# Patient Record
Sex: MALE | Race: WHITE | NOT HISPANIC OR LATINO | Employment: OTHER | URBAN - METROPOLITAN AREA
[De-identification: names, ages, dates, MRNs, and addresses within clinical notes are randomized per-mention and may not be internally consistent; named-entity substitution may affect disease eponyms.]

---

## 2017-02-03 ENCOUNTER — ALLSCRIPTS OFFICE VISIT (OUTPATIENT)
Dept: OTHER | Facility: OTHER | Age: 57
End: 2017-02-03

## 2017-03-04 ENCOUNTER — TRANSCRIBE ORDERS (OUTPATIENT)
Dept: ADMINISTRATIVE | Facility: HOSPITAL | Age: 57
End: 2017-03-04

## 2017-03-04 ENCOUNTER — HOSPITAL ENCOUNTER (OUTPATIENT)
Dept: RADIOLOGY | Facility: MEDICAL CENTER | Age: 57
Discharge: HOME | End: 2017-03-04
Payer: COMMERCIAL

## 2017-03-04 DIAGNOSIS — N20.0 URIC ACID NEPHROLITHIASIS: Primary | ICD-10-CM

## 2017-03-04 DIAGNOSIS — N20.0 URIC ACID NEPHROLITHIASIS: ICD-10-CM

## 2017-03-04 PROCEDURE — 74000 HB X-RAY EXAM OF ABDOMEN (SINGLE ANTEROPOSTERIOR VIEW): CPT

## 2017-04-07 ENCOUNTER — GENERIC CONVERSION - ENCOUNTER (OUTPATIENT)
Dept: OTHER | Facility: OTHER | Age: 57
End: 2017-04-07

## 2017-04-10 ENCOUNTER — GENERIC CONVERSION - ENCOUNTER (OUTPATIENT)
Dept: OTHER | Facility: OTHER | Age: 57
End: 2017-04-10

## 2017-06-26 ENCOUNTER — ALLSCRIPTS OFFICE VISIT (OUTPATIENT)
Dept: OTHER | Facility: OTHER | Age: 57
End: 2017-06-26

## 2017-06-26 ENCOUNTER — TRANSCRIBE ORDERS (OUTPATIENT)
Dept: ADMINISTRATIVE | Facility: HOSPITAL | Age: 57
End: 2017-06-26

## 2017-06-26 DIAGNOSIS — K21.9 GASTRO-ESOPHAGEAL REFLUX DISEASE WITHOUT ESOPHAGITIS: ICD-10-CM

## 2017-06-26 DIAGNOSIS — M19.90 CHRONIC ARTHRITIS: ICD-10-CM

## 2017-06-26 DIAGNOSIS — M19.90 OSTEOARTHRITIS: ICD-10-CM

## 2017-06-26 DIAGNOSIS — Z00.00 ROUTINE GENERAL MEDICAL EXAMINATION AT A HEALTH CARE FACILITY: ICD-10-CM

## 2017-06-26 DIAGNOSIS — Z00.00 ENCOUNTER FOR GENERAL ADULT MEDICAL EXAMINATION WITHOUT ABNORMAL FINDINGS: ICD-10-CM

## 2017-06-26 DIAGNOSIS — R10.12 ABDOMINAL PAIN, LEFT UPPER QUADRANT: ICD-10-CM

## 2017-06-26 DIAGNOSIS — R05.9 COUGH: ICD-10-CM

## 2017-06-26 DIAGNOSIS — R10.12 LEFT UPPER QUADRANT PAIN: ICD-10-CM

## 2017-06-26 DIAGNOSIS — R10.32 LEFT LOWER QUADRANT PAIN: ICD-10-CM

## 2017-06-26 DIAGNOSIS — C62.90 MALIGNANT NEOPLASM OF TESTIS (HCC): ICD-10-CM

## 2017-06-26 DIAGNOSIS — Z13.89 SCREENING FOR GOUT: ICD-10-CM

## 2017-06-26 DIAGNOSIS — R10.32 ABDOMINAL PAIN, LEFT LOWER QUADRANT: ICD-10-CM

## 2017-06-26 DIAGNOSIS — Z13.89 ENCOUNTER FOR SCREENING FOR OTHER DISORDER: ICD-10-CM

## 2017-06-26 DIAGNOSIS — C62.90 MALIGNANT NEOPLASM OF TESTIS, UNSPECIFIED LATERALITY, UNSPECIFIED WHETHER DESCENDED OR UNDESCENDED: Primary | ICD-10-CM

## 2017-06-26 DIAGNOSIS — K21.9 GASTROESOPHAGEAL REFLUX DISEASE WITHOUT ESOPHAGITIS: ICD-10-CM

## 2017-06-27 ENCOUNTER — APPOINTMENT (OUTPATIENT)
Dept: LAB | Facility: MEDICAL CENTER | Age: 57
End: 2017-06-27
Payer: COMMERCIAL

## 2017-06-27 DIAGNOSIS — R10.12 LEFT UPPER QUADRANT PAIN: ICD-10-CM

## 2017-06-27 DIAGNOSIS — K21.9 GASTRO-ESOPHAGEAL REFLUX DISEASE WITHOUT ESOPHAGITIS: ICD-10-CM

## 2017-06-27 DIAGNOSIS — R10.32 LEFT LOWER QUADRANT PAIN: ICD-10-CM

## 2017-06-27 DIAGNOSIS — M19.90 OSTEOARTHRITIS: ICD-10-CM

## 2017-06-27 DIAGNOSIS — Z13.89 ENCOUNTER FOR SCREENING FOR OTHER DISORDER: ICD-10-CM

## 2017-06-27 DIAGNOSIS — C62.90 MALIGNANT NEOPLASM OF TESTIS (HCC): ICD-10-CM

## 2017-06-27 DIAGNOSIS — Z00.00 ENCOUNTER FOR GENERAL ADULT MEDICAL EXAMINATION WITHOUT ABNORMAL FINDINGS: ICD-10-CM

## 2017-06-27 DIAGNOSIS — R05.9 COUGH: ICD-10-CM

## 2017-06-27 LAB
ALBUMIN SERPL BCP-MCNC: 3.6 G/DL (ref 3.5–5)
ALP SERPL-CCNC: 72 U/L (ref 46–116)
ALT SERPL W P-5'-P-CCNC: 41 U/L (ref 12–78)
ANION GAP SERPL CALCULATED.3IONS-SCNC: 7 MMOL/L (ref 4–13)
AST SERPL W P-5'-P-CCNC: 24 U/L (ref 5–45)
BASOPHILS # BLD AUTO: 0.02 THOUSANDS/ΜL (ref 0–0.1)
BASOPHILS NFR BLD AUTO: 0 % (ref 0–1)
BILIRUB SERPL-MCNC: 0.83 MG/DL (ref 0.2–1)
BILIRUB UR QL STRIP: NEGATIVE
BUN SERPL-MCNC: 15 MG/DL (ref 5–25)
CALCIUM SERPL-MCNC: 9.3 MG/DL (ref 8.3–10.1)
CHLORIDE SERPL-SCNC: 102 MMOL/L (ref 100–108)
CLARITY UR: CLEAR
CO2 SERPL-SCNC: 29 MMOL/L (ref 21–32)
COLOR UR: YELLOW
CREAT SERPL-MCNC: 1.01 MG/DL (ref 0.6–1.3)
EOSINOPHIL # BLD AUTO: 0.17 THOUSAND/ΜL (ref 0–0.61)
EOSINOPHIL NFR BLD AUTO: 2 % (ref 0–6)
ERYTHROCYTE [DISTWIDTH] IN BLOOD BY AUTOMATED COUNT: 12.8 % (ref 11.6–15.1)
GFR SERPL CREATININE-BSD FRML MDRD: >60 ML/MIN/1.73SQ M
GLUCOSE P FAST SERPL-MCNC: 84 MG/DL (ref 65–99)
GLUCOSE UR STRIP-MCNC: NEGATIVE MG/DL
HCT VFR BLD AUTO: 46.6 % (ref 36.5–49.3)
HGB BLD-MCNC: 15.7 G/DL (ref 12–17)
HGB UR QL STRIP.AUTO: NEGATIVE
KETONES UR STRIP-MCNC: NEGATIVE MG/DL
LEUKOCYTE ESTERASE UR QL STRIP: NEGATIVE
LIPASE SERPL-CCNC: 127 U/L (ref 73–393)
LYMPHOCYTES # BLD AUTO: 2.13 THOUSANDS/ΜL (ref 0.6–4.47)
LYMPHOCYTES NFR BLD AUTO: 25 % (ref 14–44)
MCH RBC QN AUTO: 30.5 PG (ref 26.8–34.3)
MCHC RBC AUTO-ENTMCNC: 33.7 G/DL (ref 31.4–37.4)
MCV RBC AUTO: 91 FL (ref 82–98)
MONOCYTES # BLD AUTO: 0.9 THOUSAND/ΜL (ref 0.17–1.22)
MONOCYTES NFR BLD AUTO: 10 % (ref 4–12)
NEUTROPHILS # BLD AUTO: 5.42 THOUSANDS/ΜL (ref 1.85–7.62)
NEUTS SEG NFR BLD AUTO: 63 % (ref 43–75)
NITRITE UR QL STRIP: NEGATIVE
NRBC BLD AUTO-RTO: 0 /100 WBCS
PH UR STRIP.AUTO: 6 [PH] (ref 4.5–8)
PLATELET # BLD AUTO: 261 THOUSANDS/UL (ref 149–390)
PMV BLD AUTO: 12 FL (ref 8.9–12.7)
POTASSIUM SERPL-SCNC: 4.5 MMOL/L (ref 3.5–5.3)
PROT SERPL-MCNC: 7.8 G/DL (ref 6.4–8.2)
PROT UR STRIP-MCNC: NEGATIVE MG/DL
RBC # BLD AUTO: 5.15 MILLION/UL (ref 3.88–5.62)
SODIUM SERPL-SCNC: 138 MMOL/L (ref 136–145)
SP GR UR STRIP.AUTO: 1.02 (ref 1–1.03)
TSH SERPL DL<=0.05 MIU/L-ACNC: 3.32 UIU/ML (ref 0.36–3.74)
UROBILINOGEN UR QL STRIP.AUTO: 0.2 E.U./DL
VIT B12 SERPL-MCNC: 1034 PG/ML (ref 100–900)
WBC # BLD AUTO: 8.67 THOUSAND/UL (ref 4.31–10.16)

## 2017-06-27 PROCEDURE — 82607 VITAMIN B-12: CPT

## 2017-06-27 PROCEDURE — 36415 COLL VENOUS BLD VENIPUNCTURE: CPT

## 2017-06-27 PROCEDURE — 83690 ASSAY OF LIPASE: CPT

## 2017-06-27 PROCEDURE — 85025 COMPLETE CBC W/AUTO DIFF WBC: CPT

## 2017-06-27 PROCEDURE — 84443 ASSAY THYROID STIM HORMONE: CPT

## 2017-06-27 PROCEDURE — 87086 URINE CULTURE/COLONY COUNT: CPT

## 2017-06-27 PROCEDURE — 80053 COMPREHEN METABOLIC PANEL: CPT

## 2017-06-27 PROCEDURE — 81003 URINALYSIS AUTO W/O SCOPE: CPT

## 2017-06-28 LAB — BACTERIA UR CULT: NORMAL

## 2017-07-03 ENCOUNTER — HOSPITAL ENCOUNTER (OUTPATIENT)
Dept: CT IMAGING | Facility: HOSPITAL | Age: 57
Discharge: HOME | End: 2017-07-03
Payer: COMMERCIAL

## 2017-07-03 DIAGNOSIS — Z13.89 ENCOUNTER FOR SCREENING FOR OTHER DISORDER: ICD-10-CM

## 2017-07-03 DIAGNOSIS — C62.90 MALIGNANT NEOPLASM OF TESTIS (HCC): ICD-10-CM

## 2017-07-03 DIAGNOSIS — R10.12 LEFT UPPER QUADRANT PAIN: ICD-10-CM

## 2017-07-03 DIAGNOSIS — M19.90 OSTEOARTHRITIS: ICD-10-CM

## 2017-07-03 DIAGNOSIS — R05.9 COUGH: ICD-10-CM

## 2017-07-03 DIAGNOSIS — K21.9 GASTRO-ESOPHAGEAL REFLUX DISEASE WITHOUT ESOPHAGITIS: ICD-10-CM

## 2017-07-03 DIAGNOSIS — Z00.00 ENCOUNTER FOR GENERAL ADULT MEDICAL EXAMINATION WITHOUT ABNORMAL FINDINGS: ICD-10-CM

## 2017-07-03 DIAGNOSIS — R10.32 LEFT LOWER QUADRANT PAIN: ICD-10-CM

## 2017-07-03 PROCEDURE — 74177 CT ABD & PELVIS W/CONTRAST: CPT

## 2017-07-03 RX ADMIN — IOHEXOL 100 ML: 350 INJECTION, SOLUTION INTRAVENOUS at 08:12

## 2017-07-07 ENCOUNTER — GENERIC CONVERSION - ENCOUNTER (OUTPATIENT)
Dept: OTHER | Facility: OTHER | Age: 57
End: 2017-07-07

## 2017-07-07 ENCOUNTER — ALLSCRIPTS OFFICE VISIT (OUTPATIENT)
Dept: OTHER | Facility: OTHER | Age: 57
End: 2017-07-07

## 2018-01-12 VITALS
HEIGHT: 72 IN | DIASTOLIC BLOOD PRESSURE: 77 MMHG | WEIGHT: 235 LBS | SYSTOLIC BLOOD PRESSURE: 123 MMHG | BODY MASS INDEX: 31.83 KG/M2 | HEART RATE: 72 BPM

## 2018-01-13 VITALS
WEIGHT: 232.5 LBS | HEIGHT: 72 IN | DIASTOLIC BLOOD PRESSURE: 80 MMHG | TEMPERATURE: 97.8 F | BODY MASS INDEX: 31.49 KG/M2 | SYSTOLIC BLOOD PRESSURE: 116 MMHG

## 2018-01-13 NOTE — RESULT NOTES
Verified Results  * CT ABDOMEN PELVIS W CONTRAST [id number] 07:31AM Mika Santos Order Number: [id number]    - Patient Instructions: To schedule this appointment, please contact Central Scheduling at [phone]  Test Name Result Flag Reference   CT ABDOMEN PELVIS W CONTRAST (Report)     CT ABDOMEN AND PELVIS WITH IV CONTRAST     INDICATION: Left lower quadrant pain  History of testicular cancer     COMPARISON: 1/14/2014     TECHNIQUE: CT examination of the abdomen and pelvis was performed  Reformatted images were created in axial, sagittal, and coronal planes  Radiation dose length product (DLP) for this visit: 715 mGy-cm   This examination, like all CT scans performed in the [facility], was performed utilizing techniques to minimize radiation dose exposure, including the use of iterative    reconstruction and automated exposure control  IV Contrast: 100 mL of iohexol (OMNIPAQUE) 350   Enteric Contrast: Enteric contrast was not administered  FINDINGS:     ABDOMEN     LOWER CHEST: No significant abnormalities identified in the lower chest      LIVER/BILIARY TREE: Unremarkable  GALLBLADDER: No calcified gallstones  No pericholecystic inflammatory change  SPLEEN: Unremarkable  PANCREAS: Unremarkable  ADRENAL GLANDS: Unremarkable  KIDNEYS/URETERS: There is an approximately 4 mm calculus in the interpolar portion of the right kidney  On the left, there is focal lateral cortical scarring with adjacent calcification, as on the prior study  There is no hydronephrosis  No acute    renal abnormality is seen  STOMACH AND BOWEL: Scattered colonic diverticula are seen  There is mild inflammatory change involving the midportion of the descending colon  Induration of the adjacent adipose tissue is seen  There is no extraluminal abscess or free air  The wall    is mildly thickened  APPENDIX: No findings to suggest appendicitis       ABDOMINOPELVIC CAVITY: No ascites or free intraperitoneal air  No lymphadenopathy  VESSELS: Unremarkable for patient's age  PELVIS     REPRODUCTIVE ORGANS: Unremarkable for patient's age  URINARY BLADDER: Unremarkable  ABDOMINAL WALL/INGUINAL REGIONS: Unremarkable  OSSEOUS STRUCTURES: No acute fracture or destructive osseous lesion  IMPRESSION:       1  Mild inflammatory changes involving the midportion of the descending colon, most consistent with diverticulitis  No extraluminal abscess, free air, or ascites  2  Left renal cortical scarring with adjacent calcification and nonobstructing right renal calculus  No hydronephrosis     3  No intra-abdominal adenopathy or other finding that would suggest recurrent testicular carcinoma        ##imslh##imslh            Workstation performed: [id number]     Signed by:   Pito Portillo MD   7/6/17

## 2018-01-14 VITALS
BODY MASS INDEX: 31.42 KG/M2 | DIASTOLIC BLOOD PRESSURE: 72 MMHG | HEIGHT: 72 IN | WEIGHT: 232 LBS | SYSTOLIC BLOOD PRESSURE: 120 MMHG

## 2018-01-15 RX ORDER — DUTASTERIDE 0.5 MG/1
0.5 CAPSULE, LIQUID FILLED ORAL DAILY
Status: ON HOLD | COMMUNITY
Start: 2017-07-07 | End: 2018-01-18 | Stop reason: ALTCHOICE

## 2018-01-15 RX ORDER — CALCIUM CARBONATE 200(500)MG
1 TABLET,CHEWABLE ORAL AS NEEDED
COMMUNITY
End: 2019-11-22

## 2018-01-15 RX ORDER — ALFUZOSIN HYDROCHLORIDE 10 MG/1
10 TABLET, EXTENDED RELEASE ORAL EVERY MORNING
Status: ON HOLD | COMMUNITY
Start: 2017-06-26 | End: 2018-01-18 | Stop reason: ALTCHOICE

## 2018-01-15 RX ORDER — ACETAMINOPHEN 325 MG/1
650 TABLET ORAL EVERY 6 HOURS PRN
COMMUNITY
End: 2018-05-15

## 2018-01-15 NOTE — PRE-PROCEDURE INSTRUCTIONS
Pre-Surgery Instructions:   Medication Instructions    acetaminophen (TYLENOL) 325 mg tablet Instructed patient per Anesthesia Guidelines   alfuzosin (UROXATRAL) 10 mg 24 hr tablet Instructed patient per Anesthesia Guidelines   aspirin (ECOTRIN) 325 mg EC tablet Instructed patient per Anesthesia Guidelines   calcium carbonate (TUMS) 500 mg chewable tablet Instructed patient per Anesthesia Guidelines   dutasteride (AVODART) 0 5 mg capsule Instructed patient per Anesthesia Guidelines   NON FORMULARY Instructed patient per Anesthesia Guidelines   psyllium (METAMUCIL) 0 52 g capsule Instructed patient per Anesthesia Guidelines  Per anesthesia patient was told to stop NSAIDS, ASA, and supplements and no medications are to be taken on morning of surgery

## 2018-01-17 ENCOUNTER — ANESTHESIA EVENT (OUTPATIENT)
Dept: PERIOP | Facility: HOSPITAL | Age: 58
End: 2018-01-17
Payer: COMMERCIAL

## 2018-01-18 ENCOUNTER — HOSPITAL ENCOUNTER (OUTPATIENT)
Facility: HOSPITAL | Age: 58
Setting detail: OUTPATIENT SURGERY
Discharge: HOME | End: 2018-01-18
Attending: UROLOGY | Admitting: UROLOGY
Payer: COMMERCIAL

## 2018-01-18 ENCOUNTER — ANESTHESIA (OUTPATIENT)
Dept: PERIOP | Facility: HOSPITAL | Age: 58
End: 2018-01-18
Payer: COMMERCIAL

## 2018-01-18 VITALS
BODY MASS INDEX: 30.48 KG/M2 | OXYGEN SATURATION: 98 % | SYSTOLIC BLOOD PRESSURE: 150 MMHG | HEIGHT: 72 IN | RESPIRATION RATE: 16 BRPM | DIASTOLIC BLOOD PRESSURE: 88 MMHG | TEMPERATURE: 97.5 F | HEART RATE: 60 BPM | WEIGHT: 225 LBS

## 2018-01-18 PROCEDURE — L8699 PROSTHETIC IMPLANT NOS: HCPCS | Performed by: UROLOGY

## 2018-01-18 DEVICE — IMPLANT URO PROSTATE UROLIFT: Type: IMPLANTABLE DEVICE | Site: PROSTATE | Status: FUNCTIONAL

## 2018-01-18 RX ORDER — ASPIRIN 325 MG
325 TABLET, DELAYED RELEASE (ENTERIC COATED) ORAL EVERY 6 HOURS PRN
Status: DISCONTINUED | OUTPATIENT
Start: 2018-01-18 | End: 2018-01-18 | Stop reason: HOSPADM

## 2018-01-18 RX ORDER — CIPROFLOXACIN 2 MG/ML
400 INJECTION, SOLUTION INTRAVENOUS ONCE
Status: COMPLETED | OUTPATIENT
Start: 2018-01-18 | End: 2018-01-18

## 2018-01-18 RX ORDER — ONDANSETRON 2 MG/ML
4 INJECTION INTRAMUSCULAR; INTRAVENOUS EVERY 6 HOURS PRN
Status: DISCONTINUED | OUTPATIENT
Start: 2018-01-18 | End: 2018-01-18 | Stop reason: HOSPADM

## 2018-01-18 RX ORDER — CALCIUM CARBONATE 200(500)MG
500 TABLET,CHEWABLE ORAL AS NEEDED
Status: DISCONTINUED | OUTPATIENT
Start: 2018-01-18 | End: 2018-01-18 | Stop reason: HOSPADM

## 2018-01-18 RX ORDER — SODIUM CHLORIDE 9 MG/ML
125 INJECTION, SOLUTION INTRAVENOUS CONTINUOUS
Status: DISCONTINUED | OUTPATIENT
Start: 2018-01-18 | End: 2018-01-18 | Stop reason: HOSPADM

## 2018-01-18 RX ORDER — FENTANYL CITRATE 50 UG/ML
50 INJECTION, SOLUTION INTRAMUSCULAR; INTRAVENOUS
Status: DISCONTINUED | OUTPATIENT
Start: 2018-01-18 | End: 2018-01-18 | Stop reason: HOSPADM

## 2018-01-18 RX ORDER — CIPROFLOXACIN 500 MG/1
500 TABLET, FILM COATED ORAL EVERY 12 HOURS SCHEDULED
Status: DISCONTINUED | OUTPATIENT
Start: 2018-01-18 | End: 2018-01-18 | Stop reason: HOSPADM

## 2018-01-18 RX ORDER — DEXAMETHASONE SODIUM PHOSPHATE 4 MG/ML
INJECTION, SOLUTION INTRA-ARTICULAR; INTRALESIONAL; INTRAMUSCULAR; INTRAVENOUS; SOFT TISSUE AS NEEDED
Status: DISCONTINUED | OUTPATIENT
Start: 2018-01-18 | End: 2018-01-18 | Stop reason: SURG

## 2018-01-18 RX ORDER — ACETAMINOPHEN 325 MG/1
650 TABLET ORAL EVERY 6 HOURS PRN
Status: DISCONTINUED | OUTPATIENT
Start: 2018-01-18 | End: 2018-01-18 | Stop reason: HOSPADM

## 2018-01-18 RX ORDER — FENTANYL CITRATE 50 UG/ML
INJECTION, SOLUTION INTRAMUSCULAR; INTRAVENOUS AS NEEDED
Status: DISCONTINUED | OUTPATIENT
Start: 2018-01-18 | End: 2018-01-18 | Stop reason: SURG

## 2018-01-18 RX ORDER — PROPOFOL 10 MG/ML
INJECTION, EMULSION INTRAVENOUS AS NEEDED
Status: DISCONTINUED | OUTPATIENT
Start: 2018-01-18 | End: 2018-01-18 | Stop reason: SURG

## 2018-01-18 RX ORDER — ALFUZOSIN HYDROCHLORIDE 10 MG/1
10 TABLET, EXTENDED RELEASE ORAL DAILY
COMMUNITY
End: 2018-07-05

## 2018-01-18 RX ORDER — CIPROFLOXACIN 500 MG/1
500 TABLET, FILM COATED ORAL EVERY 12 HOURS SCHEDULED
Qty: 14 TABLET | Refills: 0 | Status: SHIPPED | OUTPATIENT
Start: 2018-01-18 | End: 2018-01-25

## 2018-01-18 RX ORDER — ALFUZOSIN HYDROCHLORIDE 10 MG/1
10 TABLET, EXTENDED RELEASE ORAL DAILY
Status: DISCONTINUED | OUTPATIENT
Start: 2018-01-18 | End: 2018-01-18 | Stop reason: HOSPADM

## 2018-01-18 RX ORDER — MIDAZOLAM HYDROCHLORIDE 1 MG/ML
INJECTION INTRAMUSCULAR; INTRAVENOUS AS NEEDED
Status: DISCONTINUED | OUTPATIENT
Start: 2018-01-18 | End: 2018-01-18 | Stop reason: SURG

## 2018-01-18 RX ADMIN — SODIUM CHLORIDE: 0.9 INJECTION, SOLUTION INTRAVENOUS at 11:30

## 2018-01-18 RX ADMIN — MIDAZOLAM HYDROCHLORIDE 2 MG: 1 INJECTION, SOLUTION INTRAMUSCULAR; INTRAVENOUS at 11:00

## 2018-01-18 RX ADMIN — FENTANYL CITRATE 25 MCG: 50 INJECTION INTRAMUSCULAR; INTRAVENOUS at 11:27

## 2018-01-18 RX ADMIN — PROPOFOL 300 MG: 10 INJECTION, EMULSION INTRAVENOUS at 11:08

## 2018-01-18 RX ADMIN — SODIUM CHLORIDE 125 ML/HR: 0.9 INJECTION, SOLUTION INTRAVENOUS at 09:13

## 2018-01-18 RX ADMIN — CIPROFLOXACIN: 2 INJECTION INTRAVENOUS at 10:59

## 2018-01-18 RX ADMIN — LIDOCAINE HYDROCHLORIDE 100 MG: 20 INJECTION, SOLUTION INTRAVENOUS at 11:08

## 2018-01-18 RX ADMIN — FENTANYL CITRATE 25 MCG: 50 INJECTION INTRAMUSCULAR; INTRAVENOUS at 11:13

## 2018-01-18 RX ADMIN — DEXAMETHASONE SODIUM PHOSPHATE 8 MG: 4 INJECTION, SOLUTION INTRAMUSCULAR; INTRAVENOUS at 11:26

## 2018-01-18 NOTE — DISCHARGE INSTRUCTIONS
Cystoscopy   WHAT YOU NEED TO KNOW:   A cystoscopy is a procedure to look inside of your urethra and bladder using a cystoscope  A cystoscope is a small tube with a light and magnifying camera on the end  The procedure is used to diagnose and treat conditions of the bladder, urethra, and prostate  The procedure is also done to remove stones or blood clots from the urethra or bladder  Your healthcare provider may do other tests, such as ureteroscopy, during a cystoscopy  DISCHARGE INSTRUCTIONS:   Call 911 if:   · You suddenly have chest pain or trouble breathing  Seek care immediately if:   · Your urine turns from pink to red, or you have clots in your urine  · You cannot urinate and your bladder feels full  · Your pain or burning becomes worse or lasts longer than 2 days  Contact your healthcare provider or urologist if:   · Your urine stays pink for longer than 3 days  · You urinate less than normal, or still feel like you have to urinate after you use the bathroom  · Your skin is itchy, swollen, or has a new rash  · You have a fever and chills  · You have questions or concerns about your condition or care  Medicines: You may  be given any of the following:  · Antibiotics  help treat or prevent a bacterial infection  · Acetaminophen  decreases pain and fever  It is available without a doctor's order  Ask how much to take and how often to take it  Follow directions  Read the labels of all other medicines you are using to see if they also contain acetaminophen, or ask your doctor or pharmacist  Acetaminophen can cause liver damage if not taken correctly  Do not use more than 4 grams (4,000 milligrams) total of acetaminophen in one day  · Take your medicine as directed  Contact your healthcare provider if you think your medicine is not helping or if you have side effects  Tell him or her if you are allergic to any medicine   Keep a list of the medicines, vitamins, and herbs you take  Include the amounts, and when and why you take them  Bring the list or the pill bottles to follow-up visits  Carry your medicine list with you in case of an emergency  Follow up with your healthcare provider as directed: You may need to have another cystoscopy  Write down your questions so you remember to ask them during your visits  Self-care:   · Drink at least 3 to 4 glasses of water daily for 2 days after your procedure  Do not drink acidic juices such as orange juice and lemonade  Drink water to help prevent blood clots from forming  It can also help decrease the amount of acid in your urine  Acid in your urine may increase the burning feeling when you urinate  · Sit in a warm tub of water  Warm water may relieve pain and bladder spasms  · Do not have sex  until your healthcare provider tells you it is okay  Sex may increase your risk for a urinary tract infection  © 2017 [address] [employer] Information is for End User's use only and may not be sold, redistributed or otherwise used for commercial purposes  All illustrations and images included in CareNotes® are the copyrighted property of A D A M [employer]  or [address]  The above information is an  only  It is not intended as medical advice for individual conditions or treatments  Talk to your doctor, nurse or pharmacist before following any medical regimen to see if it is safe and effective for you  Carreon Catheter Placement and Care   WHAT YOU NEED TO KNOW:   A Carreon catheter is a sterile tube that is inserted into your bladder to drain urine  It is also called an indwelling urinary catheter  The tip of the catheter has a small balloon filled with solution that holds the catheter inside your bladder  DISCHARGE INSTRUCTIONS:   Seek care immediately if:   · Your catheter comes out  · You suddenly have material that looks like sand in the tubing or drainage bag      · No urine is draining into the bag and you have checked the system  · You have pain in your hip, back, pelvis, or lower abdomen  · You are confused or cannot think clearly  Contact your healthcare provider if:   · You have a fever  · You have bladder spasms for more than 1 day after the catheter is placed  · You see blood in the tubing or drainage bag  · You have a rash or itching where the catheter tube is secured to your skin  · Urine leaks from or around the catheter, tubing, or drainage bag  · The closed drainage system has accidently come open or apart  · You see a layer of crystals inside the tubing  · You have questions or concerns about your condition or care  Care for your Carreon catheter:   · Clean your genital area 2 times every day  Clean your catheter and the area around where it was inserted  Use soap and water  Clean your anal opening and catheter area after every bowel movement  · Secure the catheter tube  so you do not pull or move the catheter  This helps prevent pain and bladder spasms  Healthcare providers will show you how to use medical tape or a strap to secure the catheter tube to your body  · Keep a closed drainage system  Your Carreon catheter should always be attached to the drainage bag to form a closed system  Do not disconnect any part of the closed system unless you need to change the bag  Care for your drainage bag:   · Ask if a leg bag is right for you  A leg bag can be worn under your clothes  Ask your healthcare provider for more information about a leg bag  · Keep the drainage bag below the level of your waist   This helps stop urine from moving back up the tubing and into your bladder  Do not loop or kink the tubing  This can cause urine to back up and collect in your bladder  Do not let the drainage bag touch or lie on the floor  · Empty the drainage bag when needed  The weight of a full drainage bag can be painful   Empty the drainage bag every 3 to 6 hours or when it is ? full  · Clean and change the drainage bag as directed  Ask your healthcare provider how often you should change the drainage bag and what cleaning solution to use  Wear disposable gloves when you change the bag  Do not allow the end of the catheter or tubing to touch anything  Clean the ends with an alcohol pad before you reconnect them  What to do if problems develop:   · No urine is draining into the bag:      ¨ Check for kinks in the tubing and straighten them out  ¨ Check the tape or strap used to secure the catheter tube to your skin  Make sure it is not blocking the tube  ¨ Make sure you are not sitting or lying on the tubing  ¨ Make sure the urine bag is hanging below the level of your waist     · Urine leaks from or around the catheter, tubing, or drainage bag:  Check if the closed drainage system has accidently come open or apart  Clean the catheter and tubing ends with a new alcohol pad and reconnect them  Prevent an infection:   · Wash your hands often  Wash before and after you touch your catheter, tubing, or drainage bag  Use soap and water  Wear clean disposable gloves when you care for your catheter or disconnect the drainage bag  Wash your hands before you prepare or eat food  · Drink liquids as directed  Ask your healthcare provider how much liquid to drink each day and which liquids are best for you  Liquids will help flush your kidneys and bladder to help prevent infection  Follow up with your healthcare provider as directed:  Write down your questions so you remember to ask them during your visits  © 2017 [address] Information is for End User's use only and may not be sold, redistributed or otherwise used for commercial purposes  All illustrations and images included in CareNotes® are the copyrighted property of [employer] [facility] , [employer]  or [employer]  The above information is an  only   It is not intended as medical advice for individual conditions or treatments  Talk to your doctor, nurse or pharmacist before following any medical regimen to see if it is safe and effective for you  Urinary Leg Bag   WHAT YOU NEED TO KNOW:   What is a urinary leg bag? A urinary leg bag holds urine that drains from your catheter  It fits under your clothes and allows you to do your normal daily activities  How do I use a urinary leg bag? · Wash your hands  before and after you touch your catheter, tubing, or drainage bag  Use soap and water  This reduces the risk of infection  · Strap your leg bag to your thigh or calf  Make sure the straps are comfortable  The straps can cause problems with blood flow in your leg if they are too tight  · Clean the tip of the drainage tube with alcohol  before attaching it to your catheter  This helps prevent bacteria from getting into your catheter  · The connecting tube should not pull on your catheter  Skin breakdown can occur if there is constant pulling on the catheter  · Check the tube often to make sure it is not kinked or twisted  Blockage in the tube can cause urine to back up into your bladder  Your urine must flow straight through the tube into your leg bag  · Always keep the leg bag below your bladder  This prevents urine from the bag going back into your bladder, which may cause an infection  · Empty your leg bag when it is ½ full, or every 3 hours  A full bag may break or disconnect from the catheter  · Change to your bedside bag before you go to bed  Your bedside bag can hold more urine  Do not use your leg bag at night because it could become too full or break  · Clean your leg bag after every use  Fill the bag with 2 parts vinegar and 3 parts water  Let it soak for 20 minutes, then rinse and let dry  Follow your healthcare provider's instruction on replacing your leg bag with a new one    CARE AGREEMENT:   You have the right to help plan your care  Learn about your health condition and how it may be treated  Discuss treatment options with your caregivers to decide what care you want to receive  You always have the right to refuse treatment  The above information is an  only  It is not intended as medical advice for individual conditions or treatments  Talk to your doctor, nurse or pharmacist before following any medical regimen to see if it is safe and effective for you  © 2017 [address]  Information is for End User's use only and may not be sold, redistributed or otherwise used for commercial purposes  All illustrations and images included in CareNotes® are the copyrighted property of [employer]  or Jude Duarte

## 2018-01-18 NOTE — ANESTHESIA PREPROCEDURE EVALUATION
Review of Systems/Medical History          Cardiovascular  Negative cardio ROS    Pulmonary  Negative pulmonary ROS        GI/Hepatic    GERD well controlled,        Kidney stones,        Endo/Other  Negative endo/other ROS      GYN  Negative gynecology ROS          Hematology  Negative hematology ROS      Musculoskeletal  Negative musculoskeletal ROS        Neurology  Negative neurology ROS      Psychology   Negative psychology ROS              Physical Exam    Airway    Mallampati score: II  TM Distance: >3 FB  Neck ROM: full     Dental   No notable dental hx     Cardiovascular  Comment: Negative ROS, Rhythm: regular, Rate: normal, Cardiovascular exam normal    Pulmonary  Breath sounds clear to auscultation,     Other Findings        Anesthesia Plan  ASA Score- 2     Anesthesia Type- general with ASA Monitors  Additional Monitors:   Airway Plan: LMA  Plan Factors-    Induction- intravenous  Postoperative Plan- Plan for postoperative opioid use  Informed Consent- Anesthetic plan and risks discussed with patient

## 2018-04-13 ENCOUNTER — TRANSCRIBE ORDERS (OUTPATIENT)
Dept: ADMINISTRATIVE | Facility: HOSPITAL | Age: 58
End: 2018-04-13

## 2018-04-13 ENCOUNTER — APPOINTMENT (OUTPATIENT)
Dept: LAB | Facility: MEDICAL CENTER | Age: 58
End: 2018-04-13
Payer: COMMERCIAL

## 2018-04-13 ENCOUNTER — APPOINTMENT (OUTPATIENT)
Dept: RADIOLOGY | Facility: MEDICAL CENTER | Age: 58
End: 2018-04-13
Payer: COMMERCIAL

## 2018-04-13 DIAGNOSIS — R40.1 SEMICOMA: ICD-10-CM

## 2018-04-13 DIAGNOSIS — R39.14 FEELING OF INCOMPLETE BLADDER EMPTYING: ICD-10-CM

## 2018-04-13 DIAGNOSIS — R31.9 HEMATURIA, UNSPECIFIED TYPE: ICD-10-CM

## 2018-04-13 DIAGNOSIS — R39.12 WEAK URINARY STREAM: ICD-10-CM

## 2018-04-13 DIAGNOSIS — R39.12 WEAK URINARY STREAM: Primary | ICD-10-CM

## 2018-04-13 LAB — PSA SERPL-MCNC: 0.2 NG/ML (ref 0–4)

## 2018-04-13 PROCEDURE — 74018 RADEX ABDOMEN 1 VIEW: CPT

## 2018-04-13 PROCEDURE — 36415 COLL VENOUS BLD VENIPUNCTURE: CPT

## 2018-04-13 PROCEDURE — G0103 PSA SCREENING: HCPCS

## 2018-05-09 ENCOUNTER — TRANSCRIBE ORDERS (OUTPATIENT)
Dept: ADMINISTRATIVE | Facility: HOSPITAL | Age: 58
End: 2018-05-09

## 2018-05-09 DIAGNOSIS — R39.14 FEELING OF INCOMPLETE BLADDER EMPTYING: Primary | ICD-10-CM

## 2018-05-11 ENCOUNTER — APPOINTMENT (OUTPATIENT)
Dept: LAB | Facility: MEDICAL CENTER | Age: 58
End: 2018-05-11
Payer: COMMERCIAL

## 2018-05-11 DIAGNOSIS — R39.14 FEELING OF INCOMPLETE BLADDER EMPTYING: ICD-10-CM

## 2018-05-11 DIAGNOSIS — R39.12 WEAK URINARY STREAM: ICD-10-CM

## 2018-05-11 DIAGNOSIS — R31.9 HEMATURIA, UNSPECIFIED TYPE: ICD-10-CM

## 2018-05-11 DIAGNOSIS — R40.1 SEMICOMA: ICD-10-CM

## 2018-05-11 LAB
ANION GAP SERPL CALCULATED.3IONS-SCNC: 7 MMOL/L (ref 4–13)
BUN SERPL-MCNC: 19 MG/DL (ref 5–25)
CALCIUM SERPL-MCNC: 9.1 MG/DL (ref 8.3–10.1)
CHLORIDE SERPL-SCNC: 104 MMOL/L (ref 100–108)
CO2 SERPL-SCNC: 27 MMOL/L (ref 21–32)
CREAT SERPL-MCNC: 1.1 MG/DL (ref 0.6–1.3)
ERYTHROCYTE [DISTWIDTH] IN BLOOD BY AUTOMATED COUNT: 12.9 % (ref 11.6–15.1)
GFR SERPL CREATININE-BSD FRML MDRD: 73 ML/MIN/1.73SQ M
GLUCOSE SERPL-MCNC: 100 MG/DL (ref 65–140)
HCT VFR BLD AUTO: 44.9 % (ref 36.5–49.3)
HGB BLD-MCNC: 15.4 G/DL (ref 12–17)
MCH RBC QN AUTO: 30.9 PG (ref 26.8–34.3)
MCHC RBC AUTO-ENTMCNC: 34.3 G/DL (ref 31.4–37.4)
MCV RBC AUTO: 90 FL (ref 82–98)
PLATELET # BLD AUTO: 250 THOUSANDS/UL (ref 149–390)
PMV BLD AUTO: 11.2 FL (ref 8.9–12.7)
POTASSIUM SERPL-SCNC: 4.5 MMOL/L (ref 3.5–5.3)
RBC # BLD AUTO: 4.98 MILLION/UL (ref 3.88–5.62)
SODIUM SERPL-SCNC: 138 MMOL/L (ref 136–145)
WBC # BLD AUTO: 7.54 THOUSAND/UL (ref 4.31–10.16)

## 2018-05-11 PROCEDURE — 85027 COMPLETE CBC AUTOMATED: CPT

## 2018-05-11 PROCEDURE — 80048 BASIC METABOLIC PNL TOTAL CA: CPT

## 2018-05-11 PROCEDURE — 36415 COLL VENOUS BLD VENIPUNCTURE: CPT

## 2018-05-14 ENCOUNTER — HOSPITAL ENCOUNTER (OUTPATIENT)
Dept: ULTRASOUND IMAGING | Facility: HOSPITAL | Age: 58
Discharge: HOME | End: 2018-05-14
Attending: UROLOGY
Payer: COMMERCIAL

## 2018-05-14 DIAGNOSIS — R39.14 FEELING OF INCOMPLETE BLADDER EMPTYING: ICD-10-CM

## 2018-05-14 PROCEDURE — 76857 US EXAM PELVIC LIMITED: CPT

## 2018-05-15 RX ORDER — AMOXICILLIN 125 MG/1
125 TABLET, CHEWABLE ORAL 2 TIMES DAILY
COMMUNITY
End: 2018-07-05

## 2018-05-15 NOTE — PRE-PROCEDURE INSTRUCTIONS
Pre-Surgery Instructions:   Medication Instructions    alfuzosin (UROXATRAL) 10 mg 24 hr tablet Instructed patient per Anesthesia Guidelines   amoxicillin (AMOXIL) 125 MG chewable tablet Instructed patient per Anesthesia Guidelines   calcium carbonate (TUMS) 500 mg chewable tablet Instructed patient per Anesthesia Guidelines   Misc Natural Products (PROSTATE HEALTH PO) Instructed patient per Anesthesia Guidelines   psyllium (METAMUCIL) 0 52 g capsule Instructed patient per Anesthesia Guidelines   Saw Palmetto, Serenoa repens, (SAW PALMETTO PO) Instructed patient per Anesthesia Guidelines  Spoke to patient via telephone  Medications reviewed and patient was instructed to take no medications am of surgery  Patient was instructed to avoid NSAID, Aspirin, Vitamins, and supplements 7 days prior to surgery  [facility] pre-op instructions reviewed  Pre-op bathing reviewed with patient

## 2018-05-16 ENCOUNTER — ANESTHESIA EVENT (OUTPATIENT)
Dept: PERIOP | Facility: HOSPITAL | Age: 58
End: 2018-05-16
Payer: COMMERCIAL

## 2018-05-17 ENCOUNTER — ANESTHESIA (OUTPATIENT)
Dept: PERIOP | Facility: HOSPITAL | Age: 58
End: 2018-05-17
Payer: COMMERCIAL

## 2018-05-17 ENCOUNTER — HOSPITAL ENCOUNTER (OUTPATIENT)
Facility: HOSPITAL | Age: 58
Setting detail: OUTPATIENT SURGERY
Discharge: HOME | End: 2018-05-17
Attending: UROLOGY | Admitting: UROLOGY
Payer: COMMERCIAL

## 2018-05-17 VITALS
HEIGHT: 72 IN | DIASTOLIC BLOOD PRESSURE: 80 MMHG | TEMPERATURE: 98.4 F | HEART RATE: 55 BPM | BODY MASS INDEX: 30.48 KG/M2 | OXYGEN SATURATION: 96 % | RESPIRATION RATE: 16 BRPM | WEIGHT: 225 LBS | SYSTOLIC BLOOD PRESSURE: 140 MMHG

## 2018-05-17 DIAGNOSIS — N40.1 ENLARGED PROSTATE WITH LOWER URINARY TRACT SYMPTOMS (LUTS): ICD-10-CM

## 2018-05-17 PROCEDURE — 87086 URINE CULTURE/COLONY COUNT: CPT | Performed by: UROLOGY

## 2018-05-17 PROCEDURE — L8699 PROSTHETIC IMPLANT NOS: HCPCS | Performed by: UROLOGY

## 2018-05-17 DEVICE — IMPLANT URO PROSTATE UROLIFT: Type: IMPLANTABLE DEVICE | Site: BLADDER | Status: FUNCTIONAL

## 2018-05-17 RX ORDER — ACETAMINOPHEN 325 MG/1
650 TABLET ORAL EVERY 6 HOURS PRN
Status: DISCONTINUED | OUTPATIENT
Start: 2018-05-17 | End: 2018-05-17 | Stop reason: HOSPADM

## 2018-05-17 RX ORDER — PROPOFOL 10 MG/ML
INJECTION, EMULSION INTRAVENOUS AS NEEDED
Status: DISCONTINUED | OUTPATIENT
Start: 2018-05-17 | End: 2018-05-17 | Stop reason: SURG

## 2018-05-17 RX ORDER — FENTANYL CITRATE 50 UG/ML
INJECTION, SOLUTION INTRAMUSCULAR; INTRAVENOUS AS NEEDED
Status: DISCONTINUED | OUTPATIENT
Start: 2018-05-17 | End: 2018-05-17 | Stop reason: SURG

## 2018-05-17 RX ORDER — LEVOFLOXACIN 500 MG/1
500 TABLET, FILM COATED ORAL ONCE
Status: COMPLETED | OUTPATIENT
Start: 2018-05-17 | End: 2018-05-17

## 2018-05-17 RX ORDER — MAGNESIUM HYDROXIDE 1200 MG/15ML
LIQUID ORAL AS NEEDED
Status: DISCONTINUED | OUTPATIENT
Start: 2018-05-17 | End: 2018-05-17 | Stop reason: HOSPADM

## 2018-05-17 RX ORDER — FENTANYL CITRATE/PF 50 MCG/ML
50 SYRINGE (ML) INJECTION
Status: DISCONTINUED | OUTPATIENT
Start: 2018-05-17 | End: 2018-05-17 | Stop reason: HOSPADM

## 2018-05-17 RX ORDER — ONDANSETRON 2 MG/ML
4 INJECTION INTRAMUSCULAR; INTRAVENOUS ONCE AS NEEDED
Status: DISCONTINUED | OUTPATIENT
Start: 2018-05-17 | End: 2018-05-17 | Stop reason: HOSPADM

## 2018-05-17 RX ORDER — PHENAZOPYRIDINE HYDROCHLORIDE 200 MG/1
200 TABLET, FILM COATED ORAL ONCE
Status: COMPLETED | OUTPATIENT
Start: 2018-05-17 | End: 2018-05-17

## 2018-05-17 RX ORDER — SODIUM CHLORIDE 9 MG/ML
125 INJECTION, SOLUTION INTRAVENOUS CONTINUOUS
Status: DISCONTINUED | OUTPATIENT
Start: 2018-05-17 | End: 2018-05-17 | Stop reason: HOSPADM

## 2018-05-17 RX ORDER — TAMSULOSIN HYDROCHLORIDE 0.4 MG/1
0.4 CAPSULE ORAL ONCE
Status: COMPLETED | OUTPATIENT
Start: 2018-05-17 | End: 2018-05-17

## 2018-05-17 RX ORDER — MIDAZOLAM HYDROCHLORIDE 1 MG/ML
INJECTION INTRAMUSCULAR; INTRAVENOUS AS NEEDED
Status: DISCONTINUED | OUTPATIENT
Start: 2018-05-17 | End: 2018-05-17 | Stop reason: SURG

## 2018-05-17 RX ORDER — LIDOCAINE HYDROCHLORIDE 10 MG/ML
INJECTION, SOLUTION INFILTRATION; PERINEURAL AS NEEDED
Status: DISCONTINUED | OUTPATIENT
Start: 2018-05-17 | End: 2018-05-17 | Stop reason: SURG

## 2018-05-17 RX ORDER — ONDANSETRON 2 MG/ML
INJECTION INTRAMUSCULAR; INTRAVENOUS AS NEEDED
Status: DISCONTINUED | OUTPATIENT
Start: 2018-05-17 | End: 2018-05-17 | Stop reason: SURG

## 2018-05-17 RX ORDER — VANCOMYCIN HYDROCHLORIDE 1 G/200ML
1000 INJECTION, SOLUTION INTRAVENOUS
Status: DISCONTINUED | OUTPATIENT
Start: 2018-05-17 | End: 2018-05-17 | Stop reason: HOSPADM

## 2018-05-17 RX ORDER — OXYCODONE HYDROCHLORIDE AND ACETAMINOPHEN 5; 325 MG/1; MG/1
1 TABLET ORAL EVERY 4 HOURS PRN
Status: DISCONTINUED | OUTPATIENT
Start: 2018-05-17 | End: 2018-05-17 | Stop reason: HOSPADM

## 2018-05-17 RX ADMIN — LEVOFLOXACIN 500 MG: 500 TABLET, FILM COATED ORAL at 12:38

## 2018-05-17 RX ADMIN — DEXAMETHASONE SODIUM PHOSPHATE 4 MG: 10 INJECTION INTRAMUSCULAR; INTRAVENOUS at 10:19

## 2018-05-17 RX ADMIN — VANCOMYCIN HYDROCHLORIDE 1000 MG: 1 INJECTION, SOLUTION INTRAVENOUS at 10:14

## 2018-05-17 RX ADMIN — MIDAZOLAM 2 MG: 1 INJECTION INTRAMUSCULAR; INTRAVENOUS at 10:06

## 2018-05-17 RX ADMIN — FENTANYL CITRATE 50 MCG: 50 INJECTION, SOLUTION INTRAMUSCULAR; INTRAVENOUS at 10:16

## 2018-05-17 RX ADMIN — PHENAZOPYRIDINE HYDROCHLORIDE 200 MG: 200 TABLET ORAL at 12:37

## 2018-05-17 RX ADMIN — FENTANYL CITRATE 50 MCG: 50 INJECTION, SOLUTION INTRAMUSCULAR; INTRAVENOUS at 10:44

## 2018-05-17 RX ADMIN — TAMSULOSIN HYDROCHLORIDE 0.4 MG: 0.4 CAPSULE ORAL at 12:37

## 2018-05-17 RX ADMIN — LIDOCAINE HYDROCHLORIDE 60 MG: 10 INJECTION, SOLUTION INFILTRATION; PERINEURAL at 10:11

## 2018-05-17 RX ADMIN — PROPOFOL 200 MG: 10 INJECTION, EMULSION INTRAVENOUS at 10:11

## 2018-05-17 RX ADMIN — SODIUM CHLORIDE 125 ML/HR: 0.9 INJECTION, SOLUTION INTRAVENOUS at 09:27

## 2018-05-17 RX ADMIN — ONDANSETRON HYDROCHLORIDE 4 MG: 2 INJECTION, SOLUTION INTRAVENOUS at 10:19

## 2018-05-17 NOTE — ANESTHESIA POSTPROCEDURE EVALUATION
Post-Op Assessment Note      CV Status:  Stable    Mental Status:  Alert and awake    Hydration Status:  Euvolemic    PONV Controlled:  Controlled    Airway Patency:  Patent    Post Op Vitals Reviewed:  Yes              BP      Temp      Pulse 58 (05/17/18 1057)   Resp (!) 11 (05/17/18 1057)    SpO2 99 % (05/17/18 1057)

## 2018-05-17 NOTE — OP NOTE
OPERATIVE REPORT    PATIENT NAME: Jhonatan Casillas    :  1960  MRN: [id number]  Pt Location: [facility] OR ROOM 03    SURGERY DATE:   2018  Surgeon(s) and Role:     Eliseo Covington DO - Primary    Preop Diagnosis:  Enlarged prostate with lower urinary tract symptoms (LUTS) [N40 1]  Post-Op Diagnosis Codes:   Enlarged prostate with lower urinary tract symptoms (LUTS) [N40 1]  Procedure(s):  CYSTOSCOPY WITH INSERTION UROLIFT    Specimen(s):  Urine was sent for culture     Estimated Blood Loss:   Minimal    Drains:  Urethral Catheter Latex; Double-lumen 18 Fr   (Active)   Number of days: 119       Anesthesia Type:   General    Operative Indications:  Persistent bladder outlet obstruction  Recurring urinary infections  Incomplete bladder emptying  Presumed overflow type incontinence    Operative Findings:    Obstructing right lateral lobe prostate tissue    Complications:   None    Procedure and Technique:  Patient was identified  General anesthesia was administered  Genitals were prepped and draped  Time-out was taken  Rigid cystoscopy was performed  Right-sided obstructing prostate tissue was noted  Obturator was removed  And replaced with an implant device  A single implant was placed in the right lateral lobe of the prostate proximal to the verumontanum  A 2nd implant was placed in the obstructing right lateral lobe of the prostate distal to the bladder  Cystoscopic examination was was then done and showed anatomic resolution of bladder outlet obstruction  Excellent operative result was accomplished  Eighteen [place] Carreon catheter was placed to gravity drainage and attached to a drainage bag  Patient went to recovery room in good postoperative condition having tolerated the procedure well  He is to follow up at the office in 24 hours for Carreon catheter removal  And subsequent voiding trial       I was present for the entire procedure    Patient Disposition:  PACU     SIGNATURE: Eliseo Covington DO  DATE: May 17, 2018  TIME: 11:04 AM

## 2018-05-17 NOTE — DISCHARGE INSTRUCTIONS
Urinary Leg Bag   WHAT YOU NEED TO KNOW:   What is a urinary leg bag? A urinary leg bag holds urine that drains from your catheter  It fits under your clothes and allows you to do your normal daily activities  How do I use a urinary leg bag? · Wash your hands  before and after you touch your catheter, tubing, or drainage bag  Use soap and water  This reduces the risk of infection  · Strap your leg bag to your thigh or calf  Make sure the straps are comfortable  The straps can cause problems with blood flow in your leg if they are too tight  · Clean the tip of the drainage tube with alcohol  before attaching it to your catheter  This helps prevent bacteria from getting into your catheter  · The connecting tube should not pull on your catheter  Skin breakdown can occur if there is constant pulling on the catheter  · Check the tube often to make sure it is not kinked or twisted  Blockage in the tube can cause urine to back up into your bladder  Your urine must flow straight through the tube into your leg bag  · Always keep the leg bag below your bladder  This prevents urine from the bag going back into your bladder, which may cause an infection  · Empty your leg bag when it is ½ full, or every 3 hours  A full bag may break or disconnect from the catheter  · Change to your bedside bag before you go to bed  Your bedside bag can hold more urine  Do not use your leg bag at night because it could become too full or break  · Clean your leg bag after every use  Fill the bag with 2 parts vinegar and 3 parts water  Let it soak for 20 minutes, then rinse and let dry  Follow your healthcare provider's instruction on replacing your leg bag with a new one  CARE AGREEMENT:   You have the right to help plan your care  Learn about your health condition and how it may be treated  Discuss treatment options with your caregivers to decide what care you want to receive   You always have the right to refuse treatment  The above information is an  only  It is not intended as medical advice for individual conditions or treatments  Talk to your doctor, nurse or pharmacist before following any medical regimen to see if it is safe and effective for you  © 2017 [address] [employer] Information is for End User's use only and may not be sold, redistributed or otherwise used for commercial purposes  All illustrations and images included in CareNotes® are the copyrighted property of [employer]  or Jude Duarte  Carreon Catheter Placement and Care   AMBULATORY CARE:   A Carreon catheter  is a sterile tube that is inserted into your bladder to drain urine  It is also called an indwelling urinary catheter  The tip of the catheter has a small balloon filled with solution that holds the catheter in your bladder  How a Carreon catheter is placed:   · Your healthcare provider will clean your genital area with a sterile solution  He or she will put lubricant jelly on the catheter to help it go in smoothly  The end of the catheter with the deflated balloon will be inserted into your urethra  The catheter will be moved slowly and gently into your bladder  You may be asked to take slow, deep breaths or to push as if you were trying to urinate as the catheter is inserted  · When your healthcare provider sees urine flowing from the catheter, he or she will fill the balloon at the end of the catheter  The balloon holds the catheter in place so it does not come out  Your healthcare provider will attach the open end of the catheter to a sterile drainage bag  Seek care immediately if:   · Your catheter comes out  · You suddenly have material that looks like sand in the tubing or drainage bag  · No urine is draining into the bag and you have checked the system  · You have pain in your hip, back, pelvis, or lower abdomen      · You are confused or cannot think clearly  Contact your healthcare provider if:   · You have a fever  · You have bladder spasms for more than 1 day after the catheter is placed  · You see blood in the tubing or drainage bag  · You have a rash or itching where the catheter tube is secured to your skin  · Urine leaks from or around the catheter, tubing, or drainage bag  · The closed drainage system has accidently come open or apart  · You see a layer of crystals inside the tubing  · You have questions or concerns about your condition or care  Care for your Carreon catheter:   · Clean your genital area 2 times every day  Clean your catheter and the area around where it was inserted  Use soap and water  Clean your anal opening and catheter area after every bowel movement  · Secure the catheter tube  so you do not pull or move the catheter  This helps prevent pain and bladder spasms  Healthcare providers will show you how to use medical tape or a strap to secure the catheter tube to your body  · Keep a closed drainage system  Your Carreon catheter should always be attached to the drainage bag to form a closed system  Do not disconnect any part of the closed system unless you need to change the bag  Care for your drainage bag:   · Ask if a leg bag is right for you  A leg bag can be worn under your clothes  Ask your healthcare provider for more information about a leg bag  · Keep the drainage bag below the level of your waist   This helps stop urine from moving back up the tubing and into your bladder  Do not loop or kink the tubing  This can cause urine to back up and collect in your bladder  Do not let the drainage bag touch or lie on the floor  · Empty the drainage bag when needed  The weight of a full drainage bag can be painful  Empty the drainage bag every 3 to 6 hours or when it is ? full  · Clean and change the drainage bag as directed    Ask your healthcare provider how often you should change the drainage bag and what cleaning solution to use  Wear disposable gloves when you change the bag  Do not allow the end of the catheter or tubing to touch anything  Clean the ends with an alcohol pad before you reconnect them  What to do if problems develop:   · No urine is draining into the bag:      ¨ Check for kinks in the tubing and straighten them out  ¨ Check the tape or strap used to secure the catheter tube to your skin  Make sure it is not blocking the tube  ¨ Make sure you are not sitting or lying on the tubing  ¨ Make sure the urine bag is hanging below the level of your waist     · Urine leaks from or around the catheter, tubing, or drainage bag:  Check if the closed drainage system has accidently come open or apart  Clean the catheter and tubing ends with a new alcohol pad and reconnect them  Prevent an infection:   · Wash your hands often  Wash before and after you touch your catheter, tubing, or drainage bag  Use soap and water  Wear clean disposable gloves when you care for your catheter or disconnect the drainage bag  Wash your hands before you prepare or eat food  · Drink liquids as directed  Ask your healthcare provider how much liquid to drink each day and which liquids are best for you  Liquids will help flush your kidneys and bladder to help prevent infection  Follow up with your healthcare provider as directed:  Write down your questions so you remember to ask them during your visits  © 2017 [employer] Information is for End User's use only and may not be sold, redistributed or otherwise used for commercial purposes  All illustrations and images included in CareNotes® are the copyrighted property of [employer]  or Jude Duarte  The above information is an  only  It is not intended as medical advice for individual conditions or treatments   Talk to your doctor, nurse or pharmacist before following any medical regimen to see if it is safe and effective for you  Benign Prostatic Hypertrophy   WHAT YOU NEED TO KNOW:   Benign prostatic hypertrophy (BPH) is a condition that causes your prostate gland to grow larger than normal  The prostate gland is the male sex gland that produces a fluid that is part of semen  It is about the size of a walnut and it is located under the bladder  As the prostate grows, it can squeeze the urethra  This can block urine flow and cause urinary problems  DISCHARGE INSTRUCTIONS:   Medicines:   · Alpha blockers: This medicine relaxes the muscles in your prostate and bladder  It may help you urinate more easily  · 5 alpha reductase inhibitors: These medicines block the production of a hormone that causes the prostate to get larger  It may help to slow the growth of the prostate or shrink the prostate  · Take your medicine as directed  Contact your healthcare provider if you think your medicine is not helping or if you have side effects  Tell him of her if you are allergic to any medicine  Keep a list of the medicines, vitamins, and herbs you take  Include the amounts, and when and why you take them  Bring the list or the pill bottles to follow-up visits  Carry your medicine list with you in case of an emergency  Follow up with your healthcare provider as directed:  Write down your questions so you remember to ask them during your visits  Manage BPH:   · Do not let your bladder get too full before you empty it  Urinate when you feel the urge  · Limit alcohol  Do not drink large amounts of any liquid at one time  · Decrease the amount of salt you eat  Examples of salty foods are chips, cured meats, and canned soups  Do not use table salt  · Healthcare providers may tell you not to eat spicy foods such as chilli peppers  This may help you find out if spicy food makes your BPH symptoms worse  · You may have sex if you feel well    Contact your healthcare provider if:   · There is a large amount of blood in your urine  · Your signs and symptoms get worse  · You have a fever  · You have questions or concerns about your condition or care  Return to the emergency department if:   · You are unable to urinate  · Your bladder feels very full and painful  © 2017 [address] Information is for End User's use only and may not be sold, redistributed or otherwise used for commercial purposes  All illustrations and images included in CareNotes® are the copyrighted property of [employer]  or Jude Duarte  The above information is an  only  It is not intended as medical advice for individual conditions or treatments  Talk to your doctor, nurse or pharmacist before following any medical regimen to see if it is safe and effective for you  Cystoscopy   WHAT YOU NEED TO KNOW:   A cystoscopy is a procedure to look inside of your urethra and bladder using a cystoscope  A cystoscope is a small tube with a light and magnifying camera on the end  The procedure is used to diagnose and treat conditions of the bladder, urethra, and prostate  The procedure is also done to remove stones or blood clots from the urethra or bladder  Your healthcare provider may do other tests, such as ureteroscopy, during a cystoscopy  DISCHARGE INSTRUCTIONS:   Call 911 if:   · You suddenly have chest pain or trouble breathing  Seek care immediately if:   · Your urine turns from pink to red, or you have clots in your urine  · You cannot urinate and your bladder feels full  · Your pain or burning becomes worse or lasts longer than 2 days  Contact your healthcare provider or urologist if:   · Your urine stays pink for longer than 3 days  · You urinate less than normal, or still feel like you have to urinate after you use the bathroom  · Your skin is itchy, swollen, or has a new rash  · You have a fever and chills      · You have questions or concerns about your condition or care  Medicines: You may  be given any of the following:  · Antibiotics  help treat or prevent a bacterial infection  · Acetaminophen  decreases pain and fever  It is available without a doctor's order  Ask how much to take and how often to take it  Follow directions  Read the labels of all other medicines you are using to see if they also contain acetaminophen, or ask your doctor or pharmacist  Acetaminophen can cause liver damage if not taken correctly  Do not use more than 4 grams (4,000 milligrams) total of acetaminophen in one day  · Take your medicine as directed  Contact your healthcare provider if you think your medicine is not helping or if you have side effects  Tell him or her if you are allergic to any medicine  Keep a list of the medicines, vitamins, and herbs you take  Include the amounts, and when and why you take them  Bring the list or the pill bottles to follow-up visits  Carry your medicine list with you in case of an emergency  Follow up with your healthcare provider as directed: You may need to have another cystoscopy  Write down your questions so you remember to ask them during your visits  Self-care:   · Drink at least 3 to 4 glasses of water daily for 2 days after your procedure  Do not drink acidic juices such as orange juice and lemonade  Drink water to help prevent blood clots from forming  It can also help decrease the amount of acid in your urine  Acid in your urine may increase the burning feeling when you urinate  · Sit in a warm tub of water  Warm water may relieve pain and bladder spasms  · Do not have sex  until your healthcare provider tells you it is okay  Sex may increase your risk for a urinary tract infection  © 2017 [address] [employer]  Information is for End User's use only and may not be sold, redistributed or otherwise used for commercial purposes   All illustrations and images included in CareNotes® are the copyrighted property of [employer]  or Jude Duarte  The above information is an  only  It is not intended as medical advice for individual conditions or treatments  Talk to your doctor, nurse or pharmacist before following any medical regimen to see if it is safe and effective for you

## 2018-05-19 LAB — BACTERIA UR CULT: NORMAL

## 2018-07-05 ENCOUNTER — OFFICE VISIT (OUTPATIENT)
Dept: FAMILY MEDICINE CLINIC | Facility: CLINIC | Age: 58
End: 2018-07-05
Payer: COMMERCIAL

## 2018-07-05 VITALS
WEIGHT: 231.6 LBS | BODY MASS INDEX: 31.37 KG/M2 | SYSTOLIC BLOOD PRESSURE: 110 MMHG | DIASTOLIC BLOOD PRESSURE: 66 MMHG | HEART RATE: 75 BPM | OXYGEN SATURATION: 94 % | HEIGHT: 72 IN

## 2018-07-05 DIAGNOSIS — R29.898 WEAKNESS OF BOTH UPPER EXTREMITIES: Primary | ICD-10-CM

## 2018-07-05 DIAGNOSIS — R60.0 LOCALIZED EDEMA: ICD-10-CM

## 2018-07-05 DIAGNOSIS — Z12.11 SCREENING FOR COLON CANCER: ICD-10-CM

## 2018-07-05 DIAGNOSIS — R42 DIZZINESS: ICD-10-CM

## 2018-07-05 PROCEDURE — 99214 OFFICE O/P EST MOD 30 MIN: CPT | Performed by: FAMILY MEDICINE

## 2018-07-05 RX ORDER — DUTASTERIDE 0.5 MG/1
0.5 CAPSULE, LIQUID FILLED ORAL DAILY
Refills: 11 | COMMUNITY
Start: 2018-06-19 | End: 2019-08-15

## 2018-07-05 NOTE — PROGRESS NOTES
Assessment/Plan:   patient go for MRI of the brain due to abnormal neurologic test in today along with symptoms progressively worsening over the past year  Patient will go for laboratory studies also  Patient will follow up with Urology regarding UTI  Patient will have further testing for lower extremity edema as well as use of Galo stockings  Patient will stay well hydrated  Patient will follow up after MRI in studies done  Diagnoses and all orders for this visit:    Weakness of both upper extremities  -     CBC and differential; Future  -     Comprehensive metabolic panel; Future  -     Lipid panel; Future  -     TSH, 3rd generation with Free T4 reflex; Future  -     Magnesium; Future  -     Lyme disease, western blot; Future  -     C-reactive protein; Future  -     Vitamin B12; Future  -     Vitamin D 25 hydroxy; Future  -     MRI brain wo contrast; Future    Screening for colon cancer  -     Ambulatory referral to Gastroenterology; Future    Dizziness  -     CBC and differential; Future  -     Comprehensive metabolic panel; Future  -     Lipid panel; Future  -     TSH, 3rd generation with Free T4 reflex; Future  -     Magnesium; Future  -     Lyme disease, western blot; Future  -     C-reactive protein; Future  -     Vitamin B12; Future  -     Vitamin D 25 hydroxy; Future  -     MRI brain wo contrast; Future    Localized edema  -     CBC and differential; Future  -     Comprehensive metabolic panel; Future  -     Lipid panel; Future  -     TSH, 3rd generation with Free T4 reflex; Future  -     Magnesium; Future  -     Lyme disease, western blot; Future  -     C-reactive protein; Future  -     Vitamin B12; Future  -     Vitamin D 25 hydroxy; Future  -     MRI brain wo contrast; Future  -     NT-BNP PRO; Future  -     TEDS Stockings    Other orders  -     Cancel: Ambulatory referral to Gastroenterology;  Future  -     Cancel: Occult Bloood,Fecal Immunochemical; Future  -     dutasteride (AVODART) 0 5 mg capsule; Take 0 5 mg by mouth daily          Subjective:      Patient ID: Pratibha Ralph is a 60 y o  male  Patient is here for multiple reasons  Patient with some dizziness when standing up from a bent over position or lying position  No vertigo or syncope noted  No chest pain or shortness of breath directly related to this  Patient has noticed some bilateral lower extremity edema also over the past 6 months or so  Patient does see Urology for for prostate related issues  Patient has had bladder infection recently  Patient is following up with Dr Haylie Mathis in 2 weeks  Patient also with some difficulty with his signature right hand along with some other and coordination and dropping items with the right hand  No significant headache or vision changes  Patient with some decreased coordination with legs also  Patient does use fiber supplements for constipation issues  The following portions of the patient's history were reviewed and updated as appropriate: allergies, current medications, past family history, past medical history, past social history, past surgical history and problem list     Review of Systems   HENT: Negative  Eyes: Negative  Respiratory: Negative  Cardiovascular: Negative  Gastrointestinal: Positive for constipation  Endocrine: Negative  Genitourinary: Positive for dysuria  Musculoskeletal: Negative  Skin: Negative  Allergic/Immunologic: Negative  Neurological: Positive for dizziness, weakness, light-headedness and numbness  Negative for headaches  Hematological: Negative  Psychiatric/Behavioral: Negative  Objective:      /66 (BP Location: Right arm, Patient Position: Sitting, Cuff Size: Adult)   Pulse 75   Ht 6' (1 829 m)   Wt 105 kg (231 lb 9 6 oz)   SpO2 94%   BMI 31 41 kg/m²          Physical Exam   Constitutional: He is oriented to person, place, and time  He appears well-developed and well-nourished  No distress     HENT: Head: Normocephalic  Right Ear: External ear normal    Left Ear: External ear normal    Mouth/Throat: Oropharynx is clear and moist  No oropharyngeal exudate  Eyes: EOM are normal  Pupils are equal, round, and reactive to light  Right eye exhibits no discharge  Left eye exhibits no discharge  No scleral icterus  Neck: Normal range of motion  Neck supple  No thyromegaly present  Cardiovascular: Normal rate, regular rhythm, normal heart sounds and intact distal pulses  Exam reveals no gallop and no friction rub  No murmur heard  Pulmonary/Chest: Effort normal and breath sounds normal  No respiratory distress  He has no wheezes  He has no rales  He exhibits no tenderness  Abdominal: Soft  Bowel sounds are normal  He exhibits no distension  There is no tenderness  There is no rebound and no guarding  Musculoskeletal: Normal range of motion  He exhibits no edema or tenderness  Lymphadenopathy:     He has no cervical adenopathy  Neurological: He is alert and oriented to person, place, and time  No cranial nerve deficit  Coordination abnormal    Poor memory negative Romberg  Patient with some difficulty with finger-to-nose testing bilaterally   Skin: Skin is warm and dry  No rash noted  He is not diaphoretic  No erythema  No pallor  Psychiatric: He has a normal mood and affect  His behavior is normal  Judgment and thought content normal    Nursing note and vitals reviewed

## 2018-07-06 ENCOUNTER — APPOINTMENT (OUTPATIENT)
Dept: LAB | Facility: MEDICAL CENTER | Age: 58
End: 2018-07-06
Payer: COMMERCIAL

## 2018-07-06 DIAGNOSIS — R60.0 LOCALIZED EDEMA: ICD-10-CM

## 2018-07-06 DIAGNOSIS — R29.898 WEAKNESS OF BOTH UPPER EXTREMITIES: ICD-10-CM

## 2018-07-06 DIAGNOSIS — R42 DIZZINESS: ICD-10-CM

## 2018-07-06 LAB
25(OH)D3 SERPL-MCNC: 25.1 NG/ML (ref 30–100)
ALBUMIN SERPL BCP-MCNC: 3.9 G/DL (ref 3.5–5)
ALP SERPL-CCNC: 51 U/L (ref 46–116)
ALT SERPL W P-5'-P-CCNC: 27 U/L (ref 12–78)
ANION GAP SERPL CALCULATED.3IONS-SCNC: 6 MMOL/L (ref 4–13)
AST SERPL W P-5'-P-CCNC: 16 U/L (ref 5–45)
BASOPHILS # BLD AUTO: 0.03 THOUSANDS/ΜL (ref 0–0.1)
BASOPHILS NFR BLD AUTO: 1 % (ref 0–1)
BILIRUB SERPL-MCNC: 2.03 MG/DL (ref 0.2–1)
BUN SERPL-MCNC: 24 MG/DL (ref 5–25)
CALCIUM SERPL-MCNC: 9 MG/DL (ref 8.3–10.1)
CHLORIDE SERPL-SCNC: 103 MMOL/L (ref 100–108)
CHOLEST SERPL-MCNC: 186 MG/DL (ref 50–200)
CO2 SERPL-SCNC: 29 MMOL/L (ref 21–32)
CREAT SERPL-MCNC: 1.17 MG/DL (ref 0.6–1.3)
CRP SERPL QL: <3 MG/L
EOSINOPHIL # BLD AUTO: 0.11 THOUSAND/ΜL (ref 0–0.61)
EOSINOPHIL NFR BLD AUTO: 2 % (ref 0–6)
ERYTHROCYTE [DISTWIDTH] IN BLOOD BY AUTOMATED COUNT: 13 % (ref 11.6–15.1)
GFR SERPL CREATININE-BSD FRML MDRD: 68 ML/MIN/1.73SQ M
GLUCOSE P FAST SERPL-MCNC: 117 MG/DL (ref 65–99)
HCT VFR BLD AUTO: 46.8 % (ref 36.5–49.3)
HDLC SERPL-MCNC: 49 MG/DL (ref 40–60)
HGB BLD-MCNC: 15.3 G/DL (ref 12–17)
IMM GRANULOCYTES # BLD AUTO: 0.02 THOUSAND/UL (ref 0–0.2)
IMM GRANULOCYTES NFR BLD AUTO: 0 % (ref 0–2)
LDLC SERPL CALC-MCNC: 120 MG/DL (ref 0–100)
LYMPHOCYTES # BLD AUTO: 1.67 THOUSANDS/ΜL (ref 0.6–4.47)
LYMPHOCYTES NFR BLD AUTO: 26 % (ref 14–44)
MAGNESIUM SERPL-MCNC: 2.6 MG/DL (ref 1.6–2.6)
MCH RBC QN AUTO: 30.2 PG (ref 26.8–34.3)
MCHC RBC AUTO-ENTMCNC: 32.7 G/DL (ref 31.4–37.4)
MCV RBC AUTO: 93 FL (ref 82–98)
MONOCYTES # BLD AUTO: 0.71 THOUSAND/ΜL (ref 0.17–1.22)
MONOCYTES NFR BLD AUTO: 11 % (ref 4–12)
NEUTROPHILS # BLD AUTO: 3.99 THOUSANDS/ΜL (ref 1.85–7.62)
NEUTS SEG NFR BLD AUTO: 60 % (ref 43–75)
NONHDLC SERPL-MCNC: 137 MG/DL
NRBC BLD AUTO-RTO: 0 /100 WBCS
NT-PROBNP SERPL-MCNC: 91 PG/ML
PLATELET # BLD AUTO: 223 THOUSANDS/UL (ref 149–390)
PMV BLD AUTO: 11.6 FL (ref 8.9–12.7)
POTASSIUM SERPL-SCNC: 4.9 MMOL/L (ref 3.5–5.3)
PROT SERPL-MCNC: 7.2 G/DL (ref 6.4–8.2)
RBC # BLD AUTO: 5.06 MILLION/UL (ref 3.88–5.62)
SODIUM SERPL-SCNC: 138 MMOL/L (ref 136–145)
TRIGL SERPL-MCNC: 83 MG/DL
TSH SERPL DL<=0.05 MIU/L-ACNC: 3.09 UIU/ML (ref 0.36–3.74)
VIT B12 SERPL-MCNC: 638 PG/ML (ref 100–900)
WBC # BLD AUTO: 6.53 THOUSAND/UL (ref 4.31–10.16)

## 2018-07-06 PROCEDURE — 36415 COLL VENOUS BLD VENIPUNCTURE: CPT

## 2018-07-06 PROCEDURE — 82306 VITAMIN D 25 HYDROXY: CPT

## 2018-07-06 PROCEDURE — 85025 COMPLETE CBC W/AUTO DIFF WBC: CPT

## 2018-07-06 PROCEDURE — 83735 ASSAY OF MAGNESIUM: CPT

## 2018-07-06 PROCEDURE — 80053 COMPREHEN METABOLIC PANEL: CPT

## 2018-07-06 PROCEDURE — 84443 ASSAY THYROID STIM HORMONE: CPT

## 2018-07-06 PROCEDURE — 82607 VITAMIN B-12: CPT

## 2018-07-06 PROCEDURE — 86617 LYME DISEASE ANTIBODY: CPT

## 2018-07-06 PROCEDURE — 83880 ASSAY OF NATRIURETIC PEPTIDE: CPT

## 2018-07-06 PROCEDURE — 80061 LIPID PANEL: CPT

## 2018-07-06 PROCEDURE — 86140 C-REACTIVE PROTEIN: CPT

## 2018-07-07 LAB
B BURGDOR IGG PATRN SER IB-IMP: NEGATIVE
B BURGDOR IGM PATRN SER IB-IMP: NEGATIVE
B BURGDOR18KD IGG SER QL IB: NORMAL
B BURGDOR23KD IGG SER QL IB: NORMAL
B BURGDOR23KD IGM SER QL IB: NORMAL
B BURGDOR28KD IGG SER QL IB: NORMAL
B BURGDOR30KD IGG SER QL IB: NORMAL
B BURGDOR39KD IGG SER QL IB: NORMAL
B BURGDOR39KD IGM SER QL IB: NORMAL
B BURGDOR41KD IGG SER QL IB: NORMAL
B BURGDOR41KD IGM SER QL IB: NORMAL
B BURGDOR45KD IGG SER QL IB: NORMAL
B BURGDOR58KD IGG SER QL IB: NORMAL
B BURGDOR66KD IGG SER QL IB: NORMAL
B BURGDOR93KD IGG SER QL IB: NORMAL

## 2018-07-09 ENCOUNTER — TELEPHONE (OUTPATIENT)
Dept: FAMILY MEDICINE CLINIC | Facility: CLINIC | Age: 58
End: 2018-07-09

## 2018-07-09 NOTE — TELEPHONE ENCOUNTER
----- Message from Esmer Pope DO sent at 7/9/2018  7:47 AM EDT -----  Call patient  Labs normal other than borderline elevated blood sugar and bilirubin level

## 2018-07-13 ENCOUNTER — HOSPITAL ENCOUNTER (OUTPATIENT)
Dept: MRI IMAGING | Facility: HOSPITAL | Age: 58
Discharge: HOME | End: 2018-07-13
Payer: COMMERCIAL

## 2018-07-13 DIAGNOSIS — R42 DIZZINESS: ICD-10-CM

## 2018-07-13 DIAGNOSIS — R29.898 WEAKNESS OF BOTH UPPER EXTREMITIES: ICD-10-CM

## 2018-07-13 DIAGNOSIS — R60.0 LOCALIZED EDEMA: ICD-10-CM

## 2018-07-13 PROCEDURE — 70551 MRI BRAIN STEM W/O DYE: CPT

## 2018-08-13 ENCOUNTER — OFFICE VISIT (OUTPATIENT)
Dept: FAMILY MEDICINE CLINIC | Facility: CLINIC | Age: 58
End: 2018-08-13
Payer: COMMERCIAL

## 2018-08-13 VITALS
DIASTOLIC BLOOD PRESSURE: 83 MMHG | SYSTOLIC BLOOD PRESSURE: 120 MMHG | BODY MASS INDEX: 29.16 KG/M2 | WEIGHT: 220 LBS | HEIGHT: 73 IN

## 2018-08-13 DIAGNOSIS — R29.898 WEAKNESS OF BOTH UPPER EXTREMITIES: Primary | ICD-10-CM

## 2018-08-13 DIAGNOSIS — R42 DIZZINESS: ICD-10-CM

## 2018-08-13 PROCEDURE — 99213 OFFICE O/P EST LOW 20 MIN: CPT | Performed by: FAMILY MEDICINE

## 2018-08-13 PROCEDURE — 3008F BODY MASS INDEX DOCD: CPT | Performed by: FAMILY MEDICINE

## 2018-08-13 RX ORDER — ALFUZOSIN HYDROCHLORIDE 10 MG/1
10 TABLET, EXTENDED RELEASE ORAL DAILY
COMMUNITY
End: 2018-10-18

## 2018-08-13 NOTE — PROGRESS NOTES
Assessment/Plan:    Patient MRI and labs discussed with him  Patient will be referred to Neurology  Patient will try using Uroxatral at night  May consider trial off medication  patient will follow up as needed     Diagnoses and all orders for this visit:    Weakness of both upper extremities    Dizziness    Other orders  -     alfuzosin (UROXATRAL) 10 mg 24 hr tablet; Take 10 mg by mouth daily          Subjective:      Patient ID: Ban Quintanilla is a 60 y o  male  Patient here to follow-up on MRI and laboratory studies  Patient still having some hand tremors and micrographia  Patient also with some difficulty swallowing intermittently  Patient also does get some ringing in his ears as well as dizziness and lightheadedness  Patient is trying to alter the way he does things IE gets up slowly and hold on the things in the shower  Patient will drug heel intermittently when walking  Patient also will stagger when walking  The following portions of the patient's history were reviewed and updated as appropriate: allergies, current medications, past family history, past medical history, past social history, past surgical history and problem list     Review of Systems   HENT: Negative  Eyes: Negative  Respiratory: Negative  Cardiovascular: Negative  Gastrointestinal: Positive for constipation  Endocrine: Negative  Genitourinary: Negative  Musculoskeletal: Negative  Skin: Negative  Allergic/Immunologic: Negative  Neurological: Positive for dizziness, tremors, weakness, light-headedness and numbness  Negative for syncope and headaches  Hematological: Negative  Psychiatric/Behavioral: Negative  Objective:      /83 (BP Location: Right arm, Patient Position: Sitting, Cuff Size: Standard)   Ht 6' 1" (1 854 m)   Wt 99 8 kg (220 lb)   BMI 29 03 kg/m²          Physical Exam   Constitutional: He is oriented to person, place, and time   He appears well-developed and well-nourished  No distress  HENT:   Head: Normocephalic  Right Ear: External ear normal    Left Ear: External ear normal    Mouth/Throat: Oropharynx is clear and moist  No oropharyngeal exudate  Eyes: EOM are normal  Pupils are equal, round, and reactive to light  Right eye exhibits no discharge  Left eye exhibits no discharge  No scleral icterus  Neck: Normal range of motion  Neck supple  No thyromegaly present  Cardiovascular: Normal rate, regular rhythm, normal heart sounds and intact distal pulses  Exam reveals no gallop and no friction rub  No murmur heard  Pulmonary/Chest: Effort normal and breath sounds normal  No respiratory distress  He has no wheezes  He has no rales  He exhibits no tenderness  Abdominal: Soft  Bowel sounds are normal  He exhibits no distension  There is no tenderness  There is no rebound and no guarding  Musculoskeletal: Normal range of motion  He exhibits no edema or tenderness  Lymphadenopathy:     He has no cervical adenopathy  Neurological: He is oriented to person, place, and time  Coordination abnormal    Skin: Skin is warm and dry  No rash noted  He is not diaphoretic  No erythema  No pallor  Psychiatric: He has a normal mood and affect  His behavior is normal  Judgment and thought content normal    Nursing note and vitals reviewed

## 2018-09-18 ENCOUNTER — TELEPHONE (OUTPATIENT)
Dept: FAMILY MEDICINE CLINIC | Facility: CLINIC | Age: 58
End: 2018-09-18

## 2018-09-18 DIAGNOSIS — R29.898 WEAKNESS OF BOTH UPPER EXTREMITIES: ICD-10-CM

## 2018-09-18 DIAGNOSIS — R42 DIZZINESS: Primary | ICD-10-CM

## 2018-10-09 ENCOUNTER — TELEPHONE (OUTPATIENT)
Dept: NEUROLOGY | Facility: CLINIC | Age: 58
End: 2018-10-09

## 2018-10-09 NOTE — PROGRESS NOTES
DEPARTMENT OF NEUROLOGICAL SCIENCES  [address] DISORDERS [facility]         NEW PATIENT EVALUATION NOTE    Patient: Kristofer Balderas  Medical Record Number: # [id number]  YOB: 1960  Date of visit: 10/10/2018    Referring provider: Barrett Read, DO     The patient was not accompanied today  History was obtained from patient    HISTORY OF PRESENT ILLNESS:  Mr Fredy Silvestre is a 60 y o  right handed male who has been referred to the [facility] for evaluation of possible Parkinson's disease  Main bothersome symptoms are:   1  Balance problem  He says he first noticed smaller handwriting while taking a class and taking notes around 2015  In 2016 he noticed he was "meandering walking left and right" which seemed to progress to the point where he would have difficulty keeping pace with someone walking more normally  Denies feeling hemibody slowness or stiffness  He felt he had to put forth more effort in walking fast  If he climbs more than a flight of stairs, his thighs become fatigued  +lightheadedness after ascending stairs or rising from a kneeling position (none while laying down)  If he closes his eyes in shower and looks up, he feels very unsteady  He denies ever falling except 1x while pulling up pants in 2017  He is aware of his posture being more slouched forward  Patient saw his PCP with complaint of lightheadedness while standing and dizziness, trouble with writing on right hand with dropping objects  MRI Brain on July 2018 was reported unremarkable  - Laboratory testing in July 2018 including normal CBC, CMP, Lyme ab testing and hypovitaminosis D noted  - No previous injuries or surgeries on head or neck  - His main concern today is if he has Parkinson's disease, as he read about his symptoms on WebMD      2  Tremor  Noticeable but not bothersome; he says he can see his R hand shaking when he writes or eats  Not apparent when his hand is resting  No other tremor in his body he says  Started after 2016 very mild progression if at all  Sleep:  He says he sleeps poorly with sleep habits (sleeping on recliner while watching TV, ~4-5 hrs a night  +difficulty falling back asleep  +snores  Hyposmia: denies  RBD (REM semi-purposeful body movements):  "occasionally" acting out dreams   Gait Disturbance:  Yes, as above   Dementia:  +feeling more difficulty with attention and focusing at work  Forgetting names and small tasks  Constipation: endorses  Hallucination: denies  Skin Cancer History: denies  Hypovitaminosis D: denies  Hypovitaminosis B12:  denies  Dysautonomia:  +urinary retention but has BPH  +orthostatism    Gaze Palsy: none  Exercise Therapy: INACTIVE    Family History: Number of First Degree Relatives With Parkinsonism: mother    Imaging: MRI brain in July 2018      REVIEW OF PAST MEDICAL, SOCIAL AND FAMILY HISTORY:  This is the list of problems as per our Medical Records:    Patient Active Problem List    Diagnosis Date Noted    Plantar fasciitis, bilateral 10/10/2018    Dizziness 07/05/2018    Weakness of both upper extremities 07/05/2018    Localized edema 07/05/2018    Arthritis 11/23/2016    [facility] testicular cancer ([facility] [address] ) 12/17/2014    Esophageal reflux 10/17/2012       Past Medical History:   Diagnosis Date    Back pain     Bladder infection     BPH (benign prostatic hyperplasia)     Diverticulosis     GERD (gastroesophageal reflux disease)     occassional    History of testicular cancer 1988    Surgery and radiation; left side    Kidney stones     Currently and in the past    Wears glasses         Past Surgical History:   Procedure Laterality Date    COLONOSCOPY     [facility] [facility] inguinal hernia repair    KIDNEY STONE SURGERY      LITHOTRIPSY      Renal; Resolved: 2/27/07    ORCHIECTOMY Left     [place] CYSTOURETHRO W/IMPLANT N/A 5/17/2018    Procedure: CYSTOSCOPY WITH INSERTION Fabrice Kaplan;  Surgeon: Shan Pérez Emili Michaud DO;  Location: [facility];  Service: Urology    PROSTATE SURGERY N/A 1/18/2018    Procedure: CYSTOSCOPY WITH INSERTION Ajith Erickson;  Surgeon: Ebbie Boxer, DO;  Location: [facility];  Service: Urology   [facility]    For cancer    TONSILLECTOMY      URETERONEOCYSTOSTOMY      w/ cystoscopy Ureteral Stent Placement; Resolved: 6/14/2007    WISDOM TOOTH EXTRACTION          No Known Allergies     Outpatient Encounter Prescriptions as of 10/10/2018   Medication Sig Dispense Refill    dutasteride (AVODART) 0 5 mg capsule Take 0 5 mg by mouth daily  11    multivitamin (THERAGRAN) TABS Take 1 tablet by mouth daily      alfuzosin (UROXATRAL) 10 mg 24 hr tablet Take 10 mg by mouth daily      calcium carbonate (TUMS) 500 mg chewable tablet Chew 1 tablet as needed for indigestion or heartburn      Misc Natural Products (PROSTATE HEALTH PO) Take 2 tablets by mouth daily        psyllium (METAMUCIL) 0 52 g capsule Take 0 52 g by mouth 2 (two) times a day      Saw Palmetto, Serenoa repens, (SAW PALMETTO PO) Take 1 tablet by mouth daily       No facility-administered encounter medications on file as of 10/10/2018  Social History   Substance Use Topics    Smoking status: Former Smoker     Types: Cigarettes     Quit date: 1978    Smokeless tobacco: Never Used    Alcohol use Yes      Comment: social   works as a  on service printing [employer]  Family History   Problem Relation Age of Onset    Colon cancer Father     Heart failure Father     Parkinsonism Mother     Cancer Paternal Grandmother         REVIEW OF SYSTEMS:  The patient has entered data on an intake form regarding present illness, past medical and surgical history, medications, allergies, family and social history, and a full review of 14 systems  I have reviewed this form with the patient, and all the relevant information has been included on this note   The full review of systems was negative except as stated in HPI and below  Constitutional: Positive for appetite change  Negative for fever  HENT: Positive for trouble swallowing  Negative for hearing loss, tinnitus and voice change  Eyes: Negative  Negative for photophobia and pain  Respiratory: Negative  Negative for shortness of breath  Cardiovascular: Negative  Negative for palpitations  Gastrointestinal: Negative  Negative for nausea  Bowel habit changes   Endocrine: Negative  Negative for cold intolerance and heat intolerance  Genitourinary: Negative  Negative for dysuria, frequency and urgency  Musculoskeletal: Positive for gait problem (difficulty walking, balance problems, clumsiness) and joint swelling (Elbow pain)  Negative for myalgias and neck pain  Skin: Negative  Allergic/Immunologic: Negative  Neurological: Positive for dizziness, tremors (Right hand) and light-headedness  Negative for seizures, syncope, facial asymmetry, speech difficulty, weakness and numbness  Memory problems     Hematological: Negative  Does not bruise/bleed easily  Psychiatric/Behavioral: Negative  Negative for confusion and hallucinations  PHYSICAL EXAMINATION:     Vital signs:  /82 (BP Location: Right arm, Patient Position: Sitting)   Pulse (!) 53   Ht 6' 2" (1 88 m)   Wt 101 kg (222 lb)   BMI 28 50 kg/m²     General:  Well-appearing, well nourished, pleasant patient in no acute distress  Mood and Fund of Knowledge are appropriate  Head:  Normocephalic, atraumatic  Oropharynx and conjunctiva are clear  Speech  No hypophonia or bradylalia  No scanning speech  Language: Comprehension intact  Neck:  Supple, strong 5/5 forward flexion and retroflexion  Extremities: Range of motion is normal       Cognitive and Mental Exam:  The patient is alert, oriented to self, location, date and situation   Memory is normal to provide accurate details of health history     Cranial Nerves:  Sense of smell intact / impaired  Funduscopic examination reveals no papilledema  Direct and consensual light reflexes were normal  No afferent pupillary defect  Visual fields are full to confrontation  Extraocular movements were full, with normal pursuit and saccades  Pupils were equal, reactive to light symmetrically  Facial sensation to light touch was intact  Face is symmetric with normal strength  Hearing was assessed using the Calibrated Finger Rub Auditory Screening Test (CALFRAST) and was not abnormal (Better than CALFRAST-Strong-70)  Palate is up going bilaterally and symmetrically  Neck muscles are strong  Tongue protrusion is at midline with normal movements  No dysarthria  Motor:    Dystonia: he has some overflow on his left hand when clenching his right hand or SORAYA (left hand clenches too)  No dystonia seen in leg or foot positioning  Dyskinesia: none  Myoclonus: none  Chorea: none  Tics: none  Handwriting:  Signature x3 shows no change, but writing a long sentence several times over shows decrease in size of characters and shorter length overall  No variation in size of letters within the same sentence sample  MDS-UPDRS III:   Speech: 1  Facial Expression: 1  Tremor (Head):  0  Rest Tremor Severity (RUE/LUE/RLE/LLE/Lip): 0  Action Tremor of Hands (R): 1   (very small amplitude fast action tremor in various positions     Action Tremor of Hands (L): 0  Finger tapping (R): 2  Finger tapping (L): 0  Hand clenching (R): 0  Hand clenching (L): 0  SORAYA Hand (R): 0  SORAYA Hand (L): 0  Toe Tapping (R):  0  Toe Tapping (L):  0  Leg Agility (R):  0  Leg Agility (L):  0  Rigidity (Neck): 0  Rigidity (RUE): 0  Rigidity (LUE): 0  Rigidity (RLE): 1  Rigidity (LLE): 1  Arising From Chair: 0  Posture: 1  Gait: 0  Freezing of Gait: 0  Postural Stability: 0 (2 steps backwards)  Body Bradykinesia: 1 (perhaps slightly less arm swing on R but not consistently)  -------------------------------------------------------------------------------------    Muscle Strength Right Left  Muscle Strength Right Left   Deltoid 5/5 5/5  Hip Adductors 5/5 5/5   Biceps 5/5 5/5  Hip Abductors 5/5 5/5   Triceps 5/5 5/5  Knee Extensors 5/5 5/5   Wrist Extensors 5/5 5/5  Knee Flexors 5/5 5/5   Wrist Flexors 5/5 5/5  Ankle Extensors 5/5 5/5    5/5 5/5  Ankle Flexors 5/5 5/5   Finger Abductors 5/5 5/5       Hip Flexors 5/5 5/5   Hip Extensors 5/5 5/5     Sensory  Reduced to temperature on both feet to midcalf, reduced vibratory sense  Intact proprioception  Coordination:  Finger-to-nose-finger: normal  Sequential Finger Movements: normal  Hand rhythm tapping: normal  Finger-following-finger: normal     Gait:  Normal comprehensive gait evaluation, has normal raising, stance, gait, turns, AS, tip-toes, heels and Pull test of 0  Base was normal without scissoring, foot drop or shuffling or festination  2 steps to turn  EXCEPT:  Tandem gait could accomplish 5 steps at most       Reflexes:    Right Left   Biceps 1/4 2/4   Brachioradialis 1/4 2/4   Triceps 2/4 2/4   Knee 2/4 2/4   Ankle 2/4 2/4      Plantar cutaneous reflex:  Right: flexor  Left: flexor      REVIEW OF ANCILLARY TESTS:     Results for orders placed or performed during the hospital encounter of 07/13/18   MRI brain wo contrast    Narrative    MRI BRAIN WITHOUT CONTRAST    INDICATION:  R42: Dizziness and giddiness  R29 898: Other symptoms and signs involving the musculoskeletal system  R60 0: Localized edema  poor right hand cordination and feeling light headed  COMPARISON:   None  TECHNIQUE:  Sagittal T1, axial T2, axial FLAIR, axial T1, axial Morven and axial diffusion imaging  IMAGE QUALITY:  Diagnostic  FINDINGS:    BRAIN PARENCHYMA:  There is no discrete mass, mass effect or midline shift  No abnormal white matter signal identified   Brainstem and cerebellum demonstrate normal signal  There is no intracranial hemorrhage  There is no evidence of acute infarction and   diffusion imaging is unremarkable  VENTRICLES:  The ventricles are normal in size and contour  SELLA AND PITUITARY GLAND:  Normal     ORBITS:  Normal     PARANASAL SINUSES:  Mild ethmoidal mucosal signal abnormality  VASCULATURE:  Evaluation of the major intracranial vasculature demonstrates appropriate flow voids  CALVARIUM AND SKULL BASE:  Normal     EXTRACRANIAL SOFT TISSUES:  Normal       Impression    No acute infarction, intracranial hemorrhage or mass effect    Workstation performed: [id number]         Diagnoses for this encounter:  1  Truncal ataxia  Ambulatory referral to Neurology    Comprehensive metabolic panel    Vitamin E    Vitamin B12    Vitamin D Panel    Ceruloplasmin    COPPER, URINE, 24 HOUR    Magnesium    TSH, 3rd generation with Free T4 reflex    Lyme Antibody Profile with reflex to WB    Antigliadin Abs, IgA    DELBERT Screen w/ Reflex to Titer/Pattern    Ambulatory referral to Physical Therapy    Vitamin B1, whole blood   2  Idiopathic peripheral neuropathy  HEMOGLOBIN A1C W/ EAG ESTIMATION    Protein electrophoresis, serum       ASSESSMENT     Impression of this gentleman with complaint of 3 year history of slowly progressive and constant gait imbalance and change in his handwriting size, action-based tremor - most consistent with a sensory ataxia  He feels balance is worse when he looks upward, but denies lightheadedness, suggesting loss of proprioceptive input is contributory  His MRI brain did not demonstrate any changes explaining his complaints and no focal deficits  Today his examination was only notable for impaired tandem gait, with normal muscle strength and coordination otherwise  He is not on medication known to cause ataxia or neuropathy  He denies drinking alcohol recently to any significant degree       I reassured him that at this time, aside from micrographia, there were no clear red flags for the bradykinesia, rest tremor or rigidity typically seen in parkinsonism  The diagnosis of Parkinson's disease is mainly a clinical one and it would require further observation over time to be more certain  Orthostatic Blood pressures taken were not positive, but he still complained of lightheadedness  Examined imaging and no radiographic correlates to a developing structural lesion such as a tumor, to Multiple System Atrophy (which could cause dysautonomia and ataxia) which were on my differential       PLAN     · Will check CMP, ceruloplasmin, TSH, Lyme ab, antigliadin ab, and vitamin panel for D, E, B1, B12 and b6, folate  · Checked orthostatic blood pressure today on him to check for dysautonomia and showed at most 14 pt systolic drop but without a corresponding increase in    · Referral to Physical Therapy for more detailed evaluation of gait and safety techniques  · Encouraged more physical activity, with the equipment at home, stretching exercises and light weight training with dumbbells and techniques imparted by Physical Therapy after seeing them  · Endorsed abstaining from alcoholic drinks as his tolerance has lowered and alcohol itself can impair the balance centers of the brain  · Thank you very much for sending me this interesting patient  · The patient has been instructed to call us about any new neurological problems or medication side effects  · Return to Clinic on 3-4 months    Olimpia Brady MD  Movement Disorders  Department of Neurological [facility]    A total of 60 minutes were spent face-to-face with this patient, of which at least 50% was spent on counseling and coordination of care  We discussed the natural history of the patient's condition, differential diagnosis, level of diagnostic certainty, treatment alternatives and their side effects and possible complications

## 2018-10-10 ENCOUNTER — OFFICE VISIT (OUTPATIENT)
Dept: NEUROLOGY | Facility: CLINIC | Age: 58
End: 2018-10-10
Payer: COMMERCIAL

## 2018-10-10 VITALS
HEART RATE: 62 BPM | BODY MASS INDEX: 28.49 KG/M2 | WEIGHT: 222 LBS | DIASTOLIC BLOOD PRESSURE: 82 MMHG | SYSTOLIC BLOOD PRESSURE: 110 MMHG | HEIGHT: 74 IN

## 2018-10-10 DIAGNOSIS — G60.9 IDIOPATHIC PERIPHERAL NEUROPATHY: ICD-10-CM

## 2018-10-10 DIAGNOSIS — R27.8 TRUNCAL ATAXIA: Primary | ICD-10-CM

## 2018-10-10 DIAGNOSIS — I95.1 ORTHOSTATIC HYPOTENSION: ICD-10-CM

## 2018-10-10 DIAGNOSIS — R25.1 TREMOR: ICD-10-CM

## 2018-10-10 DIAGNOSIS — R42 POSITIONAL LIGHTHEADEDNESS: ICD-10-CM

## 2018-10-10 PROBLEM — R60.0 LOCALIZED EDEMA: Status: RESOLVED | Noted: 2018-07-05 | Resolved: 2018-10-10

## 2018-10-10 PROBLEM — M72.2 PLANTAR FASCIITIS, BILATERAL: Status: ACTIVE | Noted: 2018-10-10

## 2018-10-10 PROBLEM — R26.9 UNSPECIFIED ABNORMALITIES OF GAIT AND MOBILITY: Status: ACTIVE | Noted: 2018-10-10

## 2018-10-10 PROCEDURE — 99245 OFF/OP CONSLTJ NEW/EST HI 55: CPT | Performed by: PSYCHIATRY & NEUROLOGY

## 2018-10-10 RX ORDER — DIPHENOXYLATE HYDROCHLORIDE AND ATROPINE SULFATE 2.5; .025 MG/1; MG/1
1 TABLET ORAL DAILY
COMMUNITY

## 2018-10-10 NOTE — PATIENT INSTRUCTIONS
· Will check CMP, ceruloplasmin, TSH, Lyme ab, antigliadin ab, and vitamin panel for D, E, B1, B12 and b6, folate  · Will check orthostatic blood pressure today on him to check for dysautonomia  · Referral to Physical Therapy for more detailed evaluation of gait and safety techniques  · Encouraged more physical activity, with the equipment at home, stretching exercises and light weight training with dumbbells and techniques imparted by Physical Therapy after seeing them  · Endorsed abstaining from alcoholic drinks as his tolerance has lowered and alcohol itself can impair the balance centers of the brain  · Thank you very much for sending me this interesting patient  · The patient has been instructed to call us about any new neurological problems or medication side effects    · Return to Clinic on 3-4 months

## 2018-10-10 NOTE — LETTER
October 10, 2018     Leopold Mcardle, [address] [address]    Patient: Kevin Holloway   YOB: 1960   Date of Visit: 10/10/2018       Dear Dr Lazarus Slim:    Thank you for referring Kevin Holloway to me for evaluation  Below are my notes for this consultation  If you have questions, please do not hesitate to call me  I look forward to following your patient along with you  Sincerely,        Jose Roberto Manzano MD        CC: No Recipients  Jose Roberto Manzano MD  10/10/2018 12:06 PM  Sign at close encounter  [address] [facility] PATIENT EVALUATION NOTE    Patient: Kevin Holloway  Medical Record Number: # [id number]  YOB: 1960  Date of visit: 10/10/2018    Referring provider: Paola Barrett,      The patient was not accompanied today  History was obtained from patient    HISTORY OF PRESENT ILLNESS:  Mr Yola Santos is a 60 y o  right handed male who has been referred to the [address] [place] [address] for evaluation of possible Parkinson's disease  Main bothersome symptoms are:   1  Balance problem  He says he first noticed smaller handwriting while taking a class and taking notes around 2015  In 2016 he noticed he was "meandering walking left and right" which seemed to progress to the point where he would have difficulty keeping pace with someone walking more normally  Denies feeling hemibody slowness or stiffness  He felt he had to put forth more effort in walking fast  If he climbs more than a flight of stairs, his thighs become fatigued  +lightheadedness after ascending stairs or rising from a kneeling position (none while laying down)  If he closes his eyes in shower and looks up, he feels very unsteady  He denies ever falling except 1x while pulling up pants in 2017  He is aware of his posture being more slouched forward   Patient saw his PCP with complaint of lightheadedness while standing and dizziness, trouble with writing on right hand with dropping objects  MRI Brain on July 2018 was reported unremarkable  - Laboratory testing in July 2018 including normal CBC, CMP, Lyme ab testing and hypovitaminosis D noted  - No previous injuries or surgeries on head or neck  - His main concern today is if he has Parkinson's disease, as he read about his symptoms on WebMD      2  Tremor  Noticeable but not bothersome; he says he can see his R hand shaking when he writes or eats  Not apparent when his hand is resting  No other tremor in his body he says  Started after 2016 very mild progression if at all  Sleep:  He says he sleeps poorly with sleep habits (sleeping on recliner while watching TV, ~4-5 hrs a night  +difficulty falling back asleep  +snores  Hyposmia: denies  RBD (REM semi-purposeful body movements):  "occasionally" acting out dreams   Gait Disturbance:  Yes, as above   Dementia:  +feeling more difficulty with attention and focusing at work  Forgetting names and small tasks  Constipation: endorses  Hallucination: denies  Skin Cancer History: denies  Hypovitaminosis D: denies  Hypovitaminosis B12:  denies  Dysautonomia:  +urinary retention but has BPH  +orthostatism    Gaze Palsy: none  Exercise Therapy: INACTIVE    Family History: Number of First Degree Relatives With Parkinsonism: mother    Imaging: MRI brain in July 2018      REVIEW OF PAST MEDICAL, SOCIAL AND FAMILY HISTORY:  This is the list of problems as per our Medical Records:    Patient Active Problem List    Diagnosis Date Noted    Plantar fasciitis, bilateral 10/10/2018    Dizziness 07/05/2018    Weakness of both upper extremities 07/05/2018    Localized edema 07/05/2018    Arthritis 11/23/2016    Primary testicular cancer ([facility] [address] ) 12/17/2014    Esophageal reflux 10/17/2012       Past Medical History:   Diagnosis Date    Back pain     Bladder infection     BPH (benign prostatic hyperplasia)     Diverticulosis  GERD (gastroesophageal reflux disease)     occassional    History of testicular cancer 1988    Surgery and radiation; left side    Kidney stones     Currently and in the past    Wears glasses         Past Surgical History:   Procedure Laterality Date    COLONOSCOPY     [address] 1986 inguinal hernia repair    KIDNEY STONE SURGERY      LITHOTRIPSY      Renal; Resolved: 2/27/07    ORCHIECTOMY Left     NE CYSTOURETHRO W/IMPLANT N/A 5/17/2018    Procedure: CYSTOSCOPY WITH INSERTION UROLIFT;  Surgeon: Jamison Griggs DO;  Location: [facility];  Service: Urology    PROSTATE SURGERY N/A 1/18/2018    Procedure: CYSTOSCOPY WITH INSERTION UROLIFT;  Surgeon: Jamison Griggs DO;  Location: [facility];  Service: Urology   [address]    For cancer    TONSILLECTOMY      URETERONEOCYSTOSTOMY      w/ cystoscopy Ureteral Stent Placement; Resolved: 6/14/2007    WISDOM TOOTH EXTRACTION          No Known Allergies     Outpatient Encounter Prescriptions as of 10/10/2018   Medication Sig Dispense Refill    dutasteride (AVODART) 0 5 mg capsule Take 0 5 mg by mouth daily  11    multivitamin (THERAGRAN) TABS Take 1 tablet by mouth daily      alfuzosin (UROXATRAL) 10 mg 24 hr tablet Take 10 mg by mouth daily      calcium carbonate (TUMS) 500 mg chewable tablet Chew 1 tablet as needed for indigestion or heartburn      Misc Natural Products (PROSTATE HEALTH PO) Take 2 tablets by mouth daily        psyllium (METAMUCIL) 0 52 g capsule Take 0 52 g by mouth 2 (two) times a day      Saw Palmetto, Serenoa repens, (SAW PALMETTO PO) Take 1 tablet by mouth daily       No facility-administered encounter medications on file as of 10/10/2018  Social History   Substance Use Topics    Smoking status: Former Smoker     Types: Cigarettes     Quit date: 1978    Smokeless tobacco: Never Used    Alcohol use Yes      Comment: social   works as a  on service printing [employer]  Family History   Problem Relation Age of Onset    Colon cancer Father     Heart failure Father     Parkinsonism Mother     Cancer Paternal Grandmother         REVIEW OF SYSTEMS:  The patient has entered data on an intake form regarding present illness, past medical and surgical history, medications, allergies, family and social history, and a full review of 14 systems  I have reviewed this form with the patient, and all the relevant information has been included on this note  The full review of systems was negative except as stated in HPI and below  Constitutional: Positive for appetite change  Negative for fever  HENT: Positive for trouble swallowing  Negative for hearing loss, tinnitus and voice change  Eyes: Negative  Negative for photophobia and pain  Respiratory: Negative  Negative for shortness of breath  Cardiovascular: Negative  Negative for palpitations  Gastrointestinal: Negative  Negative for nausea  Bowel habit changes   Endocrine: Negative  Negative for cold intolerance and heat intolerance  Genitourinary: Negative  Negative for dysuria, frequency and urgency  Musculoskeletal: Positive for gait problem (difficulty walking, balance problems, clumsiness) and joint swelling (Elbow pain)  Negative for myalgias and neck pain  Skin: Negative  Allergic/Immunologic: Negative  Neurological: Positive for dizziness, tremors (Right hand) and light-headedness  Negative for seizures, syncope, facial asymmetry, speech difficulty, weakness and numbness  Memory problems     Hematological: Negative  Does not bruise/bleed easily  Psychiatric/Behavioral: Negative  Negative for confusion and hallucinations       PHYSICAL EXAMINATION:     Vital signs:  /82 (BP Location: Right arm, Patient Position: Sitting)   Pulse (!) 53   Ht 6' 2" (1 88 m)   Wt 101 kg (222 lb)   BMI 28 50 kg/m²      General:  Well-appearing, well nourished, pleasant patient in no acute distress  Mood and Fund of Knowledge are appropriate  Head:  Normocephalic, atraumatic  Oropharynx and conjunctiva are clear  Speech  No hypophonia or bradylalia  No scanning speech  Language: Comprehension intact  Neck:  Supple, strong 5/5 forward flexion and retroflexion  Extremities: Range of motion is normal       Cognitive and Mental Exam:  The patient is alert, oriented to self, location, date and situation  Memory is normal to provide accurate details of health history     Cranial Nerves:  Sense of smell intact / impaired  Funduscopic examination reveals no papilledema  Direct and consensual light reflexes were normal  No afferent pupillary defect  Visual fields are full to confrontation  Extraocular movements were full, with normal pursuit and saccades  Pupils were equal, reactive to light symmetrically  Facial sensation to light touch was intact  Face is symmetric with normal strength  Hearing was assessed using the Calibrated Finger Rub Auditory Screening Test (CALFRAST) and was not abnormal (Better than CALFRAST-Strong-70)  Palate is up going bilaterally and symmetrically  Neck muscles are strong  Tongue protrusion is at midline with normal movements  No dysarthria  Motor:    Dystonia: he has some overflow on his left hand when clenching his right hand or SORAYA (left hand clenches too)  No dystonia seen in leg or foot positioning  Dyskinesia: none  Myoclonus: none  Chorea: none  Tics: none  Handwriting:  Signature x3 shows no change, but writing a long sentence several times over shows decrease in size of characters and shorter length overall  No variation in size of letters within the same sentence sample  MDS-UPDRS III:   Speech: 1  Facial Expression: 1  Tremor (Head):  0  Rest Tremor Severity (RUE/LUE/RLE/LLE/Lip): 0  Action Tremor of Hands (R): 1   (very small amplitude fast action tremor in various positions     Action Tremor of Hands (L): 0  Finger tapping (R): 2  Finger tapping (L): 0  Hand clenching (R): 0  Hand clenching (L): 0  SORAYA Hand (R): 0  SORAYA Hand (L): 0  Toe Tapping (R):   0  Toe Tapping (L):   0  Leg Agility (R):   0  Leg Agility (L):   0  Rigidity (Neck): 0  Rigidity (RUE): 0  Rigidity (LUE): 0  Rigidity (RLE): 1  Rigidity (LLE): 1  Arising From Chair: 0  Posture: 1  Gait: 0  Freezing of Gait: 0  Postural Stability: 0 (2 steps backwards)  Body Bradykinesia: 1 (perhaps slightly less arm swing on R but not consistently)  -------------------------------------------------------------------------------------    Muscle Strength Right Left  Muscle Strength Right Left   Deltoid 5/5 5/5  Hip Adductors 5/5 5/5   Biceps 5/5 5/5  Hip Abductors 5/5 5/5   Triceps 5/5 5/5  Knee Extensors 5/5 5/5   Wrist Extensors 5/5 5/5  Knee Flexors 5/5 5/5   Wrist Flexors 5/5 5/5  Ankle Extensors 5/5 5/5    5/5 5/5  Ankle Flexors 5/5 5/5   Finger Abductors 5/5 5/5       Hip Flexors 5/5 5/5   Hip Extensors 5/5 5/5     Sensory  Reduced to temperature on both feet to midcalf, reduced vibratory sense  Intact proprioception  Coordination:  Finger-to-nose-finger: normal  Sequential Finger Movements: normal  Hand rhythm tapping: normal  Finger-following-finger: normal     Gait:  Normal comprehensive gait evaluation, has normal raising, stance, gait, turns, AS, tip-toes, heels and Pull test of 0  Base was normal without scissoring, foot drop or shuffling or festination  2 steps to turn     EXCEPT:  Tandem gait could accomplish 5 steps at most       Reflexes:    Right Left   Biceps 1/4 2/4   Brachioradialis 1/4 2/4   Triceps 2/4 2/4   Knee 2/4 2/4   Ankle 2/4 2/4      Plantar cutaneous reflex:  Right: flexor  Left: flexor      REVIEW OF ANCILLARY TESTS:     Results for orders placed or performed during the hospital encounter of 07/13/18   MRI brain wo contrast    Narrative    MRI BRAIN WITHOUT CONTRAST    INDICATION:  R42: Dizziness and giddiness  R29 898: Other symptoms and signs involving the musculoskeletal system  R60 0: Localized edema  poor right hand cordination and feeling light headed  COMPARISON:   None  TECHNIQUE:  Sagittal T1, axial T2, axial FLAIR, axial T1, axial Jefferson and axial diffusion imaging  IMAGE QUALITY:  Diagnostic  FINDINGS:    BRAIN PARENCHYMA:  There is no discrete mass, mass effect or midline shift  No abnormal white matter signal identified  Brainstem and cerebellum demonstrate normal signal  There is no intracranial hemorrhage  There is no evidence of acute infarction and   diffusion imaging is unremarkable  VENTRICLES:  The ventricles are normal in size and contour  SELLA AND PITUITARY GLAND:  Normal     ORBITS:  Normal     PARANASAL SINUSES:  Mild ethmoidal mucosal signal abnormality  VASCULATURE:  Evaluation of the major intracranial vasculature demonstrates appropriate flow voids  CALVARIUM AND SKULL BASE:  Normal     EXTRACRANIAL SOFT TISSUES:  Normal       Impression    No acute infarction, intracranial hemorrhage or mass effect    Workstation performed: [id number]         Diagnoses for this encounter:  1  Truncal ataxia  Ambulatory referral to Neurology    Comprehensive metabolic panel    Vitamin E    Vitamin B12    Vitamin D Panel    Ceruloplasmin    COPPER, URINE, 24 HOUR    Magnesium    TSH, 3rd generation with Free T4 reflex    Lyme Antibody Profile with reflex to WB    Antigliadin Abs, IgA    DELBERT Screen w/ Reflex to Titer/Pattern    Ambulatory referral to Physical Therapy    Vitamin B1, whole blood   2  Idiopathic peripheral neuropathy  HEMOGLOBIN A1C W/ EAG ESTIMATION    Protein electrophoresis, serum       ASSESSMENT     Impression of this gentleman with complaint of 3 year history of slowly progressive and constant gait imbalance and change in his handwriting size, action-based tremor - most consistent with a sensory ataxia   He feels balance is worse when he looks upward, but denies lightheadedness, suggesting loss of proprioceptive input is contributory  His MRI brain did not demonstrate any changes explaining his complaints and no focal deficits  Today his examination was only notable for impaired tandem gait, with normal muscle strength and coordination otherwise  He is not on medication known to cause ataxia or neuropathy  He denies drinking alcohol recently to any significant degree  I reassured him that at this time, aside from micrographia, there were no clear red flags for the bradykinesia, rest tremor or rigidity typically seen in parkinsonism  The diagnosis of Parkinson's disease is mainly a clinical one and it would require further observation over time to be more certain  Orthostatic Blood pressures taken were not positive, but he still complained of lightheadedness  Examined imaging and no radiographic correlates to a developing structural lesion such as a tumor, to Multiple System Atrophy (which could cause dysautonomia and ataxia) which were on my differential       PLAN     · Will check CMP, ceruloplasmin, TSH, Lyme ab, antigliadin ab, and vitamin panel for D, E, B1, B12 and b6, folate  · Checked orthostatic blood pressure today on him to check for dysautonomia and showed at most 14 pt systolic drop but without a corresponding increase in    · Referral to Physical Therapy for more detailed evaluation of gait and safety techniques  · Encouraged more physical activity, with the equipment at home, stretching exercises and light weight training with dumbbells and techniques imparted by Physical Therapy after seeing them  · Endorsed abstaining from alcoholic drinks as his tolerance has lowered and alcohol itself can impair the balance centers of the brain  · Thank you very much for sending me this interesting patient  · The patient has been instructed to call us about any new neurological problems or medication side effects  · Return to Clinic on 3-4 months    Marissa Jolley MD  Movement Disorders  Department of Neurological [facility]    A total of 60 minutes were spent face-to-face with this patient, of which at least 50% was spent on counseling and coordination of care  We discussed the natural history of the patient's condition, differential diagnosis, level of diagnostic certainty, treatment alternatives and their side effects and possible complications

## 2018-10-10 NOTE — PROGRESS NOTES
Review of Systems   Constitutional: Positive for appetite change  Negative for fever  HENT: Positive for trouble swallowing  Negative for hearing loss, tinnitus and voice change  Eyes: Negative  Negative for photophobia and pain  Respiratory: Negative  Negative for shortness of breath  Cardiovascular: Negative  Negative for palpitations  Gastrointestinal: Negative  Negative for nausea  Bowel habit changes   Endocrine: Negative  Negative for cold intolerance and heat intolerance  Genitourinary: Negative  Negative for dysuria, frequency and urgency  Musculoskeletal: Positive for gait problem (difficulty walking, balance problems, clumsiness) and joint swelling (Elbow pain)  Negative for myalgias and neck pain  Skin: Negative  Allergic/Immunologic: Negative  Neurological: Positive for dizziness, tremors (Right hand) and light-headedness  Negative for seizures, syncope, facial asymmetry, speech difficulty, weakness and numbness  Memory problems     Hematological: Negative  Does not bruise/bleed easily  Psychiatric/Behavioral: Negative  Negative for confusion and hallucinations

## 2018-10-11 ENCOUNTER — APPOINTMENT (OUTPATIENT)
Dept: LAB | Facility: MEDICAL CENTER | Age: 58
End: 2018-10-11
Payer: COMMERCIAL

## 2018-10-11 DIAGNOSIS — G60.9 IDIOPATHIC PERIPHERAL NEUROPATHY: ICD-10-CM

## 2018-10-11 DIAGNOSIS — R27.8 TRUNCAL ATAXIA: ICD-10-CM

## 2018-10-11 LAB
ALBUMIN SERPL BCP-MCNC: 3.9 G/DL (ref 3.5–5)
ALP SERPL-CCNC: 57 U/L (ref 46–116)
ALT SERPL W P-5'-P-CCNC: 28 U/L (ref 12–78)
ANION GAP SERPL CALCULATED.3IONS-SCNC: 6 MMOL/L (ref 4–13)
AST SERPL W P-5'-P-CCNC: 15 U/L (ref 5–45)
BILIRUB SERPL-MCNC: 1.43 MG/DL (ref 0.2–1)
BUN SERPL-MCNC: 22 MG/DL (ref 5–25)
CALCIUM SERPL-MCNC: 8.8 MG/DL (ref 8.3–10.1)
CHLORIDE SERPL-SCNC: 103 MMOL/L (ref 100–108)
CO2 SERPL-SCNC: 26 MMOL/L (ref 21–32)
CREAT SERPL-MCNC: 1.07 MG/DL (ref 0.6–1.3)
EST. AVERAGE GLUCOSE BLD GHB EST-MCNC: 117 MG/DL
GFR SERPL CREATININE-BSD FRML MDRD: 76 ML/MIN/1.73SQ M
GLUCOSE P FAST SERPL-MCNC: 96 MG/DL (ref 65–99)
HBA1C MFR BLD: 5.7 % (ref 4.2–6.3)
MAGNESIUM SERPL-MCNC: 2.3 MG/DL (ref 1.6–2.6)
POTASSIUM SERPL-SCNC: 3.7 MMOL/L (ref 3.5–5.3)
PROT SERPL-MCNC: 7.1 G/DL (ref 6.4–8.2)
SODIUM SERPL-SCNC: 135 MMOL/L (ref 136–145)
TSH SERPL DL<=0.05 MIU/L-ACNC: 2.34 UIU/ML (ref 0.36–3.74)
VIT B12 SERPL-MCNC: 651 PG/ML (ref 100–900)

## 2018-10-11 PROCEDURE — 86038 ANTINUCLEAR ANTIBODIES: CPT

## 2018-10-11 PROCEDURE — 84425 ASSAY OF VITAMIN B-1: CPT

## 2018-10-11 PROCEDURE — 82607 VITAMIN B-12: CPT

## 2018-10-11 PROCEDURE — 84165 PROTEIN E-PHORESIS SERUM: CPT | Performed by: PATHOLOGY

## 2018-10-11 PROCEDURE — 83735 ASSAY OF MAGNESIUM: CPT

## 2018-10-11 PROCEDURE — 84165 PROTEIN E-PHORESIS SERUM: CPT

## 2018-10-11 PROCEDURE — 82784 ASSAY IGA/IGD/IGG/IGM EACH: CPT

## 2018-10-11 PROCEDURE — 36415 COLL VENOUS BLD VENIPUNCTURE: CPT

## 2018-10-11 PROCEDURE — 84443 ASSAY THYROID STIM HORMONE: CPT

## 2018-10-11 PROCEDURE — 86618 LYME DISEASE ANTIBODY: CPT

## 2018-10-11 PROCEDURE — 82306 VITAMIN D 25 HYDROXY: CPT

## 2018-10-11 PROCEDURE — 83516 IMMUNOASSAY NONANTIBODY: CPT

## 2018-10-11 PROCEDURE — 86255 FLUORESCENT ANTIBODY SCREEN: CPT

## 2018-10-11 PROCEDURE — 80053 COMPREHEN METABOLIC PANEL: CPT

## 2018-10-11 PROCEDURE — 83036 HEMOGLOBIN GLYCOSYLATED A1C: CPT

## 2018-10-11 PROCEDURE — 82390 ASSAY OF CERULOPLASMIN: CPT

## 2018-10-11 PROCEDURE — 84446 ASSAY OF VITAMIN E: CPT

## 2018-10-12 LAB
ALBUMIN SERPL ELPH-MCNC: 4.22 G/DL (ref 3.5–5)
ALBUMIN SERPL ELPH-MCNC: 61.2 % (ref 52–65)
ALPHA1 GLOB SERPL ELPH-MCNC: 0.24 G/DL (ref 0.1–0.4)
ALPHA1 GLOB SERPL ELPH-MCNC: 3.5 % (ref 2.5–5)
ALPHA2 GLOB SERPL ELPH-MCNC: 0.57 G/DL (ref 0.4–1.2)
ALPHA2 GLOB SERPL ELPH-MCNC: 8.3 % (ref 7–13)
B BURGDOR IGG SER IA-ACNC: 0.27
B BURGDOR IGM SER IA-ACNC: 0.4
BETA GLOB ABNORMAL SERPL ELPH-MCNC: 0.45 G/DL (ref 0.4–0.8)
BETA1 GLOB SERPL ELPH-MCNC: 6.5 % (ref 5–13)
BETA2 GLOB SERPL ELPH-MCNC: 6 % (ref 2–8)
BETA2+GAMMA GLOB SERPL ELPH-MCNC: 0.41 G/DL (ref 0.2–0.5)
CERULOPLASMIN SERPL-MCNC: 17.7 MG/DL (ref 16–31)
ENDOMYSIUM IGA SER QL: NEGATIVE
GAMMA GLOB ABNORMAL SERPL ELPH-MCNC: 1 G/DL (ref 0.5–1.6)
GAMMA GLOB SERPL ELPH-MCNC: 14.5 % (ref 12–22)
GLIADIN PEPTIDE IGA SER-ACNC: 4 UNITS (ref 0–19)
GLIADIN PEPTIDE IGG SER-ACNC: 2 UNITS (ref 0–19)
IGA SERPL-MCNC: 322 MG/DL (ref 90–386)
IGG/ALB SER: 1.58 {RATIO} (ref 1.1–1.8)
PROT PATTERN SERPL ELPH-IMP: NORMAL
PROT SERPL-MCNC: 6.9 G/DL (ref 6.4–8.2)
RYE IGE QN: NEGATIVE
TTG IGA SER-ACNC: <2 U/ML (ref 0–3)
TTG IGG SER-ACNC: <2 U/ML (ref 0–5)

## 2018-10-14 LAB
A-TOCOPHEROL VIT E SERPL-MCNC: 12.3 MG/L (ref 7–25.1)
GAMMA TOCOPHEROL SERPL-MCNC: 0.7 MG/L (ref 0.5–5.5)
VIT B1 BLD-SCNC: 119.7 NMOL/L (ref 66.5–200)

## 2018-10-15 ENCOUNTER — TELEPHONE (OUTPATIENT)
Dept: NEUROLOGY | Facility: CLINIC | Age: 58
End: 2018-10-15

## 2018-10-15 LAB
25(OH)D2 SERPL-MCNC: <1 NG/ML
25(OH)D3 SERPL-MCNC: 30 NG/ML
25(OH)D3+25(OH)D2 SERPL-MCNC: 31 NG/ML

## 2018-10-15 NOTE — TELEPHONE ENCOUNTER
----- Message from Ezra Estrada MD sent at 10/15/2018  5:56 AM EDT -----  Regarding: Laboratory Results  Hello, please call patient back that his laboratory tests came back as all normal and he should continue with the plan as previously discussed   Thanks     ----- Message -----  From: Lab, Background User  Sent: 10/11/2018   3:59 PM  To: Ezra Estrada MD

## 2018-10-16 ENCOUNTER — EVALUATION (OUTPATIENT)
Dept: PHYSICAL THERAPY | Facility: REHABILITATION | Age: 58
End: 2018-10-16
Payer: COMMERCIAL

## 2018-10-16 DIAGNOSIS — R27.8 TRUNCAL ATAXIA: Primary | ICD-10-CM

## 2018-10-16 PROCEDURE — G8979 MOBILITY GOAL STATUS: HCPCS

## 2018-10-16 PROCEDURE — 97163 PT EVAL HIGH COMPLEX 45 MIN: CPT

## 2018-10-16 PROCEDURE — G8978 MOBILITY CURRENT STATUS: HCPCS

## 2018-10-16 PROCEDURE — 97112 NEUROMUSCULAR REEDUCATION: CPT

## 2018-10-16 NOTE — PROGRESS NOTES
Physical Therapy Initial Evaluation    10/16/2018  Erica Lopez  : 1960  MRN: [id number]  \[facility] [place] [facility]\"":[phone] (home)   Mobile: [phone] (mobile)  Insurance Information: Payor: Mora Marsh / Plan: [facility] PLAN 361 / Product Type: Blue Fee for Service /   Referring Provider: Abelino Reyes MD    Subjective    HPI: Erica Lopez is a 60 y o  male referred to outpatient physical therapy for   1  Truncal ataxia       Per physician note from 10/10/2018: "Mr Mikki Levy is a 60 y o  right handed male who has been referred to the [facility] for evaluation of possible Parkinson's disease  He says he first noticed smaller handwriting while taking a class and taking notes around   In  he noticed he was "meandering walking left and right" which seemed to progress to the point where he would have difficulty keeping pace with someone walking more normally  Denies feeling hemibody slowness or stiffness  He felt he had to put forth more effort in walking fast  If he climbs more than a flight of stairs, his thighs become fatigued  +lightheadedness after ascending stairs or rising from a kneeling position (none while laying down)  If he closes his eyes in shower and looks up, he feels very unsteady  He denies ever falling except 1x while pulling up pants in 2017  He is aware of his posture being more slouched forward  Patient saw his PCP with complaint of lightheadedness while standing and dizziness, trouble with writing on right hand with dropping objects  MRI Brain on 2018 was reported unremarkable "     Patient reports reports that his most prevalent symptoms are a path deviation and tremor in R hand which is not constant  He reports that if he is tired or if he has had two drinks of alcohol, his path deviation is worse  He has noticed within the last 6 months he notices muscle fatigue when he reaches the top of the steps and catches his L toes at times   Noticing an increase in lightheadedness within the past 6 months  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  Patient reports that he also has difficulty swallowing at times, especially when tilting head back to drink soda  Falls Hx: Denies   Home Environment: 2 steps to enter home, no railing  1 flight upstairs, with B rails  1 flight to basement, unilateral rail  DME: None   Pain: R elbow pain, B thumb pain   Imaging: Brain MRI with no remarkable results  Patient Goal: Patient would like to improve his path deviation and improve strength     Objective     Reflexes:  B patellar: 3+  B calcaneal: 3+     (-) pronator drift    Coordination Left Right   Heel To Shin Normal Normal    Finger To Nose Normal Hitting slightly below nose    Rapid Alternating Movement Normal Normal      UE ROM: WNL throughout   LE ROM: WNL throughout     Manual Muscle Testing - Hip Left Right   Flexion 4 4   Abduction 5 5   Internal Rotation 5 4   External Rotation 4 5   Extension Clears SL bridge Clears SL bridge     Manual Muscle Testing - Knee Left Right   Flexion 5 5   Extension 5 5     Manual Muscle Testing - Ankle Left Right   Doriflexion 5 5   Plantarflexion 4 4     Transfers    Sit To Stand Independent    Ambulatory Transfers Independent   Sit To Supine Independent   Roll Independent     Gait Assessment: Patient ambulating with good gait speed  Path deviation typically to the R  Lateral stepping for balance at time B  Decreased arm swing on R  Good step length       Outcome Measures    6 Minute Walk Test (ft): 1,650 ft    10MWT Not assessed    5x Sit To Stand (s): 8 47 sec      Modified Clinical Test of Sensory Interaction on Balance (normed on ages 19-56, lower number is less sway or better static balance)   0 94 eyes open firm surface (norm 0 21-0 48)  1 67 eyes closed firm surface (norm 0 48-0 99)  1 61 eyes open foam surface (norm 0 38-0 71)  4 09 eyes closed foam surface (norm 0 70-2 22)    Functional Gait Assessment  3 Gait level surface  3 Change in gait speed  3 Gait with horizontal head turns  2 Gait with vertical head turns  3 Gait and pivot turn  3 Step over obstacle  3 Gait with narrow base of support  2 Gait with eyes closed  2 Ambulating backwards  2 Steps  26/30 Total score    HEP Provided:  Supine SLR: 10x2  SL SLR: 10x2  SL HR: 10x2 B    Assessment/Plan    Assessment:  Patient presents to outpatient physical therapy with mild decreases in LE strength, mild decreases in static/dynamic balance, mild decreases in endurance, and gait deviations including those listed in observation section  Overall the patient's functional mobility is good and his FGA does not place him at an increased risk for falls  However, patient has noticed declines in his mobility within the last 6 months  He currently does not have an exercise routine and is willing to begin a gym regimen when he completes his outpatient physical therapy  Patient will benefit from skilled outpatient physical therapy to address the impairments listed above as well as prepare the patient for a gym regimen to decrease the risk of further decline in his mobility  Referral Required: Discussed possibility of speech therapy with patient secondary to swallowing deficits Patient declines referral at this time       STG's:    - Patient performs 9 hole peg test next session for baseline of current functioning of B UE      - Patient improves EC on foam task of MCSTIB for improved static balance within 3 wk    - Patient is independent with initial home exercise program for improvements at home within 3 wk    - Patient ambulates for 10 consecutive min with appropriate vital sign response for improved endurance within 3 wk  LTG's:   - Patient improves distance ambulated on 6MWT by 10% within  for improved endurance within 6 wk   - Patient improves score on FGA by to 30/30  for improved dynamic balance within 6 wk   - Patient reports he has joined a local gym for continued exercise upon D/C     Plan:  Patient will benefit from skilled outpatient physical therapy 2x/wk for 5-6 wk  Interventions to include therapeutic exercises, therapeutic activities, neuromuscular re-education, gait training, formation of HEP, manual therapy, and modalities as seen appropriate   Planned exercises found in table below        Exercise Diary          Initial HEP Supine SLR, SL SLR, SL heel raises       Static Balance Ex: FTEC, FTHT, Semitandem, A/P shift with EO and EC        LE Strengthening Ex: standing hip abd with TB, standing hip ext with TB, standing hip flex with TB, SL heel raises, sit <> stands       Dynamic balance  NT: EC walking, backwards walking, tandem walking, walking with head turns, walking while bouncing ball       TM ambulation         [employer] activities

## 2018-10-18 ENCOUNTER — OFFICE VISIT (OUTPATIENT)
Dept: PHYSICAL THERAPY | Facility: REHABILITATION | Age: 58
End: 2018-10-18
Payer: COMMERCIAL

## 2018-10-18 ENCOUNTER — OFFICE VISIT (OUTPATIENT)
Dept: FAMILY MEDICINE CLINIC | Facility: CLINIC | Age: 58
End: 2018-10-18
Payer: COMMERCIAL

## 2018-10-18 VITALS
BODY MASS INDEX: 27.65 KG/M2 | HEIGHT: 75 IN | SYSTOLIC BLOOD PRESSURE: 110 MMHG | DIASTOLIC BLOOD PRESSURE: 60 MMHG | WEIGHT: 222.4 LBS

## 2018-10-18 DIAGNOSIS — M25.531 RIGHT WRIST PAIN: ICD-10-CM

## 2018-10-18 DIAGNOSIS — M79.645 PAIN OF LEFT THUMB: Primary | ICD-10-CM

## 2018-10-18 DIAGNOSIS — M25.521 RIGHT ELBOW PAIN: ICD-10-CM

## 2018-10-18 DIAGNOSIS — R27.8 TRUNCAL ATAXIA: Primary | ICD-10-CM

## 2018-10-18 DIAGNOSIS — M79.644 PAIN OF RIGHT THUMB: ICD-10-CM

## 2018-10-18 PROCEDURE — 97112 NEUROMUSCULAR REEDUCATION: CPT

## 2018-10-18 PROCEDURE — 99214 OFFICE O/P EST MOD 30 MIN: CPT | Performed by: FAMILY MEDICINE

## 2018-10-18 RX ORDER — MELOXICAM 15 MG/1
15 TABLET ORAL DAILY
Qty: 30 TABLET | Refills: 1 | Status: SHIPPED | OUTPATIENT
Start: 2018-10-18 | End: 2018-12-22 | Stop reason: SDUPTHER

## 2018-10-18 NOTE — PROGRESS NOTES
Daily Note     Today's date: 10/18/2018  Patient name: Kinsey Garner  :   MRN: [id number]  Referring provider: Edd Morris MD  Dx:   Encounter Diagnosis     ICD-10-CM    1  Truncal ataxia R27 8        Start Time: 945  Stop Time:   Total time in clinic (min): 40 minutes     Subjective: Pt reports 2/10 lightheaded at start and end of session  Objective: See treatment diary below    9 hole peg test  RUE:  Trial 1: 28 sec  Trial 2: 23 sec    LUE  Trial 1: 25 sec  Trial 2: 23 sec    Exercise Diary     10/18/18     Initial HEP Supine SLR, SL SLR, SL heel raises       Static Balance Ex: FTEC, FTHT, Semitandem, A/P shift with EO and EC        LE Strengthening Ex: standing hip abd with TB, standing hip ext with TB, standing hip flex with TB, SL heel raises, sit <> stands       Dynamic balance  NT: EC walking, backwards walking, tandem walking, walking with head turns, walking while bouncing ball Walking activities on firm  involving EO, EC, backward, tandem, diagonal HTs, sudden stops and turns, HT then body turns, 180* turns w/ fwd/backward walking, karaokes x25 min    STS w/ turn in place x5 each direction    Cone step overs w/ tapping, forward and lateral x5 min    Balance beam firm and foam:  Lateral stepping, tandem stepping x5 min       TM ambulation         Biodex activities                                  Assessment: Tolerated treatment well  Pt experiences increases in lightheadedness w/ HT activities and a sense of dizziness and imbalance w/ turns in place  Pt balance most challenged w/ sidestepping cone taps, requiring multiple instances of lateral stepping strategy  Pt also utilized stepping strategy and UE support w/ wall rail during tandem walking on balance beam  Patient exhibited good technique with therapeutic exercises and would benefit from continued PT      Plan: Continue per plan of care   Continue turning practice, tandem activities, and lateral stepping w/ extended time in SLS

## 2018-10-18 NOTE — PROGRESS NOTES
Assessment/Plan:   patient will stop meloxicam daily  Patient use ice as directed  Patient go for x-rays  Patient will use tennis elbow band  Patient will follow up in 6 weeks  Patient is presently going to physical therapy  Diagnoses and all orders for this visit:    Pain of left thumb    Pain of right thumb    Right elbow pain  -     Tennis elbow strap    Right wrist pain          Subjective:      Patient ID: Ben Mendez is a 60 y o  male  Patient is here with bilateral thumb pain as well as right elbow pain and right wrist pain  Patient has had some pain for roughly 6 months and elbow pain for roughly 3 months  No treatment  No direct trauma or history of fractures  No chest pain or shortness of breath  The following portions of the patient's history were reviewed and updated as appropriate: allergies, current medications, past family history, past medical history, past social history, past surgical history and problem list     Review of Systems   Constitutional: Negative  HENT: Negative  Eyes: Negative  Respiratory: Negative  Cardiovascular: Negative  Gastrointestinal: Negative  Endocrine: Negative  Genitourinary: Negative  Musculoskeletal: Positive for arthralgias  Skin: Negative  Allergic/Immunologic: Negative  Neurological: Negative  Hematological: Negative  Psychiatric/Behavioral: Negative  Objective:      /60 (BP Location: Left arm, Patient Position: Sitting, Cuff Size: Adult)   Ht 6' 2 5" (1 892 m)   Wt 101 kg (222 lb 6 4 oz)   BMI 28 17 kg/m²          Physical Exam   Constitutional: He is oriented to person, place, and time  He appears well-developed  Musculoskeletal: He exhibits tenderness  He exhibits no edema or deformity  Right elbow pain with flexion  Some pain medially and laterally  Patient with negative pain with testing for D core venous tendinitis  Patient does have pain in the [facility] and 1st MTP joints  Neurological: He is alert and oriented to person, place, and time  Skin: Skin is warm  Psychiatric: He has a normal mood and affect  Nursing note and vitals reviewed

## 2018-10-22 ENCOUNTER — APPOINTMENT (OUTPATIENT)
Dept: LAB | Facility: MEDICAL CENTER | Age: 58
End: 2018-10-22
Payer: COMMERCIAL

## 2018-10-22 DIAGNOSIS — R27.8 TRUNCAL ATAXIA: ICD-10-CM

## 2018-10-22 PROCEDURE — 82570 ASSAY OF URINE CREATININE: CPT

## 2018-10-22 PROCEDURE — 82525 ASSAY OF COPPER: CPT

## 2018-10-23 ENCOUNTER — OFFICE VISIT (OUTPATIENT)
Dept: PHYSICAL THERAPY | Facility: REHABILITATION | Age: 58
End: 2018-10-23
Payer: COMMERCIAL

## 2018-10-23 DIAGNOSIS — R27.8 TRUNCAL ATAXIA: Primary | ICD-10-CM

## 2018-10-23 PROCEDURE — 97112 NEUROMUSCULAR REEDUCATION: CPT

## 2018-10-23 NOTE — PROGRESS NOTES
Daily Note     Today's date: 10/23/2018  Patient name: Jenni Schmidt  :   MRN: [id number]  Referring provider: Yesenia An MD  Dx:   Encounter Diagnosis     ICD-10-CM    1  Truncal ataxia R27 8        Start Time: 214  Stop Time: 810  Total time in clinic (min): 39 minutes     Subjective: Pt reports a constant lightheadedness he describes as being in the background  Objective: See treatment diary below      Exercise Diary     10/18/18  10/23/18   Initial HEP Supine SLR, SL SLR, SL heel raises       Static Balance Ex: FTEC, FTHT, Semitandem, A/P shift with EO and EC     Foam: FTEC x4 min  Tandem EO x4 min  Ball toss over shoulder x2 min     LE Strengthening Ex: standing hip abd with TB, standing hip ext with TB, standing hip flex with TB, SL heel raises, sit <> stands       Dynamic balance  NT: EC walking, backwards walking, tandem walking, walking with head turns, walking while bouncing ball Walking activities on firm  involving EO, EC, backward, tandem, diagonal HTs, sudden stops and turns, HT then body turns, 180* turns w/ fwd/backward walking, karaokes x25 min    STS w/ turn in place x5 each direction    Cone step overs w/ tapping, forward and lateral x5 min    Balance beam firm and foam:  Lateral stepping, tandem stepping x5 min Walking w/ diagonal HTs x2 min (3-4/10 lightheaded)    Sit to stand, bend over, turn to sit x2 min    Bend over and reach to bean bag, toss over shoulder standing on gray disc x5 min    Tandem walking x2 min    Forward and lateral cone tap/stepovers x 4 min    Square stepping x2 min     TM ambulation         Biodex activities                                  Assessment: Tolerated treatment well  HTs increase lightheadedness, but 30 sec to 1 minute rest returns symptoms to baseline  Pt more unsteady on foam with rotation ball tosses, utilizing hip strategy to maintain balance  At this time pt unable to tolerate tandem EC on foam due to repeated LOB   Patient exhibited good technique with therapeutic exercises and would benefit from continued PT      Plan: Continue per plan of care   Continue balance activities involving side stepping, tandem on firm and foam, and FTEC on foam

## 2018-10-24 LAB
COPPER 24H UR-MRATE: 23 UG/24 HR (ref 3–35)
COPPER UR-MCNC: 15 UG/L
COPPER/CREAT UR: 9 UG/G CREAT (ref 0–49)
CREAT UR-MCNC: 1.62 G/L (ref 0.3–3)

## 2018-10-25 ENCOUNTER — OFFICE VISIT (OUTPATIENT)
Dept: PHYSICAL THERAPY | Facility: REHABILITATION | Age: 58
End: 2018-10-25
Payer: COMMERCIAL

## 2018-10-25 DIAGNOSIS — R27.8 TRUNCAL ATAXIA: Primary | ICD-10-CM

## 2018-10-25 PROCEDURE — 97112 NEUROMUSCULAR REEDUCATION: CPT

## 2018-10-25 NOTE — PROGRESS NOTES
Daily Note     Today's date: 10/25/2018  Patient name: Arbie Saint  :   MRN: [id number]  Referring provider: Bouchra Day MD  Dx:   Encounter Diagnosis     ICD-10-CM    1  Truncal ataxia R27 8        Start Time: 730  Stop Time: 08  Total time in clinic (min): 38 minutes     Subjective: Pt reports he is compliant w/ HEP  Objective: See treatment diary below      Exercise Diary     10/18/18  10/23/18 10/25/18   Initial HEP Supine SLR, SL SLR, SL heel raises        Static Balance Ex: FTEC, FTHT, Semitandem, A/P shift with EO and EC     Foam: FTEC x4 min  Tandem EO x4 min  Ball toss over shoulder x2 min   Firm: Tandem EC 30sec  Foam: FTEC 30 sec x3  Tandem EC 30 sec x2     LE Strengthening Ex: standing hip abd with TB, standing hip ext with TB, standing hip flex with TB, SL heel raises, sit <> stands        Dynamic balance  NT: EC walking, backwards walking, tandem walking, walking with head turns, walking while bouncing ball Walking activities on firm  involving EO, EC, backward, tandem, diagonal HTs, sudden stops and turns, HT then body turns, 180* turns w/ fwd/backward walking, karaokes x25 min    STS w/ turn in place x5 each direction    Cone step overs w/ tapping, forward and lateral x5 min    Balance beam firm and foam:  Lateral stepping, tandem stepping x5 min Walking w/ diagonal HTs x2 min (3-4/10 lightheaded)    Sit to stand, bend over, turn to sit x2 min    Bend over and reach to bean bag, toss over shoulder standing on gray disc x5 min    Tandem walking x2 min    Forward and lateral cone tap/stepovers x 4 min    Square stepping x2 min   Karaokes x120 ft    Agility ladder w/ light plyometric patterns x5 min    Balance beam tandem and sidestepping x4 min  Balance beam sidestepping over obstacles x2 min    Biodex  Fwd and lateral weight shifts x2 min  LOS x2 min   TM ambulation          Biodex activities                                     Assessment: Tolerated treatment well   Pt relies primarily on placing weight through heels to maintain balance both w/ static and dynamic balance exercises  No LOB w/ increased speed during lateral stepping, pt only demonstrates LOB with activities that require higher LE clearance and extended time in SLS  Add SLS on unsteady surface  Patient exhibited good technique with therapeutic exercises and would benefit from continued PT      Plan: Continue per plan of care  Monitor R hand during gait, place cuff weight on wrist as tactile reminder to complete arm swing w/ each step  Increase balance challenges to include single limb stance on unsteady surfaces w/ appropriate support/guarding

## 2018-10-30 ENCOUNTER — OFFICE VISIT (OUTPATIENT)
Dept: PHYSICAL THERAPY | Facility: REHABILITATION | Age: 58
End: 2018-10-30
Payer: COMMERCIAL

## 2018-10-30 DIAGNOSIS — R27.8 TRUNCAL ATAXIA: Primary | ICD-10-CM

## 2018-10-30 PROCEDURE — 97112 NEUROMUSCULAR REEDUCATION: CPT

## 2018-10-30 NOTE — PROGRESS NOTES
Daily Note     Today's date: 10/30/2018  Patient name: Lilliam Guerrero  :   MRN: [id number]  Referring provider: Hannah Walker MD  Dx:   Encounter Diagnosis     ICD-10-CM    1  Truncal ataxia R27 8        Start Time:   Stop Time: 814  Total time in clinic (min): 43 minutes     Subjective: Pt reports a 1/10 lightheadedness at start of session        Objective: See treatment diary below      Exercise Diary   10/18/18  10/23/18 10/25/18 10/30/18   Initial HEP         Static Balance    Foam: FTEC x4 min  Tandem EO x4 min  Ball toss over shoulder x2 min   Firm: Tandem EC 30sec  Foam: FTEC 30 sec x3  Tandem EC 30 sec x2   Foam: Tandem EO 30 sec x2  Tandem EC 30 sec x4  (3/10 lightheaded)  SLS EO 30 sec x4  ECHTs 30 secx2  A/P sway x30    Firm:  SLS: EC 30 secx4     LE Strengthening         Dynamic balance  Walking activities on firm  involving EO, EC, backward, tandem, diagonal HTs, sudden stops and turns, HT then body turns, 180* turns w/ fwd/backward walking, karaokes x25 min    STS w/ turn in place x5 each direction    Cone step overs w/ tapping, forward and lateral x5 min    Balance beam firm and foam:  Lateral stepping, tandem stepping x5 min Walking w/ diagonal HTs x2 min (3-4/10 lightheaded)    Sit to stand, bend over, turn to sit x2 min    Bend over and reach to bean bag, toss over shoulder standing on gray disc x5 min    Tandem walking x2 min    Forward and lateral cone tap/stepovers x 4 min    Square stepping x2 min   Karaokes x120 ft    Agility ladder w/ light plyometric patterns x5 min    Balance beam tandem and sidestepping x4 min  Balance beam sidestepping over obstacles x2 min    Biodex  Fwd and lateral weight shifts x2 min  LOS x2 min Lateral step overs 20ft x4  Lateral tap step overs 20ft x4   Walking w/ diagonal HTs 1 min x4 (4/10 lightheaded)   TM ambulation       Ambulation x2 min w/ VC for increased arm swing  Ambulation x2 min w/ 2# cuff weight on R writs  Ambulation x4 min w/ 2# pvc   Biodex activities                                  Assessment: Tolerated treatment well  Increased balance challenge w/ EC, SLS, and changing surfaces  With dynamic balance pt experiences greatest instances of LOB w/ lateral toe tapping  He reports highest symptoms of lightheadedness w/ tasks EC and diagonal HTs  No change in R arm swing noted w/ cuff weight, however pt increased swing of RUE when holding weighted pvc pipe  Patient exhibited good technique with therapeutic exercises and would benefit from continued PT      Plan: Continue per plan of care  Continue ambulation w/ focus on R arm swing and progress balance activities to pt tolerance  Linnea Snyder

## 2018-11-01 ENCOUNTER — OFFICE VISIT (OUTPATIENT)
Dept: PHYSICAL THERAPY | Facility: REHABILITATION | Age: 58
End: 2018-11-01
Payer: COMMERCIAL

## 2018-11-01 DIAGNOSIS — R27.8 TRUNCAL ATAXIA: Primary | ICD-10-CM

## 2018-11-01 PROCEDURE — 97110 THERAPEUTIC EXERCISES: CPT

## 2018-11-01 PROCEDURE — 97112 NEUROMUSCULAR REEDUCATION: CPT

## 2018-11-01 RX ORDER — FLUDROCORTISONE ACETATE 0.1 MG/1
0.1 TABLET ORAL DAILY
Qty: 30 TABLET | Refills: 3 | Status: SHIPPED | OUTPATIENT
Start: 2018-11-01 | End: 2019-02-28 | Stop reason: ALTCHOICE

## 2018-11-01 NOTE — PROGRESS NOTES
Daily Note     Today's date: 2018  Patient name: Monica Weir  : [id number]  MRN: [id number]  Referring provider: Todd Long MD  Dx:   Encounter Diagnosis     ICD-10-CM    1  Truncal ataxia R27 8        Start Time: 732  Stop Time: 816  Total time in clinic (min): 44 minutes     Subjective: Pt reports lightheadedness is currently a 2/10  He reports this is the worst he has felt since his lightheadedness       Objective: See treatment diary below  VBI Test: (-) B  Convergence testing: normal  Midline Testing: normal (performed by OT)  Cover/uncover: Left Esophoria    Orthostatic Hypotension testing:  Supine - BP: 137/75   HR: 72 bpm  Seated - BP: 129/79   HR: 77 bpm  Standing (immediate) - BP: 109/70  HR: 89 bpm   Standing (1 minute) - BP: 104/66  HR: 92 bpm     Exercise Diary  10/25/18 10/30/18 11/01/18   Initial HEP      Static Balance Firm: Tandem EC 30sec  Foam: FTEC 30 sec x3  Tandem EC 30 sec x2   Foam: Tandem EO 30 sec x2  Tandem EC 30 sec x4  (3/10 lightheaded)  SLS EO 30 sec x4  ECHTs 30 secx2  A/P sway x30    Firm:  SLS: EC 30 secx4   Foam: Tandem EO 30 sec x2  FTEO: 30 sec  FTEC: 30 sec x 2    Firm: SLS 30 sec B   LE Strengthening      Dynamic balance  Karaokes x120 ft    Agility ladder w/ light plyometric patterns x5 min    Balance beam tandem and sidestepping x4 min  Balance beam sidestepping over obstacles x2 min    Biodex  Fwd and lateral weight shifts x2 min  LOS x2 min Lateral step overs 20ft x4  Lateral tap step overs 20ft x4   Walking w/ diagonal HTs 1 min x4 (4/10 lightheaded)    TM ambulation   Ambulation x2 min w/ VC for increased arm swing  Ambulation x2 min w/ 2# cuff weight on R writs  Ambulation x4 min w/ 2# pvc Ambulation x8 min w/ 2# pvc   Biodex activities                         Assessment: Tolerated treatment fair  His lightheadedness has increased since last session  He tested postive for orthostatic hypotension, physician will be contacted with the test information  Patient exhibited good technique with therapeutic exercises and would benefit from continued PT      Plan: Continue per plan of care  Continue ambulation w/ focus on R arm swing and progress balance activities to pt tolerance

## 2018-11-02 ENCOUNTER — TELEPHONE (OUTPATIENT)
Dept: NEUROLOGY | Facility: CLINIC | Age: 58
End: 2018-11-02

## 2018-11-02 NOTE — TELEPHONE ENCOUNTER
----- Message from Brenda Betancourt MD sent at 11/1/2018  7:12 PM EDT -----  Regarding: FW: Orthostatic Hypotension  Contact: [phone]  Please let patient know that as he has positive orthostatics during this PT session and since it is interfering with his therapy, I will be starting him on Florinef 0 1mg daily, script sent to his pharmacy  Also encourage increase salt intake and adequate fluid intake of at least 4 large glasses of 16oz water a day  He should call us in 1-2 weeks to report if this is helping  He can call with questions, thanks    ----- Message -----  From: Rei Flores PT  Sent: 11/1/2018   8:41 AM  To: Michael Keller DO, Brenda Betancourt MD  Subject: Orthostatic Hypotension                          Good Morning,    I wanted to make you aware that I saw Mr Deann Seymour for an outpatient physical therapy visit today  He reported that his lightheadedness was the worst it has ever been this morning  Continues to have increases in lightheadedness with changes in position and head turns  I performed a VBI test today and it was (-) B as well as a test or orthostatic hypotension  Please see below for [facility] test results  Orthostatic Hypotension testing:  Supine - BP: 137/75   HR: 72 bpm  Seated - BP: 129/79   HR: 77 bpm  Standing (immediate) - BP: 109/70  HR: 89 bpm   Standing (1 minute) - BP: 104/66  HR: 92 bpm     BP did not improve after 1 minute of standing, it actual continued to decrease  This may be playing a part in his symptoms of lightheadedness       Thank you,  Rei Flores, PT, DPT

## 2018-11-02 NOTE — TELEPHONE ENCOUNTER
Patient made aware of Dr Renetta Daniel note  To increase salt intake and fluids  Also made aware rx for florinef sent to pharm  He will call 1 to 2 weeks to  update on how he's doing  Patient verbalized understanding

## 2018-11-04 ENCOUNTER — APPOINTMENT (OUTPATIENT)
Dept: RADIOLOGY | Facility: MEDICAL CENTER | Age: 58
End: 2018-11-04
Payer: COMMERCIAL

## 2018-11-04 DIAGNOSIS — M79.645 PAIN OF LEFT THUMB: ICD-10-CM

## 2018-11-04 DIAGNOSIS — M79.644 PAIN OF RIGHT THUMB: ICD-10-CM

## 2018-11-04 DIAGNOSIS — M25.521 RIGHT ELBOW PAIN: ICD-10-CM

## 2018-11-04 PROCEDURE — 73070 X-RAY EXAM OF ELBOW: CPT

## 2018-11-04 PROCEDURE — 73130 X-RAY EXAM OF HAND: CPT

## 2018-11-06 ENCOUNTER — OFFICE VISIT (OUTPATIENT)
Dept: PHYSICAL THERAPY | Facility: REHABILITATION | Age: 58
End: 2018-11-06
Payer: COMMERCIAL

## 2018-11-06 DIAGNOSIS — R27.8 TRUNCAL ATAXIA: Primary | ICD-10-CM

## 2018-11-06 PROCEDURE — 97110 THERAPEUTIC EXERCISES: CPT

## 2018-11-06 PROCEDURE — 97112 NEUROMUSCULAR REEDUCATION: CPT

## 2018-11-06 NOTE — PROGRESS NOTES
Daily Note     Today's date: 2018  Patient name: Inderjit Bey  : [id number]  MRN: [id number]  Referring provider: Sheryle Bihari, MD  Dx:   Encounter Diagnosis     ICD-10-CM    1  Truncal ataxia R27 8        Start Time: 730  Stop Time: 813  Total time in clinic (min): 43 minutes     Subjective: Pt reports he started his medication yesterday for his low BP, and he was instructed by his doctor to drink 4 bottles of water per day, which he has been doing  He reports 2/10 lightheadedness at start of session  End of session pt denies room spinning and ringing in ears  Objective: See treatment diary below      Exercise Diary  10/30/18 11/01/18 11/6   Initial HEP      Static Balance Foam: Tandem EO 30 sec x2  Tandem EC 30 sec x4  (3/10 lightheaded)  SLS EO 30 sec x4  ECHTs 30 secx2  A/P sway x30    Firm:  SLS: EC 30 secx4   Foam: Tandem EO 30 sec x2  FTEO: 30 sec  FTEC: 30 sec x 2    Firm: SLS 30 sec B Vor x1 30 sec x3 (H)  VORx2 30 sec x3 (H) (3/10 room spinning)   LE Strengthening      Dynamic balance  Lateral step overs 20ft x4  Lateral tap step overs 20ft x4   Walking w/ diagonal HTs 1 min x4 (4/10 lightheaded)  Walking w/ horizontal HTs x120ft  Walking, self ball toss x120 ft   Walking HT, identifying numbers on L and R side x120ft    Stand to squat to  object x120ft (fwd/backward) (/10 dizziness)     TM ambulation  Ambulation x2 min w/ VC for increased arm swing  Ambulation x2 min w/ 2# cuff weight on R writs  Ambulation x4 min w/ 2# pvc Ambulation x8 min w/ 2# pvc Ambulation w/ 2# pvc, w/ mental challenges x10 min   Biodex activities                         Assessment: Tolerated treatment fair  HT activities bring his lightheadedness to a 4/10 today  After the self toss he looked down at his phone and immediately felt the room spinning (/10) and ringing in his ears  He required repeated seated rests throughout session to reduce symptoms   Noted decreased arm swing when pt focuse don mental challenge  Patient exhibited good technique with therapeutic exercises and would benefit from continued PT      Plan: Continue per plan of care  Continue to monitor pt symptoms w/ position changes and HTs

## 2018-11-08 ENCOUNTER — OFFICE VISIT (OUTPATIENT)
Dept: PHYSICAL THERAPY | Facility: REHABILITATION | Age: 58
End: 2018-11-08
Payer: COMMERCIAL

## 2018-11-08 DIAGNOSIS — R27.8 TRUNCAL ATAXIA: Primary | ICD-10-CM

## 2018-11-08 PROCEDURE — 97110 THERAPEUTIC EXERCISES: CPT

## 2018-11-08 PROCEDURE — 97112 NEUROMUSCULAR REEDUCATION: CPT

## 2018-11-08 NOTE — PROGRESS NOTES
Daily Note     Today's date: 2018  Patient name: Robbie Krause  :   MRN: [id number]  Referring provider: Gabo Ye MD  Dx:   Encounter Diagnosis     ICD-10-CM    1  Truncal ataxia R27 8        Start Time:   Stop Time: 812  Total time in clinic (min): 41 minutes     Subjective: Pt reports since beginning his BP medication, his instances of lightheadedness have cut down by half  "0 5 lightheaded" at start of session  Objective: See treatment diary below    Reviewed HEP x 5 min    Exercise Diary  18   Initial HEP      Static Balance Foam: Tandem EO 30 sec x2  FTEO: 30 sec  FTEC: 30 sec x 2    Firm: SLS 30 sec B Vor x1 30 sec x3 (H)  VORx2 30 sec x3 (H) (3/10 room spinning) Vor x1 30 sec x3 (3/10 lightheaded, 3rd set room spinning 1/10)  VOR x2 30 sec x2   LE Strengthening      Dynamic balance   Walking w/ horizontal HTs x120ft  Walking, self ball toss x120 ft   Walking HT, identifying numbers on L and R side x120ft    Stand to squat to  object x120ft (fwd/backward) (5/10 dizziness)   Walking w/ diagonal HTs x120ft (room spinning 1/10)    Stand to squat to  object x120ft (fwd/backward) (5/10 lightheaded, 1/10 ringing in ears)   TM ambulation  Ambulation x8 min w/ 2# pvc Ambulation w/ 2# pvc, w/ mental challenges x10 min Ambulation w/ 2# pvc, w/ mental challenges x10 min   Biodex activities                         Assessment: Tolerated treatment well  Pt is improving in consistency w/ R arm swing, some limitations still noted during backswing of R arm, w/ some decrease in swing as pt is focused on mental task  1 minute rest provided between each oculomotor exercise secondary to reports of symptoms  Pt participation is most limited by his lightheadedness and sensation of room spinning   Additionally he reported his ears ringing again at end of session, will advise evaluating PT as this was not a complaint that was brought up at the initial eval  Patient exhibited good technique with therapeutic exercises and would benefit from continued PT      Plan: Continue per plan of care  Next session end session w/ VOR exercises to allow greater participation w/ other exercises  Add VOR and positional change exercises to HEP

## 2018-11-13 ENCOUNTER — EVALUATION (OUTPATIENT)
Dept: PHYSICAL THERAPY | Facility: REHABILITATION | Age: 58
End: 2018-11-13
Payer: COMMERCIAL

## 2018-11-13 DIAGNOSIS — R27.8 TRUNCAL ATAXIA: Primary | ICD-10-CM

## 2018-11-13 PROCEDURE — G8979 MOBILITY GOAL STATUS: HCPCS

## 2018-11-13 PROCEDURE — 97112 NEUROMUSCULAR REEDUCATION: CPT

## 2018-11-13 PROCEDURE — 97110 THERAPEUTIC EXERCISES: CPT

## 2018-11-13 PROCEDURE — G8978 MOBILITY CURRENT STATUS: HCPCS

## 2018-11-13 NOTE — PROGRESS NOTES
Physical Therapy Initial Evaluation    2018  Monica Weir  : 1960  MRN: [id number]  POGN:[phone] (home)   Mobile: [phone] (mobile)  Insurance Information: Payor: [employer] / Plan: [employer] PLAN 361 / Product Type: Blue Fee for Service /   Referring Provider: Todd Long MD     Subjective    HPI: Monica Weir is a 60 y o  male referred to outpatient physical therapy for   1  Truncal ataxia       Per physician note from 10/10/2018: "Mr Stella Mahan is a 60 y o  right handed male who has been referred to the [facility] for evaluation of possible Parkinson's disease  He says he first noticed smaller handwriting while taking a class and taking notes around   In  he noticed he was "meandering walking left and right" which seemed to progress to the point where he would have difficulty keeping pace with someone walking more normally  Denies feeling hemibody slowness or stiffness  He felt he had to put forth more effort in walking fast  If he climbs more than a flight of stairs, his thighs become fatigued  +lightheadedness after ascending stairs or rising from a kneeling position (none while laying down)  If he closes his eyes in shower and looks up, he feels very unsteady  He denies ever falling except 1x while pulling up pants in 2017  He is aware of his posture being more slouched forward  Patient saw his PCP with complaint of lightheadedness while standing and dizziness, trouble with writing on right hand with dropping objects  MRI Brain on 2018 was reported unremarkable "     Patient reports symptoms of lightheadedness are improving since being on his new medication  He reports that he continues to have a path deviation when ambulating and feels at though this has not improved much  He reports that he noticed a lot of difficulty with ambulating up an incline recently  He felt like he was leaning very far forward and couldn't control his lean   He wants to continue working on his endurance and mobility as he does believe he has parkinson's and wants to do his best to slow the progression  Falls Hx: Denies   Home Environment: 2 steps to enter home, no railing  1 flight upstairs, with B rails  1 flight to basement, unilateral rail  DME: None   Pain: R elbow pain, B thumb pain   Imaging: Brain MRI with no remarkable results    Objective     Coordination Left Right   Heel To Shin Normal Normal    Finger To Nose Normal Hitting slightly below nose    Rapid Alternating Movement Normal Normal      UE ROM: WNL throughout   LE ROM: WNL throughout       Transfers    Sit To Stand Independent    Ambulatory Transfers Independent   Sit To Supine Independent   Roll Independent     Gait Assessment: Patient ambulating with good gait speed  Path deviation typically to the R  Lateral stepping for balance at time B  Improved R arm swing, however, patient reports he has to think about moving his arm        Outcome Measures    6 Minute Walk Test (ft): 1,670 ft  Reports fatigue 3/10    10MWT Not assessed    5x Sit To Stand (s): 8 47 sec at IE     Modified Clinical Test of Sensory Interaction on Balance (normed on ages 19-56, lower number is less sway or better static balance)   0 96 eyes open firm surface (norm 0 21-0 48)  2 87 eyes closed firm surface (norm 0 48-0 99)  1 43 eyes open foam surface (norm 0 38-0 71)  4 27 eyes closed foam surface (norm 0 70-2 22)    Functional Gait Assessment  3 Gait level surface  3 Change in gait speed  3 Gait with horizontal head turns  3 Gait with vertical head turns  3 Gait and pivot turn  3 Step over obstacle  3 Gait with narrow base of support  2 Gait with eyes closed  3 Ambulating backwards  2 Steps  28/30 Total score    9 hole peg test  RUE:  Trial 1: 19 68 sec  Trial 2: 18 79 sec    LUE  Trial 1: 17 90 sec  Trial 2: 21 10 sec    Updated HEP:   FTEC  Tandem stance  SLS  FT horizontal HT  FT Vertical HT  Picking objects up from ground    Assessment/Plan    Assessment:  Patient presents to outpatient physical therapy with mild decreases in LE strength, mild decreases in static/dynamic balance, mild decreases in endurance, and gait deviations including those listed in observation section  He has shown improvements in his dynamic balance and mild improvements in he walking endurance  He also reports improvements in lightheadedness as he can now work without experiencing symptoms  He has the most difficulty maintaining his balance when his eyes are closed indicated by outcome measures above  Patient will benefit from skilled outpatient physical therapy to address ongoing impairments listed above as well as prepare the patient for a gym regimen to decrease the risk of further decline in his mobility  Referral Required: Discussed possibility of speech therapy with patient secondary to swallowing deficits Patient declines referral at this time  STG's:    - Patient performs 9 hole peg test next session for baseline of current functioning of B UE  - MET    - Patient improves EC on foam task of MCSTIB for improved static balance within 3 wk - Not MET    - Patient is independent with initial home exercise program for improvements at home within 3 wk - MET    - Patient ambulates for 10 consecutive min with appropriate vital sign response for improved endurance within 3 wk - Ongoing   LTG's:   - Patient improves distance ambulated on 6MWT by 10% within  for improved endurance within 6 wk - Progressing   - Patient improves score on FGA by to 30/30  for improved dynamic balance within 6 wk - Progressing   - Patient reports he has joined a local gym for continued exercise upon D/C - Not Met    Plan:  Patient will benefit from skilled outpatient physical therapy 2x/wk for 5-6 wk  Interventions to include therapeutic exercises, therapeutic activities, neuromuscular re-education, gait training, manual therapy, and modalities as seen appropriate  Being to focus mostly on static balance and endurance with TM training secondary to minimal improvements in these areas upon re-evaluations

## 2018-11-15 ENCOUNTER — OFFICE VISIT (OUTPATIENT)
Dept: PHYSICAL THERAPY | Facility: REHABILITATION | Age: 58
End: 2018-11-15
Payer: COMMERCIAL

## 2018-11-15 DIAGNOSIS — R27.8 TRUNCAL ATAXIA: Primary | ICD-10-CM

## 2018-11-15 PROCEDURE — 97112 NEUROMUSCULAR REEDUCATION: CPT

## 2018-11-15 PROCEDURE — 97110 THERAPEUTIC EXERCISES: CPT

## 2018-11-15 NOTE — PROGRESS NOTES
Daily Note     Today's date: 11/15/2018  Patient name: Rahel Mariscal  :   MRN: [id number]  Referring provider: Lucia Hennessy MD  Dx:   Encounter Diagnosis     ICD-10-CM    1  Truncal ataxia R27 8        Start Time: 730  Stop Time: 813  Total time in clinic (min): 43 minutes     Subjective: Pt reports that he has approx 1 5/10 lightheadedness this morning  Objective: See treatment diary below    Exercise Diary  11/6 11/8 11/15   Initial HEP      Static Balance Vor x1 30 sec x3 (H)  VORx2 30 sec x3 (H) (3/10 room spinning) Vor x1 30 sec x3 (3/10 lightheaded, 3rd set room spinning 1/10)  VOR x2 30 sec x2    LE Strengthening      Dynamic balance  Walking w/ horizontal HTs x120ft  Walking, self ball toss x120 ft   Walking HT, identifying numbers on L and R side x120ft    Stand to squat to  object x120ft (fwd/backward) (5/10 dizziness)   Walking w/ diagonal HTs x120ft (room spinning 1/10)    Stand to squat to  object x120ft (fwd/backward) (5/10 lightheaded, 1/10 ringing in ears)    TM ambulation  Ambulation w/ 2# pvc, w/ mental challenges x10 min Ambulation w/ 2# pvc, w/ mental challenges x10 min 14 min total:  4 min - 1 7 mph  7 min - 1 8 mph  1 min - 2 0 mph    Last 5 min at 1 0% incline   [employer] activities       Maximal Daily Exercises    Floor to ceiling on physioball   Side to side on physioball  Forward step and reach  Sideways step and reach   Forward rock and reach   Sideways rock and reach               Assessment: Tolerated treatment well  Patient became dizzy with Maximal daily exercises and required a standing rest break  The dizziness faded, however, we took the rest of the exercises a little slower to decrease risk of dizziness   Patient exhibited good technique with therapeutic exercises and would benefit from continued PT      Plan: Continue per plan of care  Patient provided with daily maximal exercise print out  Ask patient about how they are going upon next visit

## 2018-11-20 ENCOUNTER — APPOINTMENT (OUTPATIENT)
Dept: PHYSICAL THERAPY | Facility: REHABILITATION | Age: 58
End: 2018-11-20
Payer: COMMERCIAL

## 2018-11-23 ENCOUNTER — OFFICE VISIT (OUTPATIENT)
Dept: PHYSICAL THERAPY | Facility: REHABILITATION | Age: 58
End: 2018-11-23
Payer: COMMERCIAL

## 2018-11-23 DIAGNOSIS — R27.8 TRUNCAL ATAXIA: Primary | ICD-10-CM

## 2018-11-23 PROCEDURE — 97112 NEUROMUSCULAR REEDUCATION: CPT

## 2018-11-23 PROCEDURE — 97110 THERAPEUTIC EXERCISES: CPT

## 2018-11-23 NOTE — PROGRESS NOTES
Daily Note     Today's date: 2018  Patient name: Silver Ge  : [birth year]  MRN: [id number]  Referring provider: Alejandra Funes MD  Dx:   Encounter Diagnosis     ICD-10-CM    1  Truncal ataxia R27 8        Start Time: 730  Stop Time: 815  Total time in clinic (min): 45 minutes     Subjective: Pt reports has not had the opportunity to perform Home exercise program    Objective: See treatment diary below    Exercise Diary  11/6 11/8 11/15 11/23     Initial HEP       Static Balance Vor x1 30 sec x3 (H)  VORx2 30 sec x3 (H) (3/10 room spinning) Vor x1 30 sec x3 (3/10 lightheaded, 3rd set room spinning 1/10)  VOR x2 30 sec x2     LE Strengthening       Dynamic balance  Walking w/ horizontal HTs x120ft  Walking, self ball toss x120 ft   Walking HT, identifying numbers on L and R side x120ft    Stand to squat to  object x120ft (fwd/backward) (/10 dizziness)   Walking w/ diagonal HTs x120ft (room spinning 1/10)    Stand to squat to  object x120ft (fwd/backward) (/10 lightheaded, /10 ringing in ears)  Walking side step  [place]        Single leg stance :30 x 2 each leg   TM ambulation  Ambulation w/ 2# pvc, w/ mental challenges x10 min Ambulation w/ 2# pvc, w/ mental challenges x10 min 14 min total:  4 min - 1 7 mph  7 min - 1 8 mph  1 min - 2 0 mph    Last 5 min at 1 0% incline   4 min- 1 7  12 min 2 0  1 cool down    Borgs Exertion scale 11   Biodex activities        Maximal Daily Exercises     Floor to ceiling on physioball   Side to side on physioball  Forward step and reach  Sideways step and reach   Forward rock and reach   Sideways rock and reach  Floor to ceiling on physioball   Side to side on physioball  Forward step and reach 2 x 10 each  Sideways step and reach 2 x 10 each  Forward rock and reach 2x10 each  Sideways rock and reach 2 x 10 each               Assessment: Tolerated treatment well  Patient did not present with dizziness today    Increased end range movement on maximal daily exercises and increased time on the treadmill  Encouraged client to make HEP a part of his daily routine for improved outcomes  Patient exhibited good technique with therapeutic exercises and would benefit from continued PT      Plan: Continue per plan of care

## 2018-11-27 ENCOUNTER — OFFICE VISIT (OUTPATIENT)
Dept: PHYSICAL THERAPY | Facility: REHABILITATION | Age: 58
End: 2018-11-27
Payer: COMMERCIAL

## 2018-11-27 DIAGNOSIS — R27.8 TRUNCAL ATAXIA: Primary | ICD-10-CM

## 2018-11-27 PROCEDURE — 97110 THERAPEUTIC EXERCISES: CPT

## 2018-11-27 PROCEDURE — 97112 NEUROMUSCULAR REEDUCATION: CPT

## 2018-11-27 NOTE — PROGRESS NOTES
Daily Note     Today's date: 2018  Patient name: Sarah Rivas  : 2002  MRN: [id number]  Referring provider: Eren Oconnell MD  Dx:   Encounter Diagnosis     ICD-10-CM    1  Truncal ataxia R27 8        Start Time: 732  Stop Time: 810  Total time in clinic (min): 38 minutes     Subjective: Pt reports he had a really bad weekend with symptoms  He reports that he walked approximately 1 5 miles to get to a football game and he had to negotiate a lot of stairs and ramps  He reports that his quads became very fatigued and he became very dizzy  At one point he felt his vision going dark and was fearful of passing out  He reports he had to stop walking and lean against a wall until his vision returned  He reports his current level of lightheadedness is 2/10    Objective: See treatment diary below    Resting BP: 143/73  O2 Saturation: 99%    Exercise Diary  11/15 11/23   11/27/18   Static Balance   FT horizontal VOR: 30 sec x 3  FT vertical VOR: 30 sec x 3  Horizontal VOR x 2: 30 sec x2  Vertical VOR x 2: 30 sec x2   Horizontal HT EC: 30 sec x 2  AP shifts EO: 30 sec  AP shifts EC: 30 sec   LE Strengthening      Dynamic balance   Walking side step  [place]        Single leg stance :30 x 2 each leg    TM ambulation  14 min total:  4 min - 1 7 mph  7 min - 1 8 mph  1 min - 2 0 mph    Last 5 min at 1 0% incline   4 min- 1 7  12 min 2 0  1 cool down    Borgs Exertion scale 11 12 min at 1 8 mph, last 6 min on 1 0% incline     O2 saturation dropped to 92-96%  Patient with reports of increased dizziness    BP s/p TM: 135/73   Biodex activities       Maximal Daily Exercises   Floor to ceiling on physioball   Side to side on physioball  Forward step and reach  Sideways step and reach   Forward rock and reach   Sideways rock and reach  Floor to ceiling on physioball   Side to side on physioball  Forward step and reach 2 x 10 each  Sideways step and reach 2 x 10 each  Forward rock and reach 2x10 each  Sideways rock and reach 2 x 10 each               Assessment: Tolerated treatment fair  He was provided with rest breaks between static balance activities secondary to increases in dizziness  He showed a significant drop in O2 saturation with TM ambulation  It was suggested that the patient should consider getting a cardiologist secondary to the changes he has been experiencing with his BP and O2 saturation as this may be causing some of his symptoms  The patient as a follow-up with his PCP on Thursday and he will be discussing this plan  Patient exhibited good technique with therapeutic exercises and would benefit from continued PT      Plan: Continue per plan of care

## 2018-11-29 ENCOUNTER — APPOINTMENT (OUTPATIENT)
Dept: RADIOLOGY | Facility: MEDICAL CENTER | Age: 58
End: 2018-11-29
Payer: COMMERCIAL

## 2018-11-29 ENCOUNTER — OFFICE VISIT (OUTPATIENT)
Dept: FAMILY MEDICINE CLINIC | Facility: CLINIC | Age: 58
End: 2018-11-29
Payer: COMMERCIAL

## 2018-11-29 VITALS
BODY MASS INDEX: 30 KG/M2 | SYSTOLIC BLOOD PRESSURE: 112 MMHG | DIASTOLIC BLOOD PRESSURE: 62 MMHG | HEIGHT: 73 IN | WEIGHT: 226.4 LBS

## 2018-11-29 DIAGNOSIS — M25.531 RIGHT WRIST PAIN: ICD-10-CM

## 2018-11-29 DIAGNOSIS — G20 PARKINSON'S DISEASE (HCC): Primary | ICD-10-CM

## 2018-11-29 DIAGNOSIS — R06.02 SHORTNESS OF BREATH: ICD-10-CM

## 2018-11-29 DIAGNOSIS — M79.644 PAIN OF RIGHT THUMB: ICD-10-CM

## 2018-11-29 DIAGNOSIS — M25.521 RIGHT ELBOW PAIN: ICD-10-CM

## 2018-11-29 DIAGNOSIS — R42 DIZZINESS: Primary | ICD-10-CM

## 2018-11-29 DIAGNOSIS — M79.645 PAIN OF LEFT THUMB: ICD-10-CM

## 2018-11-29 PROCEDURE — 1036F TOBACCO NON-USER: CPT | Performed by: FAMILY MEDICINE

## 2018-11-29 PROCEDURE — 71046 X-RAY EXAM CHEST 2 VIEWS: CPT

## 2018-11-29 PROCEDURE — 99214 OFFICE O/P EST MOD 30 MIN: CPT | Performed by: FAMILY MEDICINE

## 2018-11-29 PROCEDURE — 3008F BODY MASS INDEX DOCD: CPT | Performed by: FAMILY MEDICINE

## 2018-11-29 RX ORDER — DOXAZOSIN MESYLATE 1 MG/1
1 TABLET ORAL
COMMUNITY
End: 2019-02-28 | Stop reason: ALTCHOICE

## 2018-11-29 NOTE — PROGRESS NOTES
Assessment/Plan:  Patient will try to stay well hydrated regarding dizziness  Patient will stop Cardura which may be the offending agent  Patient will stop meloxicam if dizziness persists  Patient go for chest x-ray due to shortness of breath  X-rays discussed regarding right thumb and left thumb pain as well as right elbow pain  Patient will continue with brace for right elbow  Patient will follow up with Neurology regarding workup for Parkinson's disease  For follow-up in 2-3 months if symptoms persist   Patient will see cardiologist     Diagnoses and all orders for this visit:    Dizziness    Pain of left thumb    Pain of right thumb    Right elbow pain    Right wrist pain    Shortness of breath  -     XR chest pa & lateral; Future    Other orders  -     doxazosin (CARDURA) 1 mg tablet; Take 2 mg by mouth daily with dinner          Subjective:      Patient ID: Anneliese Simmons is a 60 y o  male  Patient here to follow-up on thumb pain as well as right elbow pain  Patient has an worsening dizziness  No syncope  Patient has had some shortness of breath with exertion  No chest pain  No diaphoresis or vomiting associated with this  Patient has been trying to hydrate more and this is helping  Patient does get some orthostatic changes  Right elbow pain and thumb pain have improved overall with treatment plan  Dizziness         The following portions of the patient's history were reviewed and updated as appropriate: allergies, current medications, past family history, past medical history, past social history, past surgical history and problem list     Review of Systems   Constitutional: Negative  HENT: Negative  Eyes: Negative  Respiratory: Negative  Cardiovascular: Negative  Gastrointestinal: Negative  Endocrine: Negative  Genitourinary: Negative  Musculoskeletal: Negative  Skin: Negative  Allergic/Immunologic: Negative  Neurological: Positive for dizziness  Hematological: Negative  Psychiatric/Behavioral: Negative            Objective:      /62 (BP Location: Right arm, Patient Position: Sitting, Cuff Size: Adult)   Ht 6' 0 5" (1 842 m)   Wt 103 kg (226 lb 6 4 oz)   BMI 30 28 kg/m²          Physical Exam

## 2018-11-30 ENCOUNTER — APPOINTMENT (OUTPATIENT)
Dept: PHYSICAL THERAPY | Facility: REHABILITATION | Age: 58
End: 2018-11-30
Payer: COMMERCIAL

## 2018-11-30 NOTE — PROGRESS NOTES
Daily Note     Today's date: 2018  Patient name: Deann Salgado  :   MRN: [id number]  Referring provider: Sharee Melara MD  Dx:   No diagnosis found  Subjective: Pt reports which Dr Lesley Wheeler recommended that he stop taking taking off medication that may be increasing his dizziness  He was also referred to a cardiologist which he will be seeing on December  Objective: See treatment diary below    Resting BP: L = 173/97   R = 182/99  O2 Saturation: 99%    Resting HR: 69 bpm     Exercise Diary  18   Static Balance  FT horizontal VOR: 30 sec x 3  FT vertical VOR: 30 sec x 3  Horizontal VOR x 2: 30 sec x2  Vertical VOR x 2: 30 sec x2   Horizontal HT EC: 30 sec x 2  AP shifts EO: 30 sec  AP shifts EC: 30 sec    LE Strengthening      Dynamic balance  Walking side step  [place]        Single leg stance :30 x 2 each leg     TM ambulation    4 min- 1 7  12 min 2 0  1 cool down    Borgs Exertion scale 11 12 min at 1 8 mph, last 6 min on 1 0% incline  O2 saturation dropped to 92-96%  Patient with reports of increased dizziness    BP s/p TM: 135/73    Biodex activities       Maximal Daily Exercises  Floor to ceiling on physioball   Side to side on physioball  Forward step and reach 2 x 10 each  Sideways step and reach 2 x 10 each  Forward rock and reach 2x10 each  Sideways rock and reach 2 x 10 each                Assessment: Tolerated treatment fair  He was provided with rest breaks between static balance activities secondary to increases in dizziness  He showed a significant drop in O2 saturation with TM ambulation  It was suggested that the patient should consider getting a cardiologist secondary to the changes he has been experiencing with his BP and O2 saturation as this may be causing some of his symptoms  The patient as a follow-up with his PCP on Thursday and he will be discussing this plan   Patient exhibited good technique with therapeutic exercises and would benefit from continued PT      Plan: Continue per plan of care

## 2018-12-03 ENCOUNTER — OFFICE VISIT (OUTPATIENT)
Dept: PHYSICAL THERAPY | Facility: REHABILITATION | Age: 58
End: 2018-12-03
Payer: COMMERCIAL

## 2018-12-03 DIAGNOSIS — R27.8 TRUNCAL ATAXIA: Primary | ICD-10-CM

## 2018-12-03 PROCEDURE — 97110 THERAPEUTIC EXERCISES: CPT

## 2018-12-03 PROCEDURE — 97112 NEUROMUSCULAR REEDUCATION: CPT

## 2018-12-03 NOTE — PROGRESS NOTES
Daily Note     Today's date: 12/3/2018  Patient name: Kurtis Partida  :   MRN: [id number]  Referring provider: Lizett Solano MD  Dx:   Encounter Diagnosis     ICD-10-CM    1  Truncal ataxia R27 8        Start Time: 732  Stop Time:   Total time in clinic (min): 42 minutes     Subjective: Pt not seen last session secondary to BP being too high  It was 182/99  He reports he was taking his BP throughout the weekend and it was consistently high  He says that his diastolic pressure was 350 at one point in time  Objective: See treatment diary below    Resting BP: 145/78  O2 Saturation: 99%    Exercise Diary  18   Static Balance  FT horizontal VOR: 30 sec x 3  FT vertical VOR: 30 sec x 3  Horizontal VOR x 2: 30 sec x2  Vertical VOR x 2: 30 sec x2   Horizontal HT EC: 30 sec x 2  AP shifts EO: 30 sec  AP shifts EC: 30 sec FTEO: 1 min  FTEC:  Min  FTEO horizontal HT: 30sec x 3  Attempted EC, however, too symptomatic   FTEO vertical HT:  30sec x 3  Tandem stance: 1 min B  AP shifts: 1 min   AP shitfs EC: 30 sec      Dynamic balance  Walking side step  [place]        Single leg stance :30 x 2 each leg     TM ambulation    4 min- 1 7  12 min 2 0  1 cool down    Borgs Exertion scale 11 12 min at 1 8 mph, last 6 min on 1 0% incline  O2 saturation dropped to 92-96%  Patient with reports of increased dizziness  BP s/p TM: 135/73 1 7 mph warm-up 2 min  2 0 mph, 1% incline 8 min  1 7 mph cool-down 2 min    12 min total   BP drops to 117/68   Biodex activities       Maximal Daily Exercises  Floor to ceiling on physioba   Side to side on physioball  Forward step and reach 2 x 10 each  Sideways step and reach 2 x 10 each  Forward rock and reach 2x10 each  Sideways rock and reach 2 x 10 each                Assessment: Tolerated treatment fair  Patient became very symptomatic with EC tasks today and had a slower than normal recovery rate to bring his symptoms back down   Continues to show large fluctuations in BP  End of session today BP was 108/70  Patient exhibited good technique with therapeutic exercises and would benefit from continued PT      Plan: Continue per plan of care

## 2018-12-06 ENCOUNTER — OFFICE VISIT (OUTPATIENT)
Dept: PHYSICAL THERAPY | Facility: REHABILITATION | Age: 58
End: 2018-12-06
Payer: COMMERCIAL

## 2018-12-06 DIAGNOSIS — R27.8 TRUNCAL ATAXIA: Primary | ICD-10-CM

## 2018-12-06 PROCEDURE — 97112 NEUROMUSCULAR REEDUCATION: CPT

## 2018-12-06 PROCEDURE — 97110 THERAPEUTIC EXERCISES: CPT

## 2018-12-06 NOTE — PROGRESS NOTES
Daily Note     Today's date: 2018  Patient name: Kevin Curry  :   MRN: [id number]  Referring provider: Valentina Mansfield MD  Dx:   Encounter Diagnosis     ICD-10-CM    1  Truncal ataxia R27 8        Start Time:   Stop Time: 813  Total time in clinic (min): 42 minutes     Subjective: Pt reports 2/10 lightheadedness  Objective: See treatment diary below    Resting BP at start: 132/72, BPM 79  O2 Saturation: 95%    Exercise Diary  18   Static Balance FT horizontal VOR: 30 sec x 3  FT vertical VOR: 30 sec x 3  Horizontal VOR x 2: 30 sec x2  Vertical VOR x 2: 30 sec x2   Horizontal HT EC: 30 sec x 2  AP shifts EO: 30 sec  AP shifts EC: 30 sec FTEO: 1 min  FTEC:  Min  FTEO horizontal HT: 30sec x 3  Attempted EC, however, too symptomatic   FTEO vertical HT:  30sec x 3  Tandem stance: 1 min B  AP shifts: 1 min   AP shitfs EC: 30 sec    Sit to stand x10 EO (no symptoms)  Sit to stand 10x2 EC  3/10 lightheaded  Sit to stand on foam, EC x10  3/10 lightheaded, ringing in ears  Marches EC in //bars x5   Dynamic balance       TM ambulation  12 min at 1 8 mph, last 6 min on 1 0% incline  O2 saturation dropped to 92-96%  Patient with reports of increased dizziness  BP s/p TM: 135/73 1 7 mph warm-up 2 min  2 0 mph, 1% incline 8 min  1 7 mph cool-down 2 min    12 min total   BP drops to 117/68 1 7 mph warm-up 2 min  2 0 mph, 1% incline 8 min  1 7 mph cool-down 2 min (3/10 lightheaded)  At end:  5/10 HA, room spinning, lightheaded  O2:96%  BP: 110/65, BPM 90  12 min total    Biodex activities       Maximal Daily Exercises                  Assessment: Tolerated treatment fair  Patient continues to become symptomatic after TM activity and EC tasks  Symptoms monitored throughout session, frequent rest breaks provided to allow pt to return to baseline     Patient exhibited good technique with therapeutic exercises and would benefit from continued PT      Plan: Continue per plan of care  Progress time on TM to pt tolerance

## 2018-12-11 ENCOUNTER — OFFICE VISIT (OUTPATIENT)
Dept: PHYSICAL THERAPY | Facility: REHABILITATION | Age: 58
End: 2018-12-11
Payer: COMMERCIAL

## 2018-12-11 DIAGNOSIS — R27.8 TRUNCAL ATAXIA: Primary | ICD-10-CM

## 2018-12-11 PROCEDURE — 97110 THERAPEUTIC EXERCISES: CPT

## 2018-12-11 PROCEDURE — 97112 NEUROMUSCULAR REEDUCATION: CPT

## 2018-12-11 NOTE — PROGRESS NOTES
Daily Note     Today's date: 2018  Patient name: Mary Jones  :   MRN: [id number]  Referring provider: Fam Dewitt MD  Dx:   Encounter Diagnosis     ICD-10-CM    1  Truncal ataxia R27 8        Start Time:   Stop Time: 815  Total time in clinic (min): 40 minutes     Subjective: Pt reports 0 5/10 lightheadedness  He reports his BP still fluctuates frequently  He feels that when he walks he deviates frequently side to side, w/ significant increase in deviation noted when he is holding something  Objective: See treatment diary below    Resting BP at start: 158/85, BPM 74       At end : 118/64  BPM 88    Exercise Diary  18   Static Balance FTEO: 1 min  FTEC:  Min  FTEO horizontal HT: 30sec x 3  Attempted EC, however, too symptomatic   FTEO vertical HT:  30sec x 3  Tandem stance: 1 min B  AP shifts: 1 min   AP shitfs EC: 30 sec    Sit to stand x10 EO (no symptoms)  Sit to stand 10x2 EC  3/10 lightheaded  Sit to stand on foam, EC x10  3/10 lightheaded, ringing in ears  Marches EC in //bars x5 Sit to stand 10x2 EC (2/10 lightheaded; off balance 4/10 lightheaded)    Foam:  FTEO x1 min  A/P sway EO x1 min  A/P sway w/ ball toss x2 min  Single limb stance EO x1 min ea  FTEC x1 min  A/P sway EC x2 min    Marches EC in //bars x10 (5/10 lightheaded)   Dynamic balance       TM ambulation  1 7 mph warm-up 2 min  2 0 mph, 1% incline 8 min  1 7 mph cool-down 2 min    12 min total   BP drops to 117/68 1 7 mph warm-up 2 min  2 0 mph, 1% incline 8 min  1 7 mph cool-down 2 min (3/10 lightheaded)  At end:  5/10 HA, room spinning, lightheaded  O2:96%  BP: 110/65, BPM 90  12 min total  1 9 mph warm-up 2 min  2 2 mph, 1% incline 8 min  1 9 mph cool-down 2 mi ("I almost feel the room is spinning")   Biodex activities       Maximal Daily Exercises                  Assessment: Tolerated treatment fair   Continued to monitor vitals and symptoms throughout session as pt reports he still becomes symptomatic frequently at home and in community and that he regularly experiences change in BP  Pt symptoms significantly provoked w/ rapid changes from sit to stand w/ EC  Significant decrease in BP noted after TM, as noted above  Signigicant increase in symptoms noted w/ EC geni  Patient exhibited good technique with therapeutic exercises and would benefit from continued PT      Plan: Continue per plan of care  Continue static balance challenges on firm and foam, continue balance challenges involving posterior weight shifting

## 2018-12-13 ENCOUNTER — OFFICE VISIT (OUTPATIENT)
Dept: PHYSICAL THERAPY | Facility: REHABILITATION | Age: 58
End: 2018-12-13
Payer: COMMERCIAL

## 2018-12-13 DIAGNOSIS — R27.8 TRUNCAL ATAXIA: Primary | ICD-10-CM

## 2018-12-13 PROCEDURE — 97110 THERAPEUTIC EXERCISES: CPT

## 2018-12-13 PROCEDURE — 97112 NEUROMUSCULAR REEDUCATION: CPT

## 2018-12-13 NOTE — PROGRESS NOTES
Daily Note     Today's date: 2018  Patient name: Santos Chinchilla  :   MRN: [id number]  Referring provider: Fely Mendoza MD  Dx:   Encounter Diagnosis     ICD-10-CM    1  Truncal ataxia R27 8        Start Time: 730  Stop Time:   Total time in clinic (min): 44 minutes     Subjective: Pt reports 0 5/10 lightheadedness  He reports his BP still fluctuates frequently  He feels that when he walks he deviates frequently side to side, w/ significant increase in deviation noted when he is holding something  Objective: See treatment diary below    Resting BP at start: 153/86, BPM 76  O2 saturation 99%       At end : 133/77  BPM 87  O2 saturation: 94-95%      Exercise Diary     Static Balance Sit to stand x10 EO (no symptoms)  Sit to stand 10x2 EC  3/10 lightheaded  Sit to stand on foam, EC x10  3/10 lightheaded, ringing in ears      Marches EC in //bars x5 Sit to stand 10x2 EC (2/10 lightheaded; off balance 4/10 lightheaded)    Foam:  FTEO x1 min  A/P sway EO x1 min  A/P sway w/ ball toss x2 min  Single limb stance EO x1 min ea  FTEC x1 min  A/P sway EC x2 min    Marches EC in //bars x10 (5/10 lightheaded)    Dynamic balance    Lateral stepping to cones, SL stance to tap cones  Forwards weaving around cones  Backwards walking, weaving around cones; posterior LOB, self corrected   Walking forward/backward foam ramp  Tossing ball up while walking  Forward/backward walking tossing ball with therapist   TM ambulation  1 7 mph warm-up 2 min  2 0 mph, 1% incline 8 min  1 7 mph cool-down 2 min (3/10 lightheaded)  At end:  5/10 HA, room spinning, lightheaded  O2:96%  BP: 110/65, BPM 90  12 min total  1 9 mph warm-up 2 min  2 2 mph, 1% incline 8 min  1 9 mph cool-down 2 mi ("I almost feel the room is spinning") 1 2 mph 4 min  1 5 mph 4 min  1 6 mph 2 min    O2 Saturation dropped to 94-95%   [employer] activities       Maximal Daily Exercises                  Assessment: Tolerated treatment fair  Did well with very slow progression of speed on TM today  However with dynamic balance activities, Brady needed frequent rest breaks secondary to increase in dizziness  He continues to have a significant drop in O2 saturation during activities and typically does not have the expected change in BP with exercise as it typically drops  Patient exhibited good technique with therapeutic exercises and would benefit from continued PT      Plan: Continue per plan of care

## 2018-12-17 ENCOUNTER — OFFICE VISIT (OUTPATIENT)
Dept: PHYSICAL THERAPY | Facility: REHABILITATION | Age: 58
End: 2018-12-17
Payer: COMMERCIAL

## 2018-12-17 DIAGNOSIS — R27.8 TRUNCAL ATAXIA: Primary | ICD-10-CM

## 2018-12-17 PROCEDURE — 97110 THERAPEUTIC EXERCISES: CPT

## 2018-12-17 NOTE — PROGRESS NOTES
Daily Note     Today's date: 2018  Patient name: Peggy Vasquez  :   MRN: [id number]  Referring provider: Evelyn Dooley MD  Dx:   Encounter Diagnosis     ICD-10-CM    1  Truncal ataxia R27 8        Start Time: 732  Stop Time: 810  Total time in clinic (min): 38 minutes     Subjective: Pt 2/0 lightheaded  He reports this weekend he got out of breath after walking up an incline    Objective: See treatment diary below       At start: 154/88  BPM 73  O2 saturation: 96%      Exercise Diary     Static Balance Sit to stand 10x2 EC (2/10 lightheaded; off balance /10 lightheaded)    Foam:  FTEO x1 min  A/P sway EO x1 min  A/P sway w/ ball toss x2 min  Single limb stance EO x1 min ea  FTEC x1 min  A/P sway EC x2 min    Marches EC in //bars x10 (5/10 lightheaded)  Foam:  FTEC x1 min mod sway  TandemEO x1 min ea  SLSEO x1 min ea  Bosu step ups x10  (3/10 lightheaded)     Dynamic balance   Lateral stepping to cones, SL stance to tap cones  Forwards weaving around cones  Backwards walking, weaving around cones; posterior LOB, self corrected   Walking forward/backward foam ramp  Tossing ball up while walking  Forward/backward walking tossing ball with therapist Ambulation    TM ambulation  1 9 mph warm-up 2 min  2 2 mph, 1% incline 8 min  1 9 mph cool-down 2 mi ("I almost feel the room is spinning") 1 2 mph 4 min  1 5 mph 4 min  1 6 mph 2 min    O2 Saturation dropped to 94-95% 1 2 mph 2 min  1 4 mph 2 min  1 5 mph 6 min (reports fatigue and 3/10 lightheaded)  1 0 mph 1 min    141/68; 75  92% O2   [employer] activities       Maximal Daily Exercises                  Assessment: Tolerated treatment fair  Monitored vitals at start of session and at the end of each activity  Pt continues to require seated rests secondary to lightheadedness  Moderate sway noted today throughout static balance activities and repeated lateral deviation and LOB w/ dynamic balance activities    Patient exhibited good technique with therapeutic exercises and would benefit from continued PT       Plan: Continue per plan of care  Pt to be reevaled

## 2018-12-20 ENCOUNTER — EVALUATION (OUTPATIENT)
Dept: PHYSICAL THERAPY | Facility: REHABILITATION | Age: 58
End: 2018-12-20
Payer: COMMERCIAL

## 2018-12-20 DIAGNOSIS — R27.8 TRUNCAL ATAXIA: Primary | ICD-10-CM

## 2018-12-20 PROCEDURE — G8990 OTHER PT/OT CURRENT STATUS: HCPCS

## 2018-12-20 PROCEDURE — G8991 OTHER PT/OT GOAL STATUS: HCPCS

## 2018-12-20 PROCEDURE — 97112 NEUROMUSCULAR REEDUCATION: CPT

## 2018-12-20 PROCEDURE — 97110 THERAPEUTIC EXERCISES: CPT

## 2018-12-20 NOTE — PROGRESS NOTES
Physical Therapy Re-evaluation     2018  Nikos Severino  : 1960  MRN: [id number]  GONN:[phone] (home)   Mobile: [phone] (mobile)  Insurance Information: Payor: Frida Neumann / Plan: [employer]AN [id number] / Product Type: Blue Fee for Service /   Referring Provider: Carlyle Gaspar MD     Subjective    HPI: Nikos Severino is a 60 y o  male referred to outpatient physical therapy for   1  Truncal ataxia       Per physician note from 10/10/2018: "Mr Huong Daniel is a 60 y o  right handed male who has been referred to the [address] [facility] for evaluation of possible Parkinson's disease  He says he first noticed smaller handwriting while taking a class and taking notes around   In  he noticed he was "meandering walking left and right" which seemed to progress to the point where he would have difficulty keeping pace with someone walking more normally  Denies feeling hemibody slowness or stiffness  He felt he had to put forth more effort in walking fast  If he climbs more than a flight of stairs, his thighs become fatigued  +lightheadedness after ascending stairs or rising from a kneeling position (none while laying down)  If he closes his eyes in shower and looks up, he feels very unsteady  He denies ever falling except 1x while pulling up pants in 2017  He is aware of his posture being more slouched forward  Patient saw his PCP with complaint of lightheadedness while standing and dizziness, trouble with writing on right hand with dropping objects  MRI Brain on 2018 was reported unremarkable "     Patient reports he no longer is experiencing a reduction in his symptoms  He says he woke up the other morning very dizzy for no reason, and the dizziness did not fade until approx 11 am  He says that he was walking up a ramp and talking on the phone and he had a very difficult time talking and maintaining his breath while walking up the ramp     Falls Hx: Denies   Home Environment: 2 steps to enter home, no railing  1 flight upstairs, with B rails  1 flight to basement, unilateral rail  DME: None   Pain: R elbow pain, B thumb pain   Imaging: Brain MRI with no remarkable results  Current Dizziness: 2-2 5/10     Objective     BP: 142/82  HR: 80 bpm  O2 Saturation: 96%    Transfers    Sit To Stand Independent    Ambulatory Transfers Independent   Sit To Supine Independent   Roll Independent     Gait Assessment: Patient ambulating with good gait speed  Improved arm swing and pelvic rotation, however, patient reports he really has to think about this when walking  Outcome Measures    6 Minute Walk Test (ft): 1,590 ft   10MWT Not assessed    5x Sit To Stand (s): 8 47 sec at IE     Modified Clinical Test of Sensory Interaction on Balance (normed on ages 19-56, lower number is less sway or better static balance)  1 29 eyes open firm surface (norm 0 21-0 48)  1 87 eyes closed firm surface (norm 0 48-0 99)  1 72 eyes open foam surface (norm 0 38-0 71)  3 46 eyes closed foam surface (norm 0 70-2 22)    Functional Gait Assessment  3 Gait level surface  3 Change in gait speed  3 Gait with horizontal head turns  3 Gait with vertical head turns  3 Gait and pivot turn  3 Step over obstacle  3 Gait with narrow base of support  2 Gait with eyes closed  3 Ambulating backwards  2 Steps  28/30 Total score    9 hole peg test  RUE:  Trial 1: 17 97 sec  Trial 2: 14 16 sec    LUE  Trial 1: 15 97 sec  Trial 2: 19 56 sec    Neuromuscular re-education:  Side-stepping on balance beam with unilateral UE support  Stepping forward/lateral/backwards over hurdles, on and off foam    Assessment/Plan    Assessment:  Patient presents to outpatient physical therapy with mild decreases in LE strength, decreases in static/dynamic balance, decreases in endurance, and dizziness that decreases his QOL  Despite focusing on patient's endurance this past month, he had a decrease in the distance ambulated on the 6MWT   He continues to have abnormal changes in BP and O2 saturation with activity and at times his O2 saturation is low even when resting  It was suggested that the patient see a cardiologist approximately 3 weeks ago  He has an appointment with a cardiologist on 12/31/2018 and will be placed on a hold until then as she is not showing improvements with therapy at this time  This plan was discussed with the patient and he agrees  STG's:    - Patient performs 9 hole peg test next session for baseline of current functioning of B UE  - MET    - Patient improves EC on foam task of MCSTIB for improved static balance within 3 wk - Not MET    - Patient is independent with initial home exercise program for improvements at home within 3 wk - MET    - Patient ambulates for 10 consecutive min with appropriate vital sign response for improved endurance within 3 wk - ambulates 10 min, however, does not have an appropriate vital sign response     LTG's:   - Patient improves distance ambulated on 6MWT by 10% within  for improved endurance within 6 wk - Not Met   - Patient improves score on FGA by to 30/30  for improved dynamic balance within 6 wk -Progressed    - Patient reports he has joined a local gym for continuede exercise upon D/C - Not Met    Plan:  Patient is being placed on hold at this time until he has an appointment with his cardiologist

## 2018-12-22 DIAGNOSIS — M79.644 PAIN OF RIGHT THUMB: ICD-10-CM

## 2018-12-24 RX ORDER — MELOXICAM 15 MG/1
TABLET ORAL
Qty: 30 TABLET | Refills: 1 | Status: SHIPPED | OUTPATIENT
Start: 2018-12-24 | End: 2019-02-28 | Stop reason: ALTCHOICE

## 2018-12-25 NOTE — PROGRESS NOTES
DEPARTMENT OF NEUROLOGICAL SCIENCES  [address] DISORDERS CLINIC         RETURN PATIENT NOTE    Patient: Isaura Brambila Record Number: # [id number]  YOB: 1960  Date of visit: 12/27/2018    Referring provider: Diana Fonseca DO     The patient was not accompanied today  History was obtained from patient mainly with wife present  ASSESSMENT     1  Orthostatic lightheadedness  Ambulatory referral to Neurology   2  Idiopathic peripheral neuropathy     3  Nocturnal leg movements     4  Worsened handwriting  Ambulatory referral to Occupational Therapy     This gentleman returns now with mainly dizziness and orthostatic hypotension as confirmed from PT sessions  He also has reports of shortness of breath with exertion, which he will be seeing Cardiology for  Today I still did not appreciate bradykinesia, resting tremor or rigidity on examination to support parkinsonism  He has had no falls  He endorses some position-dependent movement of both his legs in a jerking fashion he demonstrates that resembles a mild myoclonus and has none of the classic sensation of resembling a restless leg syndrome  However this was unable to be replicated today  He does become transiently hypertensive with the prev dose of Florinef; recommended halving dose and seeing if this helps, continuing the salt and fluid intake as before  Strength testing still full  At this point, discussed with him his condition is most likely idiopathic autonomic neuropathy vs a developing atypical Parkinsonism (which he does not currently display) vs Multiple System Atrophy (which could cause dysautonomia and ataxia without parkinsonism)  His overall condition appears stable  PLAN     · Checking orthostatic blood pressure today  · Restart the fludrocortisone at half a tablet (0 05mg) a day and see if this does symptomatically help in the next 1-2 weeks   Call with any reports of side effects or questions  Considering Midodrine as next line of medication (2 5-10mg q8h)  · Continue the Physical Therapy as before  We will make a referral to Occupational Therapy for his concern over loss of fine motor coordination and micrographia  · Asked family to video record the leg movements at home, when reclining  Depending on symptomatology will then treat accordingly  · Encouraged more physical activity, with the equipment at home, stretching exercises and light weight training with dumbbells and techniques imparted by Physical Therapy  · Thank you very much for sending me this interesting patient  · The patient has been instructed to call us about any new neurological problems or medication side effects  · Return to Clinic on 3-4 months    A total of 45 minutes were spent face-to-face with this patient, of which at least 50% was spent on counseling and coordination of care  We discussed the natural history of the patient's condition, differential diagnosis, level of diagnostic certainty, treatment alternatives and their side effects and possible complications  HISTORY OF PRESENT ILLNESS:     Mr Margie Zamora is a 60 y o  right handed male who has been referred to the [facility] for evaluation of possible Parkinson's disease  Interim History  Labwork was unremarkable  During his PT session he had positive orthostatic hypotension and since it is interfering with his therapy he was started on Florinef 0 1mg daily, along with encouragement for increased salt intake and adequate fluid intake of at least 4 large glasses of 16oz water a day  He was only taking the fludrocortisone up to a week around 12/7/18 before stopping this due to hypertension noted at PT  He has been overhydrating and with the increased salt intake, this has helped "a bit"  He says he does measure his blood pressure with an electronic gauge at home  He has an appointment scheduled for the cardiologist for next week   He tells me he was on a long walk for 2 miles and had to slow down and stop because he was out of breath with lightheadedness, which was new  Main bothersome symptoms are:   1  Balance problem - from orthostatism  No new falls  2  Tremors - very intermittent  When sitting in a recliner he tends to have some involuntary movements of the legs  INITIAL HISTORY  He says he first noticed smaller handwriting while taking a class and taking notes around 2015  In 2016 he noticed he was "meandering walking left and right" which seemed to progress to the point where he would have difficulty keeping pace with someone walking more normally  Denies feeling hemibody slowness or stiffness  He felt he had to put forth more effort in walking fast  If he climbs more than a flight of stairs, his thighs become fatigued  +lightheadedness after ascending stairs or rising from a kneeling position (none while laying down)  If he closes his eyes in shower and looks up, he feels very unsteady  He denies ever falling except 1x while pulling up pants in 2017  He is aware of his posture being more slouched forward  Patient saw his PCP with complaint of lightheadedness while standing and dizziness, trouble with writing on right hand with dropping objects  MRI Brain on July 2018 was reported unremarkable  - Laboratory testing in July 2018 including normal CBC, CMP, Lyme ab testing and hypovitaminosis D noted  - No previous injuries or surgeries on head or neck  - His main concern today is if he has Parkinson's disease, as he read about his symptoms on WebMD      Tremor noticeable but not bothersome; he says he can see his R hand shaking when he writes or eats  Not apparent when his hand is resting  No other tremor in his body he says  Started after 2016 very mild progression if at all  Sleep:  He says he sleeps poorly with sleep habits (sleeping on recliner while watching TV, ~4-5 hrs a night  +difficulty falling back asleep  +snores  Hyposmia: denies  RBD (REM semi-purposeful body movements):  "occasionally" acting out dreams   Gait Disturbance:  Yes, as above   Dementia:  +feeling more difficulty with attention and focusing at work  Forgetting names and small tasks  Constipation: endorses  Hallucination: denies  Skin Cancer History: denies  Hypovitaminosis D: denies  Hypovitaminosis B12:  denies  Dysautonomia:  +urinary retention but has BPH  +orthostatism    Gaze Palsy: none  Exercise Therapy: INACTIVE    Family History: Number of First Degree Relatives With Parkinsonism: mother    Imaging: MRI brain in July 2018      REVIEW OF PAST MEDICAL, SOCIAL AND FAMILY HISTORY:  This is the list of problems as per our Medical Records:    Patient Active Problem List    Diagnosis Date Noted    Shortness of breath 11/29/2018    Pain of right thumb 10/18/2018    Pain of left thumb 10/18/2018    Right elbow pain 10/18/2018    Right wrist pain 10/18/2018    Plantar fasciitis, bilateral 10/10/2018    Unspecified abnormalities of gait and mobility 10/10/2018    Dizziness 07/05/2018    Primary testicular cancer ([facility] [address] ) 12/17/2014       Past Medical History:   Diagnosis Date    Back pain     Bladder infection     BPH (benign prostatic hyperplasia)     Diverticulosis     GERD (gastroesophageal reflux disease)     occassional    History of testicular cancer 1988    Surgery and radiation; left side    Kidney stones     Currently and in the past    Wears glasses         Past Surgical History:   Procedure Laterality Date    COLONOSCOPY     [facility] inguinal hernia repair    KIDNEY STONE SURGERY      LITHOTRIPSY      Renal; Resolved: 2/27/07    ORCHIECTOMY Left     NH CYSTOURETHRO W/IMPLANT N/A 5/17/2018    Procedure: CYSTOSCOPY WITH INSERTION Faye Canavan;  Surgeon: Bashir Rojas DO;  Location: [facility];  Service: Urology    PROSTATE SURGERY N/A 1/18/2018    Procedure: CYSTOSCOPY WITH INSERTION Faye Canavan;  Surgeon: Derrick Crawley DO;  Location: [facility];  Service: Urology   [facility]    For cancer    TONSILLECTOMY      URETERONEOCYSTOSTOMY      w/ cystoscopy Ureteral Stent Placement; Resolved: 6/14/2007    WISDOM TOOTH EXTRACTION          No Known Allergies     Outpatient Encounter Prescriptions as of 12/27/2018   Medication Sig Dispense Refill    calcium carbonate (TUMS) 500 mg chewable tablet Chew 1 tablet as needed for indigestion or heartburn      doxazosin (CARDURA) 1 mg tablet Take 2 mg by mouth daily with dinner      dutasteride (AVODART) 0 5 mg capsule Take 0 5 mg by mouth daily  11    meloxicam (MOBIC) 15 mg tablet TAKE 1 TABLET BY MOUTH EVERY DAY 30 tablet 1    multivitamin (THERAGRAN) TABS Take 1 tablet by mouth daily      psyllium (METAMUCIL) 0 52 g capsule Take 0 52 g by mouth 2 (two) times a day      fludrocortisone (FLORINEF) 0 1 mg tablet Take 1 tablet (0 1 mg total) by mouth daily (Patient not taking: Reported on 12/27/2018 ) 30 tablet 3     No facility-administered encounter medications on file as of 12/27/2018  Social History   Substance Use Topics    Smoking status: Former Smoker     Types: Cigarettes     Quit date: 1978    Smokeless tobacco: Never Used    Alcohol use Yes      Comment: social   works as a  on service [employer]  Family History   Problem Relation Age of Onset    Colon cancer Father     Heart failure Father     Parkinsonism Mother     Cancer Paternal Grandmother         REVIEW OF SYSTEMS:  The patient has entered data on an intake form regarding present illness, past medical and surgical history, medications, allergies, family and social history, and a full review of 14 systems  I have reviewed this form with the patient, and all the relevant information has been included on this note  The full review of systems was negative except as stated in HPI and below  Constitutional: Negative    Negative for appetite change and fever  HENT: Positive for tinnitus (occ) and trouble swallowing (occ)  Negative for hearing loss and voice change  Eyes: Negative  Negative for photophobia and pain  Respiratory: Positive for shortness of breath  Cardiovascular: Negative  Negative for palpitations  Gastrointestinal: Negative  Negative for nausea  Endocrine: Negative  Negative for cold intolerance and heat intolerance  Genitourinary: Negative  Negative for dysuria, frequency and urgency  Musculoskeletal: Positive for gait problem (balance problems)  Negative for myalgias and neck pain  Skin: Negative  Allergic/Immunologic: Negative  Neurological: Positive for dizziness and light-headedness  Negative for tremors, seizures, syncope, facial asymmetry, speech difficulty, weakness and numbness  Hematological: Negative  Does not bruise/bleed easily  Psychiatric/Behavioral: Negative  Negative for confusion and hallucinations  FOCUSED PHYSICAL EXAMINATION:     Vital signs:  /74 (BP Location: Left arm, Patient Position: Sitting, Cuff Size: Standard)   Pulse 78   Ht 6' (1 829 m)   Wt 103 kg (228 lb)   BMI 30 92 kg/m²     General:  Well-appearing, well nourished, pleasant patient in no acute distress  Mood and Fund of Knowledge are appropriate  Head:  Normocephalic, atraumatic  Oropharynx and conjunctiva are clear  Speech  No hypophonia or bradylalia  No scanning speech  Language: Comprehension intact  Neck:  Supple, strong 5/5 forward flexion and retroflexion  Extremities: Range of motion is normal       Cognitive and Mental Exam:  The patient is alert, oriented to self, location, date and situation  Memory is normal to provide accurate details of health history     Cranial Nerves:  Direct and consensual light reflexes were normal  No afferent pupillary defect  Visual fields are full to confrontation  Extraocular movements were full, with normal pursuit and saccades     Pupils were equal, reactive to light symmetrically  Facial sensation to light touch was intact  Face is symmetric with normal strength  Hearing was assessed using the Calibrated Finger Rub Auditory Screening Test (CALFRAST) and was not abnormal (Better than CALFRAST-Strong-70)  Palate is up going bilaterally and symmetrically  Neck muscles are strong  Tongue protrusion is at midline with normal movements  No dysarthria  Motor:    Dystonia: he has some overflow on his left hand when clenching his right hand or SORAYA (left hand clenches too)  No dystonia seen in leg or foot positioning  Dyskinesia: none  Myoclonus: none  Chorea: none  Tics: none  MDS-UPDRS III:   Speech: 1  Facial Expression: 1  Tremor (Head):  0  Rest Tremor Severity (RUE/LUE/RLE/LLE/Lip): 0/0/0/0/0  Action Tremor of Hands (R): 1   (very small amplitude fast action tremor in various positions     Action Tremor of Hands (L): 0  Finger tapping (R): 0  Finger tapping (L): 0  Hand clenching (R): 0  Hand clenching (L): 0  SORAYA Hand (R): 0  SORAYA Hand (L): 0  Toe Tapping (R):  0  Toe Tapping (L):  0  Leg Agility (R):  0  Leg Agility (L):  0  Rigidity (Neck): 0  Rigidity (RUE): 0  Rigidity (LUE): 0  Rigidity (RLE): 1  Rigidity (LLE): 1  Arising From Chair: 0  Posture: 1  Gait: 0  Freezing of Gait: 0  Postural Stability: -  Body Bradykinesia: 1 (perhaps slightly less arm swing on R but not consistently)  -------------------------------------------------------------------------------------    Muscle Strength Right Left  Muscle Strength Right Left   Deltoid 5/5 5/5  Hip Adductors 5/5 5/5   Biceps 5/5 5/5  Hip Abductors 5/5 5/5   Triceps 5/5 5/5  Knee Extensors 5/5 5/5   Wrist Extensors 5/5 5/5  Knee Flexors 5/5 5/5   Wrist Flexors 5/5 5/5  Ankle Extensors 5/5 5/5    5/5 5/5  Ankle Flexors 5/5 5/5   Finger Abductors 5/5 5/5       Hip Flexors 5/5 5/5   Hip Extensors 5/5 5/5     Coordination:  Finger-to-nose-finger: normal     Gait:  Normal rise, straight line walking and with slightly forward hunched position  Able to do tandem gait x 10 steps, heel and toe walking without issue  Base is narrow without scissoring, foot drop or shuffling or festination  No reduced arm swing  Reflexes:    Right Left   Biceps 2/4 2/4   Brachioradialis 2/4 2/4   Triceps 2/4 2/4   Knee 2/4 2/4   Ankle 2/4 2/4      REVIEW OF ANCILLARY TESTS:     Results for orders placed or performed during the hospital encounter of 07/13/18   MRI brain wo contrast    Narrative    MRI BRAIN WITHOUT CONTRAST    INDICATION:  R42: Dizziness and giddiness  R29 898: Other symptoms and signs involving the musculoskeletal system  R60 0: Localized edema  poor right hand cordination and feeling light headed  COMPARISON:   None  TECHNIQUE:  Sagittal T1, axial T2, axial FLAIR, axial T1, axial Kittredge and axial diffusion imaging  IMAGE QUALITY:  Diagnostic  FINDINGS:    BRAIN PARENCHYMA:  There is no discrete mass, mass effect or midline shift  No abnormal white matter signal identified  Brainstem and cerebellum demonstrate normal signal  There is no intracranial hemorrhage  There is no evidence of acute infarction and   diffusion imaging is unremarkable  VENTRICLES:  The ventricles are normal in size and contour  SELLA AND PITUITARY GLAND:  Normal     ORBITS:  Normal     PARANASAL SINUSES:  Mild ethmoidal mucosal signal abnormality  VASCULATURE:  Evaluation of the major intracranial vasculature demonstrates appropriate flow voids      CALVARIUM AND SKULL BASE:  Normal     EXTRACRANIAL SOFT TISSUES:  Normal       Impression    No acute infarction, intracranial hemorrhage or mass effect    Workstation performed: [id number]

## 2018-12-27 ENCOUNTER — OFFICE VISIT (OUTPATIENT)
Dept: NEUROLOGY | Facility: CLINIC | Age: 58
End: 2018-12-27
Payer: COMMERCIAL

## 2018-12-27 VITALS
DIASTOLIC BLOOD PRESSURE: 97 MMHG | SYSTOLIC BLOOD PRESSURE: 161 MMHG | HEART RATE: 68 BPM | HEIGHT: 72 IN | BODY MASS INDEX: 30.88 KG/M2 | WEIGHT: 228 LBS

## 2018-12-27 DIAGNOSIS — R42 ORTHOSTATIC LIGHTHEADEDNESS: Primary | ICD-10-CM

## 2018-12-27 DIAGNOSIS — R25.8 NOCTURNAL LEG MOVEMENTS: ICD-10-CM

## 2018-12-27 DIAGNOSIS — G60.9 IDIOPATHIC PERIPHERAL NEUROPATHY: ICD-10-CM

## 2018-12-27 DIAGNOSIS — R27.8 WORSENED HANDWRITING: ICD-10-CM

## 2018-12-27 PROCEDURE — 99215 OFFICE O/P EST HI 40 MIN: CPT | Performed by: PSYCHIATRY & NEUROLOGY

## 2018-12-27 NOTE — LETTER
December 27, 2018     Derrick Vogel, [address] [place] [address]    Patient: Melvin Martinez   YOB: 1960   Date of Visit: 12/27/2018       Dear Dr Omari Duke:    Thank you for referring Inderjit Bey to me for evaluation  Below are my notes for this consultation  If you have questions, please do not hesitate to call me  I look forward to following your patient along with you  Sincerely,        Davis Weiss MD        CC: No Recipients  Vergil Bottoms  12/27/2018 11:44 AM  Sign at close encounter  Review of Systems   Constitutional: Negative  Negative for appetite change and fever  HENT: Positive for tinnitus (occ) and trouble swallowing (occ)  Negative for hearing loss and voice change  Eyes: Negative  Negative for photophobia and pain  Respiratory: Positive for shortness of breath  Cardiovascular: Negative  Negative for palpitations  Gastrointestinal: Negative  Negative for nausea  Endocrine: Negative  Negative for cold intolerance and heat intolerance  Genitourinary: Negative  Negative for dysuria, frequency and urgency  Musculoskeletal: Positive for gait problem (balance problems)  Negative for myalgias and neck pain  Skin: Negative  Allergic/Immunologic: Negative  Neurological: Positive for dizziness and light-headedness  Negative for tremors, seizures, syncope, facial asymmetry, speech difficulty, weakness and numbness  Hematological: Negative  Does not bruise/bleed easily  Psychiatric/Behavioral: Negative  Negative for confusion and hallucinations         Davis Weiss MD  12/27/2018  7:52 PM  Sign at close encounter  [address] PATIENT NOTE    Patient: Michele Mcdaniel 49 Record Number: # [id number]  YOB: 1960  Date of visit: 12/27/2018    Referring provider: Ezekiel Mccullough,      The patient was not accompanied today  History was obtained from patient mainly with wife present  ASSESSMENT     1  Orthostatic lightheadedness  Ambulatory referral to Neurology   2  Idiopathic peripheral neuropathy     3  Nocturnal leg movements     4  Worsened handwriting  Ambulatory referral to Occupational Therapy     This gentleman returns now with mainly dizziness and orthostatic hypotension as confirmed from PT sessions  He also has reports of shortness of breath with exertion, which he will be seeing Cardiology for  Today I still did not appreciate bradykinesia, resting tremor or rigidity on examination to support parkinsonism  He has had no falls  He endorses some position-dependent movement of both his legs in a jerking fashion he demonstrates that resembles myoclonus and has none of the classic sensation of resembling a restless leg syndrome  However this was unable to be replicated today  He does become transiently hypertensive with the prev dose of Florinef; recommended halving dose and seeing if this helps, continuing the salt and fluid intake as before  Strength testing still full  At this point, discussed with him his condition is most likely idiopathic autonomic neuropathy vs a developing atypical Parkinsonism (which he does not currently display) vs Multiple System Atrophy (which could cause dysautonomia and ataxia without parkinsonism)  His overall condition appears stable  PLAN     · Checking orthostatic blood pressure today  · Restart the fludrocortisone at half a tablet (0 05mg) a day and see if this does symptomatically help in the next 1-2 weeks  Call with any reports of side effects or questions  Considering Midodrine as next line of medication (2 5-10mg q8h)  · Continue the Physical Therapy as before  We will make a referral to Occupational Therapy for his concern over loss of fine motor coordination and micrographia  · Asked family to video record the leg movements at home, when reclining   Depending on symptomatology will then treat accordingly  · Encouraged more physical activity, with the equipment at home, stretching exercises and light weight training with dumbbells and techniques imparted by Physical Therapy  · Thank you very much for sending me this interesting patient  · The patient has been instructed to call us about any new neurological problems or medication side effects  · Return to Clinic on 3-4 months    A total of 45 minutes were spent face-to-face with this patient, of which at least 50% was spent on counseling and coordination of care  We discussed the natural history of the patient's condition, differential diagnosis, level of diagnostic certainty, treatment alternatives and their side effects and possible complications  HISTORY OF PRESENT ILLNESS:     Mr Antonia Huber is a 60 y o  right handed male who has been referred to the [facility] for evaluation of possible Parkinson's disease  Interim History  Labwork was unremarkable  During his PT session he had positive orthostatic hypotension and since it is interfering with his therapy he was started on Florinef 0 1mg daily, along with encouragement for increased salt intake and adequate fluid intake of at least 4 large glasses of 16oz water a day  He was only taking the fludrocortisone up to a week around 12/7/18 before stopping this due to hypertension noted at PT  He has been overhydrating and with the increased salt intake, this has helped "a bit"  He says he does measure his blood pressure with an electronic gauge at home  He has an appointment scheduled for the cardiologist for next week  He tells me he was on a long walk for 2 miles and had to slow down and stop because he was out of breath with lightheadedness, which was new  Main bothersome symptoms are:   1  Balance problem - from orthostatism  No new falls  2  Tremors - very intermittent   When sitting in a recliner he tends to have some involuntary movements of the legs  INITIAL HISTORY  He says he first noticed smaller handwriting while taking a class and taking notes around 2015  In 2016 he noticed he was "meandering walking left and right" which seemed to progress to the point where he would have difficulty keeping pace with someone walking more normally  Denies feeling hemibody slowness or stiffness  He felt he had to put forth more effort in walking fast  If he climbs more than a flight of stairs, his thighs become fatigued  +lightheadedness after ascending stairs or rising from a kneeling position (none while laying down)  If he closes his eyes in shower and looks up, he feels very unsteady  He denies ever falling except 1x while pulling up pants in 2017  He is aware of his posture being more slouched forward  Patient saw his PCP with complaint of lightheadedness while standing and dizziness, trouble with writing on right hand with dropping objects  MRI Brain on July 2018 was reported unremarkable  - Laboratory testing in July 2018 including normal CBC, CMP, Lyme ab testing and hypovitaminosis D noted  - No previous injuries or surgeries on head or neck  - His main concern today is if he has Parkinson's disease, as he read about his symptoms on WebMD      Tremor noticeable but not bothersome; he says he can see his R hand shaking when he writes or eats  Not apparent when his hand is resting  No other tremor in his body he says  Started after 2016 very mild progression if at all  Sleep:  He says he sleeps poorly with sleep habits (sleeping on recliner while watching TV, ~4-5 hrs a night  +difficulty falling back asleep  +snores  Hyposmia: denies  RBD (REM semi-purposeful body movements):  "occasionally" acting out dreams   Gait Disturbance:  Yes, as above   Dementia:  +feeling more difficulty with attention and focusing at work  Forgetting names and small tasks     Constipation: endorses  Hallucination: denies  Skin Cancer History: denies  Hypovitaminosis D: denies  Hypovitaminosis B12:  denies  Dysautonomia:  +urinary retention but has BPH  +orthostatism  Gaze Palsy: none  Exercise Therapy: INACTIVE    Family History: Number of First Degree Relatives With Parkinsonism: mother    Imaging: MRI brain in July 2018      REVIEW OF PAST MEDICAL, SOCIAL AND FAMILY HISTORY:  This is the list of problems as per our Medical Records:    Patient Active Problem List    Diagnosis Date Noted    Shortness of breath 11/29/2018    Pain of right thumb 10/18/2018    Pain of left thumb 10/18/2018    Right elbow pain 10/18/2018    Right wrist pain 10/18/2018    Plantar fasciitis, bilateral 10/10/2018    Unspecified abnormalities of gait and mobility 10/10/2018    Dizziness 07/05/2018    Primary testicular cancer ([address] ) 12/17/2014       Past Medical History:   Diagnosis Date    Back pain     Bladder infection     BPH (benign prostatic hyperplasia)     Diverticulosis     GERD (gastroesophageal reflux disease)     occassional    History of testicular cancer 1988    Surgery and radiation; left side    Kidney stones     Currently and in the past    Wears glasses         Past Surgical History:   Procedure Laterality Date    COLONOSCOPY     [facility] inguinal hernia repair    KIDNEY STONE SURGERY      LITHOTRIPSY      Renal; Resolved: 2/27/07    ORCHIECTOMY Left     MN CYSTOURETHRO W/IMPLANT N/A 5/17/2018    Procedure: CYSTOSCOPY WITH INSERTION Deanne Daunt;  Surgeon: Malaika Macias DO;  Location: [facility];  Service: Urology    PROSTATE SURGERY N/A 1/18/2018    Procedure: CYSTOSCOPY WITH INSERTION UROLIFT;  Surgeon: Malaika Macias DO;  Location: [facility];  Service: Urology   [address]    For cancer    TONSILLECTOMY      URETERONEOCYSTOSTOMY      w/ cystoscopy Ureteral Stent Placement;  Resolved: 6/14/2007    WISDOM TOOTH EXTRACTION          No Known Allergies     Outpatient Encounter Prescriptions as of 12/27/2018   Medication Sig Dispense Refill    calcium carbonate (TUMS) 500 mg chewable tablet Chew 1 tablet as needed for indigestion or heartburn      doxazosin (CARDURA) 1 mg tablet Take 2 mg by mouth daily with dinner      dutasteride (AVODART) 0 5 mg capsule Take 0 5 mg by mouth daily  11    meloxicam (MOBIC) 15 mg tablet TAKE 1 TABLET BY MOUTH EVERY DAY 30 tablet 1    multivitamin (THERAGRAN) TABS Take 1 tablet by mouth daily      psyllium (METAMUCIL) 0 52 g capsule Take 0 52 g by mouth 2 (two) times a day      fludrocortisone (FLORINEF) 0 1 mg tablet Take 1 tablet (0 1 mg total) by mouth daily (Patient not taking: Reported on 12/27/2018 ) 30 tablet 3     No facility-administered encounter medications on file as of 12/27/2018  Social History   Substance Use Topics    Smoking status: Former Smoker     Types: Cigarettes     Quit date: 1978    Smokeless tobacco: Never Used    Alcohol use Yes      Comment: social   works as a  on service [employer]  Family History   Problem Relation Age of Onset    Colon cancer Father     Heart failure Father     Parkinsonism Mother     Cancer Paternal Grandmother         REVIEW OF SYSTEMS:  The patient has entered data on an intake form regarding present illness, past medical and surgical history, medications, allergies, family and social history, and a full review of 14 systems  I have reviewed this form with the patient, and all the relevant information has been included on this note  The full review of systems was negative except as stated in HPI and below  Constitutional: Negative  Negative for appetite change and fever  HENT: Positive for tinnitus (occ) and trouble swallowing (occ)  Negative for hearing loss and voice change  Eyes: Negative  Negative for photophobia and pain  Respiratory: Positive for shortness of breath  Cardiovascular: Negative  Negative for palpitations  Gastrointestinal: Negative  Negative for nausea  Endocrine: Negative  Negative for cold intolerance and heat intolerance  Genitourinary: Negative  Negative for dysuria, frequency and urgency  Musculoskeletal: Positive for gait problem (balance problems)  Negative for myalgias and neck pain  Skin: Negative  Allergic/Immunologic: Negative  Neurological: Positive for dizziness and light-headedness  Negative for tremors, seizures, syncope, facial asymmetry, speech difficulty, weakness and numbness  Hematological: Negative  Does not bruise/bleed easily  Psychiatric/Behavioral: Negative  Negative for confusion and hallucinations  FOCUSED PHYSICAL EXAMINATION:     Vital signs:  /74 (BP Location: Left arm, Patient Position: Sitting, Cuff Size: Standard)   Pulse 78   Ht 6' (1 829 m)   Wt 103 kg (228 lb)   BMI 30 92 kg/m²      General:  Well-appearing, well nourished, pleasant patient in no acute distress  Mood and Fund of Knowledge are appropriate  Head:  Normocephalic, atraumatic  Oropharynx and conjunctiva are clear  Speech  No hypophonia or bradylalia  No scanning speech  Language: Comprehension intact  Neck:  Supple, strong 5/5 forward flexion and retroflexion  Extremities: Range of motion is normal       Cognitive and Mental Exam:  The patient is alert, oriented to self, location, date and situation  Memory is normal to provide accurate details of health history     Cranial Nerves:  Direct and consensual light reflexes were normal  No afferent pupillary defect  Visual fields are full to confrontation  Extraocular movements were full, with normal pursuit and saccades  Pupils were equal, reactive to light symmetrically  Facial sensation to light touch was intact  Face is symmetric with normal strength  Hearing was assessed using the Calibrated Finger Rub Auditory Screening Test (CALFRAST) and was not abnormal (Better than CALFRAST-Strong-70)  Palate is up going bilaterally and symmetrically  Neck muscles are strong  Tongue protrusion is at midline with normal movements  No dysarthria  Motor:    Dystonia: he has some overflow on his left hand when clenching his right hand or SORAYA (left hand clenches too)  No dystonia seen in leg or foot positioning  Dyskinesia: none  Myoclonus: none  Chorea: none  Tics: none  MDS-UPDRS III:   Speech: 1  Facial Expression: 1  Tremor (Head):  0  Rest Tremor Severity (RUE/LUE/RLE/LLE/Lip): 0/0/0/0/0  Action Tremor of Hands (R): 1   (very small amplitude fast action tremor in various positions  Action Tremor of Hands (L): 0  Finger tapping (R): 0  Finger tapping (L): 0  Hand clenching (R): 0  Hand clenching (L): 0  SORAYA Hand (R): 0  SORAYA Hand (L): 0  Toe Tapping (R):  0  Toe Tapping (L):  0  Leg Agility (R):  0  Leg Agility (L):  0  Rigidity (Neck): 0  Rigidity (RUE): 0  Rigidity (LUE): 0  Rigidity (RLE): 1  Rigidity (LLE): 1  Arising From Chair: 0  Posture: 1  Gait: 0  Freezing of Gait: 0  Postural Stability: -  Body Bradykinesia: 1 (perhaps slightly less arm swing on R but not consistently)  -------------------------------------------------------------------------------------    Muscle Strength Right Left  Muscle Strength Right Left   Deltoid 5/5 5/5  Hip Adductors 5/5 5/5   Biceps 5/5 5/5  Hip Abductors 5/5 5/5   Triceps 5/5 5/5  Knee Extensors 5/5 5/5   Wrist Extensors 5/5 5/5  Knee Flexors 5/5 5/5   Wrist Flexors 5/5 5/5  Ankle Extensors 5/5 5/5    5/5 5/5  Ankle Flexors 5/5 5/5   Finger Abductors 5/5 5/5       Hip Flexors 5/5 5/5   Hip Extensors 5/5 5/5     Coordination:  Finger-to-nose-finger: normal     Gait:  Normal rise, straight line walking and with slightly forward hunched position  Able to do tandem gait x 10 steps, heel and toe walking without issue  Base is narrow without scissoring, foot drop or shuffling or festination  No reduced arm swing        Reflexes:    Right Left   Biceps 2/4 2/4   Brachioradialis 2/4 2/4   Triceps 2/4 2/4 Knee 2/4 2/4   Ankle 2/4 2/4      REVIEW OF ANCILLARY TESTS:     Results for orders placed or performed during the hospital encounter of 07/13/18   MRI brain wo contrast    Narrative    MRI BRAIN WITHOUT CONTRAST    INDICATION:  R42: Dizziness and giddiness  R29 898: Other symptoms and signs involving the musculoskeletal system  R60 0: Localized edema  poor right hand cordination and feeling light headed  COMPARISON:   None  TECHNIQUE:  Sagittal T1, axial T2, axial FLAIR, axial T1, axial [place] and axial diffusion imaging  IMAGE QUALITY:  Diagnostic  FINDINGS:    BRAIN PARENCHYMA:  There is no discrete mass, mass effect or midline shift  No abnormal white matter signal identified  Brainstem and cerebellum demonstrate normal signal  There is no intracranial hemorrhage  There is no evidence of acute infarction and   diffusion imaging is unremarkable  VENTRICLES:  The ventricles are normal in size and contour  SELLA AND PITUITARY GLAND:  Normal     ORBITS:  Normal     PARANASAL SINUSES:  Mild ethmoidal mucosal signal abnormality  VASCULATURE:  Evaluation of the major intracranial vasculature demonstrates appropriate flow voids      CALVARIUM AND SKULL BASE:  Normal     EXTRACRANIAL SOFT TISSUES:  Normal       Impression    No acute infarction, intracranial hemorrhage or mass effect    Workstation performed: [id number]

## 2018-12-27 NOTE — PROGRESS NOTES
Review of Systems   Constitutional: Negative  Negative for appetite change and fever  HENT: Positive for tinnitus (occ) and trouble swallowing (occ)  Negative for hearing loss and voice change  Eyes: Negative  Negative for photophobia and pain  Respiratory: Positive for shortness of breath  Cardiovascular: Negative  Negative for palpitations  Gastrointestinal: Negative  Negative for nausea  Endocrine: Negative  Negative for cold intolerance and heat intolerance  Genitourinary: Negative  Negative for dysuria, frequency and urgency  Musculoskeletal: Positive for gait problem (balance problems)  Negative for myalgias and neck pain  Skin: Negative  Allergic/Immunologic: Negative  Neurological: Positive for dizziness and light-headedness  Negative for tremors, seizures, syncope, facial asymmetry, speech difficulty, weakness and numbness  Hematological: Negative  Does not bruise/bleed easily  Psychiatric/Behavioral: Negative  Negative for confusion and hallucinations

## 2018-12-27 NOTE — PATIENT INSTRUCTIONS
· Checking orthostatic blood pressure today  · Restart the fludrocortisone at half a tablet (0 05mg) a day and see if this does symptomatically help in the next 1-2 weeks  Call with any reports of side effects or questions  Considering Midodrine as next line of medication  · Continue the Physical Therapy as before  We will make a referral to Occupational Therapy for his concern over loss of fine motor coordination and micrographia  · Asked family to video record the leg movements at home, when reclining  Depending on symptomatology will then treat accordingly  · Encouraged more physical activity, with the equipment at home, stretching exercises and light weight training with dumbbells and techniques imparted by Physical Therapy  · Thank you very much for sending me this interesting patient  · The patient has been instructed to call us about any new neurological problems or medication side effects    · Return to Clinic on 3 months

## 2018-12-31 ENCOUNTER — CONSULT (OUTPATIENT)
Dept: CARDIOLOGY CLINIC | Facility: CLINIC | Age: 58
End: 2018-12-31
Payer: COMMERCIAL

## 2018-12-31 VITALS
WEIGHT: 232.6 LBS | HEART RATE: 71 BPM | SYSTOLIC BLOOD PRESSURE: 150 MMHG | BODY MASS INDEX: 31.51 KG/M2 | HEIGHT: 72 IN | DIASTOLIC BLOOD PRESSURE: 82 MMHG

## 2018-12-31 DIAGNOSIS — R06.02 SHORTNESS OF BREATH: Primary | ICD-10-CM

## 2018-12-31 DIAGNOSIS — R42 DIZZINESS: ICD-10-CM

## 2018-12-31 PROCEDURE — 99214 OFFICE O/P EST MOD 30 MIN: CPT | Performed by: INTERNAL MEDICINE

## 2018-12-31 PROCEDURE — 93000 ELECTROCARDIOGRAM COMPLETE: CPT | Performed by: INTERNAL MEDICINE

## 2018-12-31 NOTE — LETTER
2018     Nidhi Marley, [address]    Patient: Kelrine Diamond   YOB: 1960   Date of Visit: 2018       Dear Dr Ayden Matthews:    Thank you for referring Monica Weir to me for evaluation  Below are my notes for this consultation  If you have questions, please do not hesitate to call me  I look forward to following your patient along with you  Sincerely,        Lawrence Gee MD        CC: No Recipients  Lawrence Gee MD  2018  3:24 PM  Sign at close encounter  [facility] [place]  [address], [address] 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, testicular cancer in the past, bilateral inguinal hernia repair, nephrolithiasis, BPH presents for new patient cardiology evaluation  He complains of shortness of breath with walking and exertion and also at times accompanied with dizziness  He also notes some dizziness when he 1st stands up from sitting or kneeling  He notes no palpitations and has not had abeba syncope  He is recently retired from working as a  for [employer] is  He is  with 2 children  He has no family history of cardiovascular disease      Patient Active Problem List   Diagnosis    Primary testicular cancer ([address] )    Dizziness    Plantar fasciitis, bilateral    Unspecified abnormalities of gait and mobility    Pain of right thumb    Pain of left thumb    Right elbow pain    Right wrist pain    Shortness of breath       Past Surgical History  Past Surgical History:   Procedure Laterality Date    COLONOSCOPY      HERNIA REPAIR      ,  inguinal hernia repair    KIDNEY STONE SURGERY      LITHOTRIPSY      Renal; Resolved: 2/27/07    ORCHIECTOMY Left     ME CYSTOURETHRO W/IMPLANT N/A 5/17/2018    Procedure: CYSTOSCOPY WITH INSERTION UROLIFT;  Surgeon: Merly Chaidez DO;  [facility];  Service: Urology    PROSTATE SURGERY N/A 1/18/2018    Procedure: CYSTOSCOPY WITH INSERTION UROLIFT;  Surgeon: Merly Chaidez DO;  [facility];  Service: Urology   [address]    For cancer    TONSILLECTOMY      URETERONEOCYSTOSTOMY      w/ cystoscopy Ureteral Stent Placement; Resolved: 6/14/2007    WISDOM TOOTH EXTRACTION         Social History   Social History     Social History    Marital status: /Civil Union     Spouse name: N/A    Number of children: N/A    Years of education: N/A     Occupational History    Not on file  Social History Main Topics    Smoking status: Former Smoker     Types: Cigarettes     Quit date: 1978    Smokeless tobacco: Never Used    Alcohol use Yes      Comment: social    Drug use: No    Sexual activity: No     Other Topics Concern    Not on file     Social History Narrative    No narrative on file        No Known Allergies    Family History   Family History   Problem Relation Age of Onset    Colon cancer Father     Heart failure Father     Parkinsonism Mother     Cancer Paternal Grandmother        Review of Systems:  Review of Systems   Constitutional: Negative for chills, fatigue and fever  HENT: Negative for hearing loss and trouble swallowing  Eyes: Negative for pain  Respiratory: Positive for shortness of breath  Negative for cough and chest tightness  Cardiovascular: Negative for chest pain, palpitations and leg swelling  Gastrointestinal: Negative for abdominal pain, blood in stool, nausea and vomiting  Endocrine: Negative for cold intolerance and heat intolerance  Genitourinary: Negative for difficulty urinating, frequency and hematuria     Musculoskeletal: Negative for arthralgias and neck pain    Skin: Negative for rash  Allergic/Immunologic: Negative for environmental allergies  Neurological: Positive for dizziness  Negative for weakness and headaches  Hematological: Does not bruise/bleed easily  Psychiatric/Behavioral: Negative for decreased concentration and sleep disturbance  The patient is not nervous/anxious  Current Outpatient Prescriptions:     calcium carbonate (TUMS) 500 mg chewable tablet, Chew 1 tablet as needed for indigestion or heartburn, Disp: , Rfl:     doxazosin (CARDURA) 1 mg tablet, Take 2 mg by mouth daily with dinner, Disp: , Rfl:     dutasteride (AVODART) 0 5 mg capsule, Take 0 5 mg by mouth daily, Disp: , Rfl: 11    fludrocortisone (FLORINEF) 0 1 mg tablet, Take 1 tablet (0 1 mg total) by mouth daily (Patient not taking: Reported on 12/27/2018 ), Disp: 30 tablet, Rfl: 3    meloxicam (MOBIC) 15 mg tablet, TAKE 1 TABLET BY MOUTH EVERY DAY, Disp: 30 tablet, Rfl: 1    multivitamin (THERAGRAN) TABS, Take 1 tablet by mouth daily, Disp: , Rfl:     psyllium (METAMUCIL) 0 52 g capsule, Take 0 52 g by mouth 2 (two) times a day, Disp: , Rfl:      Physical Exam:    Vitals:    12/31/18 1441   BP: 150/82   BP Location: Left arm   Patient Position: Sitting   Cuff Size: Adult   Weight: 106 kg (232 lb 9 6 oz)   Height: 6' (1 829 m)       Physical Exam   Constitutional: He is oriented to person, place, and time  He appears well-developed and well-nourished  HENT:   Head: Normocephalic  Right Ear: External ear normal    Left Ear: External ear normal    Mouth/Throat: Oropharynx is clear and moist    Eyes: Pupils are equal, round, and reactive to light  Neck: No JVD present  Carotid bruit is not present  Cardiovascular: Normal rate, regular rhythm and intact distal pulses  Exam reveals no gallop and no friction rub  No murmur heard  Pulmonary/Chest: Effort normal and breath sounds normal  No tachypnea  No respiratory distress  He has no wheezes   He has no rales  He exhibits no tenderness  Abdominal: Soft  He exhibits no distension  There is no tenderness  There is no rebound and no guarding  Musculoskeletal: He exhibits no edema  Neurological: He is alert and oriented to person, place, and time  Skin: Skin is warm and dry  Psychiatric: He has a normal mood and affect  His behavior is normal  Judgment and thought content normal    Nursing note and vitals reviewed  Labs:not applicable    Assessment/Plan:    1  Dyspnea on exertion and dizziness with exertion as well-I would like to get an echocardiogram as well as a stress echocardiogram   We can rule out any abnormal blood pressure response with exercise as well as any thickened walls of the left ventricle which could be consistent with amyloidosis  2   Orthostasis-his blood pressure dropped no more than 18 mm of mercury in the office today  He recently had his fludrocortisone decreased  We can follow up on his blood pressure and his orthostatics symptoms in the near future  I would like to see him back in about 6 weeks for follow-up  Thank you so much, please do not hesitate to contact me if any questions or concerns    Adelaide Epps MD  12/31/2018  2:48 PM

## 2018-12-31 NOTE — LETTER
2018     Lennox Heather, [address]    Patient: Temo Ordaz   YOB: 1960   Date of Visit: 2018       Dear Dr Sid Estrella:    Thank you for referring Erica Lopez to me for evaluation  Below are my notes for this consultation  If you have questions, please do not hesitate to call me  I look forward to following your patient along with you  Sincerely,        Dino Winston MD        CC: MD Dino Jennings MD  2018  3:24 PM  Sign at close encounter  [address] [facility]iology [facility] AT [facility], [address] [place] [phone]    Cardiology New Patient     Patient:  Temo Ordaz  :  [birth year]  MRN:  [id number]    History of Present Illness:     21-year-old man with her neurologic symptoms which is thought to be idiopathic on a number neuropathy versus developing 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, testicular cancer in the past, bilateral inguinal hernia repair, nephrolithiasis, BPH presents for new patient cardiology evaluation  He complains of shortness of breath with walking and exertion and also at times accompanied with dizziness  He also notes some dizziness when he 1st stands up from sitting or kneeling  He notes no palpitations and has not had abeba syncope  He is recently retired from working as a  for [employer] is  He is  with 2 children  He has no family history of cardiovascular disease      Patient Active Problem List   Diagnosis    Primary testicular cancer ([address] )    Dizziness    Plantar fasciitis, bilateral    Unspecified abnormalities of gait and mobility    Pain of right thumb    Pain of left thumb    Right elbow pain    Right wrist pain    Shortness of breath       Past Surgical History  Past Surgical History:   Procedure Laterality Date    COLONOSCOPY      HERNIA REPAIR      ,  inguinal hernia repair    KIDNEY STONE SURGERY      LITHOTRIPSY      Renal; Resolved: 2/27/07    ORCHIECTOMY Left     AK CYSTOURETHRO W/IMPLANT N/A 5/17/2018    Procedure: CYSTOSCOPY WITH INSERTION UROLIFT;  Surgeon: Tasha Zacarias DO;  [facility];  Service: Urology    PROSTATE SURGERY N/A 1/18/2018    Procedure: CYSTOSCOPY WITH INSERTION UROLIFT;  Surgeon: Tasha Zacarias DO;  [facility];  Service: Urology   [address]    For cancer    TONSILLECTOMY      URETERONEOCYSTOSTOMY      w/ cystoscopy Ureteral Stent Placement; Resolved: 6/14/2007    WISDOM TOOTH EXTRACTION         Social History   Social History     Social History    Marital status: /Civil Union     Spouse name: N/A    Number of children: N/A    Years of education: N/A     Occupational History    Not on file  Social History Main Topics    Smoking status: Former Smoker     Types: Cigarettes     Quit date: 1978    Smokeless tobacco: Never Used    Alcohol use Yes      Comment: social    Drug use: No    Sexual activity: No     Other Topics Concern    Not on file     Social History Narrative    No narrative on file        No Known Allergies    Family History   Family History   Problem Relation Age of Onset    Colon cancer Father     Heart failure Father     Parkinsonism Mother     Cancer Paternal Grandmother        Review of Systems:  Review of Systems   Constitutional: Negative for chills, fatigue and fever  HENT: Negative for hearing loss and trouble swallowing  Eyes: Negative for pain  Respiratory: Positive for shortness of breath  Negative for cough and chest tightness  Cardiovascular: Negative for chest pain, palpitations and leg swelling  Gastrointestinal: Negative for abdominal pain, blood in stool, nausea and vomiting  Endocrine: Negative for cold intolerance and heat intolerance  Genitourinary: Negative for difficulty urinating, frequency and hematuria     Musculoskeletal: Negative for arthralgias and neck pain  Skin: Negative for rash  Allergic/Immunologic: Negative for environmental allergies  Neurological: Positive for dizziness  Negative for weakness and headaches  Hematological: Does not bruise/bleed easily  Psychiatric/Behavioral: Negative for decreased concentration and sleep disturbance  The patient is not nervous/anxious  Current Outpatient Prescriptions:     calcium carbonate (TUMS) 500 mg chewable tablet, Chew 1 tablet as needed for indigestion or heartburn, Disp: , Rfl:     doxazosin (CARDURA) 1 mg tablet, Take 2 mg by mouth daily with dinner, Disp: , Rfl:     dutasteride (AVODART) 0 5 mg capsule, Take 0 5 mg by mouth daily, Disp: , Rfl: 11    fludrocortisone (FLORINEF) 0 1 mg tablet, Take 1 tablet (0 1 mg total) by mouth daily (Patient not taking: Reported on 12/27/2018 ), Disp: 30 tablet, Rfl: 3    meloxicam (MOBIC) 15 mg tablet, TAKE 1 TABLET BY MOUTH EVERY DAY, Disp: 30 tablet, Rfl: 1    multivitamin (THERAGRAN) TABS, Take 1 tablet by mouth daily, Disp: , Rfl:     psyllium (METAMUCIL) 0 52 g capsule, Take 0 52 g by mouth 2 (two) times a day, Disp: , Rfl:      Physical Exam:    Vitals:    12/31/18 1441   BP: 150/82   BP Location: Left arm   Patient Position: Sitting   Cuff Size: Adult   Weight: 106 kg (232 lb 9 6 oz)   Height: 6' (1 829 m)       Physical Exam   Constitutional: He is oriented to person, place, and time  He appears well-developed and well-nourished  HENT:   Head: Normocephalic  Right Ear: External ear normal    Left Ear: External ear normal    Mouth/Throat: Oropharynx is clear and moist    Eyes: Pupils are equal, round, and reactive to light  Neck: No JVD present  Carotid bruit is not present  Cardiovascular: Normal rate, regular rhythm and intact distal pulses  Exam reveals no gallop and no friction rub  No murmur heard  Pulmonary/Chest: Effort normal and breath sounds normal  No tachypnea  No respiratory distress  He has no wheezes   He has no rales  He exhibits no tenderness  Abdominal: Soft  He exhibits no distension  There is no tenderness  There is no rebound and no guarding  Musculoskeletal: He exhibits no edema  Neurological: He is alert and oriented to person, place, and time  Skin: Skin is warm and dry  Psychiatric: He has a normal mood and affect  His behavior is normal  Judgment and thought content normal    Nursing note and vitals reviewed  Labs:not applicable    Assessment/Plan:    1  Dyspnea on exertion and dizziness with exertion as well-I would like to get an echocardiogram as well as a stress echocardiogram   We can rule out any abnormal blood pressure response with exercise as well as any thickened walls of the left ventricle which could be consistent with amyloidosis  2   Orthostasis-his blood pressure dropped no more than 18 mm of mercury in the office today  He recently had his fludrocortisone decreased  We can follow up on his blood pressure and his orthostatics symptoms in the near future  I would like to see him back in about 6 weeks for follow-up  Thank you so much, please do not hesitate to contact me if any questions or concerns    George Rosenberg MD  12/31/2018  2:48 PM

## 2019-01-22 ENCOUNTER — HOSPITAL ENCOUNTER (OUTPATIENT)
Dept: NON INVASIVE DIAGNOSTICS | Facility: HOSPITAL | Age: 59
Discharge: HOME | End: 2019-01-22
Payer: COMMERCIAL

## 2019-01-22 VITALS — DIASTOLIC BLOOD PRESSURE: 100 MMHG | SYSTOLIC BLOOD PRESSURE: 176 MMHG

## 2019-01-22 DIAGNOSIS — R06.02 SHORTNESS OF BREATH: ICD-10-CM

## 2019-01-22 LAB
CHEST PAIN STATEMENT: NORMAL
MAX DIASTOLIC BP: 100 MMHG
MAX HEART RATE: 136 BPM
MAX PREDICTED HEART RATE: 161 BPM
MAX. SYSTOLIC BP: 176 MMHG
PROTOCOL NAME: NORMAL
REASON FOR TERMINATION: NORMAL
TARGET HR FORMULA: NORMAL
TEST INDICATION: NORMAL
TIME IN EXERCISE PHASE: NORMAL

## 2019-01-22 PROCEDURE — 93306 TTE W/DOPPLER COMPLETE: CPT | Performed by: INTERNAL MEDICINE

## 2019-01-22 PROCEDURE — 93306 TTE W/DOPPLER COMPLETE: CPT

## 2019-01-22 PROCEDURE — 93350 STRESS TTE ONLY: CPT

## 2019-01-22 PROCEDURE — 93351 STRESS TTE COMPLETE: CPT | Performed by: INTERNAL MEDICINE

## 2019-01-25 DIAGNOSIS — M25.472 SWELLING OF BOTH ANKLES: Primary | ICD-10-CM

## 2019-01-25 DIAGNOSIS — M25.471 SWELLING OF BOTH ANKLES: Primary | ICD-10-CM

## 2019-01-25 NOTE — PROGRESS NOTES
Patient called today and stated that his insurance requires for compression stocking (s0 has to be mm HG 20-30 or higher to be approved

## 2019-01-25 NOTE — Clinical Note
Patient called in today for high strength in compression stockings, due to insurance will no longer approver lower strengths

## 2019-01-28 ENCOUNTER — TELEPHONE (OUTPATIENT)
Dept: FAMILY MEDICINE CLINIC | Facility: CLINIC | Age: 59
End: 2019-01-28

## 2019-01-28 DIAGNOSIS — R26.9 GAIT ABNORMALITY: Primary | ICD-10-CM

## 2019-01-28 NOTE — TELEPHONE ENCOUNTER
----- Message from Marisol Solano DO sent at 1/28/2019  7:50 AM EST -----      ----- Message -----  From: Josias Florian  Sent: 1/25/2019   1:42 PM  To: Marisol Solano DO    Patient called in today for high strength in compression stockings, due to insurance will no longer approver lower strengths

## 2019-01-31 ENCOUNTER — TELEPHONE (OUTPATIENT)
Dept: NEUROLOGY | Facility: CLINIC | Age: 59
End: 2019-01-31

## 2019-01-31 ENCOUNTER — OFFICE VISIT (OUTPATIENT)
Dept: CARDIOLOGY CLINIC | Facility: CLINIC | Age: 59
End: 2019-01-31
Payer: COMMERCIAL

## 2019-01-31 VITALS
OXYGEN SATURATION: 97 % | DIASTOLIC BLOOD PRESSURE: 80 MMHG | HEIGHT: 72 IN | SYSTOLIC BLOOD PRESSURE: 140 MMHG | HEART RATE: 64 BPM | BODY MASS INDEX: 32.51 KG/M2 | WEIGHT: 240 LBS

## 2019-01-31 DIAGNOSIS — R06.02 SOB (SHORTNESS OF BREATH): Primary | ICD-10-CM

## 2019-01-31 PROCEDURE — 99214 OFFICE O/P EST MOD 30 MIN: CPT | Performed by: INTERNAL MEDICINE

## 2019-01-31 NOTE — LETTER
2019     James Campos, [address]    Patient: Mary Jones   YOB: 1960   Date of Visit: 2019       Dear Dr Tanya Christianson:    Thank you for referring Glenna Almendarez to me for evaluation  Below are my notes for this consultation  If you have questions, please do not hesitate to call me  I look forward to following your patient along with you  Sincerely,        Redgie Riedel, MD        CC: No Recipients  Redgie Riedel, MD  2019  8:41 AM  Sign at close encounter  [address] Cardiology [facility] AT [place] [address], 98 [facility]  [phone]    Cardiology Follow up    Patient:  Mary Jones  :  1960  MRN:  [id number]    History of Present Illness:     11-year-old man with her neurologic symptoms which is thought to be idiopathic on a number neuropathy versus developing 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, testicular cancer in the past, bilateral inguinal hernia repair, nephrolithiasis, BPH presents for new patient cardiology evaluation  He still has been getting shortness of breath and dizziness with exertion  He denies any chest pain, palpitations, or syncope          Patient Active Problem List   Diagnosis    Primary testicular cancer ([address] )    Dizziness    Plantar fasciitis, bilateral    Unspecified abnormalities of gait and mobility    Pain of right thumb    Pain of left thumb    Right elbow pain    Right wrist pain    Shortness of breath       Past Surgical History  Past Surgical History:   Procedure Laterality Date    COLONOSCOPY      HERNIA REPAIR      ,  inguinal hernia repair    KIDNEY STONE SURGERY      LITHOTRIPSY      Renal; Resolved: 07    ORCHIECTOMY Left     HI CYSTOURETHRO W/IMPLANT N/A 2018    Procedure: CYSTOSCOPY WITH INSERTION UROLIFT;  Surgeon: Sandra Perkins DO;  [facility];  Service: Urology    PROSTATE SURGERY N/A 2018 Procedure: CYSTOSCOPY WITH INSERTION UROLIFT;  Surgeon: Vinay Jolley DO;  [facility];  Service: Urology   [address]    For cancer    TONSILLECTOMY      URETERONEOCYSTOSTOMY      w/ cystoscopy Ureteral Stent Placement; Resolved: 6/14/2007    WISDOM TOOTH EXTRACTION         Social History   Social History     Social History    Marital status: /Civil Union     Spouse name: N/A    Number of children: N/A    Years of education: N/A     Occupational History    Not on file  Social History Main Topics    Smoking status: Former Smoker     Types: Cigarettes     Quit date: 1978    Smokeless tobacco: Never Used    Alcohol use Yes      Comment: social    Drug use: No    Sexual activity: No     Other Topics Concern    Not on file     Social History Narrative    No narrative on file        No Known Allergies    Family History   Family History   Problem Relation Age of Onset    Colon cancer Father     Heart failure Father     Parkinsonism Mother     Cancer Paternal Grandmother        Review of Systems:  Review of Systems   Constitutional: Negative for chills, fatigue and fever  HENT: Negative for hearing loss and trouble swallowing  Eyes: Negative for pain  Respiratory: Positive for shortness of breath  Negative for cough and chest tightness  Cardiovascular: Negative for chest pain, palpitations and leg swelling  Gastrointestinal: Negative for abdominal pain, blood in stool, nausea and vomiting  Endocrine: Negative for cold intolerance and heat intolerance  Genitourinary: Negative for difficulty urinating, frequency and hematuria  Musculoskeletal: Negative for arthralgias and neck pain  Skin: Negative for rash  Allergic/Immunologic: Negative for environmental allergies  Neurological: Positive for light-headedness  Negative for dizziness, weakness and headaches  Hematological: Does not bruise/bleed easily     Psychiatric/Behavioral: Negative for decreased concentration and sleep disturbance  The patient is not nervous/anxious  Current Outpatient Prescriptions:     calcium carbonate (TUMS) 500 mg chewable tablet, Chew 1 tablet as needed for indigestion or heartburn, Disp: , Rfl:     doxazosin (CARDURA) 1 mg tablet, Take 1 mg by mouth daily with dinner  , Disp: , Rfl:     dutasteride (AVODART) 0 5 mg capsule, Take 0 5 mg by mouth daily, Disp: , Rfl: 11    fludrocortisone (FLORINEF) 0 1 mg tablet, Take 1 tablet (0 1 mg total) by mouth daily (Patient taking differently: Take 0 05 mg by mouth daily  ), Disp: 30 tablet, Rfl: 3    meloxicam (MOBIC) 15 mg tablet, TAKE 1 TABLET BY MOUTH EVERY DAY (Patient taking differently: TAKE 1 TABLET BY MOUTH PRN), Disp: 30 tablet, Rfl: 1    multivitamin (THERAGRAN) TABS, Take 1 tablet by mouth daily, Disp: , Rfl:     psyllium (METAMUCIL) 0 52 g capsule, Take 0 52 g by mouth 2 (two) times a day, Disp: , Rfl:      Physical Exam:    There were no vitals filed for this visit  Physical Exam   Constitutional: He is oriented to person, place, and time  He appears well-developed and well-nourished  HENT:   Head: Normocephalic  Right Ear: External ear normal    Left Ear: External ear normal    Mouth/Throat: Oropharynx is clear and moist    Eyes: Pupils are equal, round, and reactive to light  Neck: No JVD present  Carotid bruit is not present  Cardiovascular: Normal rate, regular rhythm and intact distal pulses  Exam reveals no gallop and no friction rub  No murmur heard  Pulmonary/Chest: Effort normal and breath sounds normal  No tachypnea  No respiratory distress  He has no wheezes  He has no rales  He exhibits no tenderness  Abdominal: Soft  He exhibits no distension  There is no tenderness  There is no rebound and no guarding  Musculoskeletal: He exhibits no edema  Neurological: He is alert and oriented to person, place, and time  Skin: Skin is warm and dry     Psychiatric: He has a normal mood and affect  His behavior is normal  Judgment and thought content normal    Nursing note and vitals reviewed  Labs:not applicable    Assessment/Plan:    1  Orthostatic hypotension  I would like to discuss with Neurology to see if [address] there would be a possibility  2  Shortness of breath-this may be secondary to his autonomic process but I would like him to follow up with pulmonology  3  Prevention of cardiovascular disease-would consider a calcium score for him in the future  I would like to see him back in 3-4 months for follow-up  Thank you so much, please do not hesitate to contact me with any questions or concerns          Rebecca Sam MD  1/31/2019  8:19 AM

## 2019-01-31 NOTE — TELEPHONE ENCOUNTER
Called pt back - I have not explicitly discussed blood pressure medications with Dr Danay Mcguire, but I have discussed his case with Dr Danay Mcguire today  Pt is currently only on a low dose of doxazosin for BP control and would not alter this  Pt does not have CHF, known arrhythmias, or ischemic heart disease  He has tried fludrocortisone but has hypertension with exertion that may limit increases in dose and potential uptitration  He has tried regulating fluid intake and increasing dietary salt  We discussed about Northera, an option proposed by Dr Danay Mcguire, and its potential benefit for him  I mentioned that the advantage would be ability to uptitrate to a satisfactory dose benefit, while supine hypertension would be the main drawback as well as potential cost      We agreed to try on Northera 100mg TID for now and see what insurance has to say about it  I gave him the number to the [facility] patient assistance line to see if he qualifies  If goes well, plan to uptitrate to benefit, with lowered dose at the time of day he is exercising (to avoid triggering the hypertension)  He acknowledged and agreed  Will have patient fill out / sign the [facility] specialty form to start process

## 2019-02-01 NOTE — TELEPHONE ENCOUNTER
Dr Sharon Thomson gave me the forms to fill out the rest  I spoke to the patient to make him aware there will be one form that he needs to sign before I can fax over everything  Forms are emailed to [employer]  Patient will come to the office to sign the treatment forms for Northera either this afternoon or Monday  When signed please e-mail the form back to me

## 2019-02-06 ENCOUNTER — EVALUATION (OUTPATIENT)
Dept: OCCUPATIONAL THERAPY | Facility: REHABILITATION | Age: 59
End: 2019-02-06
Payer: COMMERCIAL

## 2019-02-06 DIAGNOSIS — R27.8 WORSENED HANDWRITING: Primary | ICD-10-CM

## 2019-02-06 PROCEDURE — 97165 OT EVAL LOW COMPLEX 30 MIN: CPT | Performed by: OCCUPATIONAL THERAPIST

## 2019-02-06 NOTE — PROGRESS NOTES
OCCUPATIONAL THERAPY INITIAL EVALUATION:    2019  Juan Ferreira  [birth year]  [id number]  Eren Oconnell MD  1  Worsened handwriting        Subjective Evaluation    Quality of life: good          "We don't know what is going on yet "    PATIENT GOAL: "Increase my control and dexterity, particularly of my R hand "    HISTORY OF PRESENT ILLNESS:     Pt is a 60 y o  male who was referred to Occupational Therapy s/p Worsened Handwriting  Pt reports initial symptoms of Micrographia beginning a few years ago and then noticed imbalance and R resting Hand tremor beginning 6-9 months ago  Pt underwent imaging and blood tests which resulted WNL  Pt with no diagnosis at this time with further evaluation being carried out  As per Pt, diagnosis of Parkinson's Disease is being further evaluated  Pt with self-report of micrographia, resting R hand tremors occurring occasionally, decreased FMC/prehension, difficulties manipulating mouse with dysmetria, and lightheadedness, imbalance, and difficulties with gait  Pt underwent PT services to address imbalance/lightheadedness and now performs HEP in home environment  Pt lives in a two story home with wife  Pt currently performing all ADLs/IADLs independently at this time  Pt recently retired from a  for [employer]-- Retired 2018  Pt continues the  role with no difficulties reported  PMH:   Past Medical History:   Diagnosis Date    Back pain     Bladder infection     BPH (benign prostatic hyperplasia)     Diverticulosis     GERD (gastroesophageal reflux disease)     occassional    History of testicular cancer 1988    Surgery and radiation; left side    Kidney stones     Currently and in the past    Wears glasses          Pain Levels:     Restin    With Activity:  0    Objective     Functional Assessment        Comments  See impairment section for further details  Impairment Observations:  1  Micrographia  2  Decreased FMC/prehension  3  Occasional R resting hand tremors  4  Difficulties manipulating mouse with dysmetria      Dynamometer Position #2:   R: 119  L: 116    R hand dominant    Action:  3 point pinch: R 16 5 and L 10  2 point pinch: R 8 and L 5  Lateral pinch: R 19 and L 19    9-hole Peg Test: RUE: 19 89 seconds, LUE: 21 19seconds    Myofilaments: B/L 2 83, Pt with G abilities distinguishing between hot and cold temperatures    B/L AROM:  Shoulder elevation:  B/L full  Shoulder FF: B/L full  Shoulder ABD: B/L full  ER/IR: B/L full  Elbow ext/flex: B/L full  Sup/Pron: B/L full  Wrist flex/ext: B/L full  Composite: B/L full  Hook: B/L full  Opposition: B/L intact   Finger to nose: B/L intact  Dysdiadochokinesia: B/L intact       Assessment  Assessment details: See skilled analysis for further details  Skilled Analysis:  Pt is a 60 y o  male referred to Occupational Therapy s/p Worsened Handwriting  Pt presents with micrographia, decreased FMC/prehension, occasional R resting hand tremor, and difficulties manipulating mouse with dysmetria reported  Pt will benefit from skilled Occupational Therapy services 2x/week for 4 weeks with focus on neuro re-ed, manual tx, and handwriting functional tasks with focus on improving legibility/fluidity of handwriting, improved R hand to target accuracy, and FMC/prehension for improved overall functional use of RUE       Short Term Goals:  - Pt will tolerate BIONESS for improved motor and sensory performance for overall improved hand to target with 80% accuracy (if cleared by neurologist) 4 weeks  - Pt will increase R prehension patterns for improved tripod with utensil management and fluidity/legibility of handwriting with <20% droppage 4 weeks  - Pt will increase proprioception of R UE hand to target for improved functional reach vision occluded with ADL tasks 4 weeks  - Pt will demo with G carryover of Home Exercise Program to improve functional progression towards goals in Plan of care and for improved functional use of RUE 4 weeks  - Pt will increase automaticity of RUE to 75% for improved grasp release of tabletop items for improved functional performance with salient tasks 4 weeks  - Pt will increase rate of manipulation for all FM tests for improved functional performance with salient tasks 4 weeks  - Pt will increase RUE to Mod I assist with <20% cuing for tabletop tasks for improved functional performance of life roles and salient tasks 4           Long Term Goals:  - Pt will increase automaticity of  R  UE to 95% for resumption of B integrative tasks  - Pt will increase rate of prehension for all R FM tasks for improved use of utensils, fluidity/legibility of handwriting, ADL fasteners  - Pt will resume  R  hand dominance to independent with all meaningful occupations and life roles        -    Pt will increase R prehension patterns for improved tripod with utensil management with <0% droppage         OTHER PLANNED THERAPY INTERVENTIONS:   BIONESS, if cleared by neurologist  Handwriting  Hand to target tasks        INTERVENTION COMMENTS:  Diagnosis: Worsened Handwriting  Precautions: BPH  FOTO: 74, with 0% limitation in UE function  Insurance: Payor: [employer]OTT / Plan: [employer] PLAN 361 / Product Type: Blue Fee for Service /   1 of ____ visits, PN due 03/06/2019

## 2019-02-07 ENCOUNTER — OFFICE VISIT (OUTPATIENT)
Dept: OCCUPATIONAL THERAPY | Facility: REHABILITATION | Age: 59
End: 2019-02-07
Payer: COMMERCIAL

## 2019-02-07 DIAGNOSIS — R27.8 WORSENED HANDWRITING: Primary | ICD-10-CM

## 2019-02-07 PROCEDURE — 97112 NEUROMUSCULAR REEDUCATION: CPT

## 2019-02-07 PROCEDURE — 97535 SELF CARE MNGMENT TRAINING: CPT

## 2019-02-07 NOTE — PROGRESS NOTES
Daily Note     Today's date: 2019  Patient name: Kalyani Juarez  :   MRN: [id number]  Referring provider: Faizan Lema MD  Dx:   Encounter Diagnosis   Name Primary?  Worsened handwriting Yes                  Subjective: "I find I drop things sometimes, sometimes I think my depth perception is off too"  Objective: See treatment diary below    Pt set up on BIONESS for facilitation of muscle strengthening  Hand strengthening utilizing rubber bands and rasta board  Line tangles utilizing med  for increased grasp/release facilitating VMI, legibility and fluidity of movement  Time trials focusing on pad pinch for coin retrieval, Q,D,N,P: 16 coins w/30sec time frame for speed and accuracy of task  Assessment: Tolerated treatment well  Time trial w/coin retrieval using pad pinch: 1)10/16, 2) , 3), 4), 5)15/16, 6)15/16 in 30 sec  Pt presented w/dysmetria when dropping coins into slotted container, frequently missing slot or knocking it over  Pt demo-fluidity of movement and sustained tripod grasp when stabilizing marker during task  Patient would benefit from continued OT      Plan: Continued skilled OT per POC focusing on functional coordination tasks and handwriting       INTERVENTION COMMENTS:  Diagnosis: Worsened Handwriting  Precautions: BPH  FOTO: 76, with 0% limitation in UE function  Insurance: Payor: MART CROSS / Plan: [employer] PLAN 361 / Product Type: Blue Fee for Service /   2 of ____ visits, PN due 2019

## 2019-02-13 ENCOUNTER — OFFICE VISIT (OUTPATIENT)
Dept: OCCUPATIONAL THERAPY | Facility: REHABILITATION | Age: 59
End: 2019-02-13
Payer: COMMERCIAL

## 2019-02-13 DIAGNOSIS — R27.8 WORSENED HANDWRITING: Primary | ICD-10-CM

## 2019-02-13 PROCEDURE — 97112 NEUROMUSCULAR REEDUCATION: CPT

## 2019-02-13 PROCEDURE — 97535 SELF CARE MNGMENT TRAINING: CPT

## 2019-02-13 NOTE — PROGRESS NOTES
Daily Note     Today's date: 2019  Patient name: Lovely Mtz  : [id number]  MRN: [id number]  Referring provider: Saranya Bhat MD  Dx:   Encounter Diagnosis   Name Primary?  Worsened handwriting Yes                  Subjective: "I have problems with my circles and loops when I'm writing"  Objective: See treatment diary below    Educated pt on WHEP focusing on flexibility and dexterity to improve handwriting skills  Hand to target tasks focusing on /VS utilizing LHR to apply blocks to cones stacking three high, no noted droppgae  Handwriting tasks tracking and freehand focusing on loops and curves to improve legibility and fluidity of movement  Assessment: Tolerated treatment well  Pt demo-G understanding of WHEP as evidenced by participation in [address]  Handout provided to pt for carryover at home  No noted droppage during hand to target task, dysmetria noted about 75% of time  Pt inconsistant with size of shapes during handwriting task when copying shapes freehand  Improvement noted with mass practice  Worksheets provided for carryover at home    Patient would benefit from continued OT      Plan: Continued skilled OT per POC     INTERVENTION COMMENTS:  Diagnosis: Worsened Handwriting  [address] [place] [address]  FOTO: 74, with 0% limitation in UE function  Insurance: Payor: Donta Valverde / Plan: [facility] PLAN 361 / Product Type: Blue Fee for Service /   3 of ____ visits, PN due 2019

## 2019-02-15 ENCOUNTER — OFFICE VISIT (OUTPATIENT)
Dept: OCCUPATIONAL THERAPY | Facility: REHABILITATION | Age: 59
End: 2019-02-15
Payer: COMMERCIAL

## 2019-02-15 DIAGNOSIS — R27.8 WORSENED HANDWRITING: Primary | ICD-10-CM

## 2019-02-15 DIAGNOSIS — R06.00 DYSPNEA, UNSPECIFIED TYPE: Primary | ICD-10-CM

## 2019-02-15 PROCEDURE — 97150 GROUP THERAPEUTIC PROCEDURES: CPT

## 2019-02-15 PROCEDURE — 97112 NEUROMUSCULAR REEDUCATION: CPT

## 2019-02-15 NOTE — PROGRESS NOTES
Daily Note     Today's date: 2/15/2019  Patient name: Chucho Stoner  :   MRN: [id number]  Referring provider: Ben Camacho MD  Dx:   Encounter Diagnosis   Name Primary?  Worsened handwriting Yes                  Subjective:"I can feel it when I do the exercises"  Objective: See treatment diary below    Bioness on neuro re-ed mode  Peg pattern follow and bead string focusing on mixed prehension patterns, hand strengthening and dynamic stabilization of RUE  Ball bounce focusing on eye/hand coordination  Assessment: Tolerated treatment well  No noted droppage during peg pattern follow, pt demo-ability to string bead using shoelace alternating digits during fm movements  Ball bounce demo- ability to catch/toss w/fluidity of movement unilaterally/bilaterally     Patient would benefit from continued OT      Plan: Continued skilled OT per POC       INTERVENTION COMMENTS:  Diagnosis: Worsened Handwriting  [address] [facility]  FOTO: 74, with 0% limitation in UE function  Insurance: Payor: Lois Lubin / Plan: [facility] PLAN 361 / Product Type: Blue Fee for Service /   4 of ____ visits, PN due 2019    2/15:  [id number]  [id number]  [id number]  [id number]

## 2019-02-16 ENCOUNTER — APPOINTMENT (OUTPATIENT)
Dept: RADIOLOGY | Facility: MEDICAL CENTER | Age: 59
End: 2019-02-16
Payer: COMMERCIAL

## 2019-02-16 DIAGNOSIS — R06.00 DYSPNEA, UNSPECIFIED TYPE: ICD-10-CM

## 2019-02-16 PROCEDURE — 71046 X-RAY EXAM CHEST 2 VIEWS: CPT

## 2019-02-19 ENCOUNTER — OFFICE VISIT (OUTPATIENT)
Dept: OCCUPATIONAL THERAPY | Facility: REHABILITATION | Age: 59
End: 2019-02-19
Payer: COMMERCIAL

## 2019-02-19 DIAGNOSIS — R27.8 WORSENED HANDWRITING: Primary | ICD-10-CM

## 2019-02-19 PROCEDURE — 97535 SELF CARE MNGMENT TRAINING: CPT

## 2019-02-19 PROCEDURE — 97112 NEUROMUSCULAR REEDUCATION: CPT

## 2019-02-19 NOTE — PROGRESS NOTES
Daily Note     Today's date: 2019  Patient name: Peggy Vasquez  :   MRN: [id number]  Referring provider: Evelyn Dooley MD  Dx:   Encounter Diagnosis   Name Primary?  Worsened handwriting Yes                  Subjective: "I don't notice stiffness until I start handwriting, then its in my forearm"  Objective: See treatment diary below  BIONESS to R-hand on neuro mod simultaneously engaging in therex focusing on BIG upper body movements, 3x10  B/L wrist and forearm therex using 3# DB with UE extended on wedge, wrist flex/ext, pronation/supination, radial/ulnar deviation, 3x10  Ulnar sweep focusing on D 4&5 w/marble transfer between 2 power webs during trailing task  Large peg retrieval utilizing tennis ball focusing on hand strength and in hand manipulation to control opening and closing of ball for retrieval/insertion  Assessment: Tolerated treatment well  G form and functional movement demo during therex, fatigue to left shoulder reported w/UB therex  Min difficulty w/marble retrieval using D5  Minimal droppage noted with peg retrieval utilizing ball during inhand manipulation  Educated pt on importance of proximal strengthening to sustain distal mobility during functional task  Pt stated he continues with difficulty making loops during handwriting  Patient would benefit from continued OT      Plan: Continued skilled OT per POC focusing on RUE endurance and BIG handwriting       INTERVENTION COMMENTS:  Diagnosis: Worsened Handwriting  [address] [place] [address]  FOTO: 74, with 0% limitation in UE function  Insurance: Payor: Wily Cat / Plan: [facility] PLAN 361 / Product Type: Blue Fee for Service /   5 of ____ visits, PN due 2019

## 2019-02-21 ENCOUNTER — OFFICE VISIT (OUTPATIENT)
Dept: OCCUPATIONAL THERAPY | Facility: REHABILITATION | Age: 59
End: 2019-02-21
Payer: COMMERCIAL

## 2019-02-21 DIAGNOSIS — R27.8 WORSENED HANDWRITING: Primary | ICD-10-CM

## 2019-02-21 PROCEDURE — 97112 NEUROMUSCULAR REEDUCATION: CPT

## 2019-02-21 PROCEDURE — 97110 THERAPEUTIC EXERCISES: CPT

## 2019-02-21 NOTE — PROGRESS NOTES
Daily Note     Today's date: 2019  Patient name: Nano Clark  : [id number]  MRN: [id number]  Referring provider: Noa Johnson MD  Dx:   Encounter Diagnosis   Name Primary?  Worsened handwriting Yes                  Subjective: "I just noticed I have trouble twirling the fork when I eat spaghetti "      Objective: See treatment diary below    Bioness set on neuro candy mode for 10 min simultaneously with MMP and BIG twist at waist for trunk rotation using large red pball, 2x15  Tracing large loops focusing on BIG recipricol movements, activity placed on wall to facilitate dynamic stabilization of RUE and wrist extension during task  Educated pt on [facility] for hand strengthening and dexterity  Assessment: Tolerated treatment well  Pt performed BIG reciprocal movements in stance when completing loops repetitively with G follow through of task,G fluidity of movement and legibility noted  Pt with G understanding of putty WHEP as evidenced by participation in exercises     Patient would benefit from continued OT      Plan: Continued skilled OT per POC     INTERVENTION COMMENTS:  Diagnosis: Worsened Handwriting  [address] [place] [address]  FOTO: 74, with 0% limitation in UE function  Insurance: Payor: [facility] Steffi  / Plan: [employer] PLAN 361 / Product Type: Blue Fee for Service /   6 of ____ visits, PN due 2019

## 2019-02-25 ENCOUNTER — OFFICE VISIT (OUTPATIENT)
Dept: OCCUPATIONAL THERAPY | Facility: REHABILITATION | Age: 59
End: 2019-02-25
Payer: COMMERCIAL

## 2019-02-25 DIAGNOSIS — R27.8 WORSENED HANDWRITING: Primary | ICD-10-CM

## 2019-02-25 PROCEDURE — 97112 NEUROMUSCULAR REEDUCATION: CPT

## 2019-02-25 PROCEDURE — 97535 SELF CARE MNGMENT TRAINING: CPT

## 2019-02-25 NOTE — PROGRESS NOTES
Daily Note     Today's date: 2019  Patient name: Damián Lewis  :   MRN: [id number]  Referring provider: Edd Morris MD  Dx:   Encounter Diagnosis   Name Primary?  Worsened handwriting Yes                  Subjective: "I start medication today, its called selegiline 1x a day  There are a lot of dietary restrictions with this"  Objective: See treatment diary below"    Bioness simultaneously while engaging in therex using red pball focusng on BIG movements to sustain ROM, MMP w/trunk flexion to low surface, scaption side to side with midline pause, 2x15  Matrix and small peg pattern copy completed in mirror focusing on B/L integration,  /VS automaticity of FM w/hand to target demands  Assessment: Tolerated treatment well  No noted dysmetria during matrix activity  Pt automatically engaged both hands retrieving shapes from appropriate sides w/no noted droppage during b/l integration task  Pt able to follow peg pattern tracking peg insertion in mirror w/minimal difficulty for accurate placement, mod difficulty noted with manipulation of tweezers for peg retrieval     Patient would benefit from continued OT      Plan: Continued skilled OT per POC with focus on fluidity and coordination of movement during functional tasks       INTERVENTION COMMENTS:  Diagnosis: Worsened Handwriting  [address] [facility]  FOTO: 74, with 0% limitation in UE function  Insurance: Payor: Donte Bhatt / Plan: [employer] PLAN 361 / Product Type: Blue Fee for Service /   7 of ____ visits, PN due 2019

## 2019-02-27 ENCOUNTER — APPOINTMENT (OUTPATIENT)
Dept: OCCUPATIONAL THERAPY | Facility: REHABILITATION | Age: 59
End: 2019-02-27
Payer: COMMERCIAL

## 2019-02-27 ENCOUNTER — TELEPHONE (OUTPATIENT)
Dept: NEUROLOGY | Facility: CLINIC | Age: 59
End: 2019-02-27

## 2019-02-27 DIAGNOSIS — G90.3 NEUROGENIC ORTHOSTATIC HYPOTENSION (HCC): Primary | ICD-10-CM

## 2019-02-27 RX ORDER — DROXIDOPA 100 MG/1
CAPSULE ORAL
Qty: 450 CAPSULE | Refills: 0 | Status: SHIPPED | OUTPATIENT
Start: 2019-02-27 | End: 2019-04-30 | Stop reason: ALTCHOICE

## 2019-02-27 NOTE — TELEPHONE ENCOUNTER
Received a call from Charlotte Glasgow with [facility] requesting rx for northera, they received the start form    Their number [phone]  Fax [phone]

## 2019-02-27 NOTE — TELEPHONE ENCOUNTER
The rx was part of the Northera forms I had provided to the patient himself I believe  I had confirmed he received the form before  If this is the specialty pharmacy then, I will place in the order for:    Northera (droxidopa) 100mg capsules,  Quantity 450, 0 refills  He should take them when he gets up in the morning, at midday, and in late afternoon (at least 3 hours before bed)  Would elevate head of bed to 30 degrees and avoid laying completely flat  He should take his blood pressure both lying down and standing up once a day while taking this to make sure it is not too high or too low respectively  At any point if symptoms are controlled then can hold that dose without going up further  If feels any side effects (dizziness) that are intolerable, then go back down to next lower dose and hold there, then call us  Day 1, 2:   Take 1 tab three times a day  Day 3, 4:   Take 2 tabs three times a day  Day 5, 6:   Take 3 tabs three times a day  Day 7, 8:   Take 4 tabs three times a day  Day 9, 10:  Take 5 tabs three times a day  Days 11-30: Take 6 tabs three times a day    Depending on final beneficial dose reached, we may then change capsule size

## 2019-03-01 ENCOUNTER — EVALUATION (OUTPATIENT)
Dept: OCCUPATIONAL THERAPY | Facility: REHABILITATION | Age: 59
End: 2019-03-01
Payer: COMMERCIAL

## 2019-03-01 DIAGNOSIS — R27.8 WORSENED HANDWRITING: Primary | ICD-10-CM

## 2019-03-01 PROCEDURE — 97535 SELF CARE MNGMENT TRAINING: CPT | Performed by: OCCUPATIONAL THERAPIST

## 2019-03-01 NOTE — PROGRESS NOTES
OCCUPATIONAL THERAPY RE- EVALUATION:    2019  Pako Aguilar    [phone]  Hannah Walker MD  No diagnosis found  Subjective Evaluation    Quality of life: good          "I visited a neurologist at [facility]  He is confident that I have Parkinson's Disease"  PATIENT GOAL: "Increase my control and dexterity, particularly of my R hand "    HISTORY OF PRESENT ILLNESS:     Pt is a 60 y o  male who was referred to Occupational Therapy s/p Worsened Handwriting  Pt reports initial symptoms of Micrographia beginning a few years ago and then noticed imbalance and R resting Hand tremor beginning 6-9 months ago  Pt underwent imaging and blood tests which resulted WNL  Pt with no diagnosis at this time with further evaluation being carried out  As per Pt, diagnosis of Parkinson's Disease is being further evaluated  Pt with self-report of micrographia, resting R hand tremors occurring occasionally, decreased FMC/prehension, difficulties manipulating mouse with dysmetria, and lightheadedness, imbalance, and difficulties with gait  Pt underwent PT services to address imbalance/lightheadedness and now performs HEP in home environment  Pt lives in a two story home with wife  Pt currently performing all ADLs/IADLs independently at this time  Pt recently retired from a  for [employer]-- Retired 2018  Pt continues the  role with no difficulties reported       PMH:   Past Medical History:   Diagnosis Date    Back pain     Bladder infection     BPH (benign prostatic hyperplasia)     Diverticulosis     GERD (gastroesophageal reflux disease)     occassional    History of testicular cancer 1988    Surgery and radiation; left side    Kidney stones     Currently and in the past    Wears glasses          Pain Levels:     Restin    With Activity:  0    Objective     Functional Assessment        Comments  See impairment section for further details  Impairment Observations:  1  Micrographia    Improving, Pt reports "good days" and "bad days"  2  Decreased FMC/prehension    improving  3  Occasional R resting hand tremors    remains  4  Difficulties manipulating mouse with dysmetria    Improving, "good days" and "bad days"  Dynamometer Position #2:   R: 119    120  L: 116    112    R hand dominant    Action:  3 point pinch: R 16 5 and L 10    R 20, L 14  2 point pinch: R 8 and L 5  R 11, L 8  Lateral pinch: R 19 and L 19    R 20, L 20    9-hole Peg Test: RUE: 19 89 seconds, LUE: 21 19seconds    R 18 70 seconds, L 20 20 seconds    Myofilaments: B/L 2 83, Pt with G abilities distinguishing between hot and cold temperatures    B/L AROM:  Shoulder elevation:  B/L full  Shoulder FF: B/L full  Shoulder ABD: B/L full  ER/IR: B/L full  Elbow ext/flex: B/L full  Sup/Pron: B/L full  Wrist flex/ext: B/L full  Composite: B/L full  Hook: B/L full  Opposition: B/L intact   Finger to nose: B/L intact  Dysdiadochokinesia: B/L intact     MMT: B/L 5/5      Assessment  Assessment details: See skilled analysis for further details  Skilled Analysis:  Pt is a 60 y o  male referred to Occupational Therapy s/p Worsened Handwriting  Pt presents with micrographia, decreased FMC/prehension, occasional R resting hand tremor, and difficulties manipulating mouse with dysmetria reported  Pt will benefit from skilled Occupational Therapy services 2x/week for 4 weeks with focus on neuro re-ed, manual tx, and handwriting functional tasks with focus on improving legibility/fluidity of handwriting, improved R hand to target accuracy, and FMC/prehension for improved overall functional use of RUE  Pt demo with excellent functional progression towards goals in POC  Pt demo with G understanding/carryover of HEP provided to Pt  OTR will be D/Cing Pt at this time 2* All goals met    OTR educated Pt on the importance of continuing HEP and notifying staff if regression is evident  Short Term Goals:  - Pt will tolerate BIONESS for improved motor and sensory performance for overall improved hand to target with 80% accuracy (if cleared by neurologist) 4 weeks-- MET  - Pt will increase R prehension patterns for improved tripod with utensil management and fluidity/legibility of handwriting with <20% droppage 4 weeks-- MET  - Pt will increase proprioception of R UE hand to target for improved functional reach vision occluded with ADL tasks 4 weeks-- MET  - Pt will demo with G carryover of Home Exercise Program to improve functional progression towards goals in Plan of care and for improved functional use of RUE 4 weeks-- MET  - Pt will increase automaticity of RUE to 75% for improved grasp release of tabletop items for improved functional performance with salient tasks 4 weeks-- MET  - Pt will increase rate of manipulation for all FM tests for improved functional performance with salient tasks 4 weeks-- MET  - Pt will increase RUE to Mod I assist with <20% cuing for tabletop tasks for improved functional performance of life roles and salient tasks 4-- MET           Long Term Goals:  - Pt will increase automaticity of  R  UE to 95% for resumption of B integrative tasks-- MET  - Pt will increase rate of prehension for all R FM tasks for improved use of utensils, fluidity/legibility of handwriting, ADL fasteners-- MET  - Pt will resume  R  hand dominance to independent with all meaningful occupations and life roles-- MET        -    Pt will increase R prehension patterns for improved tripod with utensil management with <0% droppage -- MET        INTERVENTION COMMENTS:  Diagnosis: Worsened Handwriting  Precautions: BPH  FOTO: 74, with 0% limitation in UE function    68, with 16% limitations in UE function  Insurance: Payor: Miriam Day / Plan: [employer] PLAN 361 / Product Type: Blue Fee for Service /   8 of ____ visits

## 2019-03-04 ENCOUNTER — OFFICE VISIT (OUTPATIENT)
Dept: FAMILY MEDICINE CLINIC | Facility: CLINIC | Age: 59
End: 2019-03-04
Payer: COMMERCIAL

## 2019-03-04 ENCOUNTER — OFFICE VISIT (OUTPATIENT)
Dept: PULMONOLOGY | Facility: CLINIC | Age: 59
End: 2019-03-04
Payer: COMMERCIAL

## 2019-03-04 ENCOUNTER — APPOINTMENT (OUTPATIENT)
Dept: OCCUPATIONAL THERAPY | Facility: REHABILITATION | Age: 59
End: 2019-03-04
Payer: COMMERCIAL

## 2019-03-04 VITALS
SYSTOLIC BLOOD PRESSURE: 110 MMHG | WEIGHT: 235 LBS | BODY MASS INDEX: 31.83 KG/M2 | TEMPERATURE: 98.5 F | HEIGHT: 72 IN | DIASTOLIC BLOOD PRESSURE: 70 MMHG

## 2019-03-04 VITALS
SYSTOLIC BLOOD PRESSURE: 70 MMHG | HEART RATE: 95 BPM | HEIGHT: 72 IN | RESPIRATION RATE: 18 BRPM | DIASTOLIC BLOOD PRESSURE: 50 MMHG | BODY MASS INDEX: 32.23 KG/M2 | OXYGEN SATURATION: 96 % | TEMPERATURE: 97.4 F | WEIGHT: 238 LBS

## 2019-03-04 DIAGNOSIS — G89.29 CHRONIC PAIN OF LEFT KNEE: Primary | ICD-10-CM

## 2019-03-04 DIAGNOSIS — R06.02 SOB (SHORTNESS OF BREATH): ICD-10-CM

## 2019-03-04 DIAGNOSIS — E01.0 THYROMEGALY: ICD-10-CM

## 2019-03-04 DIAGNOSIS — G20 PARKINSON'S DISEASE (HCC): ICD-10-CM

## 2019-03-04 DIAGNOSIS — G89.29 CHRONIC PAIN OF RIGHT ANKLE: ICD-10-CM

## 2019-03-04 DIAGNOSIS — M25.571 CHRONIC PAIN OF RIGHT ANKLE: ICD-10-CM

## 2019-03-04 DIAGNOSIS — M25.562 CHRONIC PAIN OF LEFT KNEE: Primary | ICD-10-CM

## 2019-03-04 PROBLEM — G20.A1 PARKINSON'S DISEASE: Status: ACTIVE | Noted: 2019-03-04

## 2019-03-04 PROCEDURE — 99214 OFFICE O/P EST MOD 30 MIN: CPT | Performed by: FAMILY MEDICINE

## 2019-03-04 PROCEDURE — 1036F TOBACCO NON-USER: CPT | Performed by: FAMILY MEDICINE

## 2019-03-04 PROCEDURE — 99244 OFF/OP CNSLTJ NEW/EST MOD 40: CPT | Performed by: INTERNAL MEDICINE

## 2019-03-04 PROCEDURE — 3008F BODY MASS INDEX DOCD: CPT | Performed by: FAMILY MEDICINE

## 2019-03-04 RX ORDER — SELEGILINE HYDROCHLORIDE 5 MG/1
5 CAPSULE ORAL 2 TIMES DAILY
Refills: 11 | COMMUNITY
Start: 2019-02-11 | End: 2020-03-11 | Stop reason: ALTCHOICE

## 2019-03-04 NOTE — PROGRESS NOTES
Pulmonary outpatient note   Kurtis Partida 60 y o  male MRN: [id number]  3/4/2019      Assessment and plan:  Kurtis Partida complains of shortness of Breath the last year  He was recently diagnosed with Parkinson disease  He does not have a smoking history that can explain his symptoms  I explained to him that Parkinson's usually does not affect the lung parenchyma but can cause chest wall syndromes such as respiratory muscle weakness or dyssynchrony  I referred him to pulmonary function tests and also a sleep study to screen for sleep apnea and nocturnal hypoxemia which might be secondary to respiratory muscle weakness  This patient is also have high frequency of restless leg syndrome with frequent awakenings that should be seen on the sleep study  Follow-up in 4-6 weeks with PFTs and sleep study results  Elie Casillas MD/PhD,  [facility]      Return in about 1 month (around 4/1/2019)  History of Present Illness  This is 60y o  year old male with previous medical history of recently diagnosed Parkinson's disease who complains of shortness of Breath in the last half year specially when bending down  The patient does not have history of lung disease and was 1st seen by cardiologist who sent him to an echo and stress test which were normal  He has a very mild smoking history  He does not complain of cough or phlegm  Social history:   Smoked half pack a day for 3 years many years ago  Does not drink alcohol  Occupational history:   Retired  Worked as a printing   Review of Systems   Constitutional: Negative  HENT: Negative  Eyes: Negative  Respiratory: Positive for shortness of breath  Cardiovascular: Negative  Gastrointestinal: Negative  Endocrine: Negative  Genitourinary: Negative  Musculoskeletal: Positive for gait problem  Skin: Negative  Allergic/Immunologic: Negative      Neurological: Positive for tremors and weakness  Hematological: Negative  Psychiatric/Behavioral: Negative  Historical Information   Past Medical History:   Diagnosis Date    Back pain     Bladder infection     BPH (benign prostatic hyperplasia)     Diverticulosis     GERD (gastroesophageal reflux disease)     occassional    History of testicular cancer 1988    Surgery and radiation; left side    Kidney stones     Currently and in the past    Wears glasses      Past Surgical History:   Procedure Laterality Date    COLONOSCOPY     [address] inguinal hernia repair    KIDNEY STONE SURGERY      LITHOTRIPSY      Renal; Resolved: 2/27/07    ORCHIECTOMY Left     DE CYSTOURETHRO W/IMPLANT N/A 5/17/2018    Procedure: CYSTOSCOPY WITH INSERTION Jung Blackbird;  Surgeon: Kristen Alvarado DO;  [facility];  Service: Urology    PROSTATE SURGERY N/A 1/18/2018    Procedure: CYSTOSCOPY WITH INSERTION UROLIFT;  Surgeon: Kristen Alvarado DO;  [facility];  Service: Urology   [address]    For cancer    TONSILLECTOMY      URETERONEOCYSTOSTOMY      w/ cystoscopy Ureteral Stent Placement; Resolved: 6/14/2007    WISDOM TOOTH EXTRACTION       Family History   Problem Relation Age of Onset    Colon cancer Father     Heart failure Father     Parkinsonism Mother     Cancer Paternal Grandmother        Meds/Allergies     Current Outpatient Medications:     calcium carbonate (TUMS) 500 mg chewable tablet, Chew 1 tablet as needed for indigestion or heartburn, Disp: , Rfl:     Droxidopa (NORTHERA) 100 MG CAPS, 48 hour titration schedule   When you get up in the morning, at midday, and in late afternoon (at least 3 hours before bed), Disp: 450 capsule, Rfl: 0    dutasteride (AVODART) 0 5 mg capsule, Take 0 5 mg by mouth daily, Disp: , Rfl: 11    multivitamin (THERAGRAN) TABS, Take 1 tablet by mouth daily, Disp: , Rfl:     selegiline (ELDEPRYL) 5 mg capsule, Take 5 mg by mouth 2 (two) times a day, Disp: , Rfl: 11    Wheat Dextrin (BENEFIBER DRINK MIX PO), , Disp: , Rfl:   No Known Allergies    Vitals: Blood pressure (!) 70/50, pulse 95, temperature (!) 97 4 °F (36 3 °C), resp  rate 18, height 6' (1 829 m), weight 108 kg (238 lb), SpO2 96 %  Body mass index is 32 28 kg/m²  Oxygen Therapy  SpO2: 96 %  Oxygen Therapy: None (Room air)    Physical Exam  Physical Exam   Constitutional: He is oriented to person, place, and time  He appears well-developed and well-nourished  No distress  HENT:   Head: Normocephalic and atraumatic  Eyes: Pupils are equal, round, and reactive to light  EOM are normal    Neck: Normal range of motion  Neck supple  No JVD present  Cardiovascular: Normal rate, regular rhythm and normal heart sounds  Exam reveals no friction rub  No murmur heard  Pulmonary/Chest: Effort normal and breath sounds normal  No stridor  No respiratory distress  He has no wheezes  He has no rales  Abdominal: Soft  He exhibits no distension  Musculoskeletal: Normal range of motion  He exhibits no edema or deformity  Neurological: He is alert and oriented to person, place, and time  Skin: Skin is warm  He is not diaphoretic  Psychiatric: He has a normal mood and affect  Labs: I have personally reviewed pertinent lab results  Lab Results   Component Value Date    WBC 6 53 07/06/2018    HGB 15 3 07/06/2018    HCT 46 8 07/06/2018    MCV 93 07/06/2018     07/06/2018     Lab Results   Component Value Date    CALCIUM 8 8 10/11/2018    K 3 7 10/11/2018    CO2 26 10/11/2018     10/11/2018    BUN 22 10/11/2018    CREATININE 1 07 10/11/2018     No results found for: IGE  Lab Results   Component Value Date    ALT 28 10/11/2018    AST 15 10/11/2018    ALKPHOS 57 10/11/2018       Imaging and other studies:   I have personally reviewed pertinent imaging studies in PACS  The patient had a normal chest x-ray on February 2019      Yassine Hatch MD/PhD,  [facility]

## 2019-03-04 NOTE — LETTER
March 4, 2019     Rosana Peacock, [address] [facility]  [address]    Patient: Vu Bravo   YOB: 1960   Date of Visit: 3/4/2019       Dear Dr Saint Jacobson:    Thank you for referring Gilbert Girard to me for evaluation  Below are my notes for this consultation  If you have questions, please do not hesitate to call me  I look forward to following your patient along with you  Sincerely,        Deidre Damon MD        CC: No Recipients  Deidre Damon MD  3/4/2019  4:14 PM  Sign at close encounter  Pulmonary outpatient note   Vu Bravo 60 y o  male MRN: [id number]  3/4/2019      Assessment and plan:  Vu Bravo complains of shortness of Breath the last year  He was recently diagnosed with Parkinson disease  He does not have a smoking history that can explain his symptoms  I explained to him that Parkinson's usually does not affect the lung parenchyma but can cause chest wall syndromes such as respiratory muscle weakness or dyssynchrony  I referred him to pulmonary function tests and also a sleep study to screen for sleep apnea and nocturnal hypoxemia which might be secondary to respiratory muscle weakness  This patient is also have high frequency of restless leg syndrome with frequent awakenings that should be seen on the sleep study  Follow-up in 4-6 weeks with PFTs and sleep study results  Deidre Damon MD/PhD,  [facility]      Return in about 1 month (around 4/1/2019)  History of Present Illness  This is 60y o  year old male with previous medical history of recently diagnosed Parkinson's disease who complains of shortness of Breath in the last half year specially when bending down  The patient does not have history of lung disease and was 1st seen by cardiologist who sent him to an echo and stress test which were normal  He has a very mild smoking history  He does not complain of cough or phlegm      Social history:   Smoked half pack a day for 3 years many years ago  Does not drink alcohol  Occupational history:   Retired  Worked as a printing   Review of Systems   Constitutional: Negative  HENT: Negative  Eyes: Negative  Respiratory: Positive for shortness of breath  Cardiovascular: Negative  Gastrointestinal: Negative  Endocrine: Negative  Genitourinary: Negative  Musculoskeletal: Positive for gait problem  Skin: Negative  Allergic/Immunologic: Negative  Neurological: Positive for tremors and weakness  Hematological: Negative  Psychiatric/Behavioral: Negative  Historical Information   Past Medical History:   Diagnosis Date    Back pain     Bladder infection     BPH (benign prostatic hyperplasia)     Diverticulosis     GERD (gastroesophageal reflux disease)     occassional    History of testicular cancer 1988    Surgery and radiation; left side    Kidney stones     Currently and in the past    Wears glasses      Past Surgical History:   Procedure Laterality Date    [address]       [address] [facility]    KIDNEY STONE SURGERY      LITHOTRIPSY      Renal; Resolved: 2/27/07    ORCHIECTOMY Left     MT CYSTOURETHRO W/IMPLANT N/A 5/17/2018    Procedure: CYSTOSCOPY WITH INSERTION Fabrice Rosaura;  Surgeon: Tasha Zacarias DO;  [facility];  Service: Urology    PROSTATE SURGERY N/A 1/18/2018    Procedure: CYSTOSCOPY WITH INSERTION UROLIFT;  Surgeon: Tasha Zacarias DO;  [facility];  Service: Urology   [address]    For cancer    TONSILLECTOMY      URETERONEOCYSTOSTOMY      w/ cystoscopy Ureteral Stent Placement;  Resolved: 6/14/2007    WISDOM TOOTH EXTRACTION       Family History   Problem Relation Age of Onset    Colon cancer Father     Heart failure Father     Parkinsonism Mother     Cancer Paternal Grandmother        Meds/Allergies     Current Outpatient Medications:    calcium carbonate (TUMS) 500 mg chewable tablet, Chew 1 tablet as needed for indigestion or heartburn, Disp: , Rfl:     Droxidopa (NORTHERA) 100 MG CAPS, 48 hour titration schedule  When you get up in the morning, at midday, and in late afternoon (at least 3 hours before bed), Disp: 450 capsule, Rfl: 0    dutasteride (AVODART) 0 5 mg capsule, Take 0 5 mg by mouth daily, Disp: , Rfl: 11    multivitamin (THERAGRAN) TABS, Take 1 tablet by mouth daily, Disp: , Rfl:     selegiline (ELDEPRYL) 5 mg capsule, Take 5 mg by mouth 2 (two) times a day, Disp: , Rfl: 11    Wheat Dextrin (BENEFIBER DRINK MIX PO), , Disp: , Rfl:   No Known Allergies    Vitals: Blood pressure (!) 70/50, pulse 95, temperature (!) 97 4 °F (36 3 °C), resp  rate 18, height 6' (1 829 m), weight 108 kg (238 lb), SpO2 96 %  Body mass index is 32 28 kg/m²  Oxygen Therapy  SpO2: 96 %  Oxygen Therapy: None (Room air)    Physical Exam  Physical Exam   Constitutional: He is oriented to person, place, and time  He appears well-developed and well-nourished  No distress  HENT:   Head: Normocephalic and atraumatic  Eyes: Pupils are equal, round, and reactive to light  EOM are normal    Neck: Normal range of motion  Neck supple  No JVD present  Cardiovascular: Normal rate, regular rhythm and normal heart sounds  Exam reveals no friction rub  No murmur heard  Pulmonary/Chest: Effort normal and breath sounds normal  No stridor  No respiratory distress  He has no wheezes  He has no rales  Abdominal: Soft  He exhibits no distension  Musculoskeletal: Normal range of motion  He exhibits no edema or deformity  Neurological: He is alert and oriented to person, place, and time  Skin: Skin is warm  He is not diaphoretic  Psychiatric: He has a normal mood and affect  Labs: I have personally reviewed pertinent lab results    Lab Results   Component Value Date    WBC 6 53 07/06/2018    HGB 15 3 07/06/2018    HCT 46 8 07/06/2018    MCV 93 07/06/2018  07/06/2018     Lab Results   Component Value Date    CALCIUM 8 8 10/11/2018    K 3 7 10/11/2018    CO2 26 10/11/2018     10/11/2018    BUN 22 10/11/2018    CREATININE 1 07 10/11/2018     No results found for: IGE  Lab Results   Component Value Date    ALT 28 10/11/2018    AST 15 10/11/2018    ALKPHOS 57 10/11/2018       Imaging and other studies:   I have personally reviewed pertinent imaging studies in PACS  The patient had a normal chest x-ray on February 2019      Braulio Rinaldi MD/PhD,  [facility]

## 2019-03-04 NOTE — PROGRESS NOTES
Assessment/Plan:  Patient use meloxicam daily for the next 2 weeks  Patient uses with food  Patient go for x-rays of the left knee as well as right ankle  Patient will do home exercise program   Patient may use ice  Patient will follow up in 6 weeks if not improving  Diagnoses and all orders for this visit:    Chronic pain of left knee  -     XR knee 4+ vw left injury; Future    Chronic pain of right ankle  -     XR ankle 3+ vw right; Future    Parkinson's disease ([address] )    Thyromegaly  -     US thyroid; Future          Subjective:      Patient ID: Willie Frazier is a 60 y o  male  Patient is here with left knee pain worse over the past 2 to 3 months  Patient notices swelling after using rowing machine and using stationary bike  Patient does have pain on lateral aspect as well as medially under the patella  Patient has pain with using the stairs  No trauma noted  Patient with history of Osgood-Schlatter's  No recent x-rays etc   The patient has some pain and right ankle also  Patient has some pain with dorsiflexion the and plantar the flexion  Patient has noticed some swelling on the medial aspect  No recent trauma to ankle either  No medications used yet  Patient also with swelling over right lower neck over the past 3 weeks  The following portions of the patient's history were reviewed and updated as appropriate: allergies, current medications, past family history, past medical history, past social history, past surgical history and problem list     Review of Systems   Constitutional: Negative  HENT:        Swelling right neck   Eyes: Negative  Respiratory: Negative  Cardiovascular: Negative  Gastrointestinal: Negative  Endocrine: Negative  Genitourinary: Negative  Musculoskeletal: Positive for arthralgias and gait problem  Skin: Negative  Allergic/Immunologic: Negative  Hematological: Negative  Psychiatric/Behavioral: Negative  Objective:      /70 (BP Location: Right arm, Patient Position: Sitting, Cuff Size: Large)   Temp 98 5 °F (36 9 °C) (Tympanic)   Ht 6' (1 829 m)   Wt 107 kg (235 lb)   BMI 31 87 kg/m²          Physical Exam   Constitutional: He is oriented to person, place, and time  He appears well-developed and well-nourished  No distress  HENT:   Head: Normocephalic  Right Ear: External ear normal    Left Ear: External ear normal    Mouth/Throat: Oropharynx is clear and moist  No oropharyngeal exudate  Eyes: Pupils are equal, round, and reactive to light  EOM are normal  Right eye exhibits no discharge  Left eye exhibits no discharge  No scleral icterus  Neck: Normal range of motion  Neck supple  Thyromegaly present  Cardiovascular: Normal rate, regular rhythm, normal heart sounds and intact distal pulses  Exam reveals no gallop and no friction rub  No murmur heard  Pulmonary/Chest: Effort normal and breath sounds normal  No respiratory distress  He has no wheezes  He has no rales  He exhibits no tenderness  Abdominal: Soft  Bowel sounds are normal  He exhibits no distension  There is no tenderness  There is no rebound and no guarding  Musculoskeletal: Normal range of motion  He exhibits tenderness  He exhibits no edema  The left knee with effusion  Patient has positive Apley test   Negative Lachman test   No pain with testing LCL or MCL  Patient does not have any discomfort with palpation over the patella  Right ankle patient has some pain with inversion  Patient also with some discomfort with dorsiflexion  No pain with palpation medially or over plantar fascia  Lymphadenopathy:     He has no cervical adenopathy  Neurological: He is oriented to person, place, and time  No cranial nerve deficit  He exhibits normal muscle tone  Coordination normal    Skin: Skin is warm and dry  No rash noted  He is not diaphoretic  No erythema  No pallor  Psychiatric: He has a normal mood and affect   His behavior is normal  Judgment and thought content normal    Nursing note and vitals reviewed

## 2019-03-05 ENCOUNTER — HOSPITAL ENCOUNTER (OUTPATIENT)
Dept: ULTRASOUND IMAGING | Facility: MEDICAL CENTER | Age: 59
Discharge: HOME | End: 2019-03-05
Payer: COMMERCIAL

## 2019-03-05 ENCOUNTER — APPOINTMENT (OUTPATIENT)
Dept: RADIOLOGY | Facility: MEDICAL CENTER | Age: 59
End: 2019-03-05
Payer: COMMERCIAL

## 2019-03-05 DIAGNOSIS — G89.29 CHRONIC PAIN OF LEFT KNEE: ICD-10-CM

## 2019-03-05 DIAGNOSIS — G89.29 CHRONIC PAIN OF RIGHT ANKLE: ICD-10-CM

## 2019-03-05 DIAGNOSIS — E01.0 THYROMEGALY: ICD-10-CM

## 2019-03-05 DIAGNOSIS — M25.571 CHRONIC PAIN OF RIGHT ANKLE: ICD-10-CM

## 2019-03-05 DIAGNOSIS — M25.562 CHRONIC PAIN OF LEFT KNEE: ICD-10-CM

## 2019-03-05 PROCEDURE — 73610 X-RAY EXAM OF ANKLE: CPT

## 2019-03-05 PROCEDURE — 73564 X-RAY EXAM KNEE 4 OR MORE: CPT

## 2019-03-05 PROCEDURE — 76536 US EXAM OF HEAD AND NECK: CPT

## 2019-03-06 ENCOUNTER — APPOINTMENT (OUTPATIENT)
Dept: OCCUPATIONAL THERAPY | Facility: REHABILITATION | Age: 59
End: 2019-03-06
Payer: COMMERCIAL

## 2019-03-07 NOTE — TELEPHONE ENCOUNTER
Call placed to [facility] and spoke with pharmacist   Provided verbal order as stated below for Northera  Call placed to patient on both home and mobile numbers listed requesting call back to review instructions below    Other number listed was busy signal

## 2019-03-07 NOTE — TELEPHONE ENCOUNTER
Pt called back and was made aware of instructions  He verbalizes clear understanding and wrote the instructions down  Pt also states that he does occasional takes he blood pressure and notices a decrease of about 30 points systolic when pt stands after lying down  I did remind pt to get up very slowly to avoid dizziness/falls  Patient states that he is having a procedure on his prostate in April where he will be under anaesthesia  He is questioning if he should still take the medication that day   Please advise

## 2019-03-07 NOTE — TELEPHONE ENCOUNTER
Would advise not taking Northera the day of the procedure given he will likely be lying flat   Thanks

## 2019-03-08 ENCOUNTER — TELEPHONE (OUTPATIENT)
Dept: SLEEP CENTER | Facility: CLINIC | Age: 59
End: 2019-03-08

## 2019-03-08 ENCOUNTER — APPOINTMENT (OUTPATIENT)
Dept: LAB | Facility: MEDICAL CENTER | Age: 59
End: 2019-03-08
Payer: COMMERCIAL

## 2019-03-08 ENCOUNTER — TELEPHONE (OUTPATIENT)
Dept: FAMILY MEDICINE CLINIC | Facility: CLINIC | Age: 59
End: 2019-03-08

## 2019-03-08 ENCOUNTER — TRANSCRIBE ORDERS (OUTPATIENT)
Dept: ADMINISTRATIVE | Facility: HOSPITAL | Age: 59
End: 2019-03-08

## 2019-03-08 DIAGNOSIS — N13.8 NODULAR PROSTATE WITH URINARY OBSTRUCTION: ICD-10-CM

## 2019-03-08 DIAGNOSIS — N13.8 ENLARGED PROSTATE WITH URINARY OBSTRUCTION: Primary | ICD-10-CM

## 2019-03-08 DIAGNOSIS — N40.1 ENLARGED PROSTATE WITH URINARY OBSTRUCTION: ICD-10-CM

## 2019-03-08 DIAGNOSIS — N40.3 NODULAR PROSTATE WITH URINARY OBSTRUCTION: ICD-10-CM

## 2019-03-08 DIAGNOSIS — N40.1 ENLARGED PROSTATE WITH URINARY OBSTRUCTION: Primary | ICD-10-CM

## 2019-03-08 DIAGNOSIS — N13.8 ENLARGED PROSTATE WITH URINARY OBSTRUCTION: ICD-10-CM

## 2019-03-08 LAB
BILIRUB UR QL STRIP: NEGATIVE
CLARITY UR: NORMAL
COLOR UR: YELLOW
GLUCOSE UR STRIP-MCNC: NEGATIVE MG/DL
HGB UR QL STRIP.AUTO: NEGATIVE
KETONES UR STRIP-MCNC: NEGATIVE MG/DL
LEUKOCYTE ESTERASE UR QL STRIP: NEGATIVE
NITRITE UR QL STRIP: NEGATIVE
PH UR STRIP.AUTO: 6.5 [PH]
PROT UR STRIP-MCNC: NEGATIVE MG/DL
SP GR UR STRIP.AUTO: 1.02 (ref 1–1.03)
UROBILINOGEN UR QL STRIP.AUTO: 0.2 E.U./DL

## 2019-03-08 PROCEDURE — 87147 CULTURE TYPE IMMUNOLOGIC: CPT

## 2019-03-08 PROCEDURE — 81003 URINALYSIS AUTO W/O SCOPE: CPT

## 2019-03-08 PROCEDURE — 87086 URINE CULTURE/COLONY COUNT: CPT

## 2019-03-08 PROCEDURE — 87077 CULTURE AEROBIC IDENTIFY: CPT

## 2019-03-08 NOTE — TELEPHONE ENCOUNTER
----- Message from Austin Moy DO sent at 3/5/2019  5:06 PM EST -----  Chart reviewed  Study approved   Schedule HST   ----- Message -----  From: Krys Benoit  Sent: 3/5/2019  10:45 AM  To: [facility] Provider    Study for approval

## 2019-03-08 NOTE — TELEPHONE ENCOUNTER
Call patient  Ultrasound shows nodule that is so small that it does not need to be biopsied    To consider rechecking ultrasound in 1-2 years

## 2019-03-08 NOTE — TELEPHONE ENCOUNTER
Patient made aware of below message from Dr Tono Guerrero and advised not to take the Northera the day of the procedure  Patient verbalized clear understanding

## 2019-03-08 NOTE — TELEPHONE ENCOUNTER
Called and spoke with patient about results per Dr Steve Blackwell  Patient would like clarification of what to expect (should he expect this nodule to grow, will he just have to deal with it?)  Please advise  Thank you       ----- Message from Charlotte Monique DO sent at 3/8/2019 11:36 AM EST -----  Call patient    Ultrasound of the thyroid shows small thyroid nodule on the right which does not warrant biopsy or further workup

## 2019-03-10 LAB
BACTERIA UR CULT: ABNORMAL
BACTERIA UR CULT: ABNORMAL

## 2019-03-11 ENCOUNTER — TELEPHONE (OUTPATIENT)
Dept: NEUROLOGY | Facility: CLINIC | Age: 59
End: 2019-03-11

## 2019-03-12 NOTE — TELEPHONE ENCOUNTER
Documenting return call to patient 9:30pm  He had taken one 100mg dose of northera on 3/10 in the morning and grew concerned over his subsequent sitting blood pressures along with the mild headache that developed  He did not take the second dose of the day for this reason, and subsequently stopped taking it today 3/11  His blood pressures do appear to be elevated for several hours before they fell  I offered two routes:   1  Continue Northera 100mg one capsule a day for a week, documenting blood pressures and see if body normalizes before any further uptitration  OR  2  Stop Marquez Merrill for now, and return to the fludrocortisone that he was using before  He would return to 0 1mg daily for a week, recording blood pressures every few hours  If BP was too high then he would decrease to 1/2 tablet a day  Mckinley Herbie, if low then increase to 1 5 tablet a day for a week, then up to 2 tablets (0 2mg) a day at maximum  Goal is to see which dose would reach most regular standing and sitting blood pressures (systolic between 780-107 ideally while being clinically tolerable  He elected to choose #2  Will stop Northera for the time being based on his preference  Will notify his PCP and cardiologist as well  Regarding: FW: Prescription Question  Contact: [phone]  Dr Cas Jolley,    Please review  ----- Message -----  From: Oliva Butler  Sent: 3/11/2019   8:56 AM  To: [facility] [place] [facility]  Subject: Prescription Question                            ----- Message from [address] [place] [address], Generic sent at 3/11/2019  8:56 AM EDT -----    Good morning Dr Cas Jolley,  As you directed, I began taking Northera 100mg for my issues with low blood pressure  I'm concerned with the effects after the first use Sunday afternoon  I felt warm in my face about an hour afterward and a few hours later I had a slight headache  The headache disappeared in less than an hour and the flushed feeling diminished soon afterward    I took only one dose of Northera because I felt my BP was too high  I avoided laying flat during this period, in fact I stood as much as I sat  Here are my BP levels, taken at home,  during the past few days: Mar 5 @ 0730: seated - 112/71, heart rate=72                                               laying flat - 143/87, heart rate=63      Mar 8 @ 11:15: seated - 119/68,  hr=74     standing - 101/65, hr=88     Mar 9 @ 23:00: seated - 110/67, hr=75      standing - 100/67, hr=87      laying flat - 138/92, hr=75     Mar 10 @12:35:                                                   standing - 70/49, hr=99         laying flat - 127/78, hr=61                        13:00 1st dose Northera                        16:30 seated - 178/108, hr=63    standing - 136/89, hr=75                        17:45 seated - 175/105, hr=67    standing - 144/91, hr=80                        18:45 seated - 163/89, hr=66       standing - 128/88, hr=82                        19:45 seated - 180/103, hr=66     standing - 146/100, hr=70                        21:00 seated - 106/64, hr=76       standing - 96/59, hr=91     Mar 11 @ 0645: seated - 133/89, hr=60      standing - 96/66, hr=84      lying flat - 145/96, hr=56  With my elevated BP, I'm not comfortable continuing with Northera  I still have a small supply of Fludrocortisone 0 1mg if you feel this is a better course of treatment at this point  Did I over-react and stop Northera prematurely? What are your thoughts/advice? Also, I'm scheduled to have prostate surgery (TURP procedure) Friday morning, which will put me under general anesthesia for about an hour      Thank you,  Gauri Sanchez  cell [phone]

## 2019-03-13 ENCOUNTER — DOCUMENTATION (OUTPATIENT)
Dept: NEUROLOGY | Facility: CLINIC | Age: 59
End: 2019-03-13

## 2019-04-02 ENCOUNTER — HOSPITAL ENCOUNTER (OUTPATIENT)
Dept: SLEEP CENTER | Facility: CLINIC | Age: 59
Discharge: HOME | End: 2019-04-02
Payer: COMMERCIAL

## 2019-04-02 DIAGNOSIS — R06.02 SOB (SHORTNESS OF BREATH): ICD-10-CM

## 2019-04-02 DIAGNOSIS — G47.33 OSA (OBSTRUCTIVE SLEEP APNEA): ICD-10-CM

## 2019-04-02 PROCEDURE — G0399 HOME SLEEP TEST/TYPE 3 PORTA: HCPCS

## 2019-04-02 PROCEDURE — G0399 HOME SLEEP TEST/TYPE 3 PORTA: HCPCS | Performed by: PSYCHIATRY & NEUROLOGY

## 2019-04-04 ENCOUNTER — OFFICE VISIT (OUTPATIENT)
Dept: NEUROLOGY | Facility: CLINIC | Age: 59
End: 2019-04-04
Payer: COMMERCIAL

## 2019-04-04 VITALS
SYSTOLIC BLOOD PRESSURE: 87 MMHG | HEART RATE: 101 BPM | WEIGHT: 241 LBS | BODY MASS INDEX: 32.64 KG/M2 | HEIGHT: 72 IN | DIASTOLIC BLOOD PRESSURE: 53 MMHG

## 2019-04-04 DIAGNOSIS — G47.33 OSA (OBSTRUCTIVE SLEEP APNEA): Primary | ICD-10-CM

## 2019-04-04 DIAGNOSIS — G20 PARKINSON'S DISEASE (HCC): Primary | ICD-10-CM

## 2019-04-04 DIAGNOSIS — G90.3 NEUROGENIC ORTHOSTATIC HYPOTENSION (HCC): ICD-10-CM

## 2019-04-04 PROCEDURE — 99215 OFFICE O/P EST HI 40 MIN: CPT | Performed by: PSYCHIATRY & NEUROLOGY

## 2019-04-04 RX ORDER — FLUDROCORTISONE ACETATE 0.1 MG/1
0.15 TABLET ORAL DAILY
COMMUNITY
Start: 2018-11-01 | End: 2019-05-08 | Stop reason: SDUPTHER

## 2019-04-05 ENCOUNTER — TELEPHONE (OUTPATIENT)
Dept: SLEEP CENTER | Facility: CLINIC | Age: 59
End: 2019-04-05

## 2019-04-17 ENCOUNTER — HOSPITAL ENCOUNTER (OUTPATIENT)
Dept: PULMONOLOGY | Facility: HOSPITAL | Age: 59
Discharge: HOME | End: 2019-04-17
Attending: INTERNAL MEDICINE
Payer: COMMERCIAL

## 2019-04-17 DIAGNOSIS — R06.02 SOB (SHORTNESS OF BREATH): ICD-10-CM

## 2019-04-17 PROCEDURE — 94726 PLETHYSMOGRAPHY LUNG VOLUMES: CPT | Performed by: INTERNAL MEDICINE

## 2019-04-17 PROCEDURE — 94010 BREATHING CAPACITY TEST: CPT

## 2019-04-17 PROCEDURE — 94726 PLETHYSMOGRAPHY LUNG VOLUMES: CPT

## 2019-04-17 PROCEDURE — 94729 DIFFUSING CAPACITY: CPT

## 2019-04-17 PROCEDURE — 94760 N-INVAS EAR/PLS OXIMETRY 1: CPT

## 2019-04-17 PROCEDURE — 94729 DIFFUSING CAPACITY: CPT | Performed by: INTERNAL MEDICINE

## 2019-04-17 PROCEDURE — 94060 EVALUATION OF WHEEZING: CPT | Performed by: INTERNAL MEDICINE

## 2019-04-17 RX ORDER — ALBUTEROL SULFATE 2.5 MG/3ML
2.5 SOLUTION RESPIRATORY (INHALATION) ONCE AS NEEDED
Status: DISCONTINUED | OUTPATIENT
Start: 2019-04-17 | End: 2019-04-17

## 2019-04-24 ENCOUNTER — HOSPITAL ENCOUNTER (OUTPATIENT)
Dept: SLEEP CENTER | Facility: CLINIC | Age: 59
Discharge: HOME | End: 2019-04-24
Payer: COMMERCIAL

## 2019-04-24 DIAGNOSIS — G47.33 OSA (OBSTRUCTIVE SLEEP APNEA): ICD-10-CM

## 2019-04-24 PROCEDURE — 95811 POLYSOM 6/>YRS CPAP 4/> PARM: CPT

## 2019-04-24 PROCEDURE — 95811 POLYSOM 6/>YRS CPAP 4/> PARM: CPT | Performed by: PSYCHIATRY & NEUROLOGY

## 2019-04-25 ENCOUNTER — OFFICE VISIT (OUTPATIENT)
Dept: PULMONOLOGY | Facility: CLINIC | Age: 59
End: 2019-04-25
Payer: COMMERCIAL

## 2019-04-25 VITALS
OXYGEN SATURATION: 91 % | BODY MASS INDEX: 32.91 KG/M2 | DIASTOLIC BLOOD PRESSURE: 84 MMHG | SYSTOLIC BLOOD PRESSURE: 136 MMHG | WEIGHT: 243 LBS | HEART RATE: 67 BPM | HEIGHT: 72 IN | TEMPERATURE: 97.4 F | RESPIRATION RATE: 16 BRPM

## 2019-04-25 DIAGNOSIS — R06.02 SOB (SHORTNESS OF BREATH): Primary | ICD-10-CM

## 2019-04-25 PROCEDURE — 99215 OFFICE O/P EST HI 40 MIN: CPT | Performed by: INTERNAL MEDICINE

## 2019-04-30 ENCOUNTER — OFFICE VISIT (OUTPATIENT)
Dept: CARDIOLOGY CLINIC | Facility: CLINIC | Age: 59
End: 2019-04-30
Payer: COMMERCIAL

## 2019-04-30 ENCOUNTER — TELEPHONE (OUTPATIENT)
Dept: SLEEP CENTER | Facility: CLINIC | Age: 59
End: 2019-04-30

## 2019-04-30 VITALS
HEIGHT: 72 IN | BODY MASS INDEX: 32.78 KG/M2 | DIASTOLIC BLOOD PRESSURE: 70 MMHG | SYSTOLIC BLOOD PRESSURE: 114 MMHG | WEIGHT: 242 LBS | HEART RATE: 68 BPM

## 2019-04-30 DIAGNOSIS — G90.3 NEUROGENIC ORTHOSTATIC HYPOTENSION (HCC): Primary | ICD-10-CM

## 2019-04-30 PROCEDURE — 99214 OFFICE O/P EST MOD 30 MIN: CPT | Performed by: INTERNAL MEDICINE

## 2019-04-30 RX ORDER — RIBOFLAVIN (VITAMIN B2) 100 MG
100 TABLET ORAL DAILY
COMMUNITY
End: 2020-02-12

## 2019-05-08 DIAGNOSIS — G90.3 NEUROGENIC ORTHOSTATIC HYPOTENSION (HCC): Primary | ICD-10-CM

## 2019-05-08 RX ORDER — FLUDROCORTISONE ACETATE 0.1 MG/1
0.15 TABLET ORAL DAILY
Qty: 45 TABLET | Refills: 4 | Status: SHIPPED | OUTPATIENT
Start: 2019-05-08 | End: 2019-10-04 | Stop reason: SDUPTHER

## 2019-05-10 ENCOUNTER — TELEPHONE (OUTPATIENT)
Dept: NEUROLOGY | Facility: CLINIC | Age: 59
End: 2019-05-10

## 2019-05-20 ENCOUNTER — HOSPITAL ENCOUNTER (OUTPATIENT)
Dept: PULMONOLOGY | Facility: HOSPITAL | Age: 59
Discharge: HOME | End: 2019-05-20
Attending: INTERNAL MEDICINE
Payer: COMMERCIAL

## 2019-05-20 ENCOUNTER — HOSPITAL ENCOUNTER (OUTPATIENT)
Dept: CT IMAGING | Facility: HOSPITAL | Age: 59
Discharge: HOME | End: 2019-05-20
Attending: INTERNAL MEDICINE
Payer: COMMERCIAL

## 2019-05-20 ENCOUNTER — HOSPITAL ENCOUNTER (OUTPATIENT)
Dept: RADIOLOGY | Facility: HOSPITAL | Age: 59
Discharge: HOME | End: 2019-05-20
Attending: INTERNAL MEDICINE
Payer: COMMERCIAL

## 2019-05-20 DIAGNOSIS — R06.02 SOB (SHORTNESS OF BREATH): ICD-10-CM

## 2019-05-20 LAB
BASE EXCESS BLDA CALC-SCNC: -0.8 MMOL/L
HCO3 BLDA-SCNC: 23.9 MMOL/L (ref 22–28)
O2 CT BLDA-SCNC: 20.1 ML/DL (ref 16–23)
OXYHGB MFR BLDA: 94.1 % (ref 94–97)
PCO2 BLDA: 40.1 MM HG (ref 36–44)
PH BLDA: 7.39 [PH] (ref 7.35–7.45)
PO2 BLDA: 71.9 MM HG (ref 75–129)

## 2019-05-20 PROCEDURE — 71250 CT THORAX DX C-: CPT

## 2019-05-20 PROCEDURE — 36600 WITHDRAWAL OF ARTERIAL BLOOD: CPT

## 2019-05-20 PROCEDURE — 82805 BLOOD GASES W/O2 SATURATION: CPT

## 2019-05-20 PROCEDURE — 76000 FLUOROSCOPY <1 HR PHYS/QHP: CPT

## 2019-05-21 ENCOUNTER — TELEPHONE (OUTPATIENT)
Dept: PULMONOLOGY | Facility: CLINIC | Age: 59
End: 2019-05-21

## 2019-05-24 ENCOUNTER — TELEPHONE (OUTPATIENT)
Dept: PULMONOLOGY | Facility: CLINIC | Age: 59
End: 2019-05-24

## 2019-05-30 ENCOUNTER — TELEPHONE (OUTPATIENT)
Dept: PULMONOLOGY | Facility: CLINIC | Age: 59
End: 2019-05-30

## 2019-05-30 DIAGNOSIS — G47.33 OSA (OBSTRUCTIVE SLEEP APNEA): Primary | ICD-10-CM

## 2019-05-31 ENCOUNTER — TELEPHONE (OUTPATIENT)
Dept: PULMONOLOGY | Facility: CLINIC | Age: 59
End: 2019-05-31

## 2019-05-31 NOTE — TELEPHONE ENCOUNTER
Sharifa Jacques from [facility] [address]  called stating physician signature missing on patient's Cpap script  Please refax to [phone]   Thank you

## 2019-06-03 ENCOUNTER — OFFICE VISIT (OUTPATIENT)
Dept: FAMILY MEDICINE CLINIC | Facility: CLINIC | Age: 59
End: 2019-06-03
Payer: COMMERCIAL

## 2019-06-03 VITALS
SYSTOLIC BLOOD PRESSURE: 144 MMHG | HEIGHT: 73 IN | TEMPERATURE: 97.5 F | BODY MASS INDEX: 31.68 KG/M2 | DIASTOLIC BLOOD PRESSURE: 82 MMHG | WEIGHT: 239 LBS

## 2019-06-03 DIAGNOSIS — K21.00 GASTROESOPHAGEAL REFLUX DISEASE WITH ESOPHAGITIS: ICD-10-CM

## 2019-06-03 DIAGNOSIS — R49.9 CHANGE IN VOICE: Primary | ICD-10-CM

## 2019-06-03 PROCEDURE — 3008F BODY MASS INDEX DOCD: CPT | Performed by: FAMILY MEDICINE

## 2019-06-03 PROCEDURE — 99213 OFFICE O/P EST LOW 20 MIN: CPT | Performed by: FAMILY MEDICINE

## 2019-06-03 RX ORDER — MELOXICAM 15 MG/1
15 TABLET ORAL DAILY
COMMUNITY
End: 2019-08-15

## 2019-06-03 RX ORDER — OMEPRAZOLE 20 MG/1
20 CAPSULE, DELAYED RELEASE ORAL
Qty: 90 CAPSULE | Refills: 1 | Status: SHIPPED | OUTPATIENT
Start: 2019-06-03 | End: 2019-12-09 | Stop reason: SDUPTHER

## 2019-06-05 ENCOUNTER — OFFICE VISIT (OUTPATIENT)
Dept: NEUROLOGY | Facility: CLINIC | Age: 59
End: 2019-06-05
Payer: COMMERCIAL

## 2019-06-05 VITALS
SYSTOLIC BLOOD PRESSURE: 124 MMHG | HEART RATE: 80 BPM | BODY MASS INDEX: 31.54 KG/M2 | WEIGHT: 238 LBS | DIASTOLIC BLOOD PRESSURE: 74 MMHG | HEIGHT: 73 IN

## 2019-06-05 DIAGNOSIS — R27.8 TRUNCAL ATAXIA: ICD-10-CM

## 2019-06-05 DIAGNOSIS — G90.3 NEUROGENIC ORTHOSTATIC HYPOTENSION (HCC): ICD-10-CM

## 2019-06-05 DIAGNOSIS — J98.6 DIAPHRAGM PARALYSIS: ICD-10-CM

## 2019-06-05 DIAGNOSIS — R13.13 PHARYNGEAL DYSPHAGIA: ICD-10-CM

## 2019-06-05 DIAGNOSIS — G20 PRIMARY PARKINSONISM (HCC): Primary | ICD-10-CM

## 2019-06-05 PROBLEM — G20.C PRIMARY PARKINSONISM: Status: ACTIVE | Noted: 2019-03-04

## 2019-06-05 PROCEDURE — 99215 OFFICE O/P EST HI 40 MIN: CPT | Performed by: PSYCHIATRY & NEUROLOGY

## 2019-06-17 ENCOUNTER — EVALUATION (OUTPATIENT)
Dept: SPEECH THERAPY | Facility: REHABILITATION | Age: 59
End: 2019-06-17
Payer: COMMERCIAL

## 2019-06-17 DIAGNOSIS — R13.13 PHARYNGEAL DYSPHAGIA: Primary | ICD-10-CM

## 2019-06-17 DIAGNOSIS — R13.10 DYSPHAGIA, UNSPECIFIED TYPE: Primary | ICD-10-CM

## 2019-06-17 DIAGNOSIS — G20 PRIMARY PARKINSONISM (HCC): ICD-10-CM

## 2019-06-17 DIAGNOSIS — G20 PARKINSON DISEASE (HCC): ICD-10-CM

## 2019-06-17 DIAGNOSIS — G20 PARKINSON'S DISEASE (HCC): ICD-10-CM

## 2019-06-17 PROCEDURE — 92610 EVALUATE SWALLOWING FUNCTION: CPT

## 2019-07-03 DIAGNOSIS — M79.644 PAIN OF RIGHT THUMB: ICD-10-CM

## 2019-07-03 RX ORDER — MELOXICAM 15 MG/1
TABLET ORAL
Qty: 30 TABLET | Refills: 1 | Status: SHIPPED | OUTPATIENT
Start: 2019-07-03 | End: 2019-08-15 | Stop reason: HOSPADM

## 2019-07-11 ENCOUNTER — HOSPITAL ENCOUNTER (OUTPATIENT)
Dept: RADIOLOGY | Facility: HOSPITAL | Age: 59
Discharge: HOME | End: 2019-07-11
Attending: PSYCHIATRY & NEUROLOGY
Payer: COMMERCIAL

## 2019-07-11 DIAGNOSIS — R13.10 DYSPHAGIA, UNSPECIFIED TYPE: ICD-10-CM

## 2019-07-11 DIAGNOSIS — G20 PARKINSON DISEASE (HCC): ICD-10-CM

## 2019-07-11 PROCEDURE — G8998 SWALLOW D/C STATUS: HCPCS

## 2019-07-11 PROCEDURE — G8997 SWALLOW GOAL STATUS: HCPCS

## 2019-07-11 PROCEDURE — G8996 SWALLOW CURRENT STATUS: HCPCS

## 2019-07-11 PROCEDURE — 74230 X-RAY XM SWLNG FUNCJ C+: CPT

## 2019-07-11 PROCEDURE — 92611 MOTION FLUOROSCOPY/SWALLOW: CPT

## 2019-07-11 NOTE — PROCEDURES
Video Swallow Study      Patient Name: Sher Saravia  OETMR'U Date: 7/11/2019        Past Medical History  Past Medical History:   Diagnosis Date    Back pain     Bladder infection     BPH (benign prostatic hyperplasia)     Diverticulosis     GERD (gastroesophageal reflux disease)     occassional    History of testicular cancer 1988    Surgery and radiation; left side    Kidney stones     Currently and in the past    Parkinson's disease ([address] )     Wears glasses         Past Surgical History  Past Surgical History:   Procedure Laterality Date    COLONOSCOPY     [address] 1986 inguinal hernia repair    KIDNEY STONE SURGERY      LITHOTRIPSY      Renal; Resolved: 2/27/07    ORCHIECTOMY Left     ID CYSTOURETHRO W/IMPLANT N/A 5/17/2018    Procedure: CYSTOSCOPY WITH INSERTION Murtis Frannie;  Surgeon: Lucero El DO;  [facility];  Service: Urology    PROSTATE SURGERY N/A 1/18/2018    Procedure: CYSTOSCOPY WITH INSERTION UROLIFT;  Surgeon: Lucero El DO;  [facility];  Service: Urology   [address]    For cancer    TONSILLECTOMY      URETERONEOCYSTOSTOMY      w/ cystoscopy Ureteral Stent Placement; Resolved: 6/14/2007    WISDOM TOOTH EXTRACTION       Obtained from notes from visit w/ OP SLP 6/17/19   Subjective Comments: Tyree Crandall, 61 yo male, presented today for a swallowing evaluation  He was referred by his neurologist, Dr Donnelly Essex  He was last seen by Dr Donnelly Essex on 6/5/2019  The following was obtained from neurology chart notes on that DOS, "Impression of 61 yo gentleman with significant postural orthostatism and very subtle signs of a R sided bradykinesia and reduced arm swing suggestive of Parkinson's disease    His condition has elements supportive of an idiopathic Parkinson's disease, the degree of orthostatism is atypical and thus atypical Parkinsonism (which he does not currently display) vs Multiple System Atrophy (which could cause dysautonomia and ataxia without parkinsonism) remains on the differential  His overall condition appears stable "   Pt reports he was diagnosed with Parkinson's less than one year ago  He reports an occasional hoarse vocal quality which started 2-3 months ago and "almost difficulty swallowing"  Pt states that he felt like his throat was constricted however his PCP put him on reflux medications and "the restricted feeling has resolved 80%"  He reports his voice has maybe improved since starting his reflux medication as well  He no longer lays flat to sleep  He has an appointment scheduled the first week in July with an ENT  In regards to his swallowing, he reported being able to feel medications go down more than before, food sticking in his throat clearing with multiple throat clears and gaggling on his saliva 2x in the last 4 months while sleeping  Pt denies any recent unintentional weight loss, PNA, upper respiratory infection, or fevers  He also denies coughing/choking with food or liquid  At this time, pt reports consuming regular diet without difficulty and denies avoidance of any food items  *Please refer to Dr Debbie Ling note for complete documentation on pt's history  Portion of SLP note from 6/17/19:  -Subjective report of swallowing difficulty: Poor secretion management when sleeping x2 in the last 4 months and Globus sensation    -Difficulty swallowing: Solids and Pills    Video Barium Swallow Study    Summary:  Pt presented w/ oral and pharyngeal stages that were WNL  Only trace residual  No penetration or aspiration observed  Per gross screening, the esophageal stage was WNL  Images are on PACS for review  Recommendations:  Diet:Regular  Paola Fredy  Meds:as tolerated/desired  Upright position  F/u ST tx: Not for swallowing at this time (pending no fluctuations in status)  Aspiration Precautions  Reflux Precautions: Sleep w/ the HOB elevated   Avoid eating 2 hours prior to bedtime  Limit caffeine, spicy foods  Results reviewed with: pt  If a dedicated assessment of the esophagus is desired, consider esophagram/barium swallow or EGD  Pt is a 59yom referred for VBS  He reports h/o gagging while laying flat at night  (? On velum/tongue vs saliva vs reflux)  He now has a CPAP machine for sleep apnea but is stating he can't sleep well while wearing it  He reports much relief since he started taking reflux meds  Previous VBS:  none  Current Diet:  regular  Dentition:  Natural  O2 requirement:  no  Oral mech:  Strength and ROM:  WNL  Vocal Quality/Speech:  WNL  Cognitive status:  A&O    Consistencies administered: Barium laden applesauce, hard cookie, nutrigrain bar, ham sandwich, thin liquids, 13mm barium pill  Liquids were administered by cup  Pt was seated laterally at 90 degrees  Oral stage:  WNL  Lip closure:+  Mastication: prolonged but wnl  Bolus formation:+  Bolus control:+  Transfer:+  Residue:-    Pharyngeal stage: WNL  Swallow promptness:+  Spill to valleculae:-  Spill to pyriforms:-  Epiglottic inversion:+  Laryngeal excursion: +  Pharyngeal constriction:+  Vallecular retention:-  Pyriform retention:-  PPW coating:-  CP prominence:-  Transient penetration:-  Epiglottic undercoat:-  Penetration:-  Aspiration:-    Screening of Esophageal stage:   WNL

## 2019-08-15 ENCOUNTER — OFFICE VISIT (OUTPATIENT)
Dept: PULMONOLOGY | Facility: CLINIC | Age: 59
End: 2019-08-15
Payer: COMMERCIAL

## 2019-08-15 ENCOUNTER — DOCUMENTATION (OUTPATIENT)
Dept: PULMONOLOGY | Facility: CLINIC | Age: 59
End: 2019-08-15

## 2019-08-15 VITALS
BODY MASS INDEX: 31.69 KG/M2 | OXYGEN SATURATION: 95 % | WEIGHT: 234 LBS | DIASTOLIC BLOOD PRESSURE: 78 MMHG | RESPIRATION RATE: 16 BRPM | SYSTOLIC BLOOD PRESSURE: 136 MMHG | HEIGHT: 72 IN | HEART RATE: 71 BPM | TEMPERATURE: 97.4 F

## 2019-08-15 DIAGNOSIS — G20 PARKINSON DISEASE (HCC): ICD-10-CM

## 2019-08-15 DIAGNOSIS — J98.6 DIAPHRAGM DYSFUNCTION: ICD-10-CM

## 2019-08-15 DIAGNOSIS — G47.33 OSA (OBSTRUCTIVE SLEEP APNEA): Primary | ICD-10-CM

## 2019-08-15 PROCEDURE — 99214 OFFICE O/P EST MOD 30 MIN: CPT | Performed by: INTERNAL MEDICINE

## 2019-08-15 NOTE — LETTER
August 15, 2019     Jacklyn Boyd, [address]    Patient: Swathi Norris   YOB: 1960   Date of Visit: 8/15/2019       Dear Dr Prashanth Fung:    Thank you for referring Cristin Sanchez to me for evaluation  Below are my notes for this consultation  If you have questions, please do not hesitate to call me  I look forward to following your patient along with you  Sincerely,        María Ward MD        CC: MD María Toro MD  8/15/2019  8:55 AM  Sign at close encounter  Pulmonary outpatient note   Swathi Norris 60 y o  male MRN: [id number]  8/15/2019      Assessment and plan:  Swathi Norris has the following medical problems:  1  Shortness of breath  This is most probably secondary to his Parkinson disease with diaphragm dysfunction  Diaphragm dysfunction has been shown on a sniff test   His pulmonary function tests and chest CT are normal   The patient does not have primary lung disease  He is being followed by Neurology and treated for this Parkinson's disease which is well controlled currently  2   Obstructive sleep apnea  The patient had a sleep study with AHI 15 7 and desaturation down to 83%  He is using CPAP every night for approximately 4-5 hours  He is complaining of difficulties with breathing out against positive pressure  Given his special unique situation with sleep apnea and Parkinson's disease with diaphragmatic dysfunction I recommend to use BiPAP instead of CPAP  I am referring the patient to sleep specialist to evaluate this and order BiPAP as needed  3   Parkinson  Seeing Dr Walters from Neurology  His disease is atypical Parkinson and differential include multisystem atrophy given atypical symptoms such as significant orthostatic some and diaphragm dysfunction  Patient is pretty stable from the breathing standpoint  Will follow him in 6 months    I am referring him to sleep specialist to evaluate need of BiPAP  Stephan Browning MD/PhD,  [facility]    No follow-ups on file  History of Present Illness  This is 60y o  year old male with previous medical history of recently diagnosed Parkinson's disease who complains of shortness of Breath in the last half year especially when bending down  The patient does not have history of lung disease and was 1st seen by cardiologist who sent him to an echo and stress test which were normal  He has a very mild smoking history  He does not complain of cough or phlegm  I referred the patient to pulmonary function tests which were completely normal and chest CT that was also normal   Sniff test showed diaphragm dysfunction  Also referred to sleep study that showed mild to moderate obstructive sleep apnea with AHI 15 7 and desaturations down to 83%  He is using CPAP every night for 4-5 hours        Social history:   Smoked half pack a day for 3 years many years ago  Does not drink alcohol      Occupational history:   Retired  Worked as a printing   Review of Systems   Constitutional: Negative for appetite change and fever  HENT: Negative for ear pain, postnasal drip, rhinorrhea, sneezing, sore throat and trouble swallowing  Eyes: Negative  Respiratory: Positive for shortness of breath (Improved)  Cardiovascular: Negative for chest pain  Gastrointestinal: Negative  Endocrine: Negative  Genitourinary: Negative  Musculoskeletal: Positive for gait problem  Negative for myalgias  Skin: Negative  Allergic/Immunologic: Negative  Neurological: Positive for tremors  Negative for headaches  Hematological: Negative  Psychiatric/Behavioral: Negative          Historical Information   Past Medical History:   Diagnosis Date    Back pain     Bladder infection     BPH (benign prostatic hyperplasia)     Diverticulosis     GERD (gastroesophageal reflux disease)     occassional    History of testicular cancer 1988    Surgery and radiation; left side    Kidney stones     Currently and in the past    Parkinson's disease ([address] )     Wears glasses      Past Surgical History:   Procedure Laterality Date    [address]ONOSCOPY     [address] inguinal hernia repair    KIDNEY STONE SURGERY      LITHOTRIPSY      Renal; Resolved: 2/27/07    ORCHIECTOMY Left     NE CYSTOURETHRO W/IMPLANT N/A 5/17/2018    Procedure: CYSTOSCOPY WITH INSERTION UROLIFT;  Surgeon: Rocio Alves DO;  [facility];  Service: Urology    PROSTATE SURGERY N/A 1/18/2018    Procedure: CYSTOSCOPY WITH INSERTION Willean Johansen;  Surgeon: Rocio Alves DO;  [facility];  Service: Urology   [address]    For cancer    TONSILLECTOMY      URETERONEOCYSTOSTOMY      w/ cystoscopy Ureteral Stent Placement;  Resolved: 6/14/2007    WISDOM TOOTH EXTRACTION       Family History   Problem Relation Age of Onset    Colon cancer Father     Heart failure Father     Parkinsonism Mother     Cancer Paternal Grandmother        Meds/Allergies     Current Outpatient Medications:     Ascorbic Acid (VITAMIN C) 100 MG tablet, Take 100 mg by mouth daily, Disp: , Rfl:     calcium carbonate (TUMS) 500 mg chewable tablet, Chew 1 tablet as needed for indigestion or heartburn, Disp: , Rfl:     dutasteride (AVODART) 0 5 mg capsule, Take 0 5 mg by mouth daily, Disp: , Rfl: 11    fludrocortisone (FLORINEF) 0 1 mg tablet, Take 1 5 tablets (0 15 mg total) by mouth daily, Disp: 45 tablet, Rfl: 4    meloxicam (MOBIC) 15 mg tablet, Take 15 mg by mouth daily, Disp: , Rfl:     meloxicam (MOBIC) 15 mg tablet, TAKE 1 TABLET BY MOUTH EVERY DAY (Patient not taking: Reported on 7/9/2019), Disp: 30 tablet, Rfl: 1    multivitamin (THERAGRAN) TABS, Take 1 tablet by mouth daily, Disp: , Rfl:     omeprazole (PriLOSEC) 20 mg delayed release capsule, Take 1 capsule (20 mg total) by mouth daily before breakfast, Disp: 90 capsule, Rfl: 1    selegiline (ELDEPRYL) 5 mg capsule, Take 5 mg by mouth 2 (two) times a day, Disp: , Rfl: 11    vitamin E 100 UNIT capsule, Take 100 Units by mouth daily, Disp: , Rfl:     Wheat Dextrin (BENEFIBER DRINK MIX PO), , Disp: , Rfl:   No Known Allergies    Vitals: There were no vitals taken for this visit  There is no height or weight on file to calculate BMI  Physical Exam  Physical Exam   Constitutional: He is oriented to person, place, and time  He appears well-developed and well-nourished  No distress  Mild tremor in the hands   HENT:   Head: Normocephalic and atraumatic  Eyes: Pupils are equal, round, and reactive to light  EOM are normal    Neck: Normal range of motion  Neck supple  No JVD present  Cardiovascular: Normal rate, regular rhythm and normal heart sounds  Exam reveals no friction rub  No murmur heard  Pulmonary/Chest: Effort normal and breath sounds normal  No stridor  No respiratory distress  He has no wheezes  He has no rales  Abdominal: Soft  He exhibits no distension  Musculoskeletal: Normal range of motion  He exhibits no edema or deformity  Neurological: He is alert and oriented to person, place, and time  Skin: Skin is warm  He is not diaphoretic  Psychiatric: He has a normal mood and affect  Labs: I have personally reviewed pertinent lab results  Lab Results   Component Value Date    WBC 6 53 07/06/2018    HGB 15 3 07/06/2018    HCT 46 8 07/06/2018    MCV 93 07/06/2018     07/06/2018     Lab Results   Component Value Date    CALCIUM 8 8 10/11/2018    K 3 7 10/11/2018    CO2 26 10/11/2018     10/11/2018    BUN 22 10/11/2018    CREATININE 1 07 10/11/2018     No results found for: IGE  Lab Results   Component Value Date    ALT 28 10/11/2018    AST 15 10/11/2018    ALKPHOS 57 10/11/2018       Imaging and other studies:   I have personally reviewed pertinent imaging studies in PACS  Chest CT May 2019  LUNGS:  Lungs are clear    There is no tracheal or endobronchial lesion  Sniff test May 2019  Absence of motion of the anterior portion of the right hemidiaphragm with diminished excursion anteriorly on the left  Normal excursion posteriorly with rapid deep inhalation and during quiet breathing      Pulmonary function testin/2019  Results:  FEV1/FVC Ratio: 80 %  Forced Vital Capacity: 4 80 L    93 % predicted  FEV1: 3 85 L     97 % predicted  Lung volumes by body plethysmography:   Total Lung Capacity 94 % predicted   Residual volume 88 % predicted  DLCO corrected for patients hemoglobin level: 79 %     Resting room air saturation: 93%  Randy scale of dyspnea at start of test: 0/10  Ambulation testing:  Lowest saturation 93%  No supplemental oxygen required  Randy scale of dyspnea at end of test: 3/10  Reason if test was stopped early: N/A   Total 6 minute walk distance: 1400 feet    Joni Hinojosa MD/PhD,  [facility]    Answers for HPI/ROS submitted by the patient on 2019   Primary symptoms  Chronicity: recurrent  When did you first notice your symptoms?: more than 1 month ago  How often do your symptoms occur?: daily  Since you first noticed this problem, how has it changed?: gradually worsening  Do you have shortness of breath that occurs with effort or exertion?: Yes  Do you have ear congestion?: No  Do you have heartburn?: No  Do you have fatigue?: No  Do you have nasal congestion?: No  Do you have shortness of breath when lying flat?: No  Do you have shortness of breath when you wake up?: No  Do you have sweats?: No  Have you experienced weight loss?: No  Which of the following makes your symptoms worse?: climbing stairs, exercise, strenuous activity  Which of the following makes your symptoms better?: change in position, rest    Answers for HPI/ROS submitted by the patient on 2019   Primary symptoms  Do you have a hoarse voice?: Yes  When did you first notice your symptoms?: more than 1 month ago  How often do your symptoms occur?: intermittently  Since you first noticed this problem, how has it changed?: waxing and waning  Do you have shortness of breath that occurs with effort or exertion?: Yes  Do you have ear congestion?: No  Do you have heartburn?: No  Do you have fatigue?: No  Do you have nasal congestion?: No  Do you have shortness of breath when lying flat?: No  Do you have shortness of breath when you wake up?: No  Do you have sweats?: No  Have you experienced weight loss?: No  Which of the following makes your symptoms worse?: climbing stairs, exercise, strenuous activity  Which of the following makes your symptoms better?: rest

## 2019-08-15 NOTE — PROGRESS NOTES
Pulmonary outpatient note   Danna Pina 60 y o  male MRN: [id number]  8/15/2019      Assessment and plan:  Danna Pina has the following medical problems:  1  Shortness of breath  This is most probably secondary to his Parkinson disease with diaphragm dysfunction  Diaphragm dysfunction has been shown on a sniff test   His pulmonary function tests and chest CT are normal   The patient does not have primary lung disease  He is being followed by Neurology and treated for this Parkinson's disease which is well controlled currently  2   Obstructive sleep apnea  The patient had a sleep study with AHI 15 7 and desaturation down to 83%  He is using CPAP every night for approximately 4-5 hours  He is complaining of difficulties with breathing out against positive pressure  Given his special unique situation with sleep apnea and Parkinson's disease with diaphragmatic dysfunction I recommend to use BiPAP instead of CPAP  I am referring the patient to sleep specialist to evaluate this and order BiPAP as needed  In the last month he used the CPAP for an average of 5 hours 87% of the nights with CPAP 11 and average AHI 13     3   Parkinson  Seeing Dr Walters from Neurology  His disease is atypical Parkinson and differential include multisystem atrophy given atypical symptoms such as significant orthostatic some and diaphragm dysfunction  Patient is pretty stable from the breathing standpoint  Will follow him in 6 months  I am referring him to sleep specialist to evaluate need of BiPAP  Ketan Kumar MD/PhD,  [facility]    No follow-ups on file  History of Present Illness  This is 60y o  year old male with previous medical history of recently diagnosed Parkinson's disease who complains of shortness of Breath in the last half year especially when bending down    The patient does not have history of lung disease and was 1st seen by cardiologist who sent him to an echo and stress test which were normal  He has a very mild smoking history  He does not complain of cough or phlegm  I referred the patient to pulmonary function tests which were completely normal and chest CT that was also normal   Sniff test showed diaphragm dysfunction  Also referred to sleep study that showed mild to moderate obstructive sleep apnea with AHI 15 7 and desaturations down to 83%  He is using CPAP every night for 4-5 hours        Social history:   Smoked half pack a day for 3 years many years ago  Does not drink alcohol      Occupational history:   Retired  Worked as a [employer]   Review of Systems   Constitutional: Negative for appetite change and fever  HENT: Negative for ear pain, postnasal drip, rhinorrhea, sneezing, sore throat and trouble swallowing  Eyes: Negative  Respiratory: Positive for shortness of breath (Improved)  Cardiovascular: Negative for chest pain  Gastrointestinal: Negative  Endocrine: Negative  Genitourinary: Negative  Musculoskeletal: Positive for gait problem  Negative for myalgias  Skin: Negative  Allergic/Immunologic: Negative  Neurological: Positive for tremors  Negative for headaches  Hematological: Negative  Psychiatric/Behavioral: Negative          Historical Information   Past Medical History:   Diagnosis Date    Back pain     Bladder infection     BPH (benign prostatic hyperplasia)     Diverticulosis     GERD (gastroesophageal reflux disease)     occassional    History of testicular cancer 1988    Surgery and radiation; left side    Kidney stones     Currently and in the past    Parkinson's disease ([facility] [address] )     Wears glasses      Past Surgical History:   Procedure Laterality Date    COLONOSCOPY       Zandra 57, 1986 inguinal hernia repair    KIDNEY STONE SURGERY      LITHOTRIPSY      Renal; Resolved: 2/27/07    ORCHIECTOMY Left     [place] CYSTOURETHRO W/IMPLANT N/A 5/17/2018 Procedure: CYSTOSCOPY WITH INSERTION UROLIFT;  Surgeon: Alisia Felix DO;  [facility];  Service: Urology    PROSTATE SURGERY N/A 1/18/2018    Procedure: CYSTOSCOPY WITH INSERTION Zac Charleson;  Surgeon: Alisia Felix DO;  [facility];  Service: Urology   [address]    For cancer    TONSILLECTOMY      URETERONEOCYSTOSTOMY      w/ cystoscopy Ureteral Stent Placement; Resolved: 6/14/2007    WISDOM TOOTH EXTRACTION       Family History   Problem Relation Age of Onset    Colon cancer Father     Heart failure Father     Parkinsonism Mother     Cancer Paternal Grandmother        Meds/Allergies     Current Outpatient Medications:     Ascorbic Acid (VITAMIN C) 100 MG tablet, Take 100 mg by mouth daily, Disp: , Rfl:     calcium carbonate (TUMS) 500 mg chewable tablet, Chew 1 tablet as needed for indigestion or heartburn, Disp: , Rfl:     dutasteride (AVODART) 0 5 mg capsule, Take 0 5 mg by mouth daily, Disp: , Rfl: 11    fludrocortisone (FLORINEF) 0 1 mg tablet, Take 1 5 tablets (0 15 mg total) by mouth daily, Disp: 45 tablet, Rfl: 4    meloxicam (MOBIC) 15 mg tablet, Take 15 mg by mouth daily, Disp: , Rfl:     meloxicam (MOBIC) 15 mg tablet, TAKE 1 TABLET BY MOUTH EVERY DAY (Patient not taking: Reported on 7/9/2019), Disp: 30 tablet, Rfl: 1    multivitamin (THERAGRAN) TABS, Take 1 tablet by mouth daily, Disp: , Rfl:     omeprazole (PriLOSEC) 20 mg delayed release capsule, Take 1 capsule (20 mg total) by mouth daily before breakfast, Disp: 90 capsule, Rfl: 1    selegiline (ELDEPRYL) 5 mg capsule, Take 5 mg by mouth 2 (two) times a day, Disp: , Rfl: 11    vitamin E 100 UNIT capsule, Take 100 Units by mouth daily, Disp: , Rfl:     Wheat Dextrin (BENEFIBER DRINK MIX PO), , Disp: , Rfl:   No Known Allergies    Vitals: There were no vitals taken for this visit  There is no height or weight on file to calculate BMI        Physical Exam  Physical Exam   Constitutional: He is oriented to person, place, and time  He appears well-developed and well-nourished  No distress  Mild tremor in the hands   HENT:   Head: Normocephalic and atraumatic  Eyes: Pupils are equal, round, and reactive to light  EOM are normal    Neck: Normal range of motion  Neck supple  No JVD present  Cardiovascular: Normal rate, regular rhythm and normal heart sounds  Exam reveals no friction rub  No murmur heard  Pulmonary/Chest: Effort normal and breath sounds normal  No stridor  No respiratory distress  He has no wheezes  He has no rales  Abdominal: Soft  He exhibits no distension  Musculoskeletal: Normal range of motion  He exhibits no edema or deformity  Neurological: He is alert and oriented to person, place, and time  Skin: Skin is warm  He is not diaphoretic  Psychiatric: He has a normal mood and affect  Labs: I have personally reviewed pertinent lab results  Lab Results   Component Value Date    WBC 6 53 2018    HGB 15 3 2018    HCT 46 8 2018    MCV 93 2018     2018     Lab Results   Component Value Date    CALCIUM 8 8 10/11/2018    K 3 7 10/11/2018    CO2 26 10/11/2018     10/11/2018    BUN 22 10/11/2018    CREATININE 1 07 10/11/2018     No results found for: IGE  Lab Results   Component Value Date    ALT 28 10/11/2018    AST 15 10/11/2018    ALKPHOS 57 10/11/2018       Imaging and other studies:   I have personally reviewed pertinent imaging studies in PACS  Chest CT May 2019  LUNGS:  Lungs are clear  There is no tracheal or endobronchial lesion  Sniff test May 2019  Absence of motion of the anterior portion of the right hemidiaphragm with diminished excursion anteriorly on the left  Normal excursion posteriorly with rapid deep inhalation and during quiet breathing      Pulmonary function testin/2019  Results:  FEV1/FVC Ratio: 80 %  Forced Vital Capacity: 4 80 L    93 % predicted  FEV1: 3 85 L     97 % predicted  Lung volumes by body plethysmography:   Total Lung Capacity 94 % predicted   Residual volume 88 % predicted  DLCO corrected for patients hemoglobin level: 79 %     Resting room air saturation: 93%  Randy scale of dyspnea at start of test: 0/10  Ambulation testing:  Lowest saturation 93%  No supplemental oxygen required  Randy scale of dyspnea at end of test: 3/10  Reason if test was stopped early: N/A   Total 6 minute walk distance: 1400 feet    Joceline Alcantara MD/PhD,  [facility]    Answers for HPI/ROS submitted by the patient on 4/22/2019   Primary symptoms  Chronicity: recurrent  When did you first notice your symptoms?: more than 1 month ago  How often do your symptoms occur?: daily  Since you first noticed this problem, how has it changed?: gradually worsening  Do you have shortness of breath that occurs with effort or exertion?: Yes  Do you have ear congestion?: No  Do you have heartburn?: No  Do you have fatigue?: No  Do you have nasal congestion?: No  Do you have shortness of breath when lying flat?: No  Do you have shortness of breath when you wake up?: No  Do you have sweats?: No  Have you experienced weight loss?: No  Which of the following makes your symptoms worse?: climbing stairs, exercise, strenuous activity  Which of the following makes your symptoms better?: change in position, rest    Answers for HPI/ROS submitted by the patient on 8/14/2019   Primary symptoms  Do you have a hoarse voice?: Yes  When did you first notice your symptoms?: more than 1 month ago  How often do your symptoms occur?: intermittently  Since you first noticed this problem, how has it changed?: waxing and waning  Do you have shortness of breath that occurs with effort or exertion?: Yes  Do you have ear congestion?: No  Do you have heartburn?: No  Do you have fatigue?: No  Do you have nasal congestion?: No  Do you have shortness of breath when lying flat?: No  Do you have shortness of breath when you wake up?: No  Do you have sweats?: No  Have you experienced weight loss?: No  Which of the following makes your symptoms worse?: climbing stairs, exercise, strenuous activity  Which of the following makes your symptoms better?: rest

## 2019-08-22 ENCOUNTER — OFFICE VISIT (OUTPATIENT)
Dept: PULMONOLOGY | Facility: HOSPITAL | Age: 59
End: 2019-08-22
Payer: COMMERCIAL

## 2019-08-22 ENCOUNTER — DOCUMENTATION (OUTPATIENT)
Dept: PULMONOLOGY | Facility: HOSPITAL | Age: 59
End: 2019-08-22

## 2019-08-22 VITALS
WEIGHT: 234 LBS | SYSTOLIC BLOOD PRESSURE: 150 MMHG | BODY MASS INDEX: 31.69 KG/M2 | TEMPERATURE: 97 F | DIASTOLIC BLOOD PRESSURE: 100 MMHG | HEIGHT: 72 IN | HEART RATE: 72 BPM | OXYGEN SATURATION: 94 %

## 2019-08-22 DIAGNOSIS — G47.33 OSA (OBSTRUCTIVE SLEEP APNEA): Primary | ICD-10-CM

## 2019-08-22 DIAGNOSIS — G20 PRIMARY PARKINSONISM (HCC): ICD-10-CM

## 2019-08-22 DIAGNOSIS — J98.6 DIAPHRAGM DYSFUNCTION: ICD-10-CM

## 2019-08-22 DIAGNOSIS — G47.61 PERIODIC LIMB MOVEMENT DISORDER (PLMD): ICD-10-CM

## 2019-08-22 DIAGNOSIS — G47.00 INSOMNIA, UNSPECIFIED TYPE: ICD-10-CM

## 2019-08-22 DIAGNOSIS — G47.52 RBD (REM BEHAVIORAL DISORDER): ICD-10-CM

## 2019-08-22 PROCEDURE — 99215 OFFICE O/P EST HI 40 MIN: CPT | Performed by: INTERNAL MEDICINE

## 2019-08-22 RX ORDER — MELOXICAM 15 MG/1
15 TABLET ORAL DAILY
COMMUNITY
End: 2019-09-12 | Stop reason: SDUPTHER

## 2019-08-22 NOTE — LETTER
August 22, 2019     [place], [address]    Patient: Danna Pina   YOB: 1960   Date of Visit: 8/22/2019       Dear Dr Yfn Gutierrez:    Thank you for referring Bryce Ceja to me for evaluation  Below are my notes for this consultation  If you have questions, please do not hesitate to call me  I look forward to following your patient along with you  Sincerely,        Bing Thomas DO        CC: MD Graciela Hernandez MD Carlette Mantel, DO  8/22/2019  4:18 PM  Sign at close encounter  Pulmonary and Sleep Follow UP   Danna Pina 60 y o  male MRN: [id number]      Assessment/Plan  60 y o  M with PMHx of Parkinson's disease with diaphragmatic weakness, chronic knee and ankle pain, urinary retention s/p TURP, Moderate KALLI on CPAP who comes in for difficulty tolerating CPAP  1   Moderate KALLI (AHI - 15 7) - on CPAP 11  Compliance seems adequate but AHI is still elevated at 11 and he is meeting difficulty with tolerance due to resistance on exhalation        -   He has been using his ramp button but finds it will often go up on pressure before he is able to fall asleep and as a results will be up 1 hr prior to falling asleep  -  Switch to a trial of 5-20 today  This will allow him to fall asleep and then titrate up as need to levels above 11 to eliminate apnea  Follow compliance data in 1- 2 months      -  Discussed in depth the results of the sleep study and treatment pitfalls prior to initiation  Answered all questions regarding treatment      -  I also discussed in depth the risk of leaving sleep apnea untreated including hypertension, heart failure, arrhythmia, MI and stroke  2    Possible RBD -  He has acted out dreams  This is commonly associated with parkinson's disease  I have recommended that he start melatonin as this likely will be beneficial to prevent RBD     Can also consider benzodiazepine in the future if needed but would like to avoid this  3   Periodic limb movement (PLM index - 109) -  This may be secondary to KALLI or Parkinsons  He denies symptoms of this  -  Treat KALLI as above       -  If daytime sleepiness persists after the treatment of KALLI, will consider treatment with Neurontin, Pregabalin, Requip or Mirapex  Given parkinson's disease perhaps trial of Requip or Mirapex may help as well  4   Bilateral Diaphragmatic weakenss - R > L  PFTs without restriction, ABG without hypercapnia  -  While based on the SNIF he may benefit from BiPAP he does not meet criteria via medicare for hypercapnic respiratory failure  However, if he continues to have difficulty with tolerating CPAP, we will convert to BIPAP and he will likely benefit from it on this aspect as well  History of Present Illness   HPI:  Hailey Real is a 60 y o  male with PMHx as below who comes in for help with management of KALLI  Mr Sandra Montalvo underwent a sleep study and was diagnosed with moderate KALLI  He was then treated with CPAP of 11 but has had a lot of difficulty tolerating the CPAP  He is frustrated with the sensation of resistance against exhalation  He has been fighting that for this situation  He has had a lot of difficulty tolerating it even with using the ramp button  He will stay up for prolonged periods of up to 1 hour to fall asleep  He is ready to give up on the machine and finds that it has not helped his sleepiness and maintaining sleep  He denies morning headaches but will admit to dry mouth in the morning  He denies restless leg symptoms but does admit that he will often have vivid dreams and acts them out in sleep  As far as breathing during the day he will get dyspnea on exertion  He does admit to when he talks for long periods he will note some shortness of breath    He has been evaluated by Dr Cecily Garza and underwent PFTs, 6 minute walk and ABG which were normal  He also underwent a SNIF test which demonstrated diaphragmatic weakness  As a result he was sent here to determine if BiPAP would be more appropriate for his sleep  ROS:   Review of Systems   Constitutional: Negative  HENT: Negative  Eyes: Negative  Respiratory: Negative  Cardiovascular: Negative  Gastrointestinal: Negative  Endocrine: Negative  Genitourinary: Negative  Musculoskeletal: Positive for gait problem  Skin: Negative  Neurological: Positive for speech difficulty  Negative for tremors, syncope, numbness and headaches  Hematological: Negative  Psychiatric/Behavioral: Positive for sleep disturbance  Negative for behavioral problems and decreased concentration  Historical Information   Past Medical History:   Diagnosis Date    Back pain     Bladder infection     BPH (benign prostatic hyperplasia)     Diverticulosis     GERD (gastroesophageal reflux disease)     occassional    History of testicular cancer 1988    Surgery and radiation; left side    Kidney stones     Currently and in the past    Parkinson's disease ([facility] [address] )     Wears glasses      Past Surgical History:   Procedure Laterality Date    COLONOSCOPY       Zandra 57, 1986 inguinal hernia repair    KIDNEY STONE SURGERY      LITHOTRIPSY      Renal; Resolved: 2/27/07    ORCHIECTOMY Left     [place] CYSTOURETHRO W/IMPLANT N/A 5/17/2018    Procedure: CYSTOSCOPY WITH INSERTION Ryann Deist;  Surgeon: Gigi Serna DO;  [facility];  Service: Urology    PROSTATE SURGERY N/A 1/18/2018    Procedure: CYSTOSCOPY WITH INSERTION UROLIFT;  Surgeon: Gigi Serna DO;  [facility];  Service: Urology   [address]    For cancer    TONSILLECTOMY      URETERONEOCYSTOSTOMY      w/ cystoscopy Ureteral Stent Placement;  Resolved: 6/14/2007    WISDOM TOOTH EXTRACTION       Family History   Problem Relation Age of Onset    Colon cancer Father     Heart failure Father     Parkinsonism Mother     Cancer Paternal Grandmother      Social History     Socioeconomic History    Marital status: /Civil Union     Spouse name: Not on file    Number of children: Not on file    Years of education: Not on file    Highest education level: Not on file   Occupational History    Not on file   Social Needs    Financial resource strain: Not on file    Food insecurity:     Worry: Not on file     Inability: Not on file    Transportation needs:     Medical: Not on file     Non-medical: Not on file   Tobacco Use    Smoking status: Former Smoker     Packs/day: 0 50     Years: 3 50     Pack years: 1 75     Types: Cigarettes     Last attempt to quit:      Years since quittin 6    Smokeless tobacco: Never Used   Substance and Sexual Activity    Alcohol use: Not Currently     Comment: social    Drug use: No    Sexual activity: Never   Lifestyle    Physical activity:     Days per week: Not on file     Minutes per session: Not on file    Stress: Not on file   Relationships    Social connections:     Talks on phone: Not on file     Gets together: Not on file     Attends Sabianism service: Not on file     Active member of club or organization: Not on file     Attends meetings of clubs or organizations: Not on file     Relationship status: Not on file    Intimate partner violence:     Fear of current or ex partner: Not on file     Emotionally abused: Not on file     Physically abused: Not on file     Forced sexual activity: Not on file   Other Topics Concern    Not on file   Social History Narrative    Consumes 5 glasses of tea per week         Meds/Allergies   No Known Allergies    Home medications:  Prior to Admission medications    Medication Sig Start Date End Date Taking?  Authorizing Provider   Ascorbic Acid (VITAMIN C) 100 MG tablet Take 100 mg by mouth daily   Yes Historical Provider, MD   calcium carbonate (TUMS) 500 mg chewable tablet Chew 1 tablet as needed for indigestion or heartburn   Yes Historical Provider, MD   fludrocortisone (FLORINEF) 0 1 mg tablet Take 1 5 tablets (0 15 mg total) by mouth daily 5/8/19  Yes Toni , MD   meloxicam (MOBIC) 15 mg tablet Take 15 mg by mouth daily   Yes Historical Provider, MD   multivitamin (THERAGRAN) TABS Take 1 tablet by mouth daily   Yes Historical Provider, MD   omeprazole (PriLOSEC) 20 mg delayed release capsule Take 1 capsule (20 mg total) by mouth daily before breakfast 6/3/19  Yes Lorguerline Moon,    selegiline (ELDEPRYL) 5 mg capsule Take 5 mg by mouth 2 (two) times a day 2/11/19  Yes Historical Provider, MD   vitamin E 100 UNIT capsule Take 100 Units by mouth daily   Yes Historical Provider, MD   Wheat Dextrin (BENEFIBER DRINK MIX PO)    Yes Historical Provider, MD       Vitals:   Blood pressure 150/100, pulse 72, temperature (!) 97 °F (36 1 °C), temperature source Tympanic, height 6' (1 829 m), weight 106 kg (234 lb), SpO2 94 % , RA, Body mass index is 31 74 kg/m²  Physical Exam  General: Pleasant, Awake alert and oriented x 3, conversant without conversational dyspnea, NAD, normal affect  HEENT:  PERRL, Sclera noninjected, nonicteric OU, Nares patent,  no craniofacial abnormalities, Mucous membranes, moist, no oral lesions, normal dentition, Mallampati class 4  NECK: Trachea midline, no accessory muscle use, no stridor, no cervical or supraclavicular adenopathy, JVP not elevated  CARDIAC: Reg, single s1/S2, no m/r/g  PULM: CTA bilaterally no wheezing, rhonchi or rales  ABD: Normoactive bowel sounds, soft nontender, nondistended, no rebound, no rigidity, no guarding  EXT: No cyanosis, no clubbing, no edema, normal capillary refill  NEURO: no focal neurologic deficits but with ataxic gait, rigidity and delayed speech    Labs: I have personally reviewed pertinent lab results    Lab Results   Component Value Date    WBC 6 53 07/06/2018    HGB 15 3 07/06/2018    HCT 46 8 07/06/2018    MCV 93 07/06/2018     07/06/2018 Lab Results   Component Value Date    CALCIUM 8 8 10/11/2018    K 3 7 10/11/2018    CO2 26 10/11/2018     10/11/2018    BUN 22 10/11/2018    CREATININE 1 07 10/11/2018       PFTs:  The most recent pulmonary function tests were reviewed  Interpretation:  · No obstructive airflow defect   · Normal Spirometry  · Normal Lung volumes  · Normal diffusion capacity     6 minute walk  Resting room air saturation: 93%  Randy scale of dyspnea at start of test: 0/10     Ambulation testing:  Lowest saturation 93%  No supplemental oxygen required     Randy scale of dyspnea at end of test: 3/10  Reason if test was stopped early: N/A   Total 6 minute walk distance: 1400 feet    ABG  pH, Arterial 7 350 - 7 450 7 394    pCO2, Arterial 36 0 - 44 0 mm Hg 40 1    pO2, Arterial 75 0 - 129 0 mm Hg 71 9Low     HCO3, Arterial 22 0 - 28 0 mmol/L 23 9    Base Excess, Arterial mmol/L -0 8    O2 Content, Arterial 16 0 - 23 0 mL/dL 20 1    O2 HGB,Arterial  94 0 - 97 0 % 94 1        Imaging  I personally reviewed the images on the [employer] system pertinent to today's visit  Fl sniff test - 5/20/19   IMPRESSION:  Absence of motion of the anterior portion of the right hemidiaphragm with diminished excursion anteriorly on the left  Normal excursion posteriorly with rapid deep inhalation and during quiet breathing  CT chest - 5/20/19  Within normal limits CT of the chest     Cardiac:  Echo -    IMPRESSIONS:  1  Normal left ventricular cavity size, mildly increased wall thickness, normal left ventricle systolic and diastolic function  EF approximately 60-65%  2  No significant chamber hypertrophy or enlargement  3  No aortic valve stenosis or regurgitation  4  Trace mitral and tricuspid valve regurgitation  5  No obvious pulmonary hypertension  6  No pericardial effusion  No previous echocardiogram is available for comparison    Sleep studies:  Home study:  A total of 19 apneas and 81 hypopneas were recorded over the course of the study  The AHI for the entire study is 15 7  During supine sleep this number increases to 23 9 events per hour  Lowest measured oxygen saturation was 83%  Approximately 24 % of the study was spent with an oxygen saturation less than 90%  These findings are consistent with a  diagnosis of obstructive sleep apnea  A repeat study for titration of nasal CPAP is recommended  CPAP  Sleep latency was 1 hour 4 minutes and 57 seconds  REM latency was 1 hour and 7 minutes  Sleep efficiency was  51 7%  Seven abnormal breathing events were identified  The AHI is 2 1 events per hour  Minimum oxygen  saturation was 88%  A total of 360 periodic limb movements were identified  The PLM I was 109 4 events per  hour  One hundred ninety-seven arousals were also seen  The arousal index is 59 8 arousals per hour  Sleep  architecture was highly fragmented throughout  This is a very long episode of wakefulness in the initial portion of  the tracing which accounted for the low sleep efficiency  Periodic limb movements were seen throughout but  appeared more prominently in the terminal portion of the study  Nasal CPAP was titrated from 5-11 cm of water for  control of snoring and abnormal breathing  Patient appeared to do best at 11 cm of CPAP delivered using a [address] full face interface  Continued use of this equipment at these settings is recommended      Compliance Data:  Type of CPAP:  11                                   Percent usage: 87%, 80% for 4 hrs                                   Average time used: 4 hrs 18 mins                                  Time in large leak: 48 seconds                                   Residual AHI: 11 2, (central 0 8, obstructive 5 8)                                       Barber Brothers DO  [facility] Sleep Phys[facility]

## 2019-08-22 NOTE — PROGRESS NOTES
Pulmonary and Sleep Follow UP   Anna Fay 60 y o  male MRN: [id number]      Assessment/Plan  60 y o  M with PMHx of Parkinson's disease with diaphragmatic weakness, chronic knee and ankle pain, urinary retention s/p TURP, Moderate KALLI on CPAP who comes in for difficulty tolerating CPAP  1   Moderate KALLI (AHI - 15 7) - on CPAP 11  Compliance seems adequate but AHI is still elevated at 11 and he is meeting difficulty with tolerance due to resistance on exhalation        -   He has been using his ramp button but finds it will often go up on pressure before he is able to fall asleep and as a results will be up 1 hr prior to falling asleep  -  Switch to a trial of 5-20 today  This will allow him to fall asleep and then titrate up as need to levels above 11 to eliminate apnea  Follow compliance data in 1- 2 months      -  Discussed in depth the results of the sleep study and treatment pitfalls prior to initiation  Answered all questions regarding treatment      -  I also discussed in depth the risk of leaving sleep apnea untreated including hypertension, heart failure, arrhythmia, MI and stroke  2    Possible RBD -  He has acted out dreams  This is commonly associated with parkinson's disease  I have recommended that he start melatonin as this likely will be beneficial to prevent RBD  Can also consider benzodiazepine in the future if needed but would like to avoid this  3   Periodic limb movement (PLM index - 109) -  This may be secondary to KALLI or Parkinsons  He denies symptoms of this  -  Treat KALLI as above       -  If daytime sleepiness persists after the treatment of KALLI, will consider treatment with Neurontin, Pregabalin, Requip or Mirapex  Given parkinson's disease perhaps trial of Requip or Mirapex may help as well  4   Bilateral Diaphragmatic weakenss - R > L  PFTs without restriction, ABG without hypercapnia           -  While based on the SNIF he may benefit from BiPAP he does not meet criteria via medicare for hypercapnic respiratory failure  However, if he continues to have difficulty with tolerating CPAP, we will convert to BIPAP and he will likely benefit from it on this aspect as well  History of Present Illness   HPI:  Rachel Perkins is a 60 y o  male with PMHx as below who comes in for help with management of KALLI  Mr Robinson Combs underwent a sleep study and was diagnosed with moderate KALLI  He was then treated with CPAP of 11 but has had a lot of difficulty tolerating the CPAP  He is frustrated with the sensation of resistance against exhalation  He has been fighting that for this situation  He has had a lot of difficulty tolerating it even with using the ramp button  He will stay up for prolonged periods of up to 1 hour to fall asleep  He is ready to give up on the machine and finds that it has not helped his sleepiness and maintaining sleep  He denies morning headaches but will admit to dry mouth in the morning  He denies restless leg symptoms but does admit that he will often have vivid dreams and acts them out in sleep  As far as breathing during the day he will get dyspnea on exertion  He does admit to when he talks for long periods he will note some shortness of breath  He has been evaluated by Dr Joshua Dangelo and underwent PFTs, 6 minute walk and ABG which were normal   He also underwent a SNIF test which demonstrated diaphragmatic weakness  As a result he was sent here to determine if BiPAP would be more appropriate for his sleep  ROS:   Review of Systems   Constitutional: Negative  HENT: Negative  Eyes: Negative  Respiratory: Negative  Cardiovascular: Negative  Gastrointestinal: Negative  Endocrine: Negative  Genitourinary: Negative  Musculoskeletal: Positive for gait problem  Skin: Negative  Neurological: Positive for speech difficulty  Negative for tremors, syncope, numbness and headaches  Hematological: Negative  Psychiatric/Behavioral: Positive for sleep disturbance  Negative for behavioral problems and decreased concentration  Historical Information   Past Medical History:   Diagnosis Date    Back pain     Bladder infection     BPH (benign prostatic hyperplasia)     Diverticulosis     GERD (gastroesophageal reflux disease)     occassional    History of testicular cancer 1988    Surgery and radiation; left side    Kidney stones     Currently and in the past    Parkinson's disease ([facility] [address] )     Wears glasses      Past Surgical History:   Procedure Laterality Date    [facility]    KIDNEY STONE SURGERY      LITHOTRIPSY      Renal; Resolved: 2/27/07    ORCHIECTOMY Left     IL CYSTOURETHRO W/IMPLANT N/A 5/17/2018    Procedure: CYSTOSCOPY WITH INSERTION Wilhelmena Lion;  Surgeon: Deep Rojas DO;  Location: [facility];  Service: Urology    PROSTATE SURGERY N/A 1/18/2018    Procedure: CYSTOSCOPY WITH INSERTION UROLIFT;  Surgeon: Deep Rojas DO;  Location: [facility];  Service: Urology   [address]    For cancer    TONSILLECTOMY      URETERONEOCYSTOSTOMY      w/ cystoscopy Ureteral Stent Placement;  Resolved: 6/14/2007    WISDOM TOOTH EXTRACTION       Family History   Problem Relation Age of Onset    Colon cancer Father     Heart failure Father     Parkinsonism Mother     Cancer Paternal Grandmother      Social History     Socioeconomic History    Marital status: /Civil Union     Spouse name: Not on file    Number of children: Not on file    Years of education: Not on file    Highest education level: Not on file   Occupational History    Not on file   Social Needs    Financial resource strain: Not on file    Food insecurity:     Worry: Not on file     Inability: Not on file    Transportation needs:     Medical: Not on file     Non-medical: Not on file   Tobacco Use    Smoking status: Former Smoker     Packs/day: 0 50     Years: 3 50     Pack years: 1 75     Types: Cigarettes     Last attempt to quit: 1980     Years since quittin 6    Smokeless tobacco: Never Used   Substance and Sexual Activity    Alcohol use: Not Currently     Comment: social    Drug use: No    Sexual activity: Never   Lifestyle    Physical activity:     Days per week: Not on file     Minutes per session: Not on file    Stress: Not on file   Relationships    Social connections:     Talks on phone: Not on file     Gets together: Not on file     Attends Holiness service: Not on file     Active member of club or organization: Not on file     Attends meetings of clubs or organizations: Not on file     Relationship status: Not on file    Intimate partner violence:     Fear of current or ex partner: Not on file     Emotionally abused: Not on file     Physically abused: Not on file     Forced sexual activity: Not on file   Other Topics Concern    Not on file   Social History Narrative    Consumes 5 glasses of tea per week         Meds/Allergies   No Known Allergies    Home medications:  Prior to Admission medications    Medication Sig Start Date End Date Taking?  Authorizing Provider   Ascorbic Acid (VITAMIN C) 100 MG tablet Take 100 mg by mouth daily   Yes Historical Provider, MD   calcium carbonate (TUMS) 500 mg chewable tablet Chew 1 tablet as needed for indigestion or heartburn   Yes Historical Provider, MD   fludrocortisone (FLORINEF) 0 1 mg tablet Take 1 5 tablets (0 15 mg total) by mouth daily 19  Yes Luz Contreras MD   meloxicam (MOBIC) 15 mg tablet Take 15 mg by mouth daily   Yes Historical Provider, MD   multivitamin (THERAGRAN) TABS Take 1 tablet by mouth daily   Yes Historical Provider, MD   omeprazole (PriLOSEC) 20 mg delayed release capsule Take 1 capsule (20 mg total) by mouth daily before breakfast 6/3/19  Yes Mouna Mari DO   selegiline (ELDEPRYL) 5 mg capsule Take 5 mg by mouth 2 (two) times a day 19 Yes Historical Provider, MD   vitamin E 100 UNIT capsule Take 100 Units by mouth daily   Yes Historical Provider, MD   Wheat Dextrin (BENEFIBER DRINK MIX PO)    Yes Historical Provider, MD       Vitals:   Blood pressure 150/100, pulse 72, temperature (!) 97 °F (36 1 °C), temperature source Tympanic, height 6' (1 829 m), weight 106 kg (234 lb), SpO2 94 % , RA, Body mass index is 31 74 kg/m²  Physical Exam  General: Pleasant, Awake alert and oriented x 3, conversant without conversational dyspnea, NAD, normal affect  HEENT:  PERRL, Sclera noninjected, nonicteric OU, Nares patent,  no craniofacial abnormalities, Mucous membranes, moist, no oral lesions, normal dentition, Mallampati class 4  NECK: Trachea midline, no accessory muscle use, no stridor, no cervical or supraclavicular adenopathy, JVP not elevated  CARDIAC: Reg, single s1/S2, no m/r/g  PULM: CTA bilaterally no wheezing, rhonchi or rales  ABD: Normoactive bowel sounds, soft nontender, nondistended, no rebound, no rigidity, no guarding  EXT: No cyanosis, no clubbing, no edema, normal capillary refill  NEURO: no focal neurologic deficits but with ataxic gait, rigidity and delayed speech    Labs: I have personally reviewed pertinent lab results  Lab Results   Component Value Date    WBC 6 53 07/06/2018    HGB 15 3 07/06/2018    HCT 46 8 07/06/2018    MCV 93 07/06/2018     07/06/2018      Lab Results   Component Value Date    CALCIUM 8 8 10/11/2018    K 3 7 10/11/2018    CO2 26 10/11/2018     10/11/2018    BUN 22 10/11/2018    CREATININE 1 07 10/11/2018       PFTs:  The most recent pulmonary function tests were reviewed    Interpretation:  · No obstructive airflow defect   · Normal Spirometry  · Normal Lung volumes  · Normal diffusion capacity     6 minute walk  Resting room air saturation: 93%  Randy scale of dyspnea at start of test: 0/10     Ambulation testing:  Lowest saturation 93%  No supplemental oxygen required     Randy scale of dyspnea at end of test: 3/10  Reason if test was stopped early: N/A   Total 6 minute walk distance: 1400 feet    ABG  pH, Arterial 7 350 - 7 450 7 394    pCO2, Arterial 36 0 - 44 0 mm Hg 40 1    pO2, Arterial 75 0 - 129 0 mm Hg 71 9Low     HCO3, Arterial 22 0 - 28 0 mmol/L 23 9    Base Excess, Arterial mmol/L -0 8    O2 Content, Arterial 16 0 - 23 0 mL/dL 20 1    O2 HGB,Arterial  94 0 - 97 0 % 94 1        Imaging  I personally reviewed the images on the [facility] system pertinent to today's visit  Fl sniff test - 5/20/19   IMPRESSION:  Absence of motion of the anterior portion of the right hemidiaphragm with diminished excursion anteriorly on the left  Normal excursion posteriorly with rapid deep inhalation and during quiet breathing  CT chest - 5/20/19  Within normal limits CT of the chest     Cardiac:  Echo -    IMPRESSIONS:  1  Normal left ventricular cavity size, mildly increased wall thickness, normal left ventricle systolic and diastolic function  EF approximately 60-65%  2  No significant chamber hypertrophy or enlargement  3  No aortic valve stenosis or regurgitation  4  Trace mitral and tricuspid valve regurgitation  5  No obvious pulmonary hypertension  6  No pericardial effusion  No previous echocardiogram is available for comparison    Sleep studies:  Home study:  A total of 19 apneas and 81 hypopneas were recorded over the course of the study  The AHI for the entire study is 15 7  During supine sleep this number increases to 23 9 events per hour  Lowest measured oxygen saturation was 83%  Approximately 24 % of the study was spent with an oxygen saturation less than 90%  These findings are consistent with a  diagnosis of obstructive sleep apnea  A repeat study for titration of nasal CPAP is recommended  CPAP  Sleep latency was 1 hour 4 minutes and 57 seconds  REM latency was 1 hour and 7 minutes  Sleep efficiency was  51 7%  Seven abnormal breathing events were identified   The AHI is 2 1 events per hour  Minimum oxygen  saturation was 88%  A total of 360 periodic limb movements were identified  The PLM I was 109 4 events per  hour  One hundred ninety-seven arousals were also seen  The arousal index is 59 8 arousals per hour  Sleep  architecture was highly fragmented throughout  This is a very long episode of wakefulness in the initial portion of  the tracing which accounted for the low sleep efficiency  Periodic limb movements were seen throughout but  appeared more prominently in the terminal portion of the study  Nasal CPAP was titrated from 5-11 cm of water for  control of snoring and abnormal breathing  Patient appeared to do best at 11 cm of CPAP delivered using a [address] full face interface  Continued use of this equipment at these settings is recommended      Compliance Data:  Type of CPAP:  11                                   Percent usage: 87%, 80% for 4 hrs                                   Average time used: 4 hrs 18 mins                                  Time in large leak: 48 seconds                                   Residual AHI: 11 2, (central 0 8, obstructive 5 8)                                       Nica Elise DO  [facility]'s Sleep Physician

## 2019-08-27 ENCOUNTER — OFFICE VISIT (OUTPATIENT)
Dept: FAMILY MEDICINE CLINIC | Facility: CLINIC | Age: 59
End: 2019-08-27
Payer: COMMERCIAL

## 2019-08-27 VITALS
BODY MASS INDEX: 31.83 KG/M2 | DIASTOLIC BLOOD PRESSURE: 82 MMHG | SYSTOLIC BLOOD PRESSURE: 136 MMHG | TEMPERATURE: 98.2 F | WEIGHT: 235 LBS | HEIGHT: 72 IN

## 2019-08-27 DIAGNOSIS — B35.4 TINEA CORPORIS: Primary | ICD-10-CM

## 2019-08-27 DIAGNOSIS — G20 PRIMARY PARKINSONISM (HCC): ICD-10-CM

## 2019-08-27 PROCEDURE — 99213 OFFICE O/P EST LOW 20 MIN: CPT | Performed by: FAMILY MEDICINE

## 2019-08-27 PROCEDURE — 1036F TOBACCO NON-USER: CPT | Performed by: FAMILY MEDICINE

## 2019-08-27 PROCEDURE — 3008F BODY MASS INDEX DOCD: CPT | Performed by: FAMILY MEDICINE

## 2019-08-27 NOTE — PROGRESS NOTES
Assessment/Plan:       Diagnoses and all orders for this visit:    Tinea corporis  -     econazole nitrate 1 % cream; Apply topically daily    Primary Parkinsonism ([address] )  -     Ambulatory referral to Physical Therapy; Future  -     Ambulatory referral to Occupational Therapy; Future    Other orders  -     Psyllium 400 MG CAPS; Take 0 52 g by mouth            Subjective:        Patient ID: Andrea Shelton is a 60 y o  male  Patient is here with rash on anterior chest wall for roughly 1 week  Mild itching  No new soaps shampoos detergents etc   No new foods  No treatment use  The following portions of the patient's history were reviewed and updated as appropriate: allergies, current medications, past family history, past medical history, past social history, past surgical history and problem list       Review of Systems   Constitutional: Negative  HENT: Negative  Eyes: Negative  Respiratory: Negative  Cardiovascular: Negative  Gastrointestinal: Negative  Endocrine: Negative  Genitourinary: Negative  Skin: Positive for rash  Allergic/Immunologic: Negative  Neurological: Negative  Hematological: Negative  Psychiatric/Behavioral: Negative  Objective:      BMI Counseling: Body mass index is 31 87 kg/m²  Discussed the patient's BMI with him  The BMI is above average  BMI counseling and education was provided to the patient  Nutrition recommendations include reducing portion sizes  Depression Screening Follow-up Plan: Patient's depression screening was positive with a PHQ-2 score of   Their PHQ-9 score was   Patient assessed for underlying major depression  They have no active suicidal ideations  Brief counseling provided and recommend additional follow-up/re-evaluation next office visit        /82 (BP Location: Right arm, Patient Position: Sitting, Cuff Size: Adult)   Temp 98 2 °F (36 8 °C) (Tympanic)   Ht 6' (1 829 m)   Wt 107 kg (235 lb)   BMI 31 87 kg/m²          Physical Exam   Constitutional: He appears well-developed and well-nourished  Neurological: He is alert  Skin: Skin is warm and dry  Rash noted  Circular rash with central clearing with raised border which is erythematous on anterior chest wall just right of midline  Nursing note and vitals reviewed

## 2019-08-30 ENCOUNTER — TELEPHONE (OUTPATIENT)
Dept: PULMONOLOGY | Facility: CLINIC | Age: 59
End: 2019-08-30

## 2019-08-30 NOTE — TELEPHONE ENCOUNTER
Patient calling saying Dr Haris Stephen just recently changed the pressure on his cpap  He is noticing that when the pressure goes up, it is too much for him  He mentioned when it starts out, he is fine but when the pressure goes up, he wakes up and feels like "an inflated balloon"  Please advise

## 2019-08-31 DIAGNOSIS — M79.644 PAIN OF RIGHT THUMB: ICD-10-CM

## 2019-09-03 DIAGNOSIS — G47.33 OSA (OBSTRUCTIVE SLEEP APNEA): Primary | ICD-10-CM

## 2019-09-03 RX ORDER — MELOXICAM 15 MG/1
TABLET ORAL
Qty: 30 TABLET | Refills: 1 | Status: SHIPPED | OUTPATIENT
Start: 2019-09-03 | End: 2019-10-30 | Stop reason: SDUPTHER

## 2019-09-03 NOTE — TELEPHONE ENCOUNTER
Order has been faxed to [address]  at [phone] on 9/3/19 at 11:34am  Pt has been called and notified  He is scheduled for CPAP clinic on 9/24/19 with Dr Vidhi Srinivasan

## 2019-09-03 NOTE — TELEPHONE ENCOUNTER
I narrowed the range  Please fax to maría and let him know 8-15  Perhaps will avoid high end    Also have him come in to PAP clinic or if there is an opening anywhere

## 2019-09-12 ENCOUNTER — TELEPHONE (OUTPATIENT)
Dept: NEUROLOGY | Facility: CLINIC | Age: 59
End: 2019-09-12

## 2019-09-12 ENCOUNTER — CLINICAL SUPPORT (OUTPATIENT)
Dept: PULMONOLOGY | Age: 59
End: 2019-09-12
Payer: COMMERCIAL

## 2019-09-12 ENCOUNTER — OFFICE VISIT (OUTPATIENT)
Dept: NEUROLOGY | Facility: CLINIC | Age: 59
End: 2019-09-12
Payer: COMMERCIAL

## 2019-09-12 VITALS — HEIGHT: 72 IN | BODY MASS INDEX: 31.69 KG/M2 | WEIGHT: 234 LBS

## 2019-09-12 VITALS
HEIGHT: 72 IN | HEART RATE: 81 BPM | SYSTOLIC BLOOD PRESSURE: 135 MMHG | DIASTOLIC BLOOD PRESSURE: 79 MMHG | BODY MASS INDEX: 32.05 KG/M2 | WEIGHT: 236.6 LBS

## 2019-09-12 DIAGNOSIS — G20 PARKINSON DISEASE (HCC): ICD-10-CM

## 2019-09-12 DIAGNOSIS — J98.6 DIAPHRAGM DYSFUNCTION: ICD-10-CM

## 2019-09-12 DIAGNOSIS — G90.3 NEUROGENIC ORTHOSTATIC HYPOTENSION (HCC): ICD-10-CM

## 2019-09-12 DIAGNOSIS — G20 PARKINSON'S DISEASE (HCC): Primary | ICD-10-CM

## 2019-09-12 PROCEDURE — 99215 OFFICE O/P EST HI 40 MIN: CPT | Performed by: PSYCHIATRY & NEUROLOGY

## 2019-09-12 NOTE — PROGRESS NOTES
CARDIAC/PULMONARY REHAB ASSESSMENT    Today's date: 2019   Patient name: Radha Briseno      :        MRN: [id number]  PCP: Trudy Motley DO  Pulmonologist: Raudel Braden  Dx:   Encounter Diagnoses   Name Primary?     Parkinson disease ([facility] [address] )     Diaphragm dysfunction      Date of onset: 8/15/19  Cultural needs: none    Height: Height: 6' (182 9 cm)   Weight:  Weight - Scale: 106 kg (234 lb)   Medical History:   Past Medical History:   Diagnosis Date    Back pain     Bladder infection     BPH (benign prostatic hyperplasia)     Diverticulosis     GERD (gastroesophageal reflux disease)     occassional    History of testicular cancer 1988    Surgery and radiation; left side    Kidney stones     Currently and in the past    Parkinson's disease ([address] )     Wears glasses        Physical Limitations: 10 lb weight limit at this time    Risk Factors   Smoking: no  HTN: no  DM: no  Obesity: no   Inactivity: yes  Family History:   Family History   Problem Relation Age of Onset    Colon cancer Father     Heart failure Father     Parkinsonism Mother     Cancer Paternal Grandmother      Allergies: No Known Allergies  Other: none    Current Medications:   Current Outpatient Medications   Medication Sig Dispense Refill    Ascorbic Acid (VITAMIN C) 100 MG tablet Take 100 mg by mouth daily      calcium carbonate (TUMS) 500 mg chewable tablet Chew 1 tablet as needed for indigestion or heartburn      econazole nitrate 1 % cream Apply topically daily 30 g 1    fludrocortisone (FLORINEF) 0 1 mg tablet Take 1 5 tablets (0 15 mg total) by mouth daily 45 tablet 4    meloxicam (MOBIC) 15 mg tablet Take 15 mg by mouth daily      meloxicam (MOBIC) 15 mg tablet TAKE 1 TABLET BY MOUTH EVERY DAY 30 tablet 1    multivitamin (THERAGRAN) TABS Take 1 tablet by mouth daily      omeprazole (PriLOSEC) 20 mg delayed release capsule Take 1 capsule (20 mg total) by mouth daily before breakfast 90 capsule 1    Psyllium 400 MG CAPS Take 0 52 g by mouth      selegiline (ELDEPRYL) 5 mg capsule Take 5 mg by mouth 2 (two) times a day  11    vitamin E 100 UNIT capsule Take 100 Units by mouth daily      Wheat Dextrin (BENEFIBER DRINK MIX PO)        No current facility-administered medications for this visit  Functional Status Prior to Diagnosis for Treatment   Occupation: retired  Recreation: little  ADLs: no problems  [place]: yes  Exercise: very little  Other: none    Short Term Program Goals: Decrease shortness of breath, improve stamina, increase strength   Long Term Goals: Be able to participate in favorite activity, sport, hobby (golf, hunt, sew, play games, cPatient was evaluated for Pulm Rehab as per order by Dr Morales  He has Parkinson's and diaphragm paralysis  He feels SOB when climbing stairs and walking uphill  He had 2 surgeries this past summer on his prostate and is currently limited to 10 lb weights  He is looking forward to gaining strength and stamina  He is retired and will begin exercising on 9/17/19    ook) and enjoy family, friends without breathing difficulti  Comments:    Ability to reach goals/rehabilitation potential: medium    Projected return to function: Full    Emotional/Social    Marital status:     Life Stressors: Shortness of breath walking stairs and uphill    Goals: reduce shortness of breath

## 2019-09-12 NOTE — PROGRESS NOTES
DEPARTMENT OF NEUROLOGICAL SCIENCES  [address] DISORDERS CLINIC         RETURN PATIENT NOTE    Patient: Reynaldo Ellis  Medical Record Number: # [id number]  YOB: 1960  Date of visit: 9/12/2019     Referring provider: Franci Bahena, DO     The patient was not accompanied today  History was obtained from patient     ASSESSMENT     1  Parkinson's disease [facility])  Ambulatory referral to Physical Therapy    Ambulatory referral to Occupational Therapy   2  Neurogenic orthostatic hypotension (HCC)       Impression of this 61 yo gentleman following up for Parkinsonism treated on selegiline BID, likely idiopathic Parkinson's dz given very slight R bradykinesia, but has more prominent signs of postural orthostatism requiring fludrocortisone, reports of urinary retention and interruption in flow - as well as poorly moving diaphragm and respiratory issues  MSA not ruled out yet  But patient has not developed any further red flags, since as disorganized breaths or changes in breathing rate or strength  He has been overall slightly worse in walking, but attributed to physical deconditioning due to limitations on activity following a recent TURP procedure  We answered all of his questions today and he is interested in PD boxing  We consider adding dopamine agonist if the PT does not generate improvement  PLAN     · Will continue the selegiline as before  He will try the BiPAP longer and also eventually return to Physical Therapy  He has been evaluated at [facility], but will send referrals to [facility] for his interest in [employer]  He can return to physical activity once his weight bearing restriction post-operation is lifted  · If his walking worsens despite PT, consider then dopamine agonist    · He continues the fludrocortisone as before  He is doing well without major subjective blood pressure changes  · We answered many of his questions today   He can call us for clarification on anything else  · We made suggestion of ClinicalTrials  gov for ongoing recruiting research projects in the state he may be interested in  · We provided sheet of upcoming PD STEPS talk  · Return to Clinic on 3 months     A total of 45 minutes were spent face-to-face with this patient, of which 30% was spent on counseling and coordination of care  We discussed the natural history of the patient's condition, differential diagnosis, level of diagnostic certainty, treatment alternatives and their side effects and possible complications  HISTORY OF PRESENT ILLNESS:     Mr Anastacia Sanchez is a 60 y o  right handed male who returns to the [facility] for evaluation of Parkinson's disease and severe orthostatism  Last visit 6/5/19  Interim History  No interim calls to our office  He has followed up with Speech Therapy and Pulmonary for moderate KALLI with difficulty on his CPAP  He is on BiPAP now and working better with the adjustable pressures, but still not entirely comfortable  He feels that his walking has worsened slightly  He had another prostate surgery in mid July 2019  He could not exercise as much since then due to the weight bearing restrictions  He is interested in [employer] and wants to know how to join this  He feels his breathing otherwise has been good; only more troublesome when trying to tie his shoes or bending over at the waist      INITIAL HISTORY  He says he first noticed smaller handwriting while taking a class and taking notes around 2015  In 2016 he noticed he was "meandering walking left and right" which seemed to progress to the point where he would have difficulty keeping pace with someone walking more normally  Denies feeling hemibody slowness or stiffness  He felt he had to put forth more effort in walking fast  If he climbs more than a flight of stairs, his thighs become fatigued   +lightheadedness after ascending stairs or rising from a kneeling position (none while laying down)  If he closes his eyes in shower and looks up, he feels very unsteady  He denies ever falling except 1x while pulling up pants in 2017  He is aware of his posture being more slouched forward  Patient saw his PCP with complaint of lightheadedness while standing and dizziness, trouble with writing on right hand with dropping objects  MRI Brain on July 2018 was reported unremarkable  - Laboratory testing in July 2018 including normal CBC, CMP, Lyme ab testing and hypovitaminosis D noted  - No previous injuries or surgeries on head or neck  - His main concern today is if he has Parkinson's disease, as he read about his symptoms on WebMD      Tremor noticeable but not bothersome; he says he can see his R hand shaking when he writes or eats  Not apparent when his hand is resting  No other tremor in his body he says  Started after 2016 very mild progression if at all  Sleep:  He says he sleeps poorly with sleep habits (sleeping on recliner while watching TV, ~4-5 hrs a night  +difficulty falling back asleep  +snores  Hyposmia: denies  RBD (REM semi-purposeful body movements):  "occasionally" acting out dreams   Gait Disturbance:  Yes, as above   Dementia:  +feeling more difficulty with attention and focusing at work  Forgetting names and small tasks  Constipation: endorses  Hallucination: denies  Skin Cancer History: denies  Hypovitaminosis D: denies  Hypovitaminosis B12:  denies  Dysautonomia:  +urinary retention but has BPH  +orthostatism    Gaze Palsy: none  Exercise Therapy: INACTIVE    Family History: Number of First Degree Relatives With Parkinsonism: mother    Imaging: MRI brain in July 2018      REVIEW OF PAST MEDICAL, SOCIAL AND FAMILY HISTORY:  This is the list of problems as per our Medical Records:    Patient Active Problem List    Diagnosis Date Noted    Tinea corporis 08/27/2019    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    Periodic limb movement disorder (PLMD) 08/22/2019    Change in voice 06/03/2019    Gastroesophageal reflux disease with esophagitis 06/03/2019    KALLI (obstructive sleep apnea)     Chronic pain of right ankle 03/04/2019    Chronic pain of left knee 03/04/2019    [facility] 03/04/2019    Thyromegaly 03/04/2019    Neurogenic orthostatic hypotension (HCC) 02/27/2019    Shortness of breath 11/29/2018    Pain of right thumb 10/18/2018    Pain of left thumb 10/18/2018    Right elbow pain 10/18/2018    Right wrist pain 10/18/2018    Plantar fasciitis, bilateral 10/10/2018    Unspecified abnormalities of gait and mobility 10/10/2018    Dizziness 07/05/2018    Primary testicular cancer ([facility] ) 12/17/2014     No Known Allergies     Outpatient Encounter Medications as of 9/12/2019   Medication Sig Dispense Refill    Ascorbic Acid (VITAMIN C) 100 MG tablet Take 100 mg by mouth daily      calcium carbonate (TUMS) 500 mg chewable tablet Chew 1 tablet as needed for indigestion or heartburn      econazole nitrate 1 % cream Apply topically daily 30 g 1    fludrocortisone (FLORINEF) 0 1 mg tablet Take 1 5 tablets (0 15 mg total) by mouth daily 45 tablet 4    meloxicam (MOBIC) 15 mg tablet TAKE 1 TABLET BY MOUTH EVERY DAY 30 tablet 1    multivitamin (THERAGRAN) TABS Take 1 tablet by mouth daily      omeprazole (PriLOSEC) 20 mg delayed release capsule Take 1 capsule (20 mg total) by mouth daily before breakfast 90 capsule 1    selegiline (ELDEPRYL) 5 mg capsule Take 5 mg by mouth 2 (two) times a day  11    vitamin E 100 UNIT capsule Take 100 Units by mouth daily      Wheat Dextrin (BENEFIBER DRINK MIX PO)       Psyllium 400 MG CAPS Take 0 52 g by mouth      [DISCONTINUED] meloxicam (MOBIC) 15 mg tablet Take 15 mg by mouth daily       No facility-administered encounter medications on file as of 9/12/2019        REVIEW OF SYSTEMS:  The patient has entered data on an intake form regarding present illness, past medical and surgical history, medications, allergies, family and social history, and a full review of 14 systems  I have reviewed this form with the patient, and all the relevant information has been included on this note  The full review of systems was negative except as stated in HPI and below  Constitutional: Negative  Negative for appetite change and fever  HENT: Negative  Negative for hearing loss, tinnitus, trouble swallowing and voice change  Eyes: Negative  Negative for photophobia and pain  Respiratory: Positive for shortness of breath (ON EXCERTION)  Cardiovascular: Negative  Negative for palpitations  Gastrointestinal: Negative  Negative for nausea and vomiting  Endocrine: Negative  Negative for cold intolerance and heat intolerance  Genitourinary: Negative  Negative for dysuria, frequency and urgency  Musculoskeletal: Negative  Negative for myalgias and neck pain  Skin: Positive for rash  Neurological: Positive for tremors (RIGHT HAND)  Negative for dizziness, seizures, syncope, facial asymmetry, speech difficulty, weakness, light-headedness, numbness and headaches  BALANCE PROBLEMS AND DIFFICULTY WALKING GETTING SLIGHTLY WORSE   Hematological: Negative  Does not bruise/bleed easily  Psychiatric/Behavioral: Negative  Negative for confusion, hallucinations and sleep disturbance  FOCUSED PHYSICAL EXAMINATION:     Vital signs:  /79 (BP Location: Left arm, Patient Position: Sitting, Cuff Size: Standard)   Pulse 81   Ht 6' (1 829 m)   Wt 107 kg (236 lb 9 6 oz)   BMI 32 09 kg/m²     General:  Well-appearing, well nourished, pleasant patient in no acute distress  Mood and Fund of Knowledge are appropriate  Head:  Normocephalic, atraumatic  Oropharynx and conjunctiva are clear  Speech  No hypophonia or bradylalia  No scanning speech  Language: Comprehension intact  Neck:  Supple, strong 5/5 forward flexion and retroflexion     Extremities: Range of motion is normal       Cognitive and Mental Exam:  The patient is alert, oriented to self, location, date and situation  Memory is normal to provide accurate details of health history     Cranial Nerves:  Direct and consensual light reflexes were normal  No afferent pupillary defect  Visual fields are full to confrontation  Extraocular movements were full, with normal pursuit and saccades  Pupils were equal, reactive to light symmetrically  Facial sensation to light touch was intact  Face is symmetric with normal strength  Hearing was assessed using the finger rubs  And was normal bilaterally  Palate is up going bilaterally and symmetrically  Neck muscles are strong  Tongue protrusion is at midline with normal movements  No dysarthria  Motor:    Dystonia: none  Dyskinesia: none  Myoclonus: none  Chorea: none  Tics: none        UPDRS                Time since last dose:   4/4/19 6/5/19 on selegiline  9/12/19   Speech   1  1 1   Facial Expression   0  1 0   Rigidity - Neck   0  0 0   Rigidity - Upper Extremity (Right)   1  0 0   Rigidity - Upper Extremity (Left)    1  0 0   Rigidity - Lower Extremity (Right)   1  0 0   Rigidity - Lower Extremity (Left)    1  0 0   Finger Taps (Right)    1  0 2   Finger Taps (Left)    0  0 0   Hand Movement (Right)   0  0 0   Hand Movement (Left)    0  0 0   Pronation/Supination (Right)   0  0 0   Pronation/Supination (Left)    0  0 0   Toe Tapping (Right)  0  0 0   Toe Tapping (Left)  0  0 0   Leg Agility (Right)   0  0 0   Leg Agility (Left)    0  0 0   Arising from Chair    0  0 0   Gait    0  0 0   Freezing of Gait  0  0 0   Postural Stability    -  - -   Posture  1  0 0   Global spontaneity of movement  2  0 1 slightly less on R   Postural Tremor (Right)  0  0 0   Postural Tremor (Left)  0  0 0   Kinetic Tremor (Right)   0  0 0   Kinetic Tremor (Left)   0  0 0   Rest tremor amplitude RUE  0  0 0   Rest tremor amplitude LUE  0  0 0   Rest tremor amplitude RLE  0  0 0   Reset tremor amplitude LLE  0  0 0   Lip/Jaw Tremor   0  0 0   Consistency of tremor  0  0 0     -------------------------------------------------------------------------------------    Muscle Strength Right Left  Muscle Strength Right Left   Deltoid 5/5 5/5  Hip Adductors 5/5 5/5   Biceps 5/5 5/5  Hip Abductors 5/5 5/5   Triceps 5/5 5/5  Knee Extensors 5/5 5/5   Wrist Extensors 5/5 5/5  Knee Flexors 5/5 5/5   Wrist Flexors 5/5 5/5  Ankle Extensors 5/5 5/5    5/5 5/5  Ankle Flexors 5/5 5/5   Finger Abductors 5/5 5/5       Hip Flexors 5/5 5/5   Hip Extensors 5/5 5/5     Sensory:  Reduced to both pinprick and temperature and also vibration sense in the lower extremities  Gait:  Narrow based gait, upright walking without shift or sway, no freezing of gait and normal stride lengths  No re-emergent rest tremor  Tandem gait possible 5 steps        Reflexes:    Right Left   Biceps 2/4 2/4   Brachioradialis 2/4 2/4   Triceps 2/4 2/4   Knee 2/4 2/4   Ankle 2/4 2/4

## 2019-09-12 NOTE — PATIENT INSTRUCTIONS
· Will continue the selegiline as before  He will try the BiPAP longer and also eventually return to Physical Therapy  He has been evaluated at [facility], but will send referrals to [facility] for his interest in [employer]  He can return to physical activity once his weight bearing restriction post-operation is lifted  · If his walking worsens despite PT, consider then dopamine agonist    · He continues the fludrocortisone as before  He is doing well without major subjective blood pressure changes  · We answered many of his questions today  He can call us for clarification on anything else  · We made suggestion of ClinicalTri[employer]  gov for ongoing recruiting research projects in the state he may be interested in  · We provided sheet of upcoming PD STEPS talk     · Return to Clinic on 3 months

## 2019-09-12 NOTE — PROGRESS NOTES
Review of Systems   Constitutional: Negative  Negative for appetite change and fever  HENT: Negative  Negative for hearing loss, tinnitus, trouble swallowing and voice change  Eyes: Negative  Negative for photophobia and pain  Respiratory: Positive for shortness of breath (ON EXCERTION)  Cardiovascular: Negative  Negative for palpitations  Gastrointestinal: Negative  Negative for nausea and vomiting  Endocrine: Negative  Negative for cold intolerance and heat intolerance  Genitourinary: Negative  Negative for dysuria, frequency and urgency  Musculoskeletal: Negative  Negative for myalgias and neck pain  Skin: Positive for rash  Neurological: Positive for tremors (RIGHT HAND)  Negative for dizziness, seizures, syncope, facial asymmetry, speech difficulty, weakness, light-headedness, numbness and headaches  BALANCE PROBLEMS AND DIFFICULTY WALKING GETTING SLIGHTLY WORSE   Hematological: Negative  Does not bruise/bleed easily  Psychiatric/Behavioral: Negative  Negative for confusion, hallucinations and sleep disturbance

## 2019-09-12 NOTE — PROGRESS NOTES
Pulmonary Rehabilitation Plan of Care   Care Plan           Today's date: 2019   Total visits to date: first visit for eval  Patient name: Starr Haley      : 1960  Age: 60 y o  MRN: [id number]  Referring Physician: Ritika Fairbanks MD  Provider: [facility]  Clinician: Swapnil Sauceda    Dx:   Encounter Diagnoses   Name Primary?  Parkinson disease ([address] )     Diaphragm dysfunction      Date of onset: 8/15/19    COMMENTS:  Patient was evaluated for Pulm Rehab as per order by Dr Morales  He has Parkinson's and diaphragm paralysis  He feels SOB when climbing stairs and walking uphill  He had 2 surgeries this past summer on his prostate and is currently limited to 10 lb weights  He is looking forward to gaining strength and stamina  He is retired and will begin exercising on 19      Medication compliance: Yes   Comments: none  Fall Risk: Low   Comments: none    EXERCISE/ACTIVITY    Cardiopulmonary Goals:   Min: 30-50   HR:    RPE: 5-7 moderate to somewhat hard exercise   O2 sat: >90%    Modalities: Treadmill, UBE, Lifecycle, NuStep and Recumbent bike  Strength trainin-3 days / week, 12-15 repitations  and 1-2 sets per modality    Modalities: Leg press, Chest press, Lateral raise, Arm curl and Seated Row    Exercise Progression: first visit today for evaluation    RPE N/A  Home activity: little  Goals: 10% improvement in functional capacity, home exercise days opposite GA and >150 mins of exercise/wk  Education: Benefit of exercise, home exercise, pursed lip breathing, RPE scale and O2 saturation monitoring   Plan:home exercise target 30 mins, 2 days opposite GA and exercise education video  Readiness to change: Preparation    NUTRITION    Weight control:    Starting weight: 234 lb     Diabetes: N/A  Goals:Improved Rate Your Plate score  Education:More frequent meals, smaller portions  hydration  weight loss  healthy choices while dining out  portion control  Plan: Education Video- Diet and Nutrition  Readiness to change: Preparation    PSYCHOSOCIAL    Emotional: benefits of positive support system  Social support: good  Goals: Physical Fitness in [facility] Score < 3, Daily Activity in [facility] Score < 3, Pain in [facility] Score < 3 and Overall Health in [facility] Score < 3  Education: Mental Health Education  Plan: Anxiety and Lung disease  Readiness to change: Preparation    OTHER CORE COMPONENTS     Tobacco:   Social History     Tobacco Use   Smoking Status Former Smoker    Packs/day: 0 50    Years: 3 50    Pack years: 1 75    Types: Cigarettes    Last attempt to quit: Shanae Bartholomew Years since quittin 7   Smokeless Tobacco Never Used     Oxygen: room air  Blood pressure:    Resting:     Exercise:    Goals: consistent exercise >150 mins/wk  Education: Pulmonary Disease, physiology, Exercise, strength, flexibility, Conservation of energy, oxygen, breathing techniques, Mental Health, Diet,nutrition, Medication, Avoiding Infections and Caregivers  Plan: Exercise benefits  Readiness to change: Preparation

## 2019-09-12 NOTE — LETTER
September 12, 2019     Salo Junito, [address] [address]    Patient: Korina Bhatt   YOB: 1960   Date of Visit: 9/12/2019       Dear Dr Jimenez Daughters:    Earlier today I saw our mutual patient Mr Harmony Alves for follow-up Parkinson's disease  Below are my notes for this visit for your records and to keep you updated on his health status  If you have questions, please do not hesitate to call me  I look forward to following your patient along with you  Sincerely,        Juliette Moreau MD        CC: No Recipients  Juliette Moreau MD  9/12/2019  5:19 PM  Incomplete  [address] [place] [address] CLINIC         RETURN PATIENT NOTE    Patient: Korina Bhatt  Medical Record Number: # [id number]  YOB: 1960  Date of visit: 9/12/2019     Referring provider: Myron Dey,      The patient was not accompanied today  History was obtained from patient     ASSESSMENT     1  Parkinson's disease [facility])  Ambulatory referral to Physical Therapy    Ambulatory referral to Occupational Therapy   2  Neurogenic orthostatic hypotension (HCC)       Impression of this 61 yo gentleman following up for Parkinsonism treated on selegiline BID, likely idiopathic Parkinson's dz given very slight R bradykinesia, but has more prominent signs of postural orthostatism requiring fludrocortisone, reports of urinary retention and interruption in flow - as well as poorly moving diaphragm and respiratory issues  MSA not ruled out yet  But patient has not developed any further red flags, since as disorganized breaths or changes in breathing rate or strength  He has been overall slightly worse in walking, but attributed to physical deconditioning due to limitations on activity following a recent TURP procedure  We answered all of his questions today and he is interested in PD boxing   We consider adding dopamine agonist if the PT does not generate improvement  PLAN     · Will continue the selegiline as before  He will try the BiPAP longer and also eventually return to Physical Therapy  He has been evaluated at [facility], but will send referrals to [facility] for his interest in [employer]  He can return to physical activity once his weight bearing restriction post-operation is lifted  · If his walking worsens despite PT, consider then dopamine agonist    · He continues the fludrocortisone as before  He is doing well without major subjective blood pressure changes  · We answered many of his questions today  He can call us for clarification on anything else  · We made suggestion of [employer]Tri[employer]  gov for ongoing recruiting research projects in the state he may be interested in  · We provided sheet of upcoming PD STEPS talk  · Return to Clinic on 3 months     A total of 45 minutes were spent face-to-face with this patient, of which 30% was spent on counseling and coordination of care  We discussed the natural history of the patient's condition, differential diagnosis, level of diagnostic certainty, treatment alternatives and their side effects and possible complications  HISTORY OF PRESENT ILLNESS:     Mr Thomas Ward is a 60 y o  right handed male who returns to the [facility] for evaluation of Parkinson's disease and severe orthostatism  Last visit 6/5/19  Interim History  No interim calls to our office  He has followed up with Speech Therapy and Pulmonary for moderate KALLI with difficulty on his CPAP  He is on BiPAP now and working better with the adjustable pressures, but still not entirely comfortable  He feels that his walking has worsened slightly  He had another prostate surgery in mid July 2019  He could not exercise as much since then due to the weight bearing restrictions  He is interested in [employer] and wants to know how to join this   He feels his breathing otherwise has been good; only more troublesome when trying to tie his shoes or bending over at the waist      INITIAL HISTORY  He says he first noticed smaller handwriting while taking a class and taking notes around 2015  In 2016 he noticed he was "meandering walking left and right" which seemed to progress to the point where he would have difficulty keeping pace with someone walking more normally  Denies feeling hemibody slowness or stiffness  He felt he had to put forth more effort in walking fast  If he climbs more than a flight of stairs, his thighs become fatigued  +lightheadedness after ascending stairs or rising from a kneeling position (none while laying down)  If he closes his eyes in shower and looks up, he feels very unsteady  He denies ever falling except 1x while pulling up pants in 2017  He is aware of his posture being more slouched forward  Patient saw his PCP with complaint of lightheadedness while standing and dizziness, trouble with writing on right hand with dropping objects  MRI Brain on July 2018 was reported unremarkable  - Laboratory testing in July 2018 including normal CBC, CMP, Lyme ab testing and hypovitaminosis D noted  - No previous injuries or surgeries on head or neck  - His main concern today is if he has Parkinson's disease, as he read about his symptoms on WebMD      Tremor noticeable but not bothersome; he says he can see his R hand shaking when he writes or eats  Not apparent when his hand is resting  No other tremor in his body he says  Started after 2016 very mild progression if at all  Sleep:  He says he sleeps poorly with sleep habits (sleeping on recliner while watching TV, ~4-5 hrs a night  +difficulty falling back asleep  +snores  Hyposmia: denies  RBD (REM semi-purposeful body movements):  "occasionally" acting out dreams   Gait Disturbance:  Yes, as above   Dementia:  +feeling more difficulty with attention and focusing at work  Forgetting names and small tasks     Constipation: endorses  Hallucination: denies  Skin Cancer History: denies  Hypovitaminosis D: denies  Hypovitaminosis B12:  denies  Dysautonomia:  +urinary retention but has BPH  +orthostatism    Gaze Palsy: none  Exercise Therapy: INACTIVE    Family History: Number of First Degree Relatives With Parkinsonism: mother    Imaging: MRI brain in July 2018      REVIEW OF PAST MEDICAL, SOCIAL AND FAMILY HISTORY:  This is the list of problems as per our Medical Records:    Patient Active Problem List    Diagnosis Date Noted    Tinea corporis 08/27/2019    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    Periodic limb movement disorder (PLMD) 08/22/2019    Change in voice 06/03/2019    Gastroesophageal reflux disease with esophagitis 06/03/2019    KALLI (obstructive sleep apnea)     Chronic pain of right ankle 03/04/2019    Chronic pain of left knee 03/04/2019    [facility]'s [facility] [address] ) 03/04/2019    Thyromegaly 03/04/2019    Neurogenic orthostatic hypotension (HCC) 02/27/2019    Shortness of breath 11/29/2018    Pain of right thumb 10/18/2018    Pain of left thumb 10/18/2018    Right elbow pain 10/18/2018    Right wrist pain 10/18/2018    Plantar fasciitis, bilateral 10/10/2018    Unspecified abnormalities of gait and mobility 10/10/2018    Dizziness 07/05/2018    [facility] 12/17/2014     No Known Allergies     Outpatient Encounter Medications as of 9/12/2019   Medication Sig Dispense Refill    Ascorbic Acid (VITAMIN C) 100 MG tablet Take 100 mg by mouth daily      calcium carbonate (TUMS) 500 mg chewable tablet Chew 1 tablet as needed for indigestion or heartburn      econazole nitrate 1 % cream Apply topically daily 30 g 1    fludrocortisone (FLORINEF) 0 1 mg tablet Take 1 5 tablets (0 15 mg total) by mouth daily 45 tablet 4    meloxicam (MOBIC) 15 mg tablet TAKE 1 TABLET BY MOUTH EVERY DAY 30 tablet 1    multivitamin (THERAGRAN) TABS Take 1 tablet by mouth daily      omeprazole (PriLOSEC) 20 mg delayed release capsule Take 1 capsule (20 mg total) by mouth daily before breakfast 90 capsule 1    selegiline (ELDEPRYL) 5 mg capsule Take 5 mg by mouth 2 (two) times a day  11    vitamin E 100 UNIT capsule Take 100 Units by mouth daily      Wheat Dextrin (BENEFIBER DRINK MIX PO)       Psyllium 400 MG CAPS Take 0 52 g by mouth      [DISCONTINUED] meloxicam (MOBIC) 15 mg tablet Take 15 mg by mouth daily       No facility-administered encounter medications on file as of 9/12/2019  REVIEW OF SYSTEMS:  The patient has entered data on an intake form regarding present illness, past medical and surgical history, medications, allergies, family and social history, and a full review of 14 systems  I have reviewed this form with the patient, and all the relevant information has been included on this note  The full review of systems was negative except as stated in HPI and below  Constitutional: Negative  Negative for appetite change and fever  HENT: Negative  Negative for hearing loss, tinnitus, trouble swallowing and voice change  Eyes: Negative  Negative for photophobia and pain  Respiratory: Positive for shortness of breath (ON EXCERTION)  Cardiovascular: Negative  Negative for palpitations  Gastrointestinal: Negative  Negative for nausea and vomiting  Endocrine: Negative  Negative for cold intolerance and heat intolerance  Genitourinary: Negative  Negative for dysuria, frequency and urgency  Musculoskeletal: Negative  Negative for myalgias and neck pain  Skin: Positive for rash  Neurological: Positive for tremors (RIGHT HAND)  Negative for dizziness, seizures, syncope, facial asymmetry, speech difficulty, weakness, light-headedness, numbness and headaches  BALANCE PROBLEMS AND DIFFICULTY WALKING GETTING SLIGHTLY WORSE   Hematological: Negative  Does not bruise/bleed easily  Psychiatric/Behavioral: Negative  Negative for confusion, hallucinations and sleep disturbance        FOCUSED PHYSICAL EXAMINATION:     Vital signs:  /79 (BP Location: Left arm, Patient Position: Sitting, Cuff Size: Standard)   Pulse 81   Ht 6' (1 829 m)   Wt 107 kg (236 lb 9 6 oz)   BMI 32 09 kg/m²      General:  Well-appearing, well nourished, pleasant patient in no acute distress  Mood and Fund of Knowledge are appropriate  Head:  Normocephalic, atraumatic  Oropharynx and conjunctiva are clear  Speech  No hypophonia or bradylalia  No scanning speech  Language: Comprehension intact  Neck:  Supple, strong 5/5 forward flexion and retroflexion  Extremities: Range of motion is normal       Cognitive and Mental Exam:  The patient is alert, oriented to self, location, date and situation  Memory is normal to provide accurate details of health history     Cranial Nerves:  Direct and consensual light reflexes were normal  No afferent pupillary defect  Visual fields are full to confrontation  Extraocular movements were full, with normal pursuit and saccades  Pupils were equal, reactive to light symmetrically  Facial sensation to light touch was intact  Face is symmetric with normal strength  Hearing was assessed using the finger rubs  And was normal bilaterally  Palate is up going bilaterally and symmetrically  Neck muscles are strong  Tongue protrusion is at midline with normal movements  No dysarthria  Motor:    Dystonia: none  Dyskinesia: none  Myoclonus: none  Chorea: none  Tics: none        UPDRS                Time since last dose:   4/4/19 6/5/19 on selegiline  9/12/19   Speech   1  1 1   Facial Expression   0  1 0   Rigidity - Neck   0  0 0   Rigidity - Upper Extremity (Right)   1  0 0   Rigidity - Upper Extremity (Left)    1  0 0   Rigidity - Lower Extremity (Right)   1  0 0   Rigidity - Lower Extremity (Left)    1  0 0   Finger Taps (Right)    1  0 2   Finger Taps (Left)    0  0 0   Hand Movement (Right)   0  0 0   Hand Movement (Left)    0  0 0   Pronation/Supination (Right)  0  0 0   Pronation/Supination (Left)    0  0 0   Toe Tapping (Right)  0  0 0   Toe Tapping (Left)  0  0 0   Leg Agility (Right)   0  0 0   Leg Agility (Left)    0  0 0   Arising from Chair    0  0 0   Gait    0  0 0   Freezing of Gait  0  0 0   Postural Stability    -  - -   Posture  1  0 0   Global spontaneity of movement  2  0 1 slightly less on R   Postural Tremor (Right)  0  0 0   Postural Tremor (Left)  0  0 0   Kinetic Tremor (Right)   0  0 0   Kinetic Tremor (Left)   0  0 0   Rest tremor amplitude RUE  0  0 0   Rest tremor amplitude LUE  0  0 0   Rest tremor amplitude RLE  0  0 0   Reset tremor amplitude LLE  0  0 0   Lip/Jaw Tremor   0  0 0   Consistency of tremor  0  0 0     -------------------------------------------------------------------------------------    Muscle Strength Right Left  Muscle Strength Right Left   Deltoid 5/5 5/5  Hip Adductors 5/5 5/5   Biceps 5/5 5/5  Hip Abductors 5/5 5/5   Triceps 5/5 5/5  Knee Extensors 5/5 5/5   Wrist Extensors 5/5 5/5  Knee Flexors 5/5 5/5   Wrist Flexors 5/5 5/5  Ankle Extensors 5/5 5/5    5/5 5/5  Ankle Flexors 5/5 5/5   Finger Abductors 5/5 5/5       Hip Flexors 5/5 5/5   Hip Extensors 5/5 5/5     Sensory:  Reduced to both pinprick and temperature and also vibration sense in the lower extremities  Gait:  Narrow based gait, upright walking without shift or sway, no freezing of gait and normal stride lengths  No re-emergent rest tremor  Tandem gait possible 5 steps        Reflexes:    Right Left   Biceps 2/4 2/4   Brachioradialis 2/4 2/4   Triceps 2/4 2/4   Knee 2/4 2/4   Ankle 2/4 2/4        Sindi Black MD  9/12/2019  5:19 PM  Sign at close encounter  [address] PATIENT NOTE    Patient: Edu Ye  Medical Record Number: # [id number]  YOB: 1960  Date of visit: 9/12/2019     Referring provider: Vidhya Weaver, DO     The patient was not accompanied today  History was obtained from patient     ASSESSMENT     1  Parkinson's disease [facility])  Ambulatory referral to Physical Therapy    Ambulatory referral to Occupational Therapy   2  Neurogenic orthostatic hypotension (HCC)       Impression of this 61 yo gentleman following up for Parkinsonism treated on selegiline BID, likely idiopathic Parkinson's dz given very slight R bradykinesia, but has more prominent signs of postural orthostatism requiring fludrocortisone, reports of urinary retention and interruption in flow - as well as poorly moving diaphragm and respiratory issues  MSA not ruled out yet  But patient has not developed any further red flags, since as disorganized breaths or changes in breathing rate or strength  He has been overall slightly worse in walking, but attributed to physical deconditioning due to limitations on activity following a recent TURP procedure  We answered all of his questions today and he is interested in PD boxing  We consider adding dopamine agonist if the PT does not generate improvement  PLAN     · Will continue the selegiline as before  He will try the BiPAP longer and also eventually return to Physical Therapy  He has been evaluated at [facility], but will send referrals to [facility] for his interest in [employer]  He can return to physical activity once his weight bearing restriction post-operation is lifted  · If his walking worsens despite PT, consider then dopamine agonist    · He continues the fludrocortisone as before  He is doing well without major subjective blood pressure changes  · We answered many of his questions today  He can call us for clarification on anything else  · We made suggestion of [employer]  gov for ongoing recruiting research projects in the state he may be interested in  · We provided sheet of upcoming PD STEPS talk     · Return to Clinic on 3 months     A total of 45 minutes were spent face-to-face with this patient, of which 30% was spent on counseling and coordination of care  We discussed the natural history of the patient's condition, differential diagnosis, level of diagnostic certainty, treatment alternatives and their side effects and possible complications  HISTORY OF PRESENT ILLNESS:     Mr Reeda Apgar is a 60 y o  right handed male who returns to the [address] [facility] for evaluation of Parkinson's disease and severe orthostatism  Last visit 19  Interim History  No interim calls to our office  He has followed up with Speech Therapy and Pulmonary for moderate KALLI with difficulty on his CPAP  He is on BiPAP now and working better with the adjustable pressures, but still not entirely comfortable  He feels that his walking has worsened slightly  He had another prostate surgery in mid 2019  He could not exercise as much since then due to the weight bearing restrictions  He is interested in [employer] and wants to know how to join this  He feels his breathing otherwise has been good; only more troublesome when trying to tie his shoes or bending over at the waist      INITIAL HISTORY  He says he first noticed smaller handwriting while taking a class and taking notes around   In  he noticed he was "meandering walking left and right" which seemed to progress to the point where he would have difficulty keeping pace with someone walking more normally  Denies feeling hemibody slowness or stiffness  He felt he had to put forth more effort in walking fast  If he climbs more than a flight of stairs, his thighs become fatigued  +lightheadedness after ascending stairs or rising from a kneeling position (none while laying down)  If he closes his eyes in shower and looks up, he feels very unsteady  He denies ever falling except 1x while pulling up pants in 2017  He is aware of his posture being more slouched forward   Patient saw his PCP with complaint of lightheadedness while standing and dizziness, trouble with writing on right hand with dropping objects  MRI Brain on July 2018 was reported unremarkable  - Laboratory testing in July 2018 including normal CBC, CMP, Lyme ab testing and hypovitaminosis D noted  - No previous injuries or surgeries on head or neck  - His main concern today is if he has Parkinson's disease, as he read about his symptoms on WebMD      Tremor noticeable but not bothersome; he says he can see his R hand shaking when he writes or eats  Not apparent when his hand is resting  No other tremor in his body he says  Started after 2016 very mild progression if at all  Sleep:  He says he sleeps poorly with sleep habits (sleeping on recliner while watching TV, ~4-5 hrs a night  +difficulty falling back asleep  +snores  Hyposmia: denies  RBD (REM semi-purposeful body movements):  "occasionally" acting out dreams   Gait Disturbance:  Yes, as above   Dementia:  +feeling more difficulty with attention and focusing at work  Forgetting names and small tasks  Constipation: endorses  Hallucination: denies  Skin Cancer History: denies  Hypovitaminosis D: denies  Hypovitaminosis B12:  denies  Dysautonomia:  +urinary retention but has BPH  +orthostatism    Gaze Palsy: none  Exercise Therapy: INACTIVE    Family History: Number of First Degree Relatives With Parkinsonism: mother    Imaging: MRI brain in July 2018      REVIEW OF PAST MEDICAL, SOCIAL AND FAMILY HISTORY:  This is the list of problems as per our Medical Records:    Patient Active Problem List    Diagnosis Date Noted    Tinea corporis 08/27/2019    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    Periodic limb movement disorder (PLMD) 08/22/2019    Change in voice 06/03/2019    Gastroesophageal reflux disease with esophagitis 06/03/2019    KALLI (obstructive sleep apnea)     Chronic pain of right ankle 03/04/2019    Chronic pain of left knee 03/04/2019    [facility]'s [facility] ([facility] [address] ) 03/04/2019    Thyromegaly 03/04/2019    Neurogenic orthostatic hypotension (HCC) 02/27/2019    Shortness of breath 11/29/2018    Pain of right thumb 10/18/2018    Pain of left thumb 10/18/2018    Right elbow pain 10/18/2018    Right wrist pain 10/18/2018    Plantar fasciitis, bilateral 10/10/2018    Unspecified abnormalities of gait and mobility 10/10/2018    Dizziness 07/05/2018    Primary testicular cancer ([facility] [address] ) 12/17/2014     No Known Allergies     Outpatient Encounter Medications as of 9/12/2019   Medication Sig Dispense Refill    Ascorbic Acid (VITAMIN C) 100 MG tablet Take 100 mg by mouth daily      calcium carbonate (TUMS) 500 mg chewable tablet Chew 1 tablet as needed for indigestion or heartburn      econazole nitrate 1 % cream Apply topically daily 30 g 1    fludrocortisone (FLORINEF) 0 1 mg tablet Take 1 5 tablets (0 15 mg total) by mouth daily 45 tablet 4    meloxicam (MOBIC) 15 mg tablet TAKE 1 TABLET BY MOUTH EVERY DAY 30 tablet 1    multivitamin (THERAGRAN) TABS Take 1 tablet by mouth daily      omeprazole (PriLOSEC) 20 mg delayed release capsule Take 1 capsule (20 mg total) by mouth daily before breakfast 90 capsule 1    selegiline (ELDEPRYL) 5 mg capsule Take 5 mg by mouth 2 (two) times a day  11    vitamin E 100 UNIT capsule Take 100 Units by mouth daily      Wheat Dextrin (BENEFIBER DRINK MIX PO)       Psyllium 400 MG CAPS Take 0 52 g by mouth      [DISCONTINUED] meloxicam (MOBIC) 15 mg tablet Take 15 mg by mouth daily       No facility-administered encounter medications on file as of 9/12/2019  REVIEW OF SYSTEMS:  The patient has entered data on an intake form regarding present illness, past medical and surgical history, medications, allergies, family and social history, and a full review of 14 systems  I have reviewed this form with the patient, and all the relevant information has been included on this note  The full review of systems was negative except as stated in HPI and below        Constitutional: Negative  Negative for appetite change and fever  HENT: Negative  Negative for hearing loss, tinnitus, trouble swallowing and voice change  Eyes: Negative  Negative for photophobia and pain  Respiratory: Positive for shortness of breath (ON EXCERTION)  Cardiovascular: Negative  Negative for palpitations  Gastrointestinal: Negative  Negative for nausea and vomiting  Endocrine: Negative  Negative for cold intolerance and heat intolerance  Genitourinary: Negative  Negative for dysuria, frequency and urgency  Musculoskeletal: Negative  Negative for myalgias and neck pain  Skin: Positive for rash  Neurological: Positive for tremors (RIGHT HAND)  Negative for dizziness, seizures, syncope, facial asymmetry, speech difficulty, weakness, light-headedness, numbness and headaches  BALANCE PROBLEMS AND DIFFICULTY WALKING GETTING SLIGHTLY WORSE   Hematological: Negative  Does not bruise/bleed easily  Psychiatric/Behavioral: Negative  Negative for confusion, hallucinations and sleep disturbance  FOCUSED PHYSICAL EXAMINATION:     Vital signs:  /79 (BP Location: Left arm, Patient Position: Sitting, Cuff Size: Standard)   Pulse 81   Ht 6' (1 829 m)   Wt 107 kg (236 lb 9 6 oz)   BMI 32 09 kg/m²      General:  Well-appearing, well nourished, pleasant patient in no acute distress  Mood and Fund of Knowledge are appropriate  Head:  Normocephalic, atraumatic  Oropharynx and conjunctiva are clear  Speech  No hypophonia or bradylalia  No scanning speech  Language: Comprehension intact  Neck:  Supple, strong 5/5 forward flexion and retroflexion  Extremities: Range of motion is normal       Cognitive and Mental Exam:  The patient is alert, oriented to self, location, date and situation  Memory is normal to provide accurate details of health history     Cranial Nerves:  Direct and consensual light reflexes were normal  No afferent pupillary defect  Visual fields are full to confrontation  Extraocular movements were full, with normal pursuit and saccades  Pupils were equal, reactive to light symmetrically  Facial sensation to light touch was intact  Face is symmetric with normal strength  Hearing was assessed using the finger rubs  And was normal bilaterally  Palate is up going bilaterally and symmetrically  Neck muscles are strong  Tongue protrusion is at midline with normal movements  No dysarthria  Motor:    Dystonia: none  Dyskinesia: none  Myoclonus: none  Chorea: none  Tics: none        UPDRS                Time since last dose:   4/4/19 6/5/19 on selegiline  9/12/19   Speech   1  1 1   Facial Expression   0  1 0   Rigidity - Neck   0  0 0   Rigidity - Upper Extremity (Right)   1  0 0   Rigidity - Upper Extremity (Left)    1  0 0   Rigidity - Lower Extremity (Right)   1  0 0   Rigidity - Lower Extremity (Left)    1  0 0   Finger Taps (Right)    1  0 2   Finger Taps (Left)    0  0 0   Hand Movement (Right)   0  0 0   Hand Movement (Left)    0  0 0   Pronation/Supination (Right)   0  0 0   Pronation/Supination (Left)    0  0 0   Toe Tapping (Right)  0  0 0   Toe Tapping (Left)  0  0 0   Leg Agility (Right)   0  0 0   Leg Agility (Left)    0  0 0   Arising from Chair    0  0 0   Gait    0  0 0   Freezing of Gait  0  0 0   Postural Stability    -  - -   Posture  1  0 0   Global spontaneity of movement  2  0 1 slightly less on R   Postural Tremor (Right)  0  0 0   Postural Tremor (Left)  0  0 0   Kinetic Tremor (Right)   0  0 0   Kinetic Tremor (Left)   0  0 0   Rest tremor amplitude RUE  0  0 0   Rest tremor amplitude LUE  0  0 0   Rest tremor amplitude RLE  0  0 0   Reset tremor amplitude LLE  0  0 0   Lip/Jaw Tremor   0  0 0   Consistency of tremor  0  0 0     -------------------------------------------------------------------------------------    Muscle Strength Right Left  Muscle Strength Right Left   Deltoid 5/5 5/5  Hip Adductors 5/5 5/5   Biceps 5/5 5/5  Hip Abductors 5/5 5/5   Triceps 5/5 5/5  Knee Extensors 5/5 5/5   Wrist Extensors 5/5 5/5  Knee Flexors 5/5 5/5   Wrist Flexors 5/5 5/5  Ankle Extensors 5/5 5/5    5/5 5/5  Ankle Flexors 5/5 5/5   Finger Abductors 5/5 5/5       Hip Flexors 5/5 5/5   Hip Extensors 5/5 5/5     Sensory:  Reduced to both pinprick and temperature and also vibration sense in the lower extremities  Gait:  Narrow based gait, upright walking without shift or sway, no freezing of gait and normal stride lengths  No re-emergent rest tremor  Tandem gait possible 5 steps        Reflexes:    Right Left   Biceps 2/4 2/4   Brachioradialis 2/4 2/4   Triceps 2/4 2/4   Knee 2/4 2/4   Ankle 2/4 2/4

## 2019-09-17 ENCOUNTER — CLINICAL SUPPORT (OUTPATIENT)
Dept: PULMONOLOGY | Age: 59
End: 2019-09-17
Payer: COMMERCIAL

## 2019-09-17 DIAGNOSIS — J98.6 DISORDERS OF DIAPHRAGM: ICD-10-CM

## 2019-09-17 PROCEDURE — G0239 OTH RESP PROC, GROUP: HCPCS

## 2019-09-19 ENCOUNTER — CLINICAL SUPPORT (OUTPATIENT)
Dept: PULMONOLOGY | Age: 59
End: 2019-09-19
Payer: COMMERCIAL

## 2019-09-19 DIAGNOSIS — J98.6 DIAPHRAGM DYSFUNCTION: ICD-10-CM

## 2019-09-19 PROCEDURE — G0239 OTH RESP PROC, GROUP: HCPCS

## 2019-09-24 ENCOUNTER — OFFICE VISIT (OUTPATIENT)
Dept: PULMONOLOGY | Facility: CLINIC | Age: 59
End: 2019-09-24
Payer: COMMERCIAL

## 2019-09-24 ENCOUNTER — CLINICAL SUPPORT (OUTPATIENT)
Dept: PULMONOLOGY | Age: 59
End: 2019-09-24
Payer: COMMERCIAL

## 2019-09-24 VITALS
TEMPERATURE: 98.6 F | OXYGEN SATURATION: 93 % | HEART RATE: 82 BPM | SYSTOLIC BLOOD PRESSURE: 140 MMHG | RESPIRATION RATE: 14 BRPM | DIASTOLIC BLOOD PRESSURE: 80 MMHG | WEIGHT: 230 LBS | BODY MASS INDEX: 31.15 KG/M2 | HEIGHT: 72 IN

## 2019-09-24 DIAGNOSIS — G47.33 OSA (OBSTRUCTIVE SLEEP APNEA): ICD-10-CM

## 2019-09-24 DIAGNOSIS — B35.4 TINEA CORPORIS: ICD-10-CM

## 2019-09-24 DIAGNOSIS — G47.61 PERIODIC LIMB MOVEMENT DISORDER (PLMD): Primary | ICD-10-CM

## 2019-09-24 DIAGNOSIS — G47.50 PARASOMNIA, UNSPECIFIED TYPE: ICD-10-CM

## 2019-09-24 DIAGNOSIS — J98.6 DIAPHRAGM DYSFUNCTION: ICD-10-CM

## 2019-09-24 PROCEDURE — G0239 OTH RESP PROC, GROUP: HCPCS

## 2019-09-24 PROCEDURE — 99215 OFFICE O/P EST HI 40 MIN: CPT | Performed by: INTERNAL MEDICINE

## 2019-09-24 RX ORDER — ROPINIROLE 0.5 MG/1
0.5 TABLET, FILM COATED ORAL
Qty: 30 TABLET | Refills: 1 | Status: SHIPPED | OUTPATIENT
Start: 2019-09-24 | End: 2020-03-11 | Stop reason: ALTCHOICE

## 2019-09-24 NOTE — LETTER
September 24, 2019     Mouna Mari, [address]    Patient: Flakita Keane   YOB: 1960   Date of Visit: 9/24/2019       Dear Dr Curt Mcguire:    Thank you for referring Shen Julio to me for evaluation  Below are my notes for this consultation  If you have questions, please do not hesitate to call me  I look forward to following your patient along with you  Sincerely,        Bull Winston DO        CC: No Recipients  Bull Winston DO  9/24/2019  5:18 PM  Sign at close encounter  Progress note - Pulmonary Medicine   Flakita Keane 60 y o  male MRN: [id number]       Impression & Plan:   60 y o  M with PMHx of Parkinson's disease with diaphragmatic weakness, chronic knee and ankle pain, urinary retention s/p TURP, Moderate KALLI on CPAP who comes in for follow up with CPAP  1   Moderate KALLI (AHI - 15 7) - on  autoPAP 8-15  He has noted some improvement with the change to autoPAP 8-15  However he is still having obstructive events and the machine is increase the pressure up to 15  However, he still has difficulty tolerating CPAP due to aerophagia and drooling          -  Switch to a trial of 11-15 today  Follow compliance data in 2 months  If this is not successful, we will need to switch to BiPAP instead      -    He is aware of the risk of leaving sleep apnea untreated including hypertension, heart failure, arrhythmia, MI and stroke      2     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 -  He  is still acting out his dreams         -   I have recommended that he start melatonin as this likely will be beneficial to prevent RBD but he has not tried it yet  Can also consider benzodiazepine in the future if needed but would like to avoid this        3   Periodic limb movement (PLM index - 109) -  This may be secondary to KALLI or Parkinsons  He denies symptoms of this  -    Optimize KALLI as documented above         -  We will trial Requip 0 5 qHS to see I this is helpful for PLMs as well as parkinson's  I recommended he discuss this with Dr Bahman Pavon (neurology)  4   Bilateral Diaphragmatic weakenss - R > L  PFTs without restriction, ABG without hypercapnia  -  While based on the SNIF he may benefit from BiPAP he does not meet criteria via medicare for hypercapnic respiratory failure  Will switch to BiPAP if this does not improve        ______________________________________________________________________    HPI:    Reynaldo Ellis presents today for follow-up for sleep troubles  He states that he has noted some improvement with switch of CPAP to lower autoCPAP pressures  He still notes some aerophagia but it has improved overall  He also has noted drooling which pools in his mask as well which has been problematic for him  He is still tired overall and has not had a significant improvement with the machine  He still is acting abnormally and swinging his arms in sleep  He is kicking a lot according to his wife and is even doing it when sleeping in the recliner  He does note some jerking movements in his legs and restless symptoms at bedtime  He does admit that this will occasionally wake him up  Review of Systems:  Review of Systems   Constitutional: Negative for appetite change and fever  HENT: Negative for ear pain, postnasal drip, rhinorrhea, sneezing, sore throat and trouble swallowing  Respiratory: Positive for shortness of breath  Negative for chest tightness  Cardiovascular: Negative for chest pain  Endocrine: Negative  Genitourinary: Negative  Musculoskeletal: Positive for gait problem  Negative for myalgias  Allergic/Immunologic: Negative  Neurological: Positive for weakness  Negative for dizziness, tremors, speech difficulty and headaches  Hematological: Negative  Psychiatric/Behavioral: Positive for sleep disturbance           Social history updates:  Social History     Tobacco Use Smoking Status Former Smoker    Packs/day: 0 50    Years: 3 50    Pack years: 1 75    Types: Cigarettes    Last attempt to quit:     Years since quittin 7   Smokeless Tobacco Never Used     Social History     Socioeconomic History    Marital status: /Civil Union     Spouse name: Not on file    Number of children: Not on file    Years of education: Not on file    Highest education level: Not on file   Occupational History    Not on file   Social Needs    Financial resource strain: Not on file    Food insecurity:     Worry: Not on file     Inability: Not on file    Transportation needs:     Medical: Not on file     Non-medical: Not on file   Tobacco Use    Smoking status: Former Smoker     Packs/day: 0 50     Years: 3 50     Pack years: 1 75     Types: Cigarettes     Last attempt to quit:      Years since quittin 7    Smokeless tobacco: Never Used   Substance and Sexual Activity    Alcohol use: Not Currently     Comment: social    Drug use: No    Sexual activity: Never   Lifestyle    Physical activity:     Days per week: Not on file     Minutes per session: Not on file    Stress: Not on file   Relationships    Social connections:     Talks on phone: Not on file     Gets together: Not on file     Attends Yazidi service: Not on file     Active member of club or organization: Not on file     Attends meetings of clubs or organizations: Not on file     Relationship status: Not on file    Intimate partner violence:     Fear of current or ex partner: Not on file     Emotionally abused: Not on file     Physically abused: Not on file     Forced sexual activity: Not on file   Other Topics Concern    Not on file   Social History Narrative    Consumes 5 glasses of tea per week       PhysicalExamination:  Vitals:   /80   Pulse 82   Temp 98 6 °F (37 °C)   Resp 14   Ht 6' (1 829 m)   Wt 104 kg (230 lb)   SpO2 93%   BMI 31 19 kg/m²    General: Pleasant, Awake alert and oriented x 3, conversant without conversational dyspnea, NAD, normal affect  HEENT:  PERRL, Sclera noninjected, nonicteric OU, Nares patent,  no craniofacial abnormalities, Mucous membranes, moist, no oral lesions, normal dentition  NECK: Trachea midline, no accessory muscle use, no stridor, no cervical or supraclavicular adenopathy, JVP not elevated  CARDIAC: Reg, single s1/S2, no m/r/g  PULM: CTA bilaterally no wheezing, rhonchi or rales  ABD: Normoactive bowel sounds, soft nontender, nondistended, no rebound, no rigidity, no guarding  EXT: No cyanosis, no clubbing, no edema, normal capillary refill  NEURO: + monotone delayed slow speech, mild ataxia AAOx3, moving all extremities appropriately      Diagnostic Data:  Labs: I personally reviewed the most recent laboratory data pertinent to today's visit  I have personally reviewed pertinent lab results  Lab Results   Component Value Date    WBC 6 53 2018    HGB 15 3 2018    HCT 46 8 2018    MCV 93 2018     2018     Lab Results   Component Value Date    CALCIUM 8 8 10/11/2018    K 3 7 10/11/2018    CO2 26 10/11/2018     10/11/2018    BUN 22 10/11/2018    CREATININE 1 07 10/11/2018     No results found for: IGE  Lab Results   Component Value Date    ALT 28 10/11/2018    AST 15 10/11/2018    ALKPHOS 57 10/11/2018       PFT results: The most recent pulmonary function tests were reviewed    3/4/19  Results:  FEV1/FVC Ratio: 80 %  Forced Vital Capacity: 4 80 L    93 % predicted  FEV1: 3 85 L     97 % predicted     Lung volumes by body plethysmography:   Total Lung Capacity 94 % predicted   Residual volume 88 % predicted     DLCO corrected for patients hemoglobin level: 79 %     Interpretation:     · No obstructive airflow defect      · Normal Spirometry     · Normal Lung volumes     · Normal diffusion capacity    6 minute walk  Date of testin2019     Resting room air saturation: 93%  Randy scale of dyspnea at start of test: 0/10     Ambulation testing:  Lowest saturation 93%  No supplemental oxygen required     Randy scale of dyspnea at end of test: 3/10  Reason if test was stopped early: N/A      Total 6 minute walk distance: 1400 feet    Imaging:  I personally reviewed the images on the [facility] system pertinent to today's visit  CT chest - 5/20/19  IMPRESSION:  Within normal limits CT of the chest     Other studies:  HST - moderate (15 7), Supine AHI - 23, hypoxia   CPAP titration - Nasal CPAP was titrated from 5-11 cm of water for  control of snoring and abnormal breathing  Patient appeared to do best at 11 cm of CPAP delivered using a [address] full face interface  Continued use of this equipment at these settings is recommended      Compliance Data:  8/25/19-9/23/19                                   Type of CPAP:  autoPAP 8-15, Mean - 11 9, Peak - 15 6                                   Percent usage: 63%                                   Average time used: 2hrs 52 mins - 4 hrs 32 mins                                  Time in large leak: 4 mins 44 secs                                   Residual AHI: 13 6  (CA - 0 9)                                         Lisa Strange DO

## 2019-09-24 NOTE — PROGRESS NOTES
Progress note - Pulmonary Medicine   Roxane Weaver 60 y o  male MRN: [id number]       Impression & Plan:   60 y o  M with PMHx of Parkinson's disease with diaphragmatic weakness, chronic knee and ankle pain, urinary retention s/p TURP, Moderate KALLI on CPAP who comes in for follow up with CPAP  1   Moderate KALLI (AHI - 15 7) - on autoPAP 8-15  He has noted some improvement with the change to autoPAP 8-15  However he is still having obstructive events and the machine is increase the pressure up to 15  However, he still has difficulty tolerating CPAP due to aerophagia and drooling          -  Switch to a trial of 11-15 today  Follow compliance data in 2 months  If this is not successful, we will need to switch to BiPAP instead      -  He is aware of the risk of leaving sleep apnea untreated including hypertension, heart failure, arrhythmia, MI and stroke      2    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 -  He is still acting out his dreams         -  I have recommended that he start melatonin as this likely will be beneficial to prevent RBD but he has not tried it yet  Can also consider benzodiazepine in the future if needed but would like to avoid this        3   Periodic limb movement (PLM index - 109) -  This may be secondary to KALLI or Parkinsons  He denies symptoms of this  -  Optimize KALLI as documented above  -  We will trial Requip 0 5 qHS to see I this is helpful for PLMs as well as parkinson's  I recommended he discuss this with Dr Yasmin Bennett (neurology)  4   Bilateral Diaphragmatic weakenss - R > L  PFTs without restriction, ABG without hypercapnia  -  While based on the SNIF he may benefit from BiPAP he does not meet criteria via medicare for hypercapnic respiratory failure  Will switch to BiPAP if this does not improve        ______________________________________________________________________    HPI:    Roxane Weaver presents today for follow-up for sleep troubles    He states that he has noted some improvement with switch of CPAP to lower autoCPAP pressures  He still notes some aerophagia but it has improved overall  He also has noted drooling which pools in his mask as well which has been problematic for him  He is still tired overall and has not had a significant improvement with the machine  He still is acting abnormally and swinging his arms in sleep  He is kicking a lot according to his wife and is even doing it when sleeping in the recliner  He does note some jerking movements in his legs and restless symptoms at bedtime  He does admit that this will occasionally wake him up  Review of Systems:  Review of Systems   Constitutional: Negative for appetite change and fever  HENT: Negative for ear pain, postnasal drip, rhinorrhea, sneezing, sore throat and trouble swallowing  Respiratory: Positive for shortness of breath  Negative for chest tightness  Cardiovascular: Negative for chest pain  Endocrine: Negative  Genitourinary: Negative  Musculoskeletal: Positive for gait problem  Negative for myalgias  Allergic/Immunologic: Negative  Neurological: Positive for weakness  Negative for dizziness, tremors, speech difficulty and headaches  Hematological: Negative  Psychiatric/Behavioral: Positive for sleep disturbance           Social history updates:  Social History     Tobacco Use   Smoking Status Former Smoker    Packs/day: 0 50    Years: 3 50    Pack years: 1 75    Types: Cigarettes    Last attempt to quit:     Years since quittin 7   Smokeless Tobacco Never Used     Social History     Socioeconomic History    Marital status: /Civil Union     Spouse name: Not on file    Number of children: Not on file    Years of education: Not on file    Highest education level: Not on file   Occupational History    Not on file   Social Needs    Financial resource strain: Not on file    Food insecurity:     Worry: Not on file     Inability: Not on file    Transportation needs:     Medical: Not on file     Non-medical: Not on file   Tobacco Use    Smoking status: Former Smoker     Packs/day: 0 50     Years: 3 50     Pack years: 1 75     Types: Cigarettes     Last attempt to quit: 1980     Years since quittin 7    Smokeless tobacco: Never Used   Substance and Sexual Activity    Alcohol use: Not Currently     Comment: social    Drug use: No    Sexual activity: Never   Lifestyle    Physical activity:     Days per week: Not on file     Minutes per session: Not on file    Stress: Not on file   Relationships    Social connections:     Talks on phone: Not on file     Gets together: Not on file     Attends Congregation service: Not on file     Active member of club or organization: Not on file     Attends meetings of clubs or organizations: Not on file     Relationship status: Not on file    Intimate partner violence:     Fear of current or ex partner: Not on file     Emotionally abused: Not on file     Physically abused: Not on file     Forced sexual activity: Not on file   Other Topics Concern    Not on file   Social History Narrative    Consumes 5 glasses of tea per week       PhysicalExamination:  Vitals:   /80   Pulse 82   Temp 98 6 °F (37 °C)   Resp 14   Ht 6' (1 829 m)   Wt 104 kg (230 lb)   SpO2 93%   BMI 31 19 kg/m²   General: Pleasant, Awake alert and oriented x 3, conversant without conversational dyspnea, NAD, normal affect  HEENT:  PERRL, Sclera noninjected, nonicteric OU, Nares patent,  no craniofacial abnormalities, Mucous membranes, moist, no oral lesions, normal dentition  NECK: Trachea midline, no accessory muscle use, no stridor, no cervical or supraclavicular adenopathy, JVP not elevated  CARDIAC: Reg, single s1/S2, no m/r/g  PULM: CTA bilaterally no wheezing, rhonchi or rales  ABD: Normoactive bowel sounds, soft nontender, nondistended, no rebound, no rigidity, no guarding  EXT: No cyanosis, no clubbing, no edema, normal capillary refill  NEURO: + monotone delayed slow speech, mild ataxia AAOx3, moving all extremities appropriately      Diagnostic Data:  Labs: I personally reviewed the most recent laboratory data pertinent to today's visit  I have personally reviewed pertinent lab results  Lab Results   Component Value Date    WBC 6 53 2018    HGB 15 3 2018    HCT 46 8 2018    MCV 93 2018     2018     Lab Results   Component Value Date    CALCIUM 8 8 10/11/2018    K 3 7 10/11/2018    CO2 26 10/11/2018     10/11/2018    BUN 22 10/11/2018    CREATININE 1 07 10/11/2018     No results found for: IGE  Lab Results   Component Value Date    ALT 28 10/11/2018    AST 15 10/11/2018    ALKPHOS 57 10/11/2018       PFT results: The most recent pulmonary function tests were reviewed  3/4/19  Results:  FEV1/FVC Ratio: 80 %  Forced Vital Capacity: 4 80 L    93 % predicted  FEV1: 3 85 L     97 % predicted     Lung volumes by body plethysmography:   Total Lung Capacity 94 % predicted   Residual volume 88 % predicted     DLCO corrected for patients hemoglobin level: 79 %     Interpretation:     · No obstructive airflow defect      · Normal Spirometry     · Normal Lung volumes     · Normal diffusion capacity    6 minute walk  Date of testin2019     Resting room air saturation: 93%  Randy scale of dyspnea at start of test: 0/10     Ambulation testing:  Lowest saturation 93%  No supplemental oxygen required     Randy scale of dyspnea at end of test: 3/10  Reason if test was stopped early: N/A      Total 6 minute walk distance: 1400 feet    Imaging:  I personally reviewed the images on the [facility] system pertinent to today's visit  CT chest - 19  IMPRESSION:  Within normal limits CT of the chest     Other studies:  HST - moderate (15 7), Supine AHI - 23, hypoxia   CPAP titration - Nasal CPAP was titrated from 5-11 cm of water for  control of snoring and abnormal breathing  Patient appeared to do best at 11 cm of CPAP delivered using a [address] full face interface  Continued use of this equipment at these settings is recommended      Compliance Data:  8/25/19-9/23/19                                   Type of CPAP:  autoPAP 8-15, Mean - 11 9, Peak - 15 6                                   Percent usage: 63%                                   Average time used: 2hrs 52 mins - 4 hrs 32 mins                                  Time in large leak: 4 mins 44 secs                                   Residual AHI: 13 6  (CA - 0 9)                                         Edu Dunn DO

## 2019-09-25 ENCOUNTER — PATIENT MESSAGE (OUTPATIENT)
Dept: NEUROLOGY | Facility: CLINIC | Age: 59
End: 2019-09-25

## 2019-09-25 NOTE — TELEPHONE ENCOUNTER
From: Candace Sepulveda  To: Baljit Danielson MD  Sent: 9/25/2019 1:12 PM EDT  Subject: Shaji Gandhi I visited my Pulmonologist, Dr [place] and he recommended I begin taking Requip at a dosage  of 0 5mg/day to reduce my restless leg symptoms during sleep  He mentioned this may also help some   Parkinson's symptoms, albeit only slightly  What is your opinion on this drug? Do you have any concerns or objections to me taking it?     Thank you,  Robert Henry

## 2019-09-26 ENCOUNTER — CLINICAL SUPPORT (OUTPATIENT)
Dept: PULMONOLOGY | Age: 59
End: 2019-09-26
Payer: COMMERCIAL

## 2019-09-26 DIAGNOSIS — J98.6 DIAPHRAGM DYSFUNCTION: ICD-10-CM

## 2019-09-26 PROCEDURE — G0239 OTH RESP PROC, GROUP: HCPCS

## 2019-10-01 ENCOUNTER — CLINICAL SUPPORT (OUTPATIENT)
Dept: PULMONOLOGY | Age: 59
End: 2019-10-01
Payer: COMMERCIAL

## 2019-10-01 DIAGNOSIS — J98.6 DIAPHRAGM DYSFUNCTION: ICD-10-CM

## 2019-10-01 PROCEDURE — G0239 OTH RESP PROC, GROUP: HCPCS

## 2019-10-03 ENCOUNTER — CLINICAL SUPPORT (OUTPATIENT)
Dept: PULMONOLOGY | Age: 59
End: 2019-10-03
Payer: COMMERCIAL

## 2019-10-03 DIAGNOSIS — J98.6 DIAPHRAGM DYSFUNCTION: ICD-10-CM

## 2019-10-03 PROCEDURE — G0239 OTH RESP PROC, GROUP: HCPCS

## 2019-10-04 DIAGNOSIS — G90.3 NEUROGENIC ORTHOSTATIC HYPOTENSION (HCC): ICD-10-CM

## 2019-10-04 RX ORDER — FLUDROCORTISONE ACETATE 0.1 MG/1
0.15 TABLET ORAL DAILY
Qty: 45 TABLET | Refills: 4 | Status: SHIPPED | OUTPATIENT
Start: 2019-10-04 | End: 2019-12-06

## 2019-10-04 NOTE — TELEPHONE ENCOUNTER
Pt called Rx line for med refill for Fludrocortisone 0 1mg tablet 30 day supply to be sent to [facility] pharmacy   Pt last ov 9/12/19, next f/u 12/10/19

## 2019-10-08 ENCOUNTER — CLINICAL SUPPORT (OUTPATIENT)
Dept: PULMONOLOGY | Age: 59
End: 2019-10-08
Payer: COMMERCIAL

## 2019-10-08 DIAGNOSIS — J98.6 DIAPHRAGM DYSFUNCTION: ICD-10-CM

## 2019-10-08 PROCEDURE — G0239 OTH RESP PROC, GROUP: HCPCS

## 2019-10-09 ENCOUNTER — TRANSCRIBE ORDERS (OUTPATIENT)
Dept: ADMINISTRATIVE | Facility: HOSPITAL | Age: 59
End: 2019-10-09

## 2019-10-09 ENCOUNTER — TELEPHONE (OUTPATIENT)
Dept: OTHER | Facility: HOSPITAL | Age: 59
End: 2019-10-09

## 2019-10-09 ENCOUNTER — APPOINTMENT (OUTPATIENT)
Dept: LAB | Facility: MEDICAL CENTER | Age: 59
End: 2019-10-09
Payer: COMMERCIAL

## 2019-10-09 DIAGNOSIS — N39.0 URINARY TRACT INFECTION WITHOUT HEMATURIA, SITE UNSPECIFIED: ICD-10-CM

## 2019-10-09 DIAGNOSIS — N40.1 ENLARGED PROSTATE WITH URINARY RETENTION: ICD-10-CM

## 2019-10-09 DIAGNOSIS — N39.0 URINARY TRACT INFECTION WITHOUT HEMATURIA, SITE UNSPECIFIED: Primary | ICD-10-CM

## 2019-10-09 DIAGNOSIS — R33.8 ENLARGED PROSTATE WITH URINARY RETENTION: ICD-10-CM

## 2019-10-09 LAB — PSA SERPL-MCNC: 0.2 NG/ML (ref 0–4)

## 2019-10-09 PROCEDURE — G0103 PSA SCREENING: HCPCS

## 2019-10-09 PROCEDURE — 36415 COLL VENOUS BLD VENIPUNCTURE: CPT

## 2019-10-10 ENCOUNTER — CLINICAL SUPPORT (OUTPATIENT)
Dept: PULMONOLOGY | Age: 59
End: 2019-10-10
Payer: COMMERCIAL

## 2019-10-10 ENCOUNTER — APPOINTMENT (OUTPATIENT)
Dept: LAB | Facility: MEDICAL CENTER | Age: 59
End: 2019-10-10
Payer: COMMERCIAL

## 2019-10-10 DIAGNOSIS — J98.6 DIAPHRAGM DYSFUNCTION: ICD-10-CM

## 2019-10-10 LAB
BACTERIA UR QL AUTO: ABNORMAL /HPF
BILIRUB UR QL STRIP: NEGATIVE
CLARITY UR: CLEAR
COLOR UR: YELLOW
GLUCOSE UR STRIP-MCNC: NEGATIVE MG/DL
HGB UR QL STRIP.AUTO: NEGATIVE
HYALINE CASTS #/AREA URNS LPF: ABNORMAL /LPF
KETONES UR STRIP-MCNC: NEGATIVE MG/DL
LEUKOCYTE ESTERASE UR QL STRIP: ABNORMAL
NITRITE UR QL STRIP: NEGATIVE
NON-SQ EPI CELLS URNS QL MICRO: ABNORMAL /HPF
PH UR STRIP.AUTO: 7 [PH]
PROT UR STRIP-MCNC: NEGATIVE MG/DL
RBC #/AREA URNS AUTO: ABNORMAL /HPF
SP GR UR STRIP.AUTO: 1.02 (ref 1–1.03)
UROBILINOGEN UR QL STRIP.AUTO: 1 E.U./DL
WBC #/AREA URNS AUTO: ABNORMAL /HPF

## 2019-10-10 PROCEDURE — 87086 URINE CULTURE/COLONY COUNT: CPT

## 2019-10-10 PROCEDURE — G0239 OTH RESP PROC, GROUP: HCPCS

## 2019-10-10 PROCEDURE — 81001 URINALYSIS AUTO W/SCOPE: CPT | Performed by: UROLOGY

## 2019-10-11 ENCOUNTER — TELEPHONE (OUTPATIENT)
Dept: NEUROLOGY | Facility: CLINIC | Age: 59
End: 2019-10-11

## 2019-10-11 LAB — BACTERIA UR CULT: NORMAL

## 2019-10-11 NOTE — TELEPHONE ENCOUNTER
Pt called and states that dr Sherren Curls ordered PT at [facility] and they can not find referral   He is requesting that referral be faxed to [phone]  attn:alphonso tabares  Referral faxed

## 2019-10-15 ENCOUNTER — CLINICAL SUPPORT (OUTPATIENT)
Dept: PULMONOLOGY | Age: 59
End: 2019-10-15
Payer: COMMERCIAL

## 2019-10-15 ENCOUNTER — TELEPHONE (OUTPATIENT)
Dept: NEUROLOGY | Facility: CLINIC | Age: 59
End: 2019-10-15

## 2019-10-15 DIAGNOSIS — R13.13 PHARYNGEAL DYSPHAGIA: ICD-10-CM

## 2019-10-15 DIAGNOSIS — G20 PARKINSON'S DISEASE (HCC): Primary | ICD-10-CM

## 2019-10-15 DIAGNOSIS — J98.6 DIAPHRAGM DYSFUNCTION: ICD-10-CM

## 2019-10-15 PROCEDURE — G0239 OTH RESP PROC, GROUP: HCPCS

## 2019-10-15 NOTE — PROGRESS NOTES
Pulmonary Rehabilitation Plan of Care   30 day Plan of Care          Today's date: 10/15/2019   Total visits to date: 9  Patient name: Rao Briceño      : 1960  Age: 60 y o  MRN: [id number]  Referring Physician: Yfn Roberts MD  Provider: [address]  Clinician: Fiorella Tran    Dx:   No diagnosis found  Date of onset: 8/15/19    COMMENTS:  [email or web address] order by Dr Morales  He has Parkinson's and diaphragm paralysis  He feels SOB when climbing stairs and walking uphill  He has gained strength and endurance since beginning, now walking on the treadmill at 2 mph with an incline of 1 5; life cycle at level 6 for 10 min; arm ergometer at level 5 for 10 min, Nustep at level 6 for 10 min and Cybex equipment for strength      Medication compliance: Yes   Comments: none  Fall Risk: Low   Comments: none    EXERCISE/ACTIVITY    Cardiopulmonary Goals:   Min: 30-50   HR:    RPE: 5-7 moderate to somewhat hard exercise   O2 sat: >90%    Modalities: Treadmill, UBE, Lifecycle, NuStep and Recumbent bike  Strength trainin-3 days / week, 12-15 repitations  and 1-2 sets per modality    Modalities: Leg press, Chest press, Lateral raise, Arm curl and Seated Row    Exercise Progression: see above    RPE 5  Home activity: little  Goals: 10% improvement in functional capacity, home exercise days opposite MN and >150 mins of exercise/wk  Education: Benefit of exercise, home exercise, pursed lip breathing, RPE scale and O2 saturation monitoring   Plan:home exercise target 30 mins, 2 days opposite MN and exercise education video  Readiness to change: Preparation    NUTRITION    Weight control:    Starting weight: 234 lb     Diabetes: N/A  Goals:Improved Rate Your Plate score  Education:More frequent meals, smaller portions  hydration  weight loss  healthy choices while dining out  portion control  Plan: Education Video- Diet and Nutrition  Readiness to change: Action    PSYCHOSOCIAL    Emotional: benefits of positive support system  Social support: good  Goals: Physical Fitness in [facility] Score < 3, Daily Activity in [facility] Score < 3, Pain in [place] Score < 3 and Overall Health in [facility] Score < 3  Education: Mental Health Education  Plan: Anxiety and Lung disease  Readiness to change: Action    OTHER CORE COMPONENTS     Tobacco:   Social History     Tobacco Use   Smoking Status Former Smoker    Packs/day: 0 50    Years: 3 50    Pack years: 1 75    Types: Cigarettes    Last attempt to quit: Michael Hemphill 39 Years since quittin 8   Smokeless Tobacco Never Used     Oxygen: room air  Blood pressure:    Resting:     Exercise:    Goals: consistent exercise >150 mins/wk  Education: Pulmonary Disease, physiology, Exercise, strength, flexibility, Conservation of energy, oxygen, breathing techniques, Mental Health, Diet,nutrition, Medication, Avoiding Infections and Caregivers  Plan: Exercise benefits  Readiness to change: Action

## 2019-10-15 NOTE — TELEPHONE ENCOUNTER
[facility] from [facility] called requesting updated script for speech therapy   Requesting script be faxed to [phone]

## 2019-10-16 ENCOUNTER — PATIENT MESSAGE (OUTPATIENT)
Dept: NEUROLOGY | Facility: CLINIC | Age: 59
End: 2019-10-16

## 2019-10-16 DIAGNOSIS — G20 PARKINSON'S DISEASE (HCC): Primary | ICD-10-CM

## 2019-10-16 DIAGNOSIS — G25.81 RESTLESS LEG SYNDROME: ICD-10-CM

## 2019-10-17 ENCOUNTER — PATIENT MESSAGE (OUTPATIENT)
Dept: NEUROLOGY | Facility: CLINIC | Age: 59
End: 2019-10-17

## 2019-10-17 ENCOUNTER — OFFICE VISIT (OUTPATIENT)
Dept: CARDIOLOGY CLINIC | Facility: CLINIC | Age: 59
End: 2019-10-17
Payer: COMMERCIAL

## 2019-10-17 ENCOUNTER — CLINICAL SUPPORT (OUTPATIENT)
Dept: PULMONOLOGY | Age: 59
End: 2019-10-17
Payer: COMMERCIAL

## 2019-10-17 ENCOUNTER — DOCUMENTATION (OUTPATIENT)
Dept: NEUROLOGY | Facility: CLINIC | Age: 59
End: 2019-10-17

## 2019-10-17 VITALS
HEART RATE: 80 BPM | DIASTOLIC BLOOD PRESSURE: 72 MMHG | BODY MASS INDEX: 31.69 KG/M2 | HEIGHT: 72 IN | WEIGHT: 234 LBS | OXYGEN SATURATION: 98 % | SYSTOLIC BLOOD PRESSURE: 120 MMHG

## 2019-10-17 DIAGNOSIS — J98.6 DIAPHRAGM DYSFUNCTION: ICD-10-CM

## 2019-10-17 DIAGNOSIS — G90.3 NEUROGENIC ORTHOSTATIC HYPOTENSION (HCC): Primary | ICD-10-CM

## 2019-10-17 PROCEDURE — G0239 OTH RESP PROC, GROUP: HCPCS

## 2019-10-17 PROCEDURE — 99214 OFFICE O/P EST MOD 30 MIN: CPT | Performed by: INTERNAL MEDICINE

## 2019-10-17 RX ORDER — GABAPENTIN 100 MG/1
CAPSULE ORAL
Qty: 90 CAPSULE | Refills: 3 | Status: SHIPPED | OUTPATIENT
Start: 2019-10-17 | End: 2019-11-22

## 2019-10-17 NOTE — PROGRESS NOTES
Today I faxed the order for Physical Therapy to ATTN: [facility]; FAX# [phone] as per requested by Dr Briana Quispe and the patient

## 2019-10-17 NOTE — PROGRESS NOTES
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, obstructive sleep apnea on CPAP, on testicular cancer in the past, bilateral inguinal hernia repair, nephrolithiasis, BPH presents for cardiology follow-up  He denies any chest pain, shortness of breath, palpitations, or syncope  He does get some lightheadedness at times that is not a big problem for him  He also gets the dizziness that is separate feeling when he moves his head quickly  He is in pulmonary rehab because he was found to have dysfunction of his diaphragm      Patient Active Problem List   Diagnosis    Primary testicular cancer ([facility] )    Dizziness    Plantar fasciitis, bilateral    Unspecified abnormalities of gait and mobility    Pain of right thumb    Pain of left thumb    Right elbow pain    Right wrist pain    Shortness of breath    Neurogenic orthostatic hypotension (HCC)    Chronic pain of right ankle    Chronic pain of left knee    [facility]'s disease ([facility] )    Thyromegaly    KALLI (obstructive sleep apnea)    Change in voice    Gastroesophageal reflux disease with esophagitis    Diaphragm dysfunction    Periodic limb movement disorder (PLMD)    Tinea corporis       Past Surgical History  Past Surgical History:   Procedure Laterality Date    COLONOSCOPY      HERNIA REPAIR      ,  inguinal hernia repair    KIDNEY STONE SURGERY      LITHOTRIPSY      Renal; Resolved: 07    ORCHIECTOMY Left     WA CYSTOURETHRO W/IMPLANT N/A 2018    Procedure: CYSTOSCOPY WITH INSERTION UROLIFT;  Surgeon: Saleem Mcclain DO;  Location: [facility];  Service: Urology    PROSTATE SURGERY N/A 2018    Procedure: CYSTOSCOPY WITH INSERTION Lorraine Oconnell;  Surgeon: Saleem Mcclain DO;  Location[facility];  Service: Urology    [address]    For cancer    TONSILLECTOMY      URETERONEOCYSTOSTOMY      w/ cystoscopy Ureteral Stent Placement;  Resolved: 2007    WISDOM TOOTH EXTRACTION         Social History   Social History     Socioeconomic History    Marital status: /Civil Union     Spouse name: Not on file    Number of children: Not on file    Years of education: Not on file    Highest education level: Not on file   Occupational History    Not on file   Social Needs    Financial resource strain: Not on file    Food insecurity:     Worry: Not on file     Inability: Not on file    Transportation needs:     Medical: Not on file     Non-medical: Not on file   Tobacco Use    Smoking status: Former Smoker     Packs/day: 0 50     Years: 3 50     Pack years: 1 75     Types: Cigarettes     Last attempt to quit:      Years since quittin 8    Smokeless tobacco: Never Used   Substance and Sexual Activity    Alcohol use: Not Currently     Comment: social    Drug use: No    Sexual activity: Never   Lifestyle    Physical activity:     Days per week: Not on file     Minutes per session: Not on file    Stress: Not on file   Relationships    Social connections:     Talks on phone: Not on file     Gets together: Not on file     Attends Restorationist service: Not on file     Active member of club or organization: Not on file     Attends meetings of clubs or organizations: Not on file     Relationship status: Not on file    Intimate partner violence:     Fear of current or ex partner: Not on file     Emotionally abused: Not on file     Physically abused: Not on file     Forced sexual activity: Not on file   Other Topics Concern    Not on file   Social History Narrative    Consumes 5 glasses of tea per week        No Known Allergies    Family History   Family History   Problem Relation Age of Onset    Colon cancer Father    [address] [facility] Heart failure Father     Parkinsonism Mother     Cancer Paternal Grandmother        Review of Systems:  Review of Systems   Constitutional: Negative for chills, fatigue and fever  HENT: Negative for hearing loss and trouble swallowing  Eyes: Negative for pain  Respiratory: Negative for cough, chest tightness and shortness of breath  Cardiovascular: Negative for chest pain, palpitations and leg swelling  Gastrointestinal: Negative for abdominal pain, blood in stool, nausea and vomiting  Endocrine: Negative for cold intolerance and heat intolerance  Genitourinary: Negative for difficulty urinating, frequency and hematuria  Musculoskeletal: Negative for arthralgias and neck pain  Skin: Negative for rash  Allergic/Immunologic: Negative for environmental allergies  Neurological: Positive for dizziness and numbness  Negative for weakness and headaches  Hematological: Does not bruise/bleed easily  Psychiatric/Behavioral: Negative for decreased concentration and sleep disturbance  The patient is not nervous/anxious  Current Outpatient Medications:     Ascorbic Acid (VITAMIN C) 100 MG tablet, Take 100 mg by mouth daily, Disp: , Rfl:     calcium carbonate (TUMS) 500 mg chewable tablet, Chew 1 tablet as needed for indigestion or heartburn, Disp: , Rfl:     econazole nitrate 1 % cream, Apply topically daily, Disp: 30 g, Rfl: 1    fludrocortisone (FLORINEF) 0 1 mg tablet, Take 1 5 tablets (0 15 mg total) by mouth daily, Disp: 45 tablet, Rfl: 4    gabapentin (NEURONTIN) 100 mg capsule, Follow clinic instructions for titration up to 3 tabs at bedtime  , Disp: 90 capsule, Rfl: 3    meloxicam (MOBIC) 15 mg tablet, TAKE 1 TABLET BY MOUTH EVERY DAY, Disp: 30 tablet, Rfl: 1    multivitamin (THERAGRAN) TABS, Take 1 tablet by mouth daily, Disp: , Rfl:     omeprazole (PriLOSEC) 20 mg delayed release capsule, Take 1 capsule (20 mg total) by mouth daily before breakfast, Disp: 90 capsule, Rfl: 1    Psyllium 400 MG CAPS, Take 0 52 g by mouth, Disp: , Rfl:     selegiline (ELDEPRYL) 5 mg capsule, Take 5 mg by mouth 2 (two) times a day, Disp: , Rfl: 11    vitamin E 100 UNIT capsule, Take 100 Units by mouth daily, Disp: , Rfl:     Wheat Dextrin (BENEFIBER DRINK MIX PO), , Disp: , Rfl:     rOPINIRole (REQUIP) 0 5 mg tablet, Take 1 tablet (0 5 mg total) by mouth daily at bedtime (Patient not taking: Reported on 10/17/2019), Disp: 30 tablet, Rfl: 1     Physical Exam:    Vitals:    10/17/19 0830   BP: 120/72   BP Location: Left arm   Patient Position: Sitting   Cuff Size: Adult   Pulse: 80   SpO2: 98%   Weight: 106 kg (234 lb)   Height: 6' (1 829 m)       Physical Exam   Constitutional: He is oriented to person, place, and time  He appears well-developed and well-nourished  HENT:   Head: Normocephalic  Right Ear: External ear normal    Left Ear: External ear normal    Mouth/Throat: Oropharynx is clear and moist    Eyes: Pupils are equal, round, and reactive to light  Neck: No JVD present  Carotid bruit is not present  Cardiovascular: Normal rate, regular rhythm and intact distal pulses  Exam reveals no gallop and no friction rub  No murmur heard  Pulmonary/Chest: Effort normal and breath sounds normal  No tachypnea  No respiratory distress  He has no wheezes  He has no rales  He exhibits no tenderness  Abdominal: Soft  He exhibits no distension  There is no tenderness  There is no rebound and no guarding  Musculoskeletal: He exhibits no edema  Neurological: He is alert and oriented to person, place, and time  Skin: Skin is warm and dry  Psychiatric: He has a normal mood and affect  His behavior is normal  Judgment and thought content normal    Nursing note and vitals reviewed  Labs:not applicable    Assessment/Plan:    1  Orthostatic hypotension-this is stable on the Florinef      2  Prevention of cardiovascular disease-we discussed the possibility of calcium score-his 10 year risk is less than 10%  Given the complex relationship between statins and parkinsonism we will hold off for now but will consider this in the future  We will see him back in 1 year for follow-up  Thank you so much, please do not hesitate to contact me with any questions or concerns         May Newman MD  10/17/2019  8:43 AM

## 2019-10-17 NOTE — TELEPHONE ENCOUNTER
From: Issac Hoyt  To: Justin Michael MD  Sent: 10/16/2019 11:57 PM EDT  Subject: Referral Mahamed Moran,  I recently completed PT, OT and ST evaluations at [facility]  I've been approved to participate in their [facility] [employer]   Before I can start, they need one more document  Can you please send a prescription  for "Treatment for Parkinson's Disease including Parkinson's Disease fitness programs"  to [facility] ? Please fax to: [facility] [phone] attn: Jose Carrasco   Thank you  Another request a few weeks ago, I mentioned that Dr Maddie Watson prescribed Requip  to improve my involuntary leg movements  Dr Shay Bay at [facility] tells me this med  would disqualify me from their upcoming [facility] trial, and suggested using Gabapentin instead  What are your thoughts on this?     Thank you,  Marylou Seip

## 2019-10-18 NOTE — TELEPHONE ENCOUNTER
From: Issac Hoty  To: Justin Michael MD  Sent: 10/17/2019 11:35 PM EDT  Subject: Referral Request    Hi Dr Garima Moran,  Thanks for faxing the script to [facility]  Regarding the [facility] trial: I'm quite interested in the trial, but I will give it up if there's a better option  I have not yet started taking Requip  Do I sense from your note that Requip would be a better choice than Gabapentin? If you feel it is, I will start using it  I feel that would better suit me than holding out for a chance to participate in the [facility] trial     Thank you for your attention,  Marylou Seip  ----- Message -----  From: Justin Michael MD  Sent: 10/17/2019 8:15 AM EDT  To: Issac Hoyt  Subject: RE: Referral Request  Good morning Mr Franki Marks,     Certainly, and I have placed in the order  We are faxing it over now  Regarding your restless leg syndrome, how interested are you in the [facility] trial? And how well is the Requip helping? Since Requip is supposed to help your parkinsons, its possible you may feel a bit worse PD-wise if we make the switch  If you would like to keep your options open, we can switch you to a gabapentin trial (you'd stop the Requip first though)  I've sent a scrip to your pharmacy  It would be GABAPENTIN 100mg tablets to be taken according to the following: At any point if symptoms are controlled then can hold that dose without going up further  If feels any side effects (sedation) that are intolerable, then go back down to next lower dose and hold there, then call us  Alternative is topiramate, but has side effect of increasing paresthesia  will be sending one script for gabapentin for neuropathic pain, starting cautiously on GABAPENTIN 100mg tablets as such:   Week 1: take 100mg at bedtime   Week 2: take 200mg at bedtime   Week 3: take 300mg at bedtime       Thank you  Suresh Woodard      ----- Message -----   From: Issac Hoyt   Sent: 10/16/2019 11:57 PM EDT   To: Justin Michael MD  Subject: Referral Request    Rachelle Wing,  I recently completed PT, OT and ST evaluations at [place] [address]  I've been [facility] [employer]   Before I can start, they need one more document  Can you please send a prescription  for "Treatment for Parkinson's Disease including Parkinson's Disease fitness programs"  to [facility] ? Please fax to: [place] [address] [phone] attn: Rochelle Jaime   Thank you  Another request a few weeks ago, I mentioned that Dr Moira Adamson prescribed Requip  to improve my involuntary leg movements  Dr Libby Dominguez at [facility] tells me this med  would disqualify me from their upcoming [facility] trial, and suggested using Gabapentin instead  What are your thoughts on this?     Thank you,  Luz Reyes

## 2019-10-22 ENCOUNTER — CLINICAL SUPPORT (OUTPATIENT)
Dept: PULMONOLOGY | Age: 59
End: 2019-10-22
Payer: COMMERCIAL

## 2019-10-22 DIAGNOSIS — J98.6 DIAPHRAGM DYSFUNCTION: ICD-10-CM

## 2019-10-22 PROCEDURE — G0239 OTH RESP PROC, GROUP: HCPCS

## 2019-10-24 ENCOUNTER — CLINICAL SUPPORT (OUTPATIENT)
Dept: PULMONOLOGY | Age: 59
End: 2019-10-24
Payer: COMMERCIAL

## 2019-10-24 DIAGNOSIS — J98.6 DIAPHRAGM DYSFUNCTION: ICD-10-CM

## 2019-10-24 PROCEDURE — G0239 OTH RESP PROC, GROUP: HCPCS

## 2019-10-29 ENCOUNTER — CLINICAL SUPPORT (OUTPATIENT)
Dept: PULMONOLOGY | Age: 59
End: 2019-10-29
Payer: COMMERCIAL

## 2019-10-29 DIAGNOSIS — J98.6 DIAPHRAGM DYSFUNCTION: ICD-10-CM

## 2019-10-29 PROCEDURE — G0239 OTH RESP PROC, GROUP: HCPCS

## 2019-10-30 DIAGNOSIS — M79.644 PAIN OF RIGHT THUMB: ICD-10-CM

## 2019-10-30 RX ORDER — MELOXICAM 15 MG/1
TABLET ORAL
Qty: 30 TABLET | Refills: 1 | Status: SHIPPED | OUTPATIENT
Start: 2019-10-30 | End: 2019-12-19 | Stop reason: SDUPTHER

## 2019-10-31 ENCOUNTER — CLINICAL SUPPORT (OUTPATIENT)
Dept: PULMONOLOGY | Age: 59
End: 2019-10-31
Payer: COMMERCIAL

## 2019-10-31 DIAGNOSIS — J98.6 DIAPHRAGM DYSFUNCTION: ICD-10-CM

## 2019-10-31 PROCEDURE — G0239 OTH RESP PROC, GROUP: HCPCS

## 2019-11-01 ENCOUNTER — TELEPHONE (OUTPATIENT)
Dept: PULMONOLOGY | Facility: HOSPITAL | Age: 59
End: 2019-11-01

## 2019-11-01 NOTE — TELEPHONE ENCOUNTER
Patient needs a 6m f/u//prior f/u w dr watson and pulm rehab [place]  Reminder card sent

## 2019-11-04 ENCOUNTER — OFFICE VISIT (OUTPATIENT)
Dept: PULMONOLOGY | Facility: CLINIC | Age: 59
End: 2019-11-04

## 2019-11-04 DIAGNOSIS — J98.6 DIAPHRAGM DYSFUNCTION: Primary | ICD-10-CM

## 2019-11-04 PROCEDURE — RECHECK: Performed by: INTERNAL MEDICINE

## 2019-11-04 NOTE — PROGRESS NOTES
Medical Director 30 day Pulmonary Rehabilitation Review    I certify that I have met with Jordyn macias to provide a 30 day progress review of his/her pulmonary rehabilitation program at [address] [place] [address]  I have reviewed the most recent individualized treatment plan (ITP), outcomes assessment, and provided opportunity for discussion with the patient    Comments: feeling improvement and desires to continue   He has not started home exercise but this was encouraged today    Continue with current treatment plan yes    Please provide the following modifications to the current treatment plan: N/A      Meredith Cao DO

## 2019-11-05 ENCOUNTER — CLINICAL SUPPORT (OUTPATIENT)
Dept: PULMONOLOGY | Age: 59
End: 2019-11-05
Payer: COMMERCIAL

## 2019-11-05 DIAGNOSIS — J98.6 DIAPHRAGM DYSFUNCTION: ICD-10-CM

## 2019-11-05 PROCEDURE — G0239 OTH RESP PROC, GROUP: HCPCS

## 2019-11-05 NOTE — PROGRESS NOTES
Dr Yin Mcclure,    Just an [id number] regarding your patient Maranda Rondon: He comes into pulmonary rehab with a BP ranging from 800-631 RCQLAEAM/33-27 diastolic and feeling good  Once he starts exercise, he begins to feel lightheaded and dizziness  Severe lightheadedness and dizziness occurs while he is on the Lifecycle bike and then continues throughout exercise session  He hydrates during and takes breaks  His recovery BP drops down to  systolic/ 22-28 diastolic  He rests, drinks water, and sometimes eats a snack  He is being treated for hypotension but I just wanted you to be aware of this  Thank you      Jami Layton, MS  Cardiopulmonary Rehab

## 2019-11-07 ENCOUNTER — APPOINTMENT (OUTPATIENT)
Dept: PULMONOLOGY | Age: 59
End: 2019-11-07
Payer: COMMERCIAL

## 2019-11-12 ENCOUNTER — APPOINTMENT (OUTPATIENT)
Dept: PULMONOLOGY | Age: 59
End: 2019-11-12
Payer: COMMERCIAL

## 2019-11-12 NOTE — PROGRESS NOTES
Pulmonary Rehabilitation Plan of Care   60 day Plan of Care          Today's date: 2019   Total visits to date: 13  Patient name: Kaela Tam      : 1960  Age: 60 y o  MRN: [id number]  Referring Physician: Karrie Hodge MD  Provider: Bernice Gore  Clinician: Nikki Tejada MS    Dx:   Encounter Diagnosis   Name Primary?  Diaphragm dysfunction      Date of onset: 8/15/19    COMMENTS:  [facility]  as per order by Dr Morales  He has Parkinson's and diaphragm paralysis  He has been progressing very well as tolerated  His BP when at rest before exercise ranges from 150-632 diastolic and  diastolic  After completing a few pieces of equipment, he has been feeling lightheaded and dizzy  He rests and hydrates but symptoms seem to stay  His recovery BP ranges from  systolic and 76-47 diastolic  After his last visit on 19, Dr Rose Echeverria was contacted to be aware of this  Patient has called and will not be returning to rehab until   Reason was not stated  He has an appointment with his pulmonary doc on   Medication compliance: Yes   Comments: none  Fall Risk: Low   Comments: none    EXERCISE/ACTIVITY    Cardiopulmonary Goals:   Min: 30-50   HR:    RPE: 5-7 moderate to somewhat hard exercise   O2 sat: >90%    Modalities: Treadmill, UBE, Lifecycle, NuStep and Recumbent bike  Strength trainin-3 days / week, 12-15 repitations  and 1-2 sets per modality    Modalities: Leg press, Chest press, Lateral raise, Arm curl and Seated Row    Exercise Progression: Treadmill for 15 minutes at 2  1mph and 1 5% grade  Lifecycle bike for 10 minutes at level 7, UBE for 10 minutes at level 5, weight machines for strength training       RPE 5  Home activity: little  Goals: 10% improvement in functional capacity, home exercise days opposite FL and >150 mins of exercise/wk  Education: Benefit of exercise, home exercise, pursed lip breathing, RPE scale and O2 saturation monitoring   Plan:home exercise target 30 mins, 2 days opposite SC and exercise education video  Readiness to change: Action    NUTRITION    Weight control:    Starting weight: 234 lb     Diabetes: N/A  Goals:Improved Rate Your Plate score  Education:More frequent meals, smaller portions  hydration  weight loss  healthy choices while dining out  portion control  Plan: Education Video- Diet and Nutrition  Readiness to change: Action    PSYCHOSOCIAL    Emotional: benefits of positive support system  Social support: good  Goals: Physical Fitness in [facility] Score < 3, Daily Activity in [facility] Score < 3, Pain in [place] Score < 3 and Overall Health in [facility] Score < 3  Education: Mental Health Education  Plan: Anxiety and Lung disease  Readiness to change: Action    OTHER CORE COMPONENTS     Tobacco:   Social History     Tobacco Use   Smoking Status Former Smoker    Packs/day: 0 50    Years: 3 50    Pack years: 1 75    Types: Cigarettes    Last attempt to quit: Dayo Mcmanus Years since quittin 8   Smokeless Tobacco Never Used     Oxygen: room air  Blood pressure:    Restin/97   Recovery: 82/48  Goals: consistent exercise >150 mins/wk  Education: Pulmonary Disease, physiology, Exercise, strength, flexibility, Conservation of energy, oxygen, breathing techniques, Mental Health, Diet,nutrition, Medication, Avoiding Infections and Caregivers  Plan: Exercise benefits  Readiness to change: Action

## 2019-11-14 ENCOUNTER — APPOINTMENT (OUTPATIENT)
Dept: PULMONOLOGY | Age: 59
End: 2019-11-14
Payer: COMMERCIAL

## 2019-11-19 ENCOUNTER — PATIENT MESSAGE (OUTPATIENT)
Dept: NEUROLOGY | Facility: CLINIC | Age: 59
End: 2019-11-19

## 2019-11-19 ENCOUNTER — APPOINTMENT (OUTPATIENT)
Dept: PULMONOLOGY | Age: 59
End: 2019-11-19
Payer: COMMERCIAL

## 2019-11-20 ENCOUNTER — PATIENT MESSAGE (OUTPATIENT)
Dept: NEUROLOGY | Facility: CLINIC | Age: 59
End: 2019-11-20

## 2019-11-21 ENCOUNTER — APPOINTMENT (OUTPATIENT)
Dept: PULMONOLOGY | Age: 59
End: 2019-11-21
Payer: COMMERCIAL

## 2019-11-22 ENCOUNTER — OFFICE VISIT (OUTPATIENT)
Dept: FAMILY MEDICINE CLINIC | Facility: CLINIC | Age: 59
End: 2019-11-22
Payer: COMMERCIAL

## 2019-11-22 VITALS
SYSTOLIC BLOOD PRESSURE: 166 MMHG | DIASTOLIC BLOOD PRESSURE: 98 MMHG | TEMPERATURE: 98.3 F | WEIGHT: 239 LBS | BODY MASS INDEX: 32.37 KG/M2 | HEIGHT: 72 IN

## 2019-11-22 DIAGNOSIS — R03.0 ELEVATED BLOOD PRESSURE READING: Primary | ICD-10-CM

## 2019-11-22 PROCEDURE — 99213 OFFICE O/P EST LOW 20 MIN: CPT | Performed by: FAMILY MEDICINE

## 2019-11-22 RX ORDER — ACETAMINOPHEN AND CODEINE PHOSPHATE 300; 30 MG/1; MG/1
1 TABLET ORAL EVERY 6 HOURS PRN
COMMUNITY
Start: 2019-11-07 | End: 2019-11-22

## 2019-11-22 RX ORDER — CHOLECALCIFEROL (VITAMIN D3) 125 MCG
10 CAPSULE ORAL
COMMUNITY
End: 2020-12-21

## 2019-11-22 NOTE — PROGRESS NOTES
Assessment/Plan:  Labs and ultrasound thyroid reviewed  Patient will see Nephrology  Patient go for laboratory studies  Patient of low-salt diet  Guidance given regarding using Florinef  Patient will follow with Nephrology as soon as possible  Diagnoses and all orders for this visit:    Elevated blood pressure reading  -     Ambulatory referral to Nephrology; Future  -     Cortisol Level, AM Specimen; Future  -     Metanephrine, Fractionated Plasma Free; Future  -     Comprehensive metabolic panel; Future  -     TSH, 3rd generation with Free T4 reflex; Future  -     Aldosterone/Renin Ratio; Future            Subjective:        Patient ID: Melissa Russo is a 60 y o  male  Patient is here with elevated blood pressure  Blood pressure was elevated last night  Patient has been off Florinef  Last night patient blood pressures were elevated  Blood pressure was 179/108  This morning at roughly 10 o'clock it was 70/46  The patient did take a half tablet of Florinef this morning at roughly 930  Patient with some tinnitus over the past 24 hours or so  Patient does feel slightly flushed  No chest pain palpitations shortness of breath  Patient status post bladder neck surgery  The following portions of the patient's history were reviewed and updated as appropriate: allergies, current medications, past family history, past medical history, past social history, past surgical history and problem list       Review of Systems   Constitutional: Negative  Flushing   HENT: Positive for tinnitus  Eyes: Negative  Respiratory: Negative  Cardiovascular: Negative  Gastrointestinal: Negative  Endocrine: Negative  Genitourinary: Negative  Musculoskeletal: Negative  Skin: Negative  Allergic/Immunologic: Negative  Neurological: Negative  Hematological: Negative  Psychiatric/Behavioral: Negative              Objective:               /98 (BP Location: Left arm, Patient Position: Sitting, Cuff Size: Adult)   Temp 98 3 °F (36 8 °C) (Tympanic)   Ht 6' (1 829 m)   Wt 108 kg (239 lb)   BMI 32 41 kg/m²          Physical Exam   Constitutional: He is oriented to person, place, and time  He appears well-developed and well-nourished  No distress  HENT:   Head: Normocephalic  Right Ear: External ear normal    Left Ear: External ear normal    Mouth/Throat: Oropharynx is clear and moist  No oropharyngeal exudate  Eyes: Pupils are equal, round, and reactive to light  EOM are normal  Right eye exhibits no discharge  Left eye exhibits no discharge  No scleral icterus  Neck: Normal range of motion  Neck supple  Thyromegaly present  Cardiovascular: Normal rate, regular rhythm, normal heart sounds and intact distal pulses  Exam reveals no gallop and no friction rub  No murmur heard  Pulmonary/Chest: Effort normal and breath sounds normal  No respiratory distress  He has no wheezes  He has no rales  He exhibits no tenderness  Musculoskeletal: Normal range of motion  He exhibits no edema or tenderness  Lymphadenopathy:     He has no cervical adenopathy  Neurological: He is oriented to person, place, and time  No cranial nerve deficit  He exhibits normal muscle tone  Coordination normal    Skin: Skin is warm and dry  No rash noted  He is not diaphoretic  No erythema  No pallor  Psychiatric: He has a normal mood and affect  His behavior is normal  Judgment and thought content normal    Nursing note and vitals reviewed

## 2019-11-23 ENCOUNTER — APPOINTMENT (OUTPATIENT)
Dept: LAB | Facility: MEDICAL CENTER | Age: 59
End: 2019-11-23
Payer: COMMERCIAL

## 2019-11-23 DIAGNOSIS — R03.0 ELEVATED BLOOD PRESSURE READING: ICD-10-CM

## 2019-11-23 LAB
ALBUMIN SERPL BCP-MCNC: 3.9 G/DL (ref 3.5–5)
ALP SERPL-CCNC: 70 U/L (ref 46–116)
ALT SERPL W P-5'-P-CCNC: 26 U/L (ref 12–78)
ANION GAP SERPL CALCULATED.3IONS-SCNC: 6 MMOL/L (ref 4–13)
AST SERPL W P-5'-P-CCNC: 15 U/L (ref 5–45)
BILIRUB SERPL-MCNC: 1.45 MG/DL (ref 0.2–1)
BUN SERPL-MCNC: 18 MG/DL (ref 5–25)
CALCIUM SERPL-MCNC: 9.2 MG/DL (ref 8.3–10.1)
CHLORIDE SERPL-SCNC: 105 MMOL/L (ref 100–108)
CO2 SERPL-SCNC: 29 MMOL/L (ref 21–32)
CORTIS AM PEAK SERPL-MCNC: 16.6 UG/DL (ref 4.2–22.4)
CREAT SERPL-MCNC: 1.22 MG/DL (ref 0.6–1.3)
GFR SERPL CREATININE-BSD FRML MDRD: 64 ML/MIN/1.73SQ M
GLUCOSE P FAST SERPL-MCNC: 105 MG/DL (ref 65–99)
POTASSIUM SERPL-SCNC: 4.4 MMOL/L (ref 3.5–5.3)
PROT SERPL-MCNC: 7.5 G/DL (ref 6.4–8.2)
SODIUM SERPL-SCNC: 140 MMOL/L (ref 136–145)
TSH SERPL DL<=0.05 MIU/L-ACNC: 3.52 UIU/ML (ref 0.36–3.74)

## 2019-11-23 PROCEDURE — 84244 ASSAY OF RENIN: CPT

## 2019-11-23 PROCEDURE — 82088 ASSAY OF ALDOSTERONE: CPT

## 2019-11-23 PROCEDURE — 84443 ASSAY THYROID STIM HORMONE: CPT

## 2019-11-23 PROCEDURE — 82533 TOTAL CORTISOL: CPT

## 2019-11-23 PROCEDURE — 36415 COLL VENOUS BLD VENIPUNCTURE: CPT

## 2019-11-23 PROCEDURE — 80053 COMPREHEN METABOLIC PANEL: CPT

## 2019-11-23 PROCEDURE — 83835 ASSAY OF METANEPHRINES: CPT

## 2019-11-25 ENCOUNTER — OFFICE VISIT (OUTPATIENT)
Dept: PULMONOLOGY | Facility: CLINIC | Age: 59
End: 2019-11-25
Payer: COMMERCIAL

## 2019-11-25 VITALS
SYSTOLIC BLOOD PRESSURE: 138 MMHG | RESPIRATION RATE: 14 BRPM | OXYGEN SATURATION: 97 % | HEIGHT: 72 IN | TEMPERATURE: 97.9 F | WEIGHT: 232 LBS | HEART RATE: 77 BPM | BODY MASS INDEX: 31.42 KG/M2 | DIASTOLIC BLOOD PRESSURE: 100 MMHG

## 2019-11-25 DIAGNOSIS — J98.6 DIAPHRAGM DYSFUNCTION: Primary | ICD-10-CM

## 2019-11-25 DIAGNOSIS — G47.33 OSA (OBSTRUCTIVE SLEEP APNEA): ICD-10-CM

## 2019-11-25 DIAGNOSIS — G47.61 PERIODIC LIMB MOVEMENT DISORDER (PLMD): ICD-10-CM

## 2019-11-25 PROCEDURE — 99213 OFFICE O/P EST LOW 20 MIN: CPT | Performed by: INTERNAL MEDICINE

## 2019-11-25 NOTE — LETTER
November 26, 2019     [place], [address]    Patient: Bruce Delarosa   YOB: 1960   Date of Visit: 11/25/2019       Dear Dr Fidel Kumar:    Thank you for referring Melida Hill to me for evaluation  Below are my notes for this consultation  If you have questions, please do not hesitate to call me  I look forward to following your patient along with you  Sincerely,        Elisabet Massey DO        CC: No Recipients  Elisabet Massey DO  11/26/2019  9:46 PM  Sign at close encounter  Progress Note - Sleep Medicine  Bruce Delarosa 60 y o  male MRN: [id number]       Impression & Plan:   60 y o  M with PMHx of Parkinson's disease with diaphragmatic weakness, chronic knee and ankle pain, urinary retention s/p TURP, Moderate KALLI on CPAP who comes in for follow up with CPAP  1   Moderate KALLI (AHI - 15 7) - on autoPAP  11-15  He is unable to tolerate the pressure change  He feels like he is fighting against the machine at this pressure  However,  still notes some aerophagia and drooling          - Due to intolerance of CPAP we will move toward BiPAP to see if there is any benefit with this  It is likely he will also note benefit to diaphragmatic issues      -  He is aware of the risk of leaving sleep apnea untreated including hypertension, heart failure, arrhythmia, MI and stroke      2    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 -  He is still acting out his dreams          -  He has tried Melatonin with little effect  Will need to consider increase melatonin  I do not feel like he would do well with benzodiazepines        3   Periodic limb movement (PLM index - 109) -  This may be secondary to KALLI or Parkinsons   He denies symptoms of this          -  Optimize KALLI as documented above  -    He has improved with Requip but does note some sleepiness through the day    We will reduce Requip to 0 25mg qHS instead        4   Bilateral Diaphragmatic weakenss - R > L    PFTs without restriction, ABG without hypercapnia          -  He has failed CPAP and will move to BiPAP  This will likely benefit diaphragmatic weakness    ______________________________________________________________________    HPI:    Aury Dailey presents today for follow-up for his KALLI, parasomnia and PLMs  He states that he has been having a lot of trouble with tolerating his CPAP  He feels that the pressure makes it uncomfortable  He is unable to tolerate it well at the current pressure  As far as restless legs, he has noted a significant improvement with Requip 0 5  However, he does admit to feeling drowsy in the day  He still has periods of acting out dreams as well  Review of Systems:  Review of Systems   Constitutional: Negative  HENT: Negative  Eyes: Negative  Respiratory: Negative  Cardiovascular: Negative  Gastrointestinal: Negative  Endocrine: Negative  Genitourinary: Negative  Musculoskeletal: Negative            Social history updates:  Social History     Tobacco Use   Smoking Status Former Smoker    Packs/day: 0 50    Years: 3 50    Pack years: 1 75    Types: Cigarettes    Last attempt to quit: Tomasz Vicky Years since quittin 9   Smokeless Tobacco Never Used     Social History     Socioeconomic History    Marital status: /Civil Union     Spouse name: Not on file    Number of children: Not on file    Years of education: Not on file    Highest education level: Not on file   Occupational History    Not on file   Social Needs    Financial resource strain: Not on file    Food insecurity:     Worry: Not on file     Inability: Not on file    Transportation needs:     Medical: Not on file     Non-medical: Not on file   Tobacco Use    Smoking status: Former Smoker     Packs/day: 0 50     Years: 3 50     Pack years: 1 75     Types: Cigarettes     Last attempt to quit:      Years since quittin 9    Smokeless tobacco: Never Used   Substance and Sexual Activity    Alcohol use: Not Currently     Comment: social    Drug use: No    Sexual activity: Never   Lifestyle    Physical activity:     Days per week: Not on file     Minutes per session: Not on file    Stress: Not on file   Relationships    Social connections:     Talks on phone: Not on file     Gets together: Not on file     Attends Mandaen service: Not on file     Active member of club or organization: Not on file     Attends meetings of clubs or organizations: Not on file     Relationship status: Not on file    Intimate partner violence:     Fear of current or ex partner: Not on file     Emotionally abused: Not on file     Physically abused: Not on file     Forced sexual activity: Not on file   Other Topics Concern    Not on file   Social History Narrative    Consumes 5 glasses of tea per week       PhysicalExamination:  Vitals:   /100   Pulse 77   Temp 97 9 °F (36 6 °C)   Resp 14   Ht 6' (1 829 m)   Wt 105 kg (232 lb)   SpO2 97%   BMI 31 46 kg/m²    General: Pleasant, Awake alert and oriented x 3, conversant without conversational dyspnea, NAD, normal affect  HEENT:  PERRL, Sclera noninjected, nonicteric OU, Nares patent,  no craniofacial abnormalities, Mucous membranes, moist, no oral lesions, normal dentition, Mallampati class 4  NECK: Trachea midline, no accessory muscle use, no stridor, no cervical or supraclavicular adenopathy, JVP not elevated  CARDIAC: Reg, single s1/S2, no m/r/g  PULM: CTA bilaterally no wheezing, rhonchi or rales  ABD: Normoactive bowel sounds, soft nontender, nondistended, no rebound, no rigidity, no guarding  EXT: No cyanosis, no clubbing, no edema, normal capillary refill  NEURO: no focal neurologic deficits, AAOx3, moving all extremities appropriately      Diagnostic Data:  Labs:   I personally reviewed the most recent laboratory data pertinent to today's visit  I have personally reviewed pertinent lab results  Lab Results   Component Value Date    WBC 6 53 2018    HGB 15 3 2018    HCT 46 8 2018    MCV 93 2018     2018     Lab Results   Component Value Date    CALCIUM 9 2 2019    K 4 4 2019    CO2 29 2019     2019    BUN 18 2019    CREATININE 1 22 2019     No results found for: IGE  Lab Results   Component Value Date    ALT 26 2019    AST 15 2019    ALKPHOS 70 2019     No results found for: IRON, TIBC, FERRITIN  Lab Results   Component Value Date    [id number] 539 10/11/2018     PFT results: The most recent pulmonary function tests were reviewed  3/4/19  Results:  FEV1/FVC Ratio: 80 %  Forced Vital Capacity: 4 80 L    93 % predicted  FEV1: 3 85 L     97 % predicted     Lung volumes by body plethysmography:   Total Lung Capacity 94 % predicted   Residual volume 88 % predicted     DLCO corrected for patients hemoglobin level: 79 %     Interpretation:  · No obstructive airflow defect   · Normal Spirometry  · Normal Lung volumes  · Normal diffusion capacity     6 minute walk  Date of testin2019     Resting room air saturation: 93%  Randy scale of dyspnea at start of test: 0/10     Ambulation testing:  Lowest saturation 93%  No supplemental oxygen required     Randy scale of dyspnea at end of test: 3/10  Reason if test was stopped early: N/A      Total 6 minute walk distance: 1400 feet     Imaging:  I personally reviewed the images on the [facility] system pertinent to today's visit  CT chest - 19  IMPRESSION:  Within normal limits CT of the chest      Other studies:  HST - moderate (15 7), Supine AHI - 23, hypoxia   CPAP titration - Nasal CPAP was titrated from 5-11 cm of water for  control of snoring and abnormal breathing  Patient appeared to do best at 11 cm of CPAP delivered using a [address] full face interface   Continued use of this equipment at these settings is recommended      Compliance Data: 10/23/19-11/21/19                                   Type of CPAP:  autoPAP  11-15, Mean - 13 2, Peak - 15 0                                   Percent usage: 73%, 67%> 4hrs                                   Average time used: 3hrs 38 mins - 8 hrs 37 mins                                  Time in large leak: 9 mins 46 secs                                   Residual AHI: 11 0  (CA - 0 9)    Compliance Data:  8/25/19-9/23/19                                   Type of CPAP:  autoPAP 8-15, Mean - 11 9, Peak - 15 6                                   Percent usage: 63%                                   Average time used: 2hrs 52 mins - 4 hrs 32 mins                                  Time in large leak: 4 mins 44 secs                                   Residual AHI: 13 6  (CA - 0 9)                                            Vera Junior DO

## 2019-11-26 ENCOUNTER — TELEPHONE (OUTPATIENT)
Dept: FAMILY MEDICINE CLINIC | Facility: CLINIC | Age: 59
End: 2019-11-26

## 2019-11-26 ENCOUNTER — CLINICAL SUPPORT (OUTPATIENT)
Dept: PULMONOLOGY | Age: 59
End: 2019-11-26
Payer: COMMERCIAL

## 2019-11-26 ENCOUNTER — DOCUMENTATION (OUTPATIENT)
Dept: PULMONOLOGY | Facility: CLINIC | Age: 59
End: 2019-11-26

## 2019-11-26 DIAGNOSIS — J98.6 DIAPHRAGM DYSFUNCTION: ICD-10-CM

## 2019-11-26 DIAGNOSIS — B35.4 TINEA CORPORIS: Primary | ICD-10-CM

## 2019-11-26 PROCEDURE — G0239 OTH RESP PROC, GROUP: HCPCS

## 2019-11-26 NOTE — TELEPHONE ENCOUNTER
Pt called stating he was seen 11/22 in which a cream was to be prescribed for him, he never got this cream    OV note doesn't indicate anything about needing a cream     Do you recall what is he talking about?     (I LMOM asking for call back to clarify which cream he is referring to as well)     [facility] [address] [facility]

## 2019-11-26 NOTE — TELEPHONE ENCOUNTER
Patient called back, patient states that he was using econazole cream and stated since that was not working that Dr Kike Brown was going to prescribe something else

## 2019-11-27 LAB
METANEPH FREE SERPL-MCNC: 47 PG/ML (ref 0–62)
NORMETANEPHRINE SERPL-MCNC: 154 PG/ML (ref 0–145)

## 2019-11-27 NOTE — PROGRESS NOTES
Progress Note - Sleep Medicine  Franko Kimble 60 y o  male MRN: [id number]       Impression & Plan:   60 y o  M with PMHx of Parkinson's disease with diaphragmatic weakness, chronic knee and ankle pain, urinary retention s/p TURP, Moderate KALLI on CPAP who comes in for follow up with CPAP  1   Moderate KALLI (AHI - 15 7) - on autoPAP 11-15  He is unable to tolerate the pressure change  He feels like he is fighting against the machine at this pressure  However, still notes some aerophagia and drooling          - Due to intolerance of CPAP we will move toward BiPAP to see if there is any benefit with this  It is likely he will also note benefit to diaphragmatic issues      -  He is aware of the risk of leaving sleep apnea untreated including hypertension, heart failure, arrhythmia, MI and stroke      2    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 -  He is still acting out his dreams          -  He has tried Melatonin with little effect  Will need to consider increase melatonin  I do not feel like he would do well with benzodiazepines        3   Periodic limb movement (PLM index - 109) -  This may be secondary to KALLI or Parkinsons   He denies symptoms of this          -  Optimize KALLI as documented above  -  He has improved with Requip but does note some sleepiness through the day  We will reduce Requip to 0 25mg qHS instead        4   Bilateral Diaphragmatic weakenss - R > L    PFTs without restriction, ABG without hypercapnia          -  He has failed CPAP and will move to BiPAP  This will likely benefit diaphragmatic weakness    ______________________________________________________________________    HPI:    Franko Kimble presents today for follow-up for his KALLI, parasomnia and PLMs  He states that he has been having a lot of trouble with tolerating his CPAP  He feels that the pressure makes it uncomfortable  He is unable to tolerate it well at the current pressure     As far as restless legs, he has noted a significant improvement with Requip 0 5  However, he does admit to feeling drowsy in the day  He still has periods of acting out dreams as well  Review of Systems:  Review of Systems   Constitutional: Negative  HENT: Negative  Eyes: Negative  Respiratory: Negative  Cardiovascular: Negative  Gastrointestinal: Negative  Endocrine: Negative  Genitourinary: Negative  Musculoskeletal: Negative            Social history updates:  Social History     Tobacco Use   Smoking Status Former Smoker    Packs/day: 0 50    Years: 3 50    Pack years: 1 75    Types: Cigarettes    Last attempt to quit:     Years since quittin 9   Smokeless Tobacco Never Used     Social History     Socioeconomic History    Marital status: /Civil Union     Spouse name: Not on file    Number of children: Not on file    Years of education: Not on file    Highest education level: Not on file   Occupational History    Not on file   Social Needs    Financial resource strain: Not on file    Food insecurity:     Worry: Not on file     Inability: Not on file    Transportation needs:     Medical: Not on file     Non-medical: Not on file   Tobacco Use    Smoking status: Former Smoker     Packs/day: 0 50     Years: 3 50     Pack years: 1 75     Types: Cigarettes     Last attempt to quit:      Years since quittin 9    Smokeless tobacco: Never Used   Substance and Sexual Activity    Alcohol use: Not Currently     Comment: social    Drug use: No    Sexual activity: Never   Lifestyle    Physical activity:     Days per week: Not on file     Minutes per session: Not on file    Stress: Not on file   Relationships    Social connections:     Talks on phone: Not on file     Gets together: Not on file     Attends Tenriism service: Not on file     Active member of club or organization: Not on file     Attends meetings of clubs or organizations: Not on file     Relationship status: Not on file    Intimate partner violence:     Fear of current or ex partner: Not on file     Emotionally abused: Not on file     Physically abused: Not on file     Forced sexual activity: Not on file   Other Topics Concern    Not on file   Social History Narrative    Consumes 5 glasses of tea per week       PhysicalExamination:  Vitals:   /100   Pulse 77   Temp 97 9 °F (36 6 °C)   Resp 14   Ht 6' (1 829 m)   Wt 105 kg (232 lb)   SpO2 97%   BMI 31 46 kg/m²   General: Pleasant, Awake alert and oriented x 3, conversant without conversational dyspnea, NAD, normal affect  HEENT:  PERRL, Sclera noninjected, nonicteric OU, Nares patent,  no craniofacial abnormalities, Mucous membranes, moist, no oral lesions, normal dentition, Mallampati class 4  NECK: Trachea midline, no accessory muscle use, no stridor, no cervical or supraclavicular adenopathy, JVP not elevated  CARDIAC: Reg, single s1/S2, no m/r/g  PULM: CTA bilaterally no wheezing, rhonchi or rales  ABD: Normoactive bowel sounds, soft nontender, nondistended, no rebound, no rigidity, no guarding  EXT: No cyanosis, no clubbing, no edema, normal capillary refill  NEURO: no focal neurologic deficits, AAOx3, moving all extremities appropriately      Diagnostic Data:  Labs: I personally reviewed the most recent laboratory data pertinent to today's visit  I have personally reviewed pertinent lab results    Lab Results   Component Value Date    WBC 6 53 07/06/2018    HGB 15 3 07/06/2018    HCT 46 8 07/06/2018    MCV 93 07/06/2018     07/06/2018     Lab Results   Component Value Date    CALCIUM 9 2 11/23/2019    K 4 4 11/23/2019    CO2 29 11/23/2019     11/23/2019    BUN 18 11/23/2019    CREATININE 1 22 11/23/2019     No results found for: IGE  Lab Results   Component Value Date    ALT 26 11/23/2019    AST 15 11/23/2019    ALKPHOS 70 11/23/2019     No results found for: IRON, TIBC, FERRITIN  Lab Results   Component Value Date    [id number] 651 10/11/2018 PFT results: The most recent pulmonary function tests were reviewed  3/4/19  Results:  FEV1/FVC Ratio: 80 %  Forced Vital Capacity: 4 80 L    93 % predicted  FEV1: 3 85 L     97 % predicted     Lung volumes by body plethysmography:   Total Lung Capacity 94 % predicted   Residual volume 88 % predicted     DLCO corrected for patients hemoglobin level: 79 %     Interpretation:  · No obstructive airflow defect   · Normal Spirometry  · Normal Lung volumes  · Normal diffusion capacity     6 minute walk  Date of testin2019     Resting room air saturation: 93%  Randy scale of dyspnea at start of test: 0/10     Ambulation testing:  Lowest saturation 93%  No supplemental oxygen required     Randy scale of dyspnea at end of test: 3/10  Reason if test was stopped early: N/A      Total 6 minute walk distance: 1400 feet     Imaging:  I personally reviewed the images on the [facility] system pertinent to today's visit  CT chest - 19  IMPRESSION:  Within normal limits CT of the chest      Other studies:  HST - moderate (15 7), Supine AHI - 23, hypoxia   CPAP titration - Nasal CPAP was titrated from 5-11 cm of water for  control of snoring and abnormal breathing  Patient appeared to do best at 11 cm of CPAP delivered using a [address] full face interface   Continued use of this equipment at these settings is recommended      Compliance Data:  10/23/19-19                                   Type of CPAP:  autoPAP 11-15, Mean - 13 2, Peak - 15 0                                   Percent usage: 73%, 67%> 4hrs                                   Average time used: 3hrs 38 mins - 8 hrs 37 mins                                  Time in large leak: 9 mins 46 secs                                   Residual AHI: 11 0  (CA - 0 9)    Compliance Data:  19-19                                   Type of CPAP:  autoPAP 8-15, Mean - 11 9, Peak - 15 6                                   Percent usage: 63% Average time used: 2hrs 52 mins - 4 hrs 32 mins                                  Time in large leak: 4 mins 44 secs                                   Residual AHI: 13 6  (CA - 0 9)                                            Chris Nur DO

## 2019-12-03 ENCOUNTER — CLINICAL SUPPORT (OUTPATIENT)
Dept: PULMONOLOGY | Age: 59
End: 2019-12-03
Payer: COMMERCIAL

## 2019-12-03 DIAGNOSIS — J98.6 DIAPHRAGM DYSFUNCTION: ICD-10-CM

## 2019-12-03 PROCEDURE — G0239 OTH RESP PROC, GROUP: HCPCS

## 2019-12-04 LAB — MISCELLANEOUS LAB TEST RESULT: NORMAL

## 2019-12-05 ENCOUNTER — CLINICAL SUPPORT (OUTPATIENT)
Dept: PULMONOLOGY | Age: 59
End: 2019-12-05
Payer: COMMERCIAL

## 2019-12-05 DIAGNOSIS — J98.6 DIAPHRAGM DYSFUNCTION: ICD-10-CM

## 2019-12-05 PROCEDURE — G0239 OTH RESP PROC, GROUP: HCPCS

## 2019-12-06 ENCOUNTER — CONSULT (OUTPATIENT)
Dept: NEPHROLOGY | Facility: CLINIC | Age: 59
End: 2019-12-06
Payer: COMMERCIAL

## 2019-12-06 VITALS
HEART RATE: 78 BPM | BODY MASS INDEX: 33.05 KG/M2 | HEIGHT: 72 IN | DIASTOLIC BLOOD PRESSURE: 82 MMHG | SYSTOLIC BLOOD PRESSURE: 118 MMHG | WEIGHT: 244 LBS

## 2019-12-06 DIAGNOSIS — N20.0 NEPHROLITHIASIS: ICD-10-CM

## 2019-12-06 DIAGNOSIS — R33.9 URINARY RETENTION: ICD-10-CM

## 2019-12-06 DIAGNOSIS — G90.3 NEUROGENIC ORTHOSTATIC HYPOTENSION (HCC): ICD-10-CM

## 2019-12-06 DIAGNOSIS — R03.0 ELEVATED BLOOD PRESSURE READING: Primary | ICD-10-CM

## 2019-12-06 DIAGNOSIS — N18.2 CKD (CHRONIC KIDNEY DISEASE) STAGE 2, GFR 60-89 ML/MIN: ICD-10-CM

## 2019-12-06 DIAGNOSIS — R80.9 PROTEINURIA, UNSPECIFIED TYPE: ICD-10-CM

## 2019-12-06 LAB
SL AMB  POCT GLUCOSE, UA: NEGATIVE
SL AMB LEUKOCYTE ESTERASE,UA: NEGATIVE
SL AMB POCT BILIRUBIN,UA: NEGATIVE
SL AMB POCT BLOOD,UA: 3
SL AMB POCT CLARITY,UA: CLEAR
SL AMB POCT COLOR,UA: YELLOW
SL AMB POCT KETONES,UA: NEGATIVE
SL AMB POCT NITRITE,UA: NEGATIVE
SL AMB POCT PH,UA: 5
SL AMB POCT SPECIFIC GRAVITY,UA: 1.03
SL AMB POCT URINE PROTEIN: 2
SL AMB POCT UROBILINOGEN: 0.2

## 2019-12-06 PROCEDURE — 81002 URINALYSIS NONAUTO W/O SCOPE: CPT | Performed by: INTERNAL MEDICINE

## 2019-12-06 PROCEDURE — 99244 OFF/OP CNSLTJ NEW/EST MOD 40: CPT | Performed by: INTERNAL MEDICINE

## 2019-12-06 RX ORDER — CIPROFLOXACIN 500 MG/1
500 TABLET, FILM COATED ORAL 2 TIMES DAILY
COMMUNITY
Start: 2019-12-02 | End: 2019-12-09

## 2019-12-06 RX ORDER — FLUDROCORTISONE ACETATE 0.1 MG/1
0.05 TABLET ORAL DAILY
Qty: 45 TABLET | Refills: 4
Start: 2019-12-06 | End: 2020-03-11 | Stop reason: ALTCHOICE

## 2019-12-06 NOTE — PROGRESS NOTES
NEPHROLOGY OUTPATIENT CONSULTATION   Aury Dailey 60 y o  male MRN: [id number]  Date: 12/6/2019  Reason for consultation:   Chief Complaint   Patient presents with    Consult       Patient instructions:  Patient Instructions   1  Elevated BP readings with low readings at times as well - BP seems very well controlled in office   -Parkinson's disease can lead to autonomic dysfunction as well as labile BP readings    -I note the patient is on florinef  Would wonder if midodrine may be more effective in light of LE edema  Will discuss with the patient's neurologist   -am cortisol ok  -I do note mildly elevated free normetanephrine  Would expect 2-3x normal range to suspect pheochromocytoma  -craig 1 3, renin 0 208  Ratio is normal as of 11/23/19  -TSH ok  -will repeat plasma metanephrines  -continue to wear compression stockings  Take your time getting up   -Do not exceed half tablet of florinef   -limit sodium intake in diet as this can raise BP  -call office if BP readings persistently > 180/90      2  Elevated serum creatinine ? D/t chronic kidney disease stage 2(mild) - b/l sCr 1-1 2 since 2016, last sCr 1 22 which correlates with eGFR 88 ml/min per CKD EPI equation  ? If this is due to lability in BP readings vs some other cause  -in office urinalysis shows +blood and protein  Did have recent cystoscopy   -will repeat UA with microscopy once hematuria clears up  Prescriptions for UA and microalb:Cr and urine protein:Cr provided  3  Proteinuria - noted on UA in office as well as outpatient this month  -will check UpCr and microalb:Cr once hematuria resolves as gross hematuria can cause false proteinuria to be present  -of note, not anemic  Will defer myeloma workup    4  Nephrolithiasis - 4mm calculus noted in right kidney  Left renal cortical scarring with nonobstructing right renal calculus also noted on CT abdomen performed 7/3/17  -would follow low sodium diet (<2000mg/day)   Please read packages to ensure sodium content limited  -drink enough fluids to urinate 2-2 5L/day to prevent stone formation    5  History of urinary retention - s/p urolift x 2, s/p TURP x 2, bladder neck constricture repair  Follows with urology  For cystoscopy again soon   -will obtain renal ultrasound with PVR in light of urinary retention and kidney stones    RTC in 3 months  Armond Fountain was seen today for consult and hypertension  Diagnoses and all orders for this visit:    Elevated blood pressure reading  -     Ambulatory referral to Nephrology  -     POCT urine dip  -     Metanephrine, Fractionated Plasma Free; Future  -     US kidney and bladder; Future  -     Comprehensive metabolic panel; Future    Proteinuria, unspecified type  -     POCT urine dip  -     Urinalysis with microscopic; Future  -     Protein / creatinine ratio, urine; Future  -     Microalbumin / creatinine urine ratio; Future    Neurogenic orthostatic hypotension (HCC)  -     fludrocortisone (FLORINEF) 0 1 mg tablet; Take 0 5 tablets (0 05 mg total) by mouth daily    Urinary retention    Nephrolithiasis    CKD (chronic kidney disease) stage 2, GFR 60-89 ml/min        ASSESSMENT and PLAN:  1  Elevated BP readings with low readings at times as well - BP seems very well controlled in office   -Parkinson's disease can lead to autonomic dysfunction as well as labile BP readings    -I note the patient is on florinef  Would wonder if midodrine may be more effective in light of LE edema  Will discuss with the patient's neurologist   -am cortisol ok  -I do note mildly elevated free normetanephrine  Would expect 2-3x normal range to suspect pheochromocytoma  -craig 1 3, renin 0 208  Ratio is normal as of 11/23/19  -TSH ok  -will repeat plasma metanephrines  -continue to wear compression stockings   Take your time getting up   -Do not exceed half tablet of florinef   -limit sodium intake in diet as this can raise BP  -call office if BP readings persistently > 180/60      2  Elevated serum creatinine ? D/t chronic kidney disease stage 2(mild) - b/l sCr 1-1 2 since 2016, last sCr 1 22 which correlates with eGFR 88 ml/min per CKD EPI equation  ? If this is due to lability in BP readings vs some other cause  -in office urinalysis shows +blood and protein  Did have recent cystoscopy   -will repeat UA with microscopy once hematuria clears up  Prescriptions for UA and microalb:Cr and urine protein:Cr provided  3  Proteinuria - noted on UA in office as well as outpatient this month  -will check UpCr and microalb:Cr once hematuria resolves as gross hematuria can cause false proteinuria to be present  -of note, not anemic  Will defer myeloma workup    4  Nephrolithiasis - 4mm calculus noted in right kidney  Left renal cortical scarring with nonobstructing right renal calculus also noted on CT abdomen performed 7/3/17  -would follow low sodium diet (<2000mg/day)  Please read packages to ensure sodium content limited  -drink enough fluids to urinate 2-2 5L/day to prevent stone formation    5  History of urinary retention - s/p urolift x 2, s/p TURP x 2, bladder neck constricture repair  Follows with urology  For cystoscopy again soon   -will obtain renal ultrasound with PVR in light of urinary retention and kidney stones    HISTORY OF PRESENT ILLNESS:  Requesting Physician: DO Jann Jackson Avril is a 60 y o  male with history of GERD, BPH, testicular cancer, kidney stones, Parkinson's who presents in consultation for elevated BP  Denies history of use of herbal/OTC medications  Denies history of prior known kidney disease  Has had UTIs and has UTI now, currently on ciprofloxacin 7 day regimen total      Does take meloxicam 15mg daily at least for the past 5 months  He uses it for left knee pain  Testicular cancer history from 1988 s/p left orchiectomy  Does have history of kidney stones  He recently passed kidney stone recently   Has had lithotripsy and manual removal      January and May 2018 he had urolift x 2, but symptoms of urinary retention returned  Then he had TURP March and July 6215 complicated by bladder neck constricture which was repaired about a month ago  He has some difficulty starting and maintaining good urine flow  Has had gross hematuria since procedure  To have another cysto next week  Parkinson's has caused low BP readings so he takes florinef  He now takes it only as needed as BP has been high on it  He only takes 0 05mg florinef  His BP varies at home  He does get symptoms from low BP like tinnitus and lightheadedness  He has been checking BP 4-5x/day  Higher BP present for the past 6-7 weeks  He had been on selegiline but stopped taking this 11/28/19  Was taking it since February 2019  He is getting PT for strengthening intercostal muscles in light of unilateral diaphragmatic paralysis  He was on selegiline and took chocolate and wondered if that caused his BP to rise  He feels flushed in the face when his BP is high  He also will have headache when his BP is high  He was  on [address] [place] [address]  He is retired now  He cooks at home  Does not limit salt intake  Does not add salt to anything       PAST MEDICAL HISTORY:  Past Medical History:   Diagnosis Date    Back pain     Bladder infection     BPH (benign prostatic hyperplasia)     Diverticulosis     GERD (gastroesophageal reflux disease)     occassional    History of testicular cancer 1988    Surgery and radiation; left side    Kidney stones     Currently and in the past    Parkinson's disease ([facility] [address] )     Wears glasses        PAST SURGICAL HISTORY:  Past Surgical History:   Procedure Laterality Date    COLONOSCOPY       Zandra 57, 1986 inguinal hernia repair    KIDNEY STONE SURGERY      LITHOTRIPSY      Renal; Resolved: 2/27/07    ORCHIECTOMY Left     VA CYSTOURETHRO W/IMPLANT N/A 5/17/2018    Procedure: CYSTOSCOPY WITH INSERTION Camelia Dean;  Surgeon: Antonio Morales DO;  Location: [facility];  Service: Urology    PROSTATE SURGERY N/A 2018    Procedure: CYSTOSCOPY WITH INSERTION Camelia Dean;  Surgeon: Antonio Morales DO;  Location: [facility];  Service: Urology    [address]    For cancer    TONSILLECTOMY      URETERONEOCYSTOSTOMY      w/ cystoscopy Ureteral Stent Placement;  Resolved: 2007    WISDOM TOOTH EXTRACTION         ALLERGIES:  No Known Allergies    SOCIAL HISTORY:  Social History     Substance and Sexual Activity   Alcohol Use Not Currently    Comment: social     Social History     Substance and Sexual Activity   Drug Use No     Social History     Tobacco Use   Smoking Status Former Smoker    Packs/day: 0 50    Years: 3 50    Pack years: 1 75    Types: Cigarettes    Last attempt to quit: Katina Yarbrough Years since quittin 9   Smokeless Tobacco Never Used       FAMILY HISTORY:  Family History   Problem Relation Age of Onset    Colon cancer Father     Heart failure Father     Parkinsonism Mother     Cancer Paternal Grandmother        MEDICATIONS:    Current Outpatient Medications:     Ascorbic Acid (VITAMIN C) 100 MG tablet, Take 100 mg by mouth daily, Disp: , Rfl:     ciprofloxacin (CIPRO) 500 mg tablet, Take 500 mg by mouth 2 (two) times a day, Disp: , Rfl:     fludrocortisone (FLORINEF) 0 1 mg tablet, Take 1 5 tablets (0 15 mg total) by mouth daily (Patient taking differently: Take 0 15 mg by mouth daily as needed ), Disp: 45 tablet, Rfl: 4    Melatonin 5 MG TABS, Take 5 mg by mouth, Disp: , Rfl:     meloxicam (MOBIC) 15 mg tablet, TAKE 1 TABLET BY MOUTH EVERY DAY, Disp: 30 tablet, Rfl: 1    multivitamin (THERAGRAN) TABS, Take 1 tablet by mouth daily, Disp: , Rfl:     nystatin-triamcinolone (MYCOLOG-II) cream, Apply topically 2 (two) times a day, Disp: 30 g, Rfl: 1    omeprazole (PriLOSEC) 20 mg delayed release capsule, Take 1 capsule (20 mg total) by mouth daily before breakfast, Disp: 90 capsule, Rfl: 1    vitamin E 100 UNIT capsule, Take 100 Units by mouth daily, Disp: , Rfl:     Wheat Dextrin (BENEFIBER DRINK MIX PO), , Disp: , Rfl:     rOPINIRole (REQUIP) 0 5 mg tablet, Take 1 tablet (0 5 mg total) by mouth daily at bedtime (Patient not taking: Reported on 12/6/2019), Disp: 30 tablet, Rfl: 1    selegiline (ELDEPRYL) 5 mg capsule, Take 5 mg by mouth 2 (two) times a day, Disp: , Rfl: 11    REVIEW OF SYSTEMS:  Review of Systems   Constitutional: Negative for chills and fever  HENT: Negative for sore throat  Eyes: Negative for visual disturbance  Respiratory: Positive for shortness of breath  Negative for cough  Cardiovascular: Positive for leg swelling (more noticable lately)  Negative for chest pain  Gastrointestinal: Positive for constipation  Negative for abdominal pain, diarrhea, nausea and vomiting  Endocrine: Negative for polyuria  Genitourinary: Positive for difficulty urinating (as per HPI) and hematuria  Negative for decreased urine volume and dysuria  Musculoskeletal: Negative for back pain and myalgias  +knee pain as per HPI   Skin: Negative for rash  Neurological: Positive for dizziness (occurs at times with standing)  Negative for light-headedness and numbness  Hematological: Negative for adenopathy  Does not bruise/bleed easily  Psychiatric/Behavioral: Negative for confusion  PHYSICAL EXAM:   Vitals:    12/06/19 0732   BP: 118/82   BP Location: Right arm   Patient Position: Sitting   Cuff Size: Standard   Pulse: 78   Weight: 111 kg (244 lb)   Height: 6' (1 829 m)     Body mass index is 33 09 kg/m²  Physical Exam   Constitutional: He appears well-developed and well-nourished  No distress  HENT:   Head: Normocephalic and atraumatic  Mouth/Throat: No oropharyngeal exudate  Eyes: Right eye exhibits no discharge  Left eye exhibits no discharge  No scleral icterus  Neck: Normal range of motion  Neck supple  No thyromegaly present  Cardiovascular: Normal rate, regular rhythm and normal heart sounds  Pulmonary/Chest: Effort normal and breath sounds normal  He has no wheezes  He has no rales  Abdominal: Soft  Bowel sounds are normal  He exhibits no distension  There is no tenderness  Musculoskeletal: Normal range of motion  He exhibits no edema  Neurological: He is alert  awake   Skin: Skin is warm and dry  No rash noted  He is not diaphoretic  Psychiatric: He has a normal mood and affect  His behavior is normal    Vitals reviewed  Laboratory results:   Lab Results   Component Value Date    SODIUM 140 11/23/2019    K 4 4 11/23/2019     11/23/2019    CO2 29 11/23/2019    BUN 18 11/23/2019    CREATININE 1 22 11/23/2019    GLUC 100 05/11/2018    CALCIUM 9 2 11/23/2019        Imaging:  CT abdomen performed 7/3/17:     Portions of the record may have been created with voice recognition software   Occasional wrong word or "sound a like" substitutions may have occurred due to the inherent limitations of voice recognition software   Read the chart carefully and recognize, using context, where substitutions have occurred

## 2019-12-06 NOTE — LETTER
December 6, 2019     Alesia Johanna, [address]    Patient: Jose E Castro   YOB: 1960   Date of Visit: 12/6/2019       Dear Dr Mckay Farm:    Thank you for referring Chris Ibanez to me for evaluation  Below are my notes for this consultation  The patient exhibits orthostatic hypotension, worse in the morning  I have less concern for pheochromocytoma or other secondary cause of HTN as patient's BP is labile in setting of Parkinson's disease  His normetanephrine level was mildly elevated  I will repeat this but typically, a level 2-3x upper limit of normal range is seen with pheochromocytoma  I wonder if midodrine would be benefical rather than florinef in the setting of some leg swelling per patient  I also notice the patient has CKD stage 2  I wonder if meloxicam could contribute to this as well as resting higher BP readings at times  I would recommend replacing meloxicam if possible  If you have questions, please do not hesitate to call me  I look forward to following your patient along with you  Sincerely,        Ronnie Zamora DO        CC: Valentino Bible, MD Almetta Lightning, DO  12/6/2019  8:44 AM  Sign at close encounter  Sanya Becker [age] y o  male MRN: [id number]  Date: 12/6/2019  Reason for consultation:   Chief Complaint   Patient presents with    Consult       Patient instructions:  Patient Instructions   1  Elevated BP readings with low readings at times as well - BP seems very well controlled in office   -Parkinson's disease can lead to autonomic dysfunction as well as labile BP readings    -I note the patient is on florinef  Would wonder if midodrine may be more effective in light of LE edema  Will discuss with the patient's neurologist   -am cortisol ok  -I do note mildly elevated free normetanephrine  Would expect 2-3x normal range to suspect pheochromocytoma  -craig 1 3, renin 0 208   Ratio is normal as of 11/23/19  -TSH ok  -will repeat plasma metanephrines  -continue to wear compression stockings  Take your time getting up   -Do not exceed half tablet of florinef   -limit sodium intake in diet as this can raise BP  -call office if BP readings persistently > 180/90      2  Elevated serum creatinine ? D/t chronic kidney disease stage 2(mild) - b/l sCr 1-1 2 since 2016, last sCr 1 22 which correlates with eGFR 88 ml/min per CKD EPI equation  ? If this is due to lability in BP readings vs some other cause  -in office urinalysis shows +blood and protein  Did have recent cystoscopy   -will repeat UA with microscopy once hematuria clears up  Prescriptions for UA and microalb:Cr and urine protein:Cr provided  3  Proteinuria - noted on UA in office as well as outpatient this month  -will check UpCr and microalb:Cr once hematuria resolves as gross hematuria can cause false proteinuria to be present  -of note, not anemic  Will defer myeloma workup    4  Nephrolithiasis - 4mm calculus noted in right kidney  Left renal cortical scarring with nonobstructing right renal calculus also noted on CT abdomen performed 7/3/17  -would follow low sodium diet (<2000mg/day)  Please read packages to ensure sodium content limited  -drink enough fluids to urinate 2-2 5L/day to prevent stone formation    5  History of urinary retention - s/p urolift x 2, s/p TURP x 2, bladder neck constricture repair  Follows with urology  For cystoscopy again soon   -will obtain renal ultrasound with PVR in light of urinary retention and kidney stones    RTC in 3 months  Zaida Escobar was seen today for consult and hypertension  Diagnoses and all orders for this visit:    Elevated blood pressure reading  -     Ambulatory referral to Nephrology  -     POCT urine dip  -     Metanephrine, Fractionated Plasma Free; Future  -     US kidney and bladder; Future  -     Comprehensive metabolic panel;  Future    Proteinuria, unspecified type  -     POCT urine dip  -     Urinalysis with microscopic; Future  -     Protein / creatinine ratio, urine; Future  -     Microalbumin / creatinine urine ratio; Future    Neurogenic orthostatic hypotension (HCC)  -     fludrocortisone (FLORINEF) 0 1 mg tablet; Take 0 5 tablets (0 05 mg total) by mouth daily    Urinary retention    Nephrolithiasis    CKD (chronic kidney disease) stage 2, GFR 60-89 ml/min        ASSESSMENT and PLAN:  1  Elevated BP readings with low readings at times as well - BP seems very well controlled in office   -Parkinson's disease can lead to autonomic dysfunction as well as labile BP readings    -I note the patient is on florinef  Would wonder if midodrine may be more effective in light of LE edema  Will discuss with the patient's neurologist   -am cortisol ok  -I do note mildly elevated free normetanephrine  Would expect 2-3x normal range to suspect pheochromocytoma  -craig 1 3, renin 0 208  Ratio is normal as of 11/23/19  -TSH ok  -will repeat plasma metanephrines  -continue to wear compression stockings  Take your time getting up   -Do not exceed half tablet of florinef   -limit sodium intake in diet as this can raise BP  -call office if BP readings persistently > 180/60      2  Elevated serum creatinine ? D/t chronic kidney disease stage 2(mild) - b/l sCr 1-1 2 since 2016, last sCr 1 22 which correlates with eGFR 88 ml/min per CKD EPI equation  ? If this is due to lability in BP readings vs some other cause  -in office urinalysis shows +blood and protein  Did have recent cystoscopy   -will repeat UA with microscopy once hematuria clears up  Prescriptions for UA and microalb:Cr and urine protein:Cr provided  3  Proteinuria - noted on UA in office as well as outpatient this month  -will check UpCr and microalb:Cr once hematuria resolves as gross hematuria can cause false proteinuria to be present  -of note, not anemic  Will defer myeloma workup    4   Nephrolithiasis - 4mm calculus noted in right kidney  Left renal cortical scarring with nonobstructing right renal calculus also noted on CT abdomen performed 7/3/17  -would follow low sodium diet (<2000mg/day)  Please read packages to ensure sodium content limited  -drink enough fluids to urinate 2-2 5L/day to prevent stone formation    5  History of urinary retention - s/p urolift x 2, s/p TURP x 2, bladder neck constricture repair  Follows with urology  For cystoscopy again soon   -will obtain renal ultrasound with PVR in light of urinary retention and kidney stones    HISTORY OF PRESENT ILLNESS:  Requesting Physician: DO Jazmin Chesterchris Bruno is a 60 y o  male with history of GERD, BPH, testicular cancer, kidney stones, Parkinson's who presents in consultation for elevated BP  Denies history of use of herbal/OTC medications  Denies history of prior known kidney disease  Has had UTIs and has UTI now, currently on ciprofloxacin 7 day regimen total      Does take meloxicam 15mg daily at least for the past 5 months  He uses it for left knee pain  Testicular cancer history from 1988 s/p left orchiectomy  Does have history of kidney stones  He recently passed kidney stone recently  Has had lithotripsy and manual removal      January and May 2018 he had urolift x 2, but symptoms of urinary retention returned  Then he had TURP March and July 9277 complicated by bladder neck constricture which was repaired about a month ago  He has some difficulty starting and maintaining good urine flow  Has had gross hematuria since procedure  To have another cysto next week  Parkinson's has caused low BP readings so he takes florinef  He now takes it only as needed as BP has been high on it  He only takes 0 05mg florinef  His BP varies at home  He does get symptoms from low BP like tinnitus and lightheadedness  He has been checking BP 4-5x/day  Higher BP present for the past 6-7 weeks  He had been on selegiline but stopped taking this 11/28/19   Was taking it since February 2019  He is getting PT for strengthening intercostal muscles in light of unilateral diaphragmatic paralysis  He was on selegiline and took chocolate and wondered if that caused his BP to rise  He feels flushed in the face when his BP is high  He also will have headache when his BP is high  He was  on [address] [place] [address]  He is retired now  He cooks at home  Does not limit salt intake  Does not add salt to anything  PAST MEDICAL HISTORY:  Past Medical History:   Diagnosis Date    Back pain     Bladder infection     BPH (benign prostatic hyperplasia)     Diverticulosis     GERD (gastroesophageal reflux disease)     occassional    History of testicular cancer 1988    Surgery and radiation; left side    Kidney stones     Currently and in the past    Parkinson's disease ([facility] [address] )     Wears glasses        PAST SURGICAL HISTORY:  Past Surgical History:   Procedure Laterality Date    COLONOSCOPY       [address], 1986 inguinal hernia repair    KIDNEY STONE SURGERY      LITHOTRIPSY      Renal; Resolved: 2/27/07    ORCHIECTOMY Left     HI CYSTOURETHRO W/IMPLANT N/A 5/17/2018    Procedure: CYSTOSCOPY WITH INSERTION Kurtis Pinzon;  Surgeon: Christian Madera DO;  Location: [facility];  Service: Urology    PROSTATE SURGERY N/A 1/18/2018    Procedure: CYSTOSCOPY WITH INSERTION UROLIFT;  Surgeon: Christian Madera DO;  Location: [facility];  Service: Urology   [address] [facility]    For cancer    TONSILLECTOMY      URETERONEOCYSTOSTOMY      w/ cystoscopy Ureteral Stent Placement;  Resolved: 6/14/2007    WISDOM TOOTH EXTRACTION         ALLERGIES:  No Known Allergies    SOCIAL HISTORY:  Social History     Substance and Sexual Activity   Alcohol Use Not Currently    Comment: social     Social History     Substance and Sexual Activity   Drug Use No     Social History     Tobacco Use   Smoking Status Former Smoker    Packs/day: 0 50    Years: 3 50    Pack years: 1 75    Types: Cigarettes    Last attempt to quit: Janet Rosales Years since quittin 9   Smokeless Tobacco Never Used       FAMILY HISTORY:  Family History   Problem Relation Age of Onset    Colon cancer Father     Heart failure Father     Parkinsonism Mother     Cancer Paternal Grandmother        MEDICATIONS:    Current Outpatient Medications:     Ascorbic Acid (VITAMIN C) 100 MG tablet, Take 100 mg by mouth daily, Disp: , Rfl:     ciprofloxacin (CIPRO) 500 mg tablet, Take 500 mg by mouth 2 (two) times a day, Disp: , Rfl:     fludrocortisone (FLORINEF) 0 1 mg tablet, Take 1 5 tablets (0 15 mg total) by mouth daily (Patient taking differently: Take 0 15 mg by mouth daily as needed ), Disp: 45 tablet, Rfl: 4    Melatonin 5 MG TABS, Take 5 mg by mouth, Disp: , Rfl:     meloxicam (MOBIC) 15 mg tablet, TAKE 1 TABLET BY MOUTH EVERY DAY, Disp: 30 tablet, Rfl: 1    multivitamin (THERAGRAN) TABS, Take 1 tablet by mouth daily, Disp: , Rfl:     nystatin-triamcinolone (MYCOLOG-II) cream, Apply topically 2 (two) times a day, Disp: 30 g, Rfl: 1    omeprazole (PriLOSEC) 20 mg delayed release capsule, Take 1 capsule (20 mg total) by mouth daily before breakfast, Disp: 90 capsule, Rfl: 1    vitamin E 100 UNIT capsule, Take 100 Units by mouth daily, Disp: , Rfl:     Wheat Dextrin (BENEFIBER DRINK MIX PO), , Disp: , Rfl:     rOPINIRole (REQUIP) 0 5 mg tablet, Take 1 tablet (0 5 mg total) by mouth daily at bedtime (Patient not taking: Reported on 2019), Disp: 30 tablet, Rfl: 1    selegiline (ELDEPRYL) 5 mg capsule, Take 5 mg by mouth 2 (two) times a day, Disp: , Rfl: 11    REVIEW OF SYSTEMS:  Review of Systems   Constitutional: Negative for chills and fever  HENT: Negative for sore throat  Eyes: Negative for visual disturbance  Respiratory: Positive for shortness of breath  Negative for cough  Cardiovascular: Positive for leg swelling (more noticable lately)  Negative for chest pain  Gastrointestinal: Positive for constipation  Negative for abdominal pain, diarrhea, nausea and vomiting  Endocrine: Negative for polyuria  Genitourinary: Positive for difficulty urinating (as per HPI) and hematuria  Negative for decreased urine volume and dysuria  Musculoskeletal: Negative for back pain and myalgias  +knee pain as per HPI   Skin: Negative for rash  Neurological: Positive for dizziness (occurs at times with standing)  Negative for light-headedness and numbness  Hematological: Negative for adenopathy  Does not bruise/bleed easily  Psychiatric/Behavioral: Negative for confusion  PHYSICAL EXAM:   Vitals:    12/06/19 0732   BP: 118/82   BP Location: Right arm   Patient Position: Sitting   Cuff Size: Standard   Pulse: 78   Weight: 111 kg (244 lb)   Height: 6' (1 829 m)     Body mass index is 33 09 kg/m²  Physical Exam   Constitutional: He appears well-developed and well-nourished  No distress  HENT:   Head: Normocephalic and atraumatic  Mouth/Throat: No oropharyngeal exudate  Eyes: Right eye exhibits no discharge  Left eye exhibits no discharge  No scleral icterus  Neck: Normal range of motion  Neck supple  No thyromegaly present  Cardiovascular: Normal rate, regular rhythm and normal heart sounds  Pulmonary/Chest: Effort normal and breath sounds normal  He has no wheezes  He has no rales  Abdominal: Soft  Bowel sounds are normal  He exhibits no distension  There is no tenderness  Musculoskeletal: Normal range of motion  He exhibits no edema  Neurological: He is alert  awake   Skin: Skin is warm and dry  No rash noted  He is not diaphoretic  Psychiatric: He has a normal mood and affect  His behavior is normal    Vitals reviewed          Laboratory results:   Lab Results   Component Value Date    SODIUM 140 11/23/2019    K 4 4 11/23/2019     11/23/2019    CO2 29 11/23/2019    BUN 18 11/23/2019    CREATININE 1 22 11/23/2019    GLUC 100 05/11/2018 CALCIUM 9 2 11/23/2019        Imaging:  CT abdomen performed 7/3/17:     Portions of the record may have been created with voice recognition software   Occasional wrong word or "sound a like" substitutions may have occurred due to the inherent limitations of voice recognition software   Read the chart carefully and recognize, using context, where substitutions have occurred

## 2019-12-06 NOTE — PATIENT INSTRUCTIONS
1  Elevated BP readings with low readings at times as well - BP seems very well controlled in office   -Parkinson's disease can lead to autonomic dysfunction as well as labile BP readings    -I note the patient is on florinef  Would wonder if midodrine may be more effective in light of LE edema  Will discuss with the patient's neurologist   -am cortisol ok  -I do note mildly elevated free normetanephrine  Would expect 2-3x normal range to suspect pheochromocytoma  -craig 1 3, renin 0 208  Ratio is normal as of 11/23/19  -TSH ok  -will repeat plasma metanephrines  -continue to wear compression stockings  Take your time getting up   -Do not exceed half tablet of florinef   -limit sodium intake in diet as this can raise BP  -call office if BP readings persistently > 180/90      2  Elevated serum creatinine ? D/t chronic kidney disease stage 2(mild) - b/l sCr 1-1 2 since 2016, last sCr 1 22 which correlates with eGFR 88 ml/min per CKD EPI equation  ? If this is due to lability in BP readings vs some other cause  -in office urinalysis shows +blood and protein  Did have recent cystoscopy   -will repeat UA with microscopy once hematuria clears up  Prescriptions for UA and microalb:Cr and urine protein:Cr provided  3  Proteinuria - noted on UA in office as well as outpatient this month  -will check UpCr and microalb:Cr once hematuria resolves as gross hematuria can cause false proteinuria to be present  -of note, not anemic  Will defer myeloma workup    4  Nephrolithiasis - 4mm calculus noted in right kidney  Left renal cortical scarring with nonobstructing right renal calculus also noted on CT abdomen performed 7/3/17  -would follow low sodium diet (<2000mg/day)  Please read packages to ensure sodium content limited  -drink enough fluids to urinate 2-2 5L/day to prevent stone formation    5  History of urinary retention - s/p urolift x 2, s/p TURP x 2, bladder neck constricture repair  Follows with urology   For cystoscopy again soon   -will obtain renal ultrasound with PVR in light of urinary retention and kidney stones    RTC in 3 months

## 2019-12-09 DIAGNOSIS — K21.00 GASTROESOPHAGEAL REFLUX DISEASE WITH ESOPHAGITIS: ICD-10-CM

## 2019-12-09 DIAGNOSIS — R49.9 CHANGE IN VOICE: ICD-10-CM

## 2019-12-09 RX ORDER — OMEPRAZOLE 20 MG/1
20 CAPSULE, DELAYED RELEASE ORAL
Qty: 90 CAPSULE | Refills: 1 | Status: SHIPPED | OUTPATIENT
Start: 2019-12-09 | End: 2020-06-09

## 2019-12-09 NOTE — PROGRESS NOTES
[address] DISORDERS CLINIC         RETURN PATIENT NOTE    Patient: Diana Briones  Medical Record Number: # [id number]  YOB: 1960  Date of visit: 12/10/2019     Referring provider: Beckie Carbajal MD     The patient was not accompanied today  History was obtained from patient     ASSESSMENT     1  Neurogenic orthostatic hypotension (HCC)     2  Parkinson's disease ([address] )       Impression of this 59 yo gentleman following up for parkinsonism with significant orthostatic hypotension, having intermittent hypertension instead on low dose of fludrocortisone and worsened by physical activity  In fact he has been limited in the scope of his physical therapy due to the hypertension and sometimes feels dizzy from this  Despite this, he still sometimes have blood pressure in the systolic of 20X  However he never appears to be symptomatic from when it gets low - only when high  He has stopped both the selegiline and the Requip now with some mild worsening of his motor symptoms but manageable  He feels with his his average blood pressure has not spiked further  He continues to see pulmonology for his poorly moving diaphragm and respiratory issues  We have no ruled out [address] at this time, but his lack of substantial rapid progression is making this less likely  We advised lowering the fludrocortisone even further, perhaps replacing with midodrine given his sensitivity  We would like him to remain function enough to participate in [employer] as he wants  PLAN     · For now he will take the fludrocortisone 1/2 tablet once a day as needed for a low blood pressure reading  Discussed about potential switch to midodrine if this is unsuccessful  · We discussed about maintaining a blood pressure vital diary based on his use of the daily fludrocortisone over several weeks; he can show us in a month     · At this time his parkinsonism symptoms are manageable per him and will hold off adding any new dopamine agonist unless these become too bothersome  If that happens, considering starting pramipexole  · With pramipexole, if his average blood pressure drops too low then he may need an accompanying dose of Florinef or even midodrine  · He is using the physical therapy techniques at home, started [employer]  · We answered many of his questions today  He can call us for clarification on anything else  · Return to Clinic on 3 months     A total of 45 minutes were spent face-to-face with this patient, of which >50% was spent on counseling and coordination of care  HISTORY OF PRESENT ILLNESS:     Mr Nori Arevalo is a 60 y o  right handed male who [facility] for evaluation of Parkinson's disease and severe orthostatism  Last visit 9/12/19  Interim History  Pt had messaged office several times (see Sentri messages), touching upon questions regarding Requip and gabapentin titration (10/16/19), questions about fludrocortisone (11/20/19)  He wished to take the fludrocortisone in 1/2 tablet increments dependent upon morning BP value and prn adding another 1/2 tablet if low  As of 12/7/19 he messaged back to report periods of "very high BP" that alarmed him enough to stop selegiline and ropinirole  He felt slightly worse on his parkinsonism symptoms but not terribly so  Today he says since stopping the selegiline and Requip his self-measured blood pressure has not spiked as high  His parkinson's symptoms are worse than before but still manageable  His prn fludrocortisone regimen has been working for him  INITIAL HISTORY  He says he first noticed smaller handwriting while taking a class and taking notes around 2015  In 2016 he noticed he was "meandering walking left and right" which seemed to progress to the point where he would have difficulty keeping pace with someone walking more normally   Denies feeling hemibody slowness or stiffness  He felt he had to put forth more effort in walking fast  If he climbs more than a flight of stairs, his thighs become fatigued  +lightheadedness after ascending stairs or rising from a kneeling position (none while laying down)  If he closes his eyes in shower and looks up, he feels very unsteady  He denies ever falling except 1x while pulling up pants in 2017  He is aware of his posture being more slouched forward  Patient saw his PCP with complaint of lightheadedness while standing and dizziness, trouble with writing on right hand with dropping objects  MRI Brain on July 2018 was reported unremarkable  - Laboratory testing in July 2018 including normal CBC, CMP, Lyme ab testing and hypovitaminosis D noted  - No previous injuries or surgeries on head or neck  - His main concern today is if he has Parkinson's disease, as he read about his symptoms on WebMD      Tremor noticeable but not bothersome; he says he can see his R hand shaking when he writes or eats  Not apparent when his hand is resting  No other tremor in his body he says  Started after 2016 very mild progression if at all  Sleep:  He says he sleeps poorly with sleep habits (sleeping on recliner while watching TV, ~4-5 hrs a night  +difficulty falling back asleep  +snores  Hyposmia: denies  RBD (REM semi-purposeful body movements):  "occasionally" acting out dreams   Gait Disturbance:  Yes, as above   Dementia:  +feeling more difficulty with attention and focusing at work  Forgetting names and small tasks  Constipation: endorses  Hallucination: denies  Skin Cancer History: denies  Hypovitaminosis D: denies  Hypovitaminosis B12:  denies  Dysautonomia:  +urinary retention but has BPH  +orthostatism    Gaze Palsy: none  Exercise Therapy: INACTIVE    Family History: Number of First Degree Relatives With Parkinsonism: mother    Imaging: MRI brain in July 2018      REVIEW OF PAST MEDICAL, SOCIAL AND FAMILY HISTORY:  This is the list of problems as per our Medical Records:    Patient Active Problem List    Diagnosis Date Noted    Proteinuria 12/06/2019    Urinary retention 12/06/2019    Nephrolithiasis 12/06/2019    CKD (chronic kidney disease) stage 2, GFR 60-89 ml/min 12/06/2019    Elevated blood pressure reading 11/22/2019    Tinea corporis 08/27/2019    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    Periodic limb movement disorder (PLMD) 08/22/2019    Change in voice 06/03/2019    Gastroesophageal reflux disease with esophagitis 06/03/2019    KALLI (obstructive sleep apnea)     Chronic pain of right ankle 03/04/2019    Chronic pain of left knee 03/04/2019    Parkinson's disease ([facility] ) 03/04/2019    Thyromegaly 03/04/2019    Neurogenic orthostatic hypotension (HCC) 02/27/2019    Shortness of breath 11/29/2018    Pain of right thumb 10/18/2018    Pain of left thumb 10/18/2018    Right elbow pain 10/18/2018    Right wrist pain 10/18/2018    Plantar fasciitis, bilateral 10/10/2018    Unspecified abnormalities of gait and mobility 10/10/2018    Dizziness 07/05/2018    [facility] cancer ([facility] ) 12/17/2014     No Known Allergies     Outpatient Encounter Medications as of 12/10/2019   Medication Sig Dispense Refill    Ascorbic Acid (VITAMIN C) 100 MG tablet Take 100 mg by mouth daily      fludrocortisone (FLORINEF) 0 1 mg tablet Take 0 5 tablets (0 05 mg total) by mouth daily 45 tablet 4    Melatonin 5 MG TABS Take 5 mg by mouth      meloxicam (MOBIC) 15 mg tablet TAKE 1 TABLET BY MOUTH EVERY DAY 30 tablet 1    multivitamin (THERAGRAN) TABS Take 1 tablet by mouth daily      nystatin-triamcinolone (MYCOLOG-II) cream Apply topically 2 (two) times a day 30 g 1    omeprazole (PriLOSEC) 20 mg delayed release capsule Take 1 capsule (20 mg total) by mouth daily before breakfast 90 capsule 1    vitamin E 100 UNIT capsule Take 100 Units by mouth daily      Wheat Dextrin (BENEFIBER DRINK MIX PO)       [] ciprofloxacin (CIPRO) 500 mg tablet Take 500 mg by mouth 2 (two) times a day      rOPINIRole (REQUIP) 0 5 mg tablet Take 1 tablet (0 5 mg total) by mouth daily at bedtime (Patient not taking: Reported on 2019) 30 tablet 1    selegiline (ELDEPRYL) 5 mg capsule Take 5 mg by mouth 2 (two) times a day  11    [DISCONTINUED] omeprazole (PriLOSEC) 20 mg delayed release capsule Take 1 capsule (20 mg total) by mouth daily before breakfast 90 capsule 1     No facility-administered encounter medications on file as of 12/10/2019  REVIEW OF SYSTEMS:  The patient has entered data on an intake form regarding present illness, past medical and surgical history, medications, allergies, family and social history, and a full review of 14 systems  I have reviewed this form with the patient, and all the relevant information has been included on this note  The full review of systems was negative except as stated in HPI and below  Review of Systems   Constitutional: Negative  Negative for appetite change and fever  HENT: Positive for tinnitus  Negative for hearing loss, trouble swallowing and voice change  Eyes: Negative  Negative for photophobia and pain  Respiratory: Negative  Negative for shortness of breath  Cardiovascular: Negative  Negative for palpitations  Gastrointestinal: Negative  Negative for nausea and vomiting  Endocrine: Negative  Negative for cold intolerance and heat intolerance  Genitourinary: Negative  Negative for dysuria, frequency and urgency  Musculoskeletal: Positive for gait problem (balance)  Negative for myalgias and neck pain  Skin: Negative  Negative for rash  Neurological: Positive for dizziness, tremors (resting tremor in right hand, worse since stopping Selegiline and Requip) and light-headedness  Negative for seizures, syncope, facial asymmetry, speech difficulty, weakness, numbness and headaches  Hematological: Negative    Does not bruise/bleed easily  Psychiatric/Behavioral: Negative  Negative for confusion, hallucinations and sleep disturbance  FOCUSED PHYSICAL EXAMINATION:     Vital signs:  /71 (BP Location: Right arm, Patient Position: Standing, Cuff Size: Large) Comment: orthostatics  Pulse 84   Ht 6' (1 829 m)   Wt 109 kg (241 lb 1 6 oz)   BMI 32 70 kg/m²     General:  Well-appearing, well nourished, pleasant patient in no acute distress  Mood are appropriate  Head:  Normocephalic, atraumatic  Oropharynx and conjunctiva are clear  Speech  No hypophonia or bradylalia  No scanning speech  Language: Comprehension intact  Neck:  Supple, strong 5/5 forward flexion and retroflexion  Extremities: Range of motion is normal       Cognitive and Mental Exam:  The patient is alert, oriented to self, location, date and situation  Memory is normal to provide accurate details of health history     Cranial Nerves:  Direct and consensual light reflexes were normal  No afferent pupillary defect  Visual fields are full to confrontation  Extraocular movements were full, with normal pursuit and saccades  Pupils were equal, reactive to light symmetrically  Facial sensation to light touch was intact  Face is symmetric with normal strength  Hearing was assessed using the finger rubs  And was normal bilaterally  Palate is up going bilaterally and symmetrically  Neck muscles are strong  Tongue protrusion is at midline with normal movements  No dysarthria  Motor:    Dystonia: none  Dyskinesia: none  Myoclonus: none  Chorea: none  Tics: none        UPDRS                Time since last dose:   4/4/19 6/5/19 on selegiline  9/12/19  12/10/19   Speech   1  1 1 1   Facial Expression   0  1 0 0   Rigidity - Neck   0  0 0 0   Rigidity - Upper Extremity (Right)   1  0 0 0   Rigidity - Upper Extremity (Left)    1  0 0 0   Rigidity - Lower Extremity (Right)   1  0 0 0   Rigidity - Lower Extremity (Left)    1  0 0 0   Finger Taps (Right)    1  0 2 1   Finger Taps (Left)    0  0 0 0   Hand Movement (Right)   0  0 0 0   Hand Movement (Left)    0  0 0 0   Pronation/Supination (Right)   0  0 0 0   Pronation/Supination (Left)    0  0 0 0   Toe Tapping (Right)  0  0 0 0   Toe Tapping (Left)  0  0 0 0   Leg Agility (Right)   0  0 0 0   Leg Agility (Left)    0  0 0 0   Arising from Chair    0  0 0 0   Gait    0  0 0 0   Freezing of Gait  0  0 0 0   Postural Stability    -  - - -   Posture  1  0 0 0   Global spontaneity of movement  2  0 1 slightly less on R 0   Postural Tremor (Right)  0  0 0 0   Postural Tremor (Left)  0  0 0 0   Kinetic Tremor (Right)   0  0 0 0   Kinetic Tremor (Left)   0  0 0 0   Rest tremor amplitude RUE  0  0 0 0   Rest tremor amplitude LUE  0  0 0 0   Rest tremor amplitude RLE  0  0 0 0   Reset tremor amplitude LLE  0  0 0 0   Lip/Jaw Tremor   0  0 0 0   Consistency of tremor  0  0 0 0     -------------------------------------------------------------------------------------    Muscle Strength Right Left  Muscle Strength Right Left   Deltoid 5/5 5/5  Hip Adductors 5/5 5/5   Biceps 5/5 5/5  Hip Abductors 5/5 5/5   Triceps 5/5 5/5  Knee Extensors 5/5 5/5   Wrist Extensors 5/5 5/5  Knee Flexors 5/5 5/5   Wrist Flexors 5/5 5/5  Ankle Extensors 5/5 5/5    5/5 5/5  Ankle Flexors 5/5 5/5   Finger Abductors 5/5 5/5       Hip Flexors 5/5 5/5   Hip Extensors 5/5 5/5     Sensory:  Reduced to both pinprick and temperature and also vibration sense in the lower extremities  Gait:  Narrow based gait, upright walking without shift or sway, no freezing of gait and normal stride lengths  No re-emergent rest tremor       Reflexes:    Right Left   Biceps 2/4 2/4   Brachioradialis 2/4 2/4   Triceps 2/4 2/4   Knee 2/4 2/4   Ankle 2/4 2/4

## 2019-12-10 ENCOUNTER — TELEPHONE (OUTPATIENT)
Dept: NEUROLOGY | Facility: CLINIC | Age: 59
End: 2019-12-10

## 2019-12-10 ENCOUNTER — OFFICE VISIT (OUTPATIENT)
Dept: NEUROLOGY | Facility: CLINIC | Age: 59
End: 2019-12-10
Payer: COMMERCIAL

## 2019-12-10 ENCOUNTER — CLINICAL SUPPORT (OUTPATIENT)
Dept: PULMONOLOGY | Age: 59
End: 2019-12-10
Payer: COMMERCIAL

## 2019-12-10 VITALS
BODY MASS INDEX: 32.66 KG/M2 | WEIGHT: 241.1 LBS | HEIGHT: 72 IN | SYSTOLIC BLOOD PRESSURE: 122 MMHG | HEART RATE: 84 BPM | DIASTOLIC BLOOD PRESSURE: 71 MMHG

## 2019-12-10 DIAGNOSIS — G90.3 NEUROGENIC ORTHOSTATIC HYPOTENSION (HCC): Primary | ICD-10-CM

## 2019-12-10 DIAGNOSIS — J98.6 DIAPHRAGM DYSFUNCTION: ICD-10-CM

## 2019-12-10 DIAGNOSIS — G20 PARKINSON'S DISEASE (HCC): ICD-10-CM

## 2019-12-10 PROCEDURE — 99215 OFFICE O/P EST HI 40 MIN: CPT | Performed by: PSYCHIATRY & NEUROLOGY

## 2019-12-10 PROCEDURE — G0239 OTH RESP PROC, GROUP: HCPCS

## 2019-12-10 NOTE — LETTER
December 10, 2019     Dajuan Steven, [address] [address]    Patient: Rao Briceño   YOB: 1960   Date of Visit: 12/10/2019       Dear Dr Bonnie Edward:    Earlier today I saw Mr Ever Castellanos for follow-up of his orthostatism and Parkinson's disease  Below are my notes for this visit for your records and to keep you updated on his health status  If you have questions, please do not hesitate to call me  I look forward to following your patient along with you  Sincerely,        Fritz Carreon MD        CC: No Recipients  Fritz Carreon MD  12/10/2019  8:57 PM  Sign at close encounter  [address] PATIENT NOTE    Patient: Rao Briceño  Medical Record Number: # [id number]  YOB: 1960  Date of visit: 12/10/2019     Referring provider: Raudel Roberts MD     The patient was not accompanied today  History was obtained from patient     ASSESSMENT     1  Neurogenic orthostatic hypotension (HCC)     2  Parkinson's disease ([address] )       Impression of this 61 yo gentleman following up for parkinsonism with significant orthostatic hypotension, having intermittent hypertension instead on low dose of fludrocortisone and worsened by physical activity  In fact he has been limited in the scope of his physical therapy due to the hypertension and sometimes feels dizzy from this  Despite this, he still sometimes have blood pressure in the systolic of 50O  However he never appears to be symptomatic from when it gets low - only when high  He has stopped both the selegiline and the Requip now with some mild worsening of his motor symptoms but manageable  He feels with his his average blood pressure has not spiked further  He continues to see pulmonology for his poorly moving diaphragm and respiratory issues   We have no ruled out MSA at this time, but his lack of substantial rapid progression is making this less likely  We advised lowering the fludrocortisone even further, perhaps replacing with midodrine given his sensitivity  We would like him to remain function enough to participate in [employer] as he wants  PLAN     · For now he will take the fludrocortisone 1/2 tablet once a day as needed for a low blood pressure reading  Discussed about potential switch to midodrine if this is unsuccessful  · We discussed about maintaining a blood pressure vital diary based on his use of the daily fludrocortisone over several weeks; he can show us in a month  · At this time his parkinsonism symptoms are manageable per him and will hold off adding any new dopamine agonist unless these become too bothersome  If that happens, considering starting pramipexole  · With pramipexole, if his average blood pressure drops too low then he may need an accompanying dose of Florinef or even midodrine  · He is using the physical therapy techniques at home, started [employer]  · We answered many of his questions today  He can call us for clarification on anything else  · Return to Clinic on 3 months     A total of 45 minutes were spent face-to-face with this patient, of which >50% was spent on counseling and coordination of care  HISTORY OF PRESENT ILLNESS:     Mr Thien Sierra is a 60 y o  right handed male who returns to the [facility] for evaluation of Parkinson's disease and severe orthostatism  Last visit 9/12/19  Interim History  Pt had messaged office several times (see PerfectPostt messages), touching upon questions regarding Requip and gabapentin titration (10/16/19), questions about fludrocortisone (11/20/19)  He wished to take the fludrocortisone in 1/2 tablet increments dependent upon morning BP value and prn adding another 1/2 tablet if low   As of 12/7/19 he messaged back to report periods of "very high BP" that alarmed him enough to stop selegiline and ropinirole  He felt slightly worse on his parkinsonism symptoms but not terribly so  Today he says since stopping the selegiline and Requip his self-measured blood pressure has not spiked as high  His parkinson's symptoms are worse than before but still manageable  His prn fludrocortisone regimen has been working for him  INITIAL HISTORY  He says he first noticed smaller handwriting while taking a class and taking notes around 2015  In 2016 he noticed he was "meandering walking left and right" which seemed to progress to the point where he would have difficulty keeping pace with someone walking more normally  Denies feeling hemibody slowness or stiffness  He felt he had to put forth more effort in walking fast  If he climbs more than a flight of stairs, his thighs become fatigued  +lightheadedness after ascending stairs or rising from a kneeling position (none while laying down)  If he closes his eyes in shower and looks up, he feels very unsteady  He denies ever falling except 1x while pulling up pants in 2017  He is aware of his posture being more slouched forward  Patient saw his PCP with complaint of lightheadedness while standing and dizziness, trouble with writing on right hand with dropping objects  MRI Brain on July 2018 was reported unremarkable  - Laboratory testing in July 2018 including normal CBC, CMP, Lyme ab testing and hypovitaminosis D noted  - No previous injuries or surgeries on head or neck  - His main concern today is if he has Parkinson's disease, as he read about his symptoms on WebMD      Tremor noticeable but not bothersome; he says he can see his R hand shaking when he writes or eats  Not apparent when his hand is resting  No other tremor in his body he says  Started after 2016 very mild progression if at all  Sleep:  He says he sleeps poorly with sleep habits (sleeping on recliner while watching TV, ~4-5 hrs a night  +difficulty falling back asleep  +snores     Hyposmia: denies  RBD (REM semi-purposeful body movements):  "occasionally" acting out dreams   Gait Disturbance:  Yes, as above   Dementia:  +feeling more difficulty with attention and focusing at work  Forgetting names and small tasks  Constipation: endorses  Hallucination: denies  Skin Cancer History: denies  Hypovitaminosis D: denies  Hypovitaminosis B12:  denies  Dysautonomia:  +urinary retention but has BPH  +orthostatism    Gaze Palsy: none  Exercise Therapy: INACTIVE    Family History: Number of First Degree Relatives With Parkinsonism: mother    Imaging: MRI brain in July 2018      REVIEW OF PAST MEDICAL, SOCIAL AND FAMILY HISTORY:  This is the list of problems as per our Medical Records:    Patient Active Problem List    Diagnosis Date Noted    Proteinuria 12/06/2019    Urinary retention 12/06/2019    Nephrolithiasis 12/06/2019    CKD (chronic kidney disease) stage 2, GFR 60-89 ml/min 12/06/2019    Elevated blood pressure reading 11/22/2019    Tinea corporis 08/27/2019    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    Periodic limb movement disorder (PLMD) 08/22/2019    Change in voice 06/03/2019    Gastroesophageal reflux disease with esophagitis 06/03/2019    KALLI (obstructive sleep apnea)     Chronic pain of right ankle 03/04/2019    Chronic pain of left knee 03/04/2019    Parkinson's disease ([facility] ) 03/04/2019    Thyromegaly 03/04/2019    Neurogenic orthostatic hypotension (HCC) 02/27/2019    Shortness of breath 11/29/2018    Pain of right thumb 10/18/2018    Pain of left thumb 10/18/2018    Right elbow pain 10/18/2018    Right wrist pain 10/18/2018    Plantar fasciitis, bilateral 10/10/2018    Unspecified abnormalities of gait and mobility 10/10/2018    Dizziness 07/05/2018    Primary testicular cancer ([facility] ) 12/17/2014     No Known Allergies     Outpatient Encounter Medications as of 12/10/2019   Medication Sig Dispense Refill    Ascorbic Acid (VITAMIN C) 100 MG tablet Take 100 mg by mouth daily  fludrocortisone (FLORINEF) 0 1 mg tablet Take 0 5 tablets (0 05 mg total) by mouth daily 45 tablet 4    Melatonin 5 MG TABS Take 5 mg by mouth      meloxicam (MOBIC) 15 mg tablet TAKE 1 TABLET BY MOUTH EVERY DAY 30 tablet 1    multivitamin (THERAGRAN) TABS Take 1 tablet by mouth daily      nystatin-triamcinolone (MYCOLOG-II) cream Apply topically 2 (two) times a day 30 g 1    omeprazole (PriLOSEC) 20 mg delayed release capsule Take 1 capsule (20 mg total) by mouth daily before breakfast 90 capsule 1    vitamin E 100 UNIT capsule Take 100 Units by mouth daily      Wheat Dextrin (BENEFIBER DRINK MIX PO)       [] ciprofloxacin (CIPRO) 500 mg tablet Take 500 mg by mouth 2 (two) times a day      rOPINIRole (REQUIP) 0 5 mg tablet Take 1 tablet (0 5 mg total) by mouth daily at bedtime (Patient not taking: Reported on 2019) 30 tablet 1    selegiline (ELDEPRYL) 5 mg capsule Take 5 mg by mouth 2 (two) times a day  11    [DISCONTINUED] omeprazole (PriLOSEC) 20 mg delayed release capsule Take 1 capsule (20 mg total) by mouth daily before breakfast 90 capsule 1     No facility-administered encounter medications on file as of 12/10/2019  REVIEW OF SYSTEMS:  The patient has entered data on an intake form regarding present illness, past medical and surgical history, medications, allergies, family and social history, and a full review of 14 systems  I have reviewed this form with the patient, and all the relevant information has been included on this note  The full review of systems was negative except as stated in HPI and below  Review of Systems   Constitutional: Negative  Negative for appetite change and fever  HENT: Positive for tinnitus  Negative for hearing loss, trouble swallowing and voice change  Eyes: Negative  Negative for photophobia and pain  Respiratory: Negative  Negative for shortness of breath  Cardiovascular: Negative  Negative for palpitations     Gastrointestinal: Negative  Negative for nausea and vomiting  Endocrine: Negative  Negative for cold intolerance and heat intolerance  Genitourinary: Negative  Negative for dysuria, frequency and urgency  Musculoskeletal: Positive for gait problem (balance)  Negative for myalgias and neck pain  Skin: Negative  Negative for rash  Neurological: Positive for dizziness, tremors (resting tremor in right hand, worse since stopping Selegiline and Requip) and light-headedness  Negative for seizures, syncope, facial asymmetry, speech difficulty, weakness, numbness and headaches  Hematological: Negative  Does not bruise/bleed easily  Psychiatric/Behavioral: Negative  Negative for confusion, hallucinations and sleep disturbance  FOCUSED PHYSICAL EXAMINATION:     Vital signs:  /71 (BP Location: Right arm, Patient Position: Standing, Cuff Size: Large) Comment: orthostatics  Pulse 84   Ht 6' (1 829 m)   Wt 109 kg (241 lb 1 6 oz)   BMI 32 70 kg/m²      General:  Well-appearing, well nourished, pleasant patient in no acute distress  Mood are appropriate  Head:  Normocephalic, atraumatic  Oropharynx and conjunctiva are clear  Speech  No hypophonia or bradylalia  No scanning speech  Language: Comprehension intact  Neck:  Supple, strong 5/5 forward flexion and retroflexion  Extremities: Range of motion is normal       Cognitive and Mental Exam:  The patient is alert, oriented to self, location, date and situation  Memory is normal to provide accurate details of health history     Cranial Nerves:  Direct and consensual light reflexes were normal  No afferent pupillary defect  Visual fields are full to confrontation  Extraocular movements were full, with normal pursuit and saccades  Pupils were equal, reactive to light symmetrically  Facial sensation to light touch was intact  Face is symmetric with normal strength  Hearing was assessed using the finger rubs  And was normal bilaterally       Palate is up going bilaterally and symmetrically  Neck muscles are strong  Tongue protrusion is at midline with normal movements  No dysarthria  Motor:    Dystonia: none  Dyskinesia: none  Myoclonus: none  Chorea: none  Tics: none        UPDRS                Time since last dose:   4/4/19 6/5/19 on selegiline  9/12/19  12/10/19   Speech   1  1 1 1   Facial Expression   0  1 0 0   Rigidity - Neck   0  0 0 0   Rigidity - Upper Extremity (Right)   1  0 0 0   Rigidity - Upper Extremity (Left)    1  0 0 0   Rigidity - Lower Extremity (Right)   1  0 0 0   Rigidity - Lower Extremity (Left)    1  0 0 0   Finger Taps (Right)    1  0 2 1   Finger Taps (Left)    0  0 0 0   Hand Movement (Right)   0  0 0 0   Hand Movement (Left)    0  0 0 0   Pronation/Supination (Right)   0  0 0 0   Pronation/Supination (Left)    0  0 0 0   Toe Tapping (Right)  0  0 0 0   Toe Tapping (Left)  0  0 0 0   Leg Agility (Right)   0  0 0 0   Leg Agility (Left)    0  0 0 0   Arising from Chair    0  0 0 0   Gait    0  0 0 0   Freezing of Gait  0  0 0 0   Postural Stability    -  - - -   Posture  1  0 0 0   Global spontaneity of movement  2  0 1 slightly less on R 0   Postural Tremor (Right)  0  0 0 0   Postural Tremor (Left)  0  0 0 0   Kinetic Tremor (Right)   0  0 0 0   Kinetic Tremor (Left)   0  0 0 0   Rest tremor amplitude RUE  0  0 0 0   Rest tremor amplitude LUE  0  0 0 0   Rest tremor amplitude RLE  0  0 0 0   Reset tremor amplitude LLE  0  0 0 0   Lip/Jaw Tremor   0  0 0 0   Consistency of tremor  0  0 0 0     -------------------------------------------------------------------------------------    Muscle Strength Right Left  Muscle Strength Right Left   Deltoid 5/5 5/5  Hip Adductors 5/5 5/5   Biceps 5/5 5/5  Hip Abductors 5/5 5/5   Triceps 5/5 5/5  Knee Extensors 5/5 5/5   Wrist Extensors 5/5 5/5  Knee Flexors 5/5 5/5   Wrist Flexors 5/5 5/5  Ankle Extensors 5/5 5/5    5/5 5/5  Ankle Flexors 5/5 5/5   Finger Abductors 5/5 5/5       Hip Flexors 5/5 5/5   Hip Extensors 5/5 5/5     Sensory:  Reduced to both pinprick and temperature and also vibration sense in the lower extremities  Gait:  Narrow based gait, upright walking without shift or sway, no freezing of gait and normal stride lengths  No re-emergent rest tremor       Reflexes:    Right Left   Biceps 2/4 2/4   Brachioradialis 2/4 2/4   Triceps 2/4 2/4   Knee 2/4 2/4   Ankle 2/4 2/4

## 2019-12-10 NOTE — PATIENT INSTRUCTIONS
· For now he will take the fludrocortisone 1/2 tablet once a day as needed for a low blood pressure reading  Discussed about potential switch to midodrine if this is unsuccessful  · We discussed about maintaining a blood pressure vital diary based on his use of the daily fludrocortisone over several weeks; he can show us in a month  · At this time his parkinsonism symptoms are manageable per him and will hold off adding any new dopamine agonist unless these become too bothersome  If that happens, considering starting pramipexole  · With pramipexole, if his average blood pressure drops too low then he may need an accompanying dose of Florinef or even midodrine  · He is using the physical therapy techniques at home, started [employer]  · We answered many of his questions today  He can call us for clarification on anything else     · Return to Clinic on 3 months

## 2019-12-10 NOTE — TELEPHONE ENCOUNTER
Perhaps he can avoid taking his fludrocortisone on days when he's expected to exercise more  If that takes care of it, then he'll take it only as needed on non-PT days  If even this does not help him then he will probably need less aggressive exercises  He is taking the medication in case his blood pressure runs low and we cannot add on any antihypertensives to handle when he's high lest the hypotension becomes dangerous   Thanks

## 2019-12-10 NOTE — TELEPHONE ENCOUNTER
Zaida Lombardi from [employer]  Calling regarding patient BP and dizziness  Patient coming in extremely hypertensive  Patient lightheaded when trying to do equipment at rehab  Patient very orthostatic throughout therapy  Taking the fludrocortisone has not been helpful in their opinion  Patient is also having moments of "dazing out" during therapy sessions  Therapist is not comfortable with him doing therapy during these sessions and is further uncomfortable that he is driving home after the sessions  They wanted you to be aware    Dr Everett Harris    Please advise plan of action      Zaida Lombardi callback #[phone]

## 2019-12-10 NOTE — PROGRESS NOTES
Pulmonary Rehabilitation Plan of Care   90 day Plan of Care          Today's date: 12/10/2019   Total visits to date: 23  Patient name: Cynthia Cottrell      : 1960  Age: 60 y o  MRN: [id number]  Referring Physician: Rama Snellen, MD  Provider: Ana Sesay  Clinician: Jaison Orona MS    Dx:   Encounter Diagnosis   Name Primary?  Diaphragm dysfunction      Date of onset: 8/15/19    COMMENTS:  [facility]  [facility] per order by Dr Morales  He has Parkinson's and diaphragm paralysis  He has been progressing very well as tolerated  His BP when at rest before exercise ranges from 918-807 diastolic and  diastolic  After completing a few pieces of equipment, he still continues to feel severe lightheadedness and dizziness  He rests and hydrates but symptoms seem to stay  His recovery BP ranges from  systolic and 75-01 diastolic  Fludrocortisone was prescribed by his neurologist and he was instructed to take this when he feels symptoms  He sees Dr Cindy Wu on  and will further discuss with him  His SOB during exercise remains minimal to moderate and his O2 sats remain well above 90%  Medication compliance: Yes   Comments: none  Fall Risk: Low   Comments: none    EXERCISE/ACTIVITY    Cardiopulmonary Goals:   Min: 30-50   HR:    RPE: 5-7 moderate to somewhat hard exercise   O2 sat: >90%    Modalities: Treadmill, UBE, Lifecycle, NuStep and Recumbent bike  Strength trainin-3 days / week, 12-15 repitations  and 1-2 sets per modality    Modalities: Leg press, Chest press, Lateral raise, Arm curl and Seated Row    Exercise Progression: Treadmill for 15 minutes at 2  3mph and 2 5% grade  Lifecycle bike for 10 minutes at level 7 with hills, UBE for 10 minutes at level 5, weight machines for strength training       RPE 5  Home activity: little  Goals: 10% improvement in functional capacity, home exercise days opposite [place] and >150 mins of exercise/wk  Education: Benefit of exercise, home exercise, pursed lip breathing, RPE scale and O2 saturation monitoring   Plan:home exercise target 30 mins, 2 days opposite ID and exercise education video  Readiness to change: Action    NUTRITION    Weight control:    Starting weight: 234 lb     Diabetes: N/A  Goals:Improved Rate Your Plate score  Education:More frequent meals, smaller portions  hydration  weight loss  healthy choices while dining out  portion control  Plan: Education Video- Diet and Nutrition  Readiness to change: Action    PSYCHOSOCIAL    Emotional: benefits of positive support system  Social support: good  Goals: Physical Fitness in [place] Score < 3, Daily Activity in [place] Score < 3, Pain in [place] Score < 3 and [facility] < 3  Education: Mental Health Education  Plan: Anxiety and Lung disease  Readiness to change: Action    OTHER CORE COMPONENTS     Tobacco:   Social History     Tobacco Use   Smoking Status Former Smoker    Packs/day: 0 50    Years: 3 50    Pack years: 1 75    Types: Cigarettes    Last attempt to quit: [employer] Years since quittin 9   Smokeless Tobacco Never Used     Oxygen: room air  Blood pressure:    Restin/74   Recovery: 90/58  Goals: consistent exercise >150 mins/wk  Education: Pulmonary Disease, physiology, Exercise, strength, flexibility, Conservation of energy, oxygen, breathing techniques, Mental Health, Diet,nutrition, Medication, Avoiding Infections and Caregivers  Plan: Exercise benefits  Readiness to change: Action

## 2019-12-10 NOTE — PROGRESS NOTES
Review of Systems   Constitutional: Negative  Negative for appetite change and fever  HENT: Positive for tinnitus  Negative for hearing loss, trouble swallowing and voice change  Eyes: Negative  Negative for photophobia and pain  Respiratory: Negative  Negative for shortness of breath  Cardiovascular: Negative  Negative for palpitations  Gastrointestinal: Negative  Negative for nausea and vomiting  Endocrine: Negative  Negative for cold intolerance and heat intolerance  Genitourinary: Negative  Negative for dysuria, frequency and urgency  Musculoskeletal: Positive for gait problem (balance)  Negative for myalgias and neck pain  Skin: Negative  Negative for rash  Neurological: Positive for dizziness, tremors (resting tremor in right hand, worse since stopping Selegiline and Requip) and light-headedness  Negative for seizures, syncope, facial asymmetry, speech difficulty, weakness, numbness and headaches  Hematological: Negative  Does not bruise/bleed easily  Psychiatric/Behavioral: Negative  Negative for confusion, hallucinations and sleep disturbance

## 2019-12-11 ENCOUNTER — APPOINTMENT (OUTPATIENT)
Dept: LAB | Facility: MEDICAL CENTER | Age: 59
End: 2019-12-11
Payer: COMMERCIAL

## 2019-12-11 DIAGNOSIS — R80.9 PROTEINURIA, UNSPECIFIED TYPE: ICD-10-CM

## 2019-12-11 DIAGNOSIS — R03.0 ELEVATED BLOOD PRESSURE READING: ICD-10-CM

## 2019-12-11 LAB
ALBUMIN SERPL BCP-MCNC: 3.9 G/DL (ref 3.5–5)
ALP SERPL-CCNC: 67 U/L (ref 46–116)
ALT SERPL W P-5'-P-CCNC: 29 U/L (ref 12–78)
ANION GAP SERPL CALCULATED.3IONS-SCNC: 6 MMOL/L (ref 4–13)
AST SERPL W P-5'-P-CCNC: 15 U/L (ref 5–45)
BACTERIA UR QL AUTO: ABNORMAL /HPF
BILIRUB SERPL-MCNC: 1.56 MG/DL (ref 0.2–1)
BILIRUB UR QL STRIP: NEGATIVE
BUN SERPL-MCNC: 22 MG/DL (ref 5–25)
CALCIUM SERPL-MCNC: 9.5 MG/DL (ref 8.3–10.1)
CHLORIDE SERPL-SCNC: 105 MMOL/L (ref 100–108)
CLARITY UR: ABNORMAL
CO2 SERPL-SCNC: 29 MMOL/L (ref 21–32)
COLOR UR: YELLOW
CREAT SERPL-MCNC: 1.17 MG/DL (ref 0.6–1.3)
CREAT UR-MCNC: 115 MG/DL
CREAT UR-MCNC: 115 MG/DL
GFR SERPL CREATININE-BSD FRML MDRD: 67 ML/MIN/1.73SQ M
GLUCOSE P FAST SERPL-MCNC: 109 MG/DL (ref 65–99)
GLUCOSE UR STRIP-MCNC: NEGATIVE MG/DL
HGB UR QL STRIP.AUTO: ABNORMAL
HYALINE CASTS #/AREA URNS LPF: ABNORMAL /LPF
KETONES UR STRIP-MCNC: NEGATIVE MG/DL
LEUKOCYTE ESTERASE UR QL STRIP: ABNORMAL
MICROALBUMIN UR-MCNC: 80.5 MG/L (ref 0–20)
MICROALBUMIN/CREAT 24H UR: 70 MG/G CREATININE (ref 0–30)
NITRITE UR QL STRIP: NEGATIVE
NON-SQ EPI CELLS URNS QL MICRO: ABNORMAL /HPF
PH UR STRIP.AUTO: 6 [PH]
POTASSIUM SERPL-SCNC: 4.6 MMOL/L (ref 3.5–5.3)
PROT SERPL-MCNC: 7.1 G/DL (ref 6.4–8.2)
PROT UR STRIP-MCNC: ABNORMAL MG/DL
PROT UR-MCNC: 16 MG/DL
PROT/CREAT UR: 0.14 MG/G{CREAT} (ref 0–0.1)
RBC #/AREA URNS AUTO: ABNORMAL /HPF
SODIUM SERPL-SCNC: 140 MMOL/L (ref 136–145)
SP GR UR STRIP.AUTO: 1.02 (ref 1–1.03)
UROBILINOGEN UR QL STRIP.AUTO: 0.2 E.U./DL
WBC #/AREA URNS AUTO: ABNORMAL /HPF

## 2019-12-11 PROCEDURE — 82570 ASSAY OF URINE CREATININE: CPT

## 2019-12-11 PROCEDURE — 36415 COLL VENOUS BLD VENIPUNCTURE: CPT

## 2019-12-11 PROCEDURE — 81001 URINALYSIS AUTO W/SCOPE: CPT

## 2019-12-11 PROCEDURE — 83835 ASSAY OF METANEPHRINES: CPT

## 2019-12-11 PROCEDURE — 84156 ASSAY OF PROTEIN URINE: CPT

## 2019-12-11 PROCEDURE — 80053 COMPREHEN METABOLIC PANEL: CPT

## 2019-12-11 PROCEDURE — 82043 UR ALBUMIN QUANTITATIVE: CPT

## 2019-12-11 NOTE — TELEPHONE ENCOUNTER
Called and left message for Valeria Smalls to call office  Called Brady and discussed Dr Alberto Jones recommendations  Edith Brooks was able to confirm how he is feeling during his workout  Determined that he is feeling most of his symptoms during the bike workout  Confirmed that This is when his BP is dropping during the workout  Patient is taking his medication at this time  Patient advised to decrease the intensity of his workout during this time if he continues to have the symptoms  Patient is in agreement  Patient states that maintaining this is like "trying to hit a moving target"  Patient will call if he has any other questions/concerns

## 2019-12-12 ENCOUNTER — TELEPHONE (OUTPATIENT)
Dept: PULMONOLOGY | Facility: CLINIC | Age: 59
End: 2019-12-12

## 2019-12-12 ENCOUNTER — CLINICAL SUPPORT (OUTPATIENT)
Dept: PULMONOLOGY | Age: 59
End: 2019-12-12
Payer: COMMERCIAL

## 2019-12-12 ENCOUNTER — HOSPITAL ENCOUNTER (OUTPATIENT)
Dept: ULTRASOUND IMAGING | Facility: MEDICAL CENTER | Age: 59
Discharge: HOME | End: 2019-12-12
Payer: COMMERCIAL

## 2019-12-12 DIAGNOSIS — J98.6 DIAPHRAGM DYSFUNCTION: ICD-10-CM

## 2019-12-12 DIAGNOSIS — R03.0 ELEVATED BLOOD PRESSURE READING: ICD-10-CM

## 2019-12-12 PROCEDURE — 51798 US URINE CAPACITY MEASURE: CPT

## 2019-12-12 PROCEDURE — G0239 OTH RESP PROC, GROUP: HCPCS

## 2019-12-12 NOTE — TELEPHONE ENCOUNTER
Dr Romeo Mauro Carisa returned call  Just wanted to let you know patient was in for rehab today and everything went much better with patient  Will continue with your recommendations

## 2019-12-12 NOTE — TELEPHONE ENCOUNTER
I called Krishna to find out what was going on with pt's BiPAP that was faxed over on 11/26/19  Trung Sunnett from [facility] said that the patient's account was put on hold because there was not sufficient information as to why pt now needs BiPAP when he has been using CPAP for awhile (despite office notes from the doctor stating pt's intolerance to CPAP)  I asked what else was needed and Trung Adames said she didn't know and that she was going to send an e-mail to the CPAP department to find out what is going on  She is then supposed to call me back  I have also e-mailed Cum[place]ton Paola from [facility] to get this sorted out  I have called patient to update him  Rubia Hoffman from [facility] called back and said that they never received the office notes from his latest visit that talk about intolerance to the CPAP  Rubia Hoffman then went on to say that they have office notes from 9/24/19 that state patient has trouble tolerating CPAP and will eventually need to switch to BiPAP, and that they can use that office note as proof of the need for BiPAP use  I asked why it took 3 weeks, and for me to call to get this situation worked out, and Rubia Hoffman stated maybe the worker who was working on the original order didn't look into the office notes but he wasn't sure  Rubia Hoffman said he will make sure that the order is worked on today  A copy of the most recent office notes have been faxed to [facility] at [phone] with Attn to Rubia Hoffman  I called patient back and explained the situation and told pt if he still does not hear from [facility] by next week to call me back

## 2019-12-13 LAB
METANEPH FREE SERPL-MCNC: 37 PG/ML (ref 0–62)
NORMETANEPHRINE SERPL-MCNC: 118 PG/ML (ref 0–145)

## 2019-12-17 ENCOUNTER — CLINICAL SUPPORT (OUTPATIENT)
Dept: PULMONOLOGY | Age: 59
End: 2019-12-17
Payer: COMMERCIAL

## 2019-12-17 DIAGNOSIS — J98.6 DIAPHRAGM DYSFUNCTION: ICD-10-CM

## 2019-12-17 PROCEDURE — G0239 OTH RESP PROC, GROUP: HCPCS

## 2019-12-19 ENCOUNTER — TELEPHONE (OUTPATIENT)
Dept: FAMILY MEDICINE CLINIC | Facility: CLINIC | Age: 59
End: 2019-12-19

## 2019-12-19 ENCOUNTER — CLINICAL SUPPORT (OUTPATIENT)
Dept: PULMONOLOGY | Age: 59
End: 2019-12-19
Payer: COMMERCIAL

## 2019-12-19 ENCOUNTER — TELEPHONE (OUTPATIENT)
Dept: PULMONOLOGY | Age: 59
End: 2019-12-19

## 2019-12-19 DIAGNOSIS — J98.6 DIAPHRAGM DYSFUNCTION: ICD-10-CM

## 2019-12-19 DIAGNOSIS — G90.3 NEUROGENIC ORTHOSTATIC HYPOTENSION (HCC): ICD-10-CM

## 2019-12-19 DIAGNOSIS — M79.644 PAIN OF RIGHT THUMB: ICD-10-CM

## 2019-12-19 PROCEDURE — G0239 OTH RESP PROC, GROUP: HCPCS

## 2019-12-19 RX ORDER — MIDODRINE HYDROCHLORIDE 10 MG/1
TABLET ORAL
Qty: 90 TABLET | Refills: 2 | Status: SHIPPED | OUTPATIENT
Start: 2019-12-19 | End: 2020-03-19

## 2019-12-19 RX ORDER — MELOXICAM 15 MG/1
TABLET ORAL
Qty: 30 TABLET | Refills: 1 | Status: SHIPPED | OUTPATIENT
Start: 2019-12-19 | End: 2020-02-21

## 2019-12-19 NOTE — PROGRESS NOTES
Pulmonary Rehabilitation Plan of Care   Discharge          Today's date: 2019   Total visits to date: 25  Patient name: Jose E Castro      : 1960  Age: 60 y o  MRN: [id number]  Referring Physician: Clau Miller MD  Provider: Jordy Cope  Clinician: Jazmin Polanco MS    Dx:   Encounter Diagnosis   Name Primary?  Diaphragm dysfunction      Date of onset: 8/15/19    COMMENTS:  [facility]  as per order by Dr Morales  He has Parkinson's and diaphragm paralysis  He has been progressing very well as tolerated  His BP when at rest before exercise ranges from 519-525 diastolic and  diastolic  His recovery BP ranges from  systolic and 35-63 diastolic  After completing a few pieces of equipment, he still continues to feel severe lightheadedness and dizziness  Bouts of confusion were also experienced  Patients neurologist Dr Yang Chandra was contacted  Patient was advised to refrain from taking his Fludrocortisone  Patients symptoms and drastic BP drop did not improve so Dr Yang Chandra was contacted again  I was uncomfortable having the patient exercise under my care  His medications will be switched and he will skip his last 2 exercise sessions of rehab for now until this is under control  He completed his final 6MWT completing 1 380 total feet  His max SOB was 6 and he had a 3/10 dizziness  His beginning BP was 142/82 and when he left it was 90/52  I spoke with the patient today regarding being discharged from rehab and he was in agreement  He hopes to get this figured out so he can come back to rehab for another round of sessions       Medication compliance: Yes   Comments: none  Fall Risk: Low   Comments: none    EXERCISE/ACTIVITY    Cardiopulmonary Goals:   Min: 30-50   HR:    RPE: 5-7 moderate to somewhat hard exercise   O2 sat: >90%    Modalities: Treadmill, UBE, Lifecycle, NuStep and Recumbent bike  Strength trainin-3 days / week, 12-15 repitations and 1-2 sets per modality    Modalities: Leg press, Chest press, Lateral raise, Arm curl and Seated Row    Exercise Progression: Treadmill for 10 minutes at 2mph and 1% grade  Recumbant bike for 10 minutes at level 5, light weight machines for strength training       RPE 5  Home activity: little  Goals: >150 mins of exercise/wk  Education: Benefit of exercise, home exercise, pursed lip breathing, RPE scale and O2 saturation monitoring   Plan:begin light exercise at home and monitor his severe symptoms  Readiness to change: Action    NUTRITION    Weight control:    Starting weight: 234 lb     Diabetes: N/A  Goals:Improved Rate Your Plate score  Education:More frequent meals, smaller portions  hydration  weight loss  healthy choices while dining out  portion control  Plan: be aware of healthy eating habits  Readiness to change: Action    PSYCHOSOCIAL    Emotional: signs/sxs of depression, benefits of positive support system and depression and chronic disease  Social support: good  Goals: Physical Fitness in [facility] Score < 3 and Overall Health in [facility] Score < 3  Education: Mental Health Education  Plan: Relaxation  Readiness to change: Action    OTHER CORE COMPONENTS     Tobacco:   Social History     Tobacco Use   Smoking Status Former Smoker    Packs/day: 0 50    Years: 3 50    Pack years: 1 75    Types: Cigarettes    Last attempt to quit: Penny Lara Years since quittin 9   Smokeless Tobacco Never Used     Oxygen: room air  Blood pressure:    Restin/82   Recovery: 90/52  Goals: consistent BP < 130/80 and consistent exercise >150 mins/wk  Education: Pulmonary Disease, physiology, Exercise, strength, flexibility, Conservation of energy, oxygen, breathing techniques, Mental Health, Diet,nutrition, Medication, Avoiding Infections and Caregivers  Plan: Exercise benefits and Proper nutrition  Readiness to change: Action

## 2019-12-19 NOTE — TELEPHONE ENCOUNTER
Patient called to inform us that the compression stockings that Dr Nahun Wing ordered need to be supplied through [facility], can't be ordered through [address] told the patient they will fax us an order for the stockings, should receive it by tomorrow  I offered to send the rx that we had but he stated [facility] advised to do it this way instead  [address] fax

## 2019-12-19 NOTE — TELEPHONE ENCOUNTER
Vonnie from [email or web address], [facility]  called back again  She stated that Selena Mari had therapy today and was not doing any better today, symptoms were the same as before if not worse from cutting back medication  Vonnie also mentioned that his BP dropped so low that she is unable to let him leave the office at the moment

## 2019-12-19 NOTE — TELEPHONE ENCOUNTER
You're right  Script sent  Now, he cannot take the selegiline at the same time as the midodrine I'm afraid  Since we are not certain as to how much selegiline is helping his parkinsonism now, we can have him taper down selegiline (5mg daily for the first week, then off) and monitoring for level of worsening prior to starting midodrine  He can do this while the Florinef wean is being done  He can hold off filling the midodrine until after its confirmed he does ok off the selegiline  If he prefers to keep the selegiline, then nevermind starting midodrine  We'll rely on the abdominal binder and stockings instead  Thank you

## 2019-12-19 NOTE — TELEPHONE ENCOUNTER
I see  This wild fluctuation is very difficult to manage, if its either too high with the medication or too low without it  To date we've tried Northera, fludrocortisone  My plan was to switch entirely to midodrine if unsuccessful  Let us discontinue the Florinef entirely then now  Start on midodrine 10mg tablet as below: At any point if symptoms are controlled then can hold that dose without going up further  If feels any side effects (sedation) that are intolerable, then go back down to next lower dose and hold there, then call us  · Weeks 1-2: 0 5 tab BID  (doing exercises cautiously while on this dose, if hypertensive then take only 0 5 tab on the day of therapy  · Week 3-4: 0 5 tab TID  · Week 5-6: 1 tab / 0 5 tab / 0 5 tab   · Week 7-8: 1 tab / 1 tab / 0 5 tab  · Week 9-10: 1 tab TID      I know we have not tried abdominal binder - this device might help improve orthostatism modestly but not sure if this would restrict him too much to do exercises  We will have to recommend thigh high compression stocking as well  I've placed in scripts for these

## 2019-12-19 NOTE — TELEPHONE ENCOUNTER
Spoke with patient regarding his recent BP issues during rehab and severe lightheadedness/dizziness  With only 2 more sessions of IL left, patient/ Dr Gilbert Fabry and I have agreed to stop rehab for now until his BP and symptoms are controlled  Patient has been in contact with Dr Gilbert Fabry for his medication changes  Patient was then discharged today from Pulmonary Rehab

## 2019-12-19 NOTE — PROGRESS NOTES
IMPORTANT:    Dr Chaitanya Hutchins,    I saw your note regarding Arash Estes  He states that his severe symtpoms occur when he exercises here  When he is at home, he experiences lightheadedness and dizziness when bending over or doing strenuous things  He only has two more sessions left at pulmonary rehab but at this point he isn't getting anything out of the program because we had to cut back on a lot of intensities and durations of exercises and he still has such severe symptoms  His discharge paperwork and final 6 minute walk test has been completed so he is ready for discharge  Just need further instruction on whether to just have him stop rehab at this point  His BP dropped today to 92/50 after exercise  (BP when starting was 142/90)       Ad Zeng MS  Exercise Physiologist  Cardiopulmonary Rehab

## 2019-12-20 NOTE — TELEPHONE ENCOUNTER
Call patient  Patient use Tylenol exercising 3 times daily as needed    Patient will stop meloxicam

## 2019-12-24 ENCOUNTER — APPOINTMENT (OUTPATIENT)
Dept: PULMONOLOGY | Age: 59
End: 2019-12-24
Payer: COMMERCIAL

## 2019-12-26 ENCOUNTER — TELEPHONE (OUTPATIENT)
Dept: NEPHROLOGY | Facility: CLINIC | Age: 59
End: 2019-12-26

## 2019-12-26 ENCOUNTER — APPOINTMENT (OUTPATIENT)
Dept: PULMONOLOGY | Age: 59
End: 2019-12-26
Payer: COMMERCIAL

## 2019-12-26 NOTE — TELEPHONE ENCOUNTER
Reviewed the patient's ultrasound results  10mm stone in left kidney stable  No stones noted in right kidney  He will take half a tablet of midodrine if BP not high  He will be sending me his BP readings shortly

## 2019-12-31 NOTE — TELEPHONE ENCOUNTER
Dr Walters - please resend script for elastic bandages and support to rajeev  Script printed in office and was not sent

## 2020-02-04 ENCOUNTER — PATIENT MESSAGE (OUTPATIENT)
Dept: NEUROLOGY | Facility: CLINIC | Age: 60
End: 2020-02-04

## 2020-02-05 NOTE — TELEPHONE ENCOUNTER
From: Luis Clay  To: Lucille Argueta MD  Sent: 2/4/2020 10:02 PM EST  Subject: Non-Urgent [place] Dr Joy Nugent attached a record of my BP readings since stopping Selegine and Requip and Meloxicam in late Nov / early Dec  You may remember that I had high and low BP almost regularly  Since I stopped taking those meds HBP has been rare  Now I'm managing low BP with Midodrine - 1/2 tablet per dose  This is working fairly well, but my Parkinson's symptoms are becoming bothersome  Particularly low BP, light-headedness/dizzyness, balance issues, tremors and shortness of breath - now after only light exercise  I'd like to try another PD med to reduce my symptoms  What are your thoughts? Here is my BP record  I listed only the high and low reading per day  Jan 06: 0930-93/63; 7689-478/90 Jan 07: 1030-128/82; 1630-109/66 Jan 08: 1115-89/56 [½ Mido]; 6287-461/24  Jan 09: 1130-135/88; 2230- 86/57 [½ Mido]  Andres 10: 1700-120/75; 0615-660 /79  Jan 11: 8247-78/44 [½ Mido]; 1500-103/67 Jan 12: 1245-95/63; 2100-144/87 Jan 13: 9174-379/61; 1700-115/67 Jan 14: 1000-103/69; 8152-821/39  Jan 15: 5551-665/26; 1500-104/59  Jan 16: 1879-75/22 [½ Mido]; 8724-865/22 Jan 17: 0830-76/52 [½ Mido]; 1452-12/63; 8266-855/20 Jan 18: 0815-101/63; 1930-80/51 Jan 19: 0745-103/63; 2315-119/66 Jan 20: 1100-115/75; 2400-125/86  Jan 21: 104-94/63 [½ Mido]; ?? 120/76  Jan 22: 8755-63/82; 1482-29/87 [½ Mido]; 1630-146/90  Jan 23: 6320-56/29 [½ Mido]; 1950-134/86 Jan 24: 1130-104/62  Jan 25: 0630-73/49 [½ Mido]; 1830-102/68 Jan 26: 1300-115/74; 5369-tscpu-anksbc, [½ Mido]; 2015-115/69 Jan 27: 1015-101/62; 1130-90/53 [½ Mido]  Jan 28: 1100-81/49 [½ Mido]; 3348-122/96  Jan 29: 0915-104/72 [½ Mido]; 2130-124/81 Jan 30: 1630-160/101; 4653-225/90 Jan 31: 1600-106/63 [½ Mido]; 4721-647/24  Feb 01: 1000: 122/76; 2230-127/77  Feb 02: 0800-115/64; 8445-706/64    Feb 03: 1530-92/57 [½ Mido]; 2215-140/86 Feb 04: 1145 (light-head+dizzy) 93/54; 1930-142/85      Thank you for your attention  Tahmina Campos

## 2020-02-06 ENCOUNTER — PATIENT MESSAGE (OUTPATIENT)
Dept: NEUROLOGY | Facility: CLINIC | Age: 60
End: 2020-02-06

## 2020-02-06 NOTE — TELEPHONE ENCOUNTER
From: Indira Beauchamp  To: Samir Gonzalez MD  Sent: 2/6/2020 4:23 PM EST  Subject: Non-Urgent Medical Question    Good afternoon Dr Balta Dale,  Regarding Pulmonary therapy, I believe I can follow that regimen on my own at local [facility] - albeit without supervision  As for starting amantadine, I'm willing to try this but I'm concerned with the possible side effects  Particularly shortness of breath, dizziness, light headedness, trouble urinating, orthostatic hypotension, etc  from which I'm already suffering  Is there any definitive information quantifying the side effects? I'll defer to you recommendation, but I'd feel better if I knew more  Is there another choice with less side effects? I'll visit Dr Harley Stiles next Thursday - I'll ask about restarting PT  Thank you,  Amalia Martines  ----- Message -----  From: Samir Gonzalez MD  Sent: 2/5/2020 9:12 PM EST  To: Indira Beauchamp  Subject: RE: Non-Urgent Medical Question  Good evening sir,     Thank you for the update  I'm glad that the blood pressure is working out that way and more stable, unfortunately at the cost potentially having made your other symptoms worse  We have a few options going forward  The first one would be resuming your Pulmonary Rehab sessions from before (back when you were too hypertensive to continue) and seeing if that helps the parkinson's  Second, to help boost you enough to participate longer, would be to add on amantadine, in increments of 100mg - first one capsule once a day, then one capsule twice a day  Both can be done at the same time  If you agree, then I can send the script to your pharmacy, and please ask your Pulmonologist to place in another order for Pulm Rehab (prefer that Dr Angela Bustos does this because insurance won't question the need)  Please let me know what you think       Thanks,  Yaritza Bain      ----- Message -----   From: Indira Beauchamp   Sent: 2/4/2020 10:02 PM EST   To: Samir Gonzalez MD  Subject: Non-Urgent Juan Diego Lainez attached a record of my BP readings since stopping Selegine and Requip and Meloxicam in late Nov / early Dec  You may remember that I had high and low BP almost regularly  Since I stopped taking those meds HBP has been rare  Now I'm managing low BP with Midodrine - 1/2 tablet per dose  This is working fairly well, but my Parkinson's symptoms are becoming bothersome  Particularly low BP, light-headedness/dizzyness, balance issues, tremors and shortness of breath - now after only light exercise  I'd like to try another PD med to reduce my symptoms  What are your thoughts? Here is my BP record  I listed only the high and low reading per day  Jan 06: 0930-93/63; 7480-223/96  Jan 07: 1030-128/82; 1630-109/66 Jan 08: 1115-89/56 [½ Mido]; 1286-776/73  Jan 09: 1130-135/88; 2230- 86/57 [½ Mido]  Andres 10: 1700-120/75; 4633-417 /79  Jan 11: 7105-37/71 [½ Mido]; 1500-103/67 Jan 12: 1245-95/63; 2100-144/87 Jan 13: 7898-973/08; 1700-115/67 Jan 14: 1000-103/69; 0915-674/67 Jan 15: 5288-508/09; 1500-104/59  Jan 16: 2718-77/25 [½ Mido]; 2372-562/25 Jan 17: 0830-76/52 [½ Mido]; 2989-54/50; 7503-672/93 Jan 18: 0815-101/63; 1930-80/51  Jan 19: 0745-103/63; 2315-119/66 Jan 20: 1100-115/75; 2400-125/86  Jan 21: 104-94/63 [½ Mido]; ?? 120/76  Jan 22: 5462-51/34; 8008-75/40 [½ Mido]; 1630-146/90  Jan 23: 9506-92/93 [½ Mido]; 1950-134/86 Jan 24: 1130-104/62  Jan 25: 0630-73/49 [½ Mido]; 1830-102/68 Jan 26: 1300-115/74; 3893-qylcv-xqjcdn, [½ Mido]; 2015-115/69 Jan 27: 1015-101/62; 1130-90/53 [½ Mido]  Jan 28: 1100-81/49 [½ Mido]; 8719-963/92 Jan 29: 0915-104/72 [½ Mido]; 2130-124/81 Jan 30: 1630-160/101; 6947-197/33 Jan 31: 1600-106/63 [½ Mido]; 0614-584/95  Feb 01: 1000: 122/76; 2230-127/77  Feb 02: 0800-115/64; 5969-291/52  Feb 03: 1530-92/57 [½ Mido]; 2215-140/86  Feb 04: 1145 (light-head+dizzy) 93/54; 1930-142/85      Thank you for your binu Rojo

## 2020-02-09 ENCOUNTER — PATIENT MESSAGE (OUTPATIENT)
Dept: NEUROLOGY | Facility: CLINIC | Age: 60
End: 2020-02-09

## 2020-02-09 DIAGNOSIS — G20 PARKINSON'S DISEASE (HCC): Primary | ICD-10-CM

## 2020-02-10 RX ORDER — AMANTADINE HYDROCHLORIDE 100 MG/1
100 CAPSULE, GELATIN COATED ORAL 2 TIMES DAILY
Qty: 60 CAPSULE | Refills: 3 | Status: SHIPPED | OUTPATIENT
Start: 2020-02-10 | End: 2020-03-25

## 2020-02-10 NOTE — TELEPHONE ENCOUNTER
From: An Salguero  To: Babita Pickard MD  Sent: 2/9/2020 4:50 PM EST  Subject: Non-Urgent [place] Dr Nakul Rudolph,  I'll try amantadine  Thank you,  Radha Andres  ----- Message -----  From: Babita Pickard MD  Sent: 2/6/2020 6:42 PM EST  To: An Salguero  Subject: RE: Non-Urgent Medical Question  Good evening sir,   Understood and that's fine for the therapy regimen  Regarding amantadine, I'm afraid there is no study that directly compares amantadine's side effect profile to others  I'm afraid each one has their own host of potential problems - even if you chose a drug that does not have the side effect you listed, they have other major side effects and risks  However, if we seek to avoid a medication of the same class as the ones you have just stopped (selegiline is an MAO inhibitor and Requip is a dopamine agonist), then amantadine is a natural choice, save for just levodopa  The side effects you're concerned about are mainly potential ones - and just like any of of the parkinson's med there is no guarantee that you'll have some or any of them  The treatment option with the least side effects would be physical exercise  This page from the may be helpful to you:   Rehana goldstein    Thank you,  Marquita Granda MD        ----- Message -----   From: An Salguero   Sent: 2/6/2020 4:23 PM EST   To: Babita Pickard MD  Subject: Non-Urgent Medical Question    Good afternoon Dr Nakul Rudolph,  Regarding Pulmonary therapy, I believe I can follow that regimen on my own at local [facility] center - albeit without supervision  As for starting amantadine, I'm willing to try this but I'm concerned with the possible side effects  Particularly shortness of breath, dizziness, light headedness, trouble urinating, orthostatic hypotension, etc  from which I'm already suffering  Is there any definitive information quantifying the side effects?    I'll defer to you recommendation, but I'd feel better if I knew more  Is there another choice with less side effects? I'll visit Dr Antoinette Jones next Thursday - I'll ask about restarting PT  Thank you,  Diana Clements  ----- Message -----  From: Lidia Smiley MD  Sent: 2/5/2020 9:12 PM EST  To: Maximino Edwards  Subject: RE: Non-Urgent Medical Question  Good evening sir,     Thank you for the update  I'm glad that the blood pressure is working out that way and more stable, unfortunately at the cost potentially having made your other symptoms worse  We have a few options going forward  The first one would be resuming your Pulmonary Rehab sessions from before (back when you were too hypertensive to continue) and seeing if that helps the parkinson's  Second, to help boost you enough to participate longer, would be to add on amantadine, in increments of 100mg - first one capsule once a day, then one capsule twice a day  Both can be done at the same time  If you agree, then I can send the script to your pharmacy, and please ask your Pulmonologist to place in another order for Pulm Rehab (prefer that Dr Vincenzo Lee does this because insurance won't question the need)  Please let me know what you think  Thanks,  Smita Claudio      ----- Message -----   From: Maximino Edwards   Sent: 2/4/2020 10:02 PM EST   To: Lidia Smiley MD  Subject: Non-Urgent [facility] Shiver Dr Kj Dawson attached a record of my BP readings since stopping Selegine and Requip and Meloxicam in late Nov / early Dec  You may remember that I had high and low BP almost regularly  Since I stopped taking those meds HBP has been rare  Now I'm managing low BP with Midodrine - 1/2 tablet per dose  This is working fairly well, but my Parkinson's symptoms are becoming bothersome  Particularly low BP, light-headedness/dizzyness, balance issues, tremors and shortness of breath - now after only light exercise  I'd like to try another PD med to reduce my symptoms  What are your thoughts?     Here is my BP record  I listed only the high and low reading per day  Jan 06: 0930-93/63; 7986-397/74  Jan 07: 1030-128/82; 1630-109/66  Jan 08: 1115-89/56 [½ Mido]; 7029-479/26  Jan 09: 1130-135/88; 2230- 86/57 [½ Mido]  Andres 10: 1700-120/75; 2965-607 /79  Jan 11: 6267-86/73 [½ Mido]; 1500-103/67 Jan 12: 1245-95/63; 2100-144/87  Jan 13: 6980-308/30; 1700-115/67 Jan 14: 1000-103/69; 1574-882/90  Andres 15: 5638-193/04; 1500-104/59  Jan 16: 8192-31/15 [½ Mido]; 4801-341/42  Jan 17: 0830-76/52 [½ Mido]; 7401-22/47; 6483-865/86  Jan 18: 0815-101/63; 1930-80/51  Jan 19: 0745-103/63; 2315-119/66 Jan 20: 1100-115/75; 2400-125/86 Jan 21: 104-94/63 [½ Mido]; ?? 120/76  Jan 22: 5170-73/36; 3557-06/68 [½ Mido]; 1630-146/90  Jan 23: 4611-50/11 [½ Mido]; 1950-134/86 Jan 24: 1130-104/62  Jan 25: 0630-73/49 [½ Mido]; 1830-102/68 Jan 26: 1300-115/74; 7772-nefvh-nisnfs, [½ Mido]; 2015-115/69 Jan 27: 1015-101/62; 1130-90/53 [½ Mido]  Jan 28: 1100-81/49 [½ Mido]; 0357-998/89 Jan 29: 0915-104/72 [½ Mido]; 2130-124/81 Jan 30: 1630-160/101; 4766-986/95 Jan 31: 1600-106/63 [½ Mido]; 6584-655/27 Feb 01: 1000: 122/76; 2230-127/77  Feb 02: 0800-115/64; 6405-583/92 Feb 03: 1530-92/57 [½ Mido]; 2215-140/86 Feb 04: 1145 (light-head+dizzy) 93/54; 1930-142/85      Thank you for your attention  Brianne Moreno

## 2020-02-13 ENCOUNTER — OFFICE VISIT (OUTPATIENT)
Dept: PULMONOLOGY | Facility: CLINIC | Age: 60
End: 2020-02-13
Payer: COMMERCIAL

## 2020-02-13 VITALS
TEMPERATURE: 97.8 F | HEIGHT: 72 IN | DIASTOLIC BLOOD PRESSURE: 80 MMHG | SYSTOLIC BLOOD PRESSURE: 130 MMHG | BODY MASS INDEX: 31.97 KG/M2 | OXYGEN SATURATION: 96 % | WEIGHT: 236 LBS | HEART RATE: 72 BPM | RESPIRATION RATE: 14 BRPM

## 2020-02-13 DIAGNOSIS — G47.33 OSA (OBSTRUCTIVE SLEEP APNEA): ICD-10-CM

## 2020-02-13 DIAGNOSIS — G47.61 PERIODIC LIMB MOVEMENT DISORDER (PLMD): Primary | ICD-10-CM

## 2020-02-13 DIAGNOSIS — J98.6 DIAPHRAGM DYSFUNCTION: ICD-10-CM

## 2020-02-13 PROCEDURE — 3008F BODY MASS INDEX DOCD: CPT | Performed by: INTERNAL MEDICINE

## 2020-02-13 PROCEDURE — 1036F TOBACCO NON-USER: CPT | Performed by: INTERNAL MEDICINE

## 2020-02-13 PROCEDURE — 99214 OFFICE O/P EST MOD 30 MIN: CPT | Performed by: INTERNAL MEDICINE

## 2020-02-13 PROCEDURE — 3079F DIAST BP 80-89 MM HG: CPT | Performed by: INTERNAL MEDICINE

## 2020-02-13 PROCEDURE — 3075F SYST BP GE 130 - 139MM HG: CPT | Performed by: INTERNAL MEDICINE

## 2020-02-13 NOTE — LETTER
February 13, 2020     Jameson Barnes, [address]    Patient: Rao Briceño   YOB: 1960   Date of Visit: 2/13/2020       Dear Dr Bonnie Edward:    Thank you for referring Ever Castellanos to me for evaluation  Below are my notes for this consultation  If you have questions, please do not hesitate to call me  I look forward to following your patient along with you  Sincerely,        Liseth Campbell DO        CC: MD Liseth Michael DO  2/13/2020  3:27 PM  Sign at close encounter  Progress Note - Sleep Medicine  Rao Briceño 60 y o  male MRN: [id number]       Impression & Plan:   60 y o  M with PMHx of Parkinson's disease with diaphragmatic weakness, chronic knee and ankle pain, urinary retention s/p TURP, Moderate KALLI on CPAP who comes in for follow up  1   Moderate KALLI (AHI - 15 7) - on auto BiPAP  He  has better tolerance overall  Residual AHI has come down to 6 6 from 11  However there seems to be more central events  - For now continue current pressures  However, if at next visit central events are still present will lower min EPAP  -  He is finding that he is breathing better on BiPAP      -  He is aware of the risk of leaving sleep apnea untreated including hypertension, heart failure, arrhythmia, MI and stroke      2   Bulbar symptoms with Bilateral Diaphragmatic weakenss - R > L    PFTs without restriction, ABG without hypercapnia           He describes the upper airway closing and hoarseness  BiPAP will be helpful but I am concerned that there may be some underlying progressive neurodegenerative disease        -  Will discuss additional testing with his neurologist          -  Continue BiPAP qHS     3  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 -  He is still acting out his dreams but this has decrease in frequency         -  He will increase melatonin to 10mg qHS instead to see if that helps        4   Periodic limb movement (PLM index - 109) -  This may be secondary to KALLI or Parkinsons   He denies symptoms of this          -  Continue Requip at current dose          ______________________________________________________________________    HPI:    Franko Kimble presents today for follow-up for his KALLI  He has been using his BIPAP and finds that there has been an significant clinical improvement with the switch to BIPAP instead of CPAP  He feels more refreshed when he wakes up  He denies daytime sleepiness, mask intolerance or pressure intolerance  However, he did mention that he is having some trouble with breathing through the day  He finds that when he bends to touch tie his shoes he will note some shortness of breath  He denies coughing, wheezing or chest tightness  Review of Systems:  Review of Systems   Constitutional: Negative for appetite change and fever  HENT: Negative for ear pain, postnasal drip, rhinorrhea, sneezing, sore throat and trouble swallowing  Eyes: Negative  Respiratory: Positive for chest tightness, shortness of breath and wheezing  Cardiovascular: Negative for chest pain  Gastrointestinal: Negative for abdominal distention, blood in stool, nausea and vomiting  Endocrine: Negative  Genitourinary: Negative  Musculoskeletal: Negative for myalgias  Allergic/Immunologic: Negative  Neurological: Negative for headaches  Hematological: Negative  Psychiatric/Behavioral: Negative            Social history updates:  Social History     Tobacco Use   Smoking Status Former Smoker    Packs/day: 0 50    Years: 3 50    Pack years: 1 75    Types: Cigarettes    Last attempt to quit: 1980    Years since quittin 1   Smokeless Tobacco Never Used     Social History     Socioeconomic History    Marital status: /Civil Union     Spouse name: Not on file    Number of children: Not on file    Years of education: Not on file    Highest education level: Not on file   Occupational History    Not on file   Social Needs    Financial resource strain: Not on file    Food insecurity:     Worry: Not on file     Inability: Not on file    Transportation needs:     Medical: Not on file     Non-medical: Not on file   Tobacco Use    Smoking status: Former Smoker     Packs/day: 0 50     Years: 3 50     Pack years: 1 75     Types: Cigarettes     Last attempt to quit: 1980     Years since quittin 1    Smokeless tobacco: Never Used   Substance and Sexual Activity    Alcohol use: Not Currently     Comment: social    Drug use: No    Sexual activity: Never   Lifestyle    Physical activity:     Days per week: Not on file     Minutes per session: Not on file    Stress: Not on file   Relationships    Social connections:     Talks on phone: Not on file     Gets together: Not on file     Attends Muslim service: Not on file     Active member of club or organization: Not on file     Attends meetings of clubs or organizations: Not on file     Relationship status: Not on file    Intimate partner violence:     Fear of current or ex partner: Not on file     Emotionally abused: Not on file     Physically abused: Not on file     Forced sexual activity: Not on file   Other Topics Concern    Not on file   Social History Narrative    Consumes 5 glasses of tea per week       PhysicalExamination:  Vitals:   /80   Pulse 72   Temp 97 8 °F (36 6 °C)   Resp 14   Ht 6' (1 829 m)   Wt 107 kg (236 lb)   SpO2 96%   BMI 32 01 kg/m²    General: Pleasant, Awake alert and oriented x 3, conversant without conversational dyspnea, NAD, normal affect  HEENT:  PERRL, Sclera noninjected, nonicteric OU, Nares patent,  no craniofacial abnormalities, Mucous membranes, moist, no oral lesions, normal dentition, Mallampati class 3  NECK: Trachea midline, no accessory muscle use, no stridor, no cervical or supraclavicular adenopathy, JVP not elevated  CARDIAC: Reg, single s1/S2, no m/r/g  PULM: CTA bilaterally no wheezing, rhonchi or rales  ABD: Normoactive bowel sounds, soft nontender, nondistended, no rebound, no rigidity, no guarding  EXT: No cyanosis, no clubbing, no edema, normal capillary refill  NEURO: no focal neurologic deficits, AAOx3, moving all extremities appropriately      Diagnostic Data:  Labs: I personally reviewed the most recent laboratory data pertinent to today's visit  I have personally reviewed pertinent lab results  Lab Results   Component Value Date    WBC 6 53 2018    HGB 15 3 2018    HCT 46 8 2018    MCV 93 2018     2018     Lab Results   Component Value Date    CALCIUM 9 5 2019    K 4 6 2019    CO2 29 2019     2019    BUN 22 2019    CREATININE 1 17 2019     No results found for: IGE  Lab Results   Component Value Date    ALT 29 2019    AST 15 2019    ALKPHOS 67 2019     No results found for: IRON, TIBC, FERRITIN  Lab Results   Component Value Date    [id number] 297 10/11/2018     PFT results: The most recent pulmonary function tests were reviewed    3/4/19  Results:  FEV1/FVC Ratio: 80 %  Forced Vital Capacity: 4 80 L    93 % predicted  FEV1: 3 85 L     97 % predicted  Lung volumes by body plethysmography:   Total Lung Capacity 94 % predicted   Residual volume 88 % predicted  DLCO corrected for patients hemoglobin level: 79 %  Interpretation:  · No obstructive airflow defect   · Normal Spirometry  · Normal Lung volumes  · Normal diffusion capacity     6 minute walk  Date of testin2019     Resting room air saturation: 93%  Randy scale of dyspnea at start of test: 0/10     Ambulation testing:  Lowest saturation 93%  No supplemental oxygen required     Randy scale of dyspnea at end of test: 3/10  Reason if test was stopped early: N/A      Total 6 minute walk distance: 1400 feet     Imaging:  I personally reviewed the images on the [facility] system pertinent to today's visit  CT chest - 5/20/19  IMPRESSION:  Within normal limits CT of the chest      Other studies:  HST - moderate (15 7), Supine AHI - 23, hypoxia   CPAP titration - Nasal CPAP was titrated from 5-11 cm of water for  control of snoring and abnormal breathing  Patient appeared to do best at 11 cm of CPAP delivered using a [address] full face interface   Continued use of this equipment at these settings is recommended      New Compliance Data:  1/5/20-2/5/20                                   Type of CPAP:  auto BiPAP  min EPAP 11, PSV 4, Max IPAP 25,                                    Mean EPAP recorded - 12 1, Peak - 14 9, Min IPAP 16, Max IPAP 19                                   Percent usage: 7 2%, 63%> 4hrs                                   Average time used: 3hrs  51 mins - 8 hrs  26 mins                                  Time in large leak: 50 secs                                   Residual AHI: 6 6  (CA - 3 9)    Compliance Data:  10/23/19-11/21/19                                   Type of CPAP:  autoPAP 11-15, Mean - 13 2, Peak - 15 0                                   Percent usage: 73%, 67%> 4hrs                                   Average time used: 3hrs 38 mins - 8 hrs 37 mins                                  Time in large leak: 9 mins 46 secs                                   Residual AHI: 11 0  (CA - 0 9)     Compliance Data:  8/25/19-9/23/19                                   Type of CPAP:  autoPAP 8-15, Mean - 11 9, Peak - 15 6                                   Percent usage: 63%                                   Average time used: 2hrs 52 mins - 4 hrs 32 mins                                  Time in large leak: 4 mins 44 secs                                   Residual AHI: 13 6  (CA - 0 9)            Adrian Ye,

## 2020-02-13 NOTE — PROGRESS NOTES
Progress Note - Sleep Medicine  Lucho Cardoso 60 y o  male MRN: [id number]       Impression & Plan:   60 y o  M with PMHx of Parkinson's disease with diaphragmatic weakness, chronic knee and ankle pain, urinary retention s/p TURP, Moderate KALLI on CPAP who comes in for follow up  1   Moderate KALLI (AHI - 15 7) - on auto BiPAP  He has better tolerance overall  Residual AHI has come down to 6 6 from 11  However there seems to be more central events  - For now continue current pressures  However, if at next visit central events are still present will lower min EPAP  -  He is finding that he is breathing better on BiPAP      -  He is aware of the risk of leaving sleep apnea untreated including hypertension, heart failure, arrhythmia, MI and stroke      2   Bulbar symptoms with Bilateral Diaphragmatic weakenss - R > L    PFTs without restriction, ABG without hypercapnia           He describes the upper airway closing and hoarseness  BiPAP will be helpful but I am concerned that there may be some underlying progressive neurodegenerative disease        -  Will discuss additional testing with his neurologist          -  Continue BiPAP qHS     3  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 -  He is still acting out his dreams but this has decrease in frequency  -  He will increase melatonin to 10mg qHS instead to see if that helps        4   Periodic limb movement (PLM index - 109) -  This may be secondary to KALLI or Parkinsons   He denies symptoms of this          -  Continue Requip at current dose          ______________________________________________________________________    HPI:    Lucho Cardoso presents today for follow-up for his KALLI  He has been using his BIPAP and finds that there has been an significant clinical improvement with the switch to BIPAP instead of CPAP  He feels more refreshed when he wakes up  He denies daytime sleepiness, mask intolerance or pressure intolerance         However, he did mention that he is having some trouble with breathing through the day  He finds that when he bends to touch tie his shoes he will note some shortness of breath  He denies coughing, wheezing or chest tightness  Review of Systems:  Review of Systems   Constitutional: Negative for appetite change and fever  HENT: Negative for ear pain, postnasal drip, rhinorrhea, sneezing, sore throat and trouble swallowing  Eyes: Negative  Respiratory: Positive for chest tightness, shortness of breath and wheezing  Cardiovascular: Negative for chest pain  Gastrointestinal: Negative for abdominal distention, blood in stool, nausea and vomiting  Endocrine: Negative  Genitourinary: Negative  Musculoskeletal: Negative for myalgias  Allergic/Immunologic: Negative  Neurological: Negative for headaches  Hematological: Negative  Psychiatric/Behavioral: Negative            Social history updates:  Social History     Tobacco Use   Smoking Status Former Smoker    Packs/day: 0 50    Years: 3 50    Pack years: 1 75    Types: Cigarettes    Last attempt to quit:     Years since quittin 1   Smokeless Tobacco Never Used     Social History     Socioeconomic History    Marital status: /Civil Union     Spouse name: Not on file    Number of children: Not on file    Years of education: Not on file    Highest education level: Not on file   Occupational History    Not on file   Social Needs    Financial resource strain: Not on file    Food insecurity:     Worry: Not on file     Inability: Not on file    Transportation needs:     Medical: Not on file     Non-medical: Not on file   Tobacco Use    Smoking status: Former Smoker     Packs/day: 0 50     Years: 3 50     Pack years: 1 75     Types: Cigarettes     Last attempt to quit:      Years since quittin 1    Smokeless tobacco: Never Used   Substance and Sexual Activity    Alcohol use: Not Currently     Comment: social [employer]Rx Herrera Drug use: No    Sexual activity: Never   Lifestyle    Physical activity:     Days per week: Not on file     Minutes per session: Not on file    Stress: Not on file   Relationships    Social connections:     Talks on phone: Not on file     Gets together: Not on file     Attends Religion service: Not on file     Active member of club or organization: Not on file     Attends meetings of clubs or organizations: Not on file     Relationship status: Not on file    Intimate partner violence:     Fear of current or ex partner: Not on file     Emotionally abused: Not on file     Physically abused: Not on file     Forced sexual activity: Not on file   Other Topics Concern    Not on file   Social History Narrative    Consumes 5 glasses of tea per week       PhysicalExamination:  Vitals:   /80   Pulse 72   Temp 97 8 °F (36 6 °C)   Resp 14   Ht 6' (1 829 m)   Wt 107 kg (236 lb)   SpO2 96%   BMI 32 01 kg/m²   General: Pleasant, Awake alert and oriented x 3, conversant without conversational dyspnea, NAD, normal affect  HEENT:  PERRL, Sclera noninjected, nonicteric OU, Nares patent,  no craniofacial abnormalities, Mucous membranes, moist, no oral lesions, normal dentition, Mallampati class 3  NECK: Trachea midline, no accessory muscle use, no stridor, no cervical or supraclavicular adenopathy, JVP not elevated  CARDIAC: Reg, single s1/S2, no m/r/g  PULM: CTA bilaterally no wheezing, rhonchi or rales  ABD: Normoactive bowel sounds, soft nontender, nondistended, no rebound, no rigidity, no guarding  EXT: No cyanosis, no clubbing, no edema, normal capillary refill  NEURO: no focal neurologic deficits, AAOx3, moving all extremities appropriately      Diagnostic Data:  Labs: I personally reviewed the most recent laboratory data pertinent to today's visit  I have personally reviewed pertinent lab results    Lab Results   Component Value Date    WBC 6 53 07/06/2018    HGB 15 3 07/06/2018    HCT 46 8 07/06/2018    MCV 93 2018     2018     Lab Results   Component Value Date    CALCIUM 9 5 2019    K 4 6 2019    CO2 29 2019     2019    BUN 22 2019    CREATININE 1 17 2019     No results found for: IGE  Lab Results   Component Value Date    ALT 29 2019    AST 15 2019    ALKPHOS 67 2019     No results found for: IRON, TIBC, FERRITIN  Lab Results   Component Value Date    [id number] 576 10/11/2018     PFT results: The most recent pulmonary function tests were reviewed  3/4/19  Results:  FEV1/FVC Ratio: 80 %  Forced Vital Capacity: 4 80 L    93 % predicted  FEV1: 3 85 L     97 % predicted  Lung volumes by body plethysmography:   Total Lung Capacity 94 % predicted   Residual volume 88 % predicted  DLCO corrected for patients hemoglobin level: 79 %  Interpretation:  · No obstructive airflow defect   · Normal Spirometry  · Normal Lung volumes  · Normal diffusion capacity     6 minute walk  Date of testin2019     Resting room air saturation: 93%  Randy scale of dyspnea at start of test: 0/10     Ambulation testing:  Lowest saturation 93%  No supplemental oxygen required     Randy scale of dyspnea at end of test: 3/10  Reason if test was stopped early: N/A      Total 6 minute walk distance: 1400 feet     Imaging:  I personally reviewed the images on the [employer] system pertinent to today's visit  CT chest - 19  IMPRESSION:  Within normal limits CT of the chest      Other studies:  HST - moderate (15 7), Supine AHI - 23, hypoxia   CPAP titration - Nasal CPAP was titrated from 5-11 cm of water for  control of snoring and abnormal breathing  Patient appeared to do best at 11 cm of CPAP delivered using a [address] full face interface   Continued use of this equipment at these settings is recommended      New Compliance Data:  20-20                                   Type of CPAP:  auto BiPAP min EPAP 11, PSV 4, Max IPAP 25, Mean EPAP recorded - 12 1, Peak - 14 9, Min IPAP 16, Max IPAP 19                                   Percent usage: 72%, 63%> 4hrs                                   Average time used: 3hrs 51 mins - 8 hrs 26 mins                                  Time in large leak: 50 secs                                   Residual AHI: 6 6  (CA - 3 9)    Compliance Data:  10/23/19-11/21/19                                   Type of CPAP:  autoPAP 11-15, Mean - 13 2, Peak - 15 0                                   Percent usage: 73%, 67%> 4hrs                                   Average time used: 3hrs 38 mins - 8 hrs 37 mins                                  Time in large leak: 9 mins 46 secs                                   Residual AHI: 11 0  (CA - 0 9)     Compliance Data:  8/25/19-9/23/19                                   Type of CPAP:  autoPAP 8-15, Mean - 11 9, Peak - 15 6                                   Percent usage: 63%                                   Average time used: 2hrs 52 mins - 4 hrs 32 mins                                  Time in large leak: 4 mins 44 secs                                   Residual AHI: 13 6  (CA - 0 9)            Eliud Mckinley DO

## 2020-02-17 ENCOUNTER — TELEPHONE (OUTPATIENT)
Dept: NEUROLOGY | Facility: CLINIC | Age: 60
End: 2020-02-17

## 2020-02-17 DIAGNOSIS — G70.00 BULBAR MYASTHENIA GRAVIS (HCC): ICD-10-CM

## 2020-02-17 DIAGNOSIS — J98.6 DIAPHRAGM DYSFUNCTION: Primary | ICD-10-CM

## 2020-02-17 DIAGNOSIS — G70.00 MYASTHENIA GRAVIS (HCC): ICD-10-CM

## 2020-02-17 NOTE — PROGRESS NOTES
I just earlier discussing with the patient's pulmonologist Dr Shawn Mari about the concern about his decline regarding his diaphragmatic function and his concern for a neuromuscular process, including his fatiguing  Though I have lower suspicion for a pure neuromuscular problem, we have not ruled that out as a confounding factor to his Parkinsonism  In order to better investigate this avenue, long thought to be just dysautonomia related to the Parkinsonism, we would like to order the following:     - EMG of the diaphragm if any of our providers does this (ultrasound guided?) given his diaphragmatic weakness    - AChR antibodies and DELBERT, CK, sed rate and CRP    Please convey this to the patient if he is willing, and to help him schedule the EMG thank you  Addendum 2/19/20:  Discussed briefly with Dr Ed Cooney, with helpful recommendations of addition of MUSK antibody to round out the myasthenia gravis question, and also to modify order for EMG as 1 upper, 1 lower limb plus bilateral phrenic nerve investigation instead of diaphragm alone

## 2020-02-17 NOTE — TELEPHONE ENCOUNTER
Per Dr Jess Tomlin can do an EMG of the diaphragm, but it is not ultrasound guided   Do you want us to schedule pt for this?    (have not yet called pt)

## 2020-02-17 NOTE — Clinical Note
Rachelle, I spoke to Dr Rama Snellen and modified the orders according to our conversation and updated recommendations  We are just adding on one more antibody test for patient, so please let patient know  The rest is changing the type of EMG he gets, for Dr Cami Burnham please  Thank you

## 2020-02-17 NOTE — TELEPHONE ENCOUNTER
Doesn't have to be ultrasound guided  Many practitioners prefer to have ultrasound guidance for reassurance of hitting the target given the surrounding structures   Dr Abdifatah Bach is totally fine

## 2020-02-19 ENCOUNTER — LAB (OUTPATIENT)
Dept: LAB | Facility: MEDICAL CENTER | Age: 60
End: 2020-02-19
Payer: COMMERCIAL

## 2020-02-19 DIAGNOSIS — G70.00 MYASTHENIA GRAVIS (HCC): ICD-10-CM

## 2020-02-19 DIAGNOSIS — J98.6 DIAPHRAGM DYSFUNCTION: ICD-10-CM

## 2020-02-19 LAB
CK SERPL-CCNC: 128 U/L (ref 39–308)
CRP SERPL QL: <3 MG/L
ERYTHROCYTE [SEDIMENTATION RATE] IN BLOOD: 7 MM/HOUR (ref 0–10)

## 2020-02-19 PROCEDURE — 86038 ANTINUCLEAR ANTIBODIES: CPT

## 2020-02-19 PROCEDURE — 84238 ASSAY NONENDOCRINE RECEPTOR: CPT

## 2020-02-19 PROCEDURE — 86140 C-REACTIVE PROTEIN: CPT

## 2020-02-19 PROCEDURE — 82550 ASSAY OF CK (CPK): CPT

## 2020-02-19 PROCEDURE — 83519 RIA NONANTIBODY: CPT

## 2020-02-19 PROCEDURE — 36415 COLL VENOUS BLD VENIPUNCTURE: CPT

## 2020-02-19 PROCEDURE — 85652 RBC SED RATE AUTOMATED: CPT

## 2020-02-19 NOTE — TELEPHONE ENCOUNTER
I called pt and made him aware of Dr Best Lundberg below message  He was agreeable to labs, he will have them done within network  Pt agreeable to the EMG as well, I called over to central scheduling and we spoke with Rick Martinez, he plans to contact the [facility] to access their schedule  He will call pt and our office back with the date  Will await return call         "Just earlier discussing with the patient's pulmonologist Dr Peng Hilliard about the concern about his decline regarding his diaphragmatic function and his concern for a neuromuscular process, including his fatiguing  Though I have lower suspicion for a pure neuromuscular problem, we have not ruled that out as a confounding factor to his Parkinsonism   In order to better investigate this avenue, long thought to be just dysautonomia related to the Parkinsonism, we would like to order the following:      - EMG of the diaphragm if any of our providers does this (ultrasound guided?) given his diaphragmatic weakness    - AChR antibodies and DELBERT, CK, sed rate and CRP     Please convey this to the patient if he is willing, and to help him schedule the EMG thank you       Addendum 2/19/20:  Discussed briefly with Dr Magalis Hodge, with helpful recommendations of addition of MUSK antibody to round out the myasthenia gravis question, and also to modify order for EMG as 1 upper, 1 lower limb plus bilateral phrenic nerve investigation instead of diaphragm alone"

## 2020-02-19 NOTE — TELEPHONE ENCOUNTER
Nemesio calling back, he report the patient is scheduled with Dr Christianne Yun on 3/5 at 8:45am  Patient accepted this appt and was given all needed instructions by Mike Brown

## 2020-02-21 ENCOUNTER — TELEPHONE (OUTPATIENT)
Dept: RADIOLOGY | Facility: HOSPITAL | Age: 60
End: 2020-02-21

## 2020-02-21 ENCOUNTER — OFFICE VISIT (OUTPATIENT)
Dept: FAMILY MEDICINE CLINIC | Facility: CLINIC | Age: 60
End: 2020-02-21
Payer: COMMERCIAL

## 2020-02-21 ENCOUNTER — TELEPHONE (OUTPATIENT)
Dept: SURGERY | Facility: HOSPITAL | Age: 60
End: 2020-02-21

## 2020-02-21 VITALS
TEMPERATURE: 97.6 F | WEIGHT: 240 LBS | HEIGHT: 72 IN | DIASTOLIC BLOOD PRESSURE: 70 MMHG | SYSTOLIC BLOOD PRESSURE: 106 MMHG | BODY MASS INDEX: 32.51 KG/M2

## 2020-02-21 DIAGNOSIS — R33.9 URINARY RETENTION: ICD-10-CM

## 2020-02-21 DIAGNOSIS — G20 PARKINSON'S DISEASE (HCC): ICD-10-CM

## 2020-02-21 DIAGNOSIS — M25.562 CHRONIC PAIN OF LEFT KNEE: ICD-10-CM

## 2020-02-21 DIAGNOSIS — R06.02 SHORTNESS OF BREATH: Primary | ICD-10-CM

## 2020-02-21 DIAGNOSIS — M25.511 ACUTE PAIN OF RIGHT SHOULDER: ICD-10-CM

## 2020-02-21 DIAGNOSIS — G89.29 CHRONIC PAIN OF LEFT KNEE: ICD-10-CM

## 2020-02-21 LAB — RYE IGE QN: NEGATIVE

## 2020-02-21 PROCEDURE — 3078F DIAST BP <80 MM HG: CPT | Performed by: FAMILY MEDICINE

## 2020-02-21 PROCEDURE — 1036F TOBACCO NON-USER: CPT | Performed by: FAMILY MEDICINE

## 2020-02-21 PROCEDURE — 3008F BODY MASS INDEX DOCD: CPT | Performed by: FAMILY MEDICINE

## 2020-02-21 PROCEDURE — 99214 OFFICE O/P EST MOD 30 MIN: CPT | Performed by: FAMILY MEDICINE

## 2020-02-21 PROCEDURE — 3074F SYST BP LT 130 MM HG: CPT | Performed by: FAMILY MEDICINE

## 2020-02-21 RX ORDER — SODIUM CHLORIDE 9 MG/ML
75 INJECTION, SOLUTION INTRAVENOUS CONTINUOUS
Status: CANCELLED | OUTPATIENT
Start: 2020-02-21

## 2020-02-21 RX ORDER — PREDNISONE 10 MG/1
TABLET ORAL
Qty: 30 TABLET | Refills: 0 | Status: SHIPPED | OUTPATIENT
Start: 2020-02-21 | End: 2020-03-11 | Stop reason: ALTCHOICE

## 2020-02-21 RX ORDER — NITROFURANTOIN 25; 75 MG/1; MG/1
100 CAPSULE ORAL 2 TIMES DAILY
COMMUNITY
End: 2020-03-11 | Stop reason: ALTCHOICE

## 2020-02-21 NOTE — TELEPHONE ENCOUNTER
Called pt and reviewed pre procedure instructions with pt  No hold on medication  Pt is aware of where to arrive  Explained process to pt and answered all his questions  Pt has more questions for MD when he arrives  Pt will have a ride home   Orders in

## 2020-02-21 NOTE — H&P (VIEW-ONLY)
Assessment/Plan:  Patient will stop doing exercises above his head  Patient will see urologist   Patient go for echocardiogram   Patient will see Cardiology and pulmonology the  Patient will be placed on prednisone taper for left knee pain and right shoulder pain  Patient go for x-ray of the right shoulder  Follow-up in 3 months per routine       Diagnoses and all orders for this visit:    Shortness of breath  -     Ambulatory referral to Pulmonology; Future  -     Ambulatory referral to Cardiology; Future  -     Echo complete with contrast if indicated; Future    Urinary retention    Chronic pain of left knee  -     predniSONE 10 mg tablet; 5 pills daily for 2 days, 4 pills for 2 days, 3 for 2 days, 2 for 2 days, 1 for 2 days    Acute pain of right shoulder  -     XR shoulder 2+ vw right; Future  -     predniSONE 10 mg tablet; 5 pills daily for 2 days, 4 pills for 2 days, 3 for 2 days, 2 for 2 days, 1 for 2 days    Parkinson's disease ([facility] [address] )    Other orders  -     nitrofurantoin (MACROBID) 100 mg capsule; Take 100 mg by mouth 2 (two) times a day            Subjective:        Patient ID: Bruce Delarosa is a 60 y o  male  Patient is here to follow-up on urinary retention  Patient was seen at [facility] yesterday  The patient has scarring year bladder neck  Patient will need suprapubic catheter in the near future  Patient is be going for scan in the near future  Patient did reach out to Dr Rayray Marin urologist in this area  Patient awaiting phone call back  The patient is here also with knee pain  Patient have x-ray roughly 6 months ago  Patient was on his knees more frequently for some length furniture  Patient has difficulty going up and downstairs  Patient notices pulling sensation  The no instability  Patient's pain is 7 at 10 when descending some stairs  Patient has use Tylenol with some relief  Patient also with some right shoulder pain    Patient status post treatment with Cipro for UTI  Patient does notice pain with abduction as well as adduction  Patient also having some shortness of breath which is echocardiogram done  The following portions of the patient's history were reviewed and updated as appropriate: allergies, current medications, past family history, past medical history, past social history, past surgical history and problem list       Review of Systems   Constitutional: Negative  HENT: Negative  Eyes: Negative  Respiratory: Positive for shortness of breath  Cardiovascular: Negative  Gastrointestinal: Negative  Endocrine: Negative  Genitourinary: Positive for difficulty urinating  Musculoskeletal: Positive for arthralgias  Skin: Negative  Allergic/Immunologic: Negative  Neurological: Negative  Hematological: Negative  Psychiatric/Behavioral: Negative  Objective:      BMI Counseling: Body mass index is 32 55 kg/m²  The BMI is above normal  Nutrition recommendations include decreasing portion sizes  Exercise recommendations include moderate physical activity 150 minutes/week  /70   Temp 97 6 °F (36 4 °C) (Tympanic)   Ht 6' (1 829 m)   Wt 109 kg (240 lb)   BMI 32 55 kg/m²          Physical Exam   Constitutional: He appears well-developed and well-nourished  No distress  HENT:   Head: Normocephalic  Right Ear: External ear normal    Left Ear: External ear normal    Mouth/Throat: Oropharynx is clear and moist  No oropharyngeal exudate  Eyes: Pupils are equal, round, and reactive to light  EOM are normal  Right eye exhibits no discharge  Left eye exhibits no discharge  No scleral icterus  Neck: Normal range of motion  Neck supple  No thyromegaly present  Cardiovascular: Normal rate, regular rhythm, normal heart sounds and intact distal pulses  Exam reveals no gallop and no friction rub  No murmur heard  Pulmonary/Chest: Effort normal and breath sounds normal  No respiratory distress   He has no wheezes  He has no rales  He exhibits no tenderness  Abdominal: Soft  Bowel sounds are normal  He exhibits no distension  There is no tenderness  There is no rebound and no guarding  Musculoskeletal: He exhibits tenderness  He exhibits no edema  Right shoulder positive impingement sign  Patient has pain with abduction  Patient with no pain with palpation  Left knee no pain with palpation over the medial or lateral joint line  Negative Lachman test   Negative Apley test   No pain testing MCL or LCL  Effusion noted  Lymphadenopathy:     He has no cervical adenopathy  Neurological: He is alert  No cranial nerve deficit  He exhibits normal muscle tone  Coordination normal    Skin: Skin is warm and dry  No rash noted  He is not diaphoretic  No erythema  No pallor  Psychiatric: He has a normal mood and affect  His behavior is normal  Judgment and thought content normal    Nursing note and vitals reviewed

## 2020-02-21 NOTE — PROGRESS NOTES
Assessment/Plan:  Patient will stop doing exercises above his head  Patient will see urologist   Patient go for echocardiogram   Patient will see Cardiology and pulmonology the  Patient will be placed on prednisone taper for left knee pain and right shoulder pain  Patient go for x-ray of the right shoulder  Follow-up in 3 months per routine       Diagnoses and all orders for this visit:    Shortness of breath  -     Ambulatory referral to Pulmonology; Future  -     Ambulatory referral to Cardiology; Future  -     Echo complete with contrast if indicated; Future    Urinary retention    Chronic pain of left knee  -     predniSONE 10 mg tablet; 5 pills daily for 2 days, 4 pills for 2 days, 3 for 2 days, 2 for 2 days, 1 for 2 days    Acute pain of right shoulder  -     XR shoulder 2+ vw right; Future  -     predniSONE 10 mg tablet; 5 pills daily for 2 days, 4 pills for 2 days, 3 for 2 days, 2 for 2 days, 1 for 2 days    Parkinson's disease ([facility] [address] )    Other orders  -     nitrofurantoin (MACROBID) 100 mg capsule; Take 100 mg by mouth 2 (two) times a day            Subjective:        Patient ID: Vaibhav Lance is a 60 y o  male  Patient is here to follow-up on urinary retention  Patient was seen at [facility] yesterday  The patient has scarring year bladder neck  Patient will need suprapubic catheter in the near future  Patient is be going for scan in the near future  Patient did reach out to Dr Maycol Ferro urologist in this area  Patient awaiting phone call back  The patient is here also with knee pain  Patient have x-ray roughly 6 months ago  Patient was on his knees more frequently for some length furniture  Patient has difficulty going up and downstairs  Patient notices pulling sensation  The no instability  Patient's pain is 7 at 10 when descending some stairs  Patient has use Tylenol with some relief  Patient also with some right shoulder pain    Patient status post treatment with Cipro for UTI  Patient does notice pain with abduction as well as adduction  Patient also having some shortness of breath which is echocardiogram done  The following portions of the patient's history were reviewed and updated as appropriate: allergies, current medications, past family history, past medical history, past social history, past surgical history and problem list       Review of Systems   Constitutional: Negative  HENT: Negative  Eyes: Negative  Respiratory: Positive for shortness of breath  Cardiovascular: Negative  Gastrointestinal: Negative  Endocrine: Negative  Genitourinary: Positive for difficulty urinating  Musculoskeletal: Positive for arthralgias  Skin: Negative  Allergic/Immunologic: Negative  Neurological: Negative  Hematological: Negative  Psychiatric/Behavioral: Negative  Objective:      BMI Counseling: Body mass index is 32 55 kg/m²  The BMI is above normal  Nutrition recommendations include decreasing portion sizes  Exercise recommendations include moderate physical activity 150 minutes/week  /70   Temp 97 6 °F (36 4 °C) (Tympanic)   Ht 6' (1 829 m)   Wt 109 kg (240 lb)   BMI 32 55 kg/m²          Physical Exam   Constitutional: He appears well-developed and well-nourished  No distress  HENT:   Head: Normocephalic  Right Ear: External ear normal    Left Ear: External ear normal    Mouth/Throat: Oropharynx is clear and moist  No oropharyngeal exudate  Eyes: Pupils are equal, round, and reactive to light  EOM are normal  Right eye exhibits no discharge  Left eye exhibits no discharge  No scleral icterus  Neck: Normal range of motion  Neck supple  No thyromegaly present  Cardiovascular: Normal rate, regular rhythm, normal heart sounds and intact distal pulses  Exam reveals no gallop and no friction rub  No murmur heard  Pulmonary/Chest: Effort normal and breath sounds normal  No respiratory distress   He has no wheezes  He has no rales  He exhibits no tenderness  Abdominal: Soft  Bowel sounds are normal  He exhibits no distension  There is no tenderness  There is no rebound and no guarding  Musculoskeletal: He exhibits tenderness  He exhibits no edema  Right shoulder positive impingement sign  Patient has pain with abduction  Patient with no pain with palpation  Left knee no pain with palpation over the medial or lateral joint line  Negative Lachman test   Negative Apley test   No pain testing MCL or LCL  Effusion noted  Lymphadenopathy:     He has no cervical adenopathy  Neurological: He is alert  No cranial nerve deficit  He exhibits normal muscle tone  Coordination normal    Skin: Skin is warm and dry  No rash noted  He is not diaphoretic  No erythema  No pallor  Psychiatric: He has a normal mood and affect  His behavior is normal  Judgment and thought content normal    Nursing note and vitals reviewed

## 2020-02-22 ENCOUNTER — APPOINTMENT (OUTPATIENT)
Dept: RADIOLOGY | Facility: MEDICAL CENTER | Age: 60
End: 2020-02-22
Payer: COMMERCIAL

## 2020-02-22 DIAGNOSIS — M25.511 ACUTE PAIN OF RIGHT SHOULDER: ICD-10-CM

## 2020-02-22 PROCEDURE — 73030 X-RAY EXAM OF SHOULDER: CPT

## 2020-02-24 ENCOUNTER — ANESTHESIA EVENT (OUTPATIENT)
Dept: RADIOLOGY | Facility: HOSPITAL | Age: 60
End: 2020-02-24

## 2020-02-24 ENCOUNTER — HOSPITAL ENCOUNTER (OUTPATIENT)
Dept: RADIOLOGY | Facility: HOSPITAL | Age: 60
Discharge: HOME | End: 2020-02-24
Attending: UROLOGY
Payer: COMMERCIAL

## 2020-02-24 ENCOUNTER — ANESTHESIA (OUTPATIENT)
Dept: RADIOLOGY | Facility: HOSPITAL | Age: 60
End: 2020-02-24

## 2020-02-24 VITALS
WEIGHT: 235 LBS | OXYGEN SATURATION: 98 % | HEIGHT: 72 IN | SYSTOLIC BLOOD PRESSURE: 156 MMHG | HEART RATE: 67 BPM | DIASTOLIC BLOOD PRESSURE: 95 MMHG | BODY MASS INDEX: 31.83 KG/M2 | RESPIRATION RATE: 18 BRPM | TEMPERATURE: 97.9 F

## 2020-02-24 DIAGNOSIS — N31.9 NEUROGENIC BLADDER: ICD-10-CM

## 2020-02-24 LAB
ACHR BIND AB SER-SCNC: <0.03 NMOL/L (ref 0–0.24)
ERYTHROCYTE [DISTWIDTH] IN BLOOD BY AUTOMATED COUNT: 12.5 % (ref 11.6–15.1)
HCT VFR BLD AUTO: 44.3 % (ref 36.5–49.3)
HGB BLD-MCNC: 14.4 G/DL (ref 12–17)
INR PPP: 1.05 (ref 0.84–1.19)
MCH RBC QN AUTO: 30.4 PG (ref 26.8–34.3)
MCHC RBC AUTO-ENTMCNC: 32.5 G/DL (ref 31.4–37.4)
MCV RBC AUTO: 94 FL (ref 82–98)
PLATELET # BLD AUTO: 221 THOUSANDS/UL (ref 149–390)
PMV BLD AUTO: 10.8 FL (ref 8.9–12.7)
PROTHROMBIN TIME: 13.8 SECONDS (ref 11.6–14.5)
RBC # BLD AUTO: 4.74 MILLION/UL (ref 3.88–5.62)
WBC # BLD AUTO: 6.9 THOUSAND/UL (ref 4.31–10.16)

## 2020-02-24 PROCEDURE — 51102 DRAIN BL W/CATH INSERTION: CPT

## 2020-02-24 PROCEDURE — C1769 GUIDE WIRE: HCPCS

## 2020-02-24 PROCEDURE — 76942 ECHO GUIDE FOR BIOPSY: CPT

## 2020-02-24 PROCEDURE — 51102 DRAIN BL W/CATH INSERTION: CPT | Performed by: RADIOLOGY

## 2020-02-24 PROCEDURE — C1729 CATH, DRAINAGE: HCPCS

## 2020-02-24 PROCEDURE — 85610 PROTHROMBIN TIME: CPT | Performed by: RADIOLOGY

## 2020-02-24 PROCEDURE — 85027 COMPLETE CBC AUTOMATED: CPT | Performed by: RADIOLOGY

## 2020-02-24 PROCEDURE — 76942 ECHO GUIDE FOR BIOPSY: CPT | Performed by: RADIOLOGY

## 2020-02-24 RX ORDER — FENTANYL CITRATE/PF 50 MCG/ML
25 SYRINGE (ML) INJECTION
Status: DISCONTINUED | OUTPATIENT
Start: 2020-02-24 | End: 2020-02-24 | Stop reason: HOSPADM

## 2020-02-24 RX ORDER — SODIUM CHLORIDE 9 MG/ML
75 INJECTION, SOLUTION INTRAVENOUS CONTINUOUS
Status: DISCONTINUED | OUTPATIENT
Start: 2020-02-24 | End: 2020-02-25 | Stop reason: HOSPADM

## 2020-02-24 RX ORDER — LIDOCAINE WITH 8.4% SOD BICARB 0.9%(10ML)
SYRINGE (ML) INJECTION CODE/TRAUMA/SEDATION MEDICATION
Status: COMPLETED | OUTPATIENT
Start: 2020-02-24 | End: 2020-02-24

## 2020-02-24 RX ORDER — PROPOFOL 10 MG/ML
INJECTION, EMULSION INTRAVENOUS AS NEEDED
Status: DISCONTINUED | OUTPATIENT
Start: 2020-02-24 | End: 2020-02-24 | Stop reason: SURG

## 2020-02-24 RX ORDER — PROPOFOL 10 MG/ML
INJECTION, EMULSION INTRAVENOUS CONTINUOUS PRN
Status: DISCONTINUED | OUTPATIENT
Start: 2020-02-24 | End: 2020-02-24 | Stop reason: SURG

## 2020-02-24 RX ORDER — MELATONIN
2000 DAILY
COMMUNITY

## 2020-02-24 RX ADMIN — PROPOFOL 100 MCG/KG/MIN: 10 INJECTION, EMULSION INTRAVENOUS at 09:24

## 2020-02-24 RX ADMIN — CEFAZOLIN SODIUM 2000 MG: 10 INJECTION, POWDER, FOR SOLUTION INTRAVENOUS at 08:49

## 2020-02-24 RX ADMIN — SODIUM CHLORIDE 75 ML/HR: 0.9 INJECTION, SOLUTION INTRAVENOUS at 08:46

## 2020-02-24 RX ADMIN — IOHEXOL 15 ML: 350 INJECTION, SOLUTION INTRAVENOUS at 10:05

## 2020-02-24 RX ADMIN — Medication 10 ML: at 09:45

## 2020-02-24 RX ADMIN — PROPOFOL 100 MG: 10 INJECTION, EMULSION INTRAVENOUS at 09:24

## 2020-02-24 NOTE — PROGRESS NOTES
Patient and wife reviewed discharge instructions  No questions  Additional urinary drainage supplies received from IR prior to discharge

## 2020-02-24 NOTE — INTERVAL H&P NOTE
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, receiving care at multiple institutions  He recently underwent cystoscopy at [facility] which found a severe stricture, a wire or catheter could not be passed  He voids intermittently when his bladder is full  While there are plans for future surgical reconstruction, suprapubic placement is indicated  He currently does not have a Carreon catheter    He has multiple respiratory issues and anesthesia will support, I discussed that he is at high risk  He completed yesterday course of antibiotics for urinary tract infection, we will give Ancef today, unfortunately I do not have culture data for his recent organism    I discussed that if his bladder is not well distended it is probably more reasonable to place a small caliber tube and upsized later, after tract maturation  This is particularly the case if I will be unable to distend his bladder from below with a Carreon catheter    I discussed risks of bleeding, organ damage, sepsis, he wishes to proceed    Patient re-evaluated   Accept as history and physical     Rafa Matthews MD/February 24, 2020/9:34 AM

## 2020-02-24 NOTE — ANESTHESIA PREPROCEDURE EVALUATION
Review of Systems/Medical History  Patient summary reviewed  Chart reviewed  No history of anesthetic complications     Cardiovascular  Exercise tolerance (METS): good,     Pulmonary  Shortness of breath, Sleep apnea (questionable) ,        GI/Hepatic    GERD well controlled,        Kidney stones,   Comment: incontinence     Endo/Other  History of thyroid disease ,      GYN       Hematology  Negative hematology ROS      Musculoskeletal  Negative musculoskeletal ROS        Neurology      Comment: parkinsonism Psychology   Negative psychology ROS              Physical Exam    Airway    Mallampati score: I  TM Distance: >3 FB  Neck ROM: full     Dental   No notable dental hx     Cardiovascular  Rhythm: regular, Rate: normal,     Pulmonary  Breath sounds clear to auscultation,     Other Findings        Anesthesia Plan  ASA Score- 3     Anesthesia Type- general with ASA Monitors  Additional Monitors:   Airway Plan: LMA  Comment: 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

## 2020-02-24 NOTE — BRIEF OP NOTE (RAD/CATH)
IR SUPRAPUBIC TUBE Procedure Note    PATIENT NAME: Indira Beauchamp  : 1960  MRN: [id number]    Pre-op Diagnosis:   1  Neurogenic bladder      Post-op Diagnosis:   1   Neurogenic bladder        Surgeon:   Eduardo Cranker, MD  Assistants:     No qualified resident was available, Resident is only observing    Estimated Blood Loss: minimal  Findings: partially distensible bladder    12 Dutch APD suprapubic placed    Specimens: none    Complications:  None immediate    Anesthesia: MAC     Antibiotiocs Ancef    Plan:   Return for upsize in 4-6 weeks    Eduardo Cranker, MD     Date: 2020  Time: 10:01 AM

## 2020-02-26 LAB — ACHR BLOCK AB/ACHR TOTAL SFR SER: 10 % (ref 0–25)

## 2020-03-01 ENCOUNTER — TELEPHONE (OUTPATIENT)
Dept: OTHER | Facility: OTHER | Age: 60
End: 2020-03-01

## 2020-03-01 LAB — ACHR MOD AB/ACHR TOTAL SFR SER: <12 % (ref 0–20)

## 2020-03-01 NOTE — TELEPHONE ENCOUNTER
MSG: PT HAD SUPRAPUBIC TUBE INSTALLATION SURGERY ON 2/24- PT NOW HAS BLOOD IN URINE- WOULD LIKE TO CONSULT W/ DR Sameer Fraser TXT PT INFO TO   @ [phone]

## 2020-03-03 LAB — MUSK AB SER IA-ACNC: <1 U/ML

## 2020-03-04 NOTE — RESULT ENCOUNTER NOTE
Documentation of discussion of normal myasthenia and neuromuscular labs via [employer]  Message to patient as such:   "  Good evening Mr Cici Camacho,  I am glad to report that the blood testing we had you do turned out normal for a neuromuscular problem that Dr Hannah Kim and I wanted to rule out  The last of the results just came back now  I understand we have the EMG scheduled in a couple of days  We will be reporting back the findings when available  Please message us back here if you have any further questions    "

## 2020-03-05 ENCOUNTER — HOSPITAL ENCOUNTER (OUTPATIENT)
Dept: NEUROLOGY | Facility: CLINIC | Age: 60
Discharge: HOME | End: 2020-03-05
Payer: COMMERCIAL

## 2020-03-05 DIAGNOSIS — J98.6 DIAPHRAGM DYSFUNCTION: ICD-10-CM

## 2020-03-05 DIAGNOSIS — G70.00 MYASTHENIA GRAVIS (HCC): ICD-10-CM

## 2020-03-05 PROCEDURE — 95887 MUSC TST DONE W/N TST NONEXT: CPT | Performed by: PHYSICAL MEDICINE & REHABILITATION

## 2020-03-05 PROCEDURE — 95886 MUSC TEST DONE W/N TEST COMP: CPT | Performed by: PHYSICAL MEDICINE & REHABILITATION

## 2020-03-05 PROCEDURE — 95912 NRV CNDJ TEST 11-12 STUDIES: CPT | Performed by: PHYSICAL MEDICINE & REHABILITATION

## 2020-03-05 NOTE — RESULT ENCOUNTER NOTE
Documentation of discussion of unremarkable EMG results via [employer]  Message to patient as follows:     "  Good morning sir,   Your EMG results came back - they were normal and did help rule out a neuromuscular component to your diaphragmatic weakness that Dr Ellie Mcallister was concerned about  Combined with the lab results we have back, this is even more reassuring from that standpoint  However, it does bring us back to what we were thinking previously - that your breathing issues stems from higher up, from the brain and from the parkinsonism   Please message us back here with any questions   "

## 2020-03-09 NOTE — PROGRESS NOTES
DEPARTMENT OF NEUROLOGICAL SCIENCES  [address] DISORDERS CLINIC         RETURN PATIENT NOTE    Patient: Dhiraj Brambila Record Number: # [id number]  YOB: 1960  Date of visit: 3/11/2020     Referring provider: Myranda Prakash DO     The patient was not accompanied today  History was obtained from patient     ASSESSMENT     1  Parkinson's disease ([address] )  carbidopa-levodopa (SINEMET)  mg per tablet   2  Neurogenic orthostatic hypotension (HCC)     3  Diaphragm dysfunction       Impression of this 59 yo gentleman returning for parkinsonism in setting of persistent, significant orthostatic hypotension, overall with better control of blood pressure since discontinuing selegiline and ropinirole  He remains on amantadine but denies significant benefit  He does feel worse on his voice volume, L hand tremor and stiffness  He continues on midodrine prn for blood pressure reading below a certain point  The lightheadedness is constant and bothersome, but he is more concerned by the development of his L sided symptoms  We will first taper off amantadine as it is not helping him, followed by either making midodrine a daily scheduled dose or starting on Sinemet trial for the first time  He continues to see pulmonology for his poorly moving diaphragm and respiratory issues  We have not conclusively ruled out Multiple System Atrophy at this time, but his lack of substantial rapid progression and now asymmetric findings is making this less likely  PLAN     · We discussed about first tapering down and off the amantadine given limited or not benefit seen thus far:  First take 1 capsule 100mg daily in AM for four days before discontinuing  · After the above, then will start at Sinemet 25/100 tablets: At any point if symptoms are controlled then can hold that dose without going up further   If feels any side effects (sedation, lightheadedness, nausea) that are intolerable, then go back down to next lower dose and hold there, then call us  Nausea is common and if she has that she can take it after food  Week 1:  Take 0 5 tab BID in morning and afternoon  Week 2:  Take 0 5 tab three times a day before meals  Week 3:  Take 1 tab / 0 5 tab / 0 5 tab  Week 4:  1 tab / 1 tab / 0 5 tab  Week 5:  1 tab three times a day before meals   · He will f/u with [facility] per his preference for their opinion before proceeding with the above  · After the Sinemet dose is finalized, then we will adjust the midodrine and make the dose scheduled 0 5 tablet daily  · He is using the physical therapy techniques at home, started [employer]  · Return to Clinic on 3 months     A total of 45 minutes were spent face-to-face with this patient, of which >50% was spent on counseling and coordination of care  HISTORY OF PRESENT ILLNESS:     Mr Althea Wood is a 60 y o  right handed male who returns to the [facility] for evaluation of Parkinson's disease and severe orthostatism  Last visit 9/12/19  Interim History  While performing PT exercises he had recurrent fatigue and was not safe to perform  Team suggested stopping Fludrocortisone and this yielded variable results  Florinef replaced with midodrine  He started amantadine 100mg/day on 2/14/20, increased to 200mg/day on 3/8/20  He said starting before the dose increase, he experienced near-constant tinnitis and dizziness  He would like to know if that was a side effect  Despite stopping amantadine for a single day, symptoms did not improve  He has started to take midodrine faster but no benefit yet seen  After selegiline stopped, he feels his PD symptoms had all worsened  As discussed with his Sleep Medicine / Pulmonologist, workup for possible myasthenia gravis was ultimately negative without serum antibody markers and normal EMG with NCS of phrenic nerve for his diaphragmatic paralysis       He tells me today that his dizziness and balance issues remain his top concerns  He denies any falls, but the lightheadedness is "constant" most days  He has noticed more labored walking and tremor on the L hand as well as hoarseness and smaller handwriting  He is taking midodrine 5mg as needed, once a day when lightheadedness is especially low  INITIAL HISTORY  He says he first noticed smaller handwriting while taking a class and taking notes around 2015  In 2016 he noticed he was "meandering walking left and right" which seemed to progress to the point where he would have difficulty keeping pace with someone walking more normally  Denies feeling hemibody slowness or stiffness  He felt he had to put forth more effort in walking fast  If he climbs more than a flight of stairs, his thighs become fatigued  +lightheadedness after ascending stairs or rising from a kneeling position (none while laying down)  If he closes his eyes in shower and looks up, he feels very unsteady  He denies ever falling except 1x while pulling up pants in 2017  He is aware of his posture being more slouched forward  Patient saw his PCP with complaint of lightheadedness while standing and dizziness, trouble with writing on right hand with dropping objects  MRI Brain on July 2018 was reported unremarkable  - Laboratory testing in July 2018 including normal CBC, CMP, Lyme ab testing and hypovitaminosis D noted  - No previous injuries or surgeries on head or neck  - His main concern today is if he has Parkinson's disease, as he read about his symptoms on WebMD      Tremor noticeable but not bothersome; he says he can see his R hand shaking when he writes or eats  Not apparent when his hand is resting  No other tremor in his body he says  Started after 2016 very mild progression if at all  Sleep:  He says he sleeps poorly with sleep habits (sleeping on recliner while watching TV, ~4-5 hrs a night  +difficulty falling back asleep  +snores  Hyposmia: denies  RBD (REM semi-purposeful body movements):  "occasionally" acting out dreams   Gait Disturbance:  Yes, as above   Dementia:  +feeling more difficulty with attention and focusing at work  Forgetting names and small tasks  Constipation: endorses  Hallucination: denies  Skin Cancer History: denies  Hypovitaminosis D: denies  Hypovitaminosis B12:  denies  Dysautonomia:  +urinary retention but has BPH  +orthostatism    Gaze Palsy: none  Exercise Therapy: INACTIVE    Family History: Number of First Degree Relatives With Parkinsonism: mother    Imaging: MRI brain in July 2018      REVIEW OF PAST MEDICAL, SOCIAL AND FAMILY HISTORY:  This is the list of problems as per our Medical Records:    Patient Active Problem List    Diagnosis Date Noted    Myasthenia gravis ([facility] [address] )     Acute pain of right shoulder 02/21/2020    Proteinuria 12/06/2019    Urinary retention 12/06/2019    Nephrolithiasis 12/06/2019    CKD (chronic kidney disease) stage 2, GFR 60-89 ml/min 12/06/2019    Elevated blood pressure reading 11/22/2019    Tinea corporis 08/27/2019    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    Periodic limb movement disorder (PLMD) 08/22/2019    Change in voice 06/03/2019    Gastroesophageal reflux disease with esophagitis 06/03/2019    KALLI (obstructive sleep apnea)     Chronic pain of right ankle 03/04/2019    Chronic pain of left knee 03/04/2019    Parkinson's disease ([address] ) 03/04/2019    Thyromegaly 03/04/2019    Neurogenic orthostatic hypotension (HCC) 02/27/2019    Shortness of breath 11/29/2018    Pain of right thumb 10/18/2018    Pain of left thumb 10/18/2018    Right elbow pain 10/18/2018    Right wrist pain 10/18/2018    Plantar fasciitis, bilateral 10/10/2018    Unspecified abnormalities of gait and mobility 10/10/2018    Dizziness 07/05/2018    Primary testicular cancer ([facility] [address] ) 12/17/2014     No Known Allergies     Outpatient Encounter Medications as of 3/11/2020   Medication Sig Dispense Refill    amantadine (SYMMETREL) 100 mg capsule Take 1 capsule (100 mg total) by mouth 2 (two) times a day taken as 1 cap in the AM for a week and then if tolerated, 1 cap twice a day (Patient taking differently: Take 100 mg by mouth daily taken as 1 cap in the AM for a week and then if tolerated, 1 cap twice a day) 60 capsule 3    cephalexin (KEFLEX) 500 mg capsule       cholecalciferol (VITAMIN D3) 1,000 units tablet Take 2,000 Units by mouth daily      Elastic Bandages & Supports (MEDICAL COMPRESSION THIGH HIGH) MISC One pair thigh high compression stockings for orthostatic hypotension 2 each 0    Melatonin 5 MG TABS Take 10 mg by mouth       midodrine (PROAMATINE) 10 MG tablet Follow clinic instructions on titration dose to max 1 tab TID, as tolerated and to benefit  (Patient taking differently: 5 mg Follow clinic instructions on titration dose to max 1 tab TID, as tolerated and to benefit ) 90 tablet 2    multivitamin (THERAGRAN) TABS Take 1 tablet by mouth daily      omeprazole (PriLOSEC) 20 mg delayed release capsule Take 1 capsule (20 mg total) by mouth daily before breakfast 90 capsule 1    vitamin E 100 UNIT capsule Take 100 Units by mouth daily      Wheat Dextrin (BENEFIBER DRINK MIX PO)       carbidopa-levodopa (SINEMET)  mg per tablet Follow clinic instruction on titrating this up to 1 tablet three times a day, as tolerated and to level of benefit   Call office with questions, or after reaching Week 5  90 tablet 3    [DISCONTINUED] fludrocortisone (FLORINEF) 0 1 mg tablet Take 0 5 tablets (0 05 mg total) by mouth daily (Patient not taking: Reported on 2/13/2020) 45 tablet 4    [DISCONTINUED] nitrofurantoin (MACROBID) 100 mg capsule Take 100 mg by mouth 2 (two) times a day      [DISCONTINUED] nystatin-triamcinolone (MYCOLOG-II) cream Apply topically 2 (two) times a day 30 g 1    [DISCONTINUED] predniSONE 10 mg tablet 5 pills daily for 2 days, 4 pills for 2 days, 3 for 2 days, 2 for 2 days, 1 for 2 days 30 tablet 0    [DISCONTINUED] rOPINIRole (REQUIP) 0 5 mg tablet Take 1 tablet (0 5 mg total) by mouth daily at bedtime (Patient not taking: Reported on 2/13/2020) 30 tablet 1    [DISCONTINUED] selegiline (ELDEPRYL) 5 mg capsule Take 5 mg by mouth 2 (two) times a day  11     No facility-administered encounter medications on file as of 3/11/2020  REVIEW OF SYSTEMS:  The patient has entered data on an intake form regarding present illness, past medical and surgical history, medications, allergies, family and social history, and a full review of 14 systems  I have reviewed this form with the patient, and all the relevant information has been included on this note  The full review of systems was negative except as stated in HPI and below  Review of Systems   Constitutional: Negative  Negative for appetite change and fever  HENT: Positive for tinnitus  Negative for hearing loss, trouble swallowing and voice change  Eyes: Negative  Negative for photophobia and pain  Respiratory: Negative  Negative for shortness of breath  Cardiovascular: Negative  Negative for palpitations  Gastrointestinal: Negative  Negative for nausea and vomiting  Endocrine: Negative  Negative for cold intolerance and heat intolerance  Genitourinary: Negative  Negative for dysuria, frequency and urgency  Musculoskeletal: Positive for gait problem (balance)  Negative for myalgias and neck pain  Skin: Negative  Negative for rash  Neurological: Positive for dizziness, tremors (resting tremor in right hand, worse since stopping Selegiline and Requip) and light-headedness  Negative for seizures, syncope, facial asymmetry, speech difficulty, weakness, numbness and headaches  Hematological: Negative  Does not bruise/bleed easily  Psychiatric/Behavioral: Negative  Negative for confusion, hallucinations and sleep disturbance       FOCUSED PHYSICAL EXAMINATION:     Vital signs:  /66 (BP Location: Left arm, Patient Position: Standing, Cuff Size: Standard)   Pulse 81   Ht 6' (1 829 m)   Wt 108 kg (237 lb 9 6 oz)   BMI 32 22 kg/m²     General:  Well-appearing, well nourished, pleasant patient in no acute distress  Mood appropriate  Head:  Normocephalic, atraumatic  Oropharynx and conjunctiva are clear  Speech  +hypophonia  No bradylalia  No scanning speech  Language: Comprehension intact  Neck:  Supple, strong 5/5 forward flexion and retroflexion  Extremities: Range of motion is normal       Cognitive and Mental Exam:  The patient is alert, oriented to self, location, date and situation  Cranial Nerves:  Direct and consensual light reflexes were normal  No afferent pupillary defect  Visual fields are full to confrontation  Extraocular movements were full, with normal pursuit and saccades  Pupils were equal, reactive to light symmetrically  Facial sensation to light touch was intact  Face is symmetric with normal strength  Hearing was assessed using the finger rubs  And was normal bilaterally  Palate is up going bilaterally and symmetrically  Neck muscles are strong  Tongue protrusion is at midline with normal movements  No dysarthria  Motor:    Dystonia: none  Dyskinesia: none  Myoclonus: none  Chorea: none  Tics: none      UPDRS                Time since last dose:   4/4/19 6/5/19 on selegiline  9/12/19  12/10/19   3/11/20   Speech   1  1 1 1  2   Facial Expression   0  1 0 0  2   Rigidity - Neck   0  0 0 0  0   Rigidity - Upper Extremity (Right)   1  0 0 0 0   Rigidity - Upper Extremity (Left)    1  0 0 0 0   Rigidity - Lower Extremity (Right)   1  0 0 0 0   Rigidity - Lower Extremity (Left)    1  0 0 0 0   Finger Taps (Right)    1  0 2 1 2   Finger Taps (Left)    0  0 0 0 0   Hand Movement (Right)   0  0 0 0 0   Hand Movement (Left)    0  0 0 0 0   Pronation/Supination (Right)   0  0 0 0 0   Pronation/Supination (Left)    0  0 0 0 0   Toe Tapping (Right)  0  0 0 0 0   Toe Tapping (Left)  0  0 0 0 0   Leg Agility (Right)   0  0 0 0 0   Leg Agility (Left)    0  0 0 0 0   Arising from Chair    0  0 0 0 0   Gait    0  0 0 0 2 lagging R foot   Freezing of Gait  0  0 0 0 0   Postural Stability    -  - - - 3 retropulsion   Posture  1  0 0 0 1   Global spontaneity of movement  2  0 1 slightly less on R 0 2 decr  R arm swing   Postural Tremor (Right)  0  0 0 0 1   Postural Tremor (Left)  0  0 0 0 1   Kinetic Tremor (Right)   0  0 0 0 0   Kinetic Tremor (Left)   0  0 0 0 0   Rest tremor amplitude RUE  0  0 0 0 0   Rest tremor amplitude LUE  0  0 0 0 0   Rest tremor amplitude RLE  0  0 0 0 0   Reset tremor amplitude LLE  0  0 0 0 0   Lip/Jaw Tremor   0  0 0 0 0   Consistency of tremor  0  0 0 0 0     -------------------------------------------------------------------------------------    Muscle Strength Right Left  Muscle Strength Right Left   Deltoid 5/5 5/5  Hip Adductors 5/5 5/5   Biceps 5/5 5/5  Hip Abductors 5/5 5/5   Triceps 5/5 5/5  Knee Extensors 5/5 5/5   Wrist Extensors 5/5 5/5  Knee Flexors 5/5 5/5   Wrist Flexors 5/5 5/5  Ankle Extensors 5/5 5/5    5/5 5/5  Ankle Flexors 5/5 5/5   Finger Abductors 5/5 5/5       Hip Flexors 5/5 5/5   Hip Extensors 5/5 5/5      Gait:  Narrow based gait, upright walking without shift or sway, no freezing of gait and normal stride lengths  No re-emergent rest tremor       Reflexes:    Right Left   Biceps 2/4 2/4   Brachioradialis 2/4 2/4   Triceps 2/4 2/4   Knee 2/4 2/4   Ankle 2/4 2/4

## 2020-03-11 ENCOUNTER — OFFICE VISIT (OUTPATIENT)
Dept: NEUROLOGY | Facility: CLINIC | Age: 60
End: 2020-03-11
Payer: COMMERCIAL

## 2020-03-11 VITALS
HEART RATE: 81 BPM | BODY MASS INDEX: 32.18 KG/M2 | WEIGHT: 237.6 LBS | DIASTOLIC BLOOD PRESSURE: 66 MMHG | HEIGHT: 72 IN | SYSTOLIC BLOOD PRESSURE: 112 MMHG

## 2020-03-11 DIAGNOSIS — J98.6 DIAPHRAGM DYSFUNCTION: ICD-10-CM

## 2020-03-11 DIAGNOSIS — G90.3 NEUROGENIC ORTHOSTATIC HYPOTENSION (HCC): ICD-10-CM

## 2020-03-11 DIAGNOSIS — G20 PARKINSON'S DISEASE (HCC): Primary | ICD-10-CM

## 2020-03-11 PROCEDURE — 99215 OFFICE O/P EST HI 40 MIN: CPT | Performed by: PSYCHIATRY & NEUROLOGY

## 2020-03-11 PROCEDURE — 1036F TOBACCO NON-USER: CPT | Performed by: PSYCHIATRY & NEUROLOGY

## 2020-03-11 PROCEDURE — 3078F DIAST BP <80 MM HG: CPT | Performed by: PSYCHIATRY & NEUROLOGY

## 2020-03-11 PROCEDURE — 3074F SYST BP LT 130 MM HG: CPT | Performed by: PSYCHIATRY & NEUROLOGY

## 2020-03-11 PROCEDURE — 3008F BODY MASS INDEX DOCD: CPT | Performed by: PSYCHIATRY & NEUROLOGY

## 2020-03-11 RX ORDER — CEPHALEXIN 500 MG/1
CAPSULE ORAL
COMMUNITY
Start: 2020-03-11 | End: 2020-03-25

## 2020-03-11 NOTE — PATIENT INSTRUCTIONS
· We discussed about first tapering down and off the amantadine given limited or not benefit seen thus far:  First take 1 capsule 100mg daily in AM for four days before discontinuing  · After the above, then will start at Sinemet 25/100 tablets: At any point if symptoms are controlled then can hold that dose without going up further  If feels any side effects (sedation, lightheadedness, nausea) that are intolerable, then go back down to next lower dose and hold there, then call us  Nausea is common and if she has that she can take it after food  Week 1:  Take 0 5 tab BID in morning and afternoon  Week 2:  Take 0 5 tab three times a day before meals  Week 3:  Take 1 tab / 0 5 tab / 0 5 tab  Week 4:  1 tab / 1 tab / 0 5 tab  Week 5:  1 tab three times a day before meals   · He will f/u with [facility] per his preference for their opinion before proceeding with the above  · After the Sinemet dose is finalized, then we will adjust the midodrine and make the dose scheduled 0 5 tablet daily  · He is using the physical therapy techniques at home, started [employer]      · Return to Clinic on 3 months

## 2020-03-11 NOTE — LETTER
March 11, 2020     Nicole Ness, [address] [address]    Patient: Kya Gee   YOB: 1960   Date of Visit: 3/11/2020       Dear Dr Monserrat Shah:    Earlier today I saw Mr  Kaleigh Orellana for follow-up of his probable Parkinson's disease  Below are my notes for this visit for your records and to keep you updated on his health status  If you have questions, please do not hesitate to call me  I look forward to following your patient along with you  Sincerely,        Liset Araujo MD        CC: No Recipients  Liset Araujo MD  3/11/2020  6:12 PM  Sign at close encounter  [address] PATIENT NOTE    Patient: Kya Gee  Medical Record Number: # [id number]  YOB: 1960  Date of visit: 3/11/2020     Referring provider: Milli Alanis, DO     The patient was not accompanied today  History was obtained from patient     ASSESSMENT     1  Parkinson's disease ([address] )  carbidopa-levodopa (SINEMET)  mg per tablet   2  Neurogenic orthostatic hypotension (HCC)     3  Diaphragm dysfunction       Impression of this 61 yo gentleman returning for parkinsonism in setting of persistent, significant orthostatic hypotension, overall with better control of blood pressure since discontinuing selegiline and ropinirole  He remains on amantadine but denies significant benefit  He does feel worse on his voice volume, L hand tremor and stiffness  He continues on midodrine prn for blood pressure reading below a certain point  The lightheadedness is constant and bothersome, but he is more concerned by the development of his L sided symptoms  We will first taper off amantadine as it is not helping him, followed by either making midodrine a daily scheduled dose or starting on Sinemet trial for the first time   He continues to see pulmonology for his poorly moving diaphragm and respiratory issues  We have not conclusively ruled out Multiple System Atrophy at this time, but his lack of substantial rapid progression and now asymmetric findings is making this less likely  PLAN     · We discussed about first tapering down and off the amantadine given limited or not benefit seen thus far:  First take 1 capsule 100mg daily in AM for four days before discontinuing  · After the above, then will start at Sinemet 25/100 tablets: At any point if symptoms are controlled then can hold that dose without going up further  If feels any side effects (sedation, lightheadedness, nausea) that are intolerable, then go back down to next lower dose and hold there, then call us  Nausea is common and if she has that she can take it after food  Week 1:  Take 0 5 tab BID in morning and afternoon  Week 2:  Take 0 5 tab three times a day before meals  Week 3:  Take 1 tab / 0 5 tab / 0 5 tab  Week 4:  1 tab / 1 tab / 0 5 tab  Week 5:  1 tab three times a day before meals   · He will f/u with [facility] per his preference for their opinion before proceeding with the above  · After the Sinemet dose is finalized, then we will adjust the midodrine and make the dose scheduled 0 5 tablet daily  · He is using the physical therapy techniques at home, started [employer]  · Return to Clinic on 3 months     A total of 45 minutes were spent face-to-face with this patient, of which >50% was spent on counseling and coordination of care  HISTORY OF PRESENT ILLNESS:     Mr Oscar Carlin is a 60 y o  right handed male who returns to the [address] [place] [address] for evaluation of Parkinson's disease and severe orthostatism  Last visit 9/12/19  Interim History  While performing PT exercises he had recurrent fatigue and was not safe to perform  Team suggested stopping Fludrocortisone and this yielded variable results  Florinef replaced with midodrine   He started amantadine 100mg/day on 2/14/20, increased to 200mg/day on 3/8/20  He said starting before the dose increase, he experienced near-constant tinnitis and dizziness  He would like to know if that was a side effect  Despite stopping amantadine for a single day, symptoms did not improve  He has started to take midodrine faster but no benefit yet seen  After selegiline stopped, he feels his PD symptoms had all worsened  As discussed with his Sleep Medicine / Pulmonologist, workup for possible myasthenia gravis was ultimately negative without serum antibody markers and normal EMG with NCS of phrenic nerve for his diaphragmatic paralysis  He tells me today that his dizziness and balance issues remain his top concerns  He denies any falls, but the lightheadedness is "constant" most days  He has noticed more labored walking and tremor on the L hand as well as hoarseness and smaller handwriting  He is taking midodrine 5mg as needed, once a day when lightheadedness is especially low  INITIAL HISTORY  He says he first noticed smaller handwriting while taking a class and taking notes around 2015  In 2016 he noticed he was "meandering walking left and right" which seemed to progress to the point where he would have difficulty keeping pace with someone walking more normally  Denies feeling hemibody slowness or stiffness  He felt he had to put forth more effort in walking fast  If he climbs more than a flight of stairs, his thighs become fatigued  +lightheadedness after ascending stairs or rising from a kneeling position (none while laying down)  If he closes his eyes in shower and looks up, he feels very unsteady  He denies ever falling except 1x while pulling up pants in 2017  He is aware of his posture being more slouched forward  Patient saw his PCP with complaint of lightheadedness while standing and dizziness, trouble with writing on right hand with dropping objects  MRI Brain on July 2018 was reported unremarkable     - Laboratory testing in July 2018 including normal CBC, CMP, Lyme ab testing and hypovitaminosis D noted  - No previous injuries or surgeries on head or neck  - His main concern today is if he has Parkinson's disease, as he read about his symptoms on WebMD      Tremor noticeable but not bothersome; he says he can see his R hand shaking when he writes or eats  Not apparent when his hand is resting  No other tremor in his body he says  Started after 2016 very mild progression if at all  Sleep:  He says he sleeps poorly with sleep habits (sleeping on recliner while watching TV, ~4-5 hrs a night  +difficulty falling back asleep  +snores  Hyposmia: denies  RBD (REM semi-purposeful body movements):  "occasionally" acting out dreams   Gait Disturbance:  Yes, as above   Dementia:  +feeling more difficulty with attention and focusing at work  Forgetting names and small tasks  Constipation: endorses  Hallucination: denies  Skin Cancer History: denies  Hypovitaminosis D: denies  Hypovitaminosis B12:  denies  Dysautonomia:  +urinary retention but has BPH  +orthostatism    Gaze Palsy: none  Exercise Therapy: INACTIVE    Family History: Number of First Degree Relatives With Parkinsonism: mother    Imaging: MRI brain in July 2018      REVIEW OF PAST MEDICAL, SOCIAL AND FAMILY HISTORY:  This is the list of problems as per our Medical Records:    Patient Active Problem List    Diagnosis Date Noted    Myasthenia gravis ([address] )     Acute pain of right shoulder 02/21/2020    Proteinuria 12/06/2019    Urinary retention 12/06/2019    Nephrolithiasis 12/06/2019    CKD (chronic kidney disease) stage 2, GFR 60-89 ml/min 12/06/2019    Elevated blood pressure reading 11/22/2019    Tinea corporis 08/27/2019    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    Periodic limb movement disorder (PLMD) 08/22/2019    Change in voice 06/03/2019    Gastroesophageal reflux disease with esophagitis 06/03/2019    KALLI (obstructive sleep apnea)     Chronic pain of right ankle 03/04/2019    Chronic pain of left knee 03/04/2019    [facility]'s [facility] ([facility] ) 03/04/2019    Thyromegaly 03/04/2019    Neurogenic orthostatic hypotension (HCC) 02/27/2019    Shortness of breath 11/29/2018    Pain of right thumb 10/18/2018    Pain of left thumb 10/18/2018    Right elbow pain 10/18/2018    Right wrist pain 10/18/2018    Plantar fasciitis, bilateral 10/10/2018    Unspecified abnormalities of gait and mobility 10/10/2018    Dizziness 07/05/2018    Primary testicular cancer ([facility] ) 12/17/2014     No Known Allergies     Outpatient Encounter Medications as of 3/11/2020   Medication Sig Dispense Refill    amantadine (SYMMETREL) 100 mg capsule Take 1 capsule (100 mg total) by mouth 2 (two) times a day taken as 1 cap in the AM for a week and then if tolerated, 1 cap twice a day (Patient taking differently: Take 100 mg by mouth daily taken as 1 cap in the AM for a week and then if tolerated, 1 cap twice a day) 60 capsule 3    cephalexin (KEFLEX) 500 mg capsule       cholecalciferol (VITAMIN D3) 1,000 units tablet Take 2,000 Units by mouth daily      Elastic Bandages & Supports (MEDICAL COMPRESSION THIGH HIGH) MISC One pair thigh high compression stockings for orthostatic hypotension 2 each 0    Melatonin 5 MG TABS Take 10 mg by mouth       midodrine (PROAMATINE) 10 MG tablet Follow clinic instructions on titration dose to max 1 tab TID, as tolerated and to benefit   (Patient taking differently: 5 mg Follow clinic instructions on titration dose to max 1 tab TID, as tolerated and to benefit ) 90 tablet 2    multivitamin (THERAGRAN) TABS Take 1 tablet by mouth daily      omeprazole (PriLOSEC) 20 mg delayed release capsule Take 1 capsule (20 mg total) by mouth daily before breakfast 90 capsule 1    vitamin E 100 UNIT capsule Take 100 Units by mouth daily      Wheat Dextrin (BENEFIBER DRINK MIX PO)       carbidopa-levodopa (SINEMET)  mg per tablet Follow clinic instruction on titrating this up to 1 tablet three times a day, as tolerated and to level of benefit  Call office with questions, or after reaching Week 5  90 tablet 3    [DISCONTINUED] fludrocortisone (FLORINEF) 0 1 mg tablet Take 0 5 tablets (0 05 mg total) by mouth daily (Patient not taking: Reported on 2/13/2020) 45 tablet 4    [DISCONTINUED] nitrofurantoin (MACROBID) 100 mg capsule Take 100 mg by mouth 2 (two) times a day      [DISCONTINUED] nystatin-triamcinolone (MYCOLOG-II) cream Apply topically 2 (two) times a day 30 g 1    [DISCONTINUED] predniSONE 10 mg tablet 5 pills daily for 2 days, 4 pills for 2 days, 3 for 2 days, 2 for 2 days, 1 for 2 days 30 tablet 0    [DISCONTINUED] rOPINIRole (REQUIP) 0 5 mg tablet Take 1 tablet (0 5 mg total) by mouth daily at bedtime (Patient not taking: Reported on 2/13/2020) 30 tablet 1    [DISCONTINUED] selegiline (ELDEPRYL) 5 mg capsule Take 5 mg by mouth 2 (two) times a day  11     No facility-administered encounter medications on file as of 3/11/2020  REVIEW OF SYSTEMS:  The patient has entered data on an intake form regarding present illness, past medical and surgical history, medications, allergies, family and social history, and a full review of 14 systems  I have reviewed this form with the patient, and all the relevant information has been included on this note  The full review of systems was negative except as stated in HPI and below  Review of Systems   Constitutional: Negative  Negative for appetite change and fever  HENT: Positive for tinnitus  Negative for hearing loss, trouble swallowing and voice change  Eyes: Negative  Negative for photophobia and pain  Respiratory: Negative  Negative for shortness of breath  Cardiovascular: Negative  Negative for palpitations  Gastrointestinal: Negative  Negative for nausea and vomiting  Endocrine: Negative  Negative for cold intolerance and heat intolerance  Genitourinary: Negative    Negative for dysuria, frequency and urgency  Musculoskeletal: Positive for gait problem (balance)  Negative for myalgias and neck pain  Skin: Negative  Negative for rash  Neurological: Positive for dizziness, tremors (resting tremor in right hand, worse since stopping Selegiline and Requip) and light-headedness  Negative for seizures, syncope, facial asymmetry, speech difficulty, weakness, numbness and headaches  Hematological: Negative  Does not bruise/bleed easily  Psychiatric/Behavioral: Negative  Negative for confusion, hallucinations and sleep disturbance  FOCUSED PHYSICAL EXAMINATION:     Vital signs:  /66 (BP Location: Left arm, Patient Position: Standing, Cuff Size: Standard)   Pulse 81   Ht 6' (1 829 m)   Wt 108 kg (237 lb 9 6 oz)   BMI 32 22 kg/m²      General:  Well-appearing, well nourished, pleasant patient in no acute distress  Mood appropriate  Head:  Normocephalic, atraumatic  Oropharynx and conjunctiva are clear  Speech  +hypophonia  No bradylalia  No scanning speech  Language: Comprehension intact  Neck:  Supple, strong 5/5 forward flexion and retroflexion  Extremities: Range of motion is normal       Cognitive and Mental Exam:  The patient is alert, oriented to self, location, date and situation  Cranial Nerves:  Direct and consensual light reflexes were normal  No afferent pupillary defect  Visual fields are full to confrontation  Extraocular movements were full, with normal pursuit and saccades  Pupils were equal, reactive to light symmetrically  Facial sensation to light touch was intact  Face is symmetric with normal strength  Hearing was assessed using the finger rubs  And was normal bilaterally  Palate is up going bilaterally and symmetrically  Neck muscles are strong  Tongue protrusion is at midline with normal movements  No dysarthria  Motor:    Dystonia: none  Dyskinesia: none  Myoclonus: none  Chorea: none    Tics: none     UPDRS                Time since last dose:   4/4/19 6/5/19 on selegiline  9/12/19  12/10/19   3/11/20   Speech   1  1 1 1  2   Facial Expression   0  1 0 0  2   Rigidity - Neck   0  0 0 0  0   Rigidity - Upper Extremity (Right)   1  0 0 0 0   Rigidity - Upper Extremity (Left)    1  0 0 0 0   Rigidity - Lower Extremity (Right)   1  0 0 0 0   Rigidity - Lower Extremity (Left)    1  0 0 0 0   Finger Taps (Right)    1  0 2 1 2   Finger Taps (Left)    0  0 0 0 0   Hand Movement (Right)   0  0 0 0 0   Hand Movement (Left)    0  0 0 0 0   Pronation/Supination (Right)   0  0 0 0 0   Pronation/Supination (Left)    0  0 0 0 0   Toe Tapping (Right)  0  0 0 0 0   Toe Tapping (Left)  0  0 0 0 0   Leg Agility (Right)   0  0 0 0 0   Leg Agility (Left)    0  0 0 0 0   Arising from Chair    0  0 0 0 0   Gait    0  0 0 0 2 lagging R foot   Freezing of Gait  0  0 0 0 0   Postural Stability    -  - - - 3 retropulsion   Posture  1  0 0 0 1   Global spontaneity of movement  2  0 1 slightly less on R 0 2 decr   R arm swing   Postural Tremor (Right)  0  0 0 0 1   Postural Tremor (Left)  0  0 0 0 1   Kinetic Tremor (Right)   0  0 0 0 0   Kinetic Tremor (Left)   0  0 0 0 0   Rest tremor amplitude RUE  0  0 0 0 0   Rest tremor amplitude LUE  0  0 0 0 0   Rest tremor amplitude RLE  0  0 0 0 0   Reset tremor amplitude LLE  0  0 0 0 0   Lip/Jaw Tremor   0  0 0 0 0   Consistency of tremor  0  0 0 0 0     -------------------------------------------------------------------------------------    Muscle Strength Right Left  Muscle Strength Right Left   Deltoid 5/5 5/5  Hip Adductors 5/5 5/5   Biceps 5/5 5/5  Hip Abductors 5/5 5/5   Triceps 5/5 5/5  Knee Extensors 5/5 5/5   Wrist Extensors 5/5 5/5  Knee Flexors 5/5 5/5   Wrist Flexors 5/5 5/5  Ankle Extensors 5/5 5/5    5/5 5/5  Ankle Flexors 5/5 5/5   Finger Abductors 5/5 5/5       Hip Flexors 5/5 5/5   Hip Extensors 5/5 5/5      Gait:  Narrow based gait, upright walking without shift or sway, no freezing of gait and normal stride lengths  No re-emergent rest tremor       Reflexes:    Right Left   Biceps 2/4 2/4   Brachioradialis 2/4 2/4   Triceps 2/4 2/4   Knee 2/4 2/4   Ankle 2/4 2/4

## 2020-03-11 NOTE — PROGRESS NOTES
Review of Systems   Constitutional: Negative  Negative for appetite change and fever  HENT: Positive for tinnitus  Negative for hearing loss, trouble swallowing and voice change  Eyes: Negative  Negative for photophobia and pain  Respiratory: Positive for shortness of breath  Cardiovascular: Negative  Negative for palpitations  Gastrointestinal: Negative  Negative for nausea and vomiting  Endocrine: Negative  Negative for cold intolerance and heat intolerance  Genitourinary: Negative  Negative for dysuria, frequency and urgency  Musculoskeletal: Positive for myalgias (knee)  Negative for neck pain  Skin: Negative  Negative for rash  Neurological: Positive for dizziness, tremors (both hands, more on right ) and light-headedness  Negative for seizures, syncope, facial asymmetry, speech difficulty, weakness, numbness and headaches  Balance problems, difficulty walking   Hematological: Negative  Does not bruise/bleed easily  Psychiatric/Behavioral: Negative  Negative for confusion, hallucinations and sleep disturbance

## 2020-03-16 ENCOUNTER — OFFICE VISIT (OUTPATIENT)
Dept: NEPHROLOGY | Facility: CLINIC | Age: 60
End: 2020-03-16
Payer: COMMERCIAL

## 2020-03-16 VITALS
SYSTOLIC BLOOD PRESSURE: 114 MMHG | BODY MASS INDEX: 32.29 KG/M2 | RESPIRATION RATE: 16 BRPM | WEIGHT: 238.38 LBS | HEIGHT: 72 IN | DIASTOLIC BLOOD PRESSURE: 78 MMHG | HEART RATE: 66 BPM

## 2020-03-16 DIAGNOSIS — R33.9 URINARY RETENTION: ICD-10-CM

## 2020-03-16 DIAGNOSIS — R80.9 PROTEINURIA, UNSPECIFIED TYPE: ICD-10-CM

## 2020-03-16 DIAGNOSIS — N18.2 CKD (CHRONIC KIDNEY DISEASE) STAGE 2, GFR 60-89 ML/MIN: Primary | ICD-10-CM

## 2020-03-16 DIAGNOSIS — N20.0 NEPHROLITHIASIS: ICD-10-CM

## 2020-03-16 DIAGNOSIS — R03.0 ELEVATED BLOOD PRESSURE READING: ICD-10-CM

## 2020-03-16 PROCEDURE — 3078F DIAST BP <80 MM HG: CPT | Performed by: INTERNAL MEDICINE

## 2020-03-16 PROCEDURE — 3074F SYST BP LT 130 MM HG: CPT | Performed by: INTERNAL MEDICINE

## 2020-03-16 PROCEDURE — 3008F BODY MASS INDEX DOCD: CPT | Performed by: INTERNAL MEDICINE

## 2020-03-16 PROCEDURE — 1036F TOBACCO NON-USER: CPT | Performed by: INTERNAL MEDICINE

## 2020-03-16 PROCEDURE — 99213 OFFICE O/P EST LOW 20 MIN: CPT | Performed by: INTERNAL MEDICINE

## 2020-03-16 NOTE — PROGRESS NOTES
NEPHROLOGY OUTPATIENT PROGRESS NOTE   Erwin Fairchild 60 y o  male MRN: [id number]  DATE: 3/16/2020  Reason for visit:   Chief Complaint   Patient presents with    Chronic Kidney Disease    Follow-up        Patient Instructions   1  Previously elevated BP readings with low readings at times as well - BP seems very well controlled in office   -Parkinson's disease can lead to autonomic dysfunction as well as labile BP readings    -no longer on florinef  -am cortisol ok  -I do note mildly elevated free normetanephrine  Would expect 2-3x normal range to suspect pheochromocytoma  -craig 1 3, renin 0 208  Ratio is normal as of 11/23/19  -TSH ok  -repeat plasma metanephrines normal  -continue to wear compression stockings  Take your time getting up   -May take 10mg tablet midodrine up to three times daily  Take midodrine if BP < 105/65  -limit sodium intake in diet as this can raise BP     2  Elevated serum creatinine ? D/t chronic kidney disease stage 2(mild) - b/l sCr 1-1 2 since 2016, last sCr 1 17 which correlates with eGFR 88 ml/min per CKD EPI equation  ? If this is due to lability in BP readings vs some other cause  -recent UA with microscopy shows UpCr 0 14g with small blood, 4-10 RBCs and innumerable WBCS and trace protein     3  Proteinuria - noted on UA in office as well as outpatient this month  -UpCr 0 14g  -of note, not anemic  Will defer myeloma workup  -repeat UpCr     4  Nephrolithiasis - 10mm stone in left kidney  -would follow low sodium diet (<2000mg/day)  Please read packages to ensure sodium content limited  -drink enough fluids to urinate 2-2 5L/day to prevent stone formation     5  History of urinary retention s/p suprapubic catheter - s/p urolift x 2, s/p TURP x 2, bladder neck constricture repair  Follows with urology  Suprapubic catheter placed 2/24/20     RTC in 3 months  Repeat BMP and Urine protein:Cr a week after tube exchange            José Colon was seen today for chronic kidney disease and follow-up  Diagnoses and all orders for this visit:    CKD (chronic kidney disease) stage 2, GFR 60-89 ml/min  -     Protein / creatinine ratio, urine; Future  -     Basic metabolic panel; Future    Nephrolithiasis    Urinary retention    Proteinuria, unspecified type  -     Protein / creatinine ratio, urine; Future    Elevated blood pressure reading        Assessment/Plan:  1  Previously elevated BP readings with low readings at times as well - BP seems very well controlled in office   -Parkinson's disease can lead to autonomic dysfunction as well as labile BP readings    -no longer on florinef  -am cortisol ok  -I do note mildly elevated free normetanephrine  Would expect 2-3x normal range to suspect pheochromocytoma  -craig 1 3, renin 0 208  Ratio is normal as of 11/23/19  -TSH ok  -repeat plasma metanephrines normal  -continue to wear compression stockings  Take your time getting up   -May take 10mg tablet midodrine up to three times daily  Take midodrine if BP < 105/65  -limit sodium intake in diet as this can raise BP     2  Elevated serum creatinine ? D/t chronic kidney disease stage 2(mild) - b/l sCr 1-1 2 since 2016, last sCr 1 17 which correlates with eGFR 88 ml/min per CKD EPI equation  ? If this is due to lability in BP readings vs some other cause  -recent UA with microscopy shows UpCr 0 14g with small blood, 4-10 RBCs and innumerable WBCS and trace protein     3  Proteinuria - noted on UA in office as well as outpatient this month  -UpCr 0 14g  -of note, not anemic  Will defer myeloma workup  -repeat UpCr     4  Nephrolithiasis - 10mm stone in left kidney  -would follow low sodium diet (<2000mg/day)  Please read packages to ensure sodium content limited  -drink enough fluids to urinate 2-2 5L/day to prevent stone formation     5  History of urinary retention s/p suprapubic catheter - s/p urolift x 2, s/p TURP x 2, bladder neck constricture repair  Follows with urology   Suprapubic catheter placed 2/24/20     RTC in 3 months  Repeat BMP and Urine protein:Cr a week after tube exchange  SUBJECTIVE / INTERVAL HISTORY:  60 y o  male presents in follow up of CKD/HTN  Tyler Adair had SPT placed on 2/24/20  He is having bladder neck repaired in early April at [facility]  Still having low BP readings  He has been taking half a tablet of midodrine  Only once he felt he would pass out  Denies NSAID use  Uses tylenol as needed for pain  Avoiding salty food  Stay well hydrated  Review of Systems   Constitutional: Negative for chills and fever  HENT: Negative for sore throat  Eyes: Negative for visual disturbance  Respiratory: Positive for shortness of breath (for the last year, worsening  feels it after going up the stairs  talking and walking also brings it on)  Negative for cough  Cardiovascular: Negative for chest pain and leg swelling  Gastrointestinal: Positive for constipation (likely from Parkinson's)  Negative for abdominal pain, diarrhea, nausea and vomiting  Endocrine: Negative for polyuria  Genitourinary: Positive for hematuria (with excess movement or exercise)  Negative for decreased urine volume, difficulty urinating and dysuria  Musculoskeletal: Negative for back pain and myalgias  Skin: Negative for rash  Neurological: Positive for dizziness (as per HPI) and numbness (tingling in right foot)  Negative for light-headedness  Psychiatric/Behavioral: Negative for confusion  OBJECTIVE:  /78 (BP Location: Left arm, Patient Position: Sitting, Cuff Size: Standard)   Pulse 66   Resp 16   Ht 6' (1 829 m)   Wt 108 kg (238 lb 6 oz)   BMI 32 33 kg/m²  Body mass index is 32 33 kg/m²  Physical exam:  Physical Exam   Constitutional: He appears well-developed and well-nourished  No distress  HENT:   Head: Normocephalic and atraumatic  Mouth/Throat: No oropharyngeal exudate  Eyes: Right eye exhibits no discharge   Left eye exhibits no discharge  No scleral icterus  Neck: Neck supple  No thyromegaly present  Cardiovascular: Normal rate, regular rhythm and normal heart sounds  Pulmonary/Chest: Effort normal and breath sounds normal  He has no wheezes  He has no rales  Abdominal: Soft  Bowel sounds are normal  He exhibits no distension  There is no tenderness  Musculoskeletal: Normal range of motion  He exhibits no edema  Lymphadenopathy:     He has no cervical adenopathy  Neurological: He is alert  awake   Skin: Skin is warm and dry  No rash noted  He is not diaphoretic  Psychiatric: He has a normal mood and affect  His behavior is normal    Vitals reviewed  Medications:    Current Outpatient Medications:     carbidopa-levodopa (SINEMET)  mg per tablet, Follow clinic instruction on titrating this up to 1 tablet three times a day, as tolerated and to level of benefit  Call office with questions, or after reaching Week 5 , Disp: 90 tablet, Rfl: 3    cholecalciferol (VITAMIN D3) 1,000 units tablet, Take 2,000 Units by mouth daily, Disp: , Rfl:     Elastic Bandages & Supports (MEDICAL COMPRESSION THIGH HIGH) MISC, One pair thigh high compression stockings for orthostatic hypotension, Disp: 2 each, Rfl: 0    Melatonin 5 MG TABS, Take 10 mg by mouth , Disp: , Rfl:     midodrine (PROAMATINE) 10 MG tablet, Follow clinic instructions on titration dose to max 1 tab TID, as tolerated and to benefit   (Patient taking differently: 5 mg Follow clinic instructions on titration dose to max 1 tab TID, as tolerated and to benefit ), Disp: 90 tablet, Rfl: 2    multivitamin (THERAGRAN) TABS, Take 1 tablet by mouth daily, Disp: , Rfl:     omeprazole (PriLOSEC) 20 mg delayed release capsule, Take 1 capsule (20 mg total) by mouth daily before breakfast, Disp: 90 capsule, Rfl: 1    vitamin E 100 UNIT capsule, Take 100 Units by mouth daily, Disp: , Rfl:     Wheat Dextrin (BENEFIBER DRINK MIX PO), , Disp: , Rfl:     amantadine (SYMMETREL) 100 mg capsule, Take 1 capsule (100 mg total) by mouth 2 (two) times a day taken as 1 cap in the AM for a week and then if tolerated, 1 cap twice a day (Patient not taking: Reported on 3/16/2020), Disp: 60 capsule, Rfl: 3    cephalexin (KEFLEX) 500 mg capsule, , Disp: , Rfl:     Allergies: Allergies as of 03/16/2020    (No Known Allergies)       The following portions of the patient's history were reviewed and updated as appropriate: past family history, past surgical history and problem list     Laboratory Results:  Lab Results   Component Value Date    SODIUM 140 12/11/2019    K 4 6 12/11/2019     12/11/2019    CO2 29 12/11/2019    BUN 22 12/11/2019    CREATININE 1 17 12/11/2019    GLUC 100 05/11/2018    CALCIUM 9 5 12/11/2019        Lab Results   Component Value Date    CALCIUM 9 5 12/11/2019       Portions of the record may have been created with voice recognition software   Occasional wrong word or "sound a like" substitutions may have occurred due to the inherent limitations of voice recognition software   Read the chart carefully and recognize, using context, where substitutions have occurred

## 2020-03-16 NOTE — PATIENT INSTRUCTIONS
1  Previously elevated BP readings with low readings at times as well - BP seems very well controlled in office   -Parkinson's disease can lead to autonomic dysfunction as well as labile BP readings    -no longer on florinef  -am cortisol ok  -I do note mildly elevated free normetanephrine  Would expect 2-3x normal range to suspect pheochromocytoma  -craig 1 3, renin 0 208  Ratio is normal as of 11/23/19  -TSH ok  -repeat plasma metanephrines normal  -continue to wear compression stockings  Take your time getting up   -May take 10mg tablet midodrine up to three times daily  Take midodrine if BP < 105/65  -limit sodium intake in diet as this can raise BP     2  Elevated serum creatinine ? D/t chronic kidney disease stage 2(mild) - b/l sCr 1-1 2 since 2016, last sCr 1 17 which correlates with eGFR 88 ml/min per CKD EPI equation  ? If this is due to lability in BP readings vs some other cause  -recent UA with microscopy shows UpCr 0 14g with small blood, 4-10 RBCs and innumerable WBCS and trace protein     3  Proteinuria - noted on UA in office as well as outpatient this month  -UpCr 0 14g  -of note, not anemic  Will defer myeloma workup  -repeat UpCr     4  Nephrolithiasis - 10mm stone in left kidney  -would follow low sodium diet (<2000mg/day)  Please read packages to ensure sodium content limited  -drink enough fluids to urinate 2-2 5L/day to prevent stone formation     5  History of urinary retention s/p suprapubic catheter - s/p urolift x 2, s/p TURP x 2, bladder neck constricture repair  Follows with urology  Suprapubic catheter placed 2/24/20     RTC in 3 months  Repeat BMP and Urine protein:Cr a week after tube exchange

## 2020-03-18 ENCOUNTER — TELEPHONE (OUTPATIENT)
Dept: RADIOLOGY | Facility: HOSPITAL | Age: 60
End: 2020-03-18

## 2020-03-18 RX ORDER — SODIUM CHLORIDE 9 MG/ML
75 INJECTION, SOLUTION INTRAVENOUS CONTINUOUS
Status: CANCELLED | OUTPATIENT
Start: 2020-03-18

## 2020-03-19 DIAGNOSIS — G90.3 NEUROGENIC ORTHOSTATIC HYPOTENSION (HCC): ICD-10-CM

## 2020-03-19 RX ORDER — MIDODRINE HYDROCHLORIDE 5 MG/1
5 TABLET ORAL DAILY
Qty: 30 TABLET | Refills: 3 | Status: SHIPPED | OUTPATIENT
Start: 2020-03-19 | End: 2021-04-14 | Stop reason: SDUPTHER

## 2020-03-20 ENCOUNTER — TELEPHONE (OUTPATIENT)
Dept: SURGERY | Facility: HOSPITAL | Age: 60
End: 2020-03-20

## 2020-03-23 ENCOUNTER — ANESTHESIA EVENT (OUTPATIENT)
Dept: RADIOLOGY | Facility: HOSPITAL | Age: 60
End: 2020-03-23

## 2020-03-23 ENCOUNTER — ANESTHESIA (OUTPATIENT)
Dept: RADIOLOGY | Facility: HOSPITAL | Age: 60
End: 2020-03-23

## 2020-03-23 ENCOUNTER — HOSPITAL ENCOUNTER (OUTPATIENT)
Dept: RADIOLOGY | Facility: HOSPITAL | Age: 60
Discharge: HOME | End: 2020-03-23
Admitting: RADIOLOGY
Payer: COMMERCIAL

## 2020-03-23 VITALS
DIASTOLIC BLOOD PRESSURE: 104 MMHG | HEIGHT: 72 IN | WEIGHT: 238 LBS | TEMPERATURE: 97.8 F | RESPIRATION RATE: 18 BRPM | BODY MASS INDEX: 32.23 KG/M2 | HEART RATE: 70 BPM | OXYGEN SATURATION: 96 % | SYSTOLIC BLOOD PRESSURE: 163 MMHG

## 2020-03-23 DIAGNOSIS — N31.9 NEUROGENIC BLADDER: ICD-10-CM

## 2020-03-23 PROCEDURE — 51705 CHANGE OF BLADDER TUBE: CPT

## 2020-03-23 PROCEDURE — C1769 GUIDE WIRE: HCPCS

## 2020-03-23 PROCEDURE — 51705 CHANGE OF BLADDER TUBE: CPT | Performed by: RADIOLOGY

## 2020-03-23 PROCEDURE — C1725 CATH, TRANSLUMIN NON-LASER: HCPCS

## 2020-03-23 PROCEDURE — 75984 XRAY CONTROL CATHETER CHANGE: CPT | Performed by: RADIOLOGY

## 2020-03-23 RX ORDER — CEFAZOLIN SODIUM 2 G/50ML
SOLUTION INTRAVENOUS AS NEEDED
Status: DISCONTINUED | OUTPATIENT
Start: 2020-03-23 | End: 2020-03-23 | Stop reason: SURG

## 2020-03-23 RX ORDER — FENTANYL CITRATE/PF 50 MCG/ML
25 SYRINGE (ML) INJECTION
Status: DISCONTINUED | OUTPATIENT
Start: 2020-03-23 | End: 2020-03-23 | Stop reason: HOSPADM

## 2020-03-23 RX ORDER — PROPOFOL 10 MG/ML
INJECTION, EMULSION INTRAVENOUS AS NEEDED
Status: DISCONTINUED | OUTPATIENT
Start: 2020-03-23 | End: 2020-03-23 | Stop reason: SURG

## 2020-03-23 RX ORDER — PROPOFOL 10 MG/ML
INJECTION, EMULSION INTRAVENOUS CONTINUOUS PRN
Status: DISCONTINUED | OUTPATIENT
Start: 2020-03-23 | End: 2020-03-23 | Stop reason: SURG

## 2020-03-23 RX ORDER — LIDOCAINE WITH 8.4% SOD BICARB 0.9%(10ML)
SYRINGE (ML) INJECTION CODE/TRAUMA/SEDATION MEDICATION
Status: COMPLETED | OUTPATIENT
Start: 2020-03-23 | End: 2020-03-23

## 2020-03-23 RX ORDER — ONDANSETRON 2 MG/ML
4 INJECTION INTRAMUSCULAR; INTRAVENOUS ONCE AS NEEDED
Status: DISCONTINUED | OUTPATIENT
Start: 2020-03-23 | End: 2020-03-23 | Stop reason: HOSPADM

## 2020-03-23 RX ORDER — SODIUM CHLORIDE 9 MG/ML
75 INJECTION, SOLUTION INTRAVENOUS CONTINUOUS
Status: DISCONTINUED | OUTPATIENT
Start: 2020-03-23 | End: 2020-03-24 | Stop reason: HOSPADM

## 2020-03-23 RX ADMIN — PROPOFOL 50 MG: 10 INJECTION, EMULSION INTRAVENOUS at 09:47

## 2020-03-23 RX ADMIN — SODIUM CHLORIDE 75 ML/HR: 0.9 INJECTION, SOLUTION INTRAVENOUS at 09:00

## 2020-03-23 RX ADMIN — Medication 10 ML: at 10:12

## 2020-03-23 RX ADMIN — CEFAZOLIN SODIUM 2000 MG: 2 SOLUTION INTRAVENOUS at 10:08

## 2020-03-23 RX ADMIN — PROPOFOL 100 MCG/KG/MIN: 10 INJECTION, EMULSION INTRAVENOUS at 09:47

## 2020-03-23 RX ADMIN — IOHEXOL 10 ML: 350 INJECTION, SOLUTION INTRAVENOUS at 11:07

## 2020-03-23 NOTE — PROGRESS NOTES
H and P reviewed  Has increased numbness in his right leg today  He has ongoing neurologic issues  I instructed him to call his neurologist for further instructions  He had an episode of bleeding from his suprapubic tube several days ago since resolved after about a coughing  Prior imaging was reviewed  Plan up size his current all purpose drain to [place] [address] [facility] if possible  He has future plans for bladder neck reconstruction  Procedure discussed and questions answered  Informed written consent was obtained      Anish Sanchez MD  03/23/20  9:51 AM

## 2020-03-23 NOTE — ANESTHESIA PREPROCEDURE EVALUATION
Review of Systems/Medical History  Patient summary reviewed  Chart reviewed  No history of anesthetic complications     Cardiovascular  Exercise tolerance (METS): good,     Pulmonary  Shortness of breath, Sleep apnea (questionable) ,        GI/Hepatic    GERD well controlled,        Kidney stones,   Comment: incontinence     Endo/Other  History of thyroid disease ,   Comment: Enlarged thyroid    GYN       Hematology  Negative hematology ROS      Musculoskeletal  Negative musculoskeletal ROS        Neurology    Neuromuscular disease ,   Comment: parkinsonism Psychology   Negative psychology ROS              Physical Exam    Airway    Mallampati score: I  TM Distance: >3 FB  Neck ROM: full     Dental   No notable dental hx     Cardiovascular  Rhythm: regular, Rate: normal,     Pulmonary  Breath sounds clear to auscultation,     Other Findings        Anesthesia Plan  ASA Score- 3     Anesthesia Type- general with ASA Monitors  Additional Monitors:   Airway Plan: LMA  Comment: 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

## 2020-03-23 NOTE — DISCHARGE INSTRUCTIONS
Suprapubic Cystostomy     WHAT YOU NEED TO KNOW:   Suprapubic cystostomy is surgery to create a stoma (opening) through your abdomen into your bladder  This opening is where a catheter is inserted to drain urine  You may need a cystostomy if your urine flow is blocked  DISCHARGE INSTRUCTIONS:   Resume your normal diet  Small sips of flat soda will help with mild nausea  Follow up with your healthcare provider as directed: You may need to return to have your suprapubic catheter changed or removed  Write down your questions so you remember to ask them during your visits  Empty your urine drainage bag: Empty your urine drainage bag when it is ½ to ? full, or every 8 hours  If you have a smaller leg bag, empty it every 3 to 4 hours  Do the following when you empty your urine drainage bag:  · Hold the urine bag over a toilet or large container  · Remove the drain spout from its sleeve at the bottom of the urine bag  Do not touch the tip of the drain spout  Open the slide valve on the spout  · Let the urine flow out of the urine bag into the toilet or container  Do not let the drainage tube touch anything  · Clean the end of the drain spout with alcohol when the bag is empty  Ask which cleaning solution is best to use  · Close the slide valve and put the drain spout into its sleeve at the bottom of the urine bag  Write down how much urine was in your bag if you were asked to keep a record  Prevent an infection:   · Clean the stoma and skin around it daily  Wash your hands before and after cystostomy care  Put on a new pair of clean medical gloves  · Change your urine bag or clean reusable bags  Ask how often you need to change or clean your urine drainage bag  You may need to change your reusable bag at least once a week  · Keep the bag below your waist  This will prevent urine from flowing back into your bladder and causing an infection or other problems   Also, keep the tube free of kinks so the urine will drain properly  Do not pull on the catheter  This can cause pain and bleeding and may cause the catheter to come out  Contact Interventional Radiology at [phone] Devyn PATIENTS: Contact Interventional Radiology at [phone]) Jerzy Calloway PATIENTS: Contact Interventional Radiology at [phone]) if any of the following occur:  · You have a fever or chills  · Persistent nausea or vomiting  · You have severe pain  · You have a burning pain in your stoma  · Your stoma is red, swollen, or draining pus  · You have blood in your urine  · You have questions or concerns about your condition or care    Seek care immediately or call 911 if:   · No urine is draining into the urine bag

## 2020-03-23 NOTE — BRIEF OP NOTE (RAD/CATH)
IR TUBE UPSIZE Procedure Note    PATIENT NAME: Tyler Adair  : 1960  MRN: [id number]    Pre-op Diagnosis:   1  Neurogenic bladder      Post-op Diagnosis:   1   Neurogenic bladder        Surgeon:   Astrid Cruz MD    Estimated Blood Loss:  Minimal  Findings:  Upsized suprapubic tube to [address] [place] [address]    Specimens:  None    Complications:  None    Anesthesia: Dulce Hernandez MD     Date: 3/23/2020  Time: 10:37 AM

## 2020-03-24 ENCOUNTER — TELEPHONE (OUTPATIENT)
Dept: CARDIOLOGY CLINIC | Facility: CLINIC | Age: 60
End: 2020-03-24

## 2020-03-24 NOTE — TELEPHONE ENCOUNTER
Due to the current pandemic of COVID-19, patient was given the option to par take in a video/telephone call, which was accepted by the patient       Patient was informed via telephone,the following:     There is a possibility of a $27 00 fee to participate in this Virtual Visit  This is depending on your insurance company if they will cover that cost       Patients preferred phone number to contact is [phone]     Video option- Patient preferred e-mail: none

## 2020-03-25 ENCOUNTER — TELEMEDICINE (OUTPATIENT)
Dept: CARDIOLOGY CLINIC | Facility: CLINIC | Age: 60
End: 2020-03-25
Payer: COMMERCIAL

## 2020-03-25 VITALS
HEART RATE: 91 BPM | WEIGHT: 235 LBS | DIASTOLIC BLOOD PRESSURE: 61 MMHG | BODY MASS INDEX: 31.83 KG/M2 | HEIGHT: 72 IN | SYSTOLIC BLOOD PRESSURE: 101 MMHG

## 2020-03-25 DIAGNOSIS — J98.6 DIAPHRAGM DYSFUNCTION: ICD-10-CM

## 2020-03-25 DIAGNOSIS — K21.00 GASTROESOPHAGEAL REFLUX DISEASE WITH ESOPHAGITIS: ICD-10-CM

## 2020-03-25 DIAGNOSIS — N18.2 CKD (CHRONIC KIDNEY DISEASE) STAGE 2, GFR 60-89 ML/MIN: ICD-10-CM

## 2020-03-25 DIAGNOSIS — G20 PARKINSON'S DISEASE (HCC): ICD-10-CM

## 2020-03-25 DIAGNOSIS — R06.02 SHORTNESS OF BREATH: Primary | ICD-10-CM

## 2020-03-25 DIAGNOSIS — G90.3 NEUROGENIC ORTHOSTATIC HYPOTENSION (HCC): ICD-10-CM

## 2020-03-25 DIAGNOSIS — G47.33 OSA (OBSTRUCTIVE SLEEP APNEA): ICD-10-CM

## 2020-03-25 PROCEDURE — 99214 OFFICE O/P EST MOD 30 MIN: CPT

## 2020-03-25 NOTE — PROGRESS NOTES
Virtual Regular Visit    Problem List Items Addressed This Visit        Digestive    Gastroesophageal reflux disease with esophagitis       Respiratory    KALLI (obstructive sleep apnea)    Diaphragm dysfunction       Cardiovascular and Mediastinum    Neurogenic orthostatic hypotension (HCC)       Nervous and Auditory    Parkinson's disease (HCC)       Genitourinary    CKD (chronic kidney disease) stage 2, GFR 60-89 ml/min       Other    Shortness of breath - Primary            assessment  1  Dyspnea  2  Orthostatic hypotension  3  Diaphragm dysfunction  4  Parkinson's disease  5  Chronic kidney disease  6  Obstructive sleep apnea  7  Gastroesophageal reflux disease    Plan  At This time he complains of increasing shortness of breath  He does have Parkinson's disease and has been noted to have diaphragm dysfunction as well  I suspect this is more likely due to this however a echocardiogram has already been ordered  I Think if his repeat echocardiogram shows no significant change from last year, which I suspect it will not, I would not do any further cardiac workup for his shortness of breath  I think his symptoms are more likely neurologic in nature and secondary to his diaphragm dysfunction and/or underlying neurologic issues  It has been noted that multiple system atrophy is not been ruled out yet  The symptoms could also certainly be secondary to his known orthostatic hypotension      After he gets the echocardiogram performed I will follow-up with him with the results and make further recommendations at that time    ------------------------------------------------------------    Reason for visit is shortness of breath    Encounter provider Katelyn Dunham DO    Provider located at [address]  [address] [place] [address]      Recent Visits  Date Type Provider Dept   03/24/20 Telephone [phone] [facility] recent visits within past 7 days and meeting all other requirements     Future Appointments  No visits were found meeting these conditions  Showing future appointments within next 150 days and meeting all other requirements        After connecting through [employer], the patient was identified by name and date of birth  Momo Jesus was informed that this is a telemedicine visit and that the visit is being conducted through telephone which may not be secure and therefore, might not be HIPAA-compliant  My office door was closed  No one else was in the room  He acknowledged consent and understanding of privacy and security of the video platform  The patient has agreed to participate and understands they can discontinue the visit at any time  Subjective  Momo Jesus is a 60 y o  male with idiopathic parkinsonism, multi-system atrophy is not quite been ruled out yet, obstructive sleep apnea, testicular cancer in the past, bilateral inguinal hernia repair, nephrolithiasis, BPH, neurogenic bladder, orthostatic hypotension chronic kidney disease  I spoke to him today via telephone for a telephone visit in the middle of the corona virus outbreak  He states he has been feeling more short of breath recently  He saw his primary care doctor who would order repeat echocardiogram   Last year his echocardiogram was essentially normal, and he had a normal stress test, with no ischemia  His EKG, I reviewed today, is normal as well  He denies any symptoms of chest pain syncope diaphoresis or paroxysmal nocturnal dyspnea  He has no known history of coronary artery disease  He can check his blood pressure at home his blood pressure has been stable, although he is on Florinef to keep his blood pressure adequate        Past Medical History:   Diagnosis Date    Back pain     Bladder infection     BPH (benign prostatic hyperplasia)     Cancer [facility])     testicular 1988    Chronic kidney disease     Diverticulosis     GERD (gastroesophageal reflux disease)     occassional    History of testicular cancer 1988    Surgery and radiation; left side    Kidney stones     Currently and in the past    Orthostatic hypotension     Orthostatic hypotension due to Parkinson's disease ([address]     Parkinson's disease ([facility] [address] )     Wears glasses        Past Surgical History:   Procedure Laterality Date    COLONOSCOPY  2017   [address] inguinal hernia repair    IR SUPRAPUBIC TUBE  2/24/2020    IR TUBE UPSIZE  3/23/2020    KIDNEY STONE SURGERY      LITHOTRIPSY      Renal; Resolved: 2/27/07    ORCHIECTOMY Left     [place] CYSTOURETHRO W/IMPLANT N/A 5/17/2018    Procedure: CYSTOSCOPY WITH INSERTION UROLIFT;  Surgeon: Catrina Soto DO;  Location: [facility];  Service: Urology    PROSTATE SURGERY N/A 1/18/2018    Procedure: CYSTOSCOPY WITH INSERTION UROLIFT;  Surgeon: Catrina Soto DO;  Location: [facility];  Service: Urology   [address]    For cancer    TONSILLECTOMY      URETERONEOCYSTOSTOMY      w/ cystoscopy Ureteral Stent Placement; Resolved: 6/14/2007    WISDOM TOOTH EXTRACTION         Current Outpatient Medications   Medication Sig Dispense Refill    carbidopa-levodopa (SINEMET)  mg per tablet Follow clinic instruction on titrating this up to 1 tablet three times a day, as tolerated and to level of benefit  Call office with questions, or after reaching Week 5  90 tablet 3    cholecalciferol (VITAMIN D3) 1,000 units tablet Take 2,000 Units by mouth daily      Melatonin 5 MG TABS Take 10 mg by mouth       midodrine (PROAMATINE) 5 mg tablet Take 1 tablet (5 mg total) by mouth daily Follow clinic instructions on titration dose to max 1 tab TID, as tolerated and to benefit   30 tablet 3    multivitamin (THERAGRAN) TABS Take 1 tablet by mouth daily      omeprazole (PriLOSEC) 20 mg delayed release capsule Take 1 capsule (20 mg total) by mouth daily before breakfast 90 capsule 1    vitamin E 100 UNIT capsule Take 100 Units by mouth daily      Wheat Dextrin (BENEFIBER DRINK MIX PO)        No current facility-administered medications for this visit  No Known Allergies    Review of Systems   Constitutional: Negative for chills and fever  HENT: Negative for facial swelling and sore throat  Eyes: Negative for visual disturbance  Respiratory: Positive for shortness of breath  Negative for cough, chest tightness and wheezing  Cardiovascular: Negative for chest pain, palpitations and leg swelling  Gastrointestinal: Negative for abdominal pain, blood in stool, constipation, diarrhea, nausea and vomiting  Endocrine: Negative for cold intolerance and heat intolerance  Genitourinary: Negative for decreased urine volume, difficulty urinating, dysuria and hematuria  Musculoskeletal: Negative for arthralgias, back pain and myalgias  Skin: Negative for rash  Neurological: Negative for dizziness, syncope, weakness and numbness  Psychiatric/Behavioral: Negative for agitation, behavioral problems and confusion  The patient is not nervous/anxious  I spent 19 minutes with the patient during this visit

## 2020-03-31 ENCOUNTER — APPOINTMENT (OUTPATIENT)
Dept: LAB | Facility: MEDICAL CENTER | Age: 60
End: 2020-03-31
Payer: COMMERCIAL

## 2020-03-31 DIAGNOSIS — N18.2 CKD (CHRONIC KIDNEY DISEASE) STAGE 2, GFR 60-89 ML/MIN: ICD-10-CM

## 2020-03-31 DIAGNOSIS — R80.9 PROTEINURIA, UNSPECIFIED TYPE: ICD-10-CM

## 2020-03-31 LAB
ANION GAP SERPL CALCULATED.3IONS-SCNC: 3 MMOL/L (ref 4–13)
BUN SERPL-MCNC: 22 MG/DL (ref 5–25)
CALCIUM SERPL-MCNC: 9.5 MG/DL (ref 8.3–10.1)
CHLORIDE SERPL-SCNC: 105 MMOL/L (ref 100–108)
CO2 SERPL-SCNC: 28 MMOL/L (ref 21–32)
CREAT SERPL-MCNC: 1.25 MG/DL (ref 0.6–1.3)
CREAT UR-MCNC: 214 MG/DL
GFR SERPL CREATININE-BSD FRML MDRD: 62 ML/MIN/1.73SQ M
GLUCOSE P FAST SERPL-MCNC: 104 MG/DL (ref 65–99)
POTASSIUM SERPL-SCNC: 4.5 MMOL/L (ref 3.5–5.3)
PROT UR-MCNC: 41 MG/DL
PROT/CREAT UR: 0.19 MG/G{CREAT} (ref 0–0.1)
SODIUM SERPL-SCNC: 136 MMOL/L (ref 136–145)

## 2020-03-31 PROCEDURE — 82570 ASSAY OF URINE CREATININE: CPT

## 2020-03-31 PROCEDURE — 36415 COLL VENOUS BLD VENIPUNCTURE: CPT

## 2020-03-31 PROCEDURE — 84156 ASSAY OF PROTEIN URINE: CPT

## 2020-03-31 PROCEDURE — 80048 BASIC METABOLIC PNL TOTAL CA: CPT

## 2020-04-07 ENCOUNTER — HOSPITAL ENCOUNTER (OUTPATIENT)
Dept: NON INVASIVE DIAGNOSTICS | Facility: CLINIC | Age: 60
Discharge: HOME | End: 2020-04-07
Payer: COMMERCIAL

## 2020-04-07 DIAGNOSIS — R06.02 SHORTNESS OF BREATH: ICD-10-CM

## 2020-04-07 PROCEDURE — 93306 TTE W/DOPPLER COMPLETE: CPT

## 2020-04-07 PROCEDURE — 93306 TTE W/DOPPLER COMPLETE: CPT | Performed by: INTERNAL MEDICINE

## 2020-04-24 ENCOUNTER — OFFICE VISIT (OUTPATIENT)
Dept: FAMILY MEDICINE CLINIC | Facility: CLINIC | Age: 60
End: 2020-04-24
Payer: COMMERCIAL

## 2020-04-24 VITALS
HEIGHT: 72 IN | WEIGHT: 235 LBS | SYSTOLIC BLOOD PRESSURE: 144 MMHG | TEMPERATURE: 97.3 F | BODY MASS INDEX: 31.83 KG/M2 | DIASTOLIC BLOOD PRESSURE: 88 MMHG

## 2020-04-24 DIAGNOSIS — M75.41 CORACOID IMPINGEMENT OF RIGHT SHOULDER: Primary | ICD-10-CM

## 2020-04-24 DIAGNOSIS — R20.0 NUMBNESS AND TINGLING: ICD-10-CM

## 2020-04-24 DIAGNOSIS — R20.2 NUMBNESS AND TINGLING: ICD-10-CM

## 2020-04-24 DIAGNOSIS — M23.52: ICD-10-CM

## 2020-04-24 PROCEDURE — 99214 OFFICE O/P EST MOD 30 MIN: CPT | Performed by: FAMILY MEDICINE

## 2020-04-24 PROCEDURE — 3079F DIAST BP 80-89 MM HG: CPT | Performed by: FAMILY MEDICINE

## 2020-04-24 PROCEDURE — 3008F BODY MASS INDEX DOCD: CPT | Performed by: FAMILY MEDICINE

## 2020-04-24 PROCEDURE — 3077F SYST BP >= 140 MM HG: CPT | Performed by: FAMILY MEDICINE

## 2020-04-24 PROCEDURE — 1036F TOBACCO NON-USER: CPT | Performed by: FAMILY MEDICINE

## 2020-04-24 RX ORDER — DICLOFENAC SODIUM 75 MG/1
75 TABLET, DELAYED RELEASE ORAL 2 TIMES DAILY
Qty: 60 TABLET | Refills: 2 | Status: SHIPPED | OUTPATIENT
Start: 2020-04-24 | End: 2020-06-23 | Stop reason: HOSPADM

## 2020-04-30 ENCOUNTER — EVALUATION (OUTPATIENT)
Dept: PHYSICAL THERAPY | Facility: CLINIC | Age: 60
End: 2020-04-30
Payer: COMMERCIAL

## 2020-04-30 DIAGNOSIS — M75.41 CORACOID IMPINGEMENT OF RIGHT SHOULDER: ICD-10-CM

## 2020-04-30 DIAGNOSIS — M23.52: ICD-10-CM

## 2020-04-30 PROCEDURE — 97110 THERAPEUTIC EXERCISES: CPT | Performed by: PHYSICAL THERAPIST

## 2020-04-30 PROCEDURE — 97112 NEUROMUSCULAR REEDUCATION: CPT | Performed by: PHYSICAL THERAPIST

## 2020-04-30 PROCEDURE — 97163 PT EVAL HIGH COMPLEX 45 MIN: CPT | Performed by: PHYSICAL THERAPIST

## 2020-05-05 ENCOUNTER — OFFICE VISIT (OUTPATIENT)
Dept: PHYSICAL THERAPY | Facility: CLINIC | Age: 60
End: 2020-05-05
Payer: COMMERCIAL

## 2020-05-05 DIAGNOSIS — M75.41 CORACOID IMPINGEMENT OF RIGHT SHOULDER: Primary | ICD-10-CM

## 2020-05-05 DIAGNOSIS — M23.52: ICD-10-CM

## 2020-05-05 PROCEDURE — 97112 NEUROMUSCULAR REEDUCATION: CPT | Performed by: PHYSICAL THERAPIST

## 2020-05-05 PROCEDURE — 97140 MANUAL THERAPY 1/> REGIONS: CPT | Performed by: PHYSICAL THERAPIST

## 2020-05-05 PROCEDURE — 97110 THERAPEUTIC EXERCISES: CPT | Performed by: PHYSICAL THERAPIST

## 2020-05-07 ENCOUNTER — TELEPHONE (OUTPATIENT)
Dept: NEUROLOGY | Facility: CLINIC | Age: 60
End: 2020-05-07

## 2020-05-07 ENCOUNTER — APPOINTMENT (OUTPATIENT)
Dept: PHYSICAL THERAPY | Facility: CLINIC | Age: 60
End: 2020-05-07
Payer: COMMERCIAL

## 2020-05-12 ENCOUNTER — OFFICE VISIT (OUTPATIENT)
Dept: PHYSICAL THERAPY | Facility: CLINIC | Age: 60
End: 2020-05-12
Payer: COMMERCIAL

## 2020-05-12 DIAGNOSIS — M23.52: ICD-10-CM

## 2020-05-12 DIAGNOSIS — M75.41 CORACOID IMPINGEMENT OF RIGHT SHOULDER: Primary | ICD-10-CM

## 2020-05-12 PROCEDURE — 97112 NEUROMUSCULAR REEDUCATION: CPT | Performed by: PHYSICAL THERAPIST

## 2020-05-12 PROCEDURE — 97140 MANUAL THERAPY 1/> REGIONS: CPT | Performed by: PHYSICAL THERAPIST

## 2020-05-12 PROCEDURE — 97110 THERAPEUTIC EXERCISES: CPT | Performed by: PHYSICAL THERAPIST

## 2020-05-13 RX ORDER — CIPROFLOXACIN 500 MG/1
500 TABLET, FILM COATED ORAL 2 TIMES DAILY
COMMUNITY
Start: 2020-04-29 | End: 2020-05-15 | Stop reason: ALTCHOICE

## 2020-05-14 ENCOUNTER — OFFICE VISIT (OUTPATIENT)
Dept: PHYSICAL THERAPY | Facility: CLINIC | Age: 60
End: 2020-05-14
Payer: COMMERCIAL

## 2020-05-14 DIAGNOSIS — M75.41 CORACOID IMPINGEMENT OF RIGHT SHOULDER: Primary | ICD-10-CM

## 2020-05-14 DIAGNOSIS — M23.52: ICD-10-CM

## 2020-05-14 PROCEDURE — 97112 NEUROMUSCULAR REEDUCATION: CPT | Performed by: PHYSICAL THERAPIST

## 2020-05-14 PROCEDURE — 97140 MANUAL THERAPY 1/> REGIONS: CPT | Performed by: PHYSICAL THERAPIST

## 2020-05-14 PROCEDURE — 97110 THERAPEUTIC EXERCISES: CPT | Performed by: PHYSICAL THERAPIST

## 2020-05-15 ENCOUNTER — TELEMEDICINE (OUTPATIENT)
Dept: PULMONOLOGY | Facility: CLINIC | Age: 60
End: 2020-05-15
Payer: COMMERCIAL

## 2020-05-15 VITALS
TEMPERATURE: 96.5 F | HEIGHT: 72 IN | WEIGHT: 230 LBS | BODY MASS INDEX: 31.15 KG/M2 | OXYGEN SATURATION: 94 % | HEART RATE: 85 BPM | DIASTOLIC BLOOD PRESSURE: 55 MMHG | SYSTOLIC BLOOD PRESSURE: 82 MMHG

## 2020-05-15 DIAGNOSIS — G47.33 OSA (OBSTRUCTIVE SLEEP APNEA): Primary | ICD-10-CM

## 2020-05-15 DIAGNOSIS — R06.02 SHORTNESS OF BREATH: ICD-10-CM

## 2020-05-15 PROCEDURE — 99214 OFFICE O/P EST MOD 30 MIN: CPT | Performed by: PHYSICIAN ASSISTANT

## 2020-05-19 ENCOUNTER — OFFICE VISIT (OUTPATIENT)
Dept: PHYSICAL THERAPY | Facility: CLINIC | Age: 60
End: 2020-05-19
Payer: COMMERCIAL

## 2020-05-19 ENCOUNTER — APPOINTMENT (OUTPATIENT)
Dept: PHYSICAL THERAPY | Facility: CLINIC | Age: 60
End: 2020-05-19
Payer: COMMERCIAL

## 2020-05-19 DIAGNOSIS — M23.52: ICD-10-CM

## 2020-05-19 DIAGNOSIS — M75.41 CORACOID IMPINGEMENT OF RIGHT SHOULDER: Primary | ICD-10-CM

## 2020-05-19 PROCEDURE — 97110 THERAPEUTIC EXERCISES: CPT | Performed by: PHYSICAL THERAPIST

## 2020-05-19 PROCEDURE — 97112 NEUROMUSCULAR REEDUCATION: CPT | Performed by: PHYSICAL THERAPIST

## 2020-05-19 PROCEDURE — 97140 MANUAL THERAPY 1/> REGIONS: CPT | Performed by: PHYSICAL THERAPIST

## 2020-05-20 ENCOUNTER — PATIENT MESSAGE (OUTPATIENT)
Dept: NEUROLOGY | Facility: CLINIC | Age: 60
End: 2020-05-20

## 2020-05-21 ENCOUNTER — TELEPHONE (OUTPATIENT)
Dept: NEUROLOGY | Facility: CLINIC | Age: 60
End: 2020-05-21

## 2020-05-21 ENCOUNTER — APPOINTMENT (OUTPATIENT)
Dept: PHYSICAL THERAPY | Facility: CLINIC | Age: 60
End: 2020-05-21
Payer: COMMERCIAL

## 2020-05-21 ENCOUNTER — OFFICE VISIT (OUTPATIENT)
Dept: PHYSICAL THERAPY | Facility: CLINIC | Age: 60
End: 2020-05-21
Payer: COMMERCIAL

## 2020-05-21 DIAGNOSIS — M23.52: ICD-10-CM

## 2020-05-21 DIAGNOSIS — M75.41 CORACOID IMPINGEMENT OF RIGHT SHOULDER: Primary | ICD-10-CM

## 2020-05-21 PROCEDURE — 97110 THERAPEUTIC EXERCISES: CPT | Performed by: PHYSICAL THERAPIST

## 2020-05-21 PROCEDURE — 97112 NEUROMUSCULAR REEDUCATION: CPT | Performed by: PHYSICAL THERAPIST

## 2020-05-21 PROCEDURE — 97140 MANUAL THERAPY 1/> REGIONS: CPT | Performed by: PHYSICAL THERAPIST

## 2020-05-25 ENCOUNTER — PATIENT MESSAGE (OUTPATIENT)
Dept: NEUROLOGY | Facility: CLINIC | Age: 60
End: 2020-05-25

## 2020-05-25 DIAGNOSIS — G90.3 NEUROGENIC ORTHOSTATIC HYPOTENSION (HCC): Primary | ICD-10-CM

## 2020-05-25 DIAGNOSIS — G20 PARKINSON'S DISEASE (HCC): ICD-10-CM

## 2020-05-26 ENCOUNTER — OFFICE VISIT (OUTPATIENT)
Dept: PHYSICAL THERAPY | Facility: CLINIC | Age: 60
End: 2020-05-26
Payer: COMMERCIAL

## 2020-05-26 ENCOUNTER — HOSPITAL ENCOUNTER (OUTPATIENT)
Dept: MRI IMAGING | Facility: HOSPITAL | Age: 60
Discharge: HOME | End: 2020-05-26
Payer: COMMERCIAL

## 2020-05-26 DIAGNOSIS — M23.52: ICD-10-CM

## 2020-05-26 DIAGNOSIS — M75.41 CORACOID IMPINGEMENT OF RIGHT SHOULDER: Primary | ICD-10-CM

## 2020-05-26 PROCEDURE — 73721 MRI JNT OF LWR EXTRE W/O DYE: CPT

## 2020-05-26 PROCEDURE — 97140 MANUAL THERAPY 1/> REGIONS: CPT | Performed by: PHYSICAL THERAPIST

## 2020-05-26 PROCEDURE — 97110 THERAPEUTIC EXERCISES: CPT | Performed by: PHYSICAL THERAPIST

## 2020-05-26 PROCEDURE — 97112 NEUROMUSCULAR REEDUCATION: CPT | Performed by: PHYSICAL THERAPIST

## 2020-05-26 RX ORDER — PRAMIPEXOLE DIHYDROCHLORIDE 0.12 MG/1
TABLET ORAL
Qty: 360 TABLET | Refills: 1 | Status: SHIPPED | OUTPATIENT
Start: 2020-05-26 | End: 2020-07-21

## 2020-05-28 ENCOUNTER — OFFICE VISIT (OUTPATIENT)
Dept: PHYSICAL THERAPY | Facility: CLINIC | Age: 60
End: 2020-05-28
Payer: COMMERCIAL

## 2020-05-28 DIAGNOSIS — M75.41 CORACOID IMPINGEMENT OF RIGHT SHOULDER: Primary | ICD-10-CM

## 2020-05-28 DIAGNOSIS — M23.52: ICD-10-CM

## 2020-05-28 PROCEDURE — 97140 MANUAL THERAPY 1/> REGIONS: CPT | Performed by: PHYSICAL THERAPIST

## 2020-05-28 PROCEDURE — 97110 THERAPEUTIC EXERCISES: CPT | Performed by: PHYSICAL THERAPIST

## 2020-05-28 PROCEDURE — 97112 NEUROMUSCULAR REEDUCATION: CPT | Performed by: PHYSICAL THERAPIST

## 2020-06-02 ENCOUNTER — TELEPHONE (OUTPATIENT)
Dept: NEPHROLOGY | Facility: CLINIC | Age: 60
End: 2020-06-02

## 2020-06-02 ENCOUNTER — OFFICE VISIT (OUTPATIENT)
Dept: PHYSICAL THERAPY | Facility: CLINIC | Age: 60
End: 2020-06-02
Payer: COMMERCIAL

## 2020-06-02 DIAGNOSIS — M75.41 CORACOID IMPINGEMENT OF RIGHT SHOULDER: Primary | ICD-10-CM

## 2020-06-02 DIAGNOSIS — M23.52: ICD-10-CM

## 2020-06-02 PROCEDURE — 97140 MANUAL THERAPY 1/> REGIONS: CPT | Performed by: PHYSICAL THERAPIST

## 2020-06-02 PROCEDURE — 97110 THERAPEUTIC EXERCISES: CPT | Performed by: PHYSICAL THERAPIST

## 2020-06-02 PROCEDURE — 97112 NEUROMUSCULAR REEDUCATION: CPT | Performed by: PHYSICAL THERAPIST

## 2020-06-04 ENCOUNTER — OFFICE VISIT (OUTPATIENT)
Dept: PHYSICAL THERAPY | Facility: CLINIC | Age: 60
End: 2020-06-04
Payer: COMMERCIAL

## 2020-06-04 DIAGNOSIS — M75.41 CORACOID IMPINGEMENT OF RIGHT SHOULDER: Primary | ICD-10-CM

## 2020-06-04 DIAGNOSIS — M23.52: ICD-10-CM

## 2020-06-04 PROCEDURE — 97140 MANUAL THERAPY 1/> REGIONS: CPT | Performed by: PHYSICAL THERAPIST

## 2020-06-04 PROCEDURE — 97110 THERAPEUTIC EXERCISES: CPT | Performed by: PHYSICAL THERAPIST

## 2020-06-04 PROCEDURE — 97112 NEUROMUSCULAR REEDUCATION: CPT | Performed by: PHYSICAL THERAPIST

## 2020-06-05 ENCOUNTER — OFFICE VISIT (OUTPATIENT)
Dept: OBGYN CLINIC | Facility: MEDICAL CENTER | Age: 60
End: 2020-06-05
Payer: COMMERCIAL

## 2020-06-05 VITALS
DIASTOLIC BLOOD PRESSURE: 78 MMHG | RESPIRATION RATE: 18 BRPM | WEIGHT: 226 LBS | TEMPERATURE: 98.7 F | BODY MASS INDEX: 30.61 KG/M2 | HEIGHT: 72 IN | HEART RATE: 68 BPM | SYSTOLIC BLOOD PRESSURE: 135 MMHG

## 2020-06-05 DIAGNOSIS — M17.12 PATELLOFEMORAL ARTHRITIS OF LEFT KNEE: Primary | ICD-10-CM

## 2020-06-05 PROCEDURE — 99243 OFF/OP CNSLTJ NEW/EST LOW 30: CPT | Performed by: ORTHOPAEDIC SURGERY

## 2020-06-05 PROCEDURE — 1036F TOBACCO NON-USER: CPT | Performed by: ORTHOPAEDIC SURGERY

## 2020-06-05 PROCEDURE — 3078F DIAST BP <80 MM HG: CPT | Performed by: ORTHOPAEDIC SURGERY

## 2020-06-05 PROCEDURE — 3075F SYST BP GE 130 - 139MM HG: CPT | Performed by: ORTHOPAEDIC SURGERY

## 2020-06-05 PROCEDURE — 3008F BODY MASS INDEX DOCD: CPT | Performed by: ORTHOPAEDIC SURGERY

## 2020-06-09 ENCOUNTER — TELEMEDICINE (OUTPATIENT)
Dept: NEPHROLOGY | Facility: CLINIC | Age: 60
End: 2020-06-09
Payer: COMMERCIAL

## 2020-06-09 ENCOUNTER — OFFICE VISIT (OUTPATIENT)
Dept: PHYSICAL THERAPY | Facility: CLINIC | Age: 60
End: 2020-06-09
Payer: COMMERCIAL

## 2020-06-09 VITALS
HEIGHT: 72 IN | DIASTOLIC BLOOD PRESSURE: 74 MMHG | SYSTOLIC BLOOD PRESSURE: 118 MMHG | WEIGHT: 228 LBS | HEART RATE: 68 BPM | TEMPERATURE: 96 F | BODY MASS INDEX: 30.88 KG/M2 | RESPIRATION RATE: 12 BRPM

## 2020-06-09 DIAGNOSIS — G90.3 NEUROGENIC ORTHOSTATIC HYPOTENSION (HCC): ICD-10-CM

## 2020-06-09 DIAGNOSIS — R80.9 PROTEINURIA, UNSPECIFIED TYPE: ICD-10-CM

## 2020-06-09 DIAGNOSIS — N18.2 CKD (CHRONIC KIDNEY DISEASE) STAGE 2, GFR 60-89 ML/MIN: Primary | ICD-10-CM

## 2020-06-09 DIAGNOSIS — K21.00 GASTROESOPHAGEAL REFLUX DISEASE WITH ESOPHAGITIS: ICD-10-CM

## 2020-06-09 DIAGNOSIS — N20.0 NEPHROLITHIASIS: ICD-10-CM

## 2020-06-09 DIAGNOSIS — M75.41 CORACOID IMPINGEMENT OF RIGHT SHOULDER: Primary | ICD-10-CM

## 2020-06-09 DIAGNOSIS — R33.9 URINARY RETENTION: ICD-10-CM

## 2020-06-09 DIAGNOSIS — R49.9 CHANGE IN VOICE: ICD-10-CM

## 2020-06-09 DIAGNOSIS — M23.52: ICD-10-CM

## 2020-06-09 PROCEDURE — 97112 NEUROMUSCULAR REEDUCATION: CPT | Performed by: PHYSICAL THERAPIST

## 2020-06-09 PROCEDURE — 99214 OFFICE O/P EST MOD 30 MIN: CPT | Performed by: INTERNAL MEDICINE

## 2020-06-09 PROCEDURE — 97110 THERAPEUTIC EXERCISES: CPT | Performed by: PHYSICAL THERAPIST

## 2020-06-09 PROCEDURE — 97140 MANUAL THERAPY 1/> REGIONS: CPT | Performed by: PHYSICAL THERAPIST

## 2020-06-09 RX ORDER — OMEPRAZOLE 20 MG/1
CAPSULE, DELAYED RELEASE ORAL
Qty: 30 CAPSULE | Refills: 5 | Status: SHIPPED | OUTPATIENT
Start: 2020-06-09 | End: 2020-06-24 | Stop reason: SDUPTHER

## 2020-06-11 ENCOUNTER — OFFICE VISIT (OUTPATIENT)
Dept: PHYSICAL THERAPY | Facility: CLINIC | Age: 60
End: 2020-06-11
Payer: COMMERCIAL

## 2020-06-11 ENCOUNTER — APPOINTMENT (OUTPATIENT)
Dept: LAB | Facility: MEDICAL CENTER | Age: 60
End: 2020-06-11
Payer: COMMERCIAL

## 2020-06-11 DIAGNOSIS — R80.9 PROTEINURIA, UNSPECIFIED TYPE: ICD-10-CM

## 2020-06-11 DIAGNOSIS — N18.2 CKD (CHRONIC KIDNEY DISEASE) STAGE 2, GFR 60-89 ML/MIN: ICD-10-CM

## 2020-06-11 DIAGNOSIS — M23.52: ICD-10-CM

## 2020-06-11 DIAGNOSIS — M75.41 CORACOID IMPINGEMENT OF RIGHT SHOULDER: Primary | ICD-10-CM

## 2020-06-11 LAB
ANION GAP SERPL CALCULATED.3IONS-SCNC: 7 MMOL/L (ref 4–13)
BACTERIA UR QL AUTO: ABNORMAL /HPF
BILIRUB UR QL STRIP: NEGATIVE
BUN SERPL-MCNC: 15 MG/DL (ref 5–25)
CALCIUM SERPL-MCNC: 9.4 MG/DL (ref 8.3–10.1)
CHLORIDE SERPL-SCNC: 106 MMOL/L (ref 100–108)
CLARITY UR: ABNORMAL
CO2 SERPL-SCNC: 26 MMOL/L (ref 21–32)
COLOR UR: YELLOW
CREAT SERPL-MCNC: 1.05 MG/DL (ref 0.6–1.3)
CREAT UR-MCNC: 55.8 MG/DL
GFR SERPL CREATININE-BSD FRML MDRD: 76 ML/MIN/1.73SQ M
GLUCOSE P FAST SERPL-MCNC: 97 MG/DL (ref 65–99)
GLUCOSE UR STRIP-MCNC: NEGATIVE MG/DL
HGB UR QL STRIP.AUTO: ABNORMAL
KETONES UR STRIP-MCNC: NEGATIVE MG/DL
LEUKOCYTE ESTERASE UR QL STRIP: ABNORMAL
NITRITE UR QL STRIP: POSITIVE
NON-SQ EPI CELLS URNS QL MICRO: ABNORMAL /HPF
PH UR STRIP.AUTO: 6.5 [PH]
POTASSIUM SERPL-SCNC: 4.3 MMOL/L (ref 3.5–5.3)
PROT UR STRIP-MCNC: NEGATIVE MG/DL
PROT UR-MCNC: 9 MG/DL
PROT/CREAT UR: 0.16 MG/G{CREAT} (ref 0–0.1)
RBC #/AREA URNS AUTO: ABNORMAL /HPF
SODIUM SERPL-SCNC: 139 MMOL/L (ref 136–145)
SP GR UR STRIP.AUTO: 1.01 (ref 1–1.03)
UROBILINOGEN UR QL STRIP.AUTO: 0.2 E.U./DL
WBC #/AREA URNS AUTO: ABNORMAL /HPF

## 2020-06-11 PROCEDURE — 36415 COLL VENOUS BLD VENIPUNCTURE: CPT

## 2020-06-11 PROCEDURE — 97112 NEUROMUSCULAR REEDUCATION: CPT | Performed by: PHYSICAL THERAPIST

## 2020-06-11 PROCEDURE — 97140 MANUAL THERAPY 1/> REGIONS: CPT | Performed by: PHYSICAL THERAPIST

## 2020-06-11 PROCEDURE — 81001 URINALYSIS AUTO W/SCOPE: CPT

## 2020-06-11 PROCEDURE — 97110 THERAPEUTIC EXERCISES: CPT | Performed by: PHYSICAL THERAPIST

## 2020-06-11 PROCEDURE — 82570 ASSAY OF URINE CREATININE: CPT

## 2020-06-11 PROCEDURE — 80048 BASIC METABOLIC PNL TOTAL CA: CPT

## 2020-06-11 PROCEDURE — 84156 ASSAY OF PROTEIN URINE: CPT

## 2020-06-12 ENCOUNTER — OFFICE VISIT (OUTPATIENT)
Dept: OBGYN CLINIC | Facility: MEDICAL CENTER | Age: 60
End: 2020-06-12
Payer: COMMERCIAL

## 2020-06-12 VITALS
TEMPERATURE: 99.2 F | HEART RATE: 65 BPM | WEIGHT: 228 LBS | HEIGHT: 72 IN | BODY MASS INDEX: 30.88 KG/M2 | SYSTOLIC BLOOD PRESSURE: 137 MMHG | DIASTOLIC BLOOD PRESSURE: 83 MMHG

## 2020-06-12 DIAGNOSIS — M24.811 INTERNAL DERANGEMENT OF RIGHT SHOULDER: ICD-10-CM

## 2020-06-12 DIAGNOSIS — M25.511 CHRONIC RIGHT SHOULDER PAIN: Primary | ICD-10-CM

## 2020-06-12 DIAGNOSIS — M75.81 TENDINITIS OF RIGHT ROTATOR CUFF: ICD-10-CM

## 2020-06-12 DIAGNOSIS — G89.29 CHRONIC RIGHT SHOULDER PAIN: Primary | ICD-10-CM

## 2020-06-12 PROCEDURE — 3079F DIAST BP 80-89 MM HG: CPT | Performed by: ORTHOPAEDIC SURGERY

## 2020-06-12 PROCEDURE — 3075F SYST BP GE 130 - 139MM HG: CPT | Performed by: ORTHOPAEDIC SURGERY

## 2020-06-12 PROCEDURE — 99213 OFFICE O/P EST LOW 20 MIN: CPT | Performed by: ORTHOPAEDIC SURGERY

## 2020-06-12 PROCEDURE — 1036F TOBACCO NON-USER: CPT | Performed by: ORTHOPAEDIC SURGERY

## 2020-06-12 PROCEDURE — 3008F BODY MASS INDEX DOCD: CPT | Performed by: ORTHOPAEDIC SURGERY

## 2020-06-18 ENCOUNTER — HOSPITAL ENCOUNTER (OUTPATIENT)
Dept: MRI IMAGING | Facility: HOSPITAL | Age: 60
Discharge: HOME | End: 2020-06-18
Attending: ORTHOPAEDIC SURGERY
Payer: COMMERCIAL

## 2020-06-18 DIAGNOSIS — G89.29 CHRONIC RIGHT SHOULDER PAIN: ICD-10-CM

## 2020-06-18 DIAGNOSIS — M25.511 CHRONIC RIGHT SHOULDER PAIN: ICD-10-CM

## 2020-06-18 DIAGNOSIS — M24.811 INTERNAL DERANGEMENT OF RIGHT SHOULDER: ICD-10-CM

## 2020-06-18 DIAGNOSIS — M75.81 TENDINITIS OF RIGHT ROTATOR CUFF: ICD-10-CM

## 2020-06-18 PROCEDURE — 73221 MRI JOINT UPR EXTREM W/O DYE: CPT

## 2020-06-23 ENCOUNTER — OFFICE VISIT (OUTPATIENT)
Dept: OBGYN CLINIC | Facility: MEDICAL CENTER | Age: 60
End: 2020-06-23
Payer: COMMERCIAL

## 2020-06-23 VITALS — HEIGHT: 72 IN | TEMPERATURE: 97.7 F | WEIGHT: 228 LBS | BODY MASS INDEX: 30.88 KG/M2

## 2020-06-23 DIAGNOSIS — M17.12 PATELLOFEMORAL ARTHRITIS OF LEFT KNEE: Primary | ICD-10-CM

## 2020-06-23 PROCEDURE — 3079F DIAST BP 80-89 MM HG: CPT | Performed by: ORTHOPAEDIC SURGERY

## 2020-06-23 PROCEDURE — 99214 OFFICE O/P EST MOD 30 MIN: CPT | Performed by: ORTHOPAEDIC SURGERY

## 2020-06-23 PROCEDURE — 3075F SYST BP GE 130 - 139MM HG: CPT | Performed by: ORTHOPAEDIC SURGERY

## 2020-06-23 PROCEDURE — 3008F BODY MASS INDEX DOCD: CPT | Performed by: ORTHOPAEDIC SURGERY

## 2020-06-23 PROCEDURE — 1036F TOBACCO NON-USER: CPT | Performed by: ORTHOPAEDIC SURGERY

## 2020-06-24 ENCOUNTER — OFFICE VISIT (OUTPATIENT)
Dept: FAMILY MEDICINE CLINIC | Facility: CLINIC | Age: 60
End: 2020-06-24
Payer: COMMERCIAL

## 2020-06-24 ENCOUNTER — OFFICE VISIT (OUTPATIENT)
Dept: NEUROLOGY | Facility: CLINIC | Age: 60
End: 2020-06-24
Payer: COMMERCIAL

## 2020-06-24 VITALS
WEIGHT: 229 LBS | HEIGHT: 72 IN | SYSTOLIC BLOOD PRESSURE: 120 MMHG | BODY MASS INDEX: 31.02 KG/M2 | DIASTOLIC BLOOD PRESSURE: 62 MMHG | HEART RATE: 73 BPM | TEMPERATURE: 98 F | OXYGEN SATURATION: 97 %

## 2020-06-24 VITALS
DIASTOLIC BLOOD PRESSURE: 52 MMHG | BODY MASS INDEX: 30.61 KG/M2 | SYSTOLIC BLOOD PRESSURE: 89 MMHG | HEART RATE: 83 BPM | HEIGHT: 72 IN | WEIGHT: 226 LBS

## 2020-06-24 DIAGNOSIS — R49.9 CHANGE IN VOICE: ICD-10-CM

## 2020-06-24 DIAGNOSIS — L30.9 DERMATITIS: Primary | ICD-10-CM

## 2020-06-24 DIAGNOSIS — G20 PARKINSON'S DISEASE (HCC): Primary | ICD-10-CM

## 2020-06-24 DIAGNOSIS — J98.6 DIAPHRAGM PARALYSIS: ICD-10-CM

## 2020-06-24 DIAGNOSIS — K21.00 GASTROESOPHAGEAL REFLUX DISEASE WITH ESOPHAGITIS: ICD-10-CM

## 2020-06-24 DIAGNOSIS — G90.3 NEUROGENIC ORTHOSTATIC HYPOTENSION (HCC): ICD-10-CM

## 2020-06-24 PROCEDURE — 3078F DIAST BP <80 MM HG: CPT | Performed by: FAMILY MEDICINE

## 2020-06-24 PROCEDURE — 1036F TOBACCO NON-USER: CPT | Performed by: FAMILY MEDICINE

## 2020-06-24 PROCEDURE — 1036F TOBACCO NON-USER: CPT | Performed by: PSYCHIATRY & NEUROLOGY

## 2020-06-24 PROCEDURE — 3074F SYST BP LT 130 MM HG: CPT | Performed by: FAMILY MEDICINE

## 2020-06-24 PROCEDURE — 3074F SYST BP LT 130 MM HG: CPT | Performed by: PSYCHIATRY & NEUROLOGY

## 2020-06-24 PROCEDURE — 3008F BODY MASS INDEX DOCD: CPT | Performed by: PSYCHIATRY & NEUROLOGY

## 2020-06-24 PROCEDURE — 3078F DIAST BP <80 MM HG: CPT | Performed by: PSYCHIATRY & NEUROLOGY

## 2020-06-24 PROCEDURE — 99214 OFFICE O/P EST MOD 30 MIN: CPT | Performed by: FAMILY MEDICINE

## 2020-06-24 PROCEDURE — 99214 OFFICE O/P EST MOD 30 MIN: CPT | Performed by: PSYCHIATRY & NEUROLOGY

## 2020-06-24 PROCEDURE — 3008F BODY MASS INDEX DOCD: CPT | Performed by: FAMILY MEDICINE

## 2020-06-24 RX ORDER — OMEPRAZOLE 20 MG/1
20 CAPSULE, DELAYED RELEASE ORAL 2 TIMES DAILY
Qty: 60 CAPSULE | Refills: 5 | Status: SHIPPED | OUTPATIENT
Start: 2020-06-24 | End: 2020-08-20 | Stop reason: ALTCHOICE

## 2020-07-01 ENCOUNTER — TELEPHONE (OUTPATIENT)
Dept: NEUROLOGY | Facility: CLINIC | Age: 60
End: 2020-07-01

## 2020-07-01 NOTE — TELEPHONE ENCOUNTER
Pt reports that he started the pramipexole on 6/4, states that he has always had dizziness even prior to pramipexole, but as of last night this worsened  States that he was very lightheaded and dizzy and his BP was low at 90/58  States that he fees the same way this morning  Today he was to increase to 4 tablets TID, and he did do this  Please advise  Denies starting any other new medications  Pt #[phone] Ok to leave a detailed message

## 2020-07-01 NOTE — TELEPHONE ENCOUNTER
I see  Then return to his original pramipexole dose then  No further changes at this time  It appears everything tried at this time can trigger dizziness  The alternative would be trying a long-acting dose that would hopefully smooth out the peaks and valleys of the drug effect  He mentioned potentially returning to using selegiline before - it may be considered later if there is no good alternative

## 2020-07-01 NOTE — TELEPHONE ENCOUNTER
Ok  If he had baseline tolerable level of dizziness with 3 tab TID, then suggest 3 5 tab TID (splitting pills) to see if balance can be achieved and see what his limit is

## 2020-07-01 NOTE — TELEPHONE ENCOUNTER
To clarify, last night he was on 3 TID, and he experienced the worsening dizziness last night, then today he increased to 4 TID  So pt just started top experience the worsening last night after being on 3 TID

## 2020-07-21 ENCOUNTER — TELEPHONE (OUTPATIENT)
Dept: FAMILY MEDICINE CLINIC | Facility: CLINIC | Age: 60
End: 2020-07-21

## 2020-07-21 ENCOUNTER — OFFICE VISIT (OUTPATIENT)
Dept: FAMILY MEDICINE CLINIC | Facility: CLINIC | Age: 60
End: 2020-07-21
Payer: COMMERCIAL

## 2020-07-21 VITALS
SYSTOLIC BLOOD PRESSURE: 134 MMHG | TEMPERATURE: 98.1 F | HEART RATE: 76 BPM | DIASTOLIC BLOOD PRESSURE: 78 MMHG | WEIGHT: 229.2 LBS | HEIGHT: 72 IN | OXYGEN SATURATION: 98 % | BODY MASS INDEX: 31.04 KG/M2

## 2020-07-21 DIAGNOSIS — C62.90 PRIMARY MALIGNANT NEOPLASM OF TESTIS, UNSPECIFIED LATERALITY (HCC): ICD-10-CM

## 2020-07-21 DIAGNOSIS — Z01.818 PREOPERATIVE CLEARANCE: Primary | ICD-10-CM

## 2020-07-21 DIAGNOSIS — G20 PARKINSON'S DISEASE (HCC): ICD-10-CM

## 2020-07-21 DIAGNOSIS — N06.0 ISOLATED PROTEINURIA WITH MINOR GLOMERULAR ABNORMALITY: ICD-10-CM

## 2020-07-21 DIAGNOSIS — R33.9 URINARY RETENTION: ICD-10-CM

## 2020-07-21 DIAGNOSIS — G90.3 NEUROGENIC ORTHOSTATIC HYPOTENSION (HCC): ICD-10-CM

## 2020-07-21 DIAGNOSIS — N18.2 CKD (CHRONIC KIDNEY DISEASE) STAGE 2, GFR 60-89 ML/MIN: ICD-10-CM

## 2020-07-21 PROCEDURE — 1036F TOBACCO NON-USER: CPT | Performed by: FAMILY MEDICINE

## 2020-07-21 PROCEDURE — 99214 OFFICE O/P EST MOD 30 MIN: CPT | Performed by: FAMILY MEDICINE

## 2020-07-21 PROCEDURE — 3075F SYST BP GE 130 - 139MM HG: CPT | Performed by: FAMILY MEDICINE

## 2020-07-21 PROCEDURE — 3078F DIAST BP <80 MM HG: CPT | Performed by: FAMILY MEDICINE

## 2020-07-21 PROCEDURE — 93000 ELECTROCARDIOGRAM COMPLETE: CPT | Performed by: FAMILY MEDICINE

## 2020-07-21 PROCEDURE — 3008F BODY MASS INDEX DOCD: CPT | Performed by: FAMILY MEDICINE

## 2020-07-21 RX ORDER — PRAMIPEXOLE DIHYDROCHLORIDE 0.12 MG/1
TABLET ORAL
Qty: 180 TABLET | Refills: 3 | Status: SHIPPED | OUTPATIENT
Start: 2020-07-21 | End: 2020-09-03

## 2020-07-21 NOTE — PROGRESS NOTES
Assessment/Plan:  Patient at low risk for cardiopulmonary event  Okay to proceed if laboratory studies normal   Patient will be going for urine culture also  EKG done at this time which shows normal sinus rhythm with PAC  Diagnoses and all orders for this visit:    Preoperative clearance  -     POCT ECG  -     CBC and differential; Future  -     Comprehensive metabolic panel; Future  -     Urine culture; Future    Urinary retention    Primary malignant neoplasm of testis, unspecified laterality (HCC)    CKD (chronic kidney disease) stage 2, GFR 60-89 ml/min    Isolated proteinuria with minor glomerular abnormality    Parkinson's disease ([address] )    Neurogenic orthostatic hypotension (HCC)            Subjective:        Patient ID: Eduar Roche is a 63 y o  male  Patient is here for preop clearance for robotic bladder neck reconstruction to be done on July 27th at [facility]  This route is requested by Dr Juan Dennison  Patient will need laboratory studies well as EKG at this time  Patient with some shortness of breath only with exertion  This is been worked up with Cardiology  No new chest pain  No syncope  No fever  No bleeding issues at the present time  No abdominal pain  No diarrhea  No cough or fevers or chills  No edema  The following portions of the patient's history were reviewed and updated as appropriate: allergies, current medications, past family history, past medical history, past social history, past surgical history and problem list       Review of Systems   Constitutional: Negative  HENT: Negative  Eyes: Negative  Respiratory: Negative  Cardiovascular: Negative  Gastrointestinal: Negative  Endocrine: Negative  Genitourinary: Positive for difficulty urinating  Musculoskeletal: Negative  Skin: Negative  Allergic/Immunologic: Negative  Neurological: Negative  Hematological: Negative  Psychiatric/Behavioral: Negative  Objective:      BMI Counseling: Body mass index is 31 09 kg/m²  The BMI is above normal  Nutrition recommendations include decreasing portion sizes  Exercise recommendations include moderate physical activity 150 minutes/week  /78 (BP Location: Right arm, Patient Position: Sitting, Cuff Size: Standard)   Pulse 76   Temp 98 1 °F (36 7 °C) (Tympanic)   Ht 6' (1 829 m)   Wt 104 kg (229 lb 3 2 oz)   SpO2 98%   BMI 31 09 kg/m²          Physical Exam   Constitutional: He appears well-developed and well-nourished  No distress  HENT:   Head: Normocephalic  Right Ear: External ear normal    Left Ear: External ear normal    Mouth/Throat: Oropharynx is clear and moist  No oropharyngeal exudate  Eyes: Pupils are equal, round, and reactive to light  EOM are normal  Right eye exhibits no discharge  Left eye exhibits no discharge  No scleral icterus  Neck: Normal range of motion  Neck supple  No thyromegaly present  Cardiovascular: Normal rate, regular rhythm, normal heart sounds and intact distal pulses  Exam reveals no gallop and no friction rub  No murmur heard  Pulmonary/Chest: Effort normal and breath sounds normal  No respiratory distress  He has no wheezes  He has no rales  He exhibits no tenderness  Abdominal: Soft  Bowel sounds are normal  He exhibits no distension  There is no tenderness  There is no rebound and no guarding  Musculoskeletal: Normal range of motion  He exhibits edema  He exhibits no tenderness or deformity  Trace lower extremity edema bilaterally   Lymphadenopathy:     He has no cervical adenopathy  Neurological: He is alert  No cranial nerve deficit  He exhibits normal muscle tone  Coordination normal    Skin: Skin is warm and dry  No rash noted  He is not diaphoretic  No erythema  No pallor  Psychiatric: He has a normal mood and affect  His behavior is normal  Judgment and thought content normal    Nursing note and vitals reviewed

## 2020-07-22 ENCOUNTER — APPOINTMENT (OUTPATIENT)
Dept: LAB | Facility: MEDICAL CENTER | Age: 60
End: 2020-07-22
Payer: COMMERCIAL

## 2020-07-22 DIAGNOSIS — Z01.818 PREOPERATIVE CLEARANCE: ICD-10-CM

## 2020-07-22 LAB
ALBUMIN SERPL BCP-MCNC: 3.9 G/DL (ref 3.5–5)
ALP SERPL-CCNC: 62 U/L (ref 46–116)
ALT SERPL W P-5'-P-CCNC: 22 U/L (ref 12–78)
ANION GAP SERPL CALCULATED.3IONS-SCNC: 6 MMOL/L (ref 4–13)
AST SERPL W P-5'-P-CCNC: 14 U/L (ref 5–45)
BASOPHILS # BLD AUTO: 0.04 THOUSANDS/ΜL (ref 0–0.1)
BASOPHILS NFR BLD AUTO: 1 % (ref 0–1)
BILIRUB SERPL-MCNC: 2.19 MG/DL (ref 0.2–1)
BUN SERPL-MCNC: 15 MG/DL (ref 5–25)
CALCIUM SERPL-MCNC: 9.9 MG/DL (ref 8.3–10.1)
CHLORIDE SERPL-SCNC: 106 MMOL/L (ref 100–108)
CO2 SERPL-SCNC: 27 MMOL/L (ref 21–32)
CREAT SERPL-MCNC: 1.1 MG/DL (ref 0.6–1.3)
EOSINOPHIL # BLD AUTO: 0.12 THOUSAND/ΜL (ref 0–0.61)
EOSINOPHIL NFR BLD AUTO: 2 % (ref 0–6)
ERYTHROCYTE [DISTWIDTH] IN BLOOD BY AUTOMATED COUNT: 13.1 % (ref 11.6–15.1)
GFR SERPL CREATININE-BSD FRML MDRD: 72 ML/MIN/1.73SQ M
GLUCOSE P FAST SERPL-MCNC: 98 MG/DL (ref 65–99)
HCT VFR BLD AUTO: 44.5 % (ref 36.5–49.3)
HGB BLD-MCNC: 14.4 G/DL (ref 12–17)
IMM GRANULOCYTES # BLD AUTO: 0.02 THOUSAND/UL (ref 0–0.2)
IMM GRANULOCYTES NFR BLD AUTO: 0 % (ref 0–2)
LYMPHOCYTES # BLD AUTO: 1.85 THOUSANDS/ΜL (ref 0.6–4.47)
LYMPHOCYTES NFR BLD AUTO: 27 % (ref 14–44)
MCH RBC QN AUTO: 29.8 PG (ref 26.8–34.3)
MCHC RBC AUTO-ENTMCNC: 32.4 G/DL (ref 31.4–37.4)
MCV RBC AUTO: 92 FL (ref 82–98)
MONOCYTES # BLD AUTO: 0.75 THOUSAND/ΜL (ref 0.17–1.22)
MONOCYTES NFR BLD AUTO: 11 % (ref 4–12)
NEUTROPHILS # BLD AUTO: 3.96 THOUSANDS/ΜL (ref 1.85–7.62)
NEUTS SEG NFR BLD AUTO: 59 % (ref 43–75)
NRBC BLD AUTO-RTO: 0 /100 WBCS
PLATELET # BLD AUTO: 230 THOUSANDS/UL (ref 149–390)
PMV BLD AUTO: 11.9 FL (ref 8.9–12.7)
POTASSIUM SERPL-SCNC: 4.3 MMOL/L (ref 3.5–5.3)
PROT SERPL-MCNC: 7.3 G/DL (ref 6.4–8.2)
RBC # BLD AUTO: 4.83 MILLION/UL (ref 3.88–5.62)
SODIUM SERPL-SCNC: 139 MMOL/L (ref 136–145)
WBC # BLD AUTO: 6.74 THOUSAND/UL (ref 4.31–10.16)

## 2020-07-22 PROCEDURE — 85025 COMPLETE CBC W/AUTO DIFF WBC: CPT

## 2020-07-22 PROCEDURE — 80053 COMPREHEN METABOLIC PANEL: CPT

## 2020-07-22 PROCEDURE — 87077 CULTURE AEROBIC IDENTIFY: CPT

## 2020-07-22 PROCEDURE — 87086 URINE CULTURE/COLONY COUNT: CPT

## 2020-07-22 PROCEDURE — 87186 SC STD MICRODIL/AGAR DIL: CPT

## 2020-07-22 PROCEDURE — 87147 CULTURE TYPE IMMUNOLOGIC: CPT

## 2020-07-22 PROCEDURE — 36415 COLL VENOUS BLD VENIPUNCTURE: CPT

## 2020-07-23 DIAGNOSIS — R17 ELEVATED BILIRUBIN: Primary | ICD-10-CM

## 2020-07-23 DIAGNOSIS — N18.2 CKD (CHRONIC KIDNEY DISEASE) STAGE 2, GFR 60-89 ML/MIN: ICD-10-CM

## 2020-07-24 ENCOUNTER — TELEPHONE (OUTPATIENT)
Dept: FAMILY MEDICINE CLINIC | Facility: CLINIC | Age: 60
End: 2020-07-24

## 2020-07-24 DIAGNOSIS — N39.0 URINARY TRACT INFECTION WITHOUT HEMATURIA, SITE UNSPECIFIED: Primary | ICD-10-CM

## 2020-07-24 LAB
BACTERIA UR CULT: ABNORMAL
BACTERIA UR CULT: ABNORMAL

## 2020-07-27 RX ORDER — NITROFURANTOIN 25; 75 MG/1; MG/1
100 CAPSULE ORAL 2 TIMES DAILY
Qty: 20 CAPSULE | Refills: 0 | Status: SHIPPED | OUTPATIENT
Start: 2020-07-27 | End: 2020-08-06

## 2020-08-02 ENCOUNTER — PATIENT MESSAGE (OUTPATIENT)
Dept: NEUROLOGY | Facility: CLINIC | Age: 60
End: 2020-08-02

## 2020-08-03 ENCOUNTER — OFFICE VISIT (OUTPATIENT)
Dept: FAMILY MEDICINE CLINIC | Facility: CLINIC | Age: 60
End: 2020-08-03
Payer: COMMERCIAL

## 2020-08-03 VITALS
SYSTOLIC BLOOD PRESSURE: 138 MMHG | DIASTOLIC BLOOD PRESSURE: 86 MMHG | BODY MASS INDEX: 30.07 KG/M2 | WEIGHT: 222 LBS | HEIGHT: 72 IN | TEMPERATURE: 97.8 F

## 2020-08-03 DIAGNOSIS — R73.9 HYPERGLYCEMIA: ICD-10-CM

## 2020-08-03 DIAGNOSIS — G90.3 NEUROGENIC ORTHOSTATIC HYPOTENSION (HCC): Primary | ICD-10-CM

## 2020-08-03 PROCEDURE — 1036F TOBACCO NON-USER: CPT | Performed by: FAMILY MEDICINE

## 2020-08-03 PROCEDURE — 99213 OFFICE O/P EST LOW 20 MIN: CPT | Performed by: FAMILY MEDICINE

## 2020-08-03 PROCEDURE — 3079F DIAST BP 80-89 MM HG: CPT | Performed by: FAMILY MEDICINE

## 2020-08-03 PROCEDURE — 3075F SYST BP GE 130 - 139MM HG: CPT | Performed by: FAMILY MEDICINE

## 2020-08-03 PROCEDURE — 3008F BODY MASS INDEX DOCD: CPT | Performed by: FAMILY MEDICINE

## 2020-08-03 NOTE — PROGRESS NOTES
Assessment/Plan:  Patient will go for laboratory studies fasting for elevated blood sugar  Patient use midodrine as needed for hypotension patient go for laboratory studies  Patient will follow with Nephrology  Patient will stay well hydrated use salt as directed       Diagnoses and all orders for this visit:    Neurogenic orthostatic hypotension (HCC)  -     CBC and differential; Future  -     Lipid panel; Future  -     TSH, 3rd generation with Free T4 reflex; Future  -     Hemoglobin A1C; Future    Hyperglycemia  -     CBC and differential; Future  -     Lipid panel; Future  -     TSH, 3rd generation with Free T4 reflex; Future  -     Hemoglobin A1C; Future            Subjective:        Patient ID: Doris Hawk is a 63 y o  male  Patient is here with hypotension  Patient status post surgery at [facility]  Glucose was elevated at 136  Patient does see Nephrology  The following portions of the patient's history were reviewed and updated as appropriate: allergies, current medications, past family history, past medical history, past social history, past surgical history and problem list       Review of Systems   Constitutional: Negative  HENT: Negative  Eyes: Negative  Respiratory: Negative  Cardiovascular: Negative  Gastrointestinal: Negative  Endocrine: Negative  Genitourinary: Negative  Musculoskeletal: Negative  Skin: Negative  Allergic/Immunologic: Negative  Neurological: Positive for dizziness  Hematological: Negative  Psychiatric/Behavioral: Negative  Objective:               /86 (BP Location: Right arm, Patient Position: Sitting, Cuff Size: Adult)   Temp 97 8 °F (36 6 °C) (Tympanic)   Ht 6'   Wt 101 kg (222 lb)   BMI 30 11 kg/m²          Physical Exam   Constitutional: He is oriented to person, place, and time  He appears well-developed  No distress  HENT:   Head: Normocephalic     Right Ear: External ear normal    Left Ear: External ear normal    Mouth/Throat: No oropharyngeal exudate  Eyes: Pupils are equal, round, and reactive to light  Right eye exhibits no discharge  Left eye exhibits no discharge  No scleral icterus  Neck: Normal range of motion  Neck supple  No thyromegaly present  Cardiovascular: Normal rate, regular rhythm and normal heart sounds  Exam reveals no gallop and no friction rub  No murmur heard  Pulmonary/Chest: Effort normal and breath sounds normal  No respiratory distress  He has no wheezes  He has no rales  He exhibits no tenderness  Musculoskeletal: Normal range of motion  General: No tenderness  Lymphadenopathy:     He has no cervical adenopathy  Neurological: He is oriented to person, place, and time  No cranial nerve deficit  He exhibits normal muscle tone  Coordination normal    Skin: Skin is warm and dry  No rash noted  He is not diaphoretic  No erythema  No pallor  Psychiatric: His behavior is normal  Judgment and thought content normal    Nursing note and vitals reviewed

## 2020-08-04 ENCOUNTER — PATIENT MESSAGE (OUTPATIENT)
Dept: NEUROLOGY | Facility: CLINIC | Age: 60
End: 2020-08-04

## 2020-08-04 NOTE — TELEPHONE ENCOUNTER
From: Peggy Vasquez  To: Jose Roberto Manznao MD  Sent: 8/2/2020 6:24 PM EDT  Subject: Non-Urgent Paco Flores Smoke had difficulty lately with dizziness/low blood pressure  I almost constantly feel a bit light-headed often with some tinnitus  Last Monday I had surgery on my bladder neck  Before that I was walking half an hour every day and feeling slightly dizzy afterwards  Now I'll walk for 10-15 minutes and feel slightly dizzy  If I don't sit after walking the dizziness might progress to the point where I feel I may faint soon - at which point I immediately sit and/or recline  this situation only occurred once or twice  I'll take 5mg Midodrine as needed and it usually raises my BP enough but the last couple days I've had to take 5mg again a couple hours later  I read that Pramipexole may cause LBP so I stopped taking it for a 2 days - this didn't seem to affect my LBP or my PD symptoms - so I resumed taking it  BP would get as low as 80/50  Occasionally I will have High BP but not nearly as often as LBP  Any thoughts or advise?     Thank you,  Kevin Holloway

## 2020-08-04 NOTE — TELEPHONE ENCOUNTER
From: Johnathon Sprague  To: Дмитрий Viramontes MD  Sent: 8/4/2020 8:52 AM EDT  Subject: Non-Urgent Lazaro Adamaanderson Baptiste,   Thank you for responding  I will continue using Midodrine to correct my LBP  I read on the [facility] website that Pramipexole lowers BP  Do you agree with this? Should my meds be adjusted? I'm currently taking three 0 125mg tablets three times a day      Thank you,  Rahel Mariscal
No

## 2020-08-12 ENCOUNTER — PATIENT MESSAGE (OUTPATIENT)
Dept: NEUROLOGY | Facility: CLINIC | Age: 60
End: 2020-08-12

## 2020-08-12 NOTE — TELEPHONE ENCOUNTER
From: Nano Clark  To: Radha Jaramillo MD  Sent: 8/12/2020 2:15 PM EDT  Subject: Non-Urgent Medical Question    Good afternoon Dr Sydney Braga recall that I was to stop taking Premipexole for a week and see what effect this had on my low blood pressure  It's been a week and overall, my frequent low blood pressure may have gotten a bit worse  My PD symptoms are only slightly worse except for the return of leg twitches - which were rare while taking Pramipexole  I had a Video Visit this morning with Dr Jes Collazo at [facility]  I told him of this experiment and mentioned that while taking Selegeline in the past my BP tended to be high  And that you and I spoke about trying Selegeline again  He feels this is worth trying and sent in a prescription for it  He suggested I use it for two weeks  I will visit you on Aug 27 when we can discuss results  What are your thoughts on this matter?      Thank you,  Kamilah Grullon

## 2020-08-18 ENCOUNTER — APPOINTMENT (OUTPATIENT)
Dept: LAB | Facility: MEDICAL CENTER | Age: 60
End: 2020-08-18
Payer: COMMERCIAL

## 2020-08-18 DIAGNOSIS — R17 ELEVATED BILIRUBIN: ICD-10-CM

## 2020-08-18 DIAGNOSIS — N18.2 CKD (CHRONIC KIDNEY DISEASE) STAGE 2, GFR 60-89 ML/MIN: ICD-10-CM

## 2020-08-18 DIAGNOSIS — G90.3 NEUROGENIC ORTHOSTATIC HYPOTENSION (HCC): ICD-10-CM

## 2020-08-18 DIAGNOSIS — R73.9 HYPERGLYCEMIA: ICD-10-CM

## 2020-08-18 LAB
ALBUMIN SERPL BCP-MCNC: 4 G/DL (ref 3.5–5)
ALP SERPL-CCNC: 60 U/L (ref 46–116)
ALT SERPL W P-5'-P-CCNC: 20 U/L (ref 12–78)
ANION GAP SERPL CALCULATED.3IONS-SCNC: 5 MMOL/L (ref 4–13)
AST SERPL W P-5'-P-CCNC: 12 U/L (ref 5–45)
BASOPHILS # BLD AUTO: 0.03 THOUSANDS/ΜL (ref 0–0.1)
BASOPHILS NFR BLD AUTO: 0 % (ref 0–1)
BILIRUB SERPL-MCNC: 1.92 MG/DL (ref 0.2–1)
BUN SERPL-MCNC: 14 MG/DL (ref 5–25)
CALCIUM SERPL-MCNC: 9.2 MG/DL (ref 8.3–10.1)
CHLORIDE SERPL-SCNC: 104 MMOL/L (ref 100–108)
CHOLEST SERPL-MCNC: 182 MG/DL (ref 50–200)
CO2 SERPL-SCNC: 30 MMOL/L (ref 21–32)
CREAT SERPL-MCNC: 1.02 MG/DL (ref 0.6–1.3)
EOSINOPHIL # BLD AUTO: 0.06 THOUSAND/ΜL (ref 0–0.61)
EOSINOPHIL NFR BLD AUTO: 1 % (ref 0–6)
ERYTHROCYTE [DISTWIDTH] IN BLOOD BY AUTOMATED COUNT: 12.8 % (ref 11.6–15.1)
EST. AVERAGE GLUCOSE BLD GHB EST-MCNC: 123 MG/DL
GFR SERPL CREATININE-BSD FRML MDRD: 79 ML/MIN/1.73SQ M
GLUCOSE P FAST SERPL-MCNC: 115 MG/DL (ref 65–99)
HBA1C MFR BLD: 5.9 %
HCT VFR BLD AUTO: 45.2 % (ref 36.5–49.3)
HDLC SERPL-MCNC: 56 MG/DL
HGB BLD-MCNC: 14.4 G/DL (ref 12–17)
IMM GRANULOCYTES # BLD AUTO: 0.03 THOUSAND/UL (ref 0–0.2)
IMM GRANULOCYTES NFR BLD AUTO: 0 % (ref 0–2)
LDLC SERPL CALC-MCNC: 107 MG/DL (ref 0–100)
LYMPHOCYTES # BLD AUTO: 1.66 THOUSANDS/ΜL (ref 0.6–4.47)
LYMPHOCYTES NFR BLD AUTO: 19 % (ref 14–44)
MCH RBC QN AUTO: 29.4 PG (ref 26.8–34.3)
MCHC RBC AUTO-ENTMCNC: 31.9 G/DL (ref 31.4–37.4)
MCV RBC AUTO: 92 FL (ref 82–98)
MONOCYTES # BLD AUTO: 0.76 THOUSAND/ΜL (ref 0.17–1.22)
MONOCYTES NFR BLD AUTO: 9 % (ref 4–12)
NEUTROPHILS # BLD AUTO: 6.21 THOUSANDS/ΜL (ref 1.85–7.62)
NEUTS SEG NFR BLD AUTO: 71 % (ref 43–75)
NONHDLC SERPL-MCNC: 126 MG/DL
NRBC BLD AUTO-RTO: 0 /100 WBCS
PLATELET # BLD AUTO: 248 THOUSANDS/UL (ref 149–390)
PMV BLD AUTO: 11.6 FL (ref 8.9–12.7)
POTASSIUM SERPL-SCNC: 4.6 MMOL/L (ref 3.5–5.3)
PROT SERPL-MCNC: 7.2 G/DL (ref 6.4–8.2)
RBC # BLD AUTO: 4.89 MILLION/UL (ref 3.88–5.62)
SODIUM SERPL-SCNC: 139 MMOL/L (ref 136–145)
TRIGL SERPL-MCNC: 96 MG/DL
TSH SERPL DL<=0.05 MIU/L-ACNC: 1.91 UIU/ML (ref 0.36–3.74)
WBC # BLD AUTO: 8.75 THOUSAND/UL (ref 4.31–10.16)

## 2020-08-18 PROCEDURE — 85025 COMPLETE CBC W/AUTO DIFF WBC: CPT

## 2020-08-18 PROCEDURE — 84443 ASSAY THYROID STIM HORMONE: CPT

## 2020-08-18 PROCEDURE — 80053 COMPREHEN METABOLIC PANEL: CPT

## 2020-08-18 PROCEDURE — 83036 HEMOGLOBIN GLYCOSYLATED A1C: CPT

## 2020-08-18 PROCEDURE — 80061 LIPID PANEL: CPT

## 2020-08-18 PROCEDURE — 36415 COLL VENOUS BLD VENIPUNCTURE: CPT

## 2020-08-20 ENCOUNTER — TELEPHONE (OUTPATIENT)
Dept: FAMILY MEDICINE CLINIC | Facility: CLINIC | Age: 60
End: 2020-08-20

## 2020-08-20 DIAGNOSIS — K21.00 GASTROESOPHAGEAL REFLUX DISEASE WITH ESOPHAGITIS: Primary | ICD-10-CM

## 2020-08-20 RX ORDER — PANTOPRAZOLE SODIUM 40 MG/1
40 TABLET, DELAYED RELEASE ORAL DAILY
Qty: 30 TABLET | Refills: 2 | Status: CANCELLED | OUTPATIENT
Start: 2020-08-20

## 2020-08-20 RX ORDER — PANTOPRAZOLE SODIUM 40 MG/1
40 TABLET, DELAYED RELEASE ORAL DAILY
Qty: 30 TABLET | Refills: 2 | Status: SHIPPED | OUTPATIENT
Start: 2020-08-20 | End: 2020-10-09 | Stop reason: SDUPTHER

## 2020-08-20 NOTE — TELEPHONE ENCOUNTER
Spoke with patient about change in medication and referral to GI  He understands and would like to pursue referral  Have placed orders

## 2020-08-20 NOTE — ADDENDUM NOTE
Addended by: Cadence Nassar on: 8/20/2020 12:53 PM     Modules accepted: Orders
Addended by: Miguel Enciso on: 8/20/2020 05:26 PM     Modules accepted: Orders
no

## 2020-08-20 NOTE — TELEPHONE ENCOUNTER
Pt called stating the tight feeing in his throat is not getting better with increasing his omeprazole to 40  Pt is asking next steps

## 2020-08-24 ENCOUNTER — TELEPHONE (OUTPATIENT)
Dept: NEPHROLOGY | Facility: CLINIC | Age: 60
End: 2020-08-24

## 2020-08-25 NOTE — PROGRESS NOTES
[address] [facility] DISORDERS CLINIC         RETURN PATIENT NOTE    Patient: Belen Horner  Medical Record Number: # [id number]  YOB: 1960  Date of visit: 8/27/2020     Referring provider: No ref  provider found     The patient was accompanied today  History was obtained from patient and wife    ASSESSMENT     1  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[address] )     2  Neurogenic orthostatic hypotension (HCC)     3  Diaphragm paralysis     4  Truncal ataxia        Impression of this 62 yo gentleman returning for atypical Parkinsonism (idiopathic PD given asymmetry, vs Multiple System Atrophy with significant dysautonomia, orthostatism), currently having discontinued all dopamine agonists and levodopa due to restlessness and leg edema  After discontinuing pramipexole, he is only taking selegiline and has subjectively worsened in the interim in nearly all of his symptoms  He has not experienced the hypertension from before after taking selegiline - it may be that he had inadvertently consumed tyramine-containing compounds earlier on  He has not been able to return to his [employer] and other activities have been at a standstill  Orthostatic blood pressure measurements today were positive, with the systolic pressure falling nearly 50 points between lying supine and standing  To date: he has tried and stopped Sinemet (leg jumpy, not working well), selegiline, ropinirole, amantadine and now pramipexole  His involuntary inspiratory sighs support MSA over PD  We discussed more seriously about Multiple System Atrophy today, but will continue to treat as best as possible  PLAN     Regarding orthostatism, he understands to stand up slowly, and will be using his compression stockings more  Advised salty snacks at night as he wakes up feeling better  He can take midodrine increased to 10mg up to three times a day  Consider pyridostigmine     He will be returning to a more normalized exercise regimen with [employer] next month  Monitor for how much functioning he is able to gain back with this alone before any re-addition of levodopa or dopamine agonists  · Continue with the selegiline for now  We discussed about considering to restart levodopa after seeing how the return to an exercise regimen helps him  · He will f/u with [facility] Neurology Dr Mine Muñoz per his decision for 2nd opinion  · He will follow up with Pulmonary (Dr Lexi Kumar) regarding the possible repeat sniff test and whether diaphragmatic stimulation for the diaphragmatic paralysis may help should respiratory state decline  · Return to Clinic as scheduled with Dr Keiko Larose in Oct 2020    A total of 45 minutes were spent face-to-face with this patient, of which >50% was spent on counseling and coordination of care  HISTORY OF PRESENT ILLNESS:     Mr Jatinder Perez is a 63 y o  right handed male who returns for Parkinson's disease and severe orthostatism  Last visit 6/24/2020  Interim History  He called 7/1/20 to report worsened dizziness and hypotension on pramipexole 3 tab TID including to 4 tab TID  Team returned him to original pramipexole dose  Despite him stopping pramipexole for two days experimentally, his blood pressure continued to fluctuate as low as 80/50  Team advised increasing midodrine to 10mg TID prn, and to stop the pramipexole  On 8/12/20 he called feeling both his hypotension and his Parkinson's symptoms were both slightly worse  He did start selegiline from [facility] after discussing with us  He is wearing the full-length compression stockings and these seem to have helped with stabilizing blood pressure  He does not feeling the selegiline has substantially helped him  He notices he is leaning forward more and after his bladder neck surgery he felt his breathing became "short and shallow"   He notes increased trouble swallowing and a feeling of constriction in his throat  He feels his voice is quieter, tremors are worse in both hands  He has trouble controlling his smallest toes  INITIAL HISTORY  He says he first noticed smaller handwriting while taking a class and taking notes around 2015  In 2016 he noticed he was "meandering walking left and right" which seemed to progress to the point where he would have difficulty keeping pace with someone walking more normally  Denies feeling hemibody slowness or stiffness  He felt he had to put forth more effort in walking fast  If he climbs more than a flight of stairs, his thighs become fatigued  +lightheadedness after ascending stairs or rising from a kneeling position (none while laying down)  If he closes his eyes in shower and looks up, he feels very unsteady  He denies ever falling except 1x while pulling up pants in 2017  He is aware of his posture being more slouched forward  Patient saw his PCP with complaint of lightheadedness while standing and dizziness, trouble with writing on right hand with dropping objects  MRI Brain on July 2018 was reported unremarkable  - Laboratory testing in July 2018 including normal CBC, CMP, Lyme ab testing and hypovitaminosis D noted  - No previous injuries or surgeries on head or neck  - His main concern today is if he has Parkinson's disease, as he read about his symptoms on WebMD      Tremor noticeable but not bothersome; he says he can see his R hand shaking when he writes or eats  Not apparent when his hand is resting  No other tremor in his body he says  Started after 2016 very mild progression if at all  Sleep:  He says he sleeps poorly with sleep habits (sleeping on recliner while watching TV, ~4-5 hrs a night  +difficulty falling back asleep  +snores     Hyposmia: denies  RBD (REM semi-purposeful body movements):  "occasionally" acting out dreams   Gait Disturbance:  Yes, as above   Dementia:  +feeling more difficulty with attention and focusing at work  Forgetting names and small tasks  Constipation: endorses  Hallucination: denies  Skin Cancer History: denies  Hypovitaminosis D: denies  Hypovitaminosis B12:  denies  Dysautonomia:  +urinary retention but has BPH  +orthostatism    Gaze Palsy: none  Exercise Therapy: INACTIVE    Family History: Number of First Degree Relatives With Parkinsonism: mother  Imaging: MRI brain in July 2018    REVIEW OF PAST MEDICAL, SOCIAL AND FAMILY HISTORY:  This is the list of problems as per our Medical Records:    Patient Active Problem List    Diagnosis Date Noted    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[facility] [address] ) 08/27/2020    Truncal ataxia 08/27/2020    Recurrent left knee instability 04/24/2020    Coracoid impingement of right shoulder 04/24/2020    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 04/24/2020    Numbness and tingling 04/24/2020    Acute pain of right shoulder 02/21/2020    Proteinuria 12/06/2019    Urinary retention 12/06/2019    Nephrolithiasis 12/06/2019    CKD (chronic kidney disease) stage 2, GFR 60-89 ml/min 12/06/2019    Elevated blood pressure reading 11/22/2019    Tinea corporis 08/27/2019    Diaphragm paralysis 08/22/2019    Periodic limb movement disorder (PLMD) 08/22/2019    Change in voice 06/03/2019    Gastroesophageal reflux disease with esophagitis 06/03/2019    KALLI (obstructive sleep apnea)     Chronic pain of right ankle 03/04/2019    Chronic pain of left knee 03/04/2019    Parkinson's disease ([address] ) 03/04/2019    Thyromegaly 03/04/2019    Neurogenic orthostatic hypotension (HCC) 02/27/2019    Shortness of breath 11/29/2018    Pain of right thumb 10/18/2018    Pain of left thumb 10/18/2018    Right elbow pain 10/18/2018    Right wrist pain 10/18/2018    Plantar fasciitis, bilateral 10/10/2018    Unspecified abnormalities of gait and mobility 10/10/2018    Dizziness 07/05/2018    Primary testicular cancer ([facility] [address] [address] ) 12/17/2014     No Known Allergies     Outpatient Encounter Medications as of 8/27/2020   Medication Sig Dispense Refill    cholecalciferol (VITAMIN D3) 1,000 units tablet Take 2,000 Units by mouth daily      diclofenac sodium (VOLTAREN) 1 % Apply 2 g topically 4 (four) times a day 2 Tube 1    Elastic Bandages & Supports (MEDICAL COMPRESSION STOCKINGS) MISC Sig: One pair thigh high compression stockings 30mmHg for orthostatic hypotension  Cannot wear waist high due to urine drainage bag  1 each 0    FIBER PO Take by mouth daily      Melatonin 5 MG TABS Take 10 mg by mouth       midodrine (PROAMATINE) 5 mg tablet Take 1 tablet (5 mg total) by mouth daily Follow clinic instructions on titration dose to max 1 tab TID, as tolerated and to benefit  (Patient taking differently: Take 5 mg by mouth 3 (three) times a day as needed Follow clinic instructions on titration dose to max 1 tab TID, as tolerated and to benefit ) 30 tablet 3    multivitamin (THERAGRAN) TABS Take 1 tablet by mouth daily      nystatin-triamcinolone (MYCOLOG-II) cream Apply topically 2 (two) times a day 60 g 1    pantoprazole (PROTONIX) 40 mg tablet Take 1 tablet (40 mg total) by mouth daily 30 tablet 2    polyethylene glycol (GLYCOLAX) 17 GM/SCOOP powder Take 17 g by mouth as needed      selegiline (ELDEPRYL) 5 mg capsule Take 5 mg by mouth 2 (two) times a day before meals      vitamin E 100 UNIT capsule Take 100 Units by mouth daily      Wheat Dextrin (BENEFIBER DRINK MIX PO)       pramipexole (MIRAPEX) 0 125 mg tablet 2 tablets three times a day (Patient not taking: Reported on 8/27/2020) 180 tablet 3     No facility-administered encounter medications on file as of 8/27/2020  REVIEW OF SYSTEMS:  The patient has entered data on an intake form regarding present illness, past medical and surgical history, medications, allergies, family and social history, and a full review of 14 systems   I have reviewed this form with the patient, and all the relevant information has been included on this note  The full review of systems was negative except as stated in HPI and below  Constitutional: Negative  Negative for appetite change and fever  HENT: Positive for trouble swallowing (occassional and increased saliva , no drooling ) and voice change (more hoarse and lower in volume)  Negative for hearing loss and tinnitus  Eyes: Negative  Negative for photophobia and pain  Respiratory: Negative  Negative for shortness of breath  Breathing has become shallow and fast   Cardiovascular: Negative  Negative for palpitations  Gastrointestinal: Negative  Negative for nausea and vomiting  Endocrine: Negative  Negative for cold intolerance  Genitourinary: Negative  Negative for dysuria, frequency and urgency  Musculoskeletal: Negative  Negative for myalgias and neck pain  Skin: Negative  Negative for rash  Neurological: Positive for dizziness (MORE NOW ), tremors (right hand has gotten worse, has difficulty brusing teeth, shaving, typing in keyboard, and writing has gotten worse), light-headedness (MORE NOW) and numbness (unable to move small toes in both feet)  Negative for seizures, syncope, facial asymmetry, speech difficulty, weakness and headaches  BP Has been very low, balance is worse, leaning forward more, no falls, loses balance constantly, does catch himself, leg twitches have gotten worse , stopped the pramipexole  Hematological: Negative  Does not bruise/bleed easily  Psychiatric/Behavioral: Negative  Negative for confusion, hallucinations and sleep disturbance  FOCUSED PHYSICAL EXAMINATION:     Vital signs:  BP 96/54 (BP Location: Right arm, Patient Position: Sitting, Cuff Size: Standard)   Pulse 87   Temp 98 2 °F (36 8 °C)   Ht 6' (1 829 m)   Wt 99 6 kg (219 lb 9 6 oz)   BMI 29 78 kg/m²     General:  Well-appearing, well nourished, pleasant patient in no acute distress  Mood appropriate  Head:  Normocephalic, atraumatic  Oropharynx and conjunctiva are clear  Speech  +hypophonia  No bradylalia  No scanning speech  Language: Comprehension intact  Neck:  Supple, strong 5/5 forward flexion and retroflexion  Extremities: Range of motion is normal        Cognitive and Mental Exam:  The patient is alert, oriented to self, location, date and situation  Cranial Nerves:  Direct and consensual light reflexes were normal  No afferent pupillary defect  Visual fields are full to confrontation  Extraocular movements were full, with normal pursuit and saccades  Pupils were equal, reactive to light symmetrically  Facial sensation to light touch was intact  Face is symmetric with normal strength  Hearing was assessed using the finger rubs  And was normal bilaterally  Palate is up going bilaterally and symmetrically  Neck muscles are strong  Tongue protrusion is at midline with normal movements  No dysarthria  Motor:    Dystonia: none  Dyskinesia: none  Myoclonus: none  Chorea: none  Tics: none      UPDRS                Time since last dose:   4/4/19 6/5/19 on selegiline  9/12/19  12/10/19   3/11/20  6/24/20  8/27/20 on selegiline alone   Speech   1  1 1 1  2 1 1   Facial Expression   0  1 0 0  2 2 2   Rigidity - Neck   0  0 0 0  0 1 -   Rigidity - Upper Extremity (Right)   1  0 0 0 0 1 -   Rigidity - Upper Extremity (Left)    1  0 0 0 0 1 -   Rigidity - Lower Extremity (Right)   1  0 0 0 0 2 -   Rigidity - Lower Extremity (Left)    1  0 0 0 0 2 -   Finger Taps (Right)    1  0 2 1 2 0 2   Finger Taps (Left)    0  0 0 0 0 0 1   Hand Movement (Right)   0  0 0 0 0 0 0   Hand Movement (Left)    0  0 0 0 0 0 1   Pronation/Supination (Right)   0  0 0 0 0 1 1   Pronation/Supination (Left)    0  0 0 0 0 0 0   Toe Tapping (Right)  0  0 0 0 0 2 -   Toe Tapping (Left)  0  0 0 0 0 0 -   Leg Agility (Right)   0  0 0 0 0 0 -   Leg Agility (Left)    0  0 0 0 0 0 -   Arising from Chair    0  0 0 0 0 0 0   Gait    0  0 0 0 2 lagging R foot 1 1   Freezing of Gait  0  0 0 0 0 0 0   Postural Stability    -  - - - 3 retropulsion - -   Posture  1  0 0 0 1 1 2   Global spontaneity of movement  2  0 1 slightly less on R 0 2 decr  R arm swing 1 2   Postural Tremor (Right)  0  0 0 0 1 1 0   Postural Tremor (Left)  0  0 0 0 1 0 0   Kinetic Tremor (Right)   0  0 0 0 0 0 0   Kinetic Tremor (Left)   0  0 0 0 0 0 0   Rest tremor amplitude RUE  0  0 0 0 0 0 0   Rest tremor amplitude LUE  0  0 0 0 0 0 0   Rest tremor amplitude RLE  0  0 0 0 0 0 0   Reset tremor amplitude LLE  0  0 0 0 0 0 0   Lip/Jaw Tremor   0  0 0 0 0 0 0   Consistency of tremor  0  0 0 0 0 0 0     -------------------------------------------------------------------------------------   Gait: Narrow based gait, slight forward hunch, no freezing of gait and normal stride lengths  No rest tremor

## 2020-08-27 ENCOUNTER — OFFICE VISIT (OUTPATIENT)
Dept: NEUROLOGY | Facility: CLINIC | Age: 60
End: 2020-08-27
Payer: COMMERCIAL

## 2020-08-27 ENCOUNTER — TRANSCRIBE ORDERS (OUTPATIENT)
Dept: ADMINISTRATIVE | Facility: HOSPITAL | Age: 60
End: 2020-08-27

## 2020-08-27 ENCOUNTER — DOCUMENTATION (OUTPATIENT)
Dept: PULMONOLOGY | Facility: CLINIC | Age: 60
End: 2020-08-27

## 2020-08-27 VITALS
SYSTOLIC BLOOD PRESSURE: 96 MMHG | BODY MASS INDEX: 29.74 KG/M2 | WEIGHT: 219.6 LBS | HEART RATE: 87 BPM | TEMPERATURE: 98.2 F | DIASTOLIC BLOOD PRESSURE: 54 MMHG | HEIGHT: 72 IN

## 2020-08-27 DIAGNOSIS — R27.8 TRUNCAL ATAXIA: ICD-10-CM

## 2020-08-27 DIAGNOSIS — J98.6 DIAPHRAGM PARALYSIS: ICD-10-CM

## 2020-08-27 DIAGNOSIS — G20 ATYPICAL PARKINSONISM (HCC): Primary | ICD-10-CM

## 2020-08-27 DIAGNOSIS — G90.3 NEUROGENIC ORTHOSTATIC HYPOTENSION (HCC): ICD-10-CM

## 2020-08-27 DIAGNOSIS — N32.0 BNC (BLADDER NECK CONTRACTURE): Primary | ICD-10-CM

## 2020-08-27 PROBLEM — G20.C ATYPICAL PARKINSONISM: Status: ACTIVE | Noted: 2020-08-27

## 2020-08-27 PROCEDURE — 3074F SYST BP LT 130 MM HG: CPT | Performed by: PSYCHIATRY & NEUROLOGY

## 2020-08-27 PROCEDURE — 99215 OFFICE O/P EST HI 40 MIN: CPT | Performed by: PSYCHIATRY & NEUROLOGY

## 2020-08-27 PROCEDURE — 3078F DIAST BP <80 MM HG: CPT | Performed by: PSYCHIATRY & NEUROLOGY

## 2020-08-27 PROCEDURE — 3008F BODY MASS INDEX DOCD: CPT | Performed by: PSYCHIATRY & NEUROLOGY

## 2020-08-27 PROCEDURE — 1036F TOBACCO NON-USER: CPT | Performed by: PSYCHIATRY & NEUROLOGY

## 2020-08-27 RX ORDER — POLYETHYLENE GLYCOL 3350 17 G/17G
17 POWDER, FOR SOLUTION ORAL AS NEEDED
COMMUNITY
End: 2020-12-21

## 2020-08-27 RX ORDER — SELEGILINE HYDROCHLORIDE 5 MG/1
5 CAPSULE ORAL
COMMUNITY
End: 2021-04-15

## 2020-08-27 NOTE — PATIENT INSTRUCTIONS
Regarding orthostatism, he understands to stand up slowly, and will be using his compression stockings more  Advised salty snacks at night as he wakes up feeling better  He can take midodrine increased to 10mg up to three times a day  He will be returning to a more normalized exercise regimen with [employer] next month  Monitor for how much functioning he is able to gain back with this alone before any re-addition of levodopa or dopamine agonists  · Continue with the selegiline for now  We discussed about considering to restart levodopa after seeing how the return to an exercise regimen helps him  · He will f/u with [facility] Neurology Dr González Youssef per his decision for 2nd opinion  · He will follow up with Pulmonary (Dr Nikos Garcia) regarding the possible repeat sniff test and whether diaphragmatic stimulation for the diaphragmatic paralysis may help should respiratory state decline     · Return to Clinic as scheduled with Dr Marsha Jefferson in Oct 2020

## 2020-08-27 NOTE — LETTER
August 27, 2020     [facility], [address] [place] [address]    Patient: Damián Lewis   YOB: 1960   Date of Visit: 8/27/2020       Dear Dr María Fleming:    Earlier today I saw Mr Kinsey Garner for evaluation of his atypical Parkinsonism  Below are my notes for this visit for your records and to keep you updated on his health status  If you have questions, please do not hesitate to call me  Sincerely,        Cedrick Diehl MD        CC: No Recipients  Cedrick Diehl MD  8/27/2020  8:39 PM  Sign when Signing Visit  [address] PATIENT NOTE    Patient: Damián Lewis  Medical Record Number: # [id number]  YOB: 1960  Date of visit: 8/27/2020     Referring provider: No ref  provider found     The patient was accompanied today  History was obtained from patient and wife    ASSESSMENT     1  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[address] )     2  Neurogenic orthostatic hypotension (HCC)     3  Diaphragm paralysis     4  Truncal ataxia        Impression of this 62 yo gentleman returning for atypical Parkinsonism (idiopathic PD given asymmetry, vs Multiple System Atrophy with significant dysautonomia, orthostatism), currently having discontinued all dopamine agonists and levodopa due to restlessness and leg edema  After discontinuing pramipexole, he is only taking selegiline and has subjectively worsened in the interim in nearly all of his symptoms  He has not experienced the hypertension from before after taking selegiline - it may be that he had inadvertently consumed tyramine-containing compounds earlier on  He has not been able to return to his [employer] and other activities have been at a standstill  Orthostatic blood pressure measurements today were positive, with the systolic pressure falling nearly 50 points between lying supine and standing        To date: he has tried and stopped Sinemet (leg jumpy, not working well), selegiline, ropinirole, amantadine and now pramipexole  His involuntary inspiratory sighs support MSA over PD  We discussed more seriously about Multiple System Atrophy today, but will continue to treat as best as possible  PLAN     Regarding orthostatism, he understands to stand up slowly, and will be using his compression stockings more  Advised salty snacks at night as he wakes up feeling better  He can take midodrine increased to 10mg up to three times a day  Consider pyridostigmine  He will be returning to a more normalized exercise regimen with [employer] next month  Monitor for how much functioning he is able to gain back with this alone before any re-addition of levodopa or dopamine agonists  · Continue with the selegiline for now  We discussed about considering to restart levodopa after seeing how the return to an exercise regimen helps him  · He will f/u with [facility] Dr González Youssef per his decision for 2nd opinion  · He will follow up with Pulmonary (Dr Nikos Garcia) regarding the possible repeat sniff test and whether diaphragmatic stimulation for the diaphragmatic paralysis may help should respiratory state decline  · Return to Clinic as scheduled with Dr Marsha Jefferson in Oct 2020    A total of 45 minutes were spent face-to-face with this patient, of which >50% was spent on counseling and coordination of care  HISTORY OF PRESENT ILLNESS:     Mr Ritchie Solano is a 63 y o  right handed male who returns for Parkinson's disease and severe orthostatism  Last visit 6/24/2020  Interim History  He called 7/1/20 to report worsened dizziness and hypotension on pramipexole 3 tab TID including to 4 tab TID  Team returned him to original pramipexole dose  Despite him stopping pramipexole for two days experimentally, his blood pressure continued to fluctuate as low as 80/50   Team advised increasing midodrine to 10mg TID prn, and to stop the pramipexole  On 8/12/20 he called feeling both his hypotension and his Parkinson's symptoms were both slightly worse  He did start selegiline from [facility] after discussing with us  He is wearing the full-length compression stockings and these seem to have helped with stabilizing blood pressure  He does not feeling the selegiline has substantially helped him  He notices he is leaning forward more and after his bladder neck surgery he felt his breathing became "short and shallow"  He notes increased trouble swallowing and a feeling of constriction in his throat  He feels his voice is quieter, tremors are worse in both hands  He has trouble controlling his smallest toes  INITIAL HISTORY  He says he first noticed smaller handwriting while taking a class and taking notes around 2015  In 2016 he noticed he was "meandering walking left and right" which seemed to progress to the point where he would have difficulty keeping pace with someone walking more normally  Denies feeling hemibody slowness or stiffness  He felt he had to put forth more effort in walking fast  If he climbs more than a flight of stairs, his thighs become fatigued  +lightheadedness after ascending stairs or rising from a kneeling position (none while laying down)  If he closes his eyes in shower and looks up, he feels very unsteady  He denies ever falling except 1x while pulling up pants in 2017  He is aware of his posture being more slouched forward  Patient saw his PCP with complaint of lightheadedness while standing and dizziness, trouble with writing on right hand with dropping objects  MRI Brain on July 2018 was reported unremarkable  - Laboratory testing in July 2018 including normal CBC, CMP, Lyme ab testing and hypovitaminosis D noted  - No previous injuries or surgeries on head or neck     - His main concern today is if he has Parkinson's disease, as he read about his symptoms on WebMD      Tremor noticeable but not bothersome; he says he can see his R hand shaking when he writes or eats  Not apparent when his hand is resting  No other tremor in his body he says  Started after 2016 very mild progression if at all  Sleep:  He says he sleeps poorly with sleep habits (sleeping on recliner while watching TV, ~4-5 hrs a night  +difficulty falling back asleep  +snores  Hyposmia: denies  RBD (REM semi-purposeful body movements):  "occasionally" acting out dreams   Gait Disturbance:  Yes, as above   Dementia:  +feeling more difficulty with attention and focusing at work  Forgetting names and small tasks  Constipation: endorses  Hallucination: denies  Skin Cancer History: denies  Hypovitaminosis D: denies  Hypovitaminosis B12:  denies  Dysautonomia:  +urinary retention but has BPH  +orthostatism    Gaze Palsy: none  Exercise Therapy: INACTIVE    Family History: Number of First Degree Relatives With Parkinsonism: mother  Imaging: MRI brain in July 2018    REVIEW OF PAST MEDICAL, SOCIAL AND FAMILY HISTORY:  This is the list of problems as per our Medical Records:    Patient Active Problem List    Diagnosis Date Noted    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[facility] ) 08/27/2020    Truncal ataxia 08/27/2020    Recurrent left knee instability 04/24/2020    Coracoid impingement of right shoulder 04/24/2020    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 04/24/2020    Numbness and tingling 04/24/2020    Acute pain of right shoulder 02/21/2020    Proteinuria 12/06/2019    Urinary retention 12/06/2019    Nephrolithiasis 12/06/2019    CKD (chronic kidney disease) stage 2, GFR 60-89 ml/min 12/06/2019    Elevated blood pressure reading 11/22/2019    Tinea corporis 08/27/2019    Diaphragm paralysis 08/22/2019    Periodic limb movement disorder (PLMD) 08/22/2019    Change in voice 06/03/2019    Gastroesophageal reflux disease with esophagitis 06/03/2019    KALLI (obstructive sleep apnea)     Chronic pain of right ankle 03/04/2019    Chronic pain of left knee 03/04/2019    Parkinson's disease ([facility] ) 03/04/2019    Thyromegaly 03/04/2019    Neurogenic orthostatic hypotension (HCC) 02/27/2019    Shortness of breath 11/29/2018    Pain of right thumb 10/18/2018    Pain of left thumb 10/18/2018    Right elbow pain 10/18/2018    Right wrist pain 10/18/2018    Plantar fasciitis, bilateral 10/10/2018    Unspecified abnormalities of gait and mobility 10/10/2018    Dizziness 07/05/2018    Primary testicular cancer ([facility] ) 12/17/2014     No Known Allergies     Outpatient Encounter Medications as of 8/27/2020   Medication Sig Dispense Refill    cholecalciferol (VITAMIN D3) 1,000 units tablet Take 2,000 Units by mouth daily      diclofenac sodium (VOLTAREN) 1 % Apply 2 g topically 4 (four) times a day 2 Tube 1    Elastic Bandages & Supports (MEDICAL COMPRESSION STOCKINGS) MISC Sig: One pair thigh high compression stockings 30mmHg for orthostatic hypotension  Cannot wear waist high due to urine drainage bag  1 each 0    FIBER PO Take by mouth daily      Melatonin 5 MG TABS Take 10 mg by mouth       midodrine (PROAMATINE) 5 mg tablet Take 1 tablet (5 mg total) by mouth daily Follow clinic instructions on titration dose to max 1 tab TID, as tolerated and to benefit   (Patient taking differently: Take 5 mg by mouth 3 (three) times a day as needed Follow clinic instructions on titration dose to max 1 tab TID, as tolerated and to benefit ) 30 tablet 3    multivitamin (THERAGRAN) TABS Take 1 tablet by mouth daily      nystatin-triamcinolone (MYCOLOG-II) cream Apply topically 2 (two) times a day 60 g 1    pantoprazole (PROTONIX) 40 mg tablet Take 1 tablet (40 mg total) by mouth daily 30 tablet 2    polyethylene glycol (GLYCOLAX) 17 GM/SCOOP powder Take 17 g by mouth as needed      selegiline (ELDEPRYL) 5 mg capsule Take 5 mg by mouth 2 (two) times a day before meals      vitamin E 100 UNIT capsule Take 100 Units by mouth daily      Wheat Dextrin (BENEFIBER DRINK MIX PO)       pramipexole (MIRAPEX) 0 125 mg tablet 2 tablets three times a day (Patient not taking: Reported on 8/27/2020) 180 tablet 3     No facility-administered encounter medications on file as of 8/27/2020  REVIEW OF SYSTEMS:  The patient has entered data on an intake form regarding present illness, past medical and surgical history, medications, allergies, family and social history, and a full review of 14 systems  I have reviewed this form with the patient, and all the relevant information has been included on this note  The full review of systems was negative except as stated in HPI and below  Constitutional: Negative  Negative for appetite change and fever  HENT: Positive for trouble swallowing (occassional and increased saliva , no drooling ) and voice change (more hoarse and lower in volume)  Negative for hearing loss and tinnitus  Eyes: Negative  Negative for photophobia and pain  Respiratory: Negative  Negative for shortness of breath  Breathing has become shallow and fast   Cardiovascular: Negative  Negative for palpitations  Gastrointestinal: Negative  Negative for nausea and vomiting  Endocrine: Negative  Negative for cold intolerance  Genitourinary: Negative  Negative for dysuria, frequency and urgency  Musculoskeletal: Negative  Negative for myalgias and neck pain  Skin: Negative  Negative for rash  Neurological: Positive for dizziness (MORE NOW ), tremors (right hand has gotten worse, has difficulty brusing teeth, shaving, typing in keyboard, and writing has gotten worse), light-headedness (MORE NOW) and numbness (unable to move small toes in both feet)  Negative for seizures, syncope, facial asymmetry, speech difficulty, weakness and headaches  BP Has been very low, balance is worse, leaning forward more, no falls, loses balance constantly, does catch himself, leg twitches have gotten worse , stopped the pramipexole  Hematological: Negative  Does not bruise/bleed easily  Psychiatric/Behavioral: Negative  Negative for confusion, hallucinations and sleep disturbance  FOCUSED PHYSICAL EXAMINATION:     Vital signs:  BP 96/54 (BP Location: Right arm, Patient Position: Sitting, Cuff Size: Standard)   Pulse 87   Temp 98 2 °F (36 8 °C)   Ht 6' (1 829 m)   Wt 99 6 kg (219 lb 9 6 oz)   BMI 29 78 kg/m²     General:  Well-appearing, well nourished, pleasant patient in no acute distress  Mood appropriate  Head:  Normocephalic, atraumatic  Oropharynx and conjunctiva are clear  Speech  +hypophonia  No bradylalia  No scanning speech  Language: Comprehension intact  Neck:  Supple, strong 5/5 forward flexion and retroflexion  Extremities: Range of motion is normal        Cognitive and Mental Exam:  The patient is alert, oriented to self, location, date and situation  Cranial Nerves:  Direct and consensual light reflexes were normal  No afferent pupillary defect  Visual fields are full to confrontation  Extraocular movements were full, with normal pursuit and saccades  Pupils were equal, reactive to light symmetrically  Facial sensation to light touch was intact  Face is symmetric with normal strength  Hearing was assessed using the finger rubs  And was normal bilaterally  Palate is up going bilaterally and symmetrically  Neck muscles are strong  Tongue protrusion is at midline with normal movements  No dysarthria  Motor:    Dystonia: none  Dyskinesia: none  Myoclonus: none  Chorea: none  Tics: none      UPDRS                Time since last dose:   4/4/19 6/5/19 on selegiline  9/12/19  12/10/19   3/11/20  6/24/20  8/27/20 on selegiline alone   Speech   1  1 1 1  2 1 1   Facial Expression   0  1 0 0  2 2 2   Rigidity - Neck   0  0 0 0  0 1 -   Rigidity - Upper Extremity (Right)   1  0 0 0 0 1 -   Rigidity - Upper Extremity (Left)    1  0 0 0 0 1 -   Rigidity - Lower Extremity (Right)   1  0 0 0 0 2 -   Rigidity - Lower Extremity (Left)    1  0 0 0 0 2 -   Finger Taps (Right)    1  0 2 1 2 0 2   Finger Taps (Left)    0  0 0 0 0 0 1   Hand Movement (Right)   0  0 0 0 0 0 0   Hand Movement (Left)    0  0 0 0 0 0 1   Pronation/Supination (Right)   0  0 0 0 0 1 1   Pronation/Supination (Left)    0  0 0 0 0 0 0   Toe Tapping (Right)  0  0 0 0 0 2 -   Toe Tapping (Left)  0  0 0 0 0 0 -   Leg Agility (Right)   0  0 0 0 0 0 -   Leg Agility (Left)    0  0 0 0 0 0 -   Arising from Chair    0  0 0 0 0 0 0   Gait    0  0 0 0 2 lagging R foot 1 1   Freezing of Gait  0  0 0 0 0 0 0   Postural Stability    -  - - - 3 retropulsion - -   Posture  1  0 0 0 1 1 2   Global spontaneity of movement  2  0 1 slightly less on R 0 2 decr  R arm swing 1 2   Postural Tremor (Right)  0  0 0 0 1 1 0   Postural Tremor (Left)  0  0 0 0 1 0 0   Kinetic Tremor (Right)   0  0 0 0 0 0 0   Kinetic Tremor (Left)   0  0 0 0 0 0 0   Rest tremor amplitude RUE  0  0 0 0 0 0 0   Rest tremor amplitude LUE  0  0 0 0 0 0 0   Rest tremor amplitude RLE  0  0 0 0 0 0 0   Reset tremor amplitude LLE  0  0 0 0 0 0 0   Lip/Jaw Tremor   0  0 0 0 0 0 0   Consistency of tremor  0  0 0 0 0 0 0     -------------------------------------------------------------------------------------   Gait: Narrow based gait, slight forward hunch, no freezing of gait and normal stride lengths  No rest tremor

## 2020-08-27 NOTE — PROGRESS NOTES
Review of Systems   Constitutional: Negative  Negative for appetite change and fever  HENT: Positive for trouble swallowing (occassional and increased saliva , no drooling ) and voice change (more hoarse and lower in volume)  Negative for hearing loss and tinnitus  Eyes: Negative  Negative for photophobia and pain  Respiratory: Negative  Negative for shortness of breath  Breathing has become shallow and fast   Cardiovascular: Negative  Negative for palpitations  Gastrointestinal: Negative  Negative for nausea and vomiting  Endocrine: Negative  Negative for cold intolerance  Genitourinary: Negative  Negative for dysuria, frequency and urgency  Musculoskeletal: Negative  Negative for myalgias and neck pain  Skin: Negative  Negative for rash  Neurological: Positive for dizziness (MORE NOW ), tremors (right hand has gotten worse, has difficulty brusing teeth, shaving, typing in keyboard, and writing has gotten worse), light-headedness (MORE NOW) and numbness (unable to move small toes in both feet)  Negative for seizures, syncope, facial asymmetry, speech difficulty, weakness and headaches  BP  Has been very low, balance is worse, leaning forward more, no falls, loses balance constantly, does catch himself, leg twitches have gotten worse , stopped the pramipexole  Hematological: Negative  Does not bruise/bleed easily  Psychiatric/Behavioral: Negative  Negative for confusion, hallucinations and sleep disturbance

## 2020-08-28 ENCOUNTER — OFFICE VISIT (OUTPATIENT)
Dept: PULMONOLOGY | Facility: CLINIC | Age: 60
End: 2020-08-28
Payer: COMMERCIAL

## 2020-08-28 ENCOUNTER — APPOINTMENT (OUTPATIENT)
Dept: LAB | Facility: MEDICAL CENTER | Age: 60
End: 2020-08-28
Payer: COMMERCIAL

## 2020-08-28 ENCOUNTER — DOCUMENTATION (OUTPATIENT)
Dept: PULMONOLOGY | Facility: CLINIC | Age: 60
End: 2020-08-28

## 2020-08-28 VITALS
DIASTOLIC BLOOD PRESSURE: 70 MMHG | HEIGHT: 72 IN | WEIGHT: 218 LBS | TEMPERATURE: 98 F | HEART RATE: 83 BPM | OXYGEN SATURATION: 95 % | BODY MASS INDEX: 29.53 KG/M2 | SYSTOLIC BLOOD PRESSURE: 118 MMHG

## 2020-08-28 DIAGNOSIS — G70.9 NEUROMUSCULAR DISEASE (HCC): ICD-10-CM

## 2020-08-28 DIAGNOSIS — J98.6 DIAPHRAGM PARALYSIS: Primary | ICD-10-CM

## 2020-08-28 DIAGNOSIS — N32.0 BNC (BLADDER NECK CONTRACTURE): ICD-10-CM

## 2020-08-28 DIAGNOSIS — G47.33 OSA (OBSTRUCTIVE SLEEP APNEA): ICD-10-CM

## 2020-08-28 LAB
ANION GAP SERPL CALCULATED.3IONS-SCNC: 5 MMOL/L (ref 4–13)
BACTERIA UR QL AUTO: ABNORMAL /HPF
BILIRUB UR QL STRIP: NEGATIVE
BUN SERPL-MCNC: 18 MG/DL (ref 5–25)
CALCIUM SERPL-MCNC: 9.6 MG/DL (ref 8.3–10.1)
CHLORIDE SERPL-SCNC: 105 MMOL/L (ref 100–108)
CLARITY UR: CLEAR
CO2 SERPL-SCNC: 29 MMOL/L (ref 21–32)
COLOR UR: YELLOW
CREAT SERPL-MCNC: 1.03 MG/DL (ref 0.6–1.3)
GFR SERPL CREATININE-BSD FRML MDRD: 78 ML/MIN/1.73SQ M
GLUCOSE P FAST SERPL-MCNC: 114 MG/DL (ref 65–99)
GLUCOSE UR STRIP-MCNC: NEGATIVE MG/DL
HGB UR QL STRIP.AUTO: NEGATIVE
HYALINE CASTS #/AREA URNS LPF: ABNORMAL /LPF
KETONES UR STRIP-MCNC: NEGATIVE MG/DL
LEUKOCYTE ESTERASE UR QL STRIP: ABNORMAL
NITRITE UR QL STRIP: NEGATIVE
NON-SQ EPI CELLS URNS QL MICRO: ABNORMAL /HPF
PH UR STRIP.AUTO: 6.5 [PH]
POTASSIUM SERPL-SCNC: 4.6 MMOL/L (ref 3.5–5.3)
PROT UR STRIP-MCNC: NEGATIVE MG/DL
RBC #/AREA URNS AUTO: ABNORMAL /HPF
SODIUM SERPL-SCNC: 139 MMOL/L (ref 136–145)
SP GR UR STRIP.AUTO: 1.02 (ref 1–1.03)
UROBILINOGEN UR QL STRIP.AUTO: 0.2 E.U./DL
WBC #/AREA URNS AUTO: ABNORMAL /HPF

## 2020-08-28 PROCEDURE — 80048 BASIC METABOLIC PNL TOTAL CA: CPT

## 2020-08-28 PROCEDURE — 36415 COLL VENOUS BLD VENIPUNCTURE: CPT

## 2020-08-28 PROCEDURE — 3078F DIAST BP <80 MM HG: CPT | Performed by: INTERNAL MEDICINE

## 2020-08-28 PROCEDURE — 99214 OFFICE O/P EST MOD 30 MIN: CPT | Performed by: INTERNAL MEDICINE

## 2020-08-28 PROCEDURE — 87086 URINE CULTURE/COLONY COUNT: CPT

## 2020-08-28 PROCEDURE — 3074F SYST BP LT 130 MM HG: CPT | Performed by: INTERNAL MEDICINE

## 2020-08-28 PROCEDURE — 1036F TOBACCO NON-USER: CPT | Performed by: INTERNAL MEDICINE

## 2020-08-28 PROCEDURE — 94618 PULMONARY STRESS TESTING: CPT | Performed by: INTERNAL MEDICINE

## 2020-08-28 PROCEDURE — 81001 URINALYSIS AUTO W/SCOPE: CPT

## 2020-08-28 PROCEDURE — 3008F BODY MASS INDEX DOCD: CPT | Performed by: INTERNAL MEDICINE

## 2020-08-28 NOTE — LETTER
August 30, 2020     Adelita Rao, [address] [place] [address]    Patient: Eamon Peterson   YOB: 1960   Date of Visit: 8/28/2020       Dear Dr Saturnino Zeng:    Thank you for referring Tavares Franco to me for evaluation  Below are my notes for this consultation  If you have questions, please do not hesitate to call me  I look forward to following your patient along with you  Sincerely,        Paul Aguirre DO        CC: MD Antonette Ruffin MD Horald Haste, DO  8/30/2020  6:46 AM  Sign when Signing Visit  Progress note - Pulmonary Medicine   Eamon Peterson 63 y o  male MRN: [id number]       Impression & Plan:   63 y o  M with PMHx of Parkinson's disease with diaphragmatic weakness, chronic knee and ankle pain, urinary retention s/p TURP, Moderate KALLI on CPAP who comes in for follow up  1  Worsening shortness of breath - I am concerned for a progressive neuromuscular disease  Dr Suzanne Marks of neurology did mention Multiple System atrophy  I am significantly concerned about the bulbar symptoms he has including upper airway closing, hoarseness, dysphagia and weak voice  6 minute walk test without desaturation today  -  I will send a message to Dr Sebastian Albright and Dr Gabriel Bashir at [facility] his neurologis to see if any additional testing need to be performed  -  I will repeat PFTs to ensure that he is not developing restriction      -  Repeat SNIFF test to see if there is worsening diaphragmatic issues      -  Will check CT chest at next visit  -  Continue support nocturnally with BIPAP  We will likely need to move to consider some move of increasing nocturnal ventilation with AVAPS or ASV if BIPAP can not be optimized  -  I did have Mr Eliud Lane stop macrobid suppressive therapy due to pulmonary toxicity with little success in helping his symptoms      2  Moderate KALLI (AHI - 15 7) - on auto BiPAP   His compliance is much better with autoBIPAP  However, he is having residual events (AHI - 7) most of which are central (DONNIE - 5 1)  - I will attempt to widen the gradient of his CPAP  While this may cause more central events, it may help ventilator efforts as it seems his respirations are struggling  I would consider moving to BIPAP S/T with wider gradient if this is successful       -  However, if I believe he will benefit from an advanced machine  ASV could help reduce central events but I am more concerned for progressive ventilatory deficits  I believe I would favor moving to AVAPS instead         -  He is aware of the risk of leaving sleep apnea untreated including hypertension, heart failure, arrhythmia, MI and stroke       3  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 -  He is still acting out his dreams but this has decrease in frequency  This is likely due to atypical parkinsons or worsening neurologic disorder        -  He will increase melatonin to 10mg qHS instead to see if that helps        4   Periodic limb movement (PLM index - 109) -  This may be secondary to KALLI or Parkinsons   He denies symptoms of this          -  Continue Requip at dose as per neurology  ______________________________________________________________________    HPI:    Lovely Mtz presents today for follow-up for his KALLI and respiratory insufficiency  He states that he did stop the macrobid and has not had significant success in improving his dyspnea  He states that minimal exertion makes him short of breath  He has also noted some progressive in dysphagia, hoarseness of his throat, weakening of his voice and the airway seems like it can close at times  He has followed with Dr Winnie Okeefe who recommends that he get another opinion down at [facility] with Dr Ila Bush  He denies significant cough, wheezing or chest tightness  Review of Systems:  Review of Systems   Constitutional: Negative  HENT: Negative  Eyes: Negative  Respiratory: Negative  Cardiovascular: Negative  Gastrointestinal: Negative  Endocrine: Negative  Genitourinary: Negative  Musculoskeletal: Negative  Neurological: Negative  Hematological: Negative  Psychiatric/Behavioral: Negative            Social history updates:  Social History     Tobacco Use   Smoking Status Former Smoker    Packs/day: 0 50    Years: 3 50    Pack years: 1 75    Types: Cigarettes    Last attempt to quit:     Years since quittin 6   Smokeless Tobacco Never Used     Social History     Socioeconomic History    Marital status: /Civil Union     Spouse name: Not on file    Number of children: Not on file    Years of education: Not on file    Highest education level: Not on file   Occupational History    Not on file   Social Needs    Financial resource strain: Not on file    Food insecurity     Worry: Not on file     Inability: Not on file   [employer] needs     Medical: Not on file     Non-medical: Not on file   Tobacco Use    Smoking status: Former Smoker     Packs/day: 0 50     Years: 3 50     Pack years: 1 75     Types: Cigarettes     Last attempt to quit:      Years since quittin 6    Smokeless tobacco: Never Used   Substance and Sexual Activity    Alcohol use: Not Currently     Comment: social    Drug use: No    Sexual activity: Never   Lifestyle    Physical activity     Days per week: Not on file     Minutes per session: Not on file    Stress: Not on file   Relationships    Social connections     Talks on phone: Not on file     Gets together: Not on file     Attends [employer] service: Not on file     Active member of club or organization: Not on file     Attends meetings of clubs or organizations: Not on file     Relationship status: Not on file    Intimate partner violence     Fear of current or ex partner: Not on file     Emotionally abused: Not on file     Physically abused: Not on file     Forced sexual activity: Not on file   Other Topics Concern    Not on file   Social History Narrative    Consumes 5 glasses of tea per week       PhysicalExamination:  Vitals:   /70   Pulse 83   Temp 98 °F (36 7 °C)   Ht 6' (1 829 m)   Wt 98 9 kg (218 lb)   SpO2 95%   BMI 29 57 kg/m²   General: Pleasant, Awake alert and oriented x 3, conversant without conversational dyspnea, NAD, normal affect  HEENT:  PERRL, Sclera noninjected, nonicteric OU, Nares patent,  no craniofacial abnormalities, Mucous membranes, moist, no oral lesions, normal dentition, Mallampati class 3  NECK: Trachea midline, no accessory muscle use, no stridor, no cervical or supraclavicular adenopathy, JVP not elevated  CARDIAC: Reg, single s1/S2, no m/r/g  PULM: CTA bilaterally no wheezing, rhonchi or rales  ABD: Normoactive bowel sounds, soft nontender, nondistended, no rebound, no rigidity, no guarding  EXT: No cyanosis, no clubbing, no edema, normal capillary refill  NEURO: no focal neurologic deficits, AAOx3, moving all extremities appropriately      Diagnostic Data:  Labs: I personally reviewed the most recent laboratory data pertinent to today's visit  I have personally reviewed pertinent lab results  Lab Results   Component Value Date    WBC 8 75 08/18/2020    HGB 14 4 08/18/2020    HCT 45 2 08/18/2020    MCV 92 08/18/2020     08/18/2020     Lab Results   Component Value Date    CALCIUM 9 6 08/28/2020    K 4 6 08/28/2020    CO2 29 08/28/2020     08/28/2020    BUN 18 08/28/2020    CREATININE 1 03 08/28/2020     No results found for: IGE    PFT results: The most recent pulmonary function tests were reviewed    3/4/19  Results:  FEV1/FVC Ratio: 80 %  Forced Vital Capacity: 4 80 L    93 % predicted  FEV1: 3 85 L     97 % predicted  Lung volumes by body plethysmography:   Total Lung Capacity 94 % predicted   Residual volume 88 % predicted  DLCO corrected for patients hemoglobin level: 79 %  Interpretation:  · No obstructive airflow defect · Normal Spirometry  · Normal Lung volumes  · Normal diffusion capacity     Repeat 6 minute walk today  Starting saturation - 98%   Starting HR - 89  Lowest saturation - 91%    Highest HR - 103  Ambulated - 80 m and he did not require oxygen    6 minute walk  Date of testin2019  Resting room air saturation: 93%  Randy scale of dyspnea at start of test: 0/10     Ambulation testing:  Lowest saturation 93%  No supplemental oxygen required     Randy scale of dyspnea at end of test: 3/10  Reason if test was stopped early: N/A      Total 6 minute walk distance: 1400 feet     Imaging:  I personally reviewed the images on the [facility] system pertinent to today's visit  CT chest - 19  IMPRESSION:  Within normal limits CT of the chest      Other studies:  HST - moderate (15 7), Supine AHI - 23, hypoxia   CPAP titration - Nasal CPAP was titrated from 5-11 cm of water for  control of snoring and abnormal breathing  Patient appeared to do best at 11 cm of CPAP delivered using a [address] full face interface   Continued use of this equipment at these settings is recommended      New Compliance Data:  20-20                                   Type of CPAP:  auto BiPAP min EPAP 11, PSV 4, Max IPAP 25,                                    Mean EPAP - 12 2- 17 6, IPAP 16 2 - Max IPAP 22                                   Percent usage: 97%, 90%> 4hrs                                   Average time used: 5hrs 30 mins - 8 hrs 25 mins                                  Time in large leak: 4 min 39 secs                                   Residual AHI: 7 0 (CA - 5 1)     Compliance Data:  20-20                                   Type of CPAP:  auto BiPAP min EPAP 11, PSV 4, Max IPAP 25,                                    Mean EPAP recorded - 12 1, Peak - 14 9, Min IPAP 16, Max IPAP 19                                   Percent usage: 72%, 63%> 4hrs                                   Average time used: 3hrs 51 mins - 8 hrs 26 mins                                  Time in large leak: 50 secs                                   Residual AHI: 6 6  (CA - 3 9)     Compliance Data:  10/23/19-11/21/19                                   Type of CPAP:  autoPAP 11-15, Mean - 13 2, Peak - 15 0                                   Percent usage: 73%, 67%> 4hrs                                   Average time used: 3hrs 38 mins - 8 hrs 37 mins                                  Time in large leak: 9 mins 46 secs                                   Residual AHI: 11 0  (CA - 0 9)     Compliance Data:  8/25/19-9/23/19                                   Type of CPAP:  autoPAP 8-15, Mean - 11 9, Peak - 15 6                                   Percent usage: 63%                                   Average time used: 2hrs 52 mins - 4 hrs 32 mins                                  Time in large leak: 4 mins 44 secs                                   Residual AHI: 13 6  (CA - 0 9)       Bharat Powell DO

## 2020-08-28 NOTE — PROGRESS NOTES
Script for BiPAP pressure change has been faxed to [facility] at [phone] and [phone]

## 2020-08-29 LAB — BACTERIA UR CULT: NORMAL

## 2020-08-30 NOTE — PROGRESS NOTES
Progress note - Pulmonary Medicine   [facility] 63 y o  male MRN: [id number]       Impression & Plan:   63 y o  M with PMHx of Parkinson's disease with diaphragmatic weakness, chronic knee and ankle pain, urinary retention s/p TURP, Moderate KALLI on CPAP who comes in for follow up  1  Worsening shortness of breath - I am concerned for a progressive neuromuscular disease  Dr Rachel Guevara of neurology did mention Multiple System atrophy  I am significantly concerned about the bulbar symptoms he has including upper airway closing, hoarseness, dysphagia and weak voice  6 minute walk test without desaturation today  -  I will send a message to Dr Carnella Halsted and Dr Kierra Mcmanus at [facility] his neurologis to see if any additional testing need to be performed  -  I will repeat PFTs to ensure that he is not developing restriction      -  Repeat SNIFF test to see if there is worsening diaphragmatic issues      -  Will check CT chest at next visit  -  Continue support nocturnally with BIPAP  We will likely need to move to consider some move of increasing nocturnal ventilation with AVAPS or ASV if BIPAP can not be optimized  -  I did have Mr Jyoti Mcnamara stop macrobid suppressive therapy due to pulmonary toxicity with little success in helping his symptoms  2  Moderate KALLI (AHI - 15 7) - on auto BiPAP   His compliance is much better with autoBIPAP  However, he is having residual events (AHI - 7) most of which are central (DONNIE - 5 1)  - I will attempt to widen the gradient of his CPAP  While this may cause more central events, it may help ventilator efforts as it seems his respirations are struggling  I would consider moving to BIPAP S/T with wider gradient if this is successful       -  However, if I believe he will benefit from an advanced machine  ASV could help reduce central events but I am more concerned for progressive ventilatory deficits     I believe I would favor moving to AVAPS instead         -  He is aware of the risk of leaving sleep apnea untreated including hypertension, heart failure, arrhythmia, MI and stroke       3  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 -  He is still acting out his dreams but this has decrease in frequency  This is likely due to atypical parkinsons or worsening neurologic disorder        -  He will increase melatonin to 10mg qHS instead to see if that helps        4   Periodic limb movement (PLM index - 109) -  This may be secondary to KALLI or Parkinsons   He denies symptoms of this          -  Continue Requip at dose as per neurology  ______________________________________________________________________    HPI:    Deann Salgado presents today for follow-up for his KALLI and respiratory insufficiency  He states that he did stop the macrobid and has not had significant success in improving his dyspnea  He states that minimal exertion makes him short of breath  He has also noted some progressive in dysphagia, hoarseness of his throat, weakening of his voice and the airway seems like it can close at times  He has followed with Dr Evy Jovel who recommends that he get another opinion down at [facility] with Dr Michael Garcia  He denies significant cough, wheezing or chest tightness  Review of Systems:  Review of Systems   Constitutional: Negative  HENT: Negative  Eyes: Negative  Respiratory: Negative  Cardiovascular: Negative  Gastrointestinal: Negative  Endocrine: Negative  Genitourinary: Negative  Musculoskeletal: Negative  Neurological: Negative  Hematological: Negative  Psychiatric/Behavioral: Negative            Social history updates:  Social History     Tobacco Use   Smoking Status Former Smoker    Packs/day: 0 50    Years: 3 50    Pack years: 1 75    Types: Cigarettes    Last attempt to quit: 1980    Years since quittin 6   Smokeless Tobacco Never Used     Social History     Socioeconomic History    Marital status: /Civil [employer]     Spouse name: Not on file    Number of children: Not on file    Years of education: Not on file    Highest education level: Not on file   Occupational History    Not on file   Social Needs    Financial resource strain: Not on file    Food insecurity     Worry: Not on file     Inability: Not on file    Transportation needs     Medical: Not on file     Non-medical: Not on file   Tobacco Use    Smoking status: Former Smoker     Packs/day: 0 50     Years: 3 50     Pack years: 1 75     Types: Cigarettes     Last attempt to quit: 1980     Years since quittin 6    Smokeless tobacco: Never Used   Substance and Sexual Activity    Alcohol use: Not Currently     Comment: social    Drug use: No    Sexual activity: Never   Lifestyle    Physical activity     Days per week: Not on file     Minutes per session: Not on file    Stress: Not on file   Relationships    Social connections     Talks on phone: Not on file     Gets together: Not on file     Attends [employer] service: Not on file     Active member of club or organization: Not on file     Attends meetings of clubs or organizations: Not on file     Relationship status: Not on file    Intimate partner violence     Fear of current or ex partner: Not on file     Emotionally abused: Not on file     Physically abused: Not on file     Forced sexual activity: Not on file   Other Topics Concern    Not on file   Social History Narrative    Consumes 5 glasses of tea per week       PhysicalExamination:  Vitals:   /70   Pulse 83   Temp 98 °F (36 7 °C)   Ht 6' (1 829 m)   Wt 98 9 kg (218 lb)   SpO2 95%   BMI 29 57 kg/m²   General: Pleasant, Awake alert and oriented x 3, conversant without conversational dyspnea, NAD, normal affect  HEENT:  PERRL, Sclera noninjected, nonicteric OU, Nares patent,  no craniofacial abnormalities, Mucous membranes, moist, no oral lesions, normal dentition, Mallampati class 3  NECK: Trachea midline, no accessory muscle use, no stridor, no cervical or supraclavicular adenopathy, JVP not elevated  CARDIAC: Reg, single s1/S2, no m/r/g  PULM: CTA bilaterally no wheezing, rhonchi or rales  ABD: Normoactive bowel sounds, soft nontender, nondistended, no rebound, no rigidity, no guarding  EXT: No cyanosis, no clubbing, no edema, normal capillary refill  NEURO: no focal neurologic deficits, AAOx3, moving all extremities appropriately      Diagnostic Data:  Labs: I personally reviewed the most recent laboratory data pertinent to today's visit  I have personally reviewed pertinent lab results  Lab Results   Component Value Date    WBC 8 75 2020    HGB 14 4 2020    HCT 45 2 2020    MCV 92 2020     2020     Lab Results   Component Value Date    CALCIUM 9 6 2020    K 4 6 2020    CO2 29 2020     2020    BUN 18 2020    CREATININE 1 03 2020     No results found for: IGE    PFT results: The most recent pulmonary function tests were reviewed    3/4/19  Results:  FEV1/FVC Ratio: 80 %  Forced Vital Capacity: 4 80 L    93 % predicted  FEV1: 3 85 L     97 % predicted  Lung volumes by body plethysmography:   Total Lung Capacity 94 % predicted   Residual volume 88 % predicted  DLCO corrected for patients hemoglobin level: 79 %  Interpretation:  · No obstructive airflow defect   · Normal Spirometry  · Normal Lung volumes  · Normal diffusion capacity     Repeat 6 minute walk today  Starting saturation - 98%   Starting HR - 89  Lowest saturation - 91%    Highest HR - 103  Ambulated - 252 m and he did not require oxygen    6 minute walk  Date of testin2019  Resting room air saturation: 93%  Randy scale of dyspnea at start of test: 0/10     Ambulation testing:  Lowest saturation 93%  No supplemental oxygen required     Randy scale of dyspnea at end of test: 3/10  Reason if test was stopped early: N/A      Total 6 minute walk distance: 1400 feet     Imaging:  I personally reviewed the images on the [facility] system pertinent to today's visit  CT chest - 5/20/19  IMPRESSION:  Within normal limits CT of the chest      Other studies:  HST - moderate (15 7), Supine AHI - 23, hypoxia   CPAP titration - Nasal CPAP was titrated from 5-11 cm of water for  control of snoring and abnormal breathing  Patient appeared to do best at 11 cm of CPAP delivered using a [address] full face interface   Continued use of this equipment at these settings is recommended      New Compliance Data:  7/28/20-8/26/20                                   Type of CPAP:  auto BiPAP min EPAP 11, PSV 4, Max IPAP 25,                                    Mean EPAP - 12 2- 17 6, IPAP 16 2 - Max IPAP 22                                   Percent usage: 97%, 90%> 4hrs                                   Average time used: 5hrs 30 mins - 8 hrs 25 mins                                  Time in large leak: 4 min 39 secs                                   Residual AHI: 7 0 (CA - 5 1)     Compliance Data:  1/5/20-2/5/20                                   Type of CPAP:  auto BiPAP min EPAP 11, PSV 4, Max IPAP 25,                                    Mean EPAP recorded - 12 1, Peak - 14 9, Min IPAP 16, Max IPAP 19                                   Percent usage: 72%, 63%> 4hrs                                   Average time used: 3hrs 51 mins - 8 hrs 26 mins                                  Time in large leak: 50 secs                                   Residual AHI: 6 6  (CA - 3 9)     Compliance Data:  10/23/19-11/21/19                                   Type of CPAP:  autoPAP 11-15, Mean - 13 2, Peak - 15 0                                   Percent usage: 73%, 67%> 4hrs                                   Average time used: 3hrs 38 mins - 8 hrs 37 mins                                  Time in large leak: 9 mins 46 secs                                   Residual AHI: 11 0  (CA - 0 9)     Compliance Data:  8/25/19-9/23/19                                   Type of CPAP:  autoPAP 8-15, Mean - 11 9, Peak - 15 6                                   Percent usage: 63%                                   Average time used: 2hrs 52 mins - 4 hrs 32 mins                                  Time in large leak: 4 mins 44 secs                                   Residual AHI: 13 6  (CA - 0 9)       Chase Jonas DO

## 2020-09-02 ENCOUNTER — HOSPITAL ENCOUNTER (OUTPATIENT)
Dept: ULTRASOUND IMAGING | Facility: HOSPITAL | Age: 60
Discharge: HOME | End: 2020-09-02
Payer: COMMERCIAL

## 2020-09-02 DIAGNOSIS — N32.0 BNC (BLADDER NECK CONTRACTURE): ICD-10-CM

## 2020-09-02 PROCEDURE — 51798 US URINE CAPACITY MEASURE: CPT

## 2020-09-03 ENCOUNTER — CONSULT (OUTPATIENT)
Dept: GASTROENTEROLOGY | Facility: MEDICAL CENTER | Age: 60
End: 2020-09-03
Payer: COMMERCIAL

## 2020-09-03 VITALS
HEART RATE: 73 BPM | BODY MASS INDEX: 29.53 KG/M2 | HEIGHT: 72 IN | WEIGHT: 218 LBS | SYSTOLIC BLOOD PRESSURE: 147 MMHG | DIASTOLIC BLOOD PRESSURE: 85 MMHG | TEMPERATURE: 98 F

## 2020-09-03 DIAGNOSIS — R49.0 HOARSE VOICE QUALITY: ICD-10-CM

## 2020-09-03 DIAGNOSIS — R09.89 GLOBUS SENSATION: Primary | ICD-10-CM

## 2020-09-03 DIAGNOSIS — Z86.010 HISTORY OF COLON POLYPS: ICD-10-CM

## 2020-09-03 DIAGNOSIS — K59.01 SLOW TRANSIT CONSTIPATION: ICD-10-CM

## 2020-09-03 DIAGNOSIS — K21.00 GASTROESOPHAGEAL REFLUX DISEASE WITH ESOPHAGITIS: ICD-10-CM

## 2020-09-03 PROCEDURE — 99244 OFF/OP CNSLTJ NEW/EST MOD 40: CPT | Performed by: INTERNAL MEDICINE

## 2020-09-03 NOTE — H&P (VIEW-ONLY)
[facility] - Outpatient Consultation  [facility] 63 y o  male MRN: [id number]  Encounter: [id number]          ASSESSMENT AND PLAN:      1  Globus sensation  2  Gastroesophageal reflux disease with esophagitis  3  Hoarse voice quality  He reports 1 year of globus sensation, hoarse voice in the setting of diagnosis of his Parkinson's disease  He previously had history of GERD but states the symptoms are different  He denies overt dysphagia or odynophagia  He does report increased saliva which can be seen with Parkinson's disease  Despite daily PPI he reports no improvement of his symptoms  I recommend upper endoscopy for evaluation and toobtain esophageal biopsies  Symptoms may relate due to GERD/LPR or to his underlying neurologic disorder  His upper endoscopy is unremarkable he may require motility/pH testing off PPI  I recommend he stop his PPI 1 week prior to the upper endoscopy  - EGD; Future  - continue Protonix daily 30 minutes before breakfast  Stop 1 week prior to procedure  4  History of colon polyps  He has history of colon polyps on his 2017 colonoscopy and was recommended to repeat in 3 years  He is due for repeat screening at this time given his family history  Discussed the indication, risk and benefit of colonoscopy with him today and he is agreeable to proceed  - Colonoscopy; Future    5  Constipation  He reports history of worsening constipation in the setting of his Parkinson's disease diagnosis  I discussed that this is a frequent gastrointestinal manifestation of Parkinson's disease management includes supportive care with adequate hydration drinking 6-8 glasses of water per day, increase dietary fiber  I recommend he start MiraLax 5 days a week and increase or decrease as needed to have 1 soft, formed bowel movement every day or every other day      Follow up in GI office once the above procedures are completed  ______________________________________________________________________    HPI:  Kevin Curry is a 63 y o  male with a history of Parkinson's disease who presents for evaluation  He reports being diagnosed with Parkinson's disease 2019 after presenting with micro  States symptoms have been ongoing since two thousand eighteen  He notes for the last year he globus sensation and feeling and abnormal sensation in his throat  This can be worse with eating feeling like the food is scratching in his esophagus  He denies overt dysphagia or odynophagia  He reports increased saliva as well  He previously had gastroesophageal reflux for which he was taking intermittent acid suppression in the past and states that his symptoms are different  He denies abdominal pain, nausea or vomiting  He was initially prescribed omeprazole 40 mg daily with no improvement of symptoms and was transitioned to pantoprazole 40 mg daily, which he states with mild improvement of symptoms  He additionally reports hoarseness to his voice which has been progressive  He denies nausea or vomiting  His appetite is good his weight is stable  He reports issues with constipation since his Parkinson diagnosis with the bowel movement every 2-3 days  Previous when using stool softeners and MiraLax he improvement of his constipation but has been off these medications for last several weeks  He denies melena, abeba hematochezia or rectal bleeding  He underwent speech and swallow evaluation July 2019 showing normal oral and pharyngeal states  He reports family history of colon cancer in his father diagnosed in his [de-identified]  Past surgical history includes bladder surgery and bilateral inguinal hernia      2017 colonoscopy showed 2 subcentimeter polyps, pathology not available for review  He was recommended to repeat in 3 years        Prior EGD: none    REVIEW OF SYSTEMS:    CONSTITUTIONAL: Denies any fever, chills, rigors, and weight loss  HEENT: No earache or tinnitus  Denies hearing loss or visual disturbances  CARDIOVASCULAR: No chest pain or palpitations  RESPIRATORY: Denies any cough, hemoptysis, 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   GASTROINTESTINAL: As noted in the History of Present Illness  GENITOURINARY: No problems with urination  Denies any hematuria or dysuria  NEUROLOGIC: No dizziness or vertigo, denies headaches  MUSCULOSKELETAL: Denies any muscle or joint pain  SKIN: Denies skin rashes or itching  ENDOCRINE: Denies excessive thirst  Denies intolerance to heat or cold  PSYCHOSOCIAL: Denies depression or anxiety  Denies any recent memory loss         Historical Information   Past Medical History:   Diagnosis Date    Back pain     Bladder infection     BPH (benign prostatic hyperplasia)     Cancer ([address] )     testicular 1988    Chronic kidney disease     Diverticulosis     GERD (gastroesophageal reflux disease)     occassional    History of testicular cancer 1988    Surgery and radiation; left side    Kidney stone     Kidney stones     Currently and in the past    Orthostatic hypotension     Orthostatic hypotension due to Parkinson's disease ([address] )     [facility]     Wears glasses      Past Surgical History:   Procedure Laterality Date    COLONOSCOPY  2017    CYSTOPLASTY / CYSTOURETHROPLASTY  07/27/2020    [address] inguinal hernia repair    IR SUPRAPUBIC CATHETER PLACEMENT  2/24/2020    IR TUBE UPSIZE  3/23/2020    KIDNEY STONE SURGERY      LITHOTRIPSY      Renal; Resolved: 2/27/07    ORCHIECTOMY Left     [place] CYSTOURETHRO W/IMPLANT N/A 5/17/2018    Procedure: CYSTOSCOPY WITH INSERTION UROLIFT;  Surgeon: Kalin Williamson DO;  [facility];  Service: Urology    PROSTATE SURGERY N/A 1/18/2018    Procedure: CYSTOSCOPY WITH INSERTION UROLIFT;  Surgeon: Kalin Williamson DO;  [facility];  Service: Urology    TESTICLE SURGERY      For cancer    TONSILLECTOMY      URETERONEOCYSTOSTOMY      w/ cystoscopy Ureteral Stent Placement; Resolved: 2007    WISDOM TOOTH EXTRACTION       Social History   Social History     Substance and Sexual Activity   Alcohol Use Not Currently    Comment: social     Social History     Substance and Sexual Activity   Drug Use No     Social History     Tobacco Use   Smoking Status Former Smoker    Packs/day: 0 50    Years: 3 50    Pack years: 1 75    Types: Cigarettes    Last attempt to quit: 1980    Years since quittin 7   Smokeless Tobacco Never Used     Family History   Problem Relation Age of Onset    Colon cancer Father     Heart failure Father     Parkinsonism Mother     Cancer Paternal Grandmother        Meds/Allergies       Current Outpatient Medications:     cholecalciferol (VITAMIN D3) 1,000 units tablet    diclofenac sodium (VOLTAREN) 1 %    Elastic Bandages & Supports (MEDICAL COMPRESSION STOCKINGS) MISC    FIBER PO    Melatonin 5 MG TABS    midodrine (PROAMATINE) 5 mg tablet    multivitamin (THERAGRAN) TABS    nystatin-triamcinolone (MYCOLOG-II) cream    pantoprazole (PROTONIX) 40 mg tablet    polyethylene glycol (GLYCOLAX) 17 GM/SCOOP powder    selegiline (ELDEPRYL) 5 mg capsule    vitamin E 100 UNIT capsule    Wheat Dextrin (BENEFIBER DRINK MIX PO)    No Known Allergies        Objective     Blood pressure 147/85, pulse 73, temperature 98 °F (36 7 °C), temperature source Tympanic, height 6' (1 829 m), weight 98 9 kg (218 lb)  Body mass index is 29 57 kg/m²  PHYSICAL EXAM:      General Appearance:   Well-appearing older male, resting tremor Alert, cooperative, no distress   HEENT:   Normocephalic, atraumatic, anicteric  Neck:  Supple, symmetrical, trachea midline   Lungs:   Clear to auscultation bilaterally; no rales, rhonchi or wheezing; respirations unlabored    Heart[de-identified]   Regular rate and rhythm; no murmur, rub, or gallop  Abdomen:   Soft, non-tender, non-distended; normal bowel sounds; no masses, no organomegaly    Genitalia:   Deferred    Rectal:   Deferred    Extremities:  No cyanosis, clubbing or edema    Pulses:  2+ and symmetric    Skin:  No jaundice, rashes, or lesions    Lymph nodes:  No palpable cervical lymphadenopathy        Lab Results:   No visits with results within 1 Day(s) from this visit  Latest known visit with results is:   Appointment on 08/28/2020   Component Date Value    Sodium 08/28/2020 139     Potassium 08/28/2020 4 6     Chloride 08/28/2020 105     CO2 08/28/2020 29     ANION GAP 08/28/2020 5     BUN 08/28/2020 18     Creatinine 08/28/2020 1 03     Glucose, Fasting 08/28/2020 114*    Calcium 08/28/2020 9 6     eGFR 08/28/2020 78     Color, UA 08/28/2020 Yellow     Clarity, UA 08/28/2020 Clear     Specific Gravity, UA 08/28/2020 1 021     pH, UA 08/28/2020 6 5     Leukocytes, UA 08/28/2020 Small*    Nitrite, UA 08/28/2020 Negative     Protein, UA 08/28/2020 Negative     Glucose, UA 08/28/2020 Negative     Ketones, UA 08/28/2020 Negative     Urobilinogen, UA 08/28/2020 0 2     Bilirubin, UA 08/28/2020 Negative     Blood, UA 08/28/2020 Negative     RBC, UA 08/28/2020 None Seen     WBC, UA 08/28/2020 10-20*    Epithelial Cells 08/28/2020 None Seen     Bacteria, UA 08/28/2020 None Seen     Hyaline Casts, UA 08/28/2020 None Seen     Urine Culture 08/28/2020 No Growth <1000 cfu/mL          Radiology Results:   No results found

## 2020-09-03 NOTE — PROGRESS NOTES
[facility] - Outpatient Consultation  Kurtis Partida 63 y o  male MRN: [id number]  Encounter: [id number]          ASSESSMENT AND PLAN:      1  Globus sensation  2  Gastroesophageal reflux disease with esophagitis  3  Hoarse voice quality  He reports 1 year of globus sensation, hoarse voice in the setting of diagnosis of his Parkinson's disease  He previously had history of GERD but states the symptoms are different  He denies overt dysphagia or odynophagia  He does report increased saliva which can be seen with Parkinson's disease  Despite daily PPI he reports no improvement of his symptoms  I recommend upper endoscopy for evaluation and toobtain esophageal biopsies  Symptoms may relate due to GERD/LPR or to his underlying neurologic disorder  His upper endoscopy is unremarkable he may require motility/pH testing off PPI  I recommend he stop his PPI 1 week prior to the upper endoscopy  - EGD; Future  - continue Protonix daily 30 minutes before breakfast  Stop 1 week prior to procedure  4  History of colon polyps  He has history of colon polyps on his 2017 colonoscopy and was recommended to repeat in 3 years  He is due for repeat screening at this time given his family history  Discussed the indication, risk and benefit of colonoscopy with him today and he is agreeable to proceed  - Colonoscopy; Future    5  Constipation  He reports history of worsening constipation in the setting of his Parkinson's disease diagnosis  I discussed that this is a frequent gastrointestinal manifestation of Parkinson's disease management includes supportive care with adequate hydration drinking 6-8 glasses of water per day, increase dietary fiber  I recommend he start MiraLax 5 days a week and increase or decrease as needed to have 1 soft, formed bowel movement every day or every other day      Follow up in GI office once the above procedures are completed  ______________________________________________________________________    HPI:  Baljeet Hernandez is a 63 y o  male with a history of Parkinson's disease who presents for evaluation  He reports being diagnosed with Parkinson's disease 2019 after presenting with micro  States symptoms have been ongoing since two thousand eighteen  He notes for the last year he globus sensation and feeling and abnormal sensation in his throat  This can be worse with eating feeling like the food is scratching in his esophagus  He denies overt dysphagia or odynophagia  He reports increased saliva as well  He previously had gastroesophageal reflux for which he was taking intermittent acid suppression in the past and states that his symptoms are different  He denies abdominal pain, nausea or vomiting  He was initially prescribed omeprazole 40 mg daily with no improvement of symptoms and was transitioned to pantoprazole 40 mg daily, which he states with mild improvement of symptoms  He additionally reports hoarseness to his voice which has been progressive  He denies nausea or vomiting  His appetite is good his weight is stable  He reports issues with constipation since his Parkinson diagnosis with the bowel movement every 2-3 days  Previous when using stool softeners and MiraLax he improvement of his constipation but has been off these medications for last several weeks  He denies melena, abeba hematochezia or rectal bleeding  He underwent speech and swallow evaluation July 2019 showing normal oral and pharyngeal states  He reports family history of colon cancer in his father diagnosed in his [de-identified]  Past surgical history includes bladder surgery and bilateral inguinal hernia      2017 colonoscopy showed 2 subcentimeter polyps, pathology not available for review  He was recommended to repeat in 3 years        Prior EGD: none    REVIEW OF SYSTEMS:    CONSTITUTIONAL: Denies any fever, chills, rigors, and weight loss  HEENT: No earache or tinnitus  Denies hearing loss or visual disturbances  CARDIOVASCULAR: No chest pain or palpitations  RESPIRATORY: Denies any cough, hemoptysis, 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   GASTROINTESTINAL: As noted in the History of Present Illness  GENITOURINARY: No problems with urination  Denies any hematuria or dysuria  NEUROLOGIC: No dizziness or vertigo, denies headaches  MUSCULOSKELETAL: Denies any muscle or joint pain  SKIN: Denies skin rashes or itching  ENDOCRINE: Denies excessive thirst  Denies intolerance to heat or cold  PSYCHOSOCIAL: Denies depression or anxiety  Denies any recent memory loss         Historical Information   Past Medical History:   Diagnosis Date    Back pain     Bladder infection     BPH (benign prostatic hyperplasia)     Cancer ([address] )     testicular 1988    Chronic kidney disease     Diverticulosis     GERD (gastroesophageal reflux disease)     occassional    History of testicular cancer 1988    Surgery and radiation; left side    Kidney stone     Kidney stones     Currently and in the past    Orthostatic hypotension     Orthostatic hypotension due to Parkinson's disease ([address] )     [facility]'s [facility]     Wears glasses      Past Surgical History:   Procedure Laterality Date    COLONOSCOPY  2017    CYSTOPLASTY / CYSTOURETHROPLASTY  07/27/2020    [address] inguinal hernia repair    IR SUPRAPUBIC CATHETER PLACEMENT  2/24/2020    IR TUBE UPSIZE  3/23/2020    KIDNEY STONE SURGERY      LITHOTRIPSY      Renal; Resolved: 2/27/07    ORCHIECTOMY Left     [place] CYSTOURETHRO W/IMPLANT N/A 5/17/2018    Procedure: CYSTOSCOPY WITH INSERTION UROLIFT;  Surgeon: Torri Rodriguez DO;  [facility];  Service: Urology    PROSTATE SURGERY N/A 1/18/2018    Procedure: CYSTOSCOPY WITH INSERTION UROLIFT;  Surgeon: Torri Rodriguez DO;  [facility];  Service: Urology    TESTICLE SURGERY      For cancer    TONSILLECTOMY      URETERONEOCYSTOSTOMY      w/ cystoscopy Ureteral Stent Placement; Resolved: 2007    WISDOM TOOTH EXTRACTION       Social History   Social History     Substance and Sexual Activity   Alcohol Use Not Currently    Comment: social     Social History     Substance and Sexual Activity   Drug Use No     Social History     Tobacco Use   Smoking Status Former Smoker    Packs/day: 0 50    Years: 3 50    Pack years: 1 75    Types: Cigarettes    Last attempt to quit: 1980    Years since quittin 7   Smokeless Tobacco Never Used     Family History   Problem Relation Age of Onset    Colon cancer Father     Heart failure Father     Parkinsonism Mother     Cancer Paternal Grandmother        Meds/Allergies       Current Outpatient Medications:     cholecalciferol (VITAMIN D3) 1,000 units tablet    diclofenac sodium (VOLTAREN) 1 %    Elastic Bandages & Supports (MEDICAL COMPRESSION STOCKINGS) MISC    FIBER PO    Melatonin 5 MG TABS    midodrine (PROAMATINE) 5 mg tablet    multivitamin (THERAGRAN) TABS    nystatin-triamcinolone (MYCOLOG-II) cream    pantoprazole (PROTONIX) 40 mg tablet    polyethylene glycol (GLYCOLAX) 17 GM/SCOOP powder    selegiline (ELDEPRYL) 5 mg capsule    vitamin E 100 UNIT capsule    Wheat Dextrin (BENEFIBER DRINK MIX PO)    No Known Allergies        Objective     Blood pressure 147/85, pulse 73, temperature 98 °F (36 7 °C), temperature source Tympanic, height 6' (1 829 m), weight 98 9 kg (218 lb)  Body mass index is 29 57 kg/m²  PHYSICAL EXAM:      General Appearance:   Well-appearing older male, resting tremor Alert, cooperative, no distress   HEENT:   Normocephalic, atraumatic, anicteric  Neck:  Supple, symmetrical, trachea midline   Lungs:   Clear to auscultation bilaterally; no rales, rhonchi or wheezing; respirations unlabored    Heart[de-identified]   Regular rate and rhythm; no murmur, rub, or gallop  Abdomen:   Soft, non-tender, non-distended; normal bowel sounds; no masses, no organomegaly    Genitalia:   Deferred    Rectal:   Deferred    Extremities:  No cyanosis, clubbing or edema    Pulses:  2+ and symmetric    Skin:  No jaundice, rashes, or lesions    Lymph nodes:  No palpable cervical lymphadenopathy        Lab Results:   No visits with results within 1 Day(s) from this visit  Latest known visit with results is:   Appointment on 08/28/2020   Component Date Value    Sodium 08/28/2020 139     Potassium 08/28/2020 4 6     Chloride 08/28/2020 105     CO2 08/28/2020 29     ANION GAP 08/28/2020 5     BUN 08/28/2020 18     Creatinine 08/28/2020 1 03     Glucose, Fasting 08/28/2020 114*    Calcium 08/28/2020 9 6     eGFR 08/28/2020 78     Color, UA 08/28/2020 Yellow     Clarity, UA 08/28/2020 Clear     Specific Gravity, UA 08/28/2020 1 021     pH, UA 08/28/2020 6 5     Leukocytes, UA 08/28/2020 Small*    Nitrite, UA 08/28/2020 Negative     Protein, UA 08/28/2020 Negative     Glucose, UA 08/28/2020 Negative     Ketones, UA 08/28/2020 Negative     Urobilinogen, UA 08/28/2020 0 2     Bilirubin, UA 08/28/2020 Negative     Blood, UA 08/28/2020 Negative     RBC, UA 08/28/2020 None Seen     WBC, UA 08/28/2020 10-20*    Epithelial Cells 08/28/2020 None Seen     Bacteria, UA 08/28/2020 None Seen     Hyaline Casts, UA 08/28/2020 None Seen     Urine Culture 08/28/2020 No Growth <1000 cfu/mL          Radiology Results:   No results found

## 2020-09-03 NOTE — PATIENT INSTRUCTIONS
The patient is scheduled at [place] colon/egd with Gemini Lockhart on 10/15/2020  golytely/dulcolax prep instructions have been gone over in the office, with the patient, by the MA  The patient is aware that they will receive a call with the arrival time the day prior to procedure and that they will need a  the day of the procedure   I have asked the patient to call with any questions that they might have prior to procedure

## 2020-09-04 ENCOUNTER — TELEPHONE (OUTPATIENT)
Dept: GASTROENTEROLOGY | Facility: CLINIC | Age: 60
End: 2020-09-04

## 2020-09-04 NOTE — TELEPHONE ENCOUNTER
Patients GI provider:  Dr Charles Coppola    Number to return call: [phone]    Reason for call: Pt calling requesting to speak to Dr Charles Coppola regarding his medication  Pt states medication is not helping with his symptoms      Scheduled procedure/appointment date if applicable: NA

## 2020-09-04 NOTE — TELEPHONE ENCOUNTER
Patient of Dr Wilbert Mon, last seen yesterday 9/3    History of globus sensation, GERD, hoarse voice    Called patient and spoke with him  He was prescribed protonix by his PCP 8/20 and feels it is not helping  When Dr Wilbert Mon saw him yesterday she referred him for testing and he scheduled an EGD, but he states his throat tightness is "even worse today"  He denies any difficulty breathing or any other symptoms besides the throat tightness, which he states is only slightly worse today but he worries it is progressively getting worse  His EGD is scheduled for 10/15, he wants to know if there is anything he can do in the meantime  Do we want to change protonix to BID or add H2 blocker?  Please advise, thanks

## 2020-09-04 NOTE — TELEPHONE ENCOUNTER
Called patient and spoke with him, he will start taking his protonix BID  I advised him on lifestyle measures, alarm symptoms, and when to call us/go to the ED  I spoke with  and she doesn't believe earlier than 10/15 is available  Is this okay or something I should talk to Dr Eduardo Renee about so we can pull strings to have him done sooner?

## 2020-09-04 NOTE — TELEPHONE ENCOUNTER
We can do either or (increase protonix to BID or add an H2 blocker at night), whichever the patient prefers  He should continue to monitor the throat tightness and consider a sooner EGD if possible  If he continues to have progressive symptoms or begins to have SOB, dysphagia, odynophagia, etc  he should notify us  If severe symptoms should proceed to ER       Please also remind the patient to avoid of trigger foods (coffee, caffeine, chocolate, mint, tomato-based products, spicy foods, fatty foods/alcohol), avoid eating 2-3 hours prior to bed, and to elevate the head of bed 30 degrees at night

## 2020-09-05 NOTE — TELEPHONE ENCOUNTER
I agree with increasing protonix to twice daily, but I do think we should get him in sooner if possible  Salima- can you see if another provider is available at one of the hospitals to move his procedure up?

## 2020-09-14 ENCOUNTER — TELEPHONE (OUTPATIENT)
Dept: GASTROENTEROLOGY | Facility: CLINIC | Age: 60
End: 2020-09-14

## 2020-09-14 NOTE — TELEPHONE ENCOUNTER
We need to get this patient scheduled sooner as per Dr Liat Beasley (see note in this encounter), it has been 10 days and no one has reached out to him and he is very upset   Please call him to reschedule for sooner, thank you

## 2020-09-14 NOTE — TELEPHONE ENCOUNTER
Patient stated he spoke with you last week and wanted to talk to you again  Please call him   Thanks

## 2020-09-14 NOTE — TELEPHONE ENCOUNTER
I called patient and spoke with him  He is calling to see why no one reached out to reschedule his EGD for a sooner date as Dr Aleyda Freeman suggested   I let him know I would be reaching out to ensure he was rescheduled

## 2020-09-15 ENCOUNTER — TELEPHONE (OUTPATIENT)
Dept: GASTROENTEROLOGY | Facility: AMBULARY SURGERY CENTER | Age: 60
End: 2020-09-15

## 2020-09-15 NOTE — TELEPHONE ENCOUNTER
EGD/COLON rescheduled on 9/17/20 with Dr Escalante at [facility] instructions emailed to [email or web address]

## 2020-09-15 NOTE — TELEPHONE ENCOUNTER
Patients GI provider:  Dr Dick Settler    Number to return call: [phone]    Reason for call: Pt calling stated suprep was not called into pharmacy       Scheduled procedure/appointment date if applicable: Apt/procedure 9/17/20

## 2020-09-16 ENCOUNTER — ANESTHESIA EVENT (OUTPATIENT)
Dept: GASTROENTEROLOGY | Facility: HOSPITAL | Age: 60
End: 2020-09-16

## 2020-09-16 DIAGNOSIS — Z86.010 HISTORY OF COLON POLYPS: Primary | ICD-10-CM

## 2020-09-16 RX ORDER — SODIUM CHLORIDE, SODIUM LACTATE, POTASSIUM CHLORIDE, CALCIUM CHLORIDE 600; 310; 30; 20 MG/100ML; MG/100ML; MG/100ML; MG/100ML
125 INJECTION, SOLUTION INTRAVENOUS CONTINUOUS
Status: CANCELLED | OUTPATIENT
Start: 2020-09-16

## 2020-09-16 NOTE — TELEPHONE ENCOUNTER
Spoke with pt dr Josh Sanchez ordered golytely at office visit and he has these instructions in his blue folder   I sent golytely to pt pharmacy

## 2020-09-16 NOTE — TELEPHONE ENCOUNTER
Patient needs prescription for colonoscopy prep  He states it should be suprep but it appears nothing was ever ordered  Please send in to his pharmacy on file, thanks!

## 2020-09-17 ENCOUNTER — HOSPITAL ENCOUNTER (OUTPATIENT)
Dept: GASTROENTEROLOGY | Facility: HOSPITAL | Age: 60
Setting detail: OUTPATIENT SURGERY
Discharge: HOME | End: 2020-09-17
Attending: INTERNAL MEDICINE
Payer: COMMERCIAL

## 2020-09-17 ENCOUNTER — ANESTHESIA (OUTPATIENT)
Dept: GASTROENTEROLOGY | Facility: HOSPITAL | Age: 60
End: 2020-09-17

## 2020-09-17 VITALS
RESPIRATION RATE: 16 BRPM | OXYGEN SATURATION: 99 % | BODY MASS INDEX: 28.44 KG/M2 | TEMPERATURE: 97.6 F | DIASTOLIC BLOOD PRESSURE: 95 MMHG | SYSTOLIC BLOOD PRESSURE: 156 MMHG | WEIGHT: 210 LBS | HEIGHT: 72 IN | HEART RATE: 78 BPM

## 2020-09-17 VITALS — HEART RATE: 62 BPM

## 2020-09-17 DIAGNOSIS — Z86.010 HISTORY OF COLON POLYPS: ICD-10-CM

## 2020-09-17 DIAGNOSIS — R09.89 GLOBUS SENSATION: ICD-10-CM

## 2020-09-17 DIAGNOSIS — K21.00 GASTROESOPHAGEAL REFLUX DISEASE WITH ESOPHAGITIS: ICD-10-CM

## 2020-09-17 PROCEDURE — 88305 TISSUE EXAM BY PATHOLOGIST: CPT | Performed by: PATHOLOGY

## 2020-09-17 PROCEDURE — NC001 PR NO CHARGE: Performed by: INTERNAL MEDICINE

## 2020-09-17 PROCEDURE — 43239 EGD BIOPSY SINGLE/MULTIPLE: CPT | Performed by: INTERNAL MEDICINE

## 2020-09-17 PROCEDURE — 88342 IMHCHEM/IMCYTCHM 1ST ANTB: CPT | Performed by: PATHOLOGY

## 2020-09-17 PROCEDURE — 45385 COLONOSCOPY W/LESION REMOVAL: CPT | Performed by: INTERNAL MEDICINE

## 2020-09-17 PROCEDURE — 88341 IMHCHEM/IMCYTCHM EA ADD ANTB: CPT | Performed by: PATHOLOGY

## 2020-09-17 PROCEDURE — 45380 COLONOSCOPY AND BIOPSY: CPT | Performed by: INTERNAL MEDICINE

## 2020-09-17 RX ORDER — PROPOFOL 10 MG/ML
INJECTION, EMULSION INTRAVENOUS AS NEEDED
Status: DISCONTINUED | OUTPATIENT
Start: 2020-09-17 | End: 2020-09-17

## 2020-09-17 RX ORDER — SODIUM CHLORIDE 9 MG/ML
INJECTION, SOLUTION INTRAVENOUS CONTINUOUS PRN
Status: DISCONTINUED | OUTPATIENT
Start: 2020-09-17 | End: 2020-09-17

## 2020-09-17 RX ORDER — LIDOCAINE HYDROCHLORIDE 10 MG/ML
INJECTION, SOLUTION EPIDURAL; INFILTRATION; INTRACAUDAL; PERINEURAL AS NEEDED
Status: DISCONTINUED | OUTPATIENT
Start: 2020-09-17 | End: 2020-09-17

## 2020-09-17 RX ORDER — PROPOFOL 10 MG/ML
INJECTION, EMULSION INTRAVENOUS CONTINUOUS PRN
Status: DISCONTINUED | OUTPATIENT
Start: 2020-09-17 | End: 2020-09-17

## 2020-09-17 RX ORDER — FENTANYL CITRATE 50 UG/ML
INJECTION, SOLUTION INTRAMUSCULAR; INTRAVENOUS AS NEEDED
Status: DISCONTINUED | OUTPATIENT
Start: 2020-09-17 | End: 2020-09-17

## 2020-09-17 RX ADMIN — SODIUM CHLORIDE: 9 INJECTION, SOLUTION INTRAVENOUS at 11:47

## 2020-09-17 RX ADMIN — LIDOCAINE HYDROCHLORIDE 70 MG: 10 INJECTION, SOLUTION EPIDURAL; INFILTRATION; INTRACAUDAL; PERINEURAL at 11:51

## 2020-09-17 RX ADMIN — PROPOFOL 180 MCG/KG/MIN: 10 INJECTION, EMULSION INTRAVENOUS at 11:53

## 2020-09-17 RX ADMIN — FENTANYL CITRATE 25 MCG: 50 INJECTION, SOLUTION INTRAMUSCULAR; INTRAVENOUS at 11:51

## 2020-09-17 RX ADMIN — PROPOFOL 30 MG: 10 INJECTION, EMULSION INTRAVENOUS at 11:53

## 2020-09-17 NOTE — INTERVAL H&P NOTE
H&P reviewed  After examining the patient I find no changes in the patients condition since the H&P had been written      Vitals:    09/17/20 1118   BP: 144/75   Pulse: 87   Resp: 18   Temp: 97 6 °F (36 4 °C)   SpO2: 98%

## 2020-09-17 NOTE — ANESTHESIA POSTPROCEDURE EVALUATION
Post-Op Assessment Note    CV Status:  Stable  Pain Score: 0    Pain management: adequate     Mental Status:  Sleepy and arousable   Hydration Status:  Stable   PONV Controlled:  None   Airway Patency:  Patent      Post Op Vitals Reviewed: Yes      Staff: CRNA         No complications documented      BP   149/87   Temp      Pulse  77   Resp   14   SpO2   97 room air

## 2020-09-17 NOTE — ANESTHESIA PREPROCEDURE EVALUATION
Procedure:  COLONOSCOPY  EGD    TTE: LVEF 60%, RV nml, findings consistent with mod-severe pulm HTN    Partially paralyzed R diaphragm, SOB with high exertion  Able to walk up flight of staris without CP/SOB  Denies: asthma, COPD, KALLI, active GERD, stroke/TIA, seziures    Relevant Problems   /RENAL   (+) CKD (chronic kidney disease) stage 2, GFR 60-89 ml/min   (+) Nephrolithiasis      MUSCULOSKELETAL   (+) Diaphragm paralysis      NEURO/PSYCH   (+) Chronic pain of right ankle   (+) Numbness and tingling      PULMONARY   (+) KALLI (obstructive sleep apnea)   (+) Shortness of breath      Other   (+) Parkinson's disease (HCC)             Anesthesia Plan  ASA Score- 3     Anesthesia Type- IV sedation with anesthesia with ASA Monitors  Additional Monitors:   Airway Plan:           Plan Factors-Exercise tolerance (METS): >4 METS  Chart reviewed  EKG reviewed  Existing labs reviewed  Patient summary reviewed  Patient is not a current smoker  Obstructive sleep apnea risk education given perioperatively  Induction- intravenous  Postoperative Plan-     Informed Consent- Anesthetic plan and risks discussed with patient  I personally reviewed this patient with the CRNA  Discussed and agreed on the Anesthesia Plan with the CRNA  Jarocho Mcmillan

## 2020-09-23 ENCOUNTER — DOCUMENTATION (OUTPATIENT)
Dept: PULMONOLOGY | Facility: CLINIC | Age: 60
End: 2020-09-23

## 2020-09-23 ENCOUNTER — OFFICE VISIT (OUTPATIENT)
Dept: FAMILY MEDICINE CLINIC | Facility: CLINIC | Age: 60
End: 2020-09-23
Payer: COMMERCIAL

## 2020-09-23 VITALS
SYSTOLIC BLOOD PRESSURE: 132 MMHG | HEIGHT: 72 IN | TEMPERATURE: 97.4 F | WEIGHT: 217 LBS | DIASTOLIC BLOOD PRESSURE: 78 MMHG | BODY MASS INDEX: 29.39 KG/M2

## 2020-09-23 DIAGNOSIS — K21.00 GASTROESOPHAGEAL REFLUX DISEASE WITH ESOPHAGITIS: ICD-10-CM

## 2020-09-23 DIAGNOSIS — G20 ATYPICAL PARKINSONISM (HCC): ICD-10-CM

## 2020-09-23 DIAGNOSIS — R49.9 CHANGE IN VOICE: ICD-10-CM

## 2020-09-23 DIAGNOSIS — Z23 NEED FOR IMMUNIZATION AGAINST INFLUENZA: Primary | ICD-10-CM

## 2020-09-23 DIAGNOSIS — E78.2 MIXED HYPERLIPIDEMIA: ICD-10-CM

## 2020-09-23 DIAGNOSIS — R73.03 PREDIABETES: ICD-10-CM

## 2020-09-23 DIAGNOSIS — G90.3 NEUROGENIC ORTHOSTATIC HYPOTENSION (HCC): ICD-10-CM

## 2020-09-23 DIAGNOSIS — G47.33 OSA (OBSTRUCTIVE SLEEP APNEA): Primary | ICD-10-CM

## 2020-09-23 PROCEDURE — 90682 RIV4 VACC RECOMBINANT DNA IM: CPT

## 2020-09-23 PROCEDURE — 90471 IMMUNIZATION ADMIN: CPT

## 2020-09-23 PROCEDURE — 99214 OFFICE O/P EST MOD 30 MIN: CPT | Performed by: FAMILY MEDICINE

## 2020-09-23 NOTE — PROGRESS NOTES
Script for BiPAP pressure change has been faxed to [address]  at [phone] and [phone]

## 2020-09-28 NOTE — RESULT ENCOUNTER NOTE
I called him with his results and left a voicemail to call me back  Submucosal leiomyoma and adenomatous polyps were removed  Repeat colonoscopy in 3 years or sooner if clinically indicated

## 2020-10-02 DIAGNOSIS — K21.00 GASTROESOPHAGEAL REFLUX DISEASE WITH ESOPHAGITIS: ICD-10-CM

## 2020-10-02 RX ORDER — PANTOPRAZOLE SODIUM 40 MG/1
40 TABLET, DELAYED RELEASE ORAL
Qty: 60 TABLET | Refills: 5 | Status: SHIPPED | OUTPATIENT
Start: 2020-10-02 | End: 2021-03-04 | Stop reason: SDUPTHER

## 2020-10-09 ENCOUNTER — OFFICE VISIT (OUTPATIENT)
Dept: NEPHROLOGY | Facility: CLINIC | Age: 60
End: 2020-10-09
Payer: COMMERCIAL

## 2020-10-09 VITALS
RESPIRATION RATE: 18 BRPM | TEMPERATURE: 97.4 F | SYSTOLIC BLOOD PRESSURE: 178 MMHG | HEART RATE: 59 BPM | WEIGHT: 222.2 LBS | BODY MASS INDEX: 30.1 KG/M2 | DIASTOLIC BLOOD PRESSURE: 98 MMHG | HEIGHT: 72 IN

## 2020-10-09 DIAGNOSIS — N18.2 CKD (CHRONIC KIDNEY DISEASE) STAGE 2, GFR 60-89 ML/MIN: Primary | ICD-10-CM

## 2020-10-09 DIAGNOSIS — N06.0 ISOLATED PROTEINURIA WITH MINOR GLOMERULAR ABNORMALITY: ICD-10-CM

## 2020-10-09 DIAGNOSIS — G90.3 NEUROGENIC ORTHOSTATIC HYPOTENSION (HCC): ICD-10-CM

## 2020-10-09 DIAGNOSIS — N20.0 NEPHROLITHIASIS: ICD-10-CM

## 2020-10-09 PROCEDURE — 3077F SYST BP >= 140 MM HG: CPT | Performed by: INTERNAL MEDICINE

## 2020-10-09 PROCEDURE — 3080F DIAST BP >= 90 MM HG: CPT | Performed by: INTERNAL MEDICINE

## 2020-10-09 PROCEDURE — 99213 OFFICE O/P EST LOW 20 MIN: CPT | Performed by: INTERNAL MEDICINE

## 2020-10-09 RX ORDER — UBIDECARENONE 75 MG
CAPSULE ORAL DAILY
COMMUNITY
End: 2021-09-09

## 2020-10-19 ENCOUNTER — OFFICE VISIT (OUTPATIENT)
Dept: NEUROLOGY | Facility: CLINIC | Age: 60
End: 2020-10-19
Payer: COMMERCIAL

## 2020-10-19 VITALS
SYSTOLIC BLOOD PRESSURE: 138 MMHG | DIASTOLIC BLOOD PRESSURE: 80 MMHG | HEIGHT: 72 IN | TEMPERATURE: 97.7 F | HEART RATE: 104 BPM | BODY MASS INDEX: 30.2 KG/M2 | WEIGHT: 223 LBS

## 2020-10-19 DIAGNOSIS — G47.52 REM BEHAVIORAL DISORDER: Primary | ICD-10-CM

## 2020-10-19 DIAGNOSIS — G90.3 NEUROGENIC ORTHOSTATIC HYPOTENSION (HCC): ICD-10-CM

## 2020-10-19 DIAGNOSIS — G20 PARKINSON'S DISEASE (HCC): ICD-10-CM

## 2020-10-19 PROCEDURE — 1036F TOBACCO NON-USER: CPT | Performed by: PSYCHIATRY & NEUROLOGY

## 2020-10-19 PROCEDURE — 99215 OFFICE O/P EST HI 40 MIN: CPT | Performed by: PSYCHIATRY & NEUROLOGY

## 2020-10-21 ENCOUNTER — TELEPHONE (OUTPATIENT)
Dept: NEUROLOGY | Facility: CLINIC | Age: 60
End: 2020-10-21

## 2020-10-21 DIAGNOSIS — G20 PARKINSON'S DISEASE (HCC): Primary | ICD-10-CM

## 2020-10-26 ENCOUNTER — HOSPITAL ENCOUNTER (OUTPATIENT)
Dept: PULMONOLOGY | Facility: HOSPITAL | Age: 60
Discharge: HOME | End: 2020-10-26
Attending: INTERNAL MEDICINE
Payer: COMMERCIAL

## 2020-10-26 ENCOUNTER — HOSPITAL ENCOUNTER (OUTPATIENT)
Dept: RADIOLOGY | Facility: HOSPITAL | Age: 60
Discharge: HOME | End: 2020-10-26
Attending: INTERNAL MEDICINE
Payer: COMMERCIAL

## 2020-10-26 DIAGNOSIS — G70.9 NEUROMUSCULAR DISEASE (HCC): ICD-10-CM

## 2020-10-26 DIAGNOSIS — J98.6 DIAPHRAGM PARALYSIS: ICD-10-CM

## 2020-10-26 DIAGNOSIS — R79.81: Primary | ICD-10-CM

## 2020-10-26 LAB
BASE EXCESS BLDA CALC-SCNC: -1 MMOL/L
HCO3 BLDA-SCNC: 23.5 MMOL/L (ref 22–28)
O2 CT BLDA-SCNC: 20.1 ML/DL (ref 16–23)
OXYHGB MFR BLDA: 94.7 % (ref 94–97)
PCO2 BLDA: 38.6 MM HG (ref 36–44)
PH BLDA: 7.4 [PH] (ref 7.35–7.45)
PO2 BLDA: 76.9 MM HG (ref 75–129)

## 2020-10-26 PROCEDURE — 94726 PLETHYSMOGRAPHY LUNG VOLUMES: CPT

## 2020-10-26 PROCEDURE — 82805 BLOOD GASES W/O2 SATURATION: CPT | Performed by: INTERNAL MEDICINE

## 2020-10-26 PROCEDURE — 94729 DIFFUSING CAPACITY: CPT | Performed by: INTERNAL MEDICINE

## 2020-10-26 PROCEDURE — 36600 WITHDRAWAL OF ARTERIAL BLOOD: CPT

## 2020-10-26 PROCEDURE — 94729 DIFFUSING CAPACITY: CPT

## 2020-10-26 PROCEDURE — 94010 BREATHING CAPACITY TEST: CPT

## 2020-10-26 PROCEDURE — 76000 FLUOROSCOPY <1 HR PHYS/QHP: CPT

## 2020-10-26 PROCEDURE — 94726 PLETHYSMOGRAPHY LUNG VOLUMES: CPT | Performed by: INTERNAL MEDICINE

## 2020-10-26 PROCEDURE — 94760 N-INVAS EAR/PLS OXIMETRY 1: CPT

## 2020-10-26 PROCEDURE — 94010 BREATHING CAPACITY TEST: CPT | Performed by: INTERNAL MEDICINE

## 2020-10-27 ENCOUNTER — OFFICE VISIT (OUTPATIENT)
Dept: GASTROENTEROLOGY | Facility: MEDICAL CENTER | Age: 60
End: 2020-10-27
Payer: COMMERCIAL

## 2020-10-27 VITALS
SYSTOLIC BLOOD PRESSURE: 164 MMHG | DIASTOLIC BLOOD PRESSURE: 93 MMHG | TEMPERATURE: 97.6 F | WEIGHT: 225 LBS | HEIGHT: 72 IN | BODY MASS INDEX: 30.48 KG/M2 | HEART RATE: 71 BPM

## 2020-10-27 DIAGNOSIS — K21.9 GASTROESOPHAGEAL REFLUX DISEASE WITHOUT ESOPHAGITIS: Primary | ICD-10-CM

## 2020-10-27 DIAGNOSIS — R13.10 DYSPHAGIA, UNSPECIFIED TYPE: ICD-10-CM

## 2020-10-27 DIAGNOSIS — K59.00 CONSTIPATION, UNSPECIFIED CONSTIPATION TYPE: ICD-10-CM

## 2020-10-27 PROCEDURE — 99214 OFFICE O/P EST MOD 30 MIN: CPT | Performed by: INTERNAL MEDICINE

## 2020-11-02 ENCOUNTER — TELEPHONE (OUTPATIENT)
Dept: PULMONOLOGY | Facility: HOSPITAL | Age: 60
End: 2020-11-02

## 2020-11-02 ENCOUNTER — TELEPHONE (OUTPATIENT)
Dept: NEUROLOGY | Facility: CLINIC | Age: 60
End: 2020-11-02

## 2020-11-12 ENCOUNTER — TELEPHONE (OUTPATIENT)
Dept: FAMILY MEDICINE CLINIC | Facility: CLINIC | Age: 60
End: 2020-11-12

## 2020-11-20 ENCOUNTER — TELEPHONE (OUTPATIENT)
Dept: PULMONOLOGY | Facility: CLINIC | Age: 60
End: 2020-11-20

## 2020-11-23 ENCOUNTER — OFFICE VISIT (OUTPATIENT)
Dept: PULMONOLOGY | Facility: CLINIC | Age: 60
End: 2020-11-23
Payer: COMMERCIAL

## 2020-11-23 VITALS
OXYGEN SATURATION: 97 % | DIASTOLIC BLOOD PRESSURE: 68 MMHG | BODY MASS INDEX: 31.07 KG/M2 | SYSTOLIC BLOOD PRESSURE: 130 MMHG | WEIGHT: 229.4 LBS | HEART RATE: 71 BPM | TEMPERATURE: 95.7 F | HEIGHT: 72 IN

## 2020-11-23 DIAGNOSIS — G25.81 RESTLESS LEG SYNDROME: Primary | ICD-10-CM

## 2020-11-23 DIAGNOSIS — G47.33 OSA (OBSTRUCTIVE SLEEP APNEA): ICD-10-CM

## 2020-11-23 DIAGNOSIS — R06.02 SHORTNESS OF BREATH: ICD-10-CM

## 2020-11-23 DIAGNOSIS — G47.50 PARASOMNIA, UNSPECIFIED TYPE: ICD-10-CM

## 2020-11-23 DIAGNOSIS — G20 PARKINSON'S DISEASE (HCC): ICD-10-CM

## 2020-11-23 PROCEDURE — 3078F DIAST BP <80 MM HG: CPT | Performed by: INTERNAL MEDICINE

## 2020-11-23 PROCEDURE — 99215 OFFICE O/P EST HI 40 MIN: CPT | Performed by: INTERNAL MEDICINE

## 2020-11-23 PROCEDURE — 3075F SYST BP GE 130 - 139MM HG: CPT | Performed by: INTERNAL MEDICINE

## 2020-11-23 PROCEDURE — 3008F BODY MASS INDEX DOCD: CPT | Performed by: INTERNAL MEDICINE

## 2020-11-23 PROCEDURE — 1036F TOBACCO NON-USER: CPT | Performed by: INTERNAL MEDICINE

## 2020-11-23 RX ORDER — FAMOTIDINE 20 MG/1
20 TABLET, FILM COATED ORAL AS NEEDED
COMMUNITY
Start: 2020-10-01 | End: 2021-03-04 | Stop reason: SDUPTHER

## 2020-11-23 RX ORDER — ROPINIROLE 0.5 MG/1
0.5 TABLET, FILM COATED ORAL
Qty: 30 TABLET | Refills: 3 | Status: SHIPPED | OUTPATIENT
Start: 2020-11-23 | End: 2021-01-26

## 2020-11-24 ENCOUNTER — TELEPHONE (OUTPATIENT)
Dept: NEUROLOGY | Facility: CLINIC | Age: 60
End: 2020-11-24

## 2020-11-24 PROBLEM — G25.81 RESTLESS LEG SYNDROME: Status: ACTIVE | Noted: 2020-11-24

## 2020-11-24 PROBLEM — G47.50 PARASOMNIA: Status: ACTIVE | Noted: 2020-11-24

## 2020-12-01 ENCOUNTER — HOSPITAL ENCOUNTER (OUTPATIENT)
Dept: GASTROENTEROLOGY | Facility: HOSPITAL | Age: 60
Discharge: HOME | End: 2020-12-01
Attending: INTERNAL MEDICINE
Payer: COMMERCIAL

## 2020-12-01 VITALS
DIASTOLIC BLOOD PRESSURE: 97 MMHG | TEMPERATURE: 97.7 F | OXYGEN SATURATION: 96 % | SYSTOLIC BLOOD PRESSURE: 178 MMHG | RESPIRATION RATE: 16 BRPM | HEART RATE: 68 BPM

## 2020-12-01 DIAGNOSIS — K21.9 GASTROESOPHAGEAL REFLUX DISEASE WITHOUT ESOPHAGITIS: ICD-10-CM

## 2020-12-01 DIAGNOSIS — R13.10 DYSPHAGIA, UNSPECIFIED TYPE: ICD-10-CM

## 2020-12-01 PROCEDURE — 91038 ESOPH IMPED FUNCT TEST > 1HR: CPT

## 2020-12-01 PROCEDURE — 91020 GASTRIC MOTILITY STUDIES: CPT

## 2020-12-07 PROCEDURE — 91010 ESOPHAGUS MOTILITY STUDY: CPT | Performed by: INTERNAL MEDICINE

## 2020-12-07 PROCEDURE — 91038 ESOPH IMPED FUNCT TEST > 1HR: CPT | Performed by: INTERNAL MEDICINE

## 2020-12-14 ENCOUNTER — OFFICE VISIT (OUTPATIENT)
Dept: FAMILY MEDICINE CLINIC | Facility: CLINIC | Age: 60
End: 2020-12-14
Payer: COMMERCIAL

## 2020-12-14 VITALS
DIASTOLIC BLOOD PRESSURE: 86 MMHG | BODY MASS INDEX: 31.8 KG/M2 | HEIGHT: 72 IN | TEMPERATURE: 96.4 F | WEIGHT: 234.8 LBS | SYSTOLIC BLOOD PRESSURE: 126 MMHG

## 2020-12-14 DIAGNOSIS — M70.22 OLECRANON BURSITIS OF LEFT ELBOW: Primary | ICD-10-CM

## 2020-12-14 PROBLEM — M70.20 OLECRANON BURSITIS: Status: ACTIVE | Noted: 2020-12-14

## 2020-12-14 PROCEDURE — 1036F TOBACCO NON-USER: CPT | Performed by: FAMILY MEDICINE

## 2020-12-14 PROCEDURE — 99213 OFFICE O/P EST LOW 20 MIN: CPT | Performed by: FAMILY MEDICINE

## 2020-12-14 PROCEDURE — 3074F SYST BP LT 130 MM HG: CPT | Performed by: FAMILY MEDICINE

## 2020-12-14 PROCEDURE — 3008F BODY MASS INDEX DOCD: CPT | Performed by: FAMILY MEDICINE

## 2020-12-14 PROCEDURE — 3079F DIAST BP 80-89 MM HG: CPT | Performed by: FAMILY MEDICINE

## 2020-12-14 RX ORDER — METHYLPREDNISOLONE 4 MG/1
TABLET ORAL
Qty: 21 EACH | Refills: 0 | Status: SHIPPED | OUTPATIENT
Start: 2020-12-14 | End: 2021-01-06 | Stop reason: ALTCHOICE

## 2020-12-21 ENCOUNTER — OFFICE VISIT (OUTPATIENT)
Dept: GASTROENTEROLOGY | Facility: MEDICAL CENTER | Age: 60
End: 2020-12-21
Payer: COMMERCIAL

## 2020-12-21 VITALS
BODY MASS INDEX: 3.11 KG/M2 | SYSTOLIC BLOOD PRESSURE: 154 MMHG | HEIGHT: 72 IN | TEMPERATURE: 97.2 F | HEART RATE: 96 BPM | DIASTOLIC BLOOD PRESSURE: 90 MMHG | WEIGHT: 23 LBS

## 2020-12-21 DIAGNOSIS — K59.00 CONSTIPATION, UNSPECIFIED CONSTIPATION TYPE: ICD-10-CM

## 2020-12-21 DIAGNOSIS — K21.9 GASTROESOPHAGEAL REFLUX DISEASE WITHOUT ESOPHAGITIS: Primary | ICD-10-CM

## 2020-12-21 PROCEDURE — 3008F BODY MASS INDEX DOCD: CPT | Performed by: INTERNAL MEDICINE

## 2020-12-21 PROCEDURE — 3077F SYST BP >= 140 MM HG: CPT | Performed by: INTERNAL MEDICINE

## 2020-12-21 PROCEDURE — 3080F DIAST BP >= 90 MM HG: CPT | Performed by: INTERNAL MEDICINE

## 2020-12-21 PROCEDURE — 99213 OFFICE O/P EST LOW 20 MIN: CPT | Performed by: INTERNAL MEDICINE

## 2020-12-21 PROCEDURE — 1036F TOBACCO NON-USER: CPT | Performed by: INTERNAL MEDICINE

## 2020-12-31 ENCOUNTER — LAB (OUTPATIENT)
Dept: LAB | Facility: MEDICAL CENTER | Age: 60
End: 2020-12-31
Payer: COMMERCIAL

## 2020-12-31 ENCOUNTER — TELEPHONE (OUTPATIENT)
Dept: NEPHROLOGY | Facility: HOSPITAL | Age: 60
End: 2020-12-31

## 2020-12-31 DIAGNOSIS — N06.0 ISOLATED PROTEINURIA WITH MINOR GLOMERULAR ABNORMALITY: ICD-10-CM

## 2020-12-31 DIAGNOSIS — N18.2 CKD (CHRONIC KIDNEY DISEASE) STAGE 2, GFR 60-89 ML/MIN: ICD-10-CM

## 2020-12-31 LAB
ANION GAP SERPL CALCULATED.3IONS-SCNC: 3 MMOL/L (ref 4–13)
BACTERIA UR QL AUTO: ABNORMAL /HPF
BILIRUB UR QL STRIP: NEGATIVE
BUN SERPL-MCNC: 19 MG/DL (ref 5–25)
CALCIUM SERPL-MCNC: 10 MG/DL (ref 8.3–10.1)
CHLORIDE SERPL-SCNC: 101 MMOL/L (ref 100–108)
CLARITY UR: CLEAR
CO2 SERPL-SCNC: 30 MMOL/L (ref 21–32)
COLOR UR: YELLOW
CREAT SERPL-MCNC: 1.25 MG/DL (ref 0.6–1.3)
CREAT UR-MCNC: 77.9 MG/DL
GFR SERPL CREATININE-BSD FRML MDRD: 62 ML/MIN/1.73SQ M
GLUCOSE P FAST SERPL-MCNC: 102 MG/DL (ref 65–99)
GLUCOSE UR STRIP-MCNC: NEGATIVE MG/DL
HGB UR QL STRIP.AUTO: NEGATIVE
HYALINE CASTS #/AREA URNS LPF: ABNORMAL /LPF
KETONES UR STRIP-MCNC: NEGATIVE MG/DL
LEUKOCYTE ESTERASE UR QL STRIP: ABNORMAL
NITRITE UR QL STRIP: NEGATIVE
NON-SQ EPI CELLS URNS QL MICRO: ABNORMAL /HPF
PH UR STRIP.AUTO: 6 [PH]
POTASSIUM SERPL-SCNC: 4.9 MMOL/L (ref 3.5–5.3)
PROT UR STRIP-MCNC: NEGATIVE MG/DL
PROT UR-MCNC: <6 MG/DL
PROT/CREAT UR: <0.08 MG/G{CREAT} (ref 0–0.1)
RBC #/AREA URNS AUTO: ABNORMAL /HPF
SODIUM SERPL-SCNC: 134 MMOL/L (ref 136–145)
SP GR UR STRIP.AUTO: 1.01 (ref 1–1.03)
UROBILINOGEN UR QL STRIP.AUTO: 0.2 E.U./DL
WBC #/AREA URNS AUTO: ABNORMAL /HPF

## 2020-12-31 PROCEDURE — 82570 ASSAY OF URINE CREATININE: CPT

## 2020-12-31 PROCEDURE — 84156 ASSAY OF PROTEIN URINE: CPT

## 2020-12-31 PROCEDURE — 80048 BASIC METABOLIC PNL TOTAL CA: CPT

## 2020-12-31 PROCEDURE — 81001 URINALYSIS AUTO W/SCOPE: CPT

## 2020-12-31 PROCEDURE — 36415 COLL VENOUS BLD VENIPUNCTURE: CPT

## 2021-01-06 ENCOUNTER — OFFICE VISIT (OUTPATIENT)
Dept: NEPHROLOGY | Facility: CLINIC | Age: 61
End: 2021-01-06
Payer: COMMERCIAL

## 2021-01-06 VITALS
SYSTOLIC BLOOD PRESSURE: 128 MMHG | RESPIRATION RATE: 16 BRPM | WEIGHT: 234 LBS | BODY MASS INDEX: 31.69 KG/M2 | TEMPERATURE: 97.5 F | DIASTOLIC BLOOD PRESSURE: 80 MMHG | HEART RATE: 73 BPM | HEIGHT: 72 IN

## 2021-01-06 DIAGNOSIS — N18.2 CKD (CHRONIC KIDNEY DISEASE) STAGE 2, GFR 60-89 ML/MIN: Primary | ICD-10-CM

## 2021-01-06 DIAGNOSIS — E87.1 HYPONATREMIA: ICD-10-CM

## 2021-01-06 DIAGNOSIS — N06.0 ISOLATED PROTEINURIA WITH MINOR GLOMERULAR ABNORMALITY: ICD-10-CM

## 2021-01-06 DIAGNOSIS — R33.9 URINARY RETENTION: ICD-10-CM

## 2021-01-06 DIAGNOSIS — N20.0 NEPHROLITHIASIS: ICD-10-CM

## 2021-01-06 DIAGNOSIS — G90.3 NEUROGENIC ORTHOSTATIC HYPOTENSION (HCC): ICD-10-CM

## 2021-01-06 PROCEDURE — 99214 OFFICE O/P EST MOD 30 MIN: CPT | Performed by: INTERNAL MEDICINE

## 2021-01-06 NOTE — LETTER
January 6, 2021     Nicole Ness, [address]    Patient: Kya Gee   YOB: 1960   Date of Visit: 1/6/2021       Dear Dr Monserrat Shah:    Thank you for referring Kaleigh Orellana to me for evaluation  Below are my notes for this consultation  Dr Alanna Bolivar - this patient has significant orthostasis  I am concerned about this patient's low BP readings  I have recommend midodrine 5mg TID and recommend increasing to 10mg if BP < 105/65  I also suggested he wear an abdominal binder  He has been having more dizziness, even when BP is not as low  I was hoping you could assist with this  Renal function is stable and he has CKD stage 2  If you have questions, please do not hesitate to call me  I look forward to following your patient along with you  Sincerely,        Frankie Mancia DO        CC: MD Frankie Abbott DO  1/6/2021  9:42 AM  Sign when Signing Visit  NEPHROLOGY OUTPATIENT PROGRESS NOTE   Kya Gee 63 y o  male MRN: [id number]  DATE: 1/6/2021  Reason for visit:   Chief Complaint   Patient presents with    Chronic Kidney Disease    Follow-up        Patient Instructions   1  Previously elevated BP readings with low readings at times as well - BP elevated in office today  -Parkinson's disease can lead to autonomic dysfunction as well as labile BP readings    -no longer on florinef  -am cortisol ok  -I do note mildly elevated free normetanephrine  Would expect 2-3x normal range to suspect pheochromocytoma  -craig 1 3, renin 0 208  Ratio is normal as of 11/23/19  -TSH ok  -repeat plasma metanephrines normal  -continue to wear compression stockings  Take your time getting up   -May take 10mg tablet midodrine up to three times daily  Take midodrine if BP < 105/65    -would consider abdominal binder as well     2  Elevated serum creatinine ?  D/t chronic kidney disease stage 2(mild) - b/l sCr 1-1 2 since 2016, last sCr 1 25 which correlates with eGFR > 80 ml/min per CKD EPI equation  ? If this is due to lability in BP readings vs some other cause  -recent UA with microscopy shows trace protein and 2-4 WBCs     3  Proteinuria  -UpCr < 0 08 and stable  -of note, not anemic  Will defer myeloma workup     4  Nephrolithiasis - 10mm stone in left kidney  -would continue to follow low sodium diet (<2000mg/day)  Please read packages to ensure sodium content limited  -drink enough fluids to urinate 2-2 5L/day to prevent stone formation   -on multivitamin     5  History of urinary retention s/p suprapubic catheter - s/p urolift x 2, s/p TURP x 2, bladder neck constricture repair  Follows with urology   -recent bladder scan negative for post void residual urine volume    6  Hyponatremia - sNa 134 on latest bloodwork  Repeat BMP and urine Na, Urine Osm, urine Cl, uric acid, TSH, am cortisol     RTC in 3 months  Obtain blood and urine testing as soon as you can to reevaluate the low sodium level  Erickson Levels was seen today for chronic kidney disease and follow-up  Diagnoses and all orders for this visit:    CKD (chronic kidney disease) stage 2, GFR 60-89 ml/min    Neurogenic orthostatic hypotension (HCC)    Urinary retention    Nephrolithiasis    Isolated proteinuria with minor glomerular abnormality    Hyponatremia  -     Basic metabolic panel; Future  -     Osmolality, urine; Future  -     Sodium, urine, random; Future  -     Chloride, urine, random; Future  -     Osmolality-If this is regarding a toxic alcohol, STOP  Test is not routinely indicated  Please consult medical  on call for further guidance ; Future  -     TSH, 3rd generation; Future  -     Cortisol Level, AM Specimen; Future  -     Uric acid; Future        Assessment/Plan:  1   Previously elevated BP readings with low readings at times as well - BP elevated in office today  -Parkinson's disease can lead to autonomic dysfunction as well as labile BP readings    -no longer on florinef  -am cortisol ok  -I do note mildly elevated free normetanephrine  Would expect 2-3x normal range to suspect pheochromocytoma  -craig 1 3, renin 0 208  Ratio is normal as of 11/23/19  -TSH ok  -repeat plasma metanephrines normal  -continue to wear compression stockings  Take your time getting up   -May take 10mg tablet midodrine up to three times daily  Take midodrine if BP < 105/65    -would consider abdominal binder as well     2  Elevated serum creatinine ? D/t chronic kidney disease stage 2(mild) - b/l sCr 1-1 2 since 2016, last sCr 1 25 which correlates with eGFR > 80 ml/min per CKD EPI equation  ? If this is due to lability in BP readings vs some other cause  -recent UA with microscopy shows trace protein and 2-4 WBCs     3  Proteinuria  -UpCr < 0 08 and stable  -of note, not anemic  Will defer myeloma workup     4  Nephrolithiasis - 10mm stone in left kidney  -would continue to follow low sodium diet (<2000mg/day)  Please read packages to ensure sodium content limited  -drink enough fluids to urinate 2-2 5L/day to prevent stone formation   -on multivitamin     5  History of urinary retention s/p suprapubic catheter - s/p urolift x 2, s/p TURP x 2, bladder neck constricture repair  Follows with urology   -recent bladder scan negative for post void residual urine volume    6  Hyponatremia - sNa 134 on latest bloodwork  Repeat BMP and urine Na, Urine Osm, urine Cl, uric acid, TSH, am cortisol     RTC in 3 months  Obtain blood and urine testing as soon as you can to reevaluate the low sodium level  SUBJECTIVE / INTERVAL HISTORY:  63 y o  male presents in follow up of CKD  Erwin Fairchild denies any recent illness/hospitalizations/medication changes since last office visit  Denies NSAID use  Denies recent kidney stones  Having significant orthostasis at times  Had left elbow bursitis and is s/p 5 days steroids  Had recent urodynamics test Dec  15th       Review of Systems Constitutional: Negative for chills and fever  HENT: Negative for sore throat  Eyes: Negative for visual disturbance  Respiratory: Positive for shortness of breath (due to partial paralysis of diaphragm, upon exertion or up stairs)  Negative for cough  Cardiovascular: Positive for leg swelling (compression stockings help this)  Negative for chest pain  Gastrointestinal: Positive for constipation (occasional)  Negative for abdominal pain, diarrhea, nausea and vomiting  Endocrine: Negative for polyuria  Genitourinary: Negative for decreased urine volume, difficulty urinating, dysuria and hematuria         "hard to start and maintain urine flow"   Musculoskeletal: Negative for back pain and myalgias  Skin: Negative for rash  Neurological: Positive for dizziness (has had fainting episode with orthostasis)  Negative for light-headedness and numbness  Psychiatric/Behavioral: Negative for confusion  OBJECTIVE:  /80 (BP Location: Left arm, Patient Position: Sitting, Cuff Size: Large)   Pulse 73   Temp 97 5 °F (36 4 °C) (Temporal)   Resp 16   Ht 6' (1 829 m)   Wt 106 kg (234 lb)   BMI 31 74 kg/m²  Body mass index is 31 74 kg/m²  Physical exam:  Physical Exam  Vitals signs reviewed  Constitutional:       General: He is not in acute distress  Appearance: Normal appearance  He is well-developed  He is not diaphoretic  HENT:      Head: Normocephalic and atraumatic  Mouth/Throat:      Mouth: Mucous membranes are dry  Pharynx: No oropharyngeal exudate  Eyes:      General: No scleral icterus  Right eye: No discharge  Left eye: No discharge  Comments: +eyeglasses   Neck:      Musculoskeletal: Neck supple  Thyroid: No thyromegaly  Cardiovascular:      Rate and Rhythm: Normal rate and regular rhythm  Heart sounds: Normal heart sounds  Pulmonary:      Effort: Pulmonary effort is normal       Breath sounds: Normal breath sounds   No wheezing or rales  Abdominal:      General: Bowel sounds are normal  There is no distension  Palpations: Abdomen is soft  Tenderness: There is no abdominal tenderness  Musculoskeletal: Normal range of motion  General: No swelling  Lymphadenopathy:      Cervical: No cervical adenopathy  Skin:     General: Skin is warm and dry  Findings: No rash  Neurological:      General: No focal deficit present  Mental Status: He is alert  Comments: awake   Psychiatric:         Mood and Affect: Mood normal          Behavior: Behavior normal          Medications:    Current Outpatient Medications:     carbidopa-levodopa (SINEMET)  mg per tablet, Take 2 tablets by mouth 3 (three) times a day Start with 1/2 tab TID and increase as dir (Patient taking differently: Take 1 tablet by mouth 3 (three) times a day Start with 1/2 tab TID and increase as dir), Disp: 180 tablet, Rfl: 3    cholecalciferol (VITAMIN D3) 1,000 units tablet, Take 2,000 Units by mouth daily, Disp: , Rfl:     cyanocobalamin (VITAMIN B-12) 100 mcg tablet, Take by mouth daily, Disp: , Rfl:     diclofenac sodium (VOLTAREN) 1 %, Apply 2 g topically 4 (four) times a day (Patient taking differently: Apply 2 g topically as needed ), Disp: 2 Tube, Rfl: 1    Elastic Bandages & Supports (Elbow Copper-Infused Sleeve) MISC, Use 1 Act daily, Disp: 1 each, Rfl: 0    famotidine (Pepcid) 20 mg tablet, Take 20 mg by mouth as needed , Disp: , Rfl:     FIBER PO, Take by mouth daily, Disp: , Rfl:     midodrine (PROAMATINE) 5 mg tablet, Take 1 tablet (5 mg total) by mouth daily Follow clinic instructions on titration dose to max 1 tab TID, as tolerated and to benefit   (Patient taking differently: Take 5 mg by mouth as needed ), Disp: 30 tablet, Rfl: 3    multivitamin (THERAGRAN) TABS, Take 1 tablet by mouth daily, Disp: , Rfl:     nystatin-triamcinolone (MYCOLOG-II) cream, Apply topically 2 (two) times a day (Patient taking differently: Apply topically as needed ), Disp: 60 g, Rfl: 1    pantoprazole (PROTONIX) 40 mg tablet, Take 1 tablet (40 mg total) by mouth 2 (two) times a day before meals (Patient taking differently: Take 40 mg by mouth daily ), Disp: 60 tablet, Rfl: 5    rOPINIRole (REQUIP) 0 5 mg tablet, Take 1 tablet (0 5 mg total) by mouth daily at bedtime, Disp: 30 tablet, Rfl: 3    selegiline (ELDEPRYL) 5 mg capsule, Take 5 mg by mouth 2 (two) times a day before meals, Disp: , Rfl:     vitamin E 100 UNIT capsule, Take 100 Units by mouth daily, Disp: , Rfl:     Wheat Dextrin (BENEFIBER DRINK MIX PO), , Disp: , Rfl:     Allergies: Allergies as of 01/06/2021    (No Known Allergies)       The following portions of the patient's history were reviewed and updated as appropriate: past family history, past surgical history and problem list     Laboratory Results:  Lab Results   Component Value Date    SODIUM 134 (L) 12/31/2020    K 4 9 12/31/2020     12/31/2020    CO2 30 12/31/2020    BUN 19 12/31/2020    CREATININE 1 25 12/31/2020    GLUC 100 05/11/2018    CALCIUM 10 0 12/31/2020        Lab Results   Component Value Date    CALCIUM 10 0 12/31/2020       Portions of the record may have been created with voice recognition software   Occasional wrong word or "sound a like" substitutions may have occurred due to the inherent limitations of voice recognition software   Read the chart carefully and recognize, using context, where substitutions have occurred

## 2021-01-06 NOTE — PATIENT INSTRUCTIONS
1  Previously elevated BP readings with low readings at times as well - BP elevated in office today  -Parkinson's disease can lead to autonomic dysfunction as well as labile BP readings    -no longer on florinef  -am cortisol ok  -I do note mildly elevated free normetanephrine  Would expect 2-3x normal range to suspect pheochromocytoma  -craig 1 3, renin 0 208  Ratio is normal as of 11/23/19  -TSH ok  -repeat plasma metanephrines normal  -continue to wear compression stockings  Take your time getting up   -May take 10mg tablet midodrine up to three times daily  Take midodrine if BP < 105/65    -would consider abdominal binder as well     2  Elevated serum creatinine ? D/t chronic kidney disease stage 2(mild) - b/l sCr 1-1 2 since 2016, last sCr 1 25 which correlates with eGFR > 80 ml/min per CKD EPI equation  ? If this is due to lability in BP readings vs some other cause  -recent UA with microscopy shows trace protein and 2-4 WBCs     3  Proteinuria  -UpCr < 0 08 and stable  -of note, not anemic  Will defer myeloma workup     4  Nephrolithiasis - 10mm stone in left kidney  -would continue to follow low sodium diet (<2000mg/day)  Please read packages to ensure sodium content limited  -drink enough fluids to urinate 2-2 5L/day to prevent stone formation   -on multivitamin     5  History of urinary retention s/p suprapubic catheter - s/p urolift x 2, s/p TURP x 2, bladder neck constricture repair  Follows with urology   -recent bladder scan negative for post void residual urine volume    6  Hyponatremia - sNa 134 on latest bloodwork  Repeat BMP and urine Na, Urine Osm, urine Cl, uric acid, TSH, am cortisol     RTC in 3 months  Obtain blood and urine testing as soon as you can to reevaluate the low sodium level

## 2021-01-06 NOTE — PROGRESS NOTES
NEPHROLOGY OUTPATIENT PROGRESS NOTE   Benigno Boswell 63 y o  male MRN: [id number]  DATE: 1/6/2021  Reason for visit:   Chief Complaint   Patient presents with    Chronic Kidney Disease    Follow-up        Patient Instructions   1  Previously elevated BP readings with low readings at times as well - BP elevated in office today  -Parkinson's disease can lead to autonomic dysfunction as well as labile BP readings    -no longer on florinef  -am cortisol ok  -I do note mildly elevated free normetanephrine  Would expect 2-3x normal range to suspect pheochromocytoma  -craig 1 3, renin 0 208  Ratio is normal as of 11/23/19  -TSH ok  -repeat plasma metanephrines normal  -continue to wear compression stockings  Take your time getting up   -May take 10mg tablet midodrine up to three times daily  Take midodrine if BP < 105/65    -would consider abdominal binder as well     2  Elevated serum creatinine ? D/t chronic kidney disease stage 2(mild) - b/l sCr 1-1 2 since 2016, last sCr 1 25 which correlates with eGFR > 80 ml/min per CKD EPI equation  ? If this is due to lability in BP readings vs some other cause  -recent UA with microscopy shows trace protein and 2-4 WBCs     3  Proteinuria  -UpCr < 0 08 and stable  -of note, not anemic  Will defer myeloma workup     4  Nephrolithiasis - 10mm stone in left kidney  -would continue to follow low sodium diet (<2000mg/day)  Please read packages to ensure sodium content limited  -drink enough fluids to urinate 2-2 5L/day to prevent stone formation   -on multivitamin     5  History of urinary retention s/p suprapubic catheter - s/p urolift x 2, s/p TURP x 2, bladder neck constricture repair  Follows with urology   -recent bladder scan negative for post void residual urine volume    6  Hyponatremia - sNa 134 on latest bloodwork  Repeat BMP and urine Na, Urine Osm, urine Cl, uric acid, TSH, am cortisol     RTC in 3 months   Obtain blood and urine testing as soon as you can to reevaluate the low sodium level  Taran was seen today for chronic kidney disease and follow-up  Diagnoses and all orders for this visit:    CKD (chronic kidney disease) stage 2, GFR 60-89 ml/min    Neurogenic orthostatic hypotension (HCC)    Urinary retention    Nephrolithiasis    Isolated proteinuria with minor glomerular abnormality    Hyponatremia  -     Basic metabolic panel; Future  -     Osmolality, urine; Future  -     Sodium, urine, random; Future  -     Chloride, urine, random; Future  -     Osmolality-If this is regarding a toxic alcohol, STOP  Test is not routinely indicated  Please consult medical  on call for further guidance ; Future  -     TSH, 3rd generation; Future  -     Cortisol Level, AM Specimen; Future  -     Uric acid; Future        Assessment/Plan:  1  Previously elevated BP readings with low readings at times as well - BP elevated in office today  -Parkinson's disease can lead to autonomic dysfunction as well as labile BP readings    -no longer on florinef  -am cortisol ok  -I do note mildly elevated free normetanephrine  Would expect 2-3x normal range to suspect pheochromocytoma  -craig 1 3, renin 0 208  Ratio is normal as of 11/23/19  -TSH ok  -repeat plasma metanephrines normal  -continue to wear compression stockings  Take your time getting up   -May take 10mg tablet midodrine up to three times daily  Take midodrine if BP < 105/65    -would consider abdominal binder as well     2  Elevated serum creatinine ? D/t chronic kidney disease stage 2(mild) - b/l sCr 1-1 2 since 2016, last sCr 1 25 which correlates with eGFR > 80 ml/min per CKD EPI equation  ? If this is due to lability in BP readings vs some other cause  -recent UA with microscopy shows trace protein and 2-4 WBCs     3  Proteinuria  -UpCr < 0 08 and stable  -of note, not anemic  Will defer myeloma workup     4   Nephrolithiasis - 10mm stone in left kidney  -would continue to follow low sodium diet (<2000mg/day)  Please read packages to ensure sodium content limited  -drink enough fluids to urinate 2-2 5L/day to prevent stone formation   -on multivitamin     5  History of urinary retention s/p suprapubic catheter - s/p urolift x 2, s/p TURP x 2, bladder neck constricture repair  Follows with urology   -recent bladder scan negative for post void residual urine volume    6  Hyponatremia - sNa 134 on latest bloodwork  Repeat BMP and urine Na, Urine Osm, urine Cl, uric acid, TSH, am cortisol     RTC in 3 months  Obtain blood and urine testing as soon as you can to reevaluate the low sodium level  SUBJECTIVE / INTERVAL HISTORY:  63 y o  male presents in follow up of CKD  Rao Briceño denies any recent illness/hospitalizations/medication changes since last office visit  Denies NSAID use  Denies recent kidney stones  Having significant orthostasis at times  Had left elbow bursitis and is s/p 5 days steroids  Had recent urodynamics test Dec  15th  Review of Systems   Constitutional: Negative for chills and fever  HENT: Negative for sore throat  Eyes: Negative for visual disturbance  Respiratory: Positive for shortness of breath (due to partial paralysis of diaphragm, upon exertion or up stairs)  Negative for cough  Cardiovascular: Positive for leg swelling (compression stockings help this)  Negative for chest pain  Gastrointestinal: Positive for constipation (occasional)  Negative for abdominal pain, diarrhea, nausea and vomiting  Endocrine: Negative for polyuria  Genitourinary: Negative for decreased urine volume, difficulty urinating, dysuria and hematuria         "hard to start and maintain urine flow"   Musculoskeletal: Negative for back pain and myalgias  Skin: Negative for rash  Neurological: Positive for dizziness (has had fainting episode with orthostasis)  Negative for light-headedness and numbness  Psychiatric/Behavioral: Negative for confusion  OBJECTIVE:  /80 (BP Location: Left arm, Patient Position: Sitting, Cuff Size: Large)   Pulse 73   Temp 97 5 °F (36 4 °C) (Temporal)   Resp 16   Ht 6' (1 829 m)   Wt 106 kg (234 lb)   BMI 31 74 kg/m²  Body mass index is 31 74 kg/m²  Physical exam:  Physical Exam  Vitals signs reviewed  Constitutional:       General: He is not in acute distress  Appearance: Normal appearance  He is well-developed  He is not diaphoretic  HENT:      Head: Normocephalic and atraumatic  Mouth/Throat:      Mouth: Mucous membranes are dry  Pharynx: No oropharyngeal exudate  Eyes:      General: No scleral icterus  Right eye: No discharge  Left eye: No discharge  Comments: +eyeglasses   Neck:      Musculoskeletal: Neck supple  Thyroid: No thyromegaly  Cardiovascular:      Rate and Rhythm: Normal rate and regular rhythm  Heart sounds: Normal heart sounds  Pulmonary:      Effort: Pulmonary effort is normal       Breath sounds: Normal breath sounds  No wheezing or rales  Abdominal:      General: Bowel sounds are normal  There is no distension  Palpations: Abdomen is soft  Tenderness: There is no abdominal tenderness  Musculoskeletal: Normal range of motion  General: No swelling  Lymphadenopathy:      Cervical: No cervical adenopathy  Skin:     General: Skin is warm and dry  Findings: No rash  Neurological:      General: No focal deficit present  Mental Status: He is alert        Comments: awake   Psychiatric:         Mood and Affect: Mood normal          Behavior: Behavior normal          Medications:    Current Outpatient Medications:     carbidopa-levodopa (SINEMET)  mg per tablet, Take 2 tablets by mouth 3 (three) times a day Start with 1/2 tab TID and increase as dir (Patient taking differently: Take 1 tablet by mouth 3 (three) times a day Start with 1/2 tab TID and increase as dir), Disp: 180 tablet, Rfl: 3   cholecalciferol (VITAMIN D3) 1,000 units tablet, Take 2,000 Units by mouth daily, Disp: , Rfl:     cyanocobalamin (VITAMIN B-12) 100 mcg tablet, Take by mouth daily, Disp: , Rfl:     diclofenac sodium (VOLTAREN) 1 %, Apply 2 g topically 4 (four) times a day (Patient taking differently: Apply 2 g topically as needed ), Disp: 2 Tube, Rfl: 1    Elastic Bandages & Supports (Elbow Copper-Infused Sleeve) MISC, Use 1 Act daily, Disp: 1 each, Rfl: 0    famotidine (Pepcid) 20 mg tablet, Take 20 mg by mouth as needed , Disp: , Rfl:     FIBER PO, Take by mouth daily, Disp: , Rfl:     midodrine (PROAMATINE) 5 mg tablet, Take 1 tablet (5 mg total) by mouth daily Follow clinic instructions on titration dose to max 1 tab TID, as tolerated and to benefit  (Patient taking differently: Take 5 mg by mouth as needed ), Disp: 30 tablet, Rfl: 3    multivitamin (THERAGRAN) TABS, Take 1 tablet by mouth daily, Disp: , Rfl:     nystatin-triamcinolone (MYCOLOG-II) cream, Apply topically 2 (two) times a day (Patient taking differently: Apply topically as needed ), Disp: 60 g, Rfl: 1    pantoprazole (PROTONIX) 40 mg tablet, Take 1 tablet (40 mg total) by mouth 2 (two) times a day before meals (Patient taking differently: Take 40 mg by mouth daily ), Disp: 60 tablet, Rfl: 5    rOPINIRole (REQUIP) 0 5 mg tablet, Take 1 tablet (0 5 mg total) by mouth daily at bedtime, Disp: 30 tablet, Rfl: 3    selegiline (ELDEPRYL) 5 mg capsule, Take 5 mg by mouth 2 (two) times a day before meals, Disp: , Rfl:     vitamin E 100 UNIT capsule, Take 100 Units by mouth daily, Disp: , Rfl:     Wheat Dextrin (BENEFIBER DRINK MIX PO), , Disp: , Rfl:     Allergies:   Allergies as of 01/06/2021    (No Known Allergies)       The following portions of the patient's history were reviewed and updated as appropriate: past family history, past surgical history and problem list     Laboratory Results:  Lab Results   Component Value Date    SODIUM 134 (L) 12/31/2020 K 4 9 12/31/2020     12/31/2020    CO2 30 12/31/2020    BUN 19 12/31/2020    CREATININE 1 25 12/31/2020    GLUC 100 05/11/2018    CALCIUM 10 0 12/31/2020        Lab Results   Component Value Date    CALCIUM 10 0 12/31/2020       Portions of the record may have been created with voice recognition software   Occasional wrong word or "sound a like" substitutions may have occurred due to the inherent limitations of voice recognition software   Read the chart carefully and recognize, using context, where substitutions have occurred

## 2021-01-08 ENCOUNTER — PATIENT MESSAGE (OUTPATIENT)
Dept: NEUROLOGY | Facility: CLINIC | Age: 61
End: 2021-01-08

## 2021-01-08 ENCOUNTER — LAB (OUTPATIENT)
Dept: LAB | Facility: MEDICAL CENTER | Age: 61
End: 2021-01-08
Payer: COMMERCIAL

## 2021-01-08 DIAGNOSIS — E87.1 HYPONATREMIA: ICD-10-CM

## 2021-01-08 LAB
ANION GAP SERPL CALCULATED.3IONS-SCNC: 3 MMOL/L (ref 4–13)
BUN SERPL-MCNC: 24 MG/DL (ref 5–25)
CALCIUM SERPL-MCNC: 9.8 MG/DL (ref 8.3–10.1)
CHLORIDE SERPL-SCNC: 102 MMOL/L (ref 100–108)
CHLORIDE UR-SCNC: 25 MMOL/L
CO2 SERPL-SCNC: 29 MMOL/L (ref 21–32)
CORTIS AM PEAK SERPL-MCNC: 16.7 UG/DL (ref 4.2–22.4)
CREAT SERPL-MCNC: 1.31 MG/DL (ref 0.6–1.3)
GFR SERPL CREATININE-BSD FRML MDRD: 58 ML/MIN/1.73SQ M
GLUCOSE P FAST SERPL-MCNC: 103 MG/DL (ref 65–99)
OSMOLALITY UR/SERPL-RTO: 294 MMOL/KG (ref 282–298)
OSMOLALITY UR: 180 MMOL/KG
POTASSIUM SERPL-SCNC: 4.5 MMOL/L (ref 3.5–5.3)
SODIUM 24H UR-SCNC: 21 MOL/L
SODIUM SERPL-SCNC: 134 MMOL/L (ref 136–145)
TSH SERPL DL<=0.05 MIU/L-ACNC: 3.86 UIU/ML (ref 0.36–3.74)
URATE SERPL-MCNC: 5.8 MG/DL (ref 4.2–8)

## 2021-01-08 PROCEDURE — 83935 ASSAY OF URINE OSMOLALITY: CPT

## 2021-01-08 PROCEDURE — 36415 COLL VENOUS BLD VENIPUNCTURE: CPT

## 2021-01-08 PROCEDURE — 84550 ASSAY OF BLOOD/URIC ACID: CPT

## 2021-01-08 PROCEDURE — 80048 BASIC METABOLIC PNL TOTAL CA: CPT

## 2021-01-08 PROCEDURE — 82533 TOTAL CORTISOL: CPT

## 2021-01-08 PROCEDURE — 82436 ASSAY OF URINE CHLORIDE: CPT

## 2021-01-08 PROCEDURE — 84300 ASSAY OF URINE SODIUM: CPT

## 2021-01-08 PROCEDURE — 84443 ASSAY THYROID STIM HORMONE: CPT

## 2021-01-08 PROCEDURE — 83930 ASSAY OF BLOOD OSMOLALITY: CPT

## 2021-01-11 DIAGNOSIS — G20 PARKINSON'S DISEASE (HCC): ICD-10-CM

## 2021-01-14 ENCOUNTER — OFFICE VISIT (OUTPATIENT)
Dept: NEUROLOGY | Facility: CLINIC | Age: 61
End: 2021-01-14
Payer: COMMERCIAL

## 2021-01-14 VITALS
HEART RATE: 75 BPM | SYSTOLIC BLOOD PRESSURE: 180 MMHG | HEIGHT: 72 IN | BODY MASS INDEX: 31.97 KG/M2 | WEIGHT: 236 LBS | DIASTOLIC BLOOD PRESSURE: 110 MMHG

## 2021-01-14 DIAGNOSIS — G20 PARKINSON'S DISEASE (HCC): Primary | ICD-10-CM

## 2021-01-14 DIAGNOSIS — G90.3 NEUROGENIC ORTHOSTATIC HYPOTENSION (HCC): ICD-10-CM

## 2021-01-14 PROCEDURE — 3077F SYST BP >= 140 MM HG: CPT | Performed by: PSYCHIATRY & NEUROLOGY

## 2021-01-14 PROCEDURE — 99214 OFFICE O/P EST MOD 30 MIN: CPT | Performed by: PSYCHIATRY & NEUROLOGY

## 2021-01-14 PROCEDURE — 3080F DIAST BP >= 90 MM HG: CPT | Performed by: PSYCHIATRY & NEUROLOGY

## 2021-01-14 NOTE — PROGRESS NOTES
Assessment/Plan:    Parkinson's disease [facility])  76-year-old man presents in follow-up for parkinsonism and autonomic dysfunction  He continues to have worsening orthostatic hypotension can mild progression of his parkinsonism  He does appear to be levodopa responsive which further argues in favor of the idiopathic Parkinson's disease diagnosis  At this point the benefits from the Sinemet seem to be outweighed by the adverse impact on his blood pressure so we will cut the dose back to a half tablet 3 times a day  The patient is describing dizziness even at relatively normal blood pressures  We could consider acquiring vessel imaging of the head and neck to rule out alternative etiologies  The patient will continue working with Nephrology to optimize his blood pressure  Now that the patient is on Sinemet we could initiate a trial of [place] which might hester some motoric benefit without worsening his orthostatic blood pressures  There are no diagnoses linked to this encounter  Subjective:     Patient ID: Aristides Sofia 63 y o  male    I had the pleasure of seeing your patient, Aristides Sofia in the Movement Disorders Clinic at the [facility]        Kristyn Lombardo is a 63year-old right-handed man with parkinsonism, sympom onset with micrographia around 1  Later he developed some gait/posture changes  Course complicated by significant orthostatic hypotension  He also has some pulmonary symptoms which have raised concern for possible MSA however he does appear to be levodopa responsive and his course as a whole argues against MSA      Prior medication trials:   Sinemet (leg jumpy, not working well) (1 tab QID at max)   Ropinirole  amantadine - adverse cognitive effects     Pramipexole  Northera - flush and hypertensive   Florinef -      Current medications and timing:  Selegiline 5mg BID - helped his gait some, "takes the edge off"   Sinemet 1 tab TID with meals  Ropinirole 0 5mg QHS (RLS/PLMS)      Interval history:  Numbness in fingers and toes  Progressive over the past month  Fingers are very cold  SOB symptoms  diaphram paralysis in the past    Did find that his symptoms improved with sinemet "takes the edge off"   Right hand has been getting more stiff  Walking and balance issues - more stiff appearing       The following portions of the patient's history were reviewed and updated as appropriate: allergies, current medications, past family history, past medical history, past social history, past surgical history and problem list     Objective:  BP (!) 180/110 (BP Location: Left arm, Patient Position: Sitting, Cuff Size: Standard)   Pulse 75   Ht 6' (1 829 m)   Wt 107 kg (236 lb)   BMI 32 01 kg/m²     Physical Exam    Neurological Exam  GENERAL MEDICAL EXAMINATION:  General appearance: alert, in no apparent distress  Appropriately dressed and groomed  Conversing and interacting appropriately  Eyes: Sclera are non-injected  Ears, nose, Mouth, Throat: Mucous membranes are moist    Resp: Breathing comfortably on RA   Musculoskeletal: No evidence of deformities  No contractures  No Edema  Skin: No visible rashes  Warm and well perfused  Psych: normal and appropriate affect     Mental Status:  Alert and oriented to person place and time  Able to relate history without difficulty  Attentive to conversation  Language is fluent and appropriate with normal prosody  There were no paraphasic errors  Speech was not dysarthric  Able to follow both midline and appendicular commands       General Neurology examination:     UPDRS motor:                              Time since last dose:  2 5     Speech  1     Facial Expression  1     Rigidity - Neck  0     Rigidity - Upper Extremity (Right)  1     Rigidity - Upper Extremity (Left)   0     Rigidity - Lower Extremity (Right)  0     Rigidity - Lower Extremity (Left)   0     Finger Taps (Right)   1     Finger Taps (Left)   0     Hand Movement (Right)  0     Hand Movement (Left)   0     Pronation/Supination (Right)  0     Pronation/Supination (Left)   0     Toe Tapping (Right) 2     Toe Tapping (Left) 1     Leg Agility (Right)  2     Leg Agility (Left)   1     Arising from Chair   1     Gait   2     Freezing of Gait 0     Postural Stability        Posture 1     Global spontaneity of movement 1     Postural Tremor (Right) 1     Postural Tremor (Left) 0     Kinetic Tremor (Right)  1     Kinetic Tremor (Left)  1     Rest tremor amplitude RUE 0     Rest tremor amplitude LUE 0     Rest tremor amplitude RLE 0     Reset tremor amplitude LLE 0     Lip/Jaw Tremor  0     Consistency of tremor 0     Motor Exam Total:          Dysmetria: ? On the left with LFL  Dyskinesia: none  Dystonia: none   Myoclonus: none     Stance: Wide-based and stable   Stride: normal speed, stride length, step height, walks with no assistive device   Arm swing: reduced on the right   Turn: stable, 2-3 steps       Review of Systems   Constitutional: Negative  Negative for appetite change and fever  HENT: Negative  Negative for hearing loss, tinnitus, trouble swallowing and voice change  Eyes: Negative  Negative for photophobia and pain  Respiratory: Positive for shortness of breath  Cardiovascular: Negative  Negative for palpitations  Gastrointestinal: Negative  Negative for nausea and vomiting  Endocrine: Negative  Negative for cold intolerance  Genitourinary: Negative  Negative for dysuria, frequency and urgency  Musculoskeletal: Positive for gait problem  Negative for myalgias and neck pain  Skin: Negative  Negative for rash  Neurological: Positive for dizziness, weakness (legs), light-headedness and numbness (toes)  Negative for tremors, seizures, syncope, facial asymmetry, speech difficulty and headaches  Hematological: Negative  Does not bruise/bleed easily  Psychiatric/Behavioral: Negative    Negative for confusion, hallucinations and sleep disturbance  All other systems reviewed and are negative  The above ROS was reviewed and updated  Current Outpatient Medications on File Prior to Visit   Medication Sig Dispense Refill    carbidopa-levodopa (SINEMET)  mg per tablet TAKE 2 TABLETS BY MOUTH 3 (THREE) TIMES A DAY START WITH 1/2 TAB THREE TIMES A DAY AND INCREASE AS DIRECTED 180 tablet 3    cholecalciferol (VITAMIN D3) 1,000 units tablet Take 2,000 Units by mouth daily      cyanocobalamin (VITAMIN B-12) 100 mcg tablet Take by mouth daily      diclofenac sodium (VOLTAREN) 1 % Apply 2 g topically 4 (four) times a day (Patient taking differently: Apply 2 g topically as needed ) 2 Tube 1    Elastic Bandages & Supports (Elbow Copper-Infused Sleeve) MISC Use 1 Act daily 1 each 0    FIBER PO Take by mouth daily      midodrine (PROAMATINE) 5 mg tablet Take 1 tablet (5 mg total) by mouth daily Follow clinic instructions on titration dose to max 1 tab TID, as tolerated and to benefit  (Patient taking differently: Take 5 mg by mouth as needed ) 30 tablet 3    multivitamin (THERAGRAN) TABS Take 1 tablet by mouth daily      nystatin-triamcinolone (MYCOLOG-II) cream Apply topically 2 (two) times a day (Patient taking differently: Apply topically as needed ) 60 g 1    pantoprazole (PROTONIX) 40 mg tablet Take 1 tablet (40 mg total) by mouth 2 (two) times a day before meals (Patient taking differently: Take 40 mg by mouth daily ) 60 tablet 5    rOPINIRole (REQUIP) 0 5 mg tablet Take 1 tablet (0 5 mg total) by mouth daily at bedtime 30 tablet 3    selegiline (ELDEPRYL) 5 mg capsule Take 5 mg by mouth 2 (two) times a day before meals      vitamin E 100 UNIT capsule Take 100 Units by mouth daily      Wheat Dextrin (BENEFIBER DRINK MIX PO)       famotidine (Pepcid) 20 mg tablet Take 20 mg by mouth as needed        No current facility-administered medications on file prior to visit          Avel Kenney MD  Medical Director   [facility]

## 2021-01-14 NOTE — ASSESSMENT & PLAN NOTE
29-year-old man presents in follow-up for parkinsonism and autonomic dysfunction  He continues to have worsening orthostatic hypotension can mild progression of his parkinsonism  He does appear to be levodopa responsive which further argues in favor of the idiopathic Parkinson's disease diagnosis  At this point the benefits from the Sinemet seem to be outweighed by the adverse impact on his blood pressure so we will cut the dose back to a half tablet 3 times a day  The patient is describing dizziness even at relatively normal blood pressures  We could consider acquiring vessel imaging of the head and neck to rule out alternative etiologies  The patient will continue working with Nephrology to optimize his blood pressure  Now that the patient is on Sinemet we could initiate a trial of [place] [place] which might hester some motoric benefit without worsening his orthostatic blood pressures

## 2021-01-14 NOTE — PATIENT INSTRUCTIONS
-  parkinsons  [email or web address]/exercise_videos  html  - [address] [facility] Exercise helpline: [phone]  - [facility]

## 2021-01-15 ENCOUNTER — TELEPHONE (OUTPATIENT)
Dept: NEUROLOGY | Facility: CLINIC | Age: 61
End: 2021-01-15

## 2021-01-15 DIAGNOSIS — G62.9 POLYNEUROPATHY: ICD-10-CM

## 2021-01-15 DIAGNOSIS — G90.3 NEUROGENIC ORTHOSTATIC HYPOTENSION (HCC): Primary | ICD-10-CM

## 2021-01-15 DIAGNOSIS — G90.9 AUTONOMIC NEUROPATHY: ICD-10-CM

## 2021-01-15 NOTE — TELEPHONE ENCOUNTER
Can you let the patient know that I reviewed his case with our neuromuscular specialist and I think the best next step would be to run some blood work for reversible causes of neuropathy  Looking for other treatable issues that could be causing his symptoms  Orders placed

## 2021-01-15 NOTE — TELEPHONE ENCOUNTER
Called and review below with patient  He verbalized understanding   He will go to [facility] lab

## 2021-01-16 ENCOUNTER — LAB (OUTPATIENT)
Dept: LAB | Facility: MEDICAL CENTER | Age: 61
End: 2021-01-16
Payer: COMMERCIAL

## 2021-01-16 DIAGNOSIS — G90.3 NEUROGENIC ORTHOSTATIC HYPOTENSION (HCC): ICD-10-CM

## 2021-01-16 DIAGNOSIS — G62.9 POLYNEUROPATHY: ICD-10-CM

## 2021-01-16 DIAGNOSIS — G90.9 AUTONOMIC NEUROPATHY: ICD-10-CM

## 2021-01-17 ENCOUNTER — APPOINTMENT (OUTPATIENT)
Dept: LAB | Facility: HOSPITAL | Age: 61
End: 2021-01-17
Attending: PSYCHIATRY & NEUROLOGY
Payer: COMMERCIAL

## 2021-01-17 DIAGNOSIS — G62.9 POLYNEUROPATHY: ICD-10-CM

## 2021-01-17 DIAGNOSIS — G90.3 NEUROGENIC ORTHOSTATIC HYPOTENSION (HCC): Primary | ICD-10-CM

## 2021-01-17 DIAGNOSIS — G90.9 AUTONOMIC NEUROPATHY: ICD-10-CM

## 2021-01-17 LAB
TSH SERPL DL<=0.05 MIU/L-ACNC: 3.09 UIU/ML (ref 0.36–3.74)
VIT B12 SERPL-MCNC: 671 PG/ML (ref 100–900)

## 2021-01-17 PROCEDURE — 36415 COLL VENOUS BLD VENIPUNCTURE: CPT

## 2021-01-17 PROCEDURE — 84207 ASSAY OF VITAMIN B-6: CPT

## 2021-01-17 PROCEDURE — 86235 NUCLEAR ANTIGEN ANTIBODY: CPT

## 2021-01-17 PROCEDURE — 84165 PROTEIN E-PHORESIS SERUM: CPT

## 2021-01-17 PROCEDURE — 86038 ANTINUCLEAR ANTIBODIES: CPT

## 2021-01-17 PROCEDURE — 86592 SYPHILIS TEST NON-TREP QUAL: CPT

## 2021-01-17 PROCEDURE — 86255 FLUORESCENT ANTIBODY SCREEN: CPT

## 2021-01-17 PROCEDURE — 84425 ASSAY OF VITAMIN B-1: CPT

## 2021-01-17 PROCEDURE — 82784 ASSAY IGA/IGD/IGG/IGM EACH: CPT

## 2021-01-17 PROCEDURE — 82607 VITAMIN B-12: CPT

## 2021-01-17 PROCEDURE — 83520 IMMUNOASSAY QUANT NOS NONAB: CPT

## 2021-01-17 PROCEDURE — 83516 IMMUNOASSAY NONANTIBODY: CPT

## 2021-01-17 PROCEDURE — 86618 LYME DISEASE ANTIBODY: CPT

## 2021-01-17 PROCEDURE — 83918 ORGANIC ACIDS TOTAL QUANT: CPT

## 2021-01-17 PROCEDURE — 84443 ASSAY THYROID STIM HORMONE: CPT

## 2021-01-17 PROCEDURE — 83519 RIA NONANTIBODY: CPT

## 2021-01-17 PROCEDURE — 82595 ASSAY OF CRYOGLOBULIN: CPT

## 2021-01-17 PROCEDURE — 84165 PROTEIN E-PHORESIS SERUM: CPT | Performed by: PATHOLOGY

## 2021-01-17 PROCEDURE — 86341 ISLET CELL ANTIBODY: CPT

## 2021-01-18 ENCOUNTER — TELEPHONE (OUTPATIENT)
Dept: NEPHROLOGY | Facility: CLINIC | Age: 61
End: 2021-01-18

## 2021-01-18 LAB
ALBUMIN SERPL ELPH-MCNC: 4.5 G/DL (ref 3.5–5)
ALBUMIN SERPL ELPH-MCNC: 59.2 % (ref 52–65)
ALPHA1 GLOB SERPL ELPH-MCNC: 0.31 G/DL (ref 0.1–0.4)
ALPHA1 GLOB SERPL ELPH-MCNC: 4.1 % (ref 2.5–5)
ALPHA2 GLOB SERPL ELPH-MCNC: 0.68 G/DL (ref 0.4–1.2)
ALPHA2 GLOB SERPL ELPH-MCNC: 8.9 % (ref 7–13)
BETA GLOB ABNORMAL SERPL ELPH-MCNC: 0.55 G/DL (ref 0.4–0.8)
BETA1 GLOB SERPL ELPH-MCNC: 7.2 % (ref 5–13)
BETA2 GLOB SERPL ELPH-MCNC: 6.2 % (ref 2–8)
BETA2+GAMMA GLOB SERPL ELPH-MCNC: 0.47 G/DL (ref 0.2–0.5)
ENA SS-A AB SER-ACNC: <0.2 AI (ref 0–0.9)
ENA SS-B AB SER-ACNC: <0.2 AI (ref 0–0.9)
ENDOMYSIUM IGA SER QL: NEGATIVE
GAMMA GLOB ABNORMAL SERPL ELPH-MCNC: 1.09 G/DL (ref 0.5–1.6)
GAMMA GLOB SERPL ELPH-MCNC: 14.4 % (ref 12–22)
GLIADIN PEPTIDE IGA SER-ACNC: 4 UNITS (ref 0–19)
GLIADIN PEPTIDE IGG SER-ACNC: 3 UNITS (ref 0–19)
IGA SERPL-MCNC: 337 MG/DL (ref 61–437)
IGG/ALB SER: 1.45 {RATIO} (ref 1.1–1.8)
PROT PATTERN SERPL ELPH-IMP: NORMAL
PROT SERPL-MCNC: 7.6 G/DL (ref 6.4–8.2)
RPR SER QL: NORMAL
RYE IGE QN: NEGATIVE
TTG IGA SER-ACNC: <2 U/ML (ref 0–3)
TTG IGG SER-ACNC: 5 U/ML (ref 0–5)

## 2021-01-18 NOTE — TELEPHONE ENCOUNTER
I spoke to the patient and he is aware his urine sodium is low  He states he drinks 6-8 12oz glasses of water per day

## 2021-01-18 NOTE — TELEPHONE ENCOUNTER
----- Message from Shannon Walton DO sent at 1/15/2021  3:39 PM EST -----  Urine Na borderline, urine osm low - suspect possible dilutional effect  Recommend patient avoid aggressive fluid ingestion  Please ask about current water intake total amount  Will then follow up with further recommendations on fluid restriction based on low sodium in setting of low urine osm

## 2021-01-19 LAB
B BURGDOR IGG+IGM SER-ACNC: 12
C-ANCA TITR SER IF: NORMAL TITER
MYELOPEROXIDASE AB SER IA-ACNC: <9 U/ML (ref 0–9)
P-ANCA ATYPICAL TITR SER IF: NORMAL TITER
P-ANCA TITR SER IF: NORMAL TITER
PROTEINASE3 AB SER IA-ACNC: <3.5 U/ML (ref 0–3.5)

## 2021-01-19 NOTE — TELEPHONE ENCOUNTER
Left detailed message for patient that he should drink no more than 5 12oz glasses of water per day

## 2021-01-20 DIAGNOSIS — E87.1 HYPONATREMIA: Primary | ICD-10-CM

## 2021-01-22 LAB
CRYOGLOB SER QL 1D COLD INC: NORMAL
METHYLMALONATE SERPL-SCNC: 204 NMOL/L (ref 0–378)
SL AMB DISCLAIMER: NORMAL
VIT B6 SERPL-MCNC: 24.6 UG/L (ref 5.3–46.7)

## 2021-01-23 ENCOUNTER — LAB (OUTPATIENT)
Dept: LAB | Facility: MEDICAL CENTER | Age: 61
End: 2021-01-23
Payer: COMMERCIAL

## 2021-01-23 DIAGNOSIS — E87.1 HYPONATREMIA: ICD-10-CM

## 2021-01-23 LAB
ANION GAP SERPL CALCULATED.3IONS-SCNC: 4 MMOL/L (ref 4–13)
BUN SERPL-MCNC: 21 MG/DL (ref 5–25)
CALCIUM SERPL-MCNC: 10 MG/DL (ref 8.3–10.1)
CHLORIDE SERPL-SCNC: 107 MMOL/L (ref 100–108)
CO2 SERPL-SCNC: 30 MMOL/L (ref 21–32)
CREAT SERPL-MCNC: 1.24 MG/DL (ref 0.6–1.3)
GFR SERPL CREATININE-BSD FRML MDRD: 62 ML/MIN/1.73SQ M
GLUCOSE P FAST SERPL-MCNC: 111 MG/DL (ref 65–99)
POTASSIUM SERPL-SCNC: 4.5 MMOL/L (ref 3.5–5.3)
SODIUM SERPL-SCNC: 141 MMOL/L (ref 136–145)

## 2021-01-23 PROCEDURE — 80048 BASIC METABOLIC PNL TOTAL CA: CPT

## 2021-01-23 PROCEDURE — 36415 COLL VENOUS BLD VENIPUNCTURE: CPT

## 2021-01-24 LAB — VIT B1 BLD-SCNC: 165 NMOL/L (ref 66.5–200)

## 2021-01-25 ENCOUNTER — OFFICE VISIT (OUTPATIENT)
Dept: FAMILY MEDICINE CLINIC | Facility: CLINIC | Age: 61
End: 2021-01-25
Payer: COMMERCIAL

## 2021-01-25 ENCOUNTER — TELEPHONE (OUTPATIENT)
Dept: NEPHROLOGY | Facility: CLINIC | Age: 61
End: 2021-01-25

## 2021-01-25 VITALS
SYSTOLIC BLOOD PRESSURE: 140 MMHG | WEIGHT: 235.8 LBS | DIASTOLIC BLOOD PRESSURE: 82 MMHG | BODY MASS INDEX: 31.94 KG/M2 | TEMPERATURE: 95.9 F | HEIGHT: 72 IN

## 2021-01-25 DIAGNOSIS — K21.00 GASTROESOPHAGEAL REFLUX DISEASE WITH ESOPHAGITIS WITHOUT HEMORRHAGE: ICD-10-CM

## 2021-01-25 DIAGNOSIS — G20 ATYPICAL PARKINSONISM (HCC): ICD-10-CM

## 2021-01-25 DIAGNOSIS — N18.2 CKD (CHRONIC KIDNEY DISEASE) STAGE 2, GFR 60-89 ML/MIN: ICD-10-CM

## 2021-01-25 DIAGNOSIS — G90.3 NEUROGENIC ORTHOSTATIC HYPOTENSION (HCC): Primary | ICD-10-CM

## 2021-01-25 PROCEDURE — 99214 OFFICE O/P EST MOD 30 MIN: CPT | Performed by: FAMILY MEDICINE

## 2021-01-25 PROCEDURE — 1036F TOBACCO NON-USER: CPT | Performed by: FAMILY MEDICINE

## 2021-01-25 PROCEDURE — 3008F BODY MASS INDEX DOCD: CPT | Performed by: FAMILY MEDICINE

## 2021-01-25 NOTE — TELEPHONE ENCOUNTER
----- Message from Alexandra Pittman DO sent at 1/25/2021  9:16 AM EST -----  Please let the patient know that his sodium has normalized on the current water intake regimen  Renal function stable  Thank you

## 2021-01-25 NOTE — PROGRESS NOTES
Assessment/Plan:  Patient will follow with neurologist at [facility] for parkinsonian disease  Patient follow-up with other specialists/cardiology for orthostatic hypotension  Patient continue with current regimen for GERD symptoms which is stable  Labs reviewed  Patient have low carb diet due to mild hyperglycemia  Diagnoses and all orders for this visit:    Neurogenic orthostatic hypotension (HCC)    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HCC)    Gastroesophageal reflux disease with esophagitis without hemorrhage    CKD (chronic kidney disease) stage 2, GFR 60-89 ml/min            Subjective:        Patient ID: Diana Briones is a 63 y o  male  Patient is here to follow-up on Parkinson's disease as well as orthostatic hypotension and GERD symptoms  GERD stable overall     Patient is having blood pressures checked at rest lying down as well as with standing  The patient having significant blood pressure fluctuations  Patient has notice orthostatic hypotension  Patient had decreased dosing of carbidopa levodopa  Patient is using midodrine  Patient is seeing neurologist at [facility]  The following portions of the patient's history were reviewed and updated as appropriate: allergies, current medications, past family history, past medical history, past social history, past surgical history and problem list       Review of Systems   Constitutional: Negative  HENT: Negative  Eyes: Negative  Respiratory: Negative  Cardiovascular:        Orthostatics changes noted on blood pressure   Gastrointestinal: Negative  Endocrine: Negative  Genitourinary: Negative  Musculoskeletal: Negative  Skin: Negative  Allergic/Immunologic: Negative  Neurological: Positive for dizziness, weakness and light-headedness  Hematological: Negative  Psychiatric/Behavioral: Negative  Objective:      BMI Counseling: Body mass index is 31 98 kg/m²   The BMI is above normal  Nutrition recommendations include decreasing portion sizes  Exercise recommendations include moderate physical activity 150 minutes/week  Depression Screening and Follow-up Plan: Clincally patient does not have depression  No treatment is required  /82 (BP Location: Right arm, Patient Position: Sitting, Cuff Size: Standard)   Temp (!) 95 9 °F (35 5 °C) (Tympanic)   Ht 6' (1 829 m)   Wt 107 kg (235 lb 12 8 oz)   BMI 31 98 kg/m²          Physical Exam  Vitals signs and nursing note reviewed  Constitutional:       General: He is not in acute distress  Appearance: Normal appearance  He is not ill-appearing, toxic-appearing or diaphoretic  HENT:      Head: Normocephalic and atraumatic  Right Ear: Tympanic membrane, ear canal and external ear normal  There is no impacted cerumen  Left Ear: Tympanic membrane, ear canal and external ear normal  There is no impacted cerumen  Nose: Nose normal  No congestion or rhinorrhea  Mouth/Throat:      Mouth: Mucous membranes are moist       Pharynx: No oropharyngeal exudate or posterior oropharyngeal erythema  Eyes:      General: No scleral icterus  Right eye: No discharge  Left eye: No discharge  Extraocular Movements: Extraocular movements intact  Conjunctiva/sclera: Conjunctivae normal       Pupils: Pupils are equal, round, and reactive to light  Neck:      Musculoskeletal: Normal range of motion and neck supple  No neck rigidity or muscular tenderness  Vascular: No carotid bruit  Cardiovascular:      Rate and Rhythm: Normal rate and regular rhythm  Pulses: Normal pulses  Heart sounds: Normal heart sounds  No murmur  No friction rub  No gallop  Pulmonary:      Effort: Pulmonary effort is normal  No respiratory distress  Breath sounds: Normal breath sounds  No stridor  No wheezing, rhonchi or rales  Chest:      Chest wall: No tenderness  Abdominal:      General: Abdomen is flat  Bowel sounds are normal  There is no distension  Palpations: Abdomen is soft  Tenderness: There is no abdominal tenderness  There is no guarding or rebound  Musculoskeletal: Normal range of motion  General: No swelling, tenderness, deformity or signs of injury  Right lower leg: No edema  Left lower leg: No edema  Lymphadenopathy:      Cervical: No cervical adenopathy  Skin:     General: Skin is warm and dry  Capillary Refill: Capillary refill takes less than 2 seconds  Coloration: Skin is not jaundiced  Findings: No bruising, erythema, lesion or rash  Neurological:      Mental Status: He is alert and oriented to person, place, and time  Mental status is at baseline  Cranial Nerves: No cranial nerve deficit  Sensory: No sensory deficit  Motor: Weakness present  Coordination: Coordination normal       Gait: Gait normal    Psychiatric:         Mood and Affect: Mood normal          Behavior: Behavior normal          Thought Content:  Thought content normal          Judgment: Judgment normal

## 2021-01-25 NOTE — TELEPHONE ENCOUNTER
I spoke to the patient and he is aware his sodium level is now normal and his kidney function is stable  He will continue on his current water regimen  No further questions or concerns at this time

## 2021-01-26 DIAGNOSIS — G25.81 RESTLESS LEG SYNDROME: ICD-10-CM

## 2021-01-26 RX ORDER — ROPINIROLE 0.5 MG/1
TABLET, FILM COATED ORAL
Qty: 90 TABLET | Refills: 1 | Status: SHIPPED | OUTPATIENT
Start: 2021-01-26 | End: 2021-07-21

## 2021-01-28 LAB — MISCELLANEOUS LAB TEST RESULT: NORMAL

## 2021-02-12 ENCOUNTER — OFFICE VISIT (OUTPATIENT)
Dept: PULMONOLOGY | Facility: CLINIC | Age: 61
End: 2021-02-12
Payer: COMMERCIAL

## 2021-02-12 VITALS
OXYGEN SATURATION: 93 % | DIASTOLIC BLOOD PRESSURE: 110 MMHG | HEIGHT: 72 IN | HEART RATE: 67 BPM | TEMPERATURE: 96.6 F | SYSTOLIC BLOOD PRESSURE: 162 MMHG | WEIGHT: 239 LBS | BODY MASS INDEX: 32.37 KG/M2

## 2021-02-12 DIAGNOSIS — R06.02 SHORTNESS OF BREATH: ICD-10-CM

## 2021-02-12 DIAGNOSIS — G47.33 OSA (OBSTRUCTIVE SLEEP APNEA): Primary | ICD-10-CM

## 2021-02-12 DIAGNOSIS — G25.81 RESTLESS LEG SYNDROME: ICD-10-CM

## 2021-02-12 PROCEDURE — 99214 OFFICE O/P EST MOD 30 MIN: CPT | Performed by: INTERNAL MEDICINE

## 2021-02-12 NOTE — LETTER
February 13, 2021     Tonya Loya, [address] [address]    Patient: Melissa Russo   YOB: 1960   Date of Visit: 2/12/2021       Dear Dr Nabila Patel:    Thank you for referring Kely Neff to me for evaluation  Below are my notes for this consultation  If you have questions, please do not hesitate to call me  I look forward to following your patient along with you  Sincerely,        Sherrie Royal, DO        CC: No Recipients  Sherrie Royal, DO  2/13/2021  8:07 PM  Sign when Signing Visit  Progress note - Pulmonary Medicine   Melissa Russo 63 y o  male MRN: [id number]       Impression & Plan:   60 y o  M with PMHx of Parkinson's disease with diaphragmatic weakness, chronic knee and ankle pain, urinary retention s/p TURP, Moderate KALLI on CPAP who comes in for follow up   1  Worsening shortness of breath - I am still concerned for a progressive neuromuscular disease   Dr Irene Rivers of neurology did mention Multiple System atrophy   I am significantly concerned about the bulbar symptoms he has including upper airway closing, hoarseness, dysphagia and weak voice  6 minute walk test without desaturation today  -  I will repeat PFT and ABG to assess if he has neuromuscular decline and possible hypercapnic decompensation         - While previous SNIF test does not show paralysis, he does still have some weakness overall        -   I feel strongly he may need to switch to AVAPS or AVAPS - AE       2  Moderate KALLI (AHI - 15 7) - on auto BiPAP   He demonstrates good compliance and Residual AHI - 4 8  However he does suffer with some aerophagia       -  Set BIPAP to 17/12 to avoid aerophagia    Follow compliance at next visit       -  May need to consider switch to AVAPS pending results of ABG      -  He is aware of the risk of leaving sleep apnea untreated including hypertension, heart failure, arrhythmia, MI and stroke       3  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 -  this has been less problematic  Would avoid sedating medications      4    Periodic limb movement (PLM index - 109) -  This may be secondary to KALLI or Parkinsons   He denies symptoms of this          - Continue Requip at 0 5mg qHS dosing          -  He did not find neurontin helpful     ______________________________________________________________________    HPI:    Tyler Adair presents today for follow-up for his dyspnea  He states that last he was here he was feeling better  However, he has noted significant dyspnea since we last met  He states that even shoveling snow he will get significant shortness of breath  He has been noting less exercise tolerance  He still notes periods where his airway seems to close  He will get tightness in his throat overall  He has been using his autoBIPAP each night and has been tolerating it well  He actually feels as if his breathing is better overall with it  However, he does still get episodes of gastric distenstion, in particular when he is laying on his side  Review of Systems:  Review of Systems   Constitutional: Positive for fatigue  HENT: Negative  Eyes: Negative  Respiratory: Positive for cough, chest tightness, shortness of breath and wheezing  Cardiovascular: Negative  Gastrointestinal: Negative  Endocrine: Negative  Genitourinary: Negative  Skin: Negative  Allergic/Immunologic: Negative  Neurological: Positive for weakness  Negative for light-headedness  Psychiatric/Behavioral: Negative            Social history updates:  Social History     Tobacco Use   Smoking Status Former Smoker    Packs/day: 0 50    Years: 3 50    Pack years: 1 75    Types: Cigarettes    Quit date: Timothy Bass Years since quittin 1   Smokeless Tobacco Never Used     Social History     Socioeconomic History    Marital status: /Civil Union     Spouse name: Not on file    Number of children: Not on file   [facility] Years of education: Not on file    Highest education level: Not on file   Occupational History    Occupation: [employer] retired   Social Needs    Financial resource strain: Not on file    Food insecurity     Worry: Not on file     Inability: Not on file   [employer] needs     Medical: Not on file     Non-medical: Not on file   Tobacco Use    Smoking status: Former Smoker     Packs/day: 0 50     Years: 3 50     Pack years: 1 75     Types: Cigarettes     Quit date:      Years since quittin 1    Smokeless tobacco: Never Used   Substance and Sexual Activity    Alcohol use: Not Currently    Drug use: No    Sexual activity: Not on file   Lifestyle    Physical activity     Days per week: Not on file     Minutes per session: Not on file    Stress: Not on file   Relationships    Social connections     Talks on phone: Not on file     Gets together: Not on file     Attends Congregational service: Not on file     Active member of club or organization: Not on file     Attends meetings of clubs or organizations: Not on file     Relationship status: Not on file    Intimate partner violence     Fear of current or ex partner: Not on file     Emotionally abused: Not on file     Physically abused: Not on file     Forced sexual activity: Not on file   Other Topics Concern    Not on file   Social History Narrative    Consumes 5 glasses of tea per week       PhysicalExamination:  Vitals:   BP (!) 162/110 (BP Location: Right arm, Patient Position: Sitting)   Pulse 67   Temp (!) 96 6 °F (35 9 °C) (Temporal)   Ht 6' (1 829 m)   Wt 108 kg (239 lb)   SpO2 93%   BMI 32 41 kg/m²   General: Pleasant, Awake alert and oriented x 3, conversant without conversational dyspnea, NAD, normal affect  HEENT:  PERRL, Sclera noninjected, nonicteric OU, Nares patent,  no craniofacial abnormalities, Mucous membranes, moist, no oral lesions, normal dentition  NECK: Trachea midline, no accessory muscle use, no stridor, no cervical or supraclavicular adenopathy, JVP not elevated  CARDIAC: Reg, single s1/S2, no m/r/g  PULM: CTA bilaterally no wheezing, rhonchi or rales  ABD: Normoactive bowel sounds, soft nontender, nondistended, no rebound, no rigidity, no guarding  EXT: No cyanosis, no clubbing, no edema, normal capillary refill  NEURO: no focal neurologic deficits, AAOx3, moving all extremities appropriately      Diagnostic Data:  Labs: I personally reviewed the most recent laboratory data pertinent to today's visit  not applicable  I have personally reviewed pertinent lab results  Lab Results   Component Value Date    WBC 8 75 08/18/2020    HGB 14 4 08/18/2020    HCT 45 2 08/18/2020    MCV 92 08/18/2020     08/18/2020     Lab Results   Component Value Date    CALCIUM 10 0 01/23/2021    K 4 5 01/23/2021    CO2 30 01/23/2021     01/23/2021    BUN 21 01/23/2021    CREATININE 1 24 01/23/2021     No results found for: IGE  Lab Results   Component Value Date    ALT 20 08/18/2020    AST 12 08/18/2020    ALKPHOS 60 08/18/2020       ABG    10/26/20 10:11 AM 5/20/19 8:55 AM     pH, Arterial 7 350 - 7 450 7 402  7 394    pCO2, Arterial 36 0 - 44 0 mm Hg 38 6  40 1    pO2, Arterial 75 0 - 129 0 mm Hg 76 9  71 9Low     HCO3, Arterial 22 0 - 28 0 mmol/L 23 5  23 9    Base Excess, Arterial mmol/L -1 0  -0 8    O2 Content, Arterial 16 0 - 23 0 mL/dL 20 1  20 1    O2 HGB,Arterial  94 0 - 97 0 % 94 7  94 1             The most recent pulmonary function tests were reviewed    PFT 10/26/20  Results:  FEV1/FVC Ratio: 77 %  Forced Vital Capacity: 4 88 L    99 % predicted  FEV1: 3 75 L     99 % predicted     Lung volumes by body plethysmography:   Total Lung Capacity 99 % predicted   Residual volume 96 % predicted     DLCO corrected for patients hemoglobin level: 83 %     Interpretation:     · No obstructive airflow defect on spirometry     · Normal Spirometry     · Normal Lung volumes     · Normal diffusion capacity     · Flow volume loop is normal      3/4/19  Results:  FEV1/FVC Ratio: 80 %  Forced Vital Capacity: 4 80 L    93 % predicted  FEV1: 3 85 L     97 % predicted  Lung volumes by body plethysmography:   Total Lung Capacity 94 % predicted   Residual volume 88 % predicted  DLCO corrected for patients hemoglobin level: 79 %  Interpretation:  · No obstructive airflow defect   · Normal Spirometry  · Normal Lung volumes  · Normal diffusion capacity     Repeat 6 minute walk 20  Starting saturation - 98%   Starting HR - 89  Lowest saturation - 91%    Highest HR - 103  Ambulated - 252 m and he did not require oxygen     6 minute walk  Date of testin2019  Resting room air saturation: 93%  Randy scale of dyspnea at start of test: 0/10     Ambulation testing:  Lowest saturation 93%  No supplemental oxygen required     Randy scale of dyspnea at end of test: 3/10  Reason if test was stopped early: N/A      Total 6 minute walk distance: 1400 feet     Imaging:  I personally reviewed the images on the [facility] system pertinent to today's visit  CT chest - 19  IMPRESSION:  Within normal limits CT of the chest      Repeat SNF test 10/26/20  IMPRESSION:  There is no evidence of diaphragmatic paralysis or elevation         Other studies:  HST - moderate (15 7), Supine AHI - 23, hypoxia   CPAP titration - Nasal CPAP was titrated from 5-11 cm of water for  control of snoring and abnormal breathing  Patient appeared to do best at 11 cm of CPAP delivered using a [employer] 45 full face interface   Continued use of this equipment at these settings is recommended       New Compliance Data:  21-2/10/21                                   Type of CPAP:  auto BiPAP min EPAP 11, PSV 4-6, Max IPAP 25,                                    Mean EPAP - 12- 14 8, IPAP 17 - Max IPAP 18 8                                   Percent usage: 95%, 85 %> 4hrs                                   Average time used: 5hrs 26 mins - 7 hrs 40 mins                                  Time in large leak: 6 min 22 secs                                   Residual AHI: 4 8 (CA - 3 0)     Compliance Data:  10/24/20-11/22/20                                   Type of CPAP:  auto BiPAP min EPAP 11, PSV 4-6, Max IPAP 25,                                    Mean EPAP - 11 7- 16 9, IPAP 16 2 - Max IPAP 22 9                                   Percent usage: 63%, 30%> 4hrs                                   Average time used: 2hrs 40 mins - 6 hrs 45 mins                                  Time in large leak: 16 min 19 secs                                   Residual AHI: 5 6 (CA - 3 1)     Compliance Data:  7/28/20-8/26/20                                   Type of CPAP:  auto BiPAP min EPAP 11, PSV 4, Max IPAP 25,                                    Mean EPAP - 12 2- 17 6, IPAP 16 2 - Max IPAP 22                                   Percent usage: 97%, 90%> 4hrs                                   Average time used: 5hrs 30 mins - 8 hrs 25 mins                                  Time in large leak: 4 min 39 secs                                   Residual AHI: 7 0 (CA - 5 1)     Compliance Data:  1/5/20-2/5/20                                   Type of CPAP:  auto BiPAP min EPAP 11, PSV 4, Max IPAP 25,                                    Mean EPAP recorded - 12 1, Peak - 14 9, Min IPAP 16, Max IPAP 19                                   Percent usage: 72%, 63%> 4hrs                                   Average time used: 3hrs 51 mins - 8 hrs 26 mins                                  Time in large leak: 50 secs                                   Residual AHI: 6 6  (CA - 3 9)     Compliance Data:  10/23/19-11/21/19                                   Type of CPAP:  autoPAP 11-15, Mean - 13 2, Peak - 15 0                                   Percent usage: 73%, 67%> 4hrs                                   Average time used: 3hrs 38 mins - 8 hrs 37 mins                                  Time in large leak: 9 mins 46 secs                                   Residual AHI: 11 0  (CA - 0 9)     Compliance Data:  8/25/19-9/23/19                                   Type of CPAP:  autoPAP 8-15, Mean - 11 9, Peak - 15 6                                   Percent usage: 63%                                   Average time used: 2hrs 52 mins - 4 hrs 32 mins                                  Time in large leak: 4 mins 44 secs                                   Residual AHI: 13 6  (CA - 0 9)         Jonel Fraga DO

## 2021-02-14 NOTE — PROGRESS NOTES
Progress note - Pulmonary Medicine   Kaela Tam 63 y o  male MRN: [id number]       Impression & Plan:   60 y o  M with PMHx of Parkinson's disease with diaphragmatic weakness, chronic knee and ankle pain, urinary retention s/p TURP, Moderate KALLI on CPAP who comes in for follow up   1  Worsening shortness of breath - I am still concerned for a progressive neuromuscular disease   Dr Winsome Dai of neurology did mention Multiple System atrophy   I am significantly concerned about the bulbar symptoms he has including upper airway closing, hoarseness, dysphagia and weak voice  6 minute walk test without desaturation today  -  I will repeat PFT and ABG to assess if he has neuromuscular decline and possible hypercapnic decompensation         - While previous SNIF test does not show paralysis, he does still have some weakness overall        -   I feel strongly he may need to switch to AVAPS or AVAPS - AE       2  Moderate KALLI (AHI - 15 7) - on auto BiPAP   He demonstrates good compliance and Residual AHI - 4 8  However he does suffer with some aerophagia       -  Set BIPAP to 17/12 to avoid aerophagia  Follow compliance at next visit       -  May need to consider switch to AVAPS pending results of ABG      -  He is aware of the risk of leaving sleep apnea untreated including hypertension, heart failure, arrhythmia, MI and stroke       3  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 -  this has been less problematic  Would avoid sedating medications      4    Periodic limb movement (PLM index - 109) -  This may be secondary to KALLI or Parkinsons   He denies symptoms of this          - Continue Requip at 0 5mg qHS dosing          -  He did not find neurontin helpful     ______________________________________________________________________    HPI:    Kaela Tam presents today for follow-up for his dyspnea  He states that last he was here he was feeling better    However, he has noted significant dyspnea since we last met   He states that even shoveling snow he will get significant shortness of breath  He has been noting less exercise tolerance  He still notes periods where his airway seems to close  He will get tightness in his throat overall  He has been using his autoBIPAP each night and has been tolerating it well  He actually feels as if his breathing is better overall with it  However, he does still get episodes of gastric distenstion, in particular when he is laying on his side  Review of Systems:  Review of Systems   Constitutional: Positive for fatigue  HENT: Negative  Eyes: Negative  Respiratory: Positive for cough, chest tightness, shortness of breath and wheezing  Cardiovascular: Negative  Gastrointestinal: Negative  Endocrine: Negative  Genitourinary: Negative  Skin: Negative  Allergic/Immunologic: Negative  Neurological: Positive for weakness  Negative for light-headedness  Psychiatric/Behavioral: Negative            Social history updates:  Social History     Tobacco Use   Smoking Status Former Smoker    Packs/day: 0 50    Years: 3 50    Pack years: 1 75    Types: Cigarettes    Quit date: 36    Years since quittin 1   Smokeless Tobacco Never Used     Social History     Socioeconomic History    Marital status: /Civil Union     Spouse name: Not on file    Number of children: Not on file    Years of education: Not on file    Highest education level: Not on file   Occupational History    Occupation: [employer] retired   Social Needs    Financial resource strain: Not on file    Food insecurity     Worry: Not on file     Inability: Not on file   [employer] needs     Medical: Not on file     Non-medical: Not on file   Tobacco Use    Smoking status: Former Smoker     Packs/day: 0 50     Years: 3 50     Pack years: 1 75     Types: Cigarettes     Quit date:      Years since quittin 1    Smokeless tobacco: Never Used Substance and Sexual Activity    Alcohol use: Not Currently    Drug use: No    Sexual activity: Not on file   Lifestyle    Physical activity     Days per week: Not on file     Minutes per session: Not on file    Stress: Not on file   Relationships    Social connections     Talks on phone: Not on file     Gets together: Not on file     Attends Cheondoism service: Not on file     Active member of club or organization: Not on file     Attends meetings of clubs or organizations: Not on file     Relationship status: Not on file    Intimate partner violence     Fear of current or ex partner: Not on file     Emotionally abused: Not on file     Physically abused: Not on file     Forced sexual activity: Not on file   Other Topics Concern    Not on file   Social History Narrative    Consumes 5 glasses of tea per week       PhysicalExamination:  Vitals:   BP (!) 162/110 (BP Location: Right arm, Patient Position: Sitting)   Pulse 67   Temp (!) 96 6 °F (35 9 °C) (Temporal)   Ht 6' (1 829 m)   Wt 108 kg (239 lb)   SpO2 93%   BMI 32 41 kg/m²   General: Pleasant, Awake alert and oriented x 3, conversant without conversational dyspnea, NAD, normal affect  HEENT:  PERRL, Sclera noninjected, nonicteric OU, Nares patent,  no craniofacial abnormalities, Mucous membranes, moist, no oral lesions, normal dentition  NECK: Trachea midline, no accessory muscle use, no stridor, no cervical or supraclavicular adenopathy, JVP not elevated  CARDIAC: Reg, single s1/S2, no m/r/g  PULM: CTA bilaterally no wheezing, rhonchi or rales  ABD: Normoactive bowel sounds, soft nontender, nondistended, no rebound, no rigidity, no guarding  EXT: No cyanosis, no clubbing, no edema, normal capillary refill  NEURO: no focal neurologic deficits, AAOx3, moving all extremities appropriately      Diagnostic Data:  Labs:   I personally reviewed the most recent laboratory data pertinent to today's visit  not applicable  I have personally reviewed pertinent lab results  Lab Results   Component Value Date    WBC 8 75 08/18/2020    HGB 14 4 08/18/2020    HCT 45 2 08/18/2020    MCV 92 08/18/2020     08/18/2020     Lab Results   Component Value Date    CALCIUM 10 0 01/23/2021    K 4 5 01/23/2021    CO2 30 01/23/2021     01/23/2021    BUN 21 01/23/2021    CREATININE 1 24 01/23/2021     No results found for: IGE  Lab Results   Component Value Date    ALT 20 08/18/2020    AST 12 08/18/2020    ALKPHOS 60 08/18/2020       ABG    10/26/20 10:11 AM 5/20/19 8:55 AM     pH, Arterial 7 350 - 7 450 7 402  7 394    pCO2, Arterial 36 0 - 44 0 mm Hg 38 6  40 1    pO2, Arterial 75 0 - 129 0 mm Hg 76 9  71 9Low     HCO3, Arterial 22 0 - 28 0 mmol/L 23 5  23 9    Base Excess, Arterial mmol/L -1 0  -0 8    O2 Content, Arterial 16 0 - 23 0 mL/dL 20 1  20 1    O2 HGB,Arterial  94 0 - 97 0 % 94 7  94 1             The most recent pulmonary function tests were reviewed    PFT 10/26/20  Results:  FEV1/FVC Ratio: 77 %  Forced Vital Capacity: 4 88 L    99 % predicted  FEV1: 3 75 L     99 % predicted     Lung volumes by body plethysmography:   Total Lung Capacity 99 % predicted   Residual volume 96 % predicted     DLCO corrected for patients hemoglobin level: 83 %     Interpretation:     · No obstructive airflow defect on spirometry     · Normal Spirometry     · Normal Lung volumes     · Normal diffusion capacity     · Flow volume loop is normal      3/4/19  Results:  FEV1/FVC Ratio: 80 %  Forced Vital Capacity: 4 80 L    93 % predicted  FEV1: 3 85 L     97 % predicted  Lung volumes by body plethysmography:   Total Lung Capacity 94 % predicted   Residual volume 88 % predicted  DLCO corrected for patients hemoglobin level: 79 %  Interpretation:  · No obstructive airflow defect   · Normal Spirometry  · Normal Lung volumes  · Normal diffusion capacity     Repeat 6 minute walk 8/28/20  Starting saturation - 98%   Starting HR - 89  Lowest saturation - 91%    Highest HR - 103  Ambulated - 252 m and he did not require oxygen     6 minute walk  Date of testin2019  Resting room air saturation: 93%  Randy scale of dyspnea at start of test: 0/10     Ambulation testing:  Lowest saturation 93%  No supplemental oxygen required     Randy scale of dyspnea at end of test: 3/10  Reason if test was stopped early: N/A      Total 6 minute walk distance: 1400 feet     Imaging:  I personally reviewed the images on the [facility] system pertinent to today's visit  CT chest - 19  IMPRESSION:  Within normal limits CT of the chest      Repeat SNF test 10/26/20  IMPRESSION:  There is no evidence of diaphragmatic paralysis or elevation         Other studies:  HST - moderate (15 7), Supine AHI - 23, hypoxia   CPAP titration - Nasal CPAP was titrated from 5-11 cm of water for  control of snoring and abnormal breathing  Patient appeared to do best at 11 cm of CPAP delivered using a [employer] 45 full face interface   Continued use of this equipment at these settings is recommended       New Compliance Data:  21-2/10/21                                   Type of CPAP:  auto BiPAP min EPAP 11, PSV 4-6, Max IPAP 25,                                    Mean EPAP - 12- 14 8, IPAP 17 - Max IPAP 18 8                                   Percent usage: 95%, 85 %> 4hrs                                   Average time used: 5hrs 26 mins - 7 hrs 40 mins                                  Time in large leak: 6 min 22 secs                                   Residual AHI: 4 8 (CA - 3 0)     Compliance Data:  10/24/20-20                                   Type of CPAP:  auto BiPAP min EPAP 11, PSV 4-6, Max IPAP 25,                                    Mean EPAP - 11 7- 16 9, IPAP 16 2 - Max IPAP 22 9                                   Percent usage: 63%, 30%> 4hrs                                   Average time used: 2hrs 40 mins - 6 hrs 45 mins                                  Time in large leak: 16 min 19 secs                                   Residual AHI: 5 6 (CA - 3 1)     Compliance Data:  7/28/20-8/26/20                                   Type of CPAP:  auto BiPAP min EPAP 11, PSV 4, Max IPAP 25,                                    Mean EPAP - 12 2- 17 6, IPAP 16 2 - Max IPAP 22                                   Percent usage: 97%, 90%> 4hrs                                   Average time used: 5hrs 30 mins - 8 hrs 25 mins                                  Time in large leak: 4 min 39 secs                                   Residual AHI: 7 0 (CA - 5 1)     Compliance Data:  1/5/20-2/5/20                                   Type of CPAP:  auto BiPAP min EPAP 11, PSV 4, Max IPAP 25,                                    Mean EPAP recorded - 12 1, Peak - 14 9, Min IPAP 16, Max IPAP 19                                   Percent usage: 72%, 63%> 4hrs                                   Average time used: 3hrs 51 mins - 8 hrs 26 mins                                  Time in large leak: 50 secs                                   Residual AHI: 6 6  (CA - 3 9)     Compliance Data:  10/23/19-11/21/19                                   Type of CPAP:  autoPAP 11-15, Mean - 13 2, Peak - 15 0                                   Percent usage: 73%, 67%> 4hrs                                   Average time used: 3hrs 38 mins - 8 hrs 37 mins                                  Time in large leak: 9 mins 46 secs                                   Residual AHI: 11 0  (CA - 0 9)     Compliance Data:  8/25/19-9/23/19                                   Type of CPAP:  autoPAP 8-15, Mean - 11 9, Peak - 15 6                                   Percent usage: 63%                                   Average time used: 2hrs 52 mins - 4 hrs 32 mins                                  Time in large leak: 4 mins 44 secs                                   Residual AHI: 13 6  (CA - 0 9)         Judge Francisco DO

## 2021-02-17 DIAGNOSIS — G20 PARKINSON'S DISEASE (HCC): ICD-10-CM

## 2021-02-19 ENCOUNTER — OFFICE VISIT (OUTPATIENT)
Dept: FAMILY MEDICINE CLINIC | Facility: CLINIC | Age: 61
End: 2021-02-19
Payer: COMMERCIAL

## 2021-02-19 VITALS
SYSTOLIC BLOOD PRESSURE: 168 MMHG | DIASTOLIC BLOOD PRESSURE: 102 MMHG | HEIGHT: 72 IN | WEIGHT: 240 LBS | TEMPERATURE: 97.5 F | BODY MASS INDEX: 32.51 KG/M2

## 2021-02-19 DIAGNOSIS — G90.3 NEUROGENIC ORTHOSTATIC HYPOTENSION (HCC): Primary | ICD-10-CM

## 2021-02-19 DIAGNOSIS — G20 PARKINSON'S DISEASE (HCC): ICD-10-CM

## 2021-02-19 PROCEDURE — 99213 OFFICE O/P EST LOW 20 MIN: CPT | Performed by: FAMILY MEDICINE

## 2021-02-19 RX ORDER — TADALAFIL 5 MG/1
5 TABLET ORAL DAILY
COMMUNITY
Start: 2021-02-15 | End: 2022-07-21

## 2021-02-19 NOTE — PROGRESS NOTES
Assessment/Plan: patient will liberal salt at night but not during the day period patient will continue monitor blood pressures  Patient will continue with midodrine per routine  Diagnoses and all orders for this visit:    Neurogenic orthostatic hypotension ([address] )    Parkinson's disease ([address] )    Other orders  -     tadalafil (CIALIS) 5 MG tablet; Take 5 mg by mouth daily            Subjective:        Patient ID: Elroy Contreras is a 63 y o  male  Patient here to follow-up on orthostatic hypotension as well as hypertension  Patient with very labile blood pressures  Yesterday patient's blood pressure was 70/41 in the morning  The patient did use midodrine yesterday morning  Patient did have some pickles and blood pressure 1 to 208/119  Later at night it came back down to 88/52  Today patient's blood pressure was 67/42 in the morning and had midodrine 5 mg patient did have some sway based products and blood pressure at 12:45 p m  today was 190/119  Patient does notice some flushing associated with elevation of blood pressure  No chest pain  Or shortness of breath  Patient did have a headache yesterday  Hypertension  This is a recurrent problem  The current episode started more than 1 year ago  The problem is unchanged  The problem is resistant  Associated symptoms include headaches and peripheral edema  Pertinent negatives include no anxiety, blurred vision, chest pain, malaise/fatigue, neck pain, orthopnea, palpitations, PND, shortness of breath or sweats  There are no associated agents to hypertension  There are no known risk factors for coronary artery disease  There are no compliance problems  The following portions of the patient's history were reviewed and updated as appropriate: allergies, current medications, past family history, past medical history, past social history, past surgical history and problem list       Review of Systems   Constitutional: Negative    Negative for malaise/fatigue  HENT: Negative  Eyes: Negative  Negative for blurred vision  Respiratory: Negative  Negative for shortness of breath  Cardiovascular: Negative  Negative for chest pain, palpitations, orthopnea and PND  Gastrointestinal: Negative  Endocrine: Negative  Genitourinary: Negative  Musculoskeletal: Negative  Negative for neck pain  Skin: Negative  Allergic/Immunologic: Negative  Neurological: Positive for headaches  Hematological: Negative  Psychiatric/Behavioral: Negative  Objective:        Depression Screening and Follow-up Plan: Clincally patient does not have depression  No treatment is required  Patient assessed for underlying major depression  Brief counseling provided and recommend additional follow-up/re-evaluation next office visit  BP (!) 168/102 (BP Location: Left arm, Patient Position: Sitting, Cuff Size: Standard)   Temp 97 5 °F (36 4 °C)   Ht 6' (1 829 m)   Wt 109 kg (240 lb)   BMI 32 55 kg/m²          Physical Exam  Vitals signs and nursing note reviewed  Constitutional:       General: He is not in acute distress  Appearance: Normal appearance  He is not ill-appearing, toxic-appearing or diaphoretic  HENT:      Head: Normocephalic and atraumatic  Right Ear: Tympanic membrane, ear canal and external ear normal  There is no impacted cerumen  Left Ear: Tympanic membrane, ear canal and external ear normal  There is no impacted cerumen  Nose: Nose normal  No congestion or rhinorrhea  Mouth/Throat:      Mouth: Mucous membranes are moist       Pharynx: No oropharyngeal exudate or posterior oropharyngeal erythema  Eyes:      General: No scleral icterus  Right eye: No discharge  Left eye: No discharge  Extraocular Movements: Extraocular movements intact  Conjunctiva/sclera: Conjunctivae normal       Pupils: Pupils are equal, round, and reactive to light     Neck: Musculoskeletal: Normal range of motion and neck supple  No neck rigidity or muscular tenderness  Vascular: No carotid bruit  Cardiovascular:      Rate and Rhythm: Normal rate and regular rhythm  Pulses: Normal pulses  Heart sounds: Normal heart sounds  No murmur  No friction rub  No gallop  Pulmonary:      Effort: Pulmonary effort is normal  No respiratory distress  Breath sounds: Normal breath sounds  No stridor  No wheezing, rhonchi or rales  Chest:      Chest wall: No tenderness  Musculoskeletal: Normal range of motion  General: No swelling, tenderness, deformity or signs of injury  Right lower leg: No edema  Left lower leg: No edema  Lymphadenopathy:      Cervical: No cervical adenopathy  Skin:     General: Skin is warm and dry  Capillary Refill: Capillary refill takes less than 2 seconds  Coloration: Skin is not jaundiced  Findings: No bruising, erythema, lesion or rash  Neurological:      General: No focal deficit present  Mental Status: He is alert and oriented to person, place, and time  Cranial Nerves: No cranial nerve deficit  Sensory: No sensory deficit  Motor: No weakness  Coordination: Coordination normal       Gait: Gait normal    Psychiatric:         Mood and Affect: Mood normal          Behavior: Behavior normal          Thought Content:  Thought content normal          Judgment: Judgment normal

## 2021-03-04 ENCOUNTER — TELEPHONE (OUTPATIENT)
Dept: GASTROENTEROLOGY | Facility: CLINIC | Age: 61
End: 2021-03-04

## 2021-03-04 DIAGNOSIS — K21.00 GASTROESOPHAGEAL REFLUX DISEASE WITH ESOPHAGITIS: ICD-10-CM

## 2021-03-04 DIAGNOSIS — K21.9 GASTROESOPHAGEAL REFLUX DISEASE WITHOUT ESOPHAGITIS: Primary | ICD-10-CM

## 2021-03-04 RX ORDER — FAMOTIDINE 20 MG/1
20 TABLET, FILM COATED ORAL DAILY
Qty: 90 TABLET | Refills: 1 | Status: SHIPPED | OUTPATIENT
Start: 2021-03-04

## 2021-03-04 RX ORDER — SUCRALFATE ORAL 1 G/10ML
1 SUSPENSION ORAL 2 TIMES DAILY
Qty: 60 ML | Refills: 2 | Status: SHIPPED | OUTPATIENT
Start: 2021-03-04 | End: 2021-04-15

## 2021-03-04 RX ORDER — PANTOPRAZOLE SODIUM 40 MG/1
40 TABLET, DELAYED RELEASE ORAL
Qty: 60 TABLET | Refills: 5 | Status: SHIPPED | OUTPATIENT
Start: 2021-03-04 | End: 2022-04-08 | Stop reason: SDUPTHER

## 2021-03-04 NOTE — TELEPHONE ENCOUNTER
Patients GI provider:  Dr Frank Hilton    Number to return call: [phone]    Reason for call: Pt calling stating he has been experiencing extreme acid reflux and nothing seems to be working for him  Scheduled procedure/appointment date if applicable: Appt    4/14/21

## 2021-03-04 NOTE — TELEPHONE ENCOUNTER
Dr Ginger Rollins patient  Hx: GERD  Current symptoms:  Increased acid indigestion x 2 days (constant)  Patient feels this is contributed to his Parkinson's Disease  Current medication regimen:  Pantoprazole 40 mg po daily  TUMS  Baking soda/water treatment   Patient verbalized only relief is when patient is standing    Last ov- 12/21/20  Next ov- 4/14/21    Recommendations:  Pantoprazole 40 mg po 2 x daily (previously ordered) *refill ordered*  Pepcid 20 mg po at bedtime *refill ordered*  Message to provider for Rx Carafate to [facility] # [id number]

## 2021-03-10 DIAGNOSIS — Z23 ENCOUNTER FOR IMMUNIZATION: ICD-10-CM

## 2021-03-30 ENCOUNTER — OFFICE VISIT (OUTPATIENT)
Dept: FAMILY MEDICINE CLINIC | Facility: CLINIC | Age: 61
End: 2021-03-30
Payer: COMMERCIAL

## 2021-03-30 VITALS
DIASTOLIC BLOOD PRESSURE: 80 MMHG | SYSTOLIC BLOOD PRESSURE: 150 MMHG | BODY MASS INDEX: 32.37 KG/M2 | TEMPERATURE: 98 F | WEIGHT: 239 LBS | HEIGHT: 72 IN

## 2021-03-30 DIAGNOSIS — R03.0 ELEVATED BP WITHOUT DIAGNOSIS OF HYPERTENSION: Primary | ICD-10-CM

## 2021-03-30 DIAGNOSIS — N18.2 CKD (CHRONIC KIDNEY DISEASE) STAGE 2, GFR 60-89 ML/MIN: ICD-10-CM

## 2021-03-30 DIAGNOSIS — G47.33 OSA (OBSTRUCTIVE SLEEP APNEA): ICD-10-CM

## 2021-03-30 DIAGNOSIS — G20 PARKINSON'S DISEASE (HCC): ICD-10-CM

## 2021-03-30 PROCEDURE — 3008F BODY MASS INDEX DOCD: CPT | Performed by: FAMILY MEDICINE

## 2021-03-30 PROCEDURE — 1036F TOBACCO NON-USER: CPT | Performed by: FAMILY MEDICINE

## 2021-03-30 PROCEDURE — 99214 OFFICE O/P EST MOD 30 MIN: CPT | Performed by: FAMILY MEDICINE

## 2021-03-30 RX ORDER — LISINOPRIL 5 MG/1
5 TABLET ORAL DAILY
Qty: 30 TABLET | Refills: 1 | Status: SHIPPED | OUTPATIENT
Start: 2021-03-30 | End: 2021-05-13

## 2021-03-30 NOTE — PROGRESS NOTES
Assessment/Plan:    We did have a discussion about his blood pressure  I will start a low-dose lisinopril to take once a day but he can take it on an as-needed basis until he follows up with Dr Fidel Kumar  Diagnoses and all orders for this visit:    Elevated BP without diagnosis of hypertension  -     lisinopril (ZESTRIL) 5 mg tablet; Take 1 tablet (5 mg total) by mouth daily    KALLI (obstructive sleep apnea)    Parkinson's disease (HCC)    CKD (chronic kidney disease) stage 2, GFR 60-89 ml/min            Subjective:        Patient ID: Bruce Delarosa is a 63 y o  male  Patient presents with:  Hypertension: patient states he was having prssure in his head and face was red   10pm-203/130 15 mins later 148/93 , 12pm 160/102      [place]  He noticed his blood pressure was elevated what down her period he did not have a very good night sleep last night  The following portions of the patient's history were reviewed and updated as appropriate: allergies, current medications, past family history, past medical history, past social history, past surgical history and problem list       Review of Systems   Constitutional: Negative  HENT: Negative  Eyes: Negative  Respiratory: Negative  Cardiovascular: Negative  Gastrointestinal: Negative  Endocrine: Negative  Genitourinary: Negative  Skin: Negative  Allergic/Immunologic: Negative  Neurological: Positive for tremors and headaches  Negative for dizziness and facial asymmetry  Hematological: Negative  Psychiatric/Behavioral: Negative  All other systems reviewed and are negative  Objective:             /80 (BP Location: Left arm, Patient Position: Sitting, Cuff Size: Large)   Temp 98 °F (36 7 °C) (Tympanic)   Ht 6' (1 829 m)   Wt 108 kg (239 lb)   BMI 32 41 kg/m²          Physical Exam  Vitals signs and nursing note reviewed     Constitutional:       Appearance: He is well-developed  HENT:      Head: Normocephalic and atraumatic  Right Ear: External ear normal       Left Ear: External ear normal       Nose: Nose normal    Eyes:      Conjunctiva/sclera: Conjunctivae normal       Pupils: Pupils are equal, round, and reactive to light  Neck:      Musculoskeletal: Normal range of motion and neck supple  Cardiovascular:      Rate and Rhythm: Normal rate and regular rhythm  Heart sounds: Normal heart sounds  Pulmonary:      Effort: Pulmonary effort is normal       Breath sounds: Normal breath sounds  Abdominal:      General: Bowel sounds are normal       Palpations: Abdomen is soft  Skin:     General: Skin is warm and dry  Neurological:      General: No focal deficit present  Mental Status: He is alert and oriented to person, place, and time  Cranial Nerves: No cranial nerve deficit  Deep Tendon Reflexes: Reflexes are normal and symmetric     Psychiatric:         Behavior: Behavior normal

## 2021-04-09 ENCOUNTER — RA CDI HCC (OUTPATIENT)
Dept: OTHER | Facility: HOSPITAL | Age: 61
End: 2021-04-09

## 2021-04-09 NOTE — PROGRESS NOTES
[place] [address]  coding opportunities             Chart reviewed, (number of) suggestions sent to provider: 2     Problem listed updated   Provider Accepted, (number of) suggestions accepted: 2        Patients insurance company: [employer] (Medicare Advantage and Commercial)     Visit status: Patient arrived for their scheduled appointment   Suggestions accepted but not assessed at visit     [place] [address]  coding oppertunities             Chart reviewed, (number of) suggestions sent to provider: 2                 On active problem list:  C62 [id number] Primary testicular cancer ([place] [address] )      [id number]  Personal history of malignant neoplasm of testis

## 2021-04-12 ENCOUNTER — HOSPITAL ENCOUNTER (OUTPATIENT)
Dept: PULMONOLOGY | Facility: HOSPITAL | Age: 61
Discharge: HOME | End: 2021-04-12
Attending: INTERNAL MEDICINE
Payer: COMMERCIAL

## 2021-04-12 DIAGNOSIS — G90.3 NEUROGENIC ORTHOSTATIC HYPOTENSION (HCC): ICD-10-CM

## 2021-04-12 DIAGNOSIS — R06.02 SHORTNESS OF BREATH: ICD-10-CM

## 2021-04-12 LAB
ARTERIAL PATENCY WRIST A: YES
BASE EXCESS BLDA CALC-SCNC: -0.3 MMOL/L
HCO3 BLDA-SCNC: 23.8 MMOL/L (ref 22–28)
NON VENT ROOM AIR: 21 %
O2 CT BLDA-SCNC: 19 ML/DL (ref 16–23)
OXYHGB MFR BLDA: 90.3 % (ref 94–97)
PCO2 BLDA: 37.6 MM HG (ref 36–44)
PH BLDA: 7.42 [PH] (ref 7.35–7.45)
PO2 BLDA: 61 MM HG (ref 75–129)
SPECIMEN SOURCE: ABNORMAL

## 2021-04-12 PROCEDURE — 94726 PLETHYSMOGRAPHY LUNG VOLUMES: CPT | Performed by: INTERNAL MEDICINE

## 2021-04-12 PROCEDURE — 94726 PLETHYSMOGRAPHY LUNG VOLUMES: CPT

## 2021-04-12 PROCEDURE — 94729 DIFFUSING CAPACITY: CPT

## 2021-04-12 PROCEDURE — 94760 N-INVAS EAR/PLS OXIMETRY 1: CPT

## 2021-04-12 PROCEDURE — 82805 BLOOD GASES W/O2 SATURATION: CPT | Performed by: INTERNAL MEDICINE

## 2021-04-12 PROCEDURE — 94060 EVALUATION OF WHEEZING: CPT

## 2021-04-12 PROCEDURE — 94729 DIFFUSING CAPACITY: CPT | Performed by: INTERNAL MEDICINE

## 2021-04-12 PROCEDURE — 94060 EVALUATION OF WHEEZING: CPT | Performed by: INTERNAL MEDICINE

## 2021-04-12 PROCEDURE — 36600 WITHDRAWAL OF ARTERIAL BLOOD: CPT

## 2021-04-12 RX ORDER — ALBUTEROL SULFATE 2.5 MG/3ML
2.5 SOLUTION RESPIRATORY (INHALATION) ONCE AS NEEDED
Status: COMPLETED | OUTPATIENT
Start: 2021-04-12 | End: 2021-04-12

## 2021-04-12 RX ADMIN — ALBUTEROL SULFATE 2.5 MG: 2.5 SOLUTION RESPIRATORY (INHALATION) at 09:47

## 2021-04-13 RX ORDER — MIDODRINE HYDROCHLORIDE 5 MG/1
TABLET ORAL
Qty: 30 TABLET | Refills: 2 | OUTPATIENT
Start: 2021-04-13

## 2021-04-14 ENCOUNTER — OFFICE VISIT (OUTPATIENT)
Dept: GASTROENTEROLOGY | Facility: MEDICAL CENTER | Age: 61
End: 2021-04-14
Payer: COMMERCIAL

## 2021-04-14 VITALS
HEIGHT: 72 IN | DIASTOLIC BLOOD PRESSURE: 113 MMHG | WEIGHT: 238 LBS | TEMPERATURE: 97.8 F | HEART RATE: 73 BPM | SYSTOLIC BLOOD PRESSURE: 194 MMHG | BODY MASS INDEX: 32.23 KG/M2

## 2021-04-14 DIAGNOSIS — K21.00 GASTROESOPHAGEAL REFLUX DISEASE WITH ESOPHAGITIS WITHOUT HEMORRHAGE: Primary | ICD-10-CM

## 2021-04-14 DIAGNOSIS — K59.00 CONSTIPATION, UNSPECIFIED CONSTIPATION TYPE: ICD-10-CM

## 2021-04-14 DIAGNOSIS — G90.3 NEUROGENIC ORTHOSTATIC HYPOTENSION (HCC): ICD-10-CM

## 2021-04-14 DIAGNOSIS — R13.19 OTHER DYSPHAGIA: ICD-10-CM

## 2021-04-14 PROCEDURE — 99213 OFFICE O/P EST LOW 20 MIN: CPT | Performed by: INTERNAL MEDICINE

## 2021-04-14 RX ORDER — MIDODRINE HYDROCHLORIDE 5 MG/1
5 TABLET ORAL DAILY
Qty: 30 TABLET | Refills: 3 | Status: SHIPPED | OUTPATIENT
Start: 2021-04-14 | End: 2021-09-09

## 2021-04-14 NOTE — TELEPHONE ENCOUNTER
Former Dr Mich Perez patient    Patient calling for a refill of midodrine  Please sign if agreeable

## 2021-04-14 NOTE — PROGRESS NOTES
[facility] - Outpatient Follow-up Note  Pako Aguilar 63 y o  male MRN: [id number]  Encounter: [id number]          ASSESSMENT AND PLAN:  Donna FERNANDEZ 46 y  o  male with a history of Parkinson's disease, KALLI, who presents for follow up evaluation of GERD and dysphagia  1  Gastroesophageal reflux disease with esophagitis without hemorrhage  2  Other dysphagia  He has history of chronic symptoms of difficulty swallowing which has progressed since his Parkinson's diagnosis  He previously underwent upper endoscopy in 2020 which was negative for mechanical etiology  Manometry testing showed normal esophageal motility and pH testing showed reflux in the recumbent position but not significant overall acid exposure time  He is maintained on Protonix 40 mg daily and can use Pepcid as needed for breakthrough symptoms  He will continue dietary/lifestyle anti-reflux measures  3  Constipation, unspecified constipation type  He has history of chronic constipation which has been worsening since his Parkinson's diagnosis as well  We discussed constipation management including high-fiber diet and adding fiber supplementation, adequate hydration with 6-8 glasses of water per day  2020 colonoscopy showed 3 adenomatous polyps  He is due for repeat in 3 years  Follow up in 6 months  ______________________________________________________________________    SUBJECTIVE:  Donna BROWN 38 y  o  male with a history of Parkinson's disease, KALLI who presents for follow up evaluation of GERD and dysphagia  Interval history:   His last office visit was December 2020  He continues to take pantoprazole 40 mg a day with good control of his acid reflux  He had 1 episode of missing medication for 3 days and had recurrence of his symptoms for which he used to Pepcid with good relief  Now he has maintained daily PPI dosing    He notes intermittent breakthrough reflux with bending over or large meals  He notes sensation of a throat closing sensation after doing certain breathing exercises  He reports bowel movements are usually 3 to 4 times a day but are Meeker 1-2  He was using fiber supplementation previously but not regularly at this time  He denies melena or hematochezia  He denies abdominal pain, nausea vomiting  His appetite is good his weight is stable  He has had changes in his Parkinson's medications recently due to new onset of hypertension and is being evaluated for orthostatic hypotension as well        Past history:  He was 1st seen in the GI office September 2020  He diagnosed Parkinson's disease in 2019   At his office visit he noted globus sensation, hoarse voice    He was initially prescribed omeprazole daily with improvement and was transition to pantoprazole daily with mild improvement   He also noted issues with constipation since diagnosis of his Parkinson's disease      He underwent EGD September 2020 showing multiple benign gastric polyp otherwise normal esophagus stomach and duodenum   Colonoscopy showed four  subcentimeter colon polyps, and he was recommended to repeat in 3 years   Esophageal biopsies were normal   Gastric polyp showed fundic gland polyp   Colon polyp showed 3 adenomatous polyps and 1 submucosal leiomyoma  He underwent pH/manometry testing December 2020 showing evidence of acid reflux in the recumbent position but overall acid exposure times not significant  Esophageal motility was also normal         He underwent speech and swallow evaluation July 2019 showing normal oral and pharyngeal states      He reports family history of colon cancer in his father diagnosed in his [de-identified]  Past surgical history includes bladder surgery and bilateral inguinal hernia     2017 colonoscopy showed 2 subcentimeter polyps, pathology not available for review         REVIEW OF SYSTEMS IS OTHERWISE NEGATIVE    Ten point review of systems is negative other than stated as per HPI    Historical Information   Past Medical History:   Diagnosis Date    Back pain     Bladder infection     BPH (benign prostatic hyperplasia)     [facility] ([facility] )     testicular 1988    Chronic kidney disease     Diverticulosis     GERD (gastroesophageal reflux disease)     occassional    History of testicular cancer 1988    Surgery and radiation; left side    Kidney stone     Kidney stones     Currently and in the past    Orthostatic hypotension     Orthostatic hypotension due to [facility]'s disease ([facility]     [facility]     Sleep apnea     uses CPAP    Wears glasses      Past Surgical History:   Procedure Laterality Date    BLADDER NECK RECONSTRUCTION  07/27/2020    COLONOSCOPY  2017    COLONOSCOPY  09/2020    COLONOSCOPY  11/2020    Mark Serum / CYSTOURETHROPLASTY  07/27/2020    [address] inguinal hernia repair    IR SUPRAPUBIC CATHETER PLACEMENT  2/24/2020    IR TUBE UPSIZE  3/23/2020    KIDNEY STONE SURGERY      LITHOTRIPSY      Renal; Resolved: 2/27/07    ORCHIECTOMY Left     VA CYSTOURETHRO W/IMPLANT N/A 5/17/2018    Procedure: CYSTOSCOPY WITH INSERTION UROLIFT;  Surgeon: Malaika Macias DO;  [facility];  Service: Urology    PROSTATE SURGERY N/A 1/18/2018    Procedure: CYSTOSCOPY WITH INSERTION UROLIFT;  Surgeon: Malaika Macias DO;  [facility];  Service: Urology    [address] [place] [address] [address]    For cancer    TONSILLECTOMY      URETERONEOCYSTOSTOMY      w/ cystoscopy Ureteral Stent Placement;  Resolved: 6/14/2007    WISDOM TOOTH EXTRACTION       Social History   Social History     Substance and Sexual Activity   Alcohol Use Not Currently     Social History     Substance and Sexual Activity   Drug Use No     Social History     Tobacco Use   Smoking Status Former Smoker    Packs/day: 0 50    Years: 3 50    Pack years: 1 75    Types: Cigarettes    Quit date: 36    Years since quittin 3   Smokeless Tobacco Never Used     Family History   Problem Relation Age of Onset    Colon cancer Father     Heart failure Father     Parkinsonism Mother     Cancer Paternal Grandmother        Meds/Allergies       Current Outpatient Medications:     carbidopa-levodopa (SINEMET)  mg per tablet    cholecalciferol (VITAMIN D3) 1,000 units tablet    cyanocobalamin (VITAMIN B-12) 100 mcg tablet    diclofenac sodium (VOLTAREN) 1 %    Elastic Bandages & Supports (Elbow Copper-Infused Sleeve) MISC    famotidine (Pepcid) 20 mg tablet    FIBER PO    lisinopril (ZESTRIL) 5 mg tablet    midodrine (PROAMATINE) 5 mg tablet    multivitamin (THERAGRAN) TABS    nystatin-triamcinolone (MYCOLOG-II) cream    pantoprazole (PROTONIX) 40 mg tablet    rOPINIRole (REQUIP) 0 5 mg tablet    selegiline (ELDEPRYL) 5 mg capsule    sucralfate (CARAFATE) 1 g/10 mL suspension    tadalafil (CIALIS) 5 MG tablet    vitamin E 100 UNIT capsule    Wheat Dextrin (BENEFIBER DRINK MIX PO)    No Known Allergies        Objective     There were no vitals taken for this visit  There is no height or weight on file to calculate BMI  PHYSICAL EXAM:      General Appearance:   Alert, cooperative, no distress   HEENT:   Normocephalic, atraumatic, anicteric  Neck:  Supple, symmetrical, trachea midline   Lungs:   Clear to auscultation bilaterally; no rales, rhonchi or wheezing; respirations unlabored    Heart[de-identified]   Regular rate and rhythm; no murmur, rub, or gallop  Abdomen:   Soft, non-tender, non-distended; normal bowel sounds; no masses, no organomegaly    Genitalia:   Deferred    Rectal:   Deferred    Extremities:  No cyanosis, clubbing or edema    Pulses:  2+ and symmetric    Skin:  No jaundice, rashes, or lesions    Lymph nodes:  No palpable cervical lymphadenopathy        Lab Results:   No visits with results within 1 Day(s) from this visit     Latest known visit with results is:   Hospital Outpatient Visit on 04/12/2021   Component Date Value    pH, Arterial 04/12/2021 7 420     pCO2, Arterial 04/12/2021 37 6     pO2, Arterial 04/12/2021 61 0*    HCO3, Arterial 04/12/2021 23 8     Base Excess, Arterial 04/12/2021 -0 3     O2 Content, Arterial 04/12/2021 19 0     O2 HGB,Arterial  04/12/2021 90 3*    SOURCE 04/12/2021 Radial, Right     JOEL TEST 04/12/2021 Yes     ROOM AIR FIO2 04/12/2021 21          Radiology Results:   No results found

## 2021-04-14 NOTE — PATIENT INSTRUCTIONS
High Fiber Diet   AMBULATORY CARE:   A high-fiber diet  includes foods that have a high amount of fiber  Fiber is the part of fruits, vegetables, and grains that is not broken down by your body  Fiber keeps your bowel movements regular  Fiber can also help lower your cholesterol level, control blood sugar in people with diabetes, and relieve constipation  Fiber can also help you control your weight because it helps you feel full faster  Most adults should eat 25 to 35 grams of fiber each day  Talk to your dietitian or healthcare provider about the amount of fiber you need  Good sources of fiber:       · Foods with at least 4 grams of fiber per serving:      ? ? to ½ cup of high-fiber cereal (check the nutrition label on the box)    ? ½ cup of blackberries or raspberries    ? 4 dried prunes    ? 1 cooked artichoke    ? ½ cup of cooked legumes, such as lentils, or red, kidney, and weber beans    · Foods with 1 to 3 grams of fiber per serving:      ? 1 slice of whole-wheat, pumpernickel, or rye bread    ? ½ cup of cooked brown rice    ? 4 whole-wheat crackers    ? 1 cup of oatmeal    ? ½ cup of cereal with 1 to 3 grams of fiber per serving (check the nutrition label on the box)    ? 1 small piece of fruit, such as an apple, banana, pear, kiwi, or orange    ? 3 dates    ? ½ cup of canned apricots, fruit cocktail, peaches, or pears    ? ½ cup of raw or cooked vegetables, such as carrots, cauliflower, cabbage, spinach, squash, or corn  Ways that you can increase fiber in your diet:   · Choose brown or wild rice instead of white rice  · Use whole wheat flour in recipes instead of white or all-purpose flour  · Add beans and peas to casseroles or soups  · Choose fresh fruit and vegetables with peels or skins on instead of juices  Other diet guidelines to follow:   · Add fiber to your diet slowly  You may have abdominal discomfort, bloating, and gas if you add fiber to your diet too quickly       · Drink plenty of liquids as you add fiber to your diet  You may have nausea or develop constipation if you do not drink enough water  Ask how much liquid to drink each day and which liquids are best for you  © Copyright [address] Information is for End User's use only and may not be sold, redistributed or otherwise used for commercial purposes  All illustrations and images included in CareNotes® are the copyrighted property of A D A M , Inc  or [employer]  The above information is an  only  It is not intended as medical advice for individual conditions or treatments  Talk to your doctor, nurse or pharmacist before following any medical regimen to see if it is safe and effective for you  Constipation   AMBULATORY CARE:   Constipation  is when you have hard, dry bowel movements, or you go longer than usual between bowel movements  Constipation may be caused by a lack of water or high-fiber foods  Certain medicines, or a lack of fiber or physical activity may also cause constipation  Common symptoms include the following:   · Trouble pushing out your bowel movement    · Pain or bleeding during your bowel movement    · A feeling that you did not finish having your bowel movement    · Nausea    · Bloating    · Headache    Call your doctor if:   · You have blood in your bowel movements  · You have a fever and abdominal pain with the constipation  · Your constipation gets worse  · You start to vomit  · You have questions or concerns about your condition or care  Relieve constipation:  Medicine can keep you have a bowel movement more easily  Medicines may increase moisture in your bowel movement or increase the motion of your intestines  · A suppository  may be used to help soften your bowel movements  This may make them easier to pass  A suppository is guided into your rectum through your anus  · Laxatives  can help stimulate your bowels to have a bowel movement  Your provider may recommend you only use laxatives for a short time  Long-term use may make your bowels dependent on the medicine  · An enema  is liquid medicine used to clear bowel movement from your rectum  The medicine is put into your rectum through your anus  Prevent constipation:   · Drink liquids as directed  You may need to drink extra liquids to help soften and move your bowels  Ask how much liquid to drink each day and which liquids are best for you  · Eat high-fiber foods  This may help decrease constipation by adding bulk to your bowel movements  High-fiber foods include fruit, vegetables, whole-grain breads and cereals, and beans  Your healthcare provider or dietitian can help you create a high-fiber meal plan  Your provider may also recommend a fiber supplement if you cannot get enough fiber from food  · Exercise regularly  Regular physical activity can help stimulate your intestines  Walking is a good exercise to prevent or relieve constipation  Ask which exercises are best for you  · Schedule a time each day to have a bowel movement  This may help train your body to have regular bowel movements  Bend forward while you are on the toilet to help move the bowel movement out  Sit on the toilet for at least 10 minutes, even if you do not have a bowel movement  · Talk to your healthcare provider about your medicines  Certain medicines, such as opioids, can cause constipation  Your provider may be able to make medicine changes  For example, he or she may change the kind of medicine, or change when you take it  Follow up with your healthcare provider as directed:  Write down your questions so you remember to ask them during your visits  © Copyright [address] Information is for End User's use only and may not be sold, redistributed or otherwise used for commercial purposes   All illustrations and images included in CareNotes® are the copyrighted property of A  D A M , Inc  or [facility]  The above information is an  only  It is not intended as medical advice for individual conditions or treatments  Talk to your doctor, nurse or pharmacist before following any medical regimen to see if it is safe and effective for you

## 2021-04-15 ENCOUNTER — OFFICE VISIT (OUTPATIENT)
Dept: FAMILY MEDICINE CLINIC | Facility: CLINIC | Age: 61
End: 2021-04-15
Payer: COMMERCIAL

## 2021-04-15 VITALS
BODY MASS INDEX: 32.32 KG/M2 | WEIGHT: 238.6 LBS | TEMPERATURE: 96.7 F | HEIGHT: 72 IN | DIASTOLIC BLOOD PRESSURE: 78 MMHG | SYSTOLIC BLOOD PRESSURE: 120 MMHG

## 2021-04-15 DIAGNOSIS — G90.3 NEUROGENIC ORTHOSTATIC HYPOTENSION (HCC): Primary | ICD-10-CM

## 2021-04-15 DIAGNOSIS — R03.0 ELEVATED BLOOD PRESSURE READING: ICD-10-CM

## 2021-04-15 PROCEDURE — 99213 OFFICE O/P EST LOW 20 MIN: CPT | Performed by: FAMILY MEDICINE

## 2021-04-15 NOTE — PROGRESS NOTES
Assessment/Plan:  Patient will continue with current  Patient will follow with specialist per routine  Diagnoses and all orders for this visit:    Neurogenic orthostatic hypotension (HCC)    Elevated blood pressure reading            Subjective:        Patient ID: Geovanny Anglin is a 63 y o  male  Patient follow-up on hypertension with history of neurogenic orthostatic hypotension  Patient has had fluctuations in blood pressures along with some dizziness  Patient use lisinopril 1-2 time since last being seen 2 weeks ago  The following portions of the patient's history were reviewed and updated as appropriate: allergies, current medications, past family history, past medical history, past social history, past surgical history and problem list       Review of Systems   Constitutional: Negative  HENT: Negative  Eyes: Negative  Respiratory: Negative  Cardiovascular: Negative  Gastrointestinal: Negative  Endocrine: Negative  Genitourinary: Negative  Musculoskeletal: Negative  Skin: Negative  Allergic/Immunologic: Negative  Neurological: Positive for dizziness  Hematological: Negative  Psychiatric/Behavioral: Negative  Objective:      BMI Counseling: Body mass index is 32 36 kg/m²  The BMI is above normal  Nutrition recommendations include decreasing portion sizes  Exercise recommendations include moderate physical activity 150 minutes/week  Depression Screening and Follow-up Plan: Clincally patient does not have depression  No treatment is required  /78 (BP Location: Right arm, Patient Position: Sitting, Cuff Size: Standard)   Temp (!) 96 7 °F (35 9 °C) (Tympanic)   Ht 6' (1 829 m)   Wt 108 kg (238 lb 9 6 oz)   BMI 32 36 kg/m²          Physical Exam  Vitals signs and nursing note reviewed  Constitutional:       Appearance: Normal appearance  Cardiovascular:      Rate and Rhythm: Normal rate and regular rhythm  Pulses: Normal pulses  Heart sounds: Normal heart sounds  Neurological:      Mental Status: He is alert  Psychiatric:         Mood and Affect: Mood normal          Behavior: Behavior normal          Thought Content:  Thought content normal

## 2021-04-16 ENCOUNTER — TELEPHONE (OUTPATIENT)
Dept: FAMILY MEDICINE CLINIC | Facility: CLINIC | Age: 61
End: 2021-04-16

## 2021-04-16 NOTE — TELEPHONE ENCOUNTER
Physical therapist called due to Madhu BP swings  They are becoming more frequent and most going on in the afternoon  Please advise    Thank you  Geovanna Ames [phone]

## 2021-04-20 ENCOUNTER — OFFICE VISIT (OUTPATIENT)
Dept: OBGYN CLINIC | Facility: MEDICAL CENTER | Age: 61
End: 2021-04-20
Payer: COMMERCIAL

## 2021-04-20 VITALS
BODY MASS INDEX: 31.97 KG/M2 | DIASTOLIC BLOOD PRESSURE: 75 MMHG | SYSTOLIC BLOOD PRESSURE: 143 MMHG | HEIGHT: 72 IN | HEART RATE: 89 BPM | WEIGHT: 236 LBS

## 2021-04-20 DIAGNOSIS — M17.12 PATELLOFEMORAL ARTHRITIS OF LEFT KNEE: Primary | ICD-10-CM

## 2021-04-20 PROCEDURE — 1036F TOBACCO NON-USER: CPT | Performed by: ORTHOPAEDIC SURGERY

## 2021-04-20 PROCEDURE — 3008F BODY MASS INDEX DOCD: CPT | Performed by: ORTHOPAEDIC SURGERY

## 2021-04-20 PROCEDURE — 99213 OFFICE O/P EST LOW 20 MIN: CPT | Performed by: ORTHOPAEDIC SURGERY

## 2021-04-20 NOTE — PROGRESS NOTES
Orthopaedic Surgery - Office Note  Chucho Stoner (56 y o  male)   : 1960   MRN: [id number]  Encounter Date: 2021    Chief Complaint   Patient presents with    Left Knee - Follow-up       Assessment / Plan  Left knee patellofemoral OA     · Activity as tolerated   · Continue with HEP  He decline formal PT at this time  · Continue with ice and anti-inflammatories prn pain  · Can consider CSI in the future if symptoms fail to improve or worsen  Return in about 6 weeks (around 2021)  History of Present Illness  Chucho Stoner is a 57 y o  male who presents for follow up left knee osteoarthritis  He was last seen in the office for this in 2020  He states that his knee began bothering him again recently so he returned to doing his HEP which has helped resolved some symptoms  He denies any injury or instability  He has increased pain with descending steps  He does use OTC's for pain management  He denies any swelling or radiating symptoms  Review of Systems  Pertinent items are noted in HPI  All other systems were reviewed and are negative  Physical Exam  /75   Pulse 89   Ht 6' (1 829 m)   Wt 107 kg (236 lb)   BMI 32 01 kg/m²   Cons: Appears well  No apparent distress  Psych: Alert  Oriented x3  Mood and affect normal   Eyes: PERRLA, EOMI  Resp: Normal effort  No audible wheezing or stridor  CV: Palpable pulse  No discernable arrhythmia  No LE edema  Lymph:  No palpable cervical, axillary, or inguinal lymphadenopathy  Skin: Warm  No palpable masses  No visible lesions  Neuro: Normal muscle tone  Normal and symmetric DTR's  Left Knee Exam  Alignment:  Normal knee alignment  Inspection:  No swelling  No erythema  No ecchymosis  Palpation:  mild lateral joint line tenderness  No effusion  ROM:  Knee Extension 0  Knee Flexion 120  Strength:  Able to SLR without lag  Stability:  No objective knee instability   Stable Varus / Valgus stress, Lachman, and Posterior drawer  Tests:  No pertinent positive or negative tests  Patella:  Patella tracks centrally with crepitus  Neurovascular:  Sensation intact in DP/SP/Bateman/Sa/T nerve distributions  2+ DP & PT pulses  Gait:  Smooth  Studies Reviewed  MRI left knee- from 5/26/2020 demonstrating tricompartmental degenerative changes, no meniscal tearing seen  XR of left knee- from 3/05/2019 demonstrate mild patellofemoral degenerative OA    Procedures  No procedures today  Medical, Surgical, Family, and Social History  The patient's medical history, family history, and social history, were reviewed and updated as appropriate      Past Medical History:   Diagnosis Date    Back pain     Bladder infection     BPH (benign prostatic hyperplasia)     Cancer ([address] )     testicular 1988    Chronic kidney disease     Diverticulosis     GERD (gastroesophageal reflux disease)     occassional    History of testicular cancer 1988    Surgery and radiation; left side    Kidney stone     Kidney stones     Currently and in the past    Orthostatic hypotension     [facility] to [facility]     Parkinson's disease ([facility]     Sleep apnea     uses CPAP    Wears glasses        Past Surgical History:   Procedure Laterality Date    BLADDER NECK RECONSTRUCTION  07/27/2020    COLONOSCOPY  2017    COLONOSCOPY  09/2020    COLONOSCOPY  11/2020    Yonatan Horse / CYSTOURETHROPLASTY  07/27/2020    [address] inguinal hernia repair    IR SUPRAPUBIC CATHETER PLACEMENT  2/24/2020    IR TUBE UPSIZE  3/23/2020    KIDNEY STONE SURGERY      LITHOTRIPSY      Renal; Resolved: 2/27/07    ORCHIECTOMY Left     VT CYSTOURETHRO W/IMPLANT N/A 5/17/2018    Procedure: CYSTOSCOPY WITH INSERTION UROLIFT;  Surgeon: Bashir Rojas DO;  [facility];  Service: Urology    PROSTATE SURGERY N/A 1/18/2018    Procedure: CYSTOSCOPY WITH INSERTION Lorena Canavan;  Surgeon: Bashir Rojas DO;  [facility];  Service: Urology    [address]    For cancer    TONSILLECTOMY      URETERONEOCYSTOSTOMY      w/ cystoscopy Ureteral Stent Placement;  Resolved: 2007    WISDOM TOOTH EXTRACTION         Family History   Problem Relation Age of Onset    Colon cancer Father     Heart failure Father     Parkinsonism Mother     Cancer Paternal Grandmother        Social History     Occupational History    Occupation: [employer] [place] retired   Tobacco Use    Smoking status: Former Smoker     Packs/day: 0 50     Years: 3 50     Pack years: 1 75     Types: Cigarettes     Quit date:      Years since quittin 3    Smokeless tobacco: Never Used   Substance and Sexual Activity    Alcohol use: Not Currently    Drug use: No    Sexual activity: Not on file       No Known Allergies      Current Outpatient Medications:     carbidopa-levodopa (SINEMET)  mg per tablet, TAKE 2 TABLETS BY MOUTH 3 (THREE) TIMES A DAY START WITH 1/2 TAB THREE TIMES A DAY AND INCREASE AS DIRECTED, Disp: 540 tablet, Rfl: 1    cholecalciferol (VITAMIN D3) 1,000 units tablet, Take 2,000 Units by mouth daily, Disp: , Rfl:     cyanocobalamin (VITAMIN B-12) 100 mcg tablet, Take by mouth daily, Disp: , Rfl:     diclofenac sodium (VOLTAREN) 1 %, Apply 2 g topically 4 (four) times a day (Patient taking differently: Apply 2 g topically as needed ), Disp: 2 Tube, Rfl: 1    famotidine (Pepcid) 20 mg tablet, Take 1 tablet (20 mg total) by mouth daily (Patient taking differently: Take 20 mg by mouth as needed ), Disp: 90 tablet, Rfl: 1    FIBER PO, Take by mouth daily, Disp: , Rfl:     lisinopril (ZESTRIL) 5 mg tablet, Take 1 tablet (5 mg total) by mouth daily (Patient taking differently: Take 5 mg by mouth daily ), Disp: 30 tablet, Rfl: 1    midodrine (PROAMATINE) 5 mg tablet, Take 1 tablet (5 mg total) by mouth daily Follow clinic instructions on titration dose to max 1 tab TID, as tolerated and to benefit , Disp: 30 tablet, Rfl: 3    multivitamin (THERAGRAN) TABS, Take 1 tablet by mouth daily, Disp: , Rfl:     pantoprazole (PROTONIX) 40 mg tablet, Take 1 tablet (40 mg total) by mouth 2 (two) times a day before meals, Disp: 60 tablet, Rfl: 5    rOPINIRole (REQUIP) 0 5 mg tablet, TAKE 1 TABLET BY MOUTH DAILY AT BEDTIME, Disp: 90 tablet, Rfl: 1    vitamin E 100 UNIT capsule, Take 100 Units by mouth daily, Disp: , Rfl:     Wheat Dextrin (BENEFIBER DRINK MIX PO), , Disp: , Rfl:     nystatin-triamcinolone (MYCOLOG-II) cream, Apply topically 2 (two) times a day (Patient taking differently: Apply topically as needed ), Disp: 60 g, Rfl: 1    tadalafil (CIALIS) 5 MG tablet, Take 5 mg by mouth daily, Disp: , Rfl:       [employer]    I,:  Uziel Vanessa am acting as a scribe while in the presence of the attending physician :       I,:  Wilman Rojo MD personally performed the services described in this documentation    as scribed in my presence :

## 2021-04-23 DIAGNOSIS — G47.33 OSA (OBSTRUCTIVE SLEEP APNEA): Primary | ICD-10-CM

## 2021-04-27 ENCOUNTER — TELEPHONE (OUTPATIENT)
Dept: SLEEP CENTER | Facility: CLINIC | Age: 61
End: 2021-04-27

## 2021-05-06 ENCOUNTER — TELEPHONE (OUTPATIENT)
Dept: NEPHROLOGY | Facility: HOSPITAL | Age: 61
End: 2021-05-06

## 2021-05-06 DIAGNOSIS — E87.1 HYPONATREMIA: ICD-10-CM

## 2021-05-06 DIAGNOSIS — N18.2 CKD (CHRONIC KIDNEY DISEASE) STAGE 2, GFR 60-89 ML/MIN: ICD-10-CM

## 2021-05-06 DIAGNOSIS — N20.0 NEPHROLITHIASIS: Primary | ICD-10-CM

## 2021-05-06 NOTE — TELEPHONE ENCOUNTER
I spoke to the patient and he is aware of his appointment with Dr Clyde Wayne on 5/12/21  He will have his blood and urine tests done at [facility]   Orders faxed to [facility] in [place] per patient request

## 2021-05-12 ENCOUNTER — OFFICE VISIT (OUTPATIENT)
Dept: NEPHROLOGY | Facility: CLINIC | Age: 61
End: 2021-05-12
Payer: COMMERCIAL

## 2021-05-12 ENCOUNTER — TELEPHONE (OUTPATIENT)
Dept: FAMILY MEDICINE CLINIC | Facility: CLINIC | Age: 61
End: 2021-05-12

## 2021-05-12 VITALS
BODY MASS INDEX: 32.51 KG/M2 | WEIGHT: 240 LBS | HEIGHT: 72 IN | DIASTOLIC BLOOD PRESSURE: 90 MMHG | SYSTOLIC BLOOD PRESSURE: 180 MMHG | HEART RATE: 69 BPM

## 2021-05-12 DIAGNOSIS — G90.3 NEUROGENIC ORTHOSTATIC HYPOTENSION (HCC): ICD-10-CM

## 2021-05-12 DIAGNOSIS — N20.0 NEPHROLITHIASIS: ICD-10-CM

## 2021-05-12 DIAGNOSIS — E87.1 HYPONATREMIA: ICD-10-CM

## 2021-05-12 DIAGNOSIS — R80.8 OTHER PROTEINURIA: ICD-10-CM

## 2021-05-12 DIAGNOSIS — R33.9 URINARY RETENTION: ICD-10-CM

## 2021-05-12 DIAGNOSIS — N18.2 CKD (CHRONIC KIDNEY DISEASE) STAGE 2, GFR 60-89 ML/MIN: Primary | ICD-10-CM

## 2021-05-12 PROCEDURE — 99214 OFFICE O/P EST MOD 30 MIN: CPT | Performed by: INTERNAL MEDICINE

## 2021-05-12 NOTE — LETTER
May 12, 2021     Leyla Bates, [address] [address] [place]  [place] [address]    Patient: Nikos Severino   YOB: 1960   Date of Visit: 5/12/2021       Dear Dr Nidhi Mars:    Thank you for referring Moe Callaway to me for evaluation  Below are my notes for this consultation  His Cr is stable on intermittent lisinopril  I encourage abdominal binder and compression stocking use  His BP is incredibly labile  I have pursued secondary work up which has been negative in the past  He has had facial erythema in association with high BP and feels this  I will consider repeat secondary work up if BP remains elevated  If you have questions, please do not hesitate to call me  I look forward to following your patient along with you  Sincerely,        Aide Castillo,         CC: MD Aide Ramos,   5/12/2021  9:06 AM  Sign when Signing Visit  NEPHROLOGY OUTPATIENT PROGRESS NOTE   Nikos Severino 57 y o  male MRN: [id number]  DATE: 5/12/2021  Reason for visit:   Chief Complaint   Patient presents with    Chronic Kidney Disease    Follow-up        Patient Instructions   1  Previously elevated BP readings with low readings at times as well - BP elevated in office today  -Parkinson's disease can lead to autonomic dysfunction as well as labile BP readings    -no longer on florinef  -am cortisol ok  -I do note mildly elevated free normetanephrine  Would expect 2-3x normal range to suspect pheochromocytoma  -craig 1 3, renin 0 208  Ratio is normal as of 11/23/19  -TSH ok  -repeat plasma metanephrines normal  -continue to wear compression stockings  Take your time getting up   -May take 10mg tablet midodrine up to three times daily  Take midodrine if BP < 105/65    -would consider wearing abdominal binder as well  -taking lisinopril 5mg for SBP > 190-200       2  Elevated serum creatinine ?  D/t chronic kidney disease stage 2(mild) - b/l sCr 1-1 2 since 2016, last sCr 1 which correlates with eGFR > 80 ml/min per CKD EPI equation  ? If this is due to lability in BP readings vs some other cause  -last UA with microscopy shows 3-5 WBCs, > 10 squamous epithelial cells, +calcium oxalate crystals     3  Proteinuria  -UpCr < 0 08 and stable as of Dec  2020  -of note, not anemic  Will defer myeloma workup     4  Nephrolithiasis - 10mm stone in left kidney  -would continue to follow low sodium diet (<2000mg/day)  Please read packages to ensure sodium content limited  -drink enough fluids to urinate 2-2 5L/day to prevent stone formation   -on multivitamin     5  History of urinary retention s/p suprapubic catheter - s/p urolift x 2, s/p TURP x 2, bladder neck constricture repair  Follows with urology   -recent bladder scan negative for post void residual urine volume     6  Hyponatremia - resolved on latest bloodwork as of 5/9/21     RTC in 6 months  Yoel Mckee was seen today for chronic kidney disease and follow-up  Diagnoses and all orders for this visit:    CKD (chronic kidney disease) stage 2, GFR 60-89 ml/min    Nephrolithiasis    Urinary retention    Other proteinuria    Hyponatremia    Neurogenic orthostatic hypotension (HCC)        Assessment/Plan:  1  Previously elevated BP readings with low readings at times as well - BP elevated in office today  -Parkinson's disease can lead to autonomic dysfunction as well as labile BP readings    -no longer on florinef  -am cortisol ok  -I do note mildly elevated free normetanephrine  Would expect 2-3x normal range to suspect pheochromocytoma  -craig 1 3, renin 0 208  Ratio is normal as of 11/23/19  -TSH ok  -repeat plasma metanephrines normal  -continue to wear compression stockings  Take your time getting up   -May take 10mg tablet midodrine up to three times daily  Take midodrine if BP < 105/65    -would consider wearing abdominal binder as well  -taking lisinopril 5mg for SBP > 190-200       2  Elevated serum creatinine ?  D/t chronic kidney disease stage 2(mild) - b/l sCr 1-1 2 since 2016, last sCr 1 which correlates with eGFR > 80 ml/min per CKD EPI equation  ? If this is due to lability in BP readings vs some other cause  -last UA with microscopy shows 3-5 WBCs, > 10 squamous epithelial cells, +calcium oxalate crystals     3  Proteinuria  -UpCr < 0 08 and stable as of Dec  2020  -of note, not anemic  Will defer myeloma workup     4  Nephrolithiasis - 10mm stone in left kidney  -would continue to follow low sodium diet (<2000mg/day)  Please read packages to ensure sodium content limited  -drink enough fluids to urinate 2-2 5L/day to prevent stone formation   -on multivitamin     5  History of urinary retention s/p suprapubic catheter - s/p urolift x 2, s/p TURP x 2, bladder neck constricture repair  Follows with urology   -recent bladder scan negative for post void residual urine volume     6  Hyponatremia - resolved on latest bloodwork as of 5/9/21     RTC in 6 months  SUBJECTIVE / INTERVAL HISTORY:  57 y o  male presents in follow up of CKD  Cy Ponto denies any recent illness/hospitalizations/medication changes since last office visit  Denies NSAID use  Denies recent kidney stones  BP has been elevated and takes lisinopril for this when systolic 280-354  Gets facial flushing  Recently started therapy for vestibular issues  Review of Systems   Constitutional: Negative for chills and fever  HENT: Negative for sore throat  Eyes: Negative for visual disturbance  Respiratory: Negative for cough and shortness of breath  Cardiovascular: Positive for leg swelling (in feet, feet reddish at times)  Negative for chest pain  Gastrointestinal: Positive for constipation (at times )  Negative for abdominal pain, diarrhea, nausea and vomiting  Endocrine: Negative for polyuria  Genitourinary: Positive for difficulty urinating (difficulty maintaining stream)  Negative for decreased urine volume, dysuria and hematuria  Musculoskeletal: Negative for back pain and myalgias  Skin: Negative for rash  Neurological: Positive for light-headedness (with lower BP readings, feels like he will pass out) and numbness (tingling in hands/feet)  Negative for dizziness  Psychiatric/Behavioral: Negative for confusion  OBJECTIVE:  BP (!) 180/90 (BP Location: Left arm, Patient Position: Sitting, Cuff Size: Extra-Large)   Pulse 69   Ht 6' (1 829 m)   Wt 109 kg (240 lb)   BMI 32 55 kg/m²  Body mass index is 32 55 kg/m²  Physical exam:  Physical Exam  Vitals signs reviewed  Constitutional:       General: He is not in acute distress  Appearance: Normal appearance  He is well-developed  He is not diaphoretic  HENT:      Head: Normocephalic and atraumatic  Nose: Nose normal       Mouth/Throat:      Mouth: Mucous membranes are dry  Pharynx: No oropharyngeal exudate  Eyes:      General: No scleral icterus  Right eye: No discharge  Left eye: No discharge  Comments: eyeglasses   Neck:      Musculoskeletal: Neck supple  Thyroid: No thyromegaly  Cardiovascular:      Rate and Rhythm: Normal rate and regular rhythm  Heart sounds: Normal heart sounds  Pulmonary:      Effort: Pulmonary effort is normal       Breath sounds: Normal breath sounds  No wheezing or rales  Abdominal:      General: Bowel sounds are normal  There is no distension  Palpations: Abdomen is soft  Tenderness: There is no abdominal tenderness  Musculoskeletal: Normal range of motion  General: Swelling (trace LE edema) present  Lymphadenopathy:      Cervical: No cervical adenopathy  Skin:     General: Skin is warm and dry  Findings: No rash  Neurological:      General: No focal deficit present  Mental Status: He is alert        Comments: awake   Psychiatric:         Mood and Affect: Mood normal          Behavior: Behavior normal          Medications:    Current Outpatient Medications:   carbidopa-levodopa (SINEMET)  mg per tablet, TAKE 2 TABLETS BY MOUTH 3 (THREE) TIMES A DAY START WITH 1/2 TAB THREE TIMES A DAY AND INCREASE AS DIRECTED, Disp: 540 tablet, Rfl: 1    cholecalciferol (VITAMIN D3) 1,000 units tablet, Take 2,000 Units by mouth daily, Disp: , Rfl:     cyanocobalamin (VITAMIN B-12) 100 mcg tablet, Take by mouth daily, Disp: , Rfl:     diclofenac sodium (VOLTAREN) 1 %, Apply 2 g topically 4 (four) times a day (Patient taking differently: Apply 2 g topically as needed ), Disp: 2 Tube, Rfl: 1    famotidine (Pepcid) 20 mg tablet, Take 1 tablet (20 mg total) by mouth daily (Patient taking differently: Take 20 mg by mouth as needed ), Disp: 90 tablet, Rfl: 1    FIBER PO, Take by mouth daily, Disp: , Rfl:     lisinopril (ZESTRIL) 5 mg tablet, Take 1 tablet (5 mg total) by mouth daily (Patient taking differently: Take 5 mg by mouth daily ), Disp: 30 tablet, Rfl: 1    midodrine (PROAMATINE) 5 mg tablet, Take 1 tablet (5 mg total) by mouth daily Follow clinic instructions on titration dose to max 1 tab TID, as tolerated and to benefit , Disp: 30 tablet, Rfl: 3    multivitamin (THERAGRAN) TABS, Take 1 tablet by mouth daily, Disp: , Rfl:     pantoprazole (PROTONIX) 40 mg tablet, Take 1 tablet (40 mg total) by mouth 2 (two) times a day before meals, Disp: 60 tablet, Rfl: 5    tadalafil (CIALIS) 5 MG tablet, Take 5 mg by mouth daily, Disp: , Rfl:     vitamin E 100 UNIT capsule, Take 100 Units by mouth daily, Disp: , Rfl:     Wheat Dextrin (BENEFIBER DRINK MIX PO), , Disp: , Rfl:     nystatin-triamcinolone (MYCOLOG-II) cream, Apply topically 2 (two) times a day (Patient taking differently: Apply topically as needed ), Disp: 60 g, Rfl: 1    rOPINIRole (REQUIP) 0 5 mg tablet, TAKE 1 TABLET BY MOUTH DAILY AT BEDTIME (Patient not taking: Reported on 5/12/2021), Disp: 90 tablet, Rfl: 1    Allergies:   Allergies as of 05/12/2021    (No Known Allergies)       The following portions of the patient's history were reviewed and updated as appropriate: past family history, past surgical history and problem list     Laboratory Results:  Lab Results   Component Value Date    SODIUM 141 01/23/2021    K 4 5 01/23/2021     01/23/2021    CO2 30 01/23/2021    BUN 21 01/23/2021    CREATININE 1 24 01/23/2021    GLUC 100 05/11/2018    CALCIUM 10 0 01/23/2021        Lab Results   Component Value Date    CALCIUM 10 0 01/23/2021       Portions of the record may have been created with voice recognition software   Occasional wrong word or "sound a like" substitutions may have occurred due to the inherent limitations of voice recognition software   Read the chart carefully and recognize, using context, where substitutions have occurred

## 2021-05-12 NOTE — TELEPHONE ENCOUNTER
Dre Tenorio from PT called to say that Brady's BP is high 218/118 he has taken Lisinopril 7 5 about an hour before his BP was taken and it did not come down  He did not do PT today,  He has an appt with you 5/25/21      Please advise    Real's phone # is: [phone]

## 2021-05-12 NOTE — PROGRESS NOTES
NEPHROLOGY OUTPATIENT PROGRESS NOTE   Radha Benitez 57 y o  male MRN: [id number]  DATE: 5/12/2021  Reason for visit:   Chief Complaint   Patient presents with    Chronic Kidney Disease    Follow-up        Patient Instructions   1  Previously elevated BP readings with low readings at times as well - BP elevated in office today  -Parkinson's disease can lead to autonomic dysfunction as well as labile BP readings    -no longer on florinef  -am cortisol ok  -I do note mildly elevated free normetanephrine  Would expect 2-3x normal range to suspect pheochromocytoma  -craig 1 3, renin 0 208  Ratio is normal as of 11/23/19  -TSH ok  -repeat plasma metanephrines normal  -continue to wear compression stockings  Take your time getting up   -May take 10mg tablet midodrine up to three times daily  Take midodrine if BP < 105/65    -would consider wearing abdominal binder as well  -taking lisinopril 5mg for SBP > 190-200       2  Elevated serum creatinine ? D/t chronic kidney disease stage 2(mild) - b/l sCr 1-1 2 since 2016, last sCr 1 which correlates with eGFR > 80 ml/min per CKD EPI equation  ? If this is due to lability in BP readings vs some other cause  -last UA with microscopy shows 3-5 WBCs, > 10 squamous epithelial cells, +calcium oxalate crystals     3  Proteinuria  -UpCr < 0 08 and stable as of Dec  2020  -of note, not anemic  Will defer myeloma workup     4  Nephrolithiasis - 10mm stone in left kidney  -would continue to follow low sodium diet (<2000mg/day)  Please read packages to ensure sodium content limited  -drink enough fluids to urinate 2-2 5L/day to prevent stone formation   -on multivitamin     5  History of urinary retention s/p suprapubic catheter - s/p urolift x 2, s/p TURP x 2, bladder neck constricture repair  Follows with urology   -recent bladder scan negative for post void residual urine volume     6  Hyponatremia - resolved on latest bloodwork as of 5/9/21     RTC in 6 months          Dora Kirby was seen today for chronic kidney disease and follow-up  Diagnoses and all orders for this visit:    CKD (chronic kidney disease) stage 2, GFR 60-89 ml/min    Nephrolithiasis    Urinary retention    Other proteinuria    Hyponatremia    Neurogenic orthostatic hypotension (HCC)        Assessment/Plan:  1  Previously elevated BP readings with low readings at times as well - BP elevated in office today  -Parkinson's disease can lead to autonomic dysfunction as well as labile BP readings    -no longer on florinef  -am cortisol ok  -I do note mildly elevated free normetanephrine  Would expect 2-3x normal range to suspect pheochromocytoma  -craig 1 3, renin 0 208  Ratio is normal as of 11/23/19  -TSH ok  -repeat plasma metanephrines normal  -continue to wear compression stockings  Take your time getting up   -May take 10mg tablet midodrine up to three times daily  Take midodrine if BP < 105/65    -would consider wearing abdominal binder as well  -taking lisinopril 5mg for SBP > 190-200       2  Elevated serum creatinine ? D/t chronic kidney disease stage 2(mild) - b/l sCr 1-1 2 since 2016, last sCr 1 which correlates with eGFR > 80 ml/min per CKD EPI equation  ? If this is due to lability in BP readings vs some other cause  -last UA with microscopy shows 3-5 WBCs, > 10 squamous epithelial cells, +calcium oxalate crystals     3  Proteinuria  -UpCr < 0 08 and stable as of Dec  2020  -of note, not anemic  Will defer myeloma workup     4  Nephrolithiasis - 10mm stone in left kidney  -would continue to follow low sodium diet (<2000mg/day)  Please read packages to ensure sodium content limited  -drink enough fluids to urinate 2-2 5L/day to prevent stone formation   -on multivitamin     5  History of urinary retention s/p suprapubic catheter - s/p urolift x 2, s/p TURP x 2, bladder neck constricture repair  Follows with urology   -recent bladder scan negative for post void residual urine volume     6   Hyponatremia - resolved on latest bloodwork as of 5/9/21     RTC in 6 months  SUBJECTIVE / INTERVAL HISTORY:  57 y o  male presents in follow up of CKD  Kerline Diamond denies any recent illness/hospitalizations/medication changes since last office visit  Denies NSAID use  Denies recent kidney stones  BP has been elevated and takes lisinopril for this when systolic 696-617  Gets facial flushing  Recently started therapy for vestibular issues  Review of Systems   Constitutional: Negative for chills and fever  HENT: Negative for sore throat  Eyes: Negative for visual disturbance  Respiratory: Negative for cough and shortness of breath  Cardiovascular: Positive for leg swelling (in feet, feet reddish at times)  Negative for chest pain  Gastrointestinal: Positive for constipation (at times )  Negative for abdominal pain, diarrhea, nausea and vomiting  Endocrine: Negative for polyuria  Genitourinary: Positive for difficulty urinating (difficulty maintaining stream)  Negative for decreased urine volume, dysuria and hematuria  Musculoskeletal: Negative for back pain and myalgias  Skin: Negative for rash  Neurological: Positive for light-headedness (with lower BP readings, feels like he will pass out) and numbness (tingling in hands/feet)  Negative for dizziness  Psychiatric/Behavioral: Negative for confusion  OBJECTIVE:  BP (!) 180/90 (BP Location: Left arm, Patient Position: Sitting, Cuff Size: Extra-Large)   Pulse 69   Ht 6' (1 829 m)   Wt 109 kg (240 lb)   BMI 32 55 kg/m²  Body mass index is 32 55 kg/m²  Physical exam:  Physical Exam  Vitals signs reviewed  Constitutional:       General: He is not in acute distress  Appearance: Normal appearance  He is well-developed  He is not diaphoretic  HENT:      Head: Normocephalic and atraumatic  Nose: Nose normal       Mouth/Throat:      Mouth: Mucous membranes are dry  Pharynx: No oropharyngeal exudate     Eyes:      General: No scleral icterus  Right eye: No discharge  Left eye: No discharge  Comments: eyeglasses   Neck:      Musculoskeletal: Neck supple  Thyroid: No thyromegaly  Cardiovascular:      Rate and Rhythm: Normal rate and regular rhythm  Heart sounds: Normal heart sounds  Pulmonary:      Effort: Pulmonary effort is normal       Breath sounds: Normal breath sounds  No wheezing or rales  Abdominal:      General: Bowel sounds are normal  There is no distension  Palpations: Abdomen is soft  Tenderness: There is no abdominal tenderness  Musculoskeletal: Normal range of motion  General: Swelling (trace LE edema) present  Lymphadenopathy:      Cervical: No cervical adenopathy  Skin:     General: Skin is warm and dry  Findings: No rash  Neurological:      General: No focal deficit present  Mental Status: He is alert        Comments: awake   Psychiatric:         Mood and Affect: Mood normal          Behavior: Behavior normal          Medications:    Current Outpatient Medications:     carbidopa-levodopa (SINEMET)  mg per tablet, TAKE 2 TABLETS BY MOUTH 3 (THREE) TIMES A DAY START WITH 1/2 TAB THREE TIMES A DAY AND INCREASE AS DIRECTED, Disp: 540 tablet, Rfl: 1    cholecalciferol (VITAMIN D3) 1,000 units tablet, Take 2,000 Units by mouth daily, Disp: , Rfl:     cyanocobalamin (VITAMIN B-12) 100 mcg tablet, Take by mouth daily, Disp: , Rfl:     diclofenac sodium (VOLTAREN) 1 %, Apply 2 g topically 4 (four) times a day (Patient taking differently: Apply 2 g topically as needed ), Disp: 2 Tube, Rfl: 1    famotidine (Pepcid) 20 mg tablet, Take 1 tablet (20 mg total) by mouth daily (Patient taking differently: Take 20 mg by mouth as needed ), Disp: 90 tablet, Rfl: 1    FIBER PO, Take by mouth daily, Disp: , Rfl:     lisinopril (ZESTRIL) 5 mg tablet, Take 1 tablet (5 mg total) by mouth daily (Patient taking differently: Take 5 mg by mouth daily ), Disp: 30 tablet, Rfl: 1    midodrine (PROAMATINE) 5 mg tablet, Take 1 tablet (5 mg total) by mouth daily Follow clinic instructions on titration dose to max 1 tab TID, as tolerated and to benefit , Disp: 30 tablet, Rfl: 3    multivitamin (THERAGRAN) TABS, Take 1 tablet by mouth daily, Disp: , Rfl:     pantoprazole (PROTONIX) 40 mg tablet, Take 1 tablet (40 mg total) by mouth 2 (two) times a day before meals, Disp: 60 tablet, Rfl: 5    tadalafil (CIALIS) 5 MG tablet, Take 5 mg by mouth daily, Disp: , Rfl:     vitamin E 100 UNIT capsule, Take 100 Units by mouth daily, Disp: , Rfl:     Wheat Dextrin (BENEFIBER DRINK MIX PO), , Disp: , Rfl:     nystatin-triamcinolone (MYCOLOG-II) cream, Apply topically 2 (two) times a day (Patient taking differently: Apply topically as needed ), Disp: 60 g, Rfl: 1    rOPINIRole (REQUIP) 0 5 mg tablet, TAKE 1 TABLET BY MOUTH DAILY AT BEDTIME (Patient not taking: Reported on 5/12/2021), Disp: 90 tablet, Rfl: 1    Allergies: Allergies as of 05/12/2021    (No Known Allergies)       The following portions of the patient's history were reviewed and updated as appropriate: past family history, past surgical history and problem list     Laboratory Results:  Lab Results   Component Value Date    SODIUM 141 01/23/2021    K 4 5 01/23/2021     01/23/2021    CO2 30 01/23/2021    BUN 21 01/23/2021    CREATININE 1 24 01/23/2021    GLUC 100 05/11/2018    CALCIUM 10 0 01/23/2021        Lab Results   Component Value Date    CALCIUM 10 0 01/23/2021       Portions of the record may have been created with voice recognition software   Occasional wrong word or "sound a like" substitutions may have occurred due to the inherent limitations of voice recognition software   Read the chart carefully and recognize, using context, where substitutions have occurred

## 2021-05-12 NOTE — PATIENT INSTRUCTIONS
1  Previously elevated BP readings with low readings at times as well - BP elevated in office today  -Parkinson's disease can lead to autonomic dysfunction as well as labile BP readings    -no longer on florinef  -am cortisol ok  -I do note mildly elevated free normetanephrine  Would expect 2-3x normal range to suspect pheochromocytoma  -craig 1 3, renin 0 208  Ratio is normal as of 11/23/19  -TSH ok  -repeat plasma metanephrines normal  -continue to wear compression stockings  Take your time getting up   -May take 10mg tablet midodrine up to three times daily  Take midodrine if BP < 105/65    -would consider wearing abdominal binder as well  -taking lisinopril 5mg for SBP > 190-200       2  Elevated serum creatinine ? D/t chronic kidney disease stage 2(mild) - b/l sCr 1-1 2 since 2016, last sCr 1 which correlates with eGFR > 80 ml/min per CKD EPI equation  ? If this is due to lability in BP readings vs some other cause  -last UA with microscopy shows 3-5 WBCs, > 10 squamous epithelial cells, +calcium oxalate crystals     3  Proteinuria  -UpCr < 0 08 and stable as of Dec  2020  -of note, not anemic  Will defer myeloma workup     4  Nephrolithiasis - 10mm stone in left kidney  -would continue to follow low sodium diet (<2000mg/day)  Please read packages to ensure sodium content limited  -drink enough fluids to urinate 2-2 5L/day to prevent stone formation   -on multivitamin     5  History of urinary retention s/p suprapubic catheter - s/p urolift x 2, s/p TURP x 2, bladder neck constricture repair  Follows with urology   -recent bladder scan negative for post void residual urine volume     6  Hyponatremia - resolved on latest bloodwork as of 5/9/21     RTC in 6 months

## 2021-05-13 ENCOUNTER — OFFICE VISIT (OUTPATIENT)
Dept: FAMILY MEDICINE CLINIC | Facility: CLINIC | Age: 61
End: 2021-05-13
Payer: COMMERCIAL

## 2021-05-13 VITALS
BODY MASS INDEX: 32.23 KG/M2 | SYSTOLIC BLOOD PRESSURE: 184 MMHG | TEMPERATURE: 96.9 F | DIASTOLIC BLOOD PRESSURE: 112 MMHG | WEIGHT: 238 LBS | HEIGHT: 72 IN

## 2021-05-13 DIAGNOSIS — C62.90 PRIMARY MALIGNANT NEOPLASM OF TESTIS, UNSPECIFIED LATERALITY (HCC): ICD-10-CM

## 2021-05-13 DIAGNOSIS — I10 ESSENTIAL HYPERTENSION: Primary | ICD-10-CM

## 2021-05-13 DIAGNOSIS — R03.0 ELEVATED BP WITHOUT DIAGNOSIS OF HYPERTENSION: ICD-10-CM

## 2021-05-13 DIAGNOSIS — G90.3 NEUROGENIC ORTHOSTATIC HYPOTENSION (HCC): ICD-10-CM

## 2021-05-13 PROCEDURE — 99214 OFFICE O/P EST MOD 30 MIN: CPT | Performed by: FAMILY MEDICINE

## 2021-05-13 RX ORDER — SELEGILINE HYDROCHLORIDE 5 MG/1
CAPSULE ORAL
COMMUNITY
Start: 2021-05-10

## 2021-05-13 RX ORDER — LISINOPRIL 5 MG/1
5 TABLET ORAL DAILY
Qty: 30 TABLET | Refills: 1
Start: 2021-05-13 | End: 2021-12-23 | Stop reason: ALTCHOICE

## 2021-05-13 NOTE — PROGRESS NOTES
Assessment/Plan: patient go for ultrasound as well as 24 hour urine and other laboratory studies as noted  Patient use lisinopril today and as needed as directed  Patient follow-up with Cardiology as well as Nephrology  And neurology  Patient follow-up after studies are completed  Diagnoses and all orders for this visit:    Essential hypertension  -     Aldosterone/Renin Ratio; Future  -     Metanephrine, Fractionated Plasma Free; Future  -     Cortisol Level, AM Specimen; Future  -     5 HIAA, urine, quantitative, 24 hour; Future  -     US kidney and bladder; Future  -     VAS renal artery complete; Future    Primary malignant neoplasm of testis, unspecified laterality (HCC)    Neurogenic orthostatic hypotension (HCC)    Elevated BP without diagnosis of hypertension  -     lisinopril (ZESTRIL) 5 mg tablet; Take 1 tablet (5 mg total) by mouth daily    Other orders  -     selegiline (ELDEPRYL) 5 mg capsule            Subjective:        Patient ID: Sotero Dsouza is a 57 y o  male  Patient follow-up on labile hypertension  Patient's blood pressure has been elevated recently  Patient did use lisinopril total 5 mg daily yesterday no significant proven noted  Occasional headache noted  No chest pain shortness of breath  Patient with some urinary hesitancy  The following portions of the patient's history were reviewed and updated as appropriate: allergies, current medications, past family history, past medical history, past social history, past surgical history and problem list       Review of Systems   Constitutional: Negative  HENT: Negative  Eyes: Negative  Respiratory: Negative  Cardiovascular: Negative  Gastrointestinal: Negative  Endocrine: Negative  Genitourinary: Negative  Musculoskeletal: Negative  Skin: Negative  Allergic/Immunologic: Negative  Neurological: Positive for headaches  Hematological: Negative  Psychiatric/Behavioral: Negative  Objective:      BMI Counseling: Body mass index is 32 28 kg/m²  The BMI is above normal  Nutrition recommendations include decreasing portion sizes  Exercise recommendations include moderate physical activity 150 minutes/week  Depression Screening and Follow-up Plan: Clincally patient does not have depression  No treatment is required  BP (!) 184/112 (BP Location: Right arm, Patient Position: Sitting, Cuff Size: Standard)   Temp (!) 96 9 °F (36 1 °C) (Tympanic)   Ht 6' (1 829 m)   Wt 108 kg (238 lb)   BMI 32 28 kg/m²          Physical Exam  Vitals signs and nursing note reviewed  Constitutional:       General: He is not in acute distress  Appearance: Normal appearance  He is not ill-appearing, toxic-appearing or diaphoretic  HENT:      Head: Normocephalic and atraumatic  Right Ear: Tympanic membrane, ear canal and external ear normal  There is no impacted cerumen  Left Ear: Tympanic membrane, ear canal and external ear normal  There is no impacted cerumen  Nose: Nose normal  No congestion or rhinorrhea  Mouth/Throat:      Mouth: Mucous membranes are moist       Pharynx: No oropharyngeal exudate or posterior oropharyngeal erythema  Eyes:      General: No scleral icterus  Right eye: No discharge  Left eye: No discharge  Extraocular Movements: Extraocular movements intact  Conjunctiva/sclera: Conjunctivae normal       Pupils: Pupils are equal, round, and reactive to light  Neck:      Musculoskeletal: Normal range of motion and neck supple  No neck rigidity or muscular tenderness  Vascular: No carotid bruit  Cardiovascular:      Rate and Rhythm: Normal rate and regular rhythm  Pulses: Normal pulses  Heart sounds: Normal heart sounds  No murmur  No friction rub  No gallop  Pulmonary:      Effort: Pulmonary effort is normal  No respiratory distress  Breath sounds: Normal breath sounds  No stridor   No wheezing, rhonchi or rales  Chest:      Chest wall: No tenderness  Musculoskeletal: Normal range of motion  General: No swelling, tenderness, deformity or signs of injury  Right lower leg: No edema  Left lower leg: No edema  Lymphadenopathy:      Cervical: No cervical adenopathy  Skin:     General: Skin is warm and dry  Capillary Refill: Capillary refill takes less than 2 seconds  Coloration: Skin is not jaundiced  Findings: No bruising, erythema, lesion or rash  Neurological:      Mental Status: He is alert and oriented to person, place, and time  Mental status is at baseline  Cranial Nerves: No cranial nerve deficit  Sensory: No sensory deficit  Motor: No weakness  Coordination: Coordination normal       Gait: Gait normal    Psychiatric:         Behavior: Behavior normal          Thought Content:  Thought content normal          Judgment: Judgment normal

## 2021-05-14 ENCOUNTER — OFFICE VISIT (OUTPATIENT)
Dept: PULMONOLOGY | Facility: CLINIC | Age: 61
End: 2021-05-14
Payer: COMMERCIAL

## 2021-05-14 VITALS
WEIGHT: 238 LBS | SYSTOLIC BLOOD PRESSURE: 150 MMHG | HEIGHT: 72 IN | DIASTOLIC BLOOD PRESSURE: 70 MMHG | HEART RATE: 86 BPM | BODY MASS INDEX: 32.23 KG/M2 | RESPIRATION RATE: 16 BRPM | OXYGEN SATURATION: 98 % | TEMPERATURE: 98.4 F

## 2021-05-14 DIAGNOSIS — G25.81 RESTLESS LEG SYNDROME: ICD-10-CM

## 2021-05-14 DIAGNOSIS — G47.50 PARASOMNIA, UNSPECIFIED TYPE: ICD-10-CM

## 2021-05-14 DIAGNOSIS — G47.33 OSA (OBSTRUCTIVE SLEEP APNEA): Primary | ICD-10-CM

## 2021-05-14 PROBLEM — J98.6 DIAPHRAGM PARALYSIS: Status: RESOLVED | Noted: 2019-08-22 | Resolved: 2021-05-14

## 2021-05-14 PROCEDURE — 99214 OFFICE O/P EST MOD 30 MIN: CPT | Performed by: INTERNAL MEDICINE

## 2021-05-14 NOTE — LETTER
May 14, 2021     Doretha Monroe, [address] [address]    Patient: Kimberly April   YOB: 1960   Date of Visit: 5/14/2021       Dear Dr Mahad Coello:    Thank you for referring Stacia Silva to me for evaluation  Below are my notes for this consultation  If you have questions, please do not hesitate to call me  I look forward to following your patient along with you  Sincerely,        Estela Warner DO        CC: No Recipients  Estela Warner DO  5/14/2021  3:50 PM  Sign when Signing Visit  Progress note - Pulmonary Medicine   Kimberly April 57 y o  male MRN: [id number]       Impression & Plan:   61 y o  M with PMHx of Parkinson's disease with diaphragmatic weakness, chronic knee and ankle pain, urinary retention s/p TURP, Moderate KALLI on CPAP who comes in for follow up  1  Shortness of breath -  Unclear etiology  Dr Dena Tong of neurology did mention concern for Multiple System atrophy  [facility] previously had bulbar symptoms he has including upper airway closing, hoarseness, dysphagia and weak voice  This seems to have since resolved  SNIF test is negative  There may be a component of hyperinflation  As I came down on BIPAP settings he seemed to improve clinically  PFT and 6 minute walk is normal   Some values actually elevated since last visit  Oxygenation seems to be mildly reduced on ABG  However, with reduction in BIPAP pressures he seems to be doing better        2  Moderate KALLI (AHI - 15 7) - on auto BiPAP   He is compliant and doing well from a respiratory standpoint  Residual AHI - 6 8        -  Continue BIPAP 15/10         -  He is aware of the risk of leaving sleep apnea untreated including hypertension, heart failure, arrhythmia, MI and stroke       3  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 -  this has been less problematic    Would avoid sedating medications      4    Periodic limb movement (PLM index - 109) -  This may be secondary to KALLI or Parkinsons   He denies symptoms of this          - Continue Requip at 0 5mg qHS dosing          -  He did not find neurontin helpful   ______________________________________________________________________    HPI:    Eduar Roche presents today for follow-up for his shortness of breath and KALIL  He states that he is feeling much better from a respiratory standpoint and with his sleep since the BIPAP was reduced  He denies chest tightness, wheezing or cough  He is sleeping well with his bipap  Aerophagia has resolved  He is waking up feeling refreshed as well  Review of Systems:  Review of Systems   Constitutional: Negative  HENT: Negative  Eyes: Negative  Respiratory: Positive for chest tightness and shortness of breath  Negative for wheezing  Cardiovascular: Negative  Gastrointestinal: Negative  Endocrine: Negative  Genitourinary: Negative  Musculoskeletal: Negative  Skin: Negative  Neurological: Negative  Hematological: Negative  Psychiatric/Behavioral: Negative          Social history updates:  Social History     Tobacco Use   Smoking Status Former Smoker    Packs/day: 0 50    Years: 3 50    Pack years: 1 75    Types: Cigarettes    Start date: 64    Quit date: 36    Years since quittin 3   Smokeless Tobacco Never Used     Social History     Socioeconomic History    Marital status: /Civil Union     Spouse name: Not on file    Number of children: Not on file    Years of education: Not on file    Highest education level: Not on file   Occupational History    Occupation: [employer] retired   Social Needs    Financial resource strain: Not hard at all   [place] insecurity     Worry: Never true     Inability: Never true   [employer] needs     Medical: No     Non-medical: No   Tobacco Use    Smoking status: Former Smoker     Packs/day: 0 50     Years: 3 50     Pack years: 1 75     Types: Cigarettes     Start date: 65     Quit date: 36     Years since quittin 3    Smokeless tobacco: Never Used   Substance and Sexual Activity    Alcohol use: Not Currently    Drug use: No    Sexual activity: Not on file   Lifestyle    Physical activity     Days per week: 0 days     Minutes per session: 0 min    Stress: Not at all   Relationships    Social connections     Talks on phone: More than three times a week     Gets together: More than three times a week     Attends Nondenominational service: 1 to 4 times per year     Active member of club or organization: No     Attends meetings of clubs or organizations: Never     Relationship status:     Intimate partner violence     Fear of current or ex partner: No     Emotionally abused: No     Physically abused: No     Forced sexual activity: No   Other Topics Concern    Not on file   Social History Narrative    Consumes 5 glasses of tea per week       PhysicalExamination:  Vitals:   /70   Pulse 86   Temp 98 4 °F (36 9 °C) (Tympanic)   Resp 16   Ht 6' (1 829 m)   Wt 108 kg (238 lb)   SpO2 98%   BMI 32 28 kg/m²   General: Pleasant, Awake alert and oriented x 3, conversant without conversational dyspnea, NAD, normal affect  HEENT:  PERRL, Sclera noninjected, nonicteric OU, Nares patent,  no craniofacial abnormalities, Mucous membranes, moist, no oral lesions, normal dentition, Mallampati class 4  NECK: Trachea midline, no accessory muscle use, no stridor, no cervical or supraclavicular adenopathy, JVP not elevated  CARDIAC: Reg, single s1/S2, no m/r/g  PULM: CTA bilaterally no wheezing, rhonchi or rales  ABD: Normoactive bowel sounds, soft nontender, nondistended, no rebound, no rigidity, no guarding  EXT: No cyanosis, no clubbing, no edema, normal capillary refill  NEURO: no focal neurologic deficits, AAOx3, moving all extremities appropriately      Diagnostic Data:  Labs:   I personally reviewed the most recent laboratory data pertinent to today's visit  I have personally reviewed pertinent lab results  Lab Results   Component Value Date    WBC 8 75 08/18/2020    HGB 14 4 08/18/2020    HCT 45 2 08/18/2020    MCV 92 08/18/2020     08/18/2020     Lab Results   Component Value Date    CALCIUM 10 0 01/23/2021    K 4 5 01/23/2021    CO2 30 01/23/2021     01/23/2021    BUN 21 01/23/2021    CREATININE 1 24 01/23/2021     No results found for: IGE  Lab Results   Component Value Date    ALT 20 08/18/2020    AST 12 08/18/2020    ALKPHOS 60 08/18/2020       ABG    10/26/20 10:11 AM 5/20/19 8:55 AM     pH, Arterial 7 350 - 7 450 7 402  7  394    pCO2, Arterial 36 0 - 44 0 mm Hg 38 6  40 1    pO2, Arterial 75 0 - 129 0 mm Hg 76 9  71 9Low     HCO3, Arterial 22 0 - 28 0 mmol/L 23 5  23 9    Base Excess, Arterial mmol/L -1 0  -0 8    O2 Content, Arterial 16 0 - 23 0 mL/dL 20 1  20 1    O2 HGB,Arterial  94 0 - 97 0 % 94 7  94 1          The most recent pulmonary function tests were reviewed    PFT 10/26/20  Results:  FEV1/FVC Ratio: 77 %  Forced Vital Capacity: 4 88 L    99 % predicted  FEV1: 3 75 L     99 % predicted     Lung volumes by body plethysmography:   Total Lung Capacity 99 % predicted   Residual volume 96 % predicted     DLCO corrected for patients hemoglobin level: 83 %  Interpretation:  · No obstructive airflow defect on spirometry  · Normal Spirometry  · Normal Lung volumes  · Normal diffusion capacity  · Flow volume loop is normal      3/4/19  Results:  FEV1/FVC Ratio: 80 %  Forced Vital Capacity: 4 80 L    93 % predicted  FEV1: 3 85 L     97 % predicted  Lung volumes by body plethysmography:   Total Lung Capacity 94 % predicted   Residual volume 88 % predicted  DLCO corrected for patients hemoglobin level: 79 %  Interpretation:  · No obstructive airflow defect   · Normal Spirometry  · Normal Lung volumes  · Normal diffusion capacity     Repeat 6 minute walk 8/28/20  Starting saturation - 98%   Starting HR - 89  Lowest saturation - 91%    Highest HR - 103  Ambulated - 252 m and he did not require oxygen     6 minute walk  Date of testin2019  Resting room air saturation: 93%  Randy scale of dyspnea at start of test: 0/10     Ambulation testing:  Lowest saturation 93%  No supplemental oxygen required     Randy scale of dyspnea at end of test: 3/10  Reason if test was stopped early: N/A      Total 6 minute walk distance: 1400 feet     Imaging:  I personally reviewed the images on the [facility] system pertinent to today's visit  CT chest - 19  IMPRESSION:  Within normal limits CT of the chest      Repeat SNF test 10/26/20  IMPRESSION:  There is no evidence of diaphragmatic paralysis or elevation         Other studies:  HST - moderate (15 7), Supine AHI - 23, hypoxia   CPAP titration - Nasal CPAP was titrated from 5-11 cm of water for  control of snoring and abnormal breathing  Patient appeared to do best at 11 cm of CPAP delivered using a [employer] 45 full face interface   Continued use of this equipment at these settings is recommended       New Compliance Data:  3/12/21-21                                   Type of CPAP:  BiPAP 15/10                                  Percent usage: 100%, 89 %> 4hrs                                   Average time used: 5hrs 36 mins - 8 hrs 18 mins                                  Time in large leak: 10 min 15 secs                                   Residual AHI: 6 8     Compliance Data:  21-2/10/21                                   Type of CPAP:  auto BiPAP min EPAP 11, PSV 4-6, Max IPAP 25,                                    Mean EPAP - 12- 14 8, IPAP 17 - Max IPAP 18 8                                   Percent usage: 95%, 85 %> 4hrs                                   Average time used: 5hrs 26 mins - 7 hrs 40 mins                                  Time in large leak: 6 min 22 secs                                   Residual AHI: 4 8 (CA - 3 0)     Compliance Data:  10/24/20-11/22/20                                   Type of CPAP:  auto BiPAP min EPAP 11, PSV 4-6, Max IPAP 25,                                    Mean EPAP - 11 7- 16 9, IPAP 16 2 - Max IPAP 22 9                                   Percent usage: 63%, 30%> 4hrs                                   Average time used: 2hrs 40 mins - 6 hrs 45 mins                                  Time in large leak: 16 min 19 secs                                   Residual AHI: 5 6 (CA - 3 1)     Compliance Data:  7/28/20-8/26/20                                   Type of CPAP:  auto BiPAP min EPAP 11, PSV 4, Max IPAP 25,                                    Mean EPAP - 12 2- 17 6, IPAP 16 2 - Max IPAP 22                                   Percent usage: 97%, 90%> 4hrs                                   Average time used: 5hrs 30 mins - 8 hrs 25 mins                                  Time in large leak: 4 min 39 secs                                   Residual AHI: 7 0 (CA - 5 1)     Compliance Data:  1/5/20-2/5/20                                   Type of CPAP:  auto BiPAP min EPAP 11, PSV 4, Max IPAP 25,                                    Mean EPAP recorded - 12 1, Peak - 14 9, Min IPAP 16, Max IPAP 19                                   Percent usage: 72%, 63%> 4hrs                                   Average time used: 3hrs 51 mins - 8 hrs 26 mins                                  Time in large leak: 50 secs                                   Residual AHI: 6 6  (CA - 3 9)     Compliance Data:  10/23/19-11/21/19                                   Type of CPAP:  autoPAP 11-15, Mean - 13 2, Peak - 15 0                                   Percent usage: 73%, 67%> 4hrs                                   Average time used: 3hrs 38 mins - 8 hrs 37 mins                                  Time in large leak: 9 mins 46 secs                                   Residual AHI: 11 0  (CA - 0 9)     Compliance Data:  8/25/19-9/23/19                                   Type of CPAP:  autoPAP 8-15, Mean - 11 9, Peak - 15 6                                   Percent usage: 63%                                   Average time used: 2hrs 52 mins - 4 hrs 32 mins                                  Time in large leak: 4 mins 44 secs                                   Residual AHI: 13 6  (CA - 0 9)         Vern Salinas, DO

## 2021-05-14 NOTE — PROGRESS NOTES
Progress note - Pulmonary Medicine   Stephany Dugan 57 y o  male MRN: [id number]       Impression & Plan:   89 K B  E with PMHx of Parkinson's disease with diaphragmatic weakness, chronic knee and ankle pain, urinary retention s/p TURP, Moderate KALLI on CPAP who comes in for follow up  1  Shortness of breath -  Unclear etiology  Dr Jessica Zuluaga of neurology did mention concern for Multiple System atrophy  Pratima Trotter previously had bulbar symptoms he has including upper airway closing, hoarseness, dysphagia and weak voice  This seems to have since resolved  SNIF test is negative  There may be a component of hyperinflation  As I came down on BIPAP settings he seemed to improve clinically  PFT and 6 minute walk is normal   Some values actually elevated since last visit  Oxygenation seems to be mildly reduced on ABG  However, with reduction in BIPAP pressures he seems to be doing better        2  Moderate KALLI (AHI - 15 7) - on auto BiPAP   He is compliant and doing well from a respiratory standpoint  Residual AHI - 6 8        -  Continue BIPAP 15/10         -  He is aware of the risk of leaving sleep apnea untreated including hypertension, heart failure, arrhythmia, MI and stroke       3  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 -  this has been less problematic  Would avoid sedating medications      4    Periodic limb movement (PLM index - 109) -  This may be secondary to KALLI or Parkinsons   He denies symptoms of this          - Continue Requip at 0 5mg qHS dosing          -  He did not find neurontin helpful   ______________________________________________________________________    HPI:    Stephany Dugan presents today for follow-up for his shortness of breath and KALLI  He states that he is feeling much better from a respiratory standpoint and with his sleep since the BIPAP was reduced  He denies chest tightness, wheezing or cough  He is sleeping well with his bipap  Aerophagia has resolved    He is waking up feeling refreshed as well  Review of Systems:  Review of Systems   Constitutional: Negative  HENT: Negative  Eyes: Negative  Respiratory: Positive for chest tightness and shortness of breath  Negative for wheezing  Cardiovascular: Negative  Gastrointestinal: Negative  Endocrine: Negative  Genitourinary: Negative  Musculoskeletal: Negative  Skin: Negative  Neurological: Negative  Hematological: Negative  Psychiatric/Behavioral: Negative          Social history updates:  Social History     Tobacco Use   Smoking Status Former Smoker    Packs/day: 0 50    Years: 3 50    Pack years: 1 75    Types: Cigarettes    Start date:     Quit date: 36    Years since quittin 3   Smokeless Tobacco Never Used     Social History     Socioeconomic History    Marital status: /Civil Union     Spouse name: Not on file    Number of children: Not on file    Years of education: Not on file    Highest education level: Not on file   Occupational History    Occupation: [employer] retired   Social Needs    Financial resource strain: Not hard at all   [employer] insecurity     Worry: Never true     Inability: Never true   [employer] needs     Medical: No     Non-medical: No   Tobacco Use    Smoking status: Former Smoker     Packs/day: 0 50     Years: 3 50     Pack years: 1 75     Types: Cigarettes     Start date:      Quit date:      Years since quittin 3    Smokeless tobacco: Never Used   Substance and Sexual Activity    Alcohol use: Not Currently    Drug use: No    Sexual activity: Not on file   Lifestyle    Physical activity     Days per week: 0 days     Minutes per session: 0 min    Stress: Not at all   Relationships    Social connections     Talks on phone: More than three times a week     Gets together: More than three times a week     Attends Alev[employer] service: 1 to 4 times per year     Active member of club or organization: No Attends meetings of clubs or organizations: Never     Relationship status:     Intimate partner violence     Fear of current or ex partner: No     Emotionally abused: No     Physically abused: No     Forced sexual activity: No   Other Topics Concern    Not on file   Social History Narrative    Consumes 5 glasses of tea per week       PhysicalExamination:  Vitals:   /70   Pulse 86   Temp 98 4 °F (36 9 °C) (Tympanic)   Resp 16   Ht 6' (1 829 m)   Wt 108 kg (238 lb)   SpO2 98%   BMI 32 28 kg/m²   General: Pleasant, Awake alert and oriented x 3, conversant without conversational dyspnea, NAD, normal affect  HEENT:  PERRL, Sclera noninjected, nonicteric OU, Nares patent,  no craniofacial abnormalities, Mucous membranes, moist, no oral lesions, normal dentition, Mallampati class 4  NECK: Trachea midline, no accessory muscle use, no stridor, no cervical or supraclavicular adenopathy, JVP not elevated  CARDIAC: Reg, single s1/S2, no m/r/g  PULM: CTA bilaterally no wheezing, rhonchi or rales  ABD: Normoactive bowel sounds, soft nontender, nondistended, no rebound, no rigidity, no guarding  EXT: No cyanosis, no clubbing, no edema, normal capillary refill  NEURO: no focal neurologic deficits, AAOx3, moving all extremities appropriately      Diagnostic Data:  Labs: I personally reviewed the most recent laboratory data pertinent to today's visit  I have personally reviewed pertinent lab results    Lab Results   Component Value Date    WBC 8 75 08/18/2020    HGB 14 4 08/18/2020    HCT 45 2 08/18/2020    MCV 92 08/18/2020     08/18/2020     Lab Results   Component Value Date    CALCIUM 10 0 01/23/2021    K 4 5 01/23/2021    CO2 30 01/23/2021     01/23/2021    BUN 21 01/23/2021    CREATININE 1 24 01/23/2021     No results found for: IGE  Lab Results   Component Value Date    ALT 20 08/18/2020    AST 12 08/18/2020    ALKPHOS 60 08/18/2020       ABG    10/26/20 10:11 AM 5/20/19 8:55 AM     pH, Arterial 7 350 - 7 450 7 402  7  394    pCO2, Arterial 36 0 - 44 0 mm Hg 38 6  40 1    pO2, Arterial 75 0 - 129 0 mm Hg 76 9  71 9Low     HCO3, Arterial 22 0 - 28 0 mmol/L 23 5  23 9    Base Excess, Arterial mmol/L -1 0  -0 8    O2 Content, Arterial 16 0 - 23 0 mL/dL 20 1  20 1    O2 HGB,Arterial  94 0 - 97 0 % 94 7  94 1          The most recent pulmonary function tests were reviewed    PFT 10/26/20  Results:  FEV1/FVC Ratio: 77 %  Forced Vital Capacity: 4 88 L    99 % predicted  FEV1: 3 75 L     99 % predicted     Lung volumes by body plethysmography:   Total Lung Capacity 99 % predicted   Residual volume 96 % predicted     DLCO corrected for patients hemoglobin level: 83 %  Interpretation:  · No obstructive airflow defect on spirometry  · Normal Spirometry  · Normal Lung volumes  · Normal diffusion capacity  · Flow volume loop is normal      3/4/19  Results:  FEV1/FVC Ratio: 80 %  Forced Vital Capacity: 4 80 L    93 % predicted  FEV1: 3 85 L     97 % predicted  Lung volumes by body plethysmography:   Total Lung Capacity 94 % predicted   Residual volume 88 % predicted  DLCO corrected for patients hemoglobin level: 79 %  Interpretation:  · No obstructive airflow defect   · Normal Spirometry  · Normal Lung volumes  · Normal diffusion capacity     Repeat 6 minute walk 20  Starting saturation - 98%   Starting HR - 89  Lowest saturation - 91%    Highest HR - 103  Ambulated - 252 m and he did not require oxygen     6 minute walk  Date of testin2019  Resting room air saturation: 93%  Randy scale of dyspnea at start of test: 0/10     Ambulation testing:  Lowest saturation 93%  No supplemental oxygen required     Randy scale of dyspnea at end of test: 3/10  Reason if test was stopped early: N/A      Total 6 minute walk distance: 1400 feet     Imaging:  I personally reviewed the images on the [facility] system pertinent to today's visit  CT chest - 19  IMPRESSION:  Within normal limits CT of the chest      Repeat SNF test 10/26/20  IMPRESSION:  There is no evidence of diaphragmatic paralysis or elevation         Other studies:  HST - moderate (15 7), Supine AHI - 23, hypoxia   CPAP titration - Nasal CPAP was titrated from 5-11 cm of water for  control of snoring and abnormal breathing  Patient appeared to do best at 11 cm of CPAP delivered using a [employer] 45 full face interface   Continued use of this equipment at these settings is recommended       New Compliance Data:  3/12/21-5/12/21                                   Type of CPAP:  BiPAP 15/10                                  Percent usage: 100%, 89 %> 4hrs                                   Average time used: 5hrs 36 mins - 8 hrs 18 mins                                  Time in large leak: 10 min 15 secs                                   Residual AHI: 6 8     Compliance Data:  1/2/21-2/10/21                                   Type of CPAP:  auto BiPAP min EPAP 11, PSV 4-6, Max IPAP 25,                                    Mean EPAP - 12- 14 8, IPAP 17 - Max IPAP 18 8                                   Percent usage: 95%, 85 %> 4hrs                                   Average time used: 5hrs 26 mins - 7 hrs 40 mins                                  Time in large leak: 6 min 22 secs                                   Residual AHI: 4 8 (CA - 3 0)     Compliance Data:  10/24/20-11/22/20                                   Type of CPAP:  auto BiPAP min EPAP 11, PSV 4-6, Max IPAP 25,                                    Mean EPAP - 11 7- 16 9, IPAP 16 2 - Max IPAP 22 9                                   Percent usage: 63%, 30%> 4hrs                                   Average time used: 2hrs 40 mins - 6 hrs 45 mins                                  Time in large leak: 16 min 19 secs                                   Residual AHI: 5 6 (CA - 3 1)     Compliance Data:  7/28/20-8/26/20                                   Type of CPAP:  auto BiPAP min EPAP 11, PSV 4, Max IPAP 25,                                  Mean EPAP - 12 2- 17 6, IPAP 16 2 - Max IPAP 22                                   Percent usage: 97%, 90%> 4hrs                                   Average time used: 5hrs 30 mins - 8 hrs 25 mins                                  Time in large leak: 4 min 39 secs                                   Residual AHI: 7 0 (CA - 5 1)     Compliance Data:  1/5/20-2/5/20                                   Type of CPAP:  auto BiPAP min EPAP 11, PSV 4, Max IPAP 25,                                    Mean EPAP recorded - 12 1, Peak - 14 9, Min IPAP 16, Max IPAP 19                                   Percent usage: 72%, 63%> 4hrs                                   Average time used: 3hrs 51 mins - 8 hrs 26 mins                                  Time in large leak: 50 secs                                   Residual AHI: 6 6  (CA - 3 9)     Compliance Data:  10/23/19-11/21/19                                   Type of CPAP:  autoPAP 11-15, Mean - 13 2, Peak - 15 0                                   Percent usage: 73%, 67%> 4hrs                                   Average time used: 3hrs 38 mins - 8 hrs 37 mins                                  Time in large leak: 9 mins 46 secs                                   Residual AHI: 11 0  (CA - 0 9)     Compliance Data:  8/25/19-9/23/19                                   Type of CPAP:  autoPAP 8-15, Mean - 11 9, Peak - 15 6                                   Percent usage: 63%                                   Average time used: 2hrs 52 mins - 4 hrs 32 mins                                  Time in large leak: 4 mins 44 secs                                   Residual AHI: 13 6  (CA - 0 9)         Robert Wallis DO

## 2021-05-18 ENCOUNTER — OFFICE VISIT (OUTPATIENT)
Dept: CARDIOLOGY CLINIC | Facility: CLINIC | Age: 61
End: 2021-05-18
Payer: COMMERCIAL

## 2021-05-18 VITALS
BODY MASS INDEX: 32.51 KG/M2 | SYSTOLIC BLOOD PRESSURE: 90 MMHG | WEIGHT: 240 LBS | HEIGHT: 72 IN | DIASTOLIC BLOOD PRESSURE: 60 MMHG | HEART RATE: 88 BPM

## 2021-05-18 DIAGNOSIS — G90.3 NEUROGENIC ORTHOSTATIC HYPOTENSION (HCC): ICD-10-CM

## 2021-05-18 DIAGNOSIS — R42 DIZZINESS: ICD-10-CM

## 2021-05-18 DIAGNOSIS — N20.0 NEPHROLITHIASIS: ICD-10-CM

## 2021-05-18 DIAGNOSIS — N18.2 CKD (CHRONIC KIDNEY DISEASE) STAGE 2, GFR 60-89 ML/MIN: ICD-10-CM

## 2021-05-18 DIAGNOSIS — I10 ESSENTIAL HYPERTENSION: Primary | ICD-10-CM

## 2021-05-18 PROCEDURE — 3074F SYST BP LT 130 MM HG: CPT

## 2021-05-18 PROCEDURE — 99214 OFFICE O/P EST MOD 30 MIN: CPT

## 2021-05-18 PROCEDURE — 3078F DIAST BP <80 MM HG: CPT

## 2021-05-18 PROCEDURE — 1036F TOBACCO NON-USER: CPT

## 2021-05-18 NOTE — PROGRESS NOTES
Cardiology   MD Francisco J Smith MD Shayla Real, DO, MD Chance Guzman DO, Spencer Higuera DO, [facility] - [place]  -------------------------------------------------------------------  [facility]  [address]  [place], [place] [address]  [phone]  [phone]  05/18/21  Santos Chinchilla  YOB: 1960   MRN: [id number]      Referring Physician: Jairo Bentley DO  [address] [address]     HPI: Santos Chinchilla is a 57 y o  male with Parkinson's disease, obstructive prior history of testicular cancer, bilateral inguinal hernia repair, nephrolithiasis, BPH, neurogenic bladder, orthostatic hypotension, chronic kidney disease who presents today for follow-up  He had echo last year that showed preserved LV systolic function with no significant valvular heart disease  He has been having issues with his blood pressure either being very high or very low  He is very sensitive to his blood pressure medications and he also has a prescription for midodrine to take in the event he is too low  He is followed closely by his PCP as well as Nephrology for this  Likely has autonomic dysfunction related to his Parkinson's disease  New workup with rated, aldosterone, metanephrines, renal artery ultrasound were ordered by his PCP, however not completed/resulted yet  Also complains of other issues that are consistent with autonomic dysfunction including constipation, as well as intermittent episodes sweating on occasion without any other discrete cause    Review of Systems   Constitutional: Positive for diaphoresis  Negative for chills and fever  HENT: Negative for facial swelling and sore throat  Eyes: Negative for visual disturbance  Respiratory: Negative for cough, chest tightness, shortness of breath and wheezing  Cardiovascular: Negative for chest pain, palpitations and leg swelling  Gastrointestinal: Negative for abdominal pain, blood in stool, constipation, diarrhea, nausea and vomiting  Endocrine: Negative for cold intolerance and heat intolerance  Genitourinary: Negative for decreased urine volume, difficulty urinating, dysuria and hematuria  Musculoskeletal: Negative for arthralgias, back pain and myalgias  Skin: Negative for rash  Neurological: Positive for weakness and light-headedness  Negative for dizziness, syncope and numbness  Psychiatric/Behavioral: Negative for agitation, behavioral problems and confusion  The patient is not nervous/anxious  OBJECTIVE  Vitals:    05/18/21 0928   BP: 90/60   Pulse: 88       Physical Exam  Constitutional: awake, alert and oriented, in no acute distress, no obvious deformities  Head: Normocephalic, without obvious abnormality, atraumatic  Eyes: conjunctivae clear and moist  Sclera anicteric  No xanthelasmas  Pupils equal bilaterally  Extraocular motions are full  Ear nose mouth and throat: ears are symmetrical bilaterally, hearing appears to be equal bilaterally, no nasal discharge or epistaxis, oropharynx is clear with moist mucous membranes  Neck:  Trachea is midline, neck is supple, no thyromegaly or significant lymphadenopathy, there is full range of motion  Lungs: clear to auscultation bilaterally, no wheezes, no rales, no rhonchi, no accessory muscle use, breathing is nonlabored  Heart: regular rate and rhythm, S1, S2 normal, no murmur, click, rub or gallop, no lower extremity edema  Abdomen: soft, non-tender; bowel sounds normal; no masses,  no organomegaly  Psychiatric:  Patient is oriented to time, place, person, mood/affect is negative for depression, anxiety, agitation, appears to have appropriate insight  Skin: Skin is warm, dry, intact  No obvious rashes or lesions on exposed extremities  Nail beds are pink with no cyanosis or clubbing  EKG:  No results found for this visit on 05/18/21  IMPRESSION:  1  Orthostatic hypotension  2  Likely autonomic dysfunction  3  Diaphragm dysfunction  4  Parkinson's disease  5  Chronic kidney disease  6  Obstructive sleep apnea  7  Gastroesophageal reflux disease    Echo in 2020 with preserved ejection fraction with no significant valvular heart disease    DISCUSSION/RECOMMENDATIONS:   At this time from a cardiac standpoint he is relatively stable  His echo shows no underlying evidence of structural heart disease   He does have issues with hypertension as well as hypotension, is very sensitive to his blood pressure medications and has dramatic swings in his blood pressure on occasions  Likely has underlying autonomic dysfunction related to his Parkinson's disease  Also complains of other symptoms consistent with autonomic dysfunction as well as described above   There is workup pending regarding his blood pressure issues however, a renal artery ultrasound was ordered by his PCP as well as renin, aldosterone level, plasma metanephrines  The renin, craig, metanephrines were ordered in the past and were not consistent with any specific abnormality to be causing his blood pressure issues   If he is indeed found to have autonomic dysfunction, this will be difficult to treat  Would continue with intermittent dosing of his lisinopril as well as midodrine   No evidence of underlying myocardial disease at this time   Plan for close follow-up with his PCP, neurologist and nephrologist   Follow with Cardiology in 6 months    Rebeca Hines DO, [facility] [place]  --------------------------------------------------------------------------------  TREADMILL STRESS  No results found for this or any previous visit    ----------------------------------------------------------------------------------------------  NUCLEAR STRESS TEST: No results found for this or any previous visit    No results found for this or any previous visit     --------------------------------------------------------------------------------  CATH:  No results found for this or any previous visit   --------------------------------------------------------------------------------  ECHO:   Results for orders placed during the hospital encounter of 20   Echo complete with contrast if indicated    [address]  [facility] [place], [address] [facility] [facility]  [phone]    Transthoracic Echocardiogram  2D, M-mode, Doppler, and Color Doppler    Study date:  2020    Patient: Chidi Ayers  MR number: [id number]  Account number: [de-identified]  : 1960  Age: 60 years  Gender: Male  Status: Outpatient  Location: [address] [place] [address] [facility] [place]  Height: 72 in  Weight: 234 5 lb  BP: 101/ 61 mmHg    Indications: Shortness of Breath    Diagnoses: R06 02 - Shortness of breath    Sonographer:  Aldo Diez RDCS  Primary Physician:  Judith Mata DO  Referring Physician:  Judith Mata DO  Group:  [facility]  Interpreting Physician:  Shonna Franz MD    SUMMARY    LEFT VENTRICLE:  Systolic function was normal  Ejection fraction was estimated to be 60 %  There were no regional wall motion abnormalities  RIGHT VENTRICLE:  The size was normal   Systolic function was normal     HISTORY: PRIOR HISTORY: KALLI w/ BIPAP    PROCEDURE: The study was performed in the [address]  This was a routine study  The transthoracic approach was used  The study included complete 2D imaging, M-mode, complete spectral Doppler, and color Doppler  The  heart rate was 72 bpm, at the start of the study  Images were obtained from the parasternal, apical, subcostal, and suprasternal notch acoustic windows  Image quality was adequate  LEFT VENTRICLE: Size was normal  Systolic function was normal  Ejection fraction was estimated to be 60 %  There were no regional wall motion abnormalities   Han Michelle thickness was normal  DOPPLER: The transmitral flow pattern was normal  Left  ventricular diastolic function parameters were normal     RIGHT VENTRICLE: The size was normal  Systolic function was normal  Wall thickness was normal     LEFT ATRIUM: Size was normal     RIGHT ATRIUM: Size was normal     MITRAL VALVE: Valve structure was normal  There was normal leaflet separation  DOPPLER: The transmitral velocity was within the normal range  There was no evidence for stenosis  There was trace regurgitation  AORTIC VALVE: The valve was trileaflet  Leaflets exhibited normal thickness and normal cuspal separation  DOPPLER: Transaortic velocity was within the normal range  There was no evidence for stenosis  There was no significant  regurgitation  TRICUSPID VALVE: The valve structure was normal  There was normal leaflet separation  DOPPLER: The transtricuspid velocity was within the normal range  There was no evidence for stenosis  There was trace regurgitation  The tricuspid jet  envelope definition was inadequate for estimation of RV systolic pressure  There are no indirect findings (abnormal RV volume or geometry, altered pulmonary flow velocity profile, or leftward septal displacement) which would suggest  moderate or severe pulmonary hypertension  PULMONIC VALVE: Leaflets exhibited normal thickness, no calcification, and normal cuspal separation  DOPPLER: The transpulmonic velocity was within the normal range  There was trace regurgitation  PERICARDIUM: There was no pericardial effusion  The pericardium was normal in appearance  AORTA: The root exhibited normal size  SYSTEMIC VEINS: IVC: The inferior vena cava was normal in size   Respirophasic changes were normal     SYSTEM MEASUREMENT TABLES    2D  %FS: 29 56 %  Ao Diam: 3 2 cm  EDV(Teich): 116 61 ml  EF(Cube): 65 04 %  EF(Teich): 56 34 %  ESV(Cube): 42 92 ml  ESV(Teich): 50 91 ml  IVSd: 1 02 cm  LA Area: 21 08 cm2  LA Diam: 3 25 cm  LVEDV MOD A4C: 147 51 ml  LVEF MOD A4C: 69 2 %  LVESV MOD A4C: 45 43 ml  LVIDd: 4 97 cm  LVIDs: 3 5 cm  LVLd A4C: 9 78 cm  LVLs A4C: 7 97 cm  LVPWd: 1 1 cm  RA Area: 19 63 cm2  RV Diam : 4 03 cm  SI(Cube): 35 03 ml/m2  SI(Teich): 28 82 ml/m2  SV MOD A4C: 102 08 ml  SV(Cube): 79 86 ml  SV(Teich): 65 7 ml    MM  TAPSE: 2 74 cm    PW  E': 0 15 m/s  E/E': 6 45  MV A Joesph: 0 72 m/s  MV Dec [place]: 6 03 m/s2  MV DecT: 162 26 ms  MV E Joesph: 0 98 m/s  MV E/A Ratio: 1 35    [facility] Accredited Echocardiography Laboratory    Prepared and electronically signed by    Garcia Hernandez MD  Signed 07-Apr-2020 13:58:33       No results found for this or any previous visit   --------------------------------------------------------------------------------  HOLTER  No results found for this or any previous visit    No results found for this or any previous visit   --------------------------------------------------------------------------------  CAROTIDS  No results found for this or any previous visit    --------------------------------------------------------------------------------  There are no diagnoses linked to this encounter    ======================================================    Past Medical History:   Diagnosis Date    Back pain     Bladder infection     BPH (benign prostatic hyperplasia)     Cancer ([facility] [address] )     testicular 1988    Chronic kidney disease     Diverticulosis     GERD (gastroesophageal reflux disease)     occassional    History of testicular cancer 1988    Surgery and radiation; left side    Kidney stone     Kidney stones     Currently and in the past    Orthostatic hypotension     Orthostatic hypotension due to Parkinson's disease ([facility] )     Parkinson's disease ([facility]     Sleep apnea     uses CPAP    Wears glasses      Past Surgical History:   Procedure Laterality Date    BLADDER NECK RECONSTRUCTION  07/27/2020    COLONOSCOPY  2017    COLONOSCOPY  09/2020    COLONOSCOPY  11/2020    CYSTOPLASTY / CYSTOURETHROPLASTY  07/27/2020    [address] inguinal hernia repair    IR SUPRAPUBIC CATHETER PLACEMENT  2/24/2020    IR TUBE UPSIZE  3/23/2020    KIDNEY STONE SURGERY      LITHOTRIPSY      Renal; Resolved: 2/27/07    ORCHIECTOMY Left     OK CYSTOURETHRO W/IMPLANT N/A 5/17/2018    Procedure: CYSTOSCOPY WITH INSERTION UROLIFT;  Surgeon: Perla Scott DO;  Location: [facility];  Service: Urology    PROSTATE SURGERY N/A 1/18/2018    Procedure: CYSTOSCOPY WITH INSERTION Zack Ana;  Surgeon: Perla Scott DO;  Location: [facility];  Service: Urology    [address]    For cancer    TONSILLECTOMY      URETERONEOCYSTOSTOMY      w/ cystoscopy Ureteral Stent Placement; Resolved: 6/14/2007    WISDOM TOOTH EXTRACTION           Medications  Current Outpatient Medications   Medication Sig Dispense Refill    carbidopa-levodopa (SINEMET)  mg per tablet TAKE 2 TABLETS BY MOUTH 3 (THREE) TIMES A DAY START WITH 1/2 TAB THREE TIMES A DAY AND INCREASE AS DIRECTED 540 tablet 1    cholecalciferol (VITAMIN D3) 1,000 units tablet Take 2,000 Units by mouth daily      cyanocobalamin (VITAMIN B-12) 100 mcg tablet Take by mouth daily      diclofenac sodium (VOLTAREN) 1 % Apply 2 g topically 4 (four) times a day (Patient taking differently: Apply 2 g topically as needed ) 2 Tube 1    famotidine (Pepcid) 20 mg tablet Take 1 tablet (20 mg total) by mouth daily (Patient taking differently: Take 20 mg by mouth as needed ) 90 tablet 1    FIBER PO Take by mouth daily      lisinopril (ZESTRIL) 5 mg tablet Take 1 tablet (5 mg total) by mouth daily 30 tablet 1    midodrine (PROAMATINE) 5 mg tablet Take 1 tablet (5 mg total) by mouth daily Follow clinic instructions on titration dose to max 1 tab TID, as tolerated and to benefit   30 tablet 3    multivitamin (THERAGRAN) TABS Take 1 tablet by mouth daily      nystatin-triamcinolone (MYCOLOG-II) cream Apply topically 2 (two) times a day (Patient taking differently: Apply topically as needed ) 60 g 1    pantoprazole (PROTONIX) 40 mg tablet Take 1 tablet (40 mg total) by mouth 2 (two) times a day before meals 60 tablet 5    rOPINIRole (REQUIP) 0 5 mg tablet TAKE 1 TABLET BY MOUTH DAILY AT BEDTIME 90 tablet 1    selegiline (ELDEPRYL) 5 mg capsule       tadalafil (CIALIS) 5 MG tablet Take 5 mg by mouth daily      vitamin E 100 UNIT capsule Take 100 Units by mouth daily      Wheat Dextrin (BENEFIBER DRINK MIX PO)        No current facility-administered medications for this visit           No Known Allergies    Social History     Socioeconomic History    Marital status: /Civil Union     Spouse name: Not on file    Number of children: Not on file    Years of education: Not on file    Highest education level: Not on file   Occupational History    Occupation: [employer] retired   Social Needs    Financial resource strain: Not hard at all   [email or web address] insecurity     Worry: Never true     Inability: Never true   Watch Over Me needs     Medical: No     Non-medical: No   Tobacco Use    Smoking status: Former Smoker     Packs/day: 0 50     Years: 3 50     Pack years: 1 75     Types: Cigarettes     Start date:      Quit date:      Years since quittin 4    Smokeless tobacco: Never Used   Substance and Sexual Activity    Alcohol use: Not Currently    Drug use: No    Sexual activity: Not on file   Lifestyle    Physical activity     Days per week: 0 days     Minutes per session: 0 min    Stress: Not at all   Relationships    Social connections     Talks on phone: More than three times a week     Gets together: More than three times a week     Attends Voodoo service: 1 to 4 times per year     Active member of club or organization: No     Attends meetings of clubs or organizations: Never     Relationship status:     Intimate partner violence     Fear of current or ex partner: No     Emotionally abused: No     Physically abused: No     Forced sexual activity: No   Other Topics Concern    Not on file   Social History Narrative    Consumes 5 glasses of tea per week        Family History   Problem Relation Age of Onset    Colon cancer Father     Heart failure Father     Parkinsonism Mother     Cancer Paternal Grandmother        Lab Results   Component Value Date    WBC 8 75 08/18/2020    HGB 14 4 08/18/2020    HCT 45 2 08/18/2020    MCV 92 08/18/2020     08/18/2020      Lab Results   Component Value Date    SODIUM 141 01/23/2021    K 4 5 01/23/2021     01/23/2021    CO2 30 01/23/2021    BUN 21 01/23/2021    CREATININE 1 24 01/23/2021    GLUC 100 05/11/2018    CALCIUM 10 0 01/23/2021      Lab Results   Component Value Date    HGBA1C 5 9 (H) 08/18/2020      No results found for: CHOL  Lab Results   Component Value Date    HDL 56 08/18/2020    HDL 49 07/06/2018    HDL 56 12/01/2016     Lab Results   Component Value Date    LDLCALC 107 (H) 08/18/2020    LDLCALC 120 (H) 07/06/2018    LDLCALC 127 (H) 12/01/2016     Lab Results   Component Value Date    TRIG 96 08/18/2020    TRIG 83 07/06/2018    TRIG 155 (H) 12/01/2016     No results found for: [place], [place]   Lab Results   Component Value Date    INR 1 05 02/24/2020    PROTIME 13 8 02/24/2020          Patient Active Problem List    Diagnosis Date Noted    Essential hypertension 05/13/2021    Primary malignant neoplasm of testis ([facility] [address] ) 05/13/2021    Hyponatremia 01/06/2021    Olecranon bursitis 12/14/2020    Parasomnia 11/24/2020    Restless leg syndrome 11/24/2020    Prediabetes 09/23/2020    Mixed hyperlipidemia 09/23/2020    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[facility] [address] ) 08/27/2020    Truncal ataxia 08/27/2020    Recurrent left knee instability 04/24/2020    Coracoid impingement of right shoulder 04/24/2020    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 04/24/2020    Numbness and tingling 04/24/2020    Acute pain of right shoulder 02/21/2020    Proteinuria 12/06/2019    Urinary retention 12/06/2019    Nephrolithiasis 12/06/2019    CKD (chronic kidney disease) stage 2, GFR 60-89 ml/min 12/06/2019    Elevated blood pressure reading 11/22/2019    Tinea corporis 08/27/2019    Periodic limb movement disorder (PLMD) 08/22/2019    Change in voice 06/03/2019    Gastroesophageal reflux disease with esophagitis 06/03/2019    KALLI (obstructive sleep apnea)     Chronic pain of right ankle 03/04/2019    Chronic pain of left knee 03/04/2019    Parkinson's disease ([facility] [address] ) 03/04/2019    Thyromegaly 03/04/2019    Neurogenic orthostatic hypotension (HCC) 02/27/2019    Shortness of breath 11/29/2018    Pain of right thumb 10/18/2018    Pain of left thumb 10/18/2018    Right elbow pain 10/18/2018    Right wrist pain 10/18/2018    Plantar fasciitis, bilateral 10/10/2018    Unspecified abnormalities of gait and mobility 10/10/2018    Dizziness 07/05/2018    Personal history of malignant neoplasm of testis 12/17/2014       Portions of the record may have been created with voice recognition software  Occasional wrong word or "sound a like" substitutions may have occurred due to the inherent limitations of voice recognition software  Read the chart carefully and recognize, using context, where substitutions have occurred      Milind Main DO, [facility] [place]  5/18/2021 11:37 AM

## 2021-05-19 ENCOUNTER — HOSPITAL ENCOUNTER (OUTPATIENT)
Dept: ULTRASOUND IMAGING | Facility: HOSPITAL | Age: 61
Discharge: HOME | End: 2021-05-19
Payer: COMMERCIAL

## 2021-05-19 ENCOUNTER — TELEPHONE (OUTPATIENT)
Dept: NEUROLOGY | Facility: CLINIC | Age: 61
End: 2021-05-19

## 2021-05-19 ENCOUNTER — TELEPHONE (OUTPATIENT)
Dept: FAMILY MEDICINE CLINIC | Facility: CLINIC | Age: 61
End: 2021-05-19

## 2021-05-19 DIAGNOSIS — I10 ESSENTIAL HYPERTENSION: ICD-10-CM

## 2021-05-19 PROCEDURE — 76770 US EXAM ABDO BACK WALL COMP: CPT

## 2021-05-19 NOTE — TELEPHONE ENCOUNTER
Jd Andrews, PT from [facility], was doing vertebral basilar insufficiency testing in PT with patient  Patient noted dizziness during this test  He wants to relay this to you in case other testing needs to be done (carotid u/s) before resuming further PT testing  Please advise, he would like call back

## 2021-05-19 NOTE — TELEPHONE ENCOUNTER
Spoke with patient - offered sooner appt with Dr Harry Yanez in [facility] 5/20 @ 8a - pt declined has other oblgations

## 2021-05-20 DIAGNOSIS — G20 ATYPICAL PARKINSONISM (HCC): ICD-10-CM

## 2021-05-20 DIAGNOSIS — R42 DIZZINESS: ICD-10-CM

## 2021-05-20 DIAGNOSIS — R26.9 UNSPECIFIED ABNORMALITIES OF GAIT AND MOBILITY: ICD-10-CM

## 2021-05-20 DIAGNOSIS — I10 ESSENTIAL HYPERTENSION: ICD-10-CM

## 2021-05-20 DIAGNOSIS — G90.3 NEUROGENIC ORTHOSTATIC HYPOTENSION (HCC): Primary | ICD-10-CM

## 2021-05-20 NOTE — TELEPHONE ENCOUNTER
Spoke with Carlos Turpin regarding CTA  He will call patient directly to inform him of this, along with number for central scheduling  Have placed order

## 2021-05-20 NOTE — TELEPHONE ENCOUNTER
Call patient  Check CTA of the brain to evaluate vertebral basilar insufficiency as well as carotid

## 2021-05-21 ENCOUNTER — TELEPHONE (OUTPATIENT)
Dept: FAMILY MEDICINE CLINIC | Facility: CLINIC | Age: 61
End: 2021-05-21

## 2021-05-28 ENCOUNTER — HOSPITAL ENCOUNTER (OUTPATIENT)
Dept: CT IMAGING | Facility: HOSPITAL | Age: 61
Discharge: HOME | End: 2021-05-28
Payer: COMMERCIAL

## 2021-05-28 DIAGNOSIS — R26.9 UNSPECIFIED ABNORMALITIES OF GAIT AND MOBILITY: ICD-10-CM

## 2021-05-28 DIAGNOSIS — G20 ATYPICAL PARKINSONISM (HCC): ICD-10-CM

## 2021-05-28 DIAGNOSIS — R42 DIZZINESS: ICD-10-CM

## 2021-05-28 DIAGNOSIS — G90.3 NEUROGENIC ORTHOSTATIC HYPOTENSION (HCC): ICD-10-CM

## 2021-05-28 PROCEDURE — G1004 CDSM NDSC: HCPCS

## 2021-05-28 PROCEDURE — 70498 CT ANGIOGRAPHY NECK: CPT

## 2021-05-28 PROCEDURE — 70496 CT ANGIOGRAPHY HEAD: CPT

## 2021-05-28 RX ADMIN — IOHEXOL 100 ML: 350 INJECTION, SOLUTION INTRAVENOUS at 09:22

## 2021-06-01 ENCOUNTER — OFFICE VISIT (OUTPATIENT)
Dept: OBGYN CLINIC | Facility: MEDICAL CENTER | Age: 61
End: 2021-06-01
Payer: MEDICARE

## 2021-06-01 VITALS
HEART RATE: 74 BPM | BODY MASS INDEX: 31.56 KG/M2 | SYSTOLIC BLOOD PRESSURE: 196 MMHG | WEIGHT: 233 LBS | DIASTOLIC BLOOD PRESSURE: 119 MMHG | HEIGHT: 72 IN

## 2021-06-01 DIAGNOSIS — M17.12 PATELLOFEMORAL ARTHRITIS OF LEFT KNEE: Primary | ICD-10-CM

## 2021-06-01 PROCEDURE — 99213 OFFICE O/P EST LOW 20 MIN: CPT | Performed by: ORTHOPAEDIC SURGERY

## 2021-06-01 PROCEDURE — 20610 DRAIN/INJ JOINT/BURSA W/O US: CPT | Performed by: ORTHOPAEDIC SURGERY

## 2021-06-01 RX ORDER — METHYLPREDNISOLONE ACETATE 40 MG/ML
1 INJECTION, SUSPENSION INTRA-ARTICULAR; INTRALESIONAL; INTRAMUSCULAR; SOFT TISSUE
Status: COMPLETED | OUTPATIENT
Start: 2021-06-01 | End: 2021-06-01

## 2021-06-01 RX ORDER — BUPIVACAINE HYDROCHLORIDE 2.5 MG/ML
4 INJECTION, SOLUTION INFILTRATION; PERINEURAL
Status: COMPLETED | OUTPATIENT
Start: 2021-06-01 | End: 2021-06-01

## 2021-06-01 RX ADMIN — METHYLPREDNISOLONE ACETATE 1 ML: 40 INJECTION, SUSPENSION INTRA-ARTICULAR; INTRALESIONAL; INTRAMUSCULAR; SOFT TISSUE at 11:34

## 2021-06-01 RX ADMIN — BUPIVACAINE HYDROCHLORIDE 4 ML: 2.5 INJECTION, SOLUTION INFILTRATION; PERINEURAL at 11:34

## 2021-06-01 NOTE — PROGRESS NOTES
Orthopaedic Surgery - Office Note  Nikos Severino (49 y o  male)   : 1960   MRN: [id number]  Encounter Date: 2021    Chief Complaint   Patient presents with    Left Knee - Follow-up       Assessment / Plan  58 yo male with left patellofemoral arthritis  · The diagnosis and treatment options were reviewed  · The risks, benefits, and alternatives were discussed  · CSI of left knee joint was performed  · Activity as tolerated  · WBAT  · Home exercise program reviewed  · Anti-inflammatories or Tylenol prn pain  · We can consider other injections, formal PT, or surgical intervention if current conservative measures do not help  Return in about 3 months (around 2021)  History of Present Illness  Nikos Severino is a 57 y o  male who presents with left knee patellofemoral arthritis  He was last seen 6 weeks ago and elected to try home exercises  He has been doing those exercises but has continued pain in his left knee  Pain is the worst with going up stairs and better with rest  He has no new injuries or associated numbness or tingling  At this time, he would like to try a CS injection into the left knee to see if this can help decrease his symptoms and allow him to continue a HEP  Review of Systems  Pertinent items are noted in HPI  All other systems were reviewed and are negative  Physical Exam  BP (!) 196/119   Pulse 74   Ht 6' (1 829 m)   Wt 106 kg (233 lb)   BMI 31 60 kg/m²   Cons: Appears well  No apparent distress  Psych: Alert  Oriented x3  Mood and affect normal   Eyes: PERRLA, EOMI  Resp: Normal effort  No audible wheezing or stridor  CV: Palpable pulse  No discernable arrhythmia  No LE edema  Lymph:  No palpable cervical, axillary, or inguinal lymphadenopathy  Skin: Warm  No palpable masses  No visible lesions  Neuro: Normal muscle tone  Normal and symmetric DTR's  Left Knee Exam  Alignment:  Normal knee alignment  Inspection:  No swelling  No edema  No erythema  No ecchymosis  Palpation:  No tenderness  ROM:  Knee Extension 0  Knee Flexion 120  Strength:  5/5 quadriceps and hamstrings  Stability:  No objective knee instability  Stable Varus / Valgus stress, Lachman, and Posterior drawer  Tests:  No pertinent positive or negative tests  Patella:  Patella tracks centrally with crepitus  Neurovascular:  Sensation intact in DP/SP/Bateman/Sa/T nerve distributions  Toes warm and perfused  Gait:  Normal     Studies Reviewed  No studies to review    Large joint arthrocentesis: L knee  Procedure Details  Location: knee - L knee  Needle size: 22 G  Approach: lateral  Medications administered: 4 mL bupivacaine 0 25 %; 1 mL methylPREDNISolone acetate 40 mg/mL    Patient tolerance: patient tolerated the procedure well with no immediate complications  Dressing:  Sterile dressing applied          Medical, Surgical, Family, and Social History  The patient's medical history, family history, and social history, were reviewed and updated as appropriate      Past Medical History:   Diagnosis Date    Back pain     Bladder infection     BPH (benign prostatic hyperplasia)     Cancer ([address] )     testicular 1988    Chronic kidney disease     Diverticulosis     GERD (gastroesophageal reflux disease)     occassional    History of testicular cancer 1988    Surgery and radiation; left side    Kidney stone     Kidney stones     Currently and in the past    Orthostatic hypotension     Orthostatic hypotension due to Parkinson's disease ([address] )     Parkinson's disease ([address] )     Sleep apnea     uses CPAP    Wears glasses        Past Surgical History:   Procedure Laterality Date    BLADDER NECK RECONSTRUCTION  07/27/2020    COLONOSCOPY  2017    COLONOSCOPY  09/2020    COLONOSCOPY  11/2020    CYSTOPLASTY / CYSTOURETHROPLASTY  07/27/2020    [facility]   [address] 57, 1987 inguinal hernia repair    IR SUPRAPUBIC CATHETER PLACEMENT  2/24/2020    IR TUBE UPSIZE 3/23/2020    KIDNEY STONE SURGERY      LITHOTRIPSY      Renal; Resolved: 07    ORCHIECTOMY Left     LA CYSTOURETHRO W/IMPLANT N/A 2018    Procedure: CYSTOSCOPY WITH INSERTION UROLIFT;  Surgeon: Ania Morales DO;  [facility];  Service: Urology    PROSTATE SURGERY N/A 2018    Procedure: CYSTOSCOPY WITH INSERTION UROLIFT;  Surgeon: Ania Morales DO;  [facility];  Service: Urology    [address]    For cancer    TONSILLECTOMY      URETERONEOCYSTOSTOMY      w/ cystoscopy Ureteral Stent Placement;  Resolved: 2007    WISDOM TOOTH EXTRACTION         Family History   Problem Relation Age of Onset    Colon cancer Father     Heart failure Father     Parkinsonism Mother     Cancer Paternal Grandmother        Social History     Occupational History    Occupation: [employer] retired   Tobacco Use    Smoking status: Former Smoker     Packs/day: 0 50     Years: 3 50     Pack years: 1 75     Types: Cigarettes     Start date:      Quit date:      Years since quittin 4    Smokeless tobacco: Never Used   Substance and Sexual Activity    Alcohol use: Not Currently    Drug use: No    Sexual activity: Not on file       No Known Allergies      Current Outpatient Medications:     carbidopa-levodopa (SINEMET)  mg per tablet, TAKE 2 TABLETS BY MOUTH 3 (THREE) TIMES A DAY START WITH 1/2 TAB THREE TIMES A DAY AND INCREASE AS DIRECTED, Disp: 540 tablet, Rfl: 1    cholecalciferol (VITAMIN D3) 1,000 units tablet, Take 2,000 Units by mouth daily, Disp: , Rfl:     cyanocobalamin (VITAMIN B-12) 100 mcg tablet, Take by mouth daily, Disp: , Rfl:     diclofenac sodium (VOLTAREN) 1 %, Apply 2 g topically 4 (four) times a day (Patient taking differently: Apply 2 g topically as needed ), Disp: 2 Tube, Rfl: 1    famotidine (Pepcid) 20 mg tablet, Take 1 tablet (20 mg total) by mouth daily (Patient taking differently: Take 20 mg by mouth as needed ), Disp: 90 tablet, Rfl: 1    FIBER PO, Take by mouth daily, Disp: , Rfl:     lisinopril (ZESTRIL) 5 mg tablet, Take 1 tablet (5 mg total) by mouth daily, Disp: 30 tablet, Rfl: 1    midodrine (PROAMATINE) 5 mg tablet, Take 1 tablet (5 mg total) by mouth daily Follow clinic instructions on titration dose to max 1 tab TID, as tolerated and to benefit , Disp: 30 tablet, Rfl: 3    multivitamin (THERAGRAN) TABS, Take 1 tablet by mouth daily, Disp: , Rfl:     pantoprazole (PROTONIX) 40 mg tablet, Take 1 tablet (40 mg total) by mouth 2 (two) times a day before meals, Disp: 60 tablet, Rfl: 5    selegiline (ELDEPRYL) 5 mg capsule, , Disp: , Rfl:     vitamin E 100 UNIT capsule, Take 100 Units by mouth daily, Disp: , Rfl:     Wheat Dextrin (BENEFIBER DRINK MIX PO), , Disp: , Rfl:     nystatin-triamcinolone (MYCOLOG-II) cream, Apply topically 2 (two) times a day (Patient taking differently: Apply topically as needed ), Disp: 60 g, Rfl: 1    rOPINIRole (REQUIP) 0 5 mg tablet, TAKE 1 TABLET BY MOUTH DAILY AT BEDTIME, Disp: 90 tablet, Rfl: 1    tadalafil (CIALIS) 5 MG tablet, Take 5 mg by mouth daily, Disp: , Rfl:       Eliz Delgado MD

## 2021-06-02 ENCOUNTER — HOSPITAL ENCOUNTER (OUTPATIENT)
Dept: NON INVASIVE DIAGNOSTICS | Facility: CLINIC | Age: 61
Discharge: HOME | End: 2021-06-02
Payer: MEDICARE

## 2021-06-02 DIAGNOSIS — I10 ESSENTIAL HYPERTENSION: ICD-10-CM

## 2021-06-02 PROCEDURE — 93975 VASCULAR STUDY: CPT

## 2021-06-02 PROCEDURE — 93975 VASCULAR STUDY: CPT | Performed by: SURGERY

## 2021-06-07 ENCOUNTER — TELEPHONE (OUTPATIENT)
Dept: FAMILY MEDICINE CLINIC | Facility: CLINIC | Age: 61
End: 2021-06-07

## 2021-06-07 ENCOUNTER — PATIENT MESSAGE (OUTPATIENT)
Dept: FAMILY MEDICINE CLINIC | Facility: CLINIC | Age: 61
End: 2021-06-07

## 2021-06-07 NOTE — TELEPHONE ENCOUNTER
Patient called the office to make sure that he can return back to PT/OT that his cat scan is normal

## 2021-06-09 ENCOUNTER — RA CDI HCC (OUTPATIENT)
Dept: OTHER | Facility: HOSPITAL | Age: 61
End: 2021-06-09

## 2021-06-09 NOTE — PROGRESS NOTES
[facility]  coding opportunities          Chart reviewed, no opportunity found: CHART REVIEWED, NO OPPORTUNITY FOUND                     Patients insurance company: [employer] (Medicare Advantage and Commercial)

## 2021-06-15 ENCOUNTER — OFFICE VISIT (OUTPATIENT)
Dept: FAMILY MEDICINE CLINIC | Facility: CLINIC | Age: 61
End: 2021-06-15
Payer: MEDICARE

## 2021-06-15 VITALS
TEMPERATURE: 96.8 F | DIASTOLIC BLOOD PRESSURE: 82 MMHG | BODY MASS INDEX: 31.69 KG/M2 | SYSTOLIC BLOOD PRESSURE: 120 MMHG | WEIGHT: 234 LBS | HEIGHT: 72 IN

## 2021-06-15 DIAGNOSIS — I10 ESSENTIAL HYPERTENSION: ICD-10-CM

## 2021-06-15 DIAGNOSIS — Z43.6 ENCOUNTER FOR ATTENTION TO OTHER ARTIFICIAL OPENINGS OF URINARY TRACT (HCC): ICD-10-CM

## 2021-06-15 DIAGNOSIS — G70.9 NEUROMUSCULAR DISEASE (HCC): ICD-10-CM

## 2021-06-15 DIAGNOSIS — I70.1: ICD-10-CM

## 2021-06-15 DIAGNOSIS — G90.3 NEUROGENIC ORTHOSTATIC HYPOTENSION (HCC): Primary | ICD-10-CM

## 2021-06-15 PROCEDURE — 3008F BODY MASS INDEX DOCD: CPT

## 2021-06-15 PROCEDURE — 99213 OFFICE O/P EST LOW 20 MIN: CPT | Performed by: FAMILY MEDICINE

## 2021-06-15 NOTE — PROGRESS NOTES
Assessment/Plan: patient will follow-up on specialist per routine  Patient go for CT a renal arteries  Diagnoses and all orders for this visit:    Neurogenic orthostatic hypotension (HCC)    Essential hypertension    Renal artery stenosis of unknown etiology ([facility] )  -     CTA abdomen w wo contrast; Future    Encounter for attention to other artificial openings of urinary tract ([facility] )    Neuromuscular disease ([facility] )            Subjective:        Patient ID: Maurice Granda is a 57 y o  male  Patient is here with follow-up on the  Labile hypertension  Patient did have some hypotensive episodes yesterday with and hypertensive episodes  Patient will be seeing Neurology at [facility]  Patient will see nephrologist in October at [facility]  Patient is working with nephrologist at [facility] presently  The patient does have difficulty starting urination  Patient will be seeing urologist in November  Patient is getting a vestibular therapy        The following portions of the patient's history were reviewed and updated as appropriate: allergies, current medications, past family history, past medical history, past social history, past surgical history and problem list       Review of Systems   Constitutional: Negative  HENT: Negative  Eyes: Negative  Respiratory: Negative  Cardiovascular: Negative  Gastrointestinal: Negative  Endocrine: Negative  Genitourinary: Positive for difficulty urinating  Musculoskeletal: Negative  Skin: Negative  Allergic/Immunologic: Negative  Neurological: Positive for dizziness and light-headedness  Hematological: Negative  Psychiatric/Behavioral: Negative  Objective:      BMI Counseling: Body mass index is 31 74 kg/m²  The BMI is above normal  Nutrition recommendations include decreasing portion sizes  Exercise recommendations include moderate physical activity 150 minutes/week         Depression Screening and Follow-up Plan: C/o of redness & swelling to L forehead, itchy.  States "I think it is a reaction to the new medicine I'm on".  On Buprenorphine transdermal. Patient's depression screening was positive with a PHQ-2 score of 0  Clincally patient does not have depression  No treatment is required  /82 (BP Location: Right arm, Patient Position: Sitting, Cuff Size: Standard)   Temp (!) 96 8 °F (36 °C) (Tympanic)   Ht 6' (1 829 m)   Wt 106 kg (234 lb)   BMI 31 74 kg/m²          Physical Exam  Vitals and nursing note reviewed  Constitutional:       General: He is not in acute distress  Appearance: Normal appearance  He is not ill-appearing, toxic-appearing or diaphoretic  HENT:      Head: Normocephalic and atraumatic  Right Ear: Tympanic membrane, ear canal and external ear normal  There is no impacted cerumen  Left Ear: Tympanic membrane, ear canal and external ear normal  There is no impacted cerumen  Nose: Nose normal  No congestion or rhinorrhea  Mouth/Throat:      Mouth: Mucous membranes are moist       Pharynx: No oropharyngeal exudate or posterior oropharyngeal erythema  Eyes:      General: No scleral icterus  Right eye: No discharge  Left eye: No discharge  Extraocular Movements: Extraocular movements intact  Conjunctiva/sclera: Conjunctivae normal       Pupils: Pupils are equal, round, and reactive to light  Neck:      Vascular: No carotid bruit  Cardiovascular:      Rate and Rhythm: Normal rate and regular rhythm  Pulses: Normal pulses  Heart sounds: Normal heart sounds  No murmur heard  No friction rub  No gallop  Pulmonary:      Effort: Pulmonary effort is normal  No respiratory distress  Breath sounds: Normal breath sounds  No stridor  No wheezing, rhonchi or rales  Chest:      Chest wall: No tenderness  Musculoskeletal:         General: No swelling, tenderness, deformity or signs of injury  Normal range of motion  Cervical back: Normal range of motion and neck supple  No rigidity  No muscular tenderness  Right lower leg: No edema        Left lower leg: No edema  Lymphadenopathy:      Cervical: No cervical adenopathy  Skin:     General: Skin is warm and dry  Capillary Refill: Capillary refill takes less than 2 seconds  Coloration: Skin is not jaundiced  Findings: No bruising, erythema, lesion or rash  Neurological:      Mental Status: He is alert and oriented to person, place, and time  Mental status is at baseline  Cranial Nerves: No cranial nerve deficit  Sensory: No sensory deficit  Motor: No weakness  Coordination: Coordination normal       Gait: Gait normal    Psychiatric:         Mood and Affect: Mood normal          Behavior: Behavior normal          Thought Content:  Thought content normal          Judgment: Judgment normal

## 2021-06-24 ENCOUNTER — HOSPITAL ENCOUNTER (OUTPATIENT)
Dept: CT IMAGING | Facility: HOSPITAL | Age: 61
Discharge: HOME | End: 2021-06-24
Payer: MEDICARE

## 2021-06-24 DIAGNOSIS — I70.1: ICD-10-CM

## 2021-06-24 PROCEDURE — 74175 CTA ABDOMEN W/CONTRAST: CPT

## 2021-06-24 RX ADMIN — IOHEXOL 100 ML: 350 INJECTION, SOLUTION INTRAVENOUS at 18:09

## 2021-07-21 DIAGNOSIS — G25.81 RESTLESS LEG SYNDROME: ICD-10-CM

## 2021-07-21 RX ORDER — ROPINIROLE 0.5 MG/1
TABLET, FILM COATED ORAL
Qty: 90 TABLET | Refills: 1 | Status: SHIPPED | OUTPATIENT
Start: 2021-07-21 | End: 2022-02-25

## 2021-08-02 ENCOUNTER — HOSPITAL ENCOUNTER (OUTPATIENT)
Dept: MRI IMAGING | Facility: HOSPITAL | Age: 61
Discharge: HOME | End: 2021-08-02
Payer: MEDICARE

## 2021-08-02 DIAGNOSIS — G90.3 MULTIPLE SYSTEM ATROPHY (HCC): ICD-10-CM

## 2021-08-02 DIAGNOSIS — G20 PRIMARY PARKINSON'S DISEASE (HCC): ICD-10-CM

## 2021-08-02 PROCEDURE — 70551 MRI BRAIN STEM W/O DYE: CPT

## 2021-08-06 ENCOUNTER — APPOINTMENT (OUTPATIENT)
Dept: RADIOLOGY | Facility: MEDICAL CENTER | Age: 61
End: 2021-08-06
Payer: MEDICARE

## 2021-08-06 ENCOUNTER — OFFICE VISIT (OUTPATIENT)
Dept: OBGYN CLINIC | Facility: MEDICAL CENTER | Age: 61
End: 2021-08-06
Payer: MEDICARE

## 2021-08-06 DIAGNOSIS — M25.562 LEFT KNEE PAIN: ICD-10-CM

## 2021-08-06 DIAGNOSIS — Z01.89 ENCOUNTER FOR LOWER EXTREMITY COMPARISON IMAGING STUDY: ICD-10-CM

## 2021-08-06 DIAGNOSIS — M17.12 PRIMARY OSTEOARTHRITIS OF LEFT KNEE: Primary | ICD-10-CM

## 2021-08-06 PROCEDURE — 99213 OFFICE O/P EST LOW 20 MIN: CPT | Performed by: ORTHOPAEDIC SURGERY

## 2021-08-06 PROCEDURE — 73564 X-RAY EXAM KNEE 4 OR MORE: CPT

## 2021-08-06 PROCEDURE — 73562 X-RAY EXAM OF KNEE 3: CPT

## 2021-08-06 NOTE — PROGRESS NOTES
Orthopaedic Surgery - Office Note  Danna Pina (11 y o  male)   : 1960   MRN: [id number]  Encounter Date: 2021    No chief complaint on file  Assessment / Plan  58 yo male with left knee Osteoarthritis    · Had a long discussion about treatment options for osteoarthritis of the knee which included surgical versus conservative  · Continue to progress activity as tolerated  · WBAT  · Continue with home exercise program,  Can consider outpatient formal therapy going forward  · Continue with Anti-inflammatories or Tylenol prn pain  · We can consider other injections such as viscosupplementation in the future  Return in about 3 weeks (around 2021) for  as scheduled with consideration for repeat steroid injection  History of Present Illness  Danna Pina is a 57 y o  male following up for left knee osteoarthritis  Patient was with the 2021 at which time he received a steroid injection in his left knee which he felt helped greatly for 2 days and then it slowly wore off  Prior to that he did do home exercises for an extended period of time which did give him mild improvement  Pain is the worst with going up stairs and better with rest  He has no new injuries or associated numbness or tingling  He has not been also taking over-the-counter anti-inflammatories without much relief  Review of Systems  Pertinent items are noted in HPI  All other systems were reviewed and are negative  Physical Exam  There were no vitals taken for this visit  Cons: Appears well  No apparent distress  Psych: Alert  Oriented x3  Mood and affect normal   Eyes: PERRLA, EOMI  Resp: Normal effort  No audible wheezing or stridor  CV: Palpable pulse  No discernable arrhythmia  No LE edema  Lymph:  No palpable cervical, axillary, or inguinal lymphadenopathy  Skin: Warm  No palpable masses  No visible lesions  Neuro: Normal muscle tone  Normal and symmetric DTR's       Left Knee Exam  Alignment:  Normal knee alignment  Inspection:  No swelling  No edema  No erythema  No ecchymosis  Palpation:  No tenderness  Severe patellofemoral crepitus  ROM:  Knee Extension 0  Knee Flexion 120  Strength:  5/5 quadriceps and hamstrings  Stability:  No objective knee instability  Stable Varus / Valgus stress, Lachman, and Posterior drawer  Tests:  No pertinent positive or negative tests  Patella:  Patella tracks centrally with crepitus  Neurovascular:  Sensation intact in DP/SP/Bateman/Sa/T nerve distributions  Toes warm and perfused  Gait:  Normal     Studies Reviewed  I have personally reviewed pertinent films in PACS  XR of left knee - From 08/06/2021 shows severe narrowing of the patellofemoral joint space with spurring and subchondral sclerosis  The remaining 2 compartments showed mild arthritic narrowing with mild spurring  No fracture dislocation seen  Procedures  No procedures today  Medical, Surgical, Family, and Social History  The patient's medical history, family history, and social history, were reviewed and updated as appropriate      Past Medical History:   Diagnosis Date    Back pain     Bladder infection     BPH (benign prostatic hyperplasia)     Cancer ([address] )     testicular 1988    Chronic kidney disease     Diverticulosis     GERD (gastroesophageal reflux disease)     occassional    History of testicular cancer 1988    Surgery and radiation; left side    Kidney stone     Kidney stones     Currently and in the past    Orthostatic hypotension     Orthostatic hypotension due to Parkinson's disease ([address] )     Parkinson's disease ([address] )     Sleep apnea     uses CPAP    Wears glasses        Past Surgical History:   Procedure Laterality Date    BLADDER NECK RECONSTRUCTION  07/27/2020    COLONOSCOPY  2017    COLONOSCOPY  09/2020    COLONOSCOPY  11/2020    CYSTOPLASTY / CYSTOURETHROPLASTY  07/27/2020    [facility]  [address] 57, 1987 inguinal hernia repair    IR SUPRAPUBIC CATHETER PLACEMENT  2020    IR TUBE UPSIZE  3/23/2020    KIDNEY STONE SURGERY      LITHOTRIPSY      Renal; Resolved: 07    ORCHIECTOMY Left     CT CYSTOURETHRO W/IMPLANT N/A 2018    Procedure: CYSTOSCOPY WITH INSERTION UROLIFT;  Surgeon: Alisia Felix DO;  [facility];  Service: Urology    PROSTATE SURGERY N/A 2018    Procedure: CYSTOSCOPY WITH INSERTION Isidore Meza;  Surgeon: Alisia Felix DO;  [facility];  Service: Urology    [address]    For cancer    TONSILLECTOMY      URETERONEOCYSTOSTOMY      w/ cystoscopy Ureteral Stent Placement;  Resolved: 2007    WISDOM TOOTH EXTRACTION         Family History   Problem Relation Age of Onset    Colon cancer Father     Heart failure Father     Parkinsonism Mother     Cancer Paternal Grandmother        Social History     Occupational History    Occupation: [employer] retired   Tobacco Use    Smoking status: Former Smoker     Packs/day: 0 50     Years: 3 50     Pack years: 1 75     Types: Cigarettes     Start date:      Quit date:      Years since quittin 6    Smokeless tobacco: Never Used   Vaping Use    Vaping Use: Never used   Substance and Sexual Activity    Alcohol use: Not Currently    Drug use: No    Sexual activity: Not on file       No Known Allergies      Current Outpatient Medications:     carbidopa-levodopa (SINEMET)  mg per tablet, TAKE 2 TABLETS BY MOUTH 3 (THREE) TIMES A DAY START WITH 1/2 TAB THREE TIMES A DAY AND INCREASE AS DIRECTED, Disp: 540 tablet, Rfl: 1    cholecalciferol (VITAMIN D3) 1,000 units tablet, Take 2,000 Units by mouth daily, Disp: , Rfl:     cyanocobalamin (VITAMIN B-12) 100 mcg tablet, Take by mouth daily, Disp: , Rfl:     diclofenac sodium (VOLTAREN) 1 %, Apply 2 g topically 4 (four) times a day (Patient taking differently: Apply 2 g topically as needed ), Disp: 2 Tube, Rfl: 1    famotidine (Pepcid) 20 mg tablet, Take 1 tablet (20 mg total) by mouth daily (Patient taking differently: Take 20 mg by mouth as needed ), Disp: 90 tablet, Rfl: 1    FIBER PO, Take by mouth daily, Disp: , Rfl:     lisinopril (ZESTRIL) 5 mg tablet, Take 1 tablet (5 mg total) by mouth daily, Disp: 30 tablet, Rfl: 1    midodrine (PROAMATINE) 5 mg tablet, Take 1 tablet (5 mg total) by mouth daily Follow clinic instructions on titration dose to max 1 tab TID, as tolerated and to benefit , Disp: 30 tablet, Rfl: 3    multivitamin (THERAGRAN) TABS, Take 1 tablet by mouth daily, Disp: , Rfl:     nystatin-triamcinolone (MYCOLOG-II) cream, Apply topically 2 (two) times a day (Patient taking differently: Apply topically as needed ), Disp: 60 g, Rfl: 1    pantoprazole (PROTONIX) 40 mg tablet, Take 1 tablet (40 mg total) by mouth 2 (two) times a day before meals, Disp: 60 tablet, Rfl: 5    rOPINIRole (REQUIP) 0 5 mg tablet, TAKE 1 TABLET BY MOUTH EVERYDAY AT BEDTIME, Disp: 90 tablet, Rfl: 1    selegiline (ELDEPRYL) 5 mg capsule, , Disp: , Rfl:     tadalafil (CIALIS) 5 MG tablet, Take 5 mg by mouth daily, Disp: , Rfl:     vitamin E 100 UNIT capsule, Take 100 Units by mouth daily, Disp: , Rfl:     Wheat Dextrin (BENEFIBER DRINK MIX PO), , Disp: , Rfl:       Adal Antunez PA-C

## 2021-08-30 ENCOUNTER — TELEPHONE (OUTPATIENT)
Dept: NEUROLOGY | Facility: CLINIC | Age: 61
End: 2021-08-30

## 2021-08-31 ENCOUNTER — OFFICE VISIT (OUTPATIENT)
Dept: OBGYN CLINIC | Facility: MEDICAL CENTER | Age: 61
End: 2021-08-31
Payer: MEDICARE

## 2021-08-31 VITALS
SYSTOLIC BLOOD PRESSURE: 111 MMHG | DIASTOLIC BLOOD PRESSURE: 61 MMHG | HEIGHT: 72 IN | WEIGHT: 232 LBS | BODY MASS INDEX: 31.42 KG/M2 | HEART RATE: 105 BPM

## 2021-08-31 DIAGNOSIS — M17.12 PRIMARY OSTEOARTHRITIS OF LEFT KNEE: Primary | ICD-10-CM

## 2021-08-31 PROCEDURE — 99214 OFFICE O/P EST MOD 30 MIN: CPT | Performed by: ORTHOPAEDIC SURGERY

## 2021-08-31 PROCEDURE — 20610 DRAIN/INJ JOINT/BURSA W/O US: CPT | Performed by: ORTHOPAEDIC SURGERY

## 2021-08-31 RX ORDER — METHYLPREDNISOLONE ACETATE 40 MG/ML
1 INJECTION, SUSPENSION INTRA-ARTICULAR; INTRALESIONAL; INTRAMUSCULAR; SOFT TISSUE
Status: COMPLETED | OUTPATIENT
Start: 2021-08-31 | End: 2021-08-31

## 2021-08-31 RX ORDER — BUPIVACAINE HYDROCHLORIDE 2.5 MG/ML
4 INJECTION, SOLUTION INFILTRATION; PERINEURAL
Status: COMPLETED | OUTPATIENT
Start: 2021-08-31 | End: 2021-08-31

## 2021-08-31 RX ADMIN — BUPIVACAINE HYDROCHLORIDE 4 ML: 2.5 INJECTION, SOLUTION INFILTRATION; PERINEURAL at 10:30

## 2021-08-31 RX ADMIN — METHYLPREDNISOLONE ACETATE 1 ML: 40 INJECTION, SUSPENSION INTRA-ARTICULAR; INTRALESIONAL; INTRAMUSCULAR; SOFT TISSUE at 10:30

## 2021-08-31 NOTE — PROGRESS NOTES
Orthopaedic Surgery - Office Note  Edu Ye (34 y o  male)   : 1960   MRN: [id number]  Encounter Date: 2021    Chief Complaint   Patient presents with    Left Knee - Follow-up       Assessment / Plan  Moderate left knee osteoarthritis, with increased pain and symptoms  · CSI of left knee joint was performed  · Activity as tolerated  · Continue home exercise program  Return if symptoms worsen or fail to improve, or to schedule viscosupplementation  Jovana Barnes History of Present Illness  Edu Ye is a 57 y o  male who presents for follow-up evaluation of left knee osteoarthritis  He states the knee is still painful and has begun to worsen  He states that he does feel like his knee does slightly give out from time to time, but he is able to catch himself  He states that he still compliant with his home exercise program which he completes 2 times a week which is helping  He states that he does have a couple falls since his previous visit, but no new injury to that knee due to his Parkinson's  He would like to receive another cortisone shot injection for pain management at today's visit  He states the surgery still off the table as intervention  He denies any distal paresthesias  Review of Systems  Pertinent items are noted in HPI  All other systems were reviewed and are negative  Physical Exam  /61   Pulse 105   Ht 6' (1 829 m)   Wt 105 kg (232 lb)   BMI 31 46 kg/m²   Cons: Appears well  No apparent distress  Psych: Alert  Oriented x3  Mood and affect normal   Eyes: PERRLA, EOMI  Resp: Normal effort  No audible wheezing or stridor  CV: Palpable pulse  No discernable arrhythmia  No LE edema  Lymph:  No palpable cervical, axillary, or inguinal lymphadenopathy  Skin: Warm  No palpable masses  No visible lesions  Neuro: Normal muscle tone  Normal and symmetric DTR's  Left Knee Exam  Alignment:  Normal knee alignment  Inspection:  No swelling  No edema  No erythema  Palpation:  No tenderness  Moderate effusion  ROM:  Knee Extension 0°  Knee Flexion 120°  Strength:  Able to actively extend knee against gravity  Stability:  No objective knee instability  Stable Varus / Valgus stress, Lachman, and Posterior drawer  Tests:  No pertinent positive or negative tests  Patella:  Patella tracks centrally with crepitus  Neurovascular:  Sensation intact in DP/SP/Bateman/Sa/T nerve distributions  2+ DP & PT pulses  Gait:  Normal     Studies Reviewed  No studies to review    Large joint arthrocentesis: L knee  Universal Protocol:  Consent: Verbal consent obtained  Written consent not obtained  Risks and benefits: risks, benefits and alternatives were discussed  Consent given by: patient  Timeout called at: 8/31/2021 10:25 AM   Patient identity confirmed: verbally with patient    Supporting Documentation  Indications: pain and diagnostic evaluation   Procedure Details  Location: knee - L knee  Preparation: Patient was prepped and draped in the usual sterile fashion  Needle size: 22 G  Ultrasound guidance: no  Approach: lateral  Medications administered: 4 mL bupivacaine 0 25 %; 1 mL methylPREDNISolone acetate 40 mg/mL    Patient tolerance: patient tolerated the procedure well with no immediate complications  Dressing:  Sterile dressing applied            Medical, Surgical, Family, and Social History  The patient's medical history, family history, and social history, were reviewed and updated as appropriate      Past Medical History:   Diagnosis Date    Back pain     Bladder infection     BPH (benign prostatic hyperplasia)     Cancer (HCC)     testicular 1988    Chronic kidney disease     Diverticulosis     GERD (gastroesophageal reflux disease)     occassional    History of testicular cancer 1988    Surgery and radiation; left side    Kidney stone     Kidney stones     Currently and in the past    Orthostatic hypotension     Orthostatic hypotension due to [facility]'s disease ([facility]     Parkinson's disease [facility]     Sleep apnea     uses CPAP    Wears glasses        Past Surgical History:   Procedure Laterality Date    BLADDER NECK RECONSTRUCTION  2020    COLONOSCOPY  2017    COLONOSCOPY  2020    COLONOSCOPY  2020    CYSTOPLASTY / CYSTOURETHROPLASTY  2020    [address] inguinal hernia repair    IR SUPRAPUBIC CATHETER PLACEMENT  2020    IR TUBE UPSIZE  3/23/2020    KIDNEY STONE SURGERY      LITHOTRIPSY      Renal; Resolved: 07    ORCHIECTOMY Left     CT CYSTOURETHRO W/IMPLANT N/A 2018    Procedure: CYSTOSCOPY WITH INSERTION UROLIFT;  Surgeon: Paula Solorio DO;  [facility]: [facility];  Service: Urology    PROSTATE SURGERY N/A 2018    Procedure: CYSTOSCOPY WITH INSERTION Jose Pier;  Surgeon: Paula Solorio DO;  Location[facility];  Service: Urology    [address] [facility] [address]    For cancer    TONSILLECTOMY      URETERONEOCYSTOSTOMY      w/ cystoscopy Ureteral Stent Placement;  Resolved: 2007    WISDOM TOOTH EXTRACTION         Family History   Problem Relation Age of Onset    Colon cancer Father     Heart failure Father     Parkinsonism Mother     Cancer Paternal Grandmother        Social History     Occupational History    Occupation: [employer] division retired   Tobacco Use    Smoking status: Former Smoker     Packs/day: 0 50     Years: 3 50     Pack years: 1 75     Types: Cigarettes     Start date:      Quit date:      Years since quittin 6    Smokeless tobacco: Never Used   Vaping Use    Vaping Use: Never used   Substance and Sexual Activity    Alcohol use: Not Currently    Drug use: No    Sexual activity: Not on file       No Known Allergies      Current Outpatient Medications:     carbidopa-levodopa (SINEMET)  mg per tablet, TAKE 2 TABLETS BY MOUTH 3 (THREE) TIMES A DAY START WITH 1/2 TAB THREE TIMES A DAY AND INCREASE AS DIRECTED, Disp: 540 tablet, Rfl: 1    cholecalciferol (VITAMIN D3) 1,000 units tablet, Take 2,000 Units by mouth daily, Disp: , Rfl:     cyanocobalamin (VITAMIN B-12) 100 mcg tablet, Take by mouth daily, Disp: , Rfl:     diclofenac sodium (VOLTAREN) 1 %, Apply 2 g topically 4 (four) times a day (Patient taking differently: Apply 2 g topically as needed ), Disp: 2 Tube, Rfl: 1    famotidine (Pepcid) 20 mg tablet, Take 1 tablet (20 mg total) by mouth daily (Patient taking differently: Take 20 mg by mouth as needed ), Disp: 90 tablet, Rfl: 1    FIBER PO, Take by mouth daily, Disp: , Rfl:     lisinopril (ZESTRIL) 5 mg tablet, Take 1 tablet (5 mg total) by mouth daily, Disp: 30 tablet, Rfl: 1    midodrine (PROAMATINE) 5 mg tablet, Take 1 tablet (5 mg total) by mouth daily Follow clinic instructions on titration dose to max 1 tab TID, as tolerated and to benefit , Disp: 30 tablet, Rfl: 3    multivitamin (THERAGRAN) TABS, Take 1 tablet by mouth daily, Disp: , Rfl:     nystatin-triamcinolone (MYCOLOG-II) cream, Apply topically 2 (two) times a day (Patient taking differently: Apply topically as needed ), Disp: 60 g, Rfl: 1    pantoprazole (PROTONIX) 40 mg tablet, Take 1 tablet (40 mg total) by mouth 2 (two) times a day before meals, Disp: 60 tablet, Rfl: 5    rOPINIRole (REQUIP) 0 5 mg tablet, TAKE 1 TABLET BY MOUTH EVERYDAY AT BEDTIME, Disp: 90 tablet, Rfl: 1    selegiline (ELDEPRYL) 5 mg capsule, , Disp: , Rfl:     tadalafil (CIALIS) 5 MG tablet, Take 5 mg by mouth daily, Disp: , Rfl:     vitamin E 100 UNIT capsule, Take 100 Units by mouth daily, Disp: , Rfl:     Wheat Dextrin (BENEFIBER DRINK MIX PO), , Disp: , Rfl:       Jolie Itz    Scribe Attestation    I,:  Jolie Mobley am acting as a scribe while in the presence of the attending physician :       I,:  Sylwia Fine MD personally performed the services described in this documentation    as scribed in my presence :

## 2021-09-09 ENCOUNTER — OFFICE VISIT (OUTPATIENT)
Dept: PULMONOLOGY | Facility: CLINIC | Age: 61
End: 2021-09-09
Payer: MEDICARE

## 2021-09-09 VITALS
TEMPERATURE: 97.5 F | SYSTOLIC BLOOD PRESSURE: 136 MMHG | DIASTOLIC BLOOD PRESSURE: 80 MMHG | WEIGHT: 232.2 LBS | HEIGHT: 72 IN | HEART RATE: 87 BPM | OXYGEN SATURATION: 99 % | BODY MASS INDEX: 31.45 KG/M2

## 2021-09-09 DIAGNOSIS — G47.33 OSA (OBSTRUCTIVE SLEEP APNEA): Primary | ICD-10-CM

## 2021-09-09 DIAGNOSIS — G47.31 CENTRAL SLEEP APNEA: ICD-10-CM

## 2021-09-09 DIAGNOSIS — J96.10 CHRONIC RESPIRATORY FAILURE, UNSPECIFIED WHETHER WITH HYPOXIA OR HYPERCAPNIA (HCC): ICD-10-CM

## 2021-09-09 PROCEDURE — 99214 OFFICE O/P EST MOD 30 MIN: CPT | Performed by: INTERNAL MEDICINE

## 2021-09-09 RX ORDER — WHEAT DEXTRIN 3 G/3.8 G
POWDER (GRAM) ORAL
COMMUNITY

## 2021-09-09 RX ORDER — CHOLECALCIFEROL (VITAMIN D3) 125 MCG
5 CAPSULE ORAL
COMMUNITY
End: 2021-12-06

## 2021-09-09 RX ORDER — DALFAMPRIDINE 10 MG/1
TABLET, FILM COATED, EXTENDED RELEASE ORAL 3 TIMES DAILY
COMMUNITY
Start: 2021-08-16

## 2021-09-09 RX ORDER — MIDODRINE HYDROCHLORIDE 5 MG/1
TABLET ORAL
COMMUNITY
Start: 2019-12-15

## 2021-09-09 RX ORDER — POLYETHYLENE GLYCOL 3350 17 G/17G
17 POWDER, FOR SOLUTION ORAL DAILY
COMMUNITY

## 2021-09-09 NOTE — LETTER
September 10, 2021     Samara Watson, [address] [address]    Patient: Edu Ye   YOB: 1960   Date of Visit: 9/9/2021       Dear Dr Jorje Paredes:    Thank you for referring Pratibha Ralph to me for evaluation  Below are my notes for this consultation  If you have questions, please do not hesitate to call me  I look forward to following your patient along with you  Sincerely,        Yetta Risk, DO        CC: No Recipients  Yetta Risk, DO  9/10/2021  3:26 PM  Sign when Signing Visit    Progress note - Pulmonary Medicine   Edu Ye 57 y o  male MRN: [id number]       Impression & Plan:   61 y o  M with PMHx of Multiple System Atrophy, Parkinson's disease with diaphragmatic weakness, chronic knee and ankle pain, urinary retention s/p TURP, Moderate KALLI on CPAP who comes in for follow up  1  Chronic respiratory failure from progressive neurologic weakness with Multiple System Atrophy  He has noted bulbar symptoms with upper airway closing, hoarseness, dysphagia and weak/ hoarse voice  This seems to have since resolved  SNIF test is negative  -  Despite BIPAP he is manifesting central respiratory events  As a result we will switch to AVAPS  Based on his ideal body weight his TV will be set at 500 cc  -  Follow compliance in 2 -3 months        2  Moderate KALLI (AHI - 15 7) - on auto BiPAP   He is compliant and doing well from a respiratory standpoint  Residual AHI - 6  8        -  Due to central events, I will reduced the pressure to 12/8    Follow data in 1-2 months         -  He is aware of the risk of leaving sleep apnea untreated including hypertension, heart failure, arrhythmia, MI and stroke       -  He is aware of the issues with the recall, however, due to respiratory failure, he prefers to continue therapy despite the risk     3  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 -  He still has occurred on occasion but is not as severe when therapy is working  He did not have much benefit with melaton in the past        4    Periodic limb movement (PLM index - 109) -  This may be secondary to KALLI or Parkinsons   He denies symptoms of this          - Continue Requip at 0 5mg qHS dosing          -  He did not find neurontin helpful   ______________________________________________________________________    HPI:    Jean Duckworth presents today for follow-up for his respiratory failure  He states that he has been having difficulty with his breathing both during the day and at night  He is taking short and shallow breathings  He is tachypneic  He feels his symptoms are getting worse  He feels like he has less pitch and tone to his voice and he still notes trouble swallowing and speaking at times  He does sometimes note his airway feels like it is closing  Review of Systems:  Review of Systems   Constitutional: Negative for appetite change and fever  HENT: Positive for trouble swallowing  Negative for ear pain, postnasal drip, rhinorrhea, sneezing and sore throat  Eyes: Negative  Respiratory: Positive for apnea and shortness of breath  Cardiovascular: Negative for chest pain  Endocrine: Negative  Genitourinary: Negative  Musculoskeletal: Negative for myalgias  Skin: Negative  Allergic/Immunologic: Negative  Neurological: Negative for headaches  Hematological: Negative  Psychiatric/Behavioral: Negative            Social history updates:  Social History     Tobacco Use   Smoking Status Former Smoker    Packs/day: 0 50    Years: 3 50    Pack years: 1 75    Types: Cigarettes    Start date:     Quit date: 35    Years since quittin 7   Smokeless Tobacco Never Used     Social History     Socioeconomic History    Marital status: /Civil Union     Spouse name: Not on file    Number of children: Not on file    Years of education: Not on file    Highest education level: Not on file General: Pleasant, Awake alert and oriented x 3, conversant without conversational dyspnea, NAD, normal affect  HEENT:  PERRL, Sclera noninjected, nonicteric OU, Nares patent,  no craniofacial abnormalities, Mucous membranes, moist, no oral lesions, normal dentition, Mallampati class 3  NECK: Trachea midline, no accessory muscle use, no stridor, no cervical or supraclavicular adenopathy, JVP not elevated  CARDIAC: Reg, single s1/S2, no m/r/g  PULM: CTA bilaterally no wheezing, rhonchi or rales  ABD: Normoactive bowel sounds, soft nontender, nondistended, no rebound, no rigidity, no guarding  EXT: No cyanosis, no clubbing, no edema, normal capillary refill  NEURO: no focal neurologic deficits, AAOx3, moving all extremities appropriately      Diagnostic Data:  Labs: I personally reviewed the most recent laboratory data pertinent to today's visit  I have personally reviewed pertinent lab results  Lab Results   Component Value Date    WBC 8 75 08/18/2020    HGB 14 4 08/18/2020    HCT 45 2 08/18/2020    MCV 92 08/18/2020     08/18/2020     Lab Results   Component Value Date    CALCIUM 10 0 01/23/2021    K 4 5 01/23/2021    CO2 30 01/23/2021     01/23/2021    BUN 21 01/23/2021    CREATININE 1 24 01/23/2021     No results found for: IGE  Lab Results   Component Value Date    ALT 20 08/18/2020    AST 12 08/18/2020    ALKPHOS 60 08/18/2020       ABG    10/26/20 10:11 AM 5/20/19 8:55 AM     pH, Arterial 7 350 - 7 450 7 402  7  394    pCO2, Arterial 36 0 - 44 0 mm Hg 38 6  40 1    pO2, Arterial 75 0 - 129 0 mm Hg 76 9  71 9Low     HCO3, Arterial 22 0 - 28 0 mmol/L 23 5  23 9    Base Excess, Arterial mmol/L -1 0  -0 8    O2 Content, Arterial 16 0 - 23 0 mL/dL 20 1  20 1    O2 HGB,Arterial  94 0 - 97 0 % 94 7  94 1          The most recent pulmonary function tests were reviewed    PFT 10/26/20  Results:  FEV1/FVC Ratio: 77 %  Forced Vital Capacity: 4 88 L    99 % predicted  FEV1: 3 75 L     99 % predicted     Lung volumes by body plethysmography:   Total Lung Capacity 99 % predicted   Residual volume 96 % predicted     DLCO corrected for patients hemoglobin level: 83 %  Interpretation:  · No obstructive airflow defect on spirometry  · Normal Spirometry  · Normal Lung volumes  · Normal diffusion capacity  · Flow volume loop is normal      3/4/19  Results:  FEV1/FVC Ratio: 80 %  Forced Vital Capacity: 4 80 L    93 % predicted  FEV1: 3 85 L     97 % predicted  Lung volumes by body plethysmography:   Total Lung Capacity 94 % predicted   Residual volume 88 % predicted  DLCO corrected for patients hemoglobin level: 79 %  Interpretation:  · No obstructive airflow defect   · Normal Spirometry  · Normal Lung volumes  · Normal diffusion capacity     Repeat 6 minute walk 20  Starting saturation - 98%   Starting HR - 89  Lowest saturation - 91%    Highest HR - 103  Ambulated - 252 m and he did not require oxygen     6 minute walk  Date of testin2019  Resting room air saturation: 93%  Randy scale of dyspnea at start of test: 0/10     Ambulation testing:  Lowest saturation 93%  No supplemental oxygen required     Randy scale of dyspnea at end of test: 3/10  Reason if test was stopped early: N/A      Total 6 minute walk distance: 1400 feet     Imaging:  I personally reviewed the images on the [facility] system pertinent to today's visit  CT chest - 19  IMPRESSION:  Within normal limits CT of the chest      Repeat SNF test 10/26/20  IMPRESSION:  There is no evidence of diaphragmatic paralysis or elevation         Other studies:  HST - moderate (15 7), Supine AHI - 23, hypoxia   CPAP titration - Nasal CPAP was titrated from 5-11 cm of water for  control of snoring and abnormal breathing  Patient appeared to do best at 11 cm of CPAP delivered using a [employer] 45 full face interface   Continued use of this equipment at these settings is recommended      New Compliance Data:  3/12/21-21   Katelin Law of CPAP:  BiPAP 15/10                                  Percent usage: 100%, 89 %> 4hrs                                   Average time used: 5hrs 36 mins - 8 hrs 18 mins                                  Time in large leak: 10 min 15 secs                                   Residual AHI: 6 8       Compliance Data:  3/12/21-5/12/21                                   Type of CPAP:  BiPAP 15/10                                  Percent usage: 100%, 89 %> 4hrs                                   Average time used: 5hrs 36 mins - 8 hrs 18 mins                                  Time in large leak: 10 min 15 secs                                   Residual AHI: 6 8      Compliance Data:  1/2/21-2/10/21                                   Type of CPAP:  auto BiPAP min EPAP 11, PSV 4-6, Max IPAP 25,                                    Mean EPAP - 12- 14 8, IPAP 17 - Max IPAP 18 8                                   Percent usage: 95%, 85 %> 4hrs                                   Average time used: 5hrs 26 mins - 7 hrs 40 mins                                  Time in large leak: 6 min 22 secs                                   Residual AHI: 4 8 (CA - 3 0)     Compliance Data:  10/24/20-11/22/20                                   Type of CPAP:  auto BiPAP min EPAP 11, PSV 4-6, Max IPAP 25,                                    Mean EPAP - 11 7- 16 9, IPAP 16 2 - Max IPAP 22 9                                   Percent usage: 63%, 30%> 4hrs                                   Average time used: 2hrs 40 mins - 6 hrs 45 mins                                  Time in large leak: 16 min 19 secs                                   Residual AHI: 5 6 (CA - 3 1)     Compliance Data:  7/28/20-8/26/20                                   Type of CPAP:  auto BiPAP min EPAP 11, PSV 4, Max IPAP 25,                                    Mean EPAP - 12 2- 17 6, IPAP 16 2 - Max IPAP 22                                   Percent usage: 97%, 90%> 4hrs                                   Average time used: 5hrs 30 mins - 8 hrs 25 mins                                  Time in large leak: 4 min 39 secs                                   Residual AHI: 7 0 (CA - 5 1)     Compliance Data:  1/5/20-2/5/20                                   Type of CPAP:  auto BiPAP min EPAP 11, PSV 4, Max IPAP 25,                                    Mean EPAP recorded - 12 1, Peak - 14 9, Min IPAP 16, Max IPAP 19                                   Percent usage: 72%, 63%> 4hrs                                   Average time used: 3hrs 51 mins - 8 hrs 26 mins                                  Time in large leak: 50 secs                                   Residual AHI: 6 6  (CA - 3 9)     Compliance Data:  10/23/19-11/21/19                                   Type of CPAP:  autoPAP 11-15, Mean - 13 2, Peak - 15 0                                   Percent usage: 73%, 67%> 4hrs                                   Average time used: 3hrs 38 mins - 8 hrs 37 mins                                  Time in large leak: 9 mins 46 secs                                   Residual AHI: 11 0  (CA - 0 9)     Compliance Data:  8/25/19-9/23/19                                   Type of CPAP:  autoPAP 8-15, Mean - 11 9, Peak - 15 6                                   Percent usage: 63%                                   Average time used: 2hrs 52 mins - 4 hrs 32 mins                                  Time in large leak: 4 mins 44 secs                                   Residual AHI: 13 6  (CA - 0 9)            Jeffery Mendoza, DO    Answers for HPI/ROS submitted by the patient on 9/9/2021  Chronicity: chronic  When did you first notice your symptoms?: more than 1 year ago  How often do your symptoms occur?: daily  Since you first noticed this problem, how has it changed?: gradually worsening  Do you have shortness of breath that occurs with effort or exertion?: Yes  Do you have ear congestion?: No  Do you have heartburn?: No  Do you have fatigue?: Yes  Do you have nasal congestion?: No  Do you have shortness of breath when lying flat?: No  Do you have shortness of breath when you wake up?: No  Do you have sweats?: No  Have you experienced weight loss?: No  Which of the following makes your symptoms worse?: change in weather, exercise, strenuous activity  Which of the following makes your symptoms better?: cold air, rest

## 2021-09-10 NOTE — PROGRESS NOTES
Progress note - Pulmonary Medicine   Danna Pina 57 y o  male MRN: [id number]       Impression & Plan:   61 y o  M with PMHx of Multiple System Atrophy, Parkinson's disease with diaphragmatic weakness, chronic knee and ankle pain, urinary retention s/p TURP, Moderate KALLI on CPAP who comes in for follow up  1  Chronic respiratory failure from progressive neurologic weakness with Multiple System Atrophy  He has noted bulbar symptoms with upper airway closing, hoarseness, dysphagia and weak/ hoarse voice  This seems to have since resolved  SNIF test is negative  -  Despite BIPAP he is manifesting central respiratory events  As a result we will switch to AVAPS  Based on his ideal body weight his TV will be set at 500 cc  -  Follow compliance in 2 -3 months        2  Moderate KALLI (AHI - 15 7) - on auto BiPAP   He is compliant and doing well from a respiratory standpoint  Residual AHI - 6  8        -  Due to central events, I will reduced the pressure to 12/8  Follow data in 1-2 months         -  He is aware of the risk of leaving sleep apnea untreated including hypertension, heart failure, arrhythmia, MI and stroke       -  He is aware of the issues with the recall, however, due to respiratory failure, he prefers to continue therapy despite the risk     3  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 -  He still has occurred on occasion but is not as severe when therapy is working  He did not have much benefit with melaton in the past        4    Periodic limb movement (PLM index - 109) -  This may be secondary to KALLI or Parkinsons   He denies symptoms of this          - Continue Requip at 0 5mg qHS dosing          -  He did not find neurontin helpful   ______________________________________________________________________    HPI:    Danna Pina presents today for follow-up for his respiratory failure  He states that he has been having difficulty with his breathing both during the day and at night    He is taking short and shallow breathings  He is tachypneic  He feels his symptoms are getting worse  He feels like he has less pitch and tone to his voice and he still notes trouble swallowing and speaking at times  He does sometimes note his airway feels like it is closing  Review of Systems:  Review of Systems   Constitutional: Negative for appetite change and fever  HENT: Positive for trouble swallowing  Negative for ear pain, postnasal drip, rhinorrhea, sneezing and sore throat  Eyes: Negative  Respiratory: Positive for apnea and shortness of breath  Cardiovascular: Negative for chest pain  Endocrine: Negative  Genitourinary: Negative  Musculoskeletal: Negative for myalgias  Skin: Negative  Allergic/Immunologic: Negative  Neurological: Negative for headaches  Hematological: Negative  Psychiatric/Behavioral: Negative            Social history updates:  Social History     Tobacco Use   Smoking Status Former Smoker    Packs/day: 0 50    Years: 3 50    Pack years: 1 75    Types: Cigarettes    Start date:     Quit date: 36    Years since quittin 7   Smokeless Tobacco Never Used     Social History     Socioeconomic History    Marital status: /Civil Union     Spouse name: Not on file    Number of children: Not on file    Years of education: Not on file    Highest education level: Not on file   Occupational History    Occupation: [employer] retired   Tobacco Use    Smoking status: Former Smoker     Packs/day: 0 50     Years: 3 50     Pack years: 1 75     Types: Cigarettes     Start date:      Quit date:      Years since quittin 7    Smokeless tobacco: Never Used   Vaping Use    Vaping Use: Never used   Substance and Sexual Activity    Alcohol use: Not Currently    Drug use: No    Sexual activity: Not on file   Other Topics Concern    Not on file   Social History Narrative    Consumes 5 glasses of tea per week Social Determinants of Health     Financial Resource Strain: Low Risk     Difficulty of Paying Living Expenses: Not hard at all   Food Insecurity: No Food Insecurity    Worried About Running Out of Food in the Last Year: Never true    Chilo of Food in the Last Year: Never true   Transportation Needs: No Transportation Needs    Lack of Transportation (Medical): No    Lack of Transportation (Non-Medical): No   Physical Activity: Inactive    Days of Exercise per Week: 0 days    Minutes of Exercise per Session: 0 min   Stress: No Stress Concern Present    Feeling of Stress : Not at all   Social Connections:  Moderately Integrated    Frequency of Communication with Friends and Family: More than three times a week    Frequency of Social Gatherings with Friends and Family: More than three times a week    Attends Nondenominational Services: 1 to 4 times per year    Active Member of [employer] or Organizations: No    Attends Club or Organization Meetings: Never    Marital Status:    Intimate Partner Violence: Not At Risk    Fear of Current or Ex-Partner: No    Emotionally Abused: No    Physically Abused: No    Sexually Abused: No       PhysicalExamination:  Vitals:   /80 (BP Location: Left arm, Patient Position: Sitting)   Pulse 87   Temp 97 5 °F (36 4 °C) (Temporal)   Ht 6' (1 829 m)   Wt 105 kg (232 lb 3 2 oz)   SpO2 99%   BMI 31 49 kg/m²   General: Pleasant, Awake alert and oriented x 3, conversant without conversational dyspnea, NAD, normal affect  HEENT:  PERRL, Sclera noninjected, nonicteric OU, Nares patent,  no craniofacial abnormalities, Mucous membranes, moist, no oral lesions, normal dentition, Mallampati class 3  NECK: Trachea midline, no accessory muscle use, no stridor, no cervical or supraclavicular adenopathy, JVP not elevated  CARDIAC: Reg, single s1/S2, no m/r/g  PULM: CTA bilaterally no wheezing, rhonchi or rales  ABD: Normoactive bowel sounds, soft nontender, nondistended, no rebound, no rigidity, no guarding  EXT: No cyanosis, no clubbing, no edema, normal capillary refill  NEURO: no focal neurologic deficits, AAOx3, moving all extremities appropriately      Diagnostic Data:  Labs: I personally reviewed the most recent laboratory data pertinent to today's visit  I have personally reviewed pertinent lab results  Lab Results   Component Value Date    WBC 8 75 08/18/2020    HGB 14 4 08/18/2020    HCT 45 2 08/18/2020    MCV 92 08/18/2020     08/18/2020     Lab Results   Component Value Date    CALCIUM 10 0 01/23/2021    K 4 5 01/23/2021    CO2 30 01/23/2021     01/23/2021    BUN 21 01/23/2021    CREATININE 1 24 01/23/2021     No results found for: IGE  Lab Results   Component Value Date    ALT 20 08/18/2020    AST 12 08/18/2020    ALKPHOS 60 08/18/2020       ABG    10/26/20 10:11 AM 5/20/19 8:55 AM     pH, Arterial 7 350 - 7 450 7 402  7  394    pCO2, Arterial 36 0 - 44 0 mm Hg 38 6  40 1    pO2, Arterial 75 0 - 129 0 mm Hg 76 9  71 9Low     HCO3, Arterial 22 0 - 28 0 mmol/L 23 5  23 9    Base Excess, Arterial mmol/L -1 0  -0 8    O2 Content, Arterial 16 0 - 23 0 mL/dL 20 1  20 1    O2 HGB,Arterial  94 0 - 97 0 % 94 7  94 1          The most recent pulmonary function tests were reviewed    PFT 10/26/20  Results:  FEV1/FVC Ratio: 77 %  Forced Vital Capacity: 4 88 L    99 % predicted  FEV1: 3 75 L     99 % predicted     Lung volumes by body plethysmography:   Total Lung Capacity 99 % predicted   Residual volume 96 % predicted     DLCO corrected for patients hemoglobin level: 83 %  Interpretation:  · No obstructive airflow defect on spirometry  · Normal Spirometry  · Normal Lung volumes  · Normal diffusion capacity  · Flow volume loop is normal      3/4/19  Results:  FEV1/FVC Ratio: 80 %  Forced Vital Capacity: 4 80 L    93 % predicted  FEV1: 3 85 L     97 % predicted  Lung volumes by body plethysmography:   Total Lung Capacity 94 % predicted   Residual volume 88 % predicted  DLCO corrected for patients hemoglobin level: 79 %  Interpretation:  · No obstructive airflow defect   · Normal Spirometry  · Normal Lung volumes  · Normal diffusion capacity     Repeat 6 minute walk 20  Starting saturation - 98%   Starting HR - 89  Lowest saturation - 91%    Highest HR - 103  Ambulated - 252 m and he did not require oxygen     6 minute walk  Date of testin2019  Resting room air saturation: 93%  Randy scale of dyspnea at start of test: 0/10     Ambulation testing:  Lowest saturation 93%  No supplemental oxygen required     Randy scale of dyspnea at end of test: 3/10  Reason if test was stopped early: N/A      Total 6 minute walk distance: 1400 feet     Imaging:  I personally reviewed the images on the [facility] system pertinent to today's visit  CT chest - 19  IMPRESSION:  Within normal limits CT of the chest      Repeat SNF test 10/26/20  IMPRESSION:  There is no evidence of diaphragmatic paralysis or elevation         Other studies:  HST - moderate (15 7), Supine AHI - 23, hypoxia   CPAP titration - Nasal CPAP was titrated from 5-11 cm of water for  control of snoring and abnormal breathing  Patient appeared to do best at 11 cm of CPAP delivered using a [employer] 45 full face interface   Continued use of this equipment at these settings is recommended      New Compliance Data:  3/12/21-21                                   Type of CPAP:  BiPAP 15/10                                  Percent usage: 100%, 89 %> 4hrs                                   Average time used: 5hrs 36 mins - 8 hrs 18 mins                                  Time in large leak: 10 min 15 secs                                   Residual AHI: 6 8       Compliance Data:  3/12/21-21                                   Type of CPAP:  BiPAP 15/10                                  Percent usage: 100%, 89 %> 4hrs                                   Average time used: 5hrs 36 mins - 8 hrs 18 mins                                  Time in large leak: 10 min 15 secs                                   Residual AHI: 6 8      Compliance Data:  1/2/21-2/10/21                                   Type of CPAP:  auto BiPAP min EPAP 11, PSV 4-6, Max IPAP 25,                                    Mean EPAP - 12- 14 8, IPAP 17 - Max IPAP 18 8                                   Percent usage: 95%, 85 %> 4hrs                                   Average time used: 5hrs 26 mins - 7 hrs 40 mins                                  Time in large leak: 6 min 22 secs                                   Residual AHI: 4 8 (CA - 3 0)     Compliance Data:  10/24/20-11/22/20                                   Type of CPAP:  auto BiPAP min EPAP 11, PSV 4-6, Max IPAP 25,                                    Mean EPAP - 11 7- 16 9, IPAP 16 2 - Max IPAP 22 9                                   Percent usage: 63%, 30%> 4hrs                                   Average time used: 2hrs 40 mins - 6 hrs 45 mins                                  Time in large leak: 16 min 19 secs                                   Residual AHI: 5 6 (CA - 3 1)     Compliance Data:  7/28/20-8/26/20                                   Type of CPAP:  auto BiPAP min EPAP 11, PSV 4, Max IPAP 25,                                    Mean EPAP - 12 2- 17 6, IPAP 16 2 - Max IPAP 22                                   Percent usage: 97%, 90%> 4hrs                                   Average time used: 5hrs 30 mins - 8 hrs 25 mins                                  Time in large leak: 4 min 39 secs                                   Residual AHI: 7 0 (CA - 5 1)     Compliance Data:  1/5/20-2/5/20                                   Type of CPAP:  auto BiPAP min EPAP 11, PSV 4, Max IPAP 25,                                    Mean EPAP recorded - 12 1, Peak - 14 9, Min IPAP 16, Max IPAP 19                                   Percent usage: 72%, 63%> 4hrs                                   Average time used: 3hrs 51 mins - 8 hrs 26 mins                                  Time in large leak: 50 secs                                   Residual AHI: 6 6  (CA - 3 9)     Compliance Data:  10/23/19-11/21/19                                   Type of CPAP:  autoPAP 11-15, Mean - 13 2, Peak - 15 0                                   Percent usage: 73%, 67%> 4hrs                                   Average time used: 3hrs 38 mins - 8 hrs 37 mins                                  Time in large leak: 9 mins 46 secs                                   Residual AHI: 11 0  (CA - 0 9)     Compliance Data:  8/25/19-9/23/19                                   Type of CPAP:  autoPAP 8-15, Mean - 11 9, Peak - 15 6                                   Percent usage: 63%                                   Average time used: 2hrs 52 mins - 4 hrs 32 mins                                  Time in large leak: 4 mins 44 secs                                   Residual AHI: 13 6  (CA - 0 9)            Edu Dunn, DO    Answers for HPI/ROS submitted by the patient on 9/9/2021  Chronicity: chronic  When did you first notice your symptoms?: more than 1 year ago  How often do your symptoms occur?: daily  Since you first noticed this problem, how has it changed?: gradually worsening  Do you have shortness of breath that occurs with effort or exertion?: Yes  Do you have ear congestion?: No  Do you have heartburn?: No  Do you have fatigue?: Yes  Do you have nasal congestion?: No  Do you have shortness of breath when lying flat?: No  Do you have shortness of breath when you wake up?: No  Do you have sweats?: No  Have you experienced weight loss?: No  Which of the following makes your symptoms worse?: change in weather, exercise, strenuous activity  Which of the following makes your symptoms better?: cold air, rest

## 2021-09-17 ENCOUNTER — TELEPHONE (OUTPATIENT)
Dept: OBGYN CLINIC | Facility: HOSPITAL | Age: 61
End: 2021-09-17

## 2021-09-17 NOTE — TELEPHONE ENCOUNTER
Please let the patient know the voltaren gel was sent  Also confirmthe visco is for his L knee and please start the auth process

## 2021-09-17 NOTE — TELEPHONE ENCOUNTER
Dr Bety Stringer    Patient would like the process for the gel injections to be started       Patient also needs a refill of the Diclofenac Gel, preferred pharmacy is [facility]     diclofenac sodium (VOLTAREN) 1 % [[id number]]     Order Details  Dose: 2 g Route: Topical Frequency: 4 times daily          # [phone]

## 2021-09-21 NOTE — TELEPHONE ENCOUNTER
I called patient and relayed message  I also entered the order for L knee visco injection  I did make patient aware that we will be giving him a call once its approved to schedule appt

## 2021-09-23 NOTE — PROGRESS NOTES
Instructed patient on use of Chlorhexidine for preoperative bathing per hospital protocol Bactrim Counseling:  I discussed with the patient the risks of sulfa antibiotics including but not limited to GI upset, allergic reaction, drug rash, diarrhea, dizziness, photosensitivity, and yeast infections.  Rarely, more serious reactions can occur including but not limited to aplastic anemia, agranulocytosis, methemoglobinemia, blood dyscrasias, liver or kidney failure, lung infiltrates or desquamative/blistering drug rashes.

## 2021-09-27 ENCOUNTER — TELEPHONE (OUTPATIENT)
Dept: PULMONOLOGY | Facility: CLINIC | Age: 61
End: 2021-09-27

## 2021-09-27 NOTE — TELEPHONE ENCOUNTER
----- Message from Ivonne Berman DO sent at 9/24/2021  2:44 PM EDT -----  Regarding: FW: Visit Follow-Up Question  Contact: [phone]  Can you get me compliance data for him?    ----- Message -----  From: Bruno Sanchez RN  Sent: 9/24/2021   1:44 PM EDT  To: Ivonne Berman DO  Subject: FW: Visit Follow-Up Question                       ----- Message -----  From: Korina Bhatt  Sent: 9/24/2021   1:30 PM EDT  To: [facility] [place] [facility]  Subject: Visit Karina Latif,  After my last visit on Sept 9th  you made adjustments to my BiPAP parameters to reduce my feeling bloted after waking  I'm happy to report that since the changes were made that problem has all but disappeared; I experienced bloting only once in the past two weeks  Hopefully my sleep interruptions haven't increased      Thank you,  Edda Lombardi

## 2021-10-01 ENCOUNTER — TELEPHONE (OUTPATIENT)
Dept: NEPHROLOGY | Facility: CLINIC | Age: 61
End: 2021-10-01

## 2021-10-12 ENCOUNTER — PROCEDURE VISIT (OUTPATIENT)
Dept: OBGYN CLINIC | Facility: MEDICAL CENTER | Age: 61
End: 2021-10-12
Payer: MEDICARE

## 2021-10-12 VITALS
BODY MASS INDEX: 32.07 KG/M2 | HEIGHT: 72 IN | DIASTOLIC BLOOD PRESSURE: 84 MMHG | WEIGHT: 236.8 LBS | SYSTOLIC BLOOD PRESSURE: 148 MMHG | HEART RATE: 90 BPM

## 2021-10-12 DIAGNOSIS — M17.12 PRIMARY OSTEOARTHRITIS OF LEFT KNEE: Primary | ICD-10-CM

## 2021-10-12 PROCEDURE — 20610 DRAIN/INJ JOINT/BURSA W/O US: CPT | Performed by: PHYSICIAN ASSISTANT

## 2021-10-12 PROCEDURE — 99213 OFFICE O/P EST LOW 20 MIN: CPT | Performed by: PHYSICIAN ASSISTANT

## 2021-10-12 RX ORDER — LIDOCAINE HYDROCHLORIDE 10 MG/ML
5 INJECTION, SOLUTION INFILTRATION; PERINEURAL
Status: COMPLETED | OUTPATIENT
Start: 2021-10-12 | End: 2021-10-12

## 2021-10-12 RX ADMIN — LIDOCAINE HYDROCHLORIDE 5 ML: 10 INJECTION, SOLUTION INFILTRATION; PERINEURAL at 11:27

## 2021-10-18 ENCOUNTER — OFFICE VISIT (OUTPATIENT)
Dept: PULMONOLOGY | Facility: CLINIC | Age: 61
End: 2021-10-18
Payer: MEDICARE

## 2021-10-18 VITALS
HEIGHT: 72 IN | OXYGEN SATURATION: 96 % | BODY MASS INDEX: 31.97 KG/M2 | TEMPERATURE: 97.1 F | SYSTOLIC BLOOD PRESSURE: 160 MMHG | DIASTOLIC BLOOD PRESSURE: 90 MMHG | HEART RATE: 74 BPM | WEIGHT: 236 LBS

## 2021-10-18 DIAGNOSIS — G70.9 NEUROMUSCULAR DISEASE (HCC): ICD-10-CM

## 2021-10-18 DIAGNOSIS — G25.81 RESTLESS LEG SYNDROME: ICD-10-CM

## 2021-10-18 DIAGNOSIS — G47.33 OSA (OBSTRUCTIVE SLEEP APNEA): Primary | ICD-10-CM

## 2021-10-18 DIAGNOSIS — G47.61 PERIODIC LIMB MOVEMENT DISORDER (PLMD): ICD-10-CM

## 2021-10-18 PROCEDURE — 99215 OFFICE O/P EST HI 40 MIN: CPT | Performed by: INTERNAL MEDICINE

## 2021-10-18 RX ORDER — UREA 10 %
LOTION (ML) TOPICAL
COMMUNITY

## 2021-12-06 ENCOUNTER — OFFICE VISIT (OUTPATIENT)
Dept: FAMILY MEDICINE CLINIC | Facility: CLINIC | Age: 61
End: 2021-12-06
Payer: MEDICARE

## 2021-12-06 VITALS
WEIGHT: 241 LBS | HEIGHT: 72 IN | OXYGEN SATURATION: 98 % | DIASTOLIC BLOOD PRESSURE: 94 MMHG | HEART RATE: 68 BPM | BODY MASS INDEX: 32.64 KG/M2 | TEMPERATURE: 97.6 F | SYSTOLIC BLOOD PRESSURE: 150 MMHG

## 2021-12-06 DIAGNOSIS — Z23 NEED FOR IMMUNIZATION AGAINST INFLUENZA: Primary | ICD-10-CM

## 2021-12-06 DIAGNOSIS — G90.3 NEUROGENIC ORTHOSTATIC HYPOTENSION (HCC): ICD-10-CM

## 2021-12-06 DIAGNOSIS — G47.33 OSA (OBSTRUCTIVE SLEEP APNEA): ICD-10-CM

## 2021-12-06 DIAGNOSIS — B36.0 TINEA VERSICOLOR: ICD-10-CM

## 2021-12-06 DIAGNOSIS — M70.21 OLECRANON BURSITIS OF RIGHT ELBOW: ICD-10-CM

## 2021-12-06 DIAGNOSIS — G20 PARKINSON'S DISEASE (HCC): ICD-10-CM

## 2021-12-06 PROCEDURE — 90682 RIV4 VACC RECOMBINANT DNA IM: CPT

## 2021-12-06 PROCEDURE — 99214 OFFICE O/P EST MOD 30 MIN: CPT | Performed by: FAMILY MEDICINE

## 2021-12-06 PROCEDURE — G0008 ADMIN INFLUENZA VIRUS VAC: HCPCS

## 2021-12-16 ENCOUNTER — TELEPHONE (OUTPATIENT)
Dept: NEPHROLOGY | Facility: CLINIC | Age: 61
End: 2021-12-16

## 2021-12-16 DIAGNOSIS — N20.0 NEPHROLITHIASIS: Primary | ICD-10-CM

## 2021-12-16 DIAGNOSIS — N18.2 CKD (CHRONIC KIDNEY DISEASE) STAGE 2, GFR 60-89 ML/MIN: ICD-10-CM

## 2021-12-16 DIAGNOSIS — R80.8 OTHER PROTEINURIA: ICD-10-CM

## 2021-12-23 ENCOUNTER — TELEPHONE (OUTPATIENT)
Dept: NEPHROLOGY | Facility: CLINIC | Age: 61
End: 2021-12-23

## 2021-12-23 ENCOUNTER — TELEMEDICINE (OUTPATIENT)
Dept: NEPHROLOGY | Facility: CLINIC | Age: 61
End: 2021-12-23
Payer: MEDICARE

## 2021-12-23 VITALS — DIASTOLIC BLOOD PRESSURE: 70 MMHG | SYSTOLIC BLOOD PRESSURE: 113 MMHG | HEART RATE: 78 BPM | RESPIRATION RATE: 12 BRPM

## 2021-12-23 DIAGNOSIS — R33.9 URINARY RETENTION: ICD-10-CM

## 2021-12-23 DIAGNOSIS — N18.2 CKD (CHRONIC KIDNEY DISEASE) STAGE 2, GFR 60-89 ML/MIN: Primary | ICD-10-CM

## 2021-12-23 DIAGNOSIS — N20.0 NEPHROLITHIASIS: ICD-10-CM

## 2021-12-23 DIAGNOSIS — R03.0 ELEVATED BLOOD PRESSURE READING: ICD-10-CM

## 2021-12-23 PROCEDURE — 99214 OFFICE O/P EST MOD 30 MIN: CPT | Performed by: INTERNAL MEDICINE

## 2022-02-15 ENCOUNTER — OFFICE VISIT (OUTPATIENT)
Dept: OBGYN CLINIC | Facility: MEDICAL CENTER | Age: 62
End: 2022-02-15
Payer: MEDICARE

## 2022-02-15 VITALS
HEIGHT: 72 IN | DIASTOLIC BLOOD PRESSURE: 100 MMHG | HEART RATE: 87 BPM | WEIGHT: 241 LBS | BODY MASS INDEX: 32.64 KG/M2 | SYSTOLIC BLOOD PRESSURE: 186 MMHG

## 2022-02-15 DIAGNOSIS — M17.12 PRIMARY OSTEOARTHRITIS OF LEFT KNEE: Primary | ICD-10-CM

## 2022-02-15 PROCEDURE — 99213 OFFICE O/P EST LOW 20 MIN: CPT | Performed by: PHYSICIAN ASSISTANT

## 2022-02-15 PROCEDURE — 20610 DRAIN/INJ JOINT/BURSA W/O US: CPT | Performed by: PHYSICIAN ASSISTANT

## 2022-02-15 RX ORDER — TRIAMCINOLONE ACETONIDE 40 MG/ML
40 INJECTION, SUSPENSION INTRA-ARTICULAR; INTRAMUSCULAR
Status: COMPLETED | OUTPATIENT
Start: 2022-02-15 | End: 2022-02-15

## 2022-02-15 RX ORDER — BUPIVACAINE HYDROCHLORIDE 5 MG/ML
6 INJECTION, SOLUTION EPIDURAL; INTRACAUDAL
Status: COMPLETED | OUTPATIENT
Start: 2022-02-15 | End: 2022-02-15

## 2022-02-15 RX ADMIN — BUPIVACAINE HYDROCHLORIDE 6 ML: 5 INJECTION, SOLUTION EPIDURAL; INTRACAUDAL at 16:30

## 2022-02-15 RX ADMIN — TRIAMCINOLONE ACETONIDE 40 MG: 40 INJECTION, SUSPENSION INTRA-ARTICULAR; INTRAMUSCULAR at 16:30

## 2022-02-15 NOTE — PROGRESS NOTES
Orthopaedic Surgery - Office Note  Stephany Dugan (23 y o  male)   : 1960   MRN: [id number]  Encounter Date: 2/15/2022    Chief Complaint   Patient presents with    Left Knee - Follow-up       Assessment / Plan  56 yo male with left knee Osteoarthritis    · After discussion of risk and benefits the patient did agree to proceed with a steroid injection of left knee this was performed and prepared sterilely and tolerated well  · Continue to progress activity as tolerated  · WBAT  · Continue with home exercise program,  Can consider outpatient formal therapy going forward  · Continue with Anti-inflammatories or Tylenol prn pain  · We did briefly discuss other treatment options for arthritis in the knee going forward he will contact us based on his progress  Return if symptoms worsen or fail to improve  History of Present Illness  Stephany Dugan is a 57 y o  male following up for left knee osteoarthritis  Since his last visit he has been becoming more disabled due to atypical Parkinson's which is expected to leave him unable to walk unfortunately in the near future  He is currently walking with a walker which is new since his last visit  Last visit on 10/12/2021 he did have a viscosupplementation injection which he feels only helped for a few days  The patient did have a steroid injection on 2021 which did help for short period of time but eventually wore off  Prior to that he did do home exercises for an extended period of time which did give him mild improvement  Pain is the worst with going up stairs and better with rest  He has no new injuries or associated numbness or tingling  He has not been also taking over-the-counter anti-inflammatories without much relief  Review of Systems  Pertinent items are noted in HPI  All other systems were reviewed and are negative      Physical Exam  BP (!) 186/100   Pulse 87   Ht 6' (1 829 m)   Wt 109 kg (241 lb)   BMI 32 69 kg/m² Cons: Appears well  No apparent distress  Psych: Alert  Oriented x3  Mood and affect normal   Eyes: PERRLA, EOMI  Resp: Normal effort  No audible wheezing or stridor  CV: Palpable pulse  No discernable arrhythmia  No LE edema  Lymph:  No palpable cervical, axillary, or inguinal lymphadenopathy  Skin: Warm  No palpable masses  No visible lesions  Neuro: Normal muscle tone  Normal and symmetric DTR's  Left Knee Exam  Alignment:  Normal knee alignment  Inspection:  No swelling  No edema  No erythema  No ecchymosis  Palpation:  No tenderness  Severe patellofemoral crepitus  ROM:  Knee Extension 0  Knee Flexion 120  Strength:  5/5 quadriceps and hamstrings  Stability:  No objective knee instability  Stable Varus / Valgus stress, Lachman, and Posterior drawer  Tests:  No pertinent positive or negative tests  Patella:  Patella tracks centrally with crepitus  Neurovascular:  Sensation intact in DP/SP/Bateman/Sa/T nerve distributions  Toes warm and perfused  Gait:  Normal     Studies Reviewed  I have personally reviewed pertinent films in PACS  XR of left knee - From 08/06/2021 shows severe narrowing of the patellofemoral joint space with spurring and subchondral sclerosis  The remaining 2 compartments showed mild arthritic narrowing with mild spurring  No fracture dislocation seen  Large joint arthrocentesis: L knee  Universal Protocol:  Consent given by: patient    Supporting Documentation  Indications: pain   Procedure Details  Location: knee - L knee  Needle size: 22 G  Approach: lateral  Medications administered: 6 mL bupivacaine (PF) 0 5 %; 40 mg triamcinolone acetonide 40 mg/mL    Patient tolerance: patient tolerated the procedure well with no immediate complications  Dressing:  Sterile dressing applied            Medical, Surgical, Family, and Social History  The patient's medical history, family history, and social history, were reviewed and updated as appropriate      Past Medical History:   Diagnosis Date    Back pain     Bladder infection     BPH (benign prostatic hyperplasia)     Cancer (HCC)     testicular 1988    Chronic kidney disease     Diverticulosis     GERD (gastroesophageal reflux disease)     occassional    History of testicular cancer 1988    Surgery and radiation; left side    Kidney stone     Kidney stones     Currently and in the past    Orthostatic hypotension     Orthostatic hypotension due to [facility]'s disease ([facility] [address]     Parkinson's disease ([facility] [address] [facility]     Sleep apnea     uses CPAP    Wears glasses        Past Surgical History:   Procedure Laterality Date    BLADDER NECK RECONSTRUCTION  07/27/2020    COLONOSCOPY  2017    COLONOSCOPY  09/2020    COLONOSCOPY  11/2020    Cosme Shires / CYSTOURETHROPLASTY  07/27/2020    [address] inguinal hernia repair    IR SUPRAPUBIC CATHETER PLACEMENT  2/24/2020    IR TUBE UPSIZE  3/23/2020    KIDNEY STONE SURGERY      LITHOTRIPSY      Renal; Resolved: 2/27/07    ORCHIECTOMY Left     MS CYSTOURETHRO W/IMPLANT N/A 5/17/2018    Procedure: CYSTOSCOPY WITH INSERTION UROLIFT;  Surgeon: Milka Nur DO;  [facility]: [facility];  Service: Urology    PROSTATE SURGERY N/A 1/18/2018    Procedure: CYSTOSCOPY WITH INSERTION UROLIFT;  Surgeon: Milka Nur DO;  [facility];  Service: Urology    [address] [place] [address]    For cancer    TONSILLECTOMY      URETERONEOCYSTOSTOMY      w/ cystoscopy Ureteral Stent Placement;  Resolved: 6/14/2007    WISDOM TOOTH EXTRACTION         Family History   Problem Relation Age of Onset    Colon cancer Father     Heart failure Father    Mariah Leisure Parkinsonism Mother     Cancer Paternal Grandmother        Social History     Occupational History    Occupation: [employer] retired   Tobacco Use    Smoking status: Former Smoker     Packs/day: 0 50     Years: 3 50     Pack years: 1 75     Types: Cigarettes     Start date: 1974     Quit date: 1980     Years since quittin 1    Smokeless tobacco: Never Used   Vaping Use    Vaping Use: Never used   Substance and Sexual Activity    Alcohol use: Not Currently    Drug use: No    Sexual activity: Not on file       No Known Allergies      Current Outpatient Medications:     calcium carbonate (OS-NEIL) 1250 (500 Ca) MG chewable tablet, Chew, Disp: , Rfl:     cholecalciferol (VITAMIN D3) 1,000 units tablet, Take 2,000 Units by mouth daily, Disp: , Rfl:     cyanocobalamin (VITAMIN B-12) 1000 MCG tablet, Take 1,000 mcg by mouth daily, Disp: , Rfl:     Dalfampridine ER 10 MG TB12, , Disp: , Rfl:     Diclofenac Sodium (VOLTAREN) 1 %, Apply 2 g topically 4 (four) times a day, Disp: 350 g, Rfl: 2    famotidine (Pepcid) 20 mg tablet, Take 1 tablet (20 mg total) by mouth daily (Patient taking differently: Take 20 mg by mouth as needed ), Disp: 90 tablet, Rfl: 1    midodrine (PROAMATINE) 5 mg tablet, , Disp: , Rfl:     multivitamin (THERAGRAN) TABS, Take 1 tablet by mouth daily, Disp: , Rfl:     pantoprazole (PROTONIX) 40 mg tablet, Take 1 tablet (40 mg total) by mouth 2 (two) times a day before meals, Disp: 60 tablet, Rfl: 5    polyethylene glycol (GLYCOLAX) 17 GM/SCOOP powder, Take 17 g by mouth daily, Disp: , Rfl:     rOPINIRole (REQUIP) 0 5 mg tablet, TAKE 1 TABLET BY MOUTH EVERYDAY AT BEDTIME, Disp: 90 tablet, Rfl: 1    selegiline (ELDEPRYL) 5 mg capsule, , Disp: , Rfl:     vitamin E 100 UNIT capsule, Take 100 Units by mouth daily, Disp: , Rfl:     Wheat Dextrin (Benefiber) POWD, , Disp: , Rfl:     nystatin-triamcinolone (MYCOLOG-II) cream, Apply topically 2 (two) times a day, Disp: 60 g, Rfl: 0    tadalafil (CIALIS) 5 MG tablet, Take 5 mg by mouth daily, Disp: , Rfl:       Princess Gwendolyn PA-C

## 2022-02-25 ENCOUNTER — TELEMEDICINE (OUTPATIENT)
Dept: PULMONOLOGY | Facility: CLINIC | Age: 62
End: 2022-02-25
Payer: MEDICARE

## 2022-02-25 VITALS
HEIGHT: 72 IN | SYSTOLIC BLOOD PRESSURE: 133 MMHG | DIASTOLIC BLOOD PRESSURE: 92 MMHG | WEIGHT: 240 LBS | BODY MASS INDEX: 32.51 KG/M2

## 2022-02-25 DIAGNOSIS — G70.9 NEUROMUSCULAR DISEASE (HCC): Primary | ICD-10-CM

## 2022-02-25 DIAGNOSIS — G25.81 RESTLESS LEG SYNDROME: ICD-10-CM

## 2022-02-25 DIAGNOSIS — G47.33 OSA (OBSTRUCTIVE SLEEP APNEA): ICD-10-CM

## 2022-02-25 DIAGNOSIS — I10 ESSENTIAL HYPERTENSION: Primary | ICD-10-CM

## 2022-02-25 PROCEDURE — 99214 OFFICE O/P EST MOD 30 MIN: CPT | Performed by: INTERNAL MEDICINE

## 2022-02-25 RX ORDER — ROPINIROLE 1 MG/1
1 TABLET, FILM COATED ORAL 3 TIMES DAILY
Qty: 30 TABLET | Refills: 3 | Status: SHIPPED | OUTPATIENT
Start: 2022-02-25 | End: 2022-07-08

## 2022-02-25 NOTE — LETTER
February 26, 2022     Skylar Woodruff, [address] [address]    Patient: Rodolfo Bowen   YOB: 1960   Date of Visit: 2/25/2022       Dear Dr Tonio Montaño:    Thank you for referring Xenia Ball to me for evaluation  Below are my notes for this consultation  If you have questions, please do not hesitate to call me  I look forward to following your patient along with you  Sincerely,        Carl Salgado DO        CC: No Recipients  Carl Salgado DO  2/26/2022  7:52 AM  Sign when Signing Visit    Virtual Regular Visit    Verification of patient location:    Patient is located in the following state in which I hold an active license [place]      Assessment/Plan:  61 y o  M with PMHx of Multiple System Atrophy, Parkinson's disease with diaphragmatic weakness, chronic knee and ankle pain, urinary retention s/p TURP, Moderate KALLI on CPAP who comes in for follow up  1  Chronic respiratory failure from progressive neurologic weakness with Multiple System Atrophy   He has noted bulbar symptoms with upper airway closing, hoarseness, dysphagia and weak/ hoarse voice  This seems to have since resolved  SNIF test is negative but she is having worsening shortness of breath         -  He is doing well with BIPAP at 12/8 and does not have significant distension in his stomach  - We discussed iVAPS therapy to prevent further respiratory decline  It was ordered several visits ago  I will check in with [facility] to see if we can move forward with the device  He would like to hold off for now even though he has noted some worsening clinical decline         -  Follow compliance in 2 -3 months        2  Moderate KALLI (AHI - 15 7) - on BIPAP 12/8   He is compliant and doing well from a respiratory standpoint  Residual AHI - 4  3        -  Will increase BIPAP to 13/9 to see if there is further reduction of AHI       -  We again discussed a switch to iVAPS as I feel that will be most beneficial   He prefers to hold on making the switch at this time         -  He is aware of the risk of leaving sleep apnea untreated including hypertension, heart failure, arrhythmia, MI and stroke       3  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 -   He still notes this occurs  If we proceed to iVAPS, I will add clonazepam when he obtains iVAPS in the future        4    Periodic limb movement (PLM index - 109) -  This may be secondary to KALLI or Parkinsons   He denies symptoms of this          -I will increaser Requip to 1mg qHS          -  He did not find neurontin helpful  Reason for visit is   Chief Complaint   Patient presents with    Virtual Regular Visit        Encounter provider Rusty Rodríguez DO    Provider located at [address] [place] [address]      Recent Visits  Date Type Provider Dept   02/25/22 Telemedicine Rusty Rodríguez DO [facility]   Showing recent visits within past 7 days and meeting all other requirements  Future Appointments  No visits were found meeting these conditions  Showing future appointments within next 150 days and meeting all other requirements       The patient was identified by name and date of birth  Mary Jones was informed that this is a telemedicine visit and that the visit is being conducted through [address] [place] [address] Now and patient was informed that this is a secure, HIPAA-compliant platform  He agrees to proceed     My office door was closed  No one else was in the room  He acknowledged consent and understanding of privacy and security of the video platform  The patient has agreed to participate and understands they can discontinue the visit at any time  Patient is aware this is a billable service  Subjective  Mary Jones is a 57 y o  male who calls in for management of KALLI, respiratory failure        HPI   Mr María Mayer states that he has been sleeping relatively well  However, overall he continues to note clinical decline  He has been noting difficulty with his fatigue, breathing  He notes inability to take deep breaths  We discussed adjusting BIPAP but we also discussed iVAPS  He is a bit hesitant to move forward with that device  However, at next visit, if he is no better, he will  He does note trouble swallowing  He does note some improvement with RLS with Requip  However, only mild improvement  He is willing to try a higher dose      Past Medical History:   Diagnosis Date    Back pain     Bladder infection     BPH (benign prostatic hyperplasia)     Cancer ([facility] [address] )     testicular 1988    Chronic kidney disease     Diverticulosis     GERD (gastroesophageal reflux disease)     occassional    History of testicular cancer 1988    Surgery and radiation; left side    Kidney stone     Kidney stones     Currently and in the past    Orthostatic hypotension     Orthostatic hypotension due to Parkinson's disease ([facility] [address] )     Parkinson's disease ([facility] [address] )     Sleep apnea     uses CPAP    Wears glasses        Past Surgical History:   Procedure Laterality Date    BLADDER NECK RECONSTRUCTION  07/27/2020    COLONOSCOPY  2017    COLONOSCOPY  09/2020    COLONOSCOPY  11/2020    Montserrat Norton / CYSTOURETHROPLASTY  07/27/2020    [address] inguinal hernia repair    IR SUPRAPUBIC CATHETER PLACEMENT  2/24/2020    IR TUBE UPSIZE  3/23/2020    KIDNEY STONE SURGERY      LITHOTRIPSY      Renal; Resolved: 2/27/07    ORCHIECTOMY Left     TN CYSTOURETHRO W/IMPLANT N/A 5/17/2018    Procedure: CYSTOSCOPY WITH INSERTION UROLIFT;  Surgeon: Sadnra Perkins DO;  Location: [facility];  Service: Urology    PROSTATE SURGERY N/A 1/18/2018    Procedure: CYSTOSCOPY WITH INSERTION Jana Rack;  Surgeon: Sandra Perkins DO;  Location: [facility];  Service: Urology   [address] [place] [address]  URETERONEOCYSTOSTOMY      w/ cystoscopy Ureteral Stent Placement; Resolved: 6/14/2007    WISDOM TOOTH EXTRACTION         Current Outpatient Medications   Medication Sig Dispense Refill    calcium carbonate (OS-NEIL) 1250 (500 Ca) MG chewable tablet Chew      carbidopa-levodopa (SINEMET)  mg per tablet Take 1 tablet by mouth 3 (three) times a day      cholecalciferol (VITAMIN D3) 1,000 units tablet Take 2,000 Units by mouth daily      cyanocobalamin (VITAMIN B-12) 1000 MCG tablet Take 1,000 mcg by mouth daily      Dalfampridine ER 10 MG TB12 3 (three) times a day        Diclofenac Sodium (VOLTAREN) 1 % Apply 2 g topically 4 (four) times a day 350 g 2    famotidine (Pepcid) 20 mg tablet Take 1 tablet (20 mg total) by mouth daily (Patient taking differently: Take 20 mg by mouth as needed ) 90 tablet 1    midodrine (PROAMATINE) 5 mg tablet       multivitamin (THERAGRAN) TABS Take 1 tablet by mouth daily      pantoprazole (PROTONIX) 40 mg tablet Take 1 tablet (40 mg total) by mouth 2 (two) times a day before meals 60 tablet 5    polyethylene glycol (GLYCOLAX) 17 GM/SCOOP powder Take 17 g by mouth daily      selegiline (ELDEPRYL) 5 mg capsule       vitamin E 100 UNIT capsule Take 100 Units by mouth daily      Wheat Dextrin (Benefiber) POWD       nystatin-triamcinolone (MYCOLOG-II) cream Apply topically 2 (two) times a day 60 g 0    rOPINIRole (REQUIP) 1 mg tablet Take 1 tablet (1 mg total) by mouth 3 (three) times a day 30 tablet 3    tadalafil (CIALIS) 5 MG tablet Take 5 mg by mouth daily       No current facility-administered medications for this visit  No Known Allergies    Review of Systems   Constitutional: Positive for fatigue  Negative for appetite change  HENT: Negative  Eyes: Negative  Respiratory: Negative  Cardiovascular: Negative  Gastrointestinal: Negative  Endocrine: Negative  Genitourinary: Negative  Musculoskeletal: Negative  Skin: Negative  Neurological: Negative  Hematological: Negative  Psychiatric/Behavioral: Negative  Video Exam    Vitals:    02/25/22 1052   BP: 133/92   BP Location: Left arm   Patient Position: Sitting   Weight: 109 kg (240 lb)   Height: 6' (1 829 m)       Physical Exam   General: Pleasant, Awake alert and oriented x 3, conversant without conversational dyspnea, NAD, normalaffect  HEENT:  PERRL, Sclera noninjected, nonicteric OU, Nares patent,  no craniofacial abnormalities, Mucous membranes, moist, no oral lesions, normal dentition  NECK: Trachea midline, no accessory muscle use, no stridor  CARDIAC, PULM and ABD: Could not be performed on video visit      NEURO: no focal neurologic deficits, AAOx3, moving all extremities appropriately  Lab Results   Component Value Date    WBC 8 75 08/18/2020    HGB 14 4 08/18/2020    HCT 45 2 08/18/2020    MCV 92 08/18/2020     08/18/2020     Lab Results   Component Value Date    SODIUM 141 01/23/2021    K 4 5 01/23/2021     01/23/2021    CO2 30 01/23/2021    BUN 21 01/23/2021    CREATININE 1 24 01/23/2021    GLUC 100 05/11/2018    CALCIUM 10 0 01/23/2021       PFT 10/26/20  Results:  FEV1/FVC Ratio: 77 %  Forced Vital Capacity: 4 88 L    99 % predicted  FEV1: 3 75 L     99 % predicted  Lung volumes by body plethysmography:   Total Lung Capacity 99 % predicted   Residual volume 96 % predicted  DLCO corrected for patients hemoglobin level: 83 %  Interpretation:  · No obstructive airflow defect on spirometry  · Normal Spirometry  · Normal Lung volumes  · Normal diffusion capacity  · Flow volume loop is normal      3/4/19  Results:  FEV1/FVC Ratio: 80 %  Forced Vital Capacity: 4 80 L    93 % predicted  FEV1: 3 85 L     97 % predicted  Lung volumes by body plethysmography:   Total Lung Capacity 94 % predicted   Residual volume 88 % predicted  DLCO corrected for patients hemoglobin level: 79 %  Interpretation:  · No obstructive airflow defect   · Normal Spirometry  · Normal Lung volumes  · Normal diffusion capacity     Repeat 6 minute walk 20  Starting saturation - 98%   Starting HR - 89  Lowest saturation - 91%    Highest HR - 103  Ambulated - 252 m and he did not require oxygen     6 minute walk  Date of testin2019  Resting room air saturation: 93%  Randy scale of dyspnea at start of test: 0/10     Ambulation testing:  Lowest saturation 93%  No supplemental oxygen required     Randy scale of dyspnea at end of test: 3/10  Reason if test was stopped early: N/A      Total 6 minute walk distance: 1400 feet     Imaging:  I personally reviewed the images on the [facility] system pertinent to today's visit  CT chest - 19  IMPRESSION:  Within normal limits CT of the chest      Repeat SNF test 10/26/20  IMPRESSION:  There is no evidence of diaphragmatic paralysis or elevation         Other studies:  HST - moderate (15 7), Supine AHI - 23, hypoxia   CPAP titration - Nasal CPAP was titrated from 5-11 cm of water for  control of snoring and abnormal breathing  Patient appeared to do best at 11 cm of CPAP delivered using a [employer] full face interface   Continued use of this equipment at these settings is recommended      New Compliance Data:  21-21                                   Type of CPAP:  BiPAP 12/8                                  Percent usage: 97%, 93 %> 4hrs                                   Average time used: 6 hrs 36 mins                                  Time in large leak: 3 mins 43 secs                                   Residual AHI: 4 3    Compliance Data:  21-10/17/21                                   Type of CPAP:  BiPAP 12/8                                  Percent usage: 100%, 93 %> 4hrs                                   Average time used: 5hrs 58 mins - 8 hrs 36 mins                                  Time in large leak: 48 secs                                   Residual AHI: 5 6     Compliance Data:  3/12/21-5/12/21                                   Type of CPAP:  BiPAP 15/10                                  Percent usage: 100%, 89 %> 4hrs                                   Average time used: 5hrs 36 mins - 8 hrs 18 mins                                  Time in large leak: 10 min 15 secs                                   Residual AHI: 6 8       Compliance Data:  3/12/21-5/12/21                                   Type of CPAP:  BiPAP 15/10                                  Percent usage: 100%, 89 %> 4hrs                                   Average time used: 5hrs 36 mins - 8 hrs 18 mins                                  Time in large leak: 10 min 15 secs                                   Residual AHI: 6 8      Compliance Data:  1/2/21-2/10/21                                   Type of CPAP:  auto BiPAP min EPAP 11, PSV 4-6, Max IPAP 25,                                    Mean EPAP - 12- 14 8, IPAP 17 - Max IPAP 18 8                                   Percent usage: 95%, 85 %> 4hrs                                   Average time used: 5hrs 26 mins - 7 hrs 40 mins                                  Time in large leak: 6 min 22 secs                                   Residual AHI: 4 8 (CA - 3 0)     Compliance Data:  10/24/20-11/22/20                                   Type of CPAP:  auto BiPAP min EPAP 11, PSV 4-6, Max IPAP 25,                                    Mean EPAP - 11 7- 16 9, IPAP 16 2 - Max IPAP 22 9                                   Percent usage: 63%, 30%> 4hrs                                   Average time used: 2hrs 40 mins - 6 hrs 45 mins                                  Time in large leak: 16 min 19 secs                                   Residual AHI: 5 6 (CA - 3 1)     Compliance Data:  7/28/20-8/26/20                                   Type of CPAP:  auto BiPAP min EPAP 11, PSV 4, Max IPAP 25,                                    Mean EPAP - 12 2- 17 6, IPAP 16 2 - Max IPAP 22                                   Percent usage: 97%, 90%> 4hrs                                   Average time used: 5hrs 30 mins - 8 hrs 25 mins                                  Time in large leak: 4 min 39 secs                                   Residual AHI: 7 0 (CA - 5 1)     Compliance Data:  1/5/20-2/5/20                                   Type of CPAP:  auto BiPAP min EPAP 11, PSV 4, Max IPAP 25,                                    Mean EPAP recorded - 12 1, Peak - 14 9, Min IPAP 16, Max IPAP 19                                   Percent usage: 72%, 63%> 4hrs                                   Average time used: 3hrs 51 mins - 8 hrs 26 mins                                  Time in large leak: 50 secs                                   Residual AHI: 6 6  (CA - 3 9)     Compliance Data:  10/23/19-11/21/19                                   Type of CPAP:  autoPAP 11-15, Mean - 13 2, Peak - 15 0                                   Percent usage: 73%, 67%> 4hrs                                   Average time used: 3hrs 38 mins - 8 hrs 37 mins                                  Time in large leak: 9 mins 46 secs                                   Residual AHI: 11 0  (CA - 0 9)     Compliance Data:  8/25/19-9/23/19                                   Type of CPAP:  autoPAP 8-15, Mean - 11 9, Peak - 15 6                                   Percent usage: 63%                                   Average time used: 2hrs 52 mins - 4 hrs 32 mins                                  Time in large leak: 4 mins 44 secs                                   Residual AHI: 13 6  (CA - 0 9)    I spent 22 minutes directly with the patient during this visit    [address] [place] [address] verbally agrees to participate in [employer]   Pt is aware that [employer] could be limited without vital signs or the ability to perform a full hands-on physical exam  Orlando Martinez understands he or the provider may request at any time to terminate the video visit and request the patient to seek care or treatment in person

## 2022-02-26 NOTE — PROGRESS NOTES
Virtual Regular Visit    Verification of patient location:    Patient is located in the following state in which I hold an active license [place]      Assessment/Plan:  61 y o  M with PMHx of Multiple System Atrophy, Parkinson's disease with diaphragmatic weakness, chronic knee and ankle pain, urinary retention s/p TURP, Moderate KALLI on CPAP who comes in for follow up  1  Chronic respiratory failure from progressive neurologic weakness with Multiple System Atrophy   He has noted bulbar symptoms with upper airway closing, hoarseness, dysphagia and weak/ hoarse voice  This seems to have since resolved  SNIF test is negative but she is having worsening shortness of breath         -  He is doing well with BIPAP at 12/8 and does not have significant distension in his stomach  - We discussed iVAPS therapy to prevent further respiratory decline  It was ordered several visits ago  I will check in with [email or web address] to see if we can move forward with the device  He would like to hold off for now even though he has noted some worsening clinical decline         -  Follow compliance in 2 -3 months        2  Moderate KALLI (AHI - 15 7) - on BIPAP 12/8   He is compliant and doing well from a respiratory standpoint  Residual AHI - 4  3        -  Will increase BIPAP to 13/9 to see if there is further reduction of AHI       -  We again discussed a switch to iVAPS as I feel that will be most beneficial   He prefers to hold on making the switch at this time         -  He is aware of the risk of leaving sleep apnea untreated including hypertension, heart failure, arrhythmia, MI and stroke       3  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 -   He still notes this occurs  If we proceed to iVAPS, I will add clonazepam when he obtains iVAPS in the future        4    Periodic limb movement (PLM index - 109) -  This may be secondary to KALLI or Parkinsons   He denies symptoms of this          -I will increaser Requip to 1mg qHS           -  He did not find neurontin helpful  Reason for visit is   Chief Complaint   Patient presents with    Virtual Regular Visit        Encounter provider Presley Augustin DO    Provider located at [address] [id number]      Recent Visits  Date Type Provider Dept   02/25/22 Telemedicine Presley Augustin DO [facility]   Showing recent visits within past 7 days and meeting all other requirements  Future Appointments  No visits were found meeting these conditions  Showing future appointments within next 150 days and meeting all other requirements       The patient was identified by name and date of birth  Melvin Martinez was informed that this is a telemedicine visit and that the visit is being conducted through [address] [facility] and patient was informed that this is a secure, HIPAA-compliant platform  He agrees to proceed     My office door was closed  No one else was in the room  He acknowledged consent and understanding of privacy and security of the video platform  The patient has agreed to participate and understands they can discontinue the visit at any time  Patient is aware this is a billable service  Subjective  Melvin Martinez is a 57 y o  male who calls in for management of KALLI, respiratory failure  HPI   Mr Esperanza Hodge states that he has been sleeping relatively well  However, overall he continues to note clinical decline  He has been noting difficulty with his fatigue, breathing  He notes inability to take deep breaths  We discussed adjusting BIPAP but we also discussed iVAPS  He is a bit hesitant to move forward with that device  However, at next visit, if he is no better, he will  He does note trouble swallowing  He does note some improvement with RLS with Requip  However, only mild improvement  He is willing to try a higher dose      Past Medical History:   Diagnosis Date    Back pain     Bladder infection     BPH (benign prostatic hyperplasia)     Cancer [facility])     testicular 1988    Chronic kidney disease     Diverticulosis     GERD (gastroesophageal reflux disease)     occassional    History of testicular cancer 1988    Surgery and radiation; left side    Kidney stone     Kidney stones     Currently and in the past    Orthostatic hypotension     Orthostatic hypotension due to [facility]'s disease ([facility] [address] [facility]     Parkinson's disease ([facility] [address] [facility]     Sleep apnea     uses CPAP    Wears glasses        Past Surgical History:   Procedure Laterality Date    BLADDER NECK RECONSTRUCTION  07/27/2020    COLONOSCOPY  2017    COLONOSCOPY  09/2020    COLONOSCOPY  11/2020    Bethany Singh / CYSTOURETHROPLASTY  07/27/2020    [address] inguinal hernia repair    IR SUPRAPUBIC CATHETER PLACEMENT  2/24/2020    IR TUBE UPSIZE  3/23/2020    KIDNEY STONE SURGERY      LITHOTRIPSY      Renal; Resolved: 2/27/07    ORCHIECTOMY Left     [place] CYSTOURETHRO W/IMPLANT N/A 5/17/2018    Procedure: CYSTOSCOPY WITH INSERTION UROLIFT;  Surgeon: Hernan Duque DO;  [facility]: [facility];  Service: Urology    PROSTATE SURGERY N/A 1/18/2018    Procedure: CYSTOSCOPY WITH INSERTION UROLIFT;  Surgeon: Hernan Duque DO;  [facility]: [facility];  Service: Urology    [address] [facility] [address]    For cancer    TONSILLECTOMY      URETERONEOCYSTOSTOMY      w/ cystoscopy Ureteral Stent Placement;  Resolved: 6/14/2007    WISDOM TOOTH EXTRACTION         Current Outpatient Medications   Medication Sig Dispense Refill    calcium carbonate (OS-NEIL) 1250 (500 Ca) MG chewable tablet Chew      carbidopa-levodopa (SINEMET)  mg per tablet Take 1 tablet by mouth 3 (three) times a day      cholecalciferol (VITAMIN D3) 1,000 units tablet Take 2,000 Units by mouth daily      cyanocobalamin (VITAMIN B-12) 1000 MCG tablet Take 1,000 mcg by mouth daily      Dalfampridine ER 10 MG TB12 3 (three) times a day        Diclofenac Sodium (VOLTAREN) 1 % Apply 2 g topically 4 (four) times a day 350 g 2    famotidine (Pepcid) 20 mg tablet Take 1 tablet (20 mg total) by mouth daily (Patient taking differently: Take 20 mg by mouth as needed ) 90 tablet 1    midodrine (PROAMATINE) 5 mg tablet       multivitamin (THERAGRAN) TABS Take 1 tablet by mouth daily      pantoprazole (PROTONIX) 40 mg tablet Take 1 tablet (40 mg total) by mouth 2 (two) times a day before meals 60 tablet 5    polyethylene glycol (GLYCOLAX) 17 GM/SCOOP powder Take 17 g by mouth daily      selegiline (ELDEPRYL) 5 mg capsule       vitamin E 100 UNIT capsule Take 100 Units by mouth daily      Wheat Dextrin (Benefiber) POWD       nystatin-triamcinolone (MYCOLOG-II) cream Apply topically 2 (two) times a day 60 g 0    rOPINIRole (REQUIP) 1 mg tablet Take 1 tablet (1 mg total) by mouth 3 (three) times a day 30 tablet 3    tadalafil (CIALIS) 5 MG tablet Take 5 mg by mouth daily       No current facility-administered medications for this visit  No Known Allergies    Review of Systems   Constitutional: Positive for fatigue  Negative for appetite change  HENT: Negative  Eyes: Negative  Respiratory: Negative  Cardiovascular: Negative  Gastrointestinal: Negative  Endocrine: Negative  Genitourinary: Negative  Musculoskeletal: Negative  Skin: Negative  Neurological: Negative  Hematological: Negative  Psychiatric/Behavioral: Negative          Video Exam    Vitals:    02/25/22 1052   BP: 133/92   BP Location: Left arm   Patient Position: Sitting   Weight: 109 kg (240 lb)   Height: 6' (1 829 m)       Physical Exam   General: Pleasant, Awake alert and oriented x 3, conversant without conversational dyspnea, NAD, normalaffect  HEENT:  PERRL, Sclera noninjected, nonicteric OU, Nares patent,  no craniofacial abnormalities, Mucous membranes, moist, no oral lesions, normal dentition  NECK: Trachea midline, no accessory muscle use, no stridor  CARDIAC, PULM and ABD: Could not be performed on video visit      NEURO: no focal neurologic deficits, AAOx3, moving all extremities appropriately  Lab Results   Component Value Date    WBC 8 75 2020    HGB 14 4 2020    HCT 45 2 2020    MCV 92 2020     2020     Lab Results   Component Value Date    SODIUM 141 2021    K 4 5 2021     2021    CO2 30 2021    BUN 21 2021    CREATININE 1 24 2021    GLUC 100 2018    CALCIUM 10 0 2021       PFT 10/26/20  Results:  FEV1/FVC Ratio: 77 %  Forced Vital Capacity: 4 88 L    99 % predicted  FEV1: 3 75 L     99 % predicted  Lung volumes by body plethysmography:   Total Lung Capacity 99 % predicted   Residual volume 96 % predicted  DLCO corrected for patients hemoglobin level: 83 %  Interpretation:  · No obstructive airflow defect on spirometry  · Normal Spirometry  · Normal Lung volumes  · Normal diffusion capacity  · Flow volume loop is normal      3/4/19  Results:  FEV1/FVC Ratio: 80 %  Forced Vital Capacity: 4 80 L    93 % predicted  FEV1: 3 85 L     97 % predicted  Lung volumes by body plethysmography:   Total Lung Capacity 94 % predicted   Residual volume 88 % predicted  DLCO corrected for patients hemoglobin level: 79 %  Interpretation:  · No obstructive airflow defect   · Normal Spirometry  · Normal Lung volumes  · Normal diffusion capacity     Repeat 6 minute walk 20  Starting saturation - 98%   Starting HR - 89  Lowest saturation - 91%    Highest HR - 103  Ambulated - 252 m and he did not require oxygen     6 minute walk  Date of testin2019  Resting room air saturation: 93%  Randy scale of dyspnea at start of test: 0/10     Ambulation testing:  Lowest saturation 93%  No supplemental oxygen required     Randy scale of dyspnea at end of test: 3/10  Reason if test was stopped early: N/A      Total 6 minute walk distance: 1400 feet     Imaging:  I personally reviewed the images on the [facility] system pertinent to today's visit  CT chest - 5/20/19  IMPRESSION:  Within normal limits CT of the chest      Repeat SNF test 10/26/20  IMPRESSION:  There is no evidence of diaphragmatic paralysis or elevation         Other studies:  HST - moderate (15 7), Supine AHI - 23, hypoxia   CPAP titration - Nasal CPAP was titrated from 5-11 cm of water for  control of snoring and abnormal breathing  Patient appeared to do best at 11 cm of CPAP delivered using a [employer] full face interface   Continued use of this equipment at these settings is recommended      New Compliance Data:  11/9/21-12/8/21                                   Type of CPAP:  BiPAP 12/8                                  Percent usage: 97%, 93 %> 4hrs                                   Average time used: 6 hrs 36 mins                                  Time in large leak: 3 mins 43 secs                                   Residual AHI: 4 3    Compliance Data:  9/8/21-10/17/21                                   Type of CPAP:  BiPAP 12/8                                  Percent usage: 100%, 93 %> 4hrs                                   Average time used: 5hrs 58 mins - 8 hrs 36 mins                                  Time in large leak: 48 secs                                   Residual AHI: 5 6     Compliance Data:  3/12/21-5/12/21                                   Type of CPAP:  BiPAP 15/10                                  Percent usage: 100%, 89 %> 4hrs                                   Average time used: 5hrs 36 mins - 8 hrs 18 mins                                  Time in large leak: 10 min 15 secs                                   Residual AHI: 6 8       Compliance Data:  3/12/21-5/12/21                                   Type of CPAP:  BiPAP 15/10                                  Percent usage: 100%, 89 %> 4hrs                                   Average time used: 5hrs 36 mins - 8 hrs 18 mins                                  Time in large leak: 10 min 15 secs                                   Residual AHI: 6 8      Compliance Data:  1/2/21-2/10/21                                   Type of CPAP:  auto BiPAP min EPAP 11, PSV 4-6, Max IPAP 25,                                    Mean EPAP - 12- 14 8, IPAP 17 - Max IPAP 18 8                                   Percent usage: 95%, 85 %> 4hrs                                   Average time used: 5hrs 26 mins - 7 hrs 40 mins                                  Time in large leak: 6 min 22 secs                                   Residual AHI: 4 8 (CA - 3 0)     Compliance Data:  10/24/20-11/22/20                                   Type of CPAP:  auto BiPAP min EPAP 11, PSV 4-6, Max IPAP 25,                                    Mean EPAP - 11 7- 16 9, IPAP 16 2 - Max IPAP 22 9                                   Percent usage: 63%, 30%> 4hrs                                   Average time used: 2hrs 40 mins - 6 hrs 45 mins                                  Time in large leak: 16 min 19 secs                                   Residual AHI: 5 6 (CA - 3 1)     Compliance Data:  7/28/20-8/26/20                                   Type of CPAP:  auto BiPAP min EPAP 11, PSV 4, Max IPAP 25,                                    Mean EPAP - 12 2- 17 6, IPAP 16 2 - Max IPAP 22                                   Percent usage: 97%, 90%> 4hrs                                   Average time used: 5hrs 30 mins - 8 hrs 25 mins                                  Time in large leak: 4 min 39 secs                                   Residual AHI: 7 0 (CA - 5 1)     Compliance Data:  1/5/20-2/5/20                                   Type of CPAP:  auto BiPAP min EPAP 11, PSV 4, Max IPAP 25,                                    Mean EPAP recorded - 12 1, Peak - 14 9, Min IPAP 16, Max IPAP 19                                   Percent usage: 72%, 63%> 4hrs                                   Average time used: 3hrs 51 mins - 8 hrs 26 mins                                  Time in large leak: 50 secs                                   Residual AHI: 6 6  (CA - 3 9)     Compliance Data:  10/23/19-11/21/19                                   Type of CPAP:  autoPAP 11-15, Mean - 13 2, Peak - 15 0                                   Percent usage: 73%, 67%> 4hrs                                   Average time used: 3hrs 38 mins - 8 hrs 37 mins                                  Time in large leak: 9 mins 46 secs                                   Residual AHI: 11 0  (CA - 0 9)     Compliance Data:  8/25/19-9/23/19                                   Type of CPAP:  autoPAP 8-15, Mean - 11 9, Peak - 15 6                                   Percent usage: 63%                                   Average time used: 2hrs 52 mins - 4 hrs 32 mins                                  Time in large leak: 4 mins 44 secs                                   Residual AHI: 13 6  (CA - 0 9)    I spent 22 minutes directly with the patient during this visit    [address] [place] [address] verbally agrees to participate in [facility]  Pt is aware that [facility] could be limited without vital signs or the ability to perform a full hands-on physical exam  Orlando Perez understands he or the provider may request at any time to terminate the video visit and request the patient to seek care or treatment in person

## 2022-02-28 ENCOUNTER — TELEPHONE (OUTPATIENT)
Dept: PULMONOLOGY | Facility: CLINIC | Age: 62
End: 2022-02-28

## 2022-02-28 RX ORDER — LISINOPRIL 5 MG/1
5 TABLET ORAL DAILY
Qty: 90 TABLET | Refills: 1 | Status: SHIPPED | OUTPATIENT
Start: 2022-02-28 | End: 2022-04-14

## 2022-02-28 NOTE — TELEPHONE ENCOUNTER
I Left patient a message to see if he is able to make a 4/6 week fu appointment with Dr Ana Clayton at [facility] on 03/30/22 at 11:40  Please confirm with patient if he is able to make this appt  No other appointments avail at this time  Thank you

## 2022-03-30 ENCOUNTER — DOCUMENTATION (OUTPATIENT)
Dept: PULMONOLOGY | Facility: CLINIC | Age: 62
End: 2022-03-30

## 2022-03-30 ENCOUNTER — OFFICE VISIT (OUTPATIENT)
Dept: PULMONOLOGY | Facility: CLINIC | Age: 62
End: 2022-03-30
Payer: MEDICARE

## 2022-03-30 VITALS
WEIGHT: 240 LBS | HEART RATE: 86 BPM | RESPIRATION RATE: 18 BRPM | DIASTOLIC BLOOD PRESSURE: 100 MMHG | OXYGEN SATURATION: 99 % | BODY MASS INDEX: 32.51 KG/M2 | HEIGHT: 72 IN | TEMPERATURE: 97 F | SYSTOLIC BLOOD PRESSURE: 198 MMHG

## 2022-03-30 DIAGNOSIS — J45.909 ASTHMA, UNSPECIFIED ASTHMA SEVERITY, UNSPECIFIED WHETHER COMPLICATED, UNSPECIFIED WHETHER PERSISTENT: ICD-10-CM

## 2022-03-30 DIAGNOSIS — G47.61 PERIODIC LIMB MOVEMENT DISORDER (PLMD): ICD-10-CM

## 2022-03-30 DIAGNOSIS — G25.81 RESTLESS LEG SYNDROME: ICD-10-CM

## 2022-03-30 DIAGNOSIS — G70.9 NEUROMUSCULAR DISEASE (HCC): Primary | ICD-10-CM

## 2022-03-30 DIAGNOSIS — G47.33 OSA (OBSTRUCTIVE SLEEP APNEA): ICD-10-CM

## 2022-03-30 PROCEDURE — 99214 OFFICE O/P EST MOD 30 MIN: CPT | Performed by: INTERNAL MEDICINE

## 2022-03-30 RX ORDER — ALBUTEROL SULFATE 90 UG/1
2 AEROSOL, METERED RESPIRATORY (INHALATION) EVERY 6 HOURS PRN
Qty: 18 G | Refills: 0 | Status: SHIPPED | OUTPATIENT
Start: 2022-03-30

## 2022-03-30 NOTE — LETTER
March 30, 2022     Germaine Cool, [address] [place] [address]    Patient: Kevin Curry   YOB: 1960   Date of Visit: 3/30/2022       Dear Dr Rosemarie Woodard:    Thank you for referring Alexis Díaz to me for evaluation  Below are my notes for this consultation  If you have questions, please do not hesitate to call me  I look forward to following your patient along with you  Sincerely,        Markell Sender,         CC: No Recipients  Markell Sender,   3/30/2022  1:08 PM  Sign when Signing Visit    Progress note - Pulmonary Medicine   Kevin Curry 57 y o  male MRN: [id number]       Impression & Plan:   61 y o  M with PMHx of Multiple System Atrophy, Parkinson's disease with diaphragmatic weakness, chronic knee and ankle pain, urinary retention s/p TURP, Moderate KALLI on CPAP who comes in for follow up  1  Chronic respiratory failure from progressive neurologic weakness with Multiple System Atrophy   He has noted bulbar symptoms with upper airway closing, hoarseness, dysphagia and weak/ hoarse voice  This seems to have since resolved  SNIF test is negative but she is having worsening shortness of breath         -  He start iVAPS therapy and has been tolerating it well  Though it has only been 2 weeks, he has not noted improvement  -  In order to prevent further distension I will reduce EPAP to 8 and reduce alveolar ventilation        -  Follow compliance in 6 weeks        2  Moderate KALLI (AHI - 15 7) - on iVAPS with good compliance         -  Follow compliance in 6 weeks       -  He is aware of the risk of leaving sleep apnea untreated including hypertension, heart failure, arrhythmia, MI and stroke       3  Bronchospasm - will trial some albuterol MDI to see if it is helpful        4    Periodic limb movement (PLM index - 109) -  This may be secondary to KALLI or Parkinsons   He denies symptoms of this          - Continue Requip to 1mg qHS          -  He did not find neurontin helpful  5   Parasomnia - he talks in his sleep  This occasionally interrupts his sleep and will cause mask leak     ______________________________________________________________________    HPI:    Temo Ordaz presents today for follow-up for his respiratory failure after he obtained his iVAPS machine  He does feel that this has been an improvement on the BIPAP  He notes occasional bloating  He denies morning headache or dry mouth  He does not have significant improvement in his sleep or breathing but has only had the new machine for 2 weeks  Review of Systems:  Review of Systems   Constitutional: Negative  HENT: Negative  Respiratory: Negative  Cardiovascular: Negative  Gastrointestinal: Negative  Endocrine: Negative  Genitourinary: Negative  Musculoskeletal: Negative  Skin: Negative  Allergic/Immunologic: Negative  Psychiatric/Behavioral: Negative            Social history updates:  Social History     Tobacco Use   Smoking Status Former Smoker    Packs/day: 0 50    Years: 3 50    Pack years: 1 75    Types: Cigarettes    Start date:     Quit date: 36    Years since quittin 2   Smokeless Tobacco Never Used     Social History     Socioeconomic History    Marital status: /Civil Union     Spouse name: Not on file    Number of children: Not on file    Years of education: Not on file    Highest education level: Not on file   Occupational History    Occupation: [employer] retired   Tobacco Use    Smoking status: Former Smoker     Packs/day: 0 50     Years: 3 50     Pack years: 1 75     Types: Cigarettes     Start date:      Quit date:      Years since quittin 2    Smokeless tobacco: Never Used   Vaping Use    Vaping Use: Never used   Substance and Sexual Activity    Alcohol use: Not Currently    Drug use: No    Sexual activity: Not on file   Other Topics Concern    Not on file   Social History Narrative    Consumes 5 glasses of tea per week     Social Determinants of Health     Financial Resource Strain: Low Risk     Difficulty of Paying Living Expenses: Not hard at all   Food Insecurity: No Food Insecurity    Worried About Running Out of Food in the Last Year: Never true    Chilo of Food in the Last Year: Never true   Transportation Needs: No Transportation Needs    Lack of Transportation (Medical): No    Lack of Transportation (Non-Medical): No   Physical Activity: Inactive    Days of Exercise per Week: 0 days    Minutes of Exercise per Session: 0 min   Stress: No Stress Concern Present    Feeling of Stress : Not at all   Social Connections:  Moderately Integrated    Frequency of Communication with Friends and Family: More than three times a week    Frequency of Social Gatherings with Friends and Family: More than three times a week    Attends Qu[employer] Services: 1 to 4 times per year    Active Member of [employer] Group or Organizations: No    Attends Club or Organization Meetings: Never    Marital Status:    Intimate Partner Violence: Not At Risk    Fear of Current or Ex-Partner: No    Emotionally Abused: No    Physically Abused: No    Sexually Abused: No   Housing Stability: Not on file       PhysicalExamination:  Vitals:   BP (!) 198/100 (BP Location: Left arm, Patient Position: Sitting, Cuff Size: Adult)   Pulse 86   Temp (!) 97 °F (36 1 °C) (Tympanic)   Resp 18   Ht 6' (1 829 m)   Wt 109 kg (240 lb)   SpO2 99%   BMI 32 55 kg/m²   General: Pleasant, Awake alert and oriented x 3, conversant without conversational dyspnea, NAD, normal affect  HEENT:  PERRL, Sclera noninjected, nonicteric OU, Nares patent,  no craniofacial abnormalities, Mucous membranes, moist, no oral lesions, normal dentition, Mallampati class 3  NECK: Trachea midline, no accessory muscle use, no stridor, no cervical or supraclavicular adenopathy, JVP not elevated  CARDIAC: Reg, single s1/S2, no m/r/g  PULM: CTA bilaterally no wheezing, rhonchi or rales  ABD: Normoactive bowel sounds, soft nontender, nondistended, no rebound, no rigidity, no guarding  EXT: No cyanosis, no clubbing, no edema, normal capillary refill  NEURO: no focal neurologic deficits, AAOx3, moving all extremities appropriately    Diagnostic Data:  Labs: I personally reviewed the most recent laboratory data pertinent to today's visit  I have personally reviewed pertinent lab results    Lab Results   Component Value Date    WBC 8 75 08/18/2020    HGB 14 4 08/18/2020    HCT 45 2 08/18/2020    MCV 92 08/18/2020     08/18/2020     Lab Results   Component Value Date    CALCIUM 10 0 01/23/2021    K 4 5 01/23/2021    CO2 30 01/23/2021     01/23/2021    BUN 21 01/23/2021    CREATININE 1 24 01/23/2021     No results found for: IGE  Lab Results   Component Value Date    ALT 20 08/18/2020    AST 12 08/18/2020    ALKPHOS 60 08/18/2020     PFT 10/26/20  Results:  FEV1/FVC Ratio: 77 %  Forced Vital Capacity: 4 88 L    99 % predicted  FEV1: 3 75 L     99 % predicted  Lung volumes by body plethysmography:   Total Lung Capacity 99 % predicted   Residual volume 96 % predicted  DLCO corrected for patients hemoglobin level: 83 %  Interpretation:  · No obstructive airflow defect on spirometry  · Normal Spirometry  · Normal Lung volumes  · Normal diffusion capacity  · Flow volume loop is normal      3/4/19  Results:  FEV1/FVC Ratio: 80 %  Forced Vital Capacity: 4 80 L    93 % predicted  FEV1: 3 85 L     97 % predicted  Lung volumes by body plethysmography:   Total Lung Capacity 94 % predicted   Residual volume 88 % predicted  DLCO corrected for patients hemoglobin level: 79 %  Interpretation:  · No obstructive airflow defect   · Normal Spirometry  · Normal Lung volumes  · Normal diffusion capacity     Repeat 6 minute walk 8/28/20  Starting saturation - 98%   Starting HR - 89  Lowest saturation - 91%    Highest HR - 103  Ambulated - 252 m and he did not require oxygen     6 minute walk  Date of testin2019  Resting room air saturation: 93%  Randy scale of dyspnea at start of test: 0/10     Ambulation testing:  Lowest saturation 93%  No supplemental oxygen required     Randy scale of dyspnea at end of test: 3/10  Reason if test was stopped early: N/A      Total 6 minute walk distance: 1400 feet     Imaging:  I personally reviewed the images on the [facility] system pertinent to today's visit  CT chest - 19  IMPRESSION:  Within normal limits CT of the chest      Repeat SNF test 10/26/20  IMPRESSION:  There is no evidence of diaphragmatic paralysis or elevation         Other studies:  HST - moderate (15 7), Supine AHI - 23, hypoxia   CPAP titration - Nasal CPAP was titrated from 5-11 cm of water for  control of snoring and abnormal breathing  Patient appeared to do best at 11 cm of CPAP delivered using a [employer] 45 full face interface   Continued use of this equipment at these settings is recommended      New Compliance Data:  22-3/29/22                                   Type of device:  iVAPS alveolar ventilation 6L/min,  EPAP 12, PSV 4-15, target 12 bpm                                  Percent usage: 57%, 43 %> 4hrs                                   Average time used: 3 hrs 9 mins - 5 hrs 34 mins                                   Residual AHI: 6 8    Compliance Data:  21-21                                   Type of CPAP:  BiPAP 12/8                                  Percent usage: 97%, 93 %> 4hrs                                   Average time used: 6 hrs 36 mins                                  Time in large leak: 3 mins 43 secs                                   Residual AHI: 4 3     Compliance Data:  21-10/17/21                                   Type of CPAP:  BiPAP 12/8                                  Percent usage: 100%, 93 %> 4hrs                                   Average time used: 5hrs 58 mins - 8 hrs 36 mins                                  Time in large leak: 48 secs                                   Residual AHI: 5 6     Compliance Data:  3/12/21-5/12/21                                   Type of CPAP:  BiPAP 15/10                                  Percent usage: 100%, 89 %> 4hrs                                   Average time used: 5hrs 36 mins - 8 hrs 18 mins                                  Time in large leak: 10 min 15 secs                                   Residual AHI: 6 8       Compliance Data:  3/12/21-5/12/21                                   Type of CPAP:  BiPAP 15/10                                  Percent usage: 100%, 89 %> 4hrs                                   Average time used: 5hrs 36 mins - 8 hrs 18 mins                                  Time in large leak: 10 min 15 secs                                   Residual AHI: 6 8      Compliance Data:  1/2/21-2/10/21                                   Type of CPAP:  auto BiPAP min EPAP 11, PSV 4-6, Max IPAP 25,                                    Mean EPAP - 12- 14 8, IPAP 17 - Max IPAP 18 8                                   Percent usage: 95%, 85 %> 4hrs                                   Average time used: 5hrs 26 mins - 7 hrs 40 mins                                  Time in large leak: 6 min 22 secs                                   Residual AHI: 4 8 (CA - 3 0)     Compliance Data:  10/24/20-11/22/20                                   Type of CPAP:  auto BiPAP min EPAP 11, PSV 4-6, Max IPAP 25,                                    Mean EPAP - 11 7- 16 9, IPAP 16 2 - Max IPAP 22 9                                   Percent usage: 63%, 30%> 4hrs                                   Average time used: 2hrs 40 mins - 6 hrs 45 mins                                  Time in large leak: 16 min 19 secs                                   Residual AHI: 5 6 (CA - 3 1)     Compliance Data:  7/28/20-8/26/20                                   Type of CPAP:  auto BiPAP min EPAP 11, PSV 4, Max IPAP 25,                                    Mean EPAP - 12 2- 17 6, IPAP 16 2 - Max IPAP 22                                   Percent usage: 97%, 90%> 4hrs                                   Average time used: 5hrs 30 mins - 8 hrs 25 mins                                  Time in large leak: 4 min 39 secs                                   Residual AHI: 7 0 (CA - 5 1)     Compliance Data:  1/5/20-2/5/20                                   Type of CPAP:  auto BiPAP min EPAP 11, PSV 4, Max IPAP 25,                                    Mean EPAP recorded - 12 1, Peak - 14 9, Min IPAP 16, Max IPAP 19                                   Percent usage: 72%, 63%> 4hrs                                   Average time used: 3hrs 51 mins - 8 hrs 26 mins                                  Time in large leak: 50 secs                                   Residual AHI: 6 6  (CA - 3 9)     Compliance Data:  10/23/19-11/21/19                                   Type of CPAP:  autoPAP 11-15, Mean - 13 2, Peak - 15 0                                   Percent usage: 73%, 67%> 4hrs                                   Average time used: 3hrs 38 mins - 8 hrs 37 mins                                  Time in large leak: 9 mins 46 secs                                   Residual AHI: 11 0  (CA - 0 9)     Compliance Data:  8/25/19-9/23/19                                   Type of CPAP:  autoPAP 8-15, Mean - 11 9, Peak - 15 6                                   Percent usage: 63%                                   Average time used: 2hrs 52 mins - 4 hrs 32 mins                                  Time in large leak: 4 mins 44 secs                                   Residual AHI: 13 6  (CA - 0 9)       Bharat Powell DO

## 2022-03-30 NOTE — PROGRESS NOTES
Progress note - Pulmonary Medicine   Kimberly April 57 y o  male MRN: [id number]       Impression & Plan:   81 A S  N with PMHx of Multiple System Atrophy, Parkinson's disease with diaphragmatic weakness, chronic knee and ankle pain, urinary retention s/p TURP, Moderate KALLI on CPAP who comes in for follow up  1  Chronic respiratory failure from progressive neurologic weakness with Multiple System Atrophy   He has noted bulbar symptoms with upper airway closing, hoarseness, dysphagia and weak/ hoarse voice  This seems to have since resolved  SNIF test is negative but she is having worsening shortness of breath         -  He start iVAPS therapy and has been tolerating it well  Though it has only been 2 weeks, he has not noted improvement  -  In order to prevent further distension I will reduce EPAP to 8 and reduce alveolar ventilation        -  Follow compliance in 6 weeks        2  Moderate KALLI (AHI - 15 7) - on iVAPS with good compliance         -  Follow compliance in 6 weeks       -  He is aware of the risk of leaving sleep apnea untreated including hypertension, heart failure, arrhythmia, MI and stroke       3  Bronchospasm - will trial some albuterol MDI to see if it is helpful        4    Periodic limb movement (PLM index - 109) -  This may be secondary to KALLI or Parkinsons   He denies symptoms of this          - Continue Requip to 1mg qHS          -  He did not find neurontin helpful  5   Parasomnia - he talks in his sleep  This occasionally interrupts his sleep and will cause mask leak     ______________________________________________________________________    HPI:    Kimberly April presents today for follow-up for his respiratory failure after he obtained his iVAPS machine  He does feel that this has been an improvement on the BIPAP  He notes occasional bloating  He denies morning headache or dry mouth     He does not have significant improvement in his sleep or breathing but has only had the new machine for 2 weeks  Review of Systems:  Review of Systems   Constitutional: Negative  HENT: Negative  Respiratory: Negative  Cardiovascular: Negative  Gastrointestinal: Negative  Endocrine: Negative  Genitourinary: Negative  Musculoskeletal: Negative  Skin: Negative  Allergic/Immunologic: Negative  Psychiatric/Behavioral: Negative  Social history updates:  Social History     Tobacco Use   Smoking Status Former Smoker    Packs/day: 0 50    Years: 3 50    Pack years: 1 75    Types: Cigarettes    Start date:     Quit date: 36    Years since quittin 2   Smokeless Tobacco Never Used     Social History     Socioeconomic History    Marital status: /Civil Union     Spouse name: Not on file    Number of children: Not on file    Years of education: Not on file    Highest education level: Not on file   Occupational History    Occupation: [employer] retired   Tobacco Use    Smoking status: Former Smoker     Packs/day: 0 50     Years: 3 50     Pack years: 1 75     Types: Cigarettes     Start date:      Quit date:      Years since quittin 2    Smokeless tobacco: Never Used   Vaping Use    Vaping Use: Never used   Substance and Sexual Activity    Alcohol use: Not Currently    Drug use: No    Sexual activity: Not on file   Other Topics Concern    Not on file   Social History Narrative    Consumes 5 glasses of tea per week     Social Determinants of Health     Financial Resource Strain: Low Risk     Difficulty of Paying Living Expenses: Not hard at all   Food Insecurity: No Food Insecurity    Worried About [facility] in the Last Year: Never true    Chilo of Food in the Last Year: Never true   Transportation Needs: No Transportation Needs    Lack of Transportation (Medical): No    Lack of Transportation (Non-Medical):  No   Physical Activity: Inactive    Days of Exercise per Week: 0 days  Minutes of Exercise per Session: 0 min   Stress: No Stress Concern Present    Feeling of Stress : Not at all   Social Connections: Moderately Integrated    Frequency of Communication with Friends and Family: More than three times a week    Frequency of Social Gatherings with Friends and Family: More than three times a week    Attends Religion Services: 1 to 4 times per year    Active Member of [employer] or Organizations: No    Attends Club or Organization Meetings: Never    Marital Status:    Intimate Partner Violence: Not At Risk    Fear of Current or Ex-Partner: No    Emotionally Abused: No    Physically Abused: No    Sexually Abused: No   Housing Stability: Not on file       PhysicalExamination:  Vitals:   BP (!) 198/100 (BP Location: Left arm, Patient Position: Sitting, Cuff Size: Adult)   Pulse 86   Temp (!) 97 °F (36 1 °C) (Tympanic)   Resp 18   Ht 6' (1 829 m)   Wt 109 kg (240 lb)   SpO2 99%   BMI 32 55 kg/m²   General: Pleasant, Awake alert and oriented x 3, conversant without conversational dyspnea, NAD, normal affect  HEENT:  PERRL, Sclera noninjected, nonicteric OU, Nares patent,  no craniofacial abnormalities, Mucous membranes, moist, no oral lesions, normal dentition, Mallampati class 3  NECK: Trachea midline, no accessory muscle use, no stridor, no cervical or supraclavicular adenopathy, JVP not elevated  CARDIAC: Reg, single s1/S2, no m/r/g  PULM: CTA bilaterally no wheezing, rhonchi or rales  ABD: Normoactive bowel sounds, soft nontender, nondistended, no rebound, no rigidity, no guarding  EXT: No cyanosis, no clubbing, no edema, normal capillary refill  NEURO: no focal neurologic deficits, AAOx3, moving all extremities appropriately    Diagnostic Data:  Labs: I personally reviewed the most recent laboratory data pertinent to today's visit  I have personally reviewed pertinent lab results    Lab Results   Component Value Date    WBC 8 75 08/18/2020    HGB 14 4 08/18/2020 HCT 45 2 2020    MCV 92 2020     2020     Lab Results   Component Value Date    CALCIUM 10 0 2021    K 4 5 2021    CO2 30 2021     2021    BUN 21 2021    CREATININE 1 24 2021     No results found for: IGE  Lab Results   Component Value Date    ALT 20 2020    AST 12 2020    ALKPHOS 60 2020     PFT 10/26/20  Results:  FEV1/FVC Ratio: 77 %  Forced Vital Capacity: 4 88 L    99 % predicted  FEV1: 3 75 L     99 % predicted  Lung volumes by body plethysmography:   Total Lung Capacity 99 % predicted   Residual volume 96 % predicted  DLCO corrected for patients hemoglobin level: 83 %  Interpretation:  · No obstructive airflow defect on spirometry  · Normal Spirometry  · Normal Lung volumes  · Normal diffusion capacity  · Flow volume loop is normal      3/4/19  Results:  FEV1/FVC Ratio: 80 %  Forced Vital Capacity: 4 80 L    93 % predicted  FEV1: 3 85 L     97 % predicted  Lung volumes by body plethysmography:   Total Lung Capacity 94 % predicted   Residual volume 88 % predicted  DLCO corrected for patients hemoglobin level: 79 %  Interpretation:  · No obstructive airflow defect   · Normal Spirometry  · Normal Lung volumes  · Normal diffusion capacity     Repeat 6 minute walk 20  Starting saturation - 98%   Starting HR - 89  Lowest saturation - 91%    Highest HR - 103  Ambulated - 252 m and he did not require oxygen     6 minute walk  Date of testin2019  Resting room air saturation: 93%  Randy scale of dyspnea at start of test: 0/10     Ambulation testing:  Lowest saturation 93%  No supplemental oxygen required     Randy scale of dyspnea at end of test: 3/10  Reason if test was stopped early: N/A      Total 6 minute walk distance: 1400 feet     Imaging:  I personally reviewed the images on the [facility] system pertinent to today's visit  CT chest - 19  IMPRESSION:  Within normal limits CT of the chest      Repeat SNF test 10/26/20  IMPRESSION:  There is no evidence of diaphragmatic paralysis or elevation         Other studies:  HST - moderate (15 7), Supine AHI - 23, hypoxia   CPAP titration - Nasal CPAP was titrated from 5-11 cm of water for  control of snoring and abnormal breathing  Patient appeared to do best at 11 cm of CPAP delivered using a [employer] 45 full face interface   Continued use of this equipment at these settings is recommended      New Compliance Data:  2/28/22-3/29/22                                   Type of device:  iVAPS alveolar ventilation 6L/min,  EPAP 12, PSV 4-15, target 12 bpm                                  Percent usage: 57%, 43 %> 4hrs                                   Average time used: 3 hrs 9 mins - 5 hrs 34 mins                                   Residual AHI: 6 8    Compliance Data:  11/9/21-12/8/21                                   Type of CPAP:  BiPAP 12/8                                  Percent usage: 97%, 93 %> 4hrs                                   Average time used: 6 hrs 36 mins                                  Time in large leak: 3 mins 43 secs                                   Residual AHI: 4 3     Compliance Data:  9/8/21-10/17/21                                   Type of CPAP:  BiPAP 12/8                                  Percent usage: 100%, 93 %> 4hrs                                   Average time used: 5hrs 58 mins - 8 hrs 36 mins                                  Time in large leak: 48 secs                                   Residual AHI: 5 6     Compliance Data:  3/12/21-5/12/21                                   Type of CPAP:  BiPAP 15/10                                  Percent usage: 100%, 89 %> 4hrs                                   Average time used: 5hrs 36 mins - 8 hrs 18 mins                                  Time in large leak: 10 min 15 secs                                   Residual AHI: 6 8       Compliance Data:  3/12/21-5/12/21                                   Type of CPAP:  BiPAP 15/10                                  Percent usage: 100%, 89 %> 4hrs                                   Average time used: 5hrs 36 mins - 8 hrs 18 mins                                  Time in large leak: 10 min 15 secs                                   Residual AHI: 6 8      Compliance Data:  1/2/21-2/10/21                                   Type of CPAP:  auto BiPAP min EPAP 11, PSV 4-6, Max IPAP 25,                                    Mean EPAP - 12- 14 8, IPAP 17 - Max IPAP 18 8                                   Percent usage: 95%, 85 %> 4hrs                                   Average time used: 5hrs 26 mins - 7 hrs 40 mins                                  Time in large leak: 6 min 22 secs                                   Residual AHI: 4 8 (CA - 3 0)     Compliance Data:  10/24/20-11/22/20                                   Type of CPAP:  auto BiPAP min EPAP 11, PSV 4-6, Max IPAP 25,                                    Mean EPAP - 11 7- 16 9, IPAP 16 2 - Max IPAP 22 9                                   Percent usage: 63%, 30%> 4hrs                                   Average time used: 2hrs 40 mins - 6 hrs 45 mins                                  Time in large leak: 16 min 19 secs                                   Residual AHI: 5 6 (CA - 3 1)     Compliance Data:  7/28/20-8/26/20                                   Type of CPAP:  auto BiPAP min EPAP 11, PSV 4, Max IPAP 25,                                    Mean EPAP - 12 2- 17 6, IPAP 16 2 - Max IPAP 22                                   Percent usage: 97%, 90%> 4hrs                                   Average time used: 5hrs 30 mins - 8 hrs 25 mins                                  Time in large leak: 4 min 39 secs                                   Residual AHI: 7 0 (CA - 5 1)     Compliance Data:  1/5/20-2/5/20                                   Type of CPAP:  auto BiPAP min EPAP 11, PSV 4, Max IPAP 25,                                    Mean EPAP recorded - 12 1, Peak - 14 9, Min IPAP 16, Max IPAP 19                                   Percent usage: 72%, 63%> 4hrs                                   Average time used: 3hrs 51 mins - 8 hrs 26 mins                                  Time in large leak: 50 secs                                   Residual AHI: 6 6  (CA - 3 9)     Compliance Data:  10/23/19-11/21/19                                   Type of CPAP:  autoPAP 11-15, Mean - 13 2, Peak - 15 0                                   Percent usage: 73%, 67%> 4hrs                                   Average time used: 3hrs 38 mins - 8 hrs 37 mins                                  Time in large leak: 9 mins 46 secs                                   Residual AHI: 11 0  (CA - 0 9)     Compliance Data:  8/25/19-9/23/19                                   Type of CPAP:  autoPAP 8-15, Mean - 11 9, Peak - 15 6                                   Percent usage: 63%                                   Average time used: 2hrs 52 mins - 4 hrs 32 mins                                  Time in large leak: 4 mins 44 secs                                   Residual AHI: 13 6  (CA - 0 9)       Robert Wallis, DO

## 2022-04-08 ENCOUNTER — NURSE TRIAGE (OUTPATIENT)
Dept: OTHER | Facility: OTHER | Age: 62
End: 2022-04-08

## 2022-04-08 DIAGNOSIS — K21.00 GASTROESOPHAGEAL REFLUX DISEASE WITH ESOPHAGITIS: ICD-10-CM

## 2022-04-08 RX ORDER — PANTOPRAZOLE SODIUM 40 MG/1
40 TABLET, DELAYED RELEASE ORAL
Qty: 60 TABLET | Refills: 0 | Status: SHIPPED | OUTPATIENT
Start: 2022-04-08 | End: 2022-05-08

## 2022-04-09 NOTE — TELEPHONE ENCOUNTER
Regarding: Out of medication: Protonix  ----- Message from Judith Marquis RN sent at 4/8/2022  4:21 PM EDT -----  Patient reports took last tablet yesterday

## 2022-04-09 NOTE — TELEPHONE ENCOUNTER
Reason for Disposition   [1] Caller requesting a prescription renewal (no refills left), no triage required, AND [2] triager able to renew prescription per department policy    Answer Assessment - Initial Assessment Questions  1   DRUG NAME: "What medicine do you need to have refilled?"      Protonix    Protocols used: MEDICATION REFILL AND RENEWAL CALL-[facility]

## 2022-04-14 DIAGNOSIS — I10 ESSENTIAL HYPERTENSION: Primary | ICD-10-CM

## 2022-04-14 DIAGNOSIS — I10 ESSENTIAL HYPERTENSION: ICD-10-CM

## 2022-04-14 RX ORDER — CAPTOPRIL 12.5 MG/1
6.25 TABLET ORAL AS NEEDED
Qty: 60 TABLET | Refills: 1 | Status: SHIPPED | OUTPATIENT
Start: 2022-04-14 | End: 2022-04-18

## 2022-04-18 RX ORDER — CAPTOPRIL 12.5 MG/1
6.25 TABLET ORAL AS NEEDED
Qty: 60 TABLET | Refills: 1 | Status: SHIPPED | OUTPATIENT
Start: 2022-04-18 | End: 2022-04-21

## 2022-04-21 DIAGNOSIS — I10 ESSENTIAL HYPERTENSION: Primary | ICD-10-CM

## 2022-04-21 DIAGNOSIS — I10 ESSENTIAL HYPERTENSION: ICD-10-CM

## 2022-04-21 DIAGNOSIS — G90.3 NEUROGENIC ORTHOSTATIC HYPOTENSION (HCC): ICD-10-CM

## 2022-04-21 RX ORDER — ENALAPRIL MALEATE 10 MG/1
TABLET ORAL
Refills: 0 | OUTPATIENT
Start: 2022-04-21

## 2022-04-21 RX ORDER — ENALAPRIL MALEATE 2.5 MG/1
2.5 TABLET ORAL DAILY PRN
Qty: 90 TABLET | Refills: 3 | Status: SHIPPED | OUTPATIENT
Start: 2022-04-21 | End: 2022-05-19

## 2022-04-21 NOTE — TELEPHONE ENCOUNTER
Pt called stating captopril not covered under plan   states pharmacy was going to fax alternative   prescription for vasotec received from pharmacy reviewed with Dr Bernadette Dotson and he denied the 10 mg dose but reviewed and placed a rx for vasotec 2 5 mg PRN in place of it  Pt called understands of order placed

## 2022-05-07 DIAGNOSIS — K21.00 GASTROESOPHAGEAL REFLUX DISEASE WITH ESOPHAGITIS: ICD-10-CM

## 2022-05-08 RX ORDER — PANTOPRAZOLE SODIUM 40 MG/1
TABLET, DELAYED RELEASE ORAL
Qty: 60 TABLET | Refills: 0 | Status: SHIPPED | OUTPATIENT
Start: 2022-05-08 | End: 2022-06-07

## 2022-05-19 ENCOUNTER — OFFICE VISIT (OUTPATIENT)
Dept: CARDIOLOGY CLINIC | Facility: CLINIC | Age: 62
End: 2022-05-19
Payer: MEDICARE

## 2022-05-19 VITALS — BODY MASS INDEX: 32.82 KG/M2 | WEIGHT: 242 LBS | SYSTOLIC BLOOD PRESSURE: 170 MMHG | DIASTOLIC BLOOD PRESSURE: 80 MMHG

## 2022-05-19 DIAGNOSIS — I10 ESSENTIAL HYPERTENSION: Primary | ICD-10-CM

## 2022-05-19 DIAGNOSIS — I70.1: ICD-10-CM

## 2022-05-19 DIAGNOSIS — G90.3 NEUROGENIC ORTHOSTATIC HYPOTENSION (HCC): ICD-10-CM

## 2022-05-19 PROCEDURE — 99214 OFFICE O/P EST MOD 30 MIN: CPT

## 2022-05-19 PROCEDURE — 93000 ELECTROCARDIOGRAM COMPLETE: CPT

## 2022-05-19 RX ORDER — ENALAPRIL MALEATE 2.5 MG/1
5 TABLET ORAL DAILY PRN
Qty: 90 TABLET | Refills: 3 | Status: SHIPPED | OUTPATIENT
Start: 2022-05-19

## 2022-05-19 NOTE — PROGRESS NOTES
Cardiology   MD Raghu Denny MD Caron Bull, DO, MD Renetta Murphy DO, Virginia Gerard DO, [facility] - [place]  -------------------------------------------------------------------  [facility]  [address]  [address], [place] [address]  [phone]  [phone]  05/19/22  Momo Jesus  YOB: 1960   MRN: [id number]      Referring Physician: Lidia Garcia DO  [facility] [address]  [address],  [address] [facility]     HPI: Momo Jesus is a 60 y o  male with: Parkinson's disease, multi-system atrophy, obstructive sleep apnea, prior history of testicular cancer, bilateral inguinal hernia repair, nephrolithiasis, BPH, neurogenic bladder, orthostatic hypotension, chronic kidney disease who presents today for follow-up  He has been having continued issues with orthostatic hypotension  He has very large blood pressure swings in his very sensitive to medications  He keeps a very detailed blood pressure diary  For instance, on May 7th his supine blood pressure was 183/114 which immediately dropped to 75/47 when standing  He takes midodrine on occasion for hypotension and is on enalapril 2 5 mg for hypertension  The enalapril is new but this does seems to not be helping very much with the degree of his hypertension episodes  We will increase this today    Review of Systems   Constitutional: Negative for chills and fever  HENT: Negative for facial swelling and sore throat  Eyes: Negative for visual disturbance  Respiratory: Negative for cough, chest tightness, shortness of breath and wheezing  Cardiovascular: Negative for chest pain, palpitations and leg swelling  Gastrointestinal: Negative for abdominal pain, blood in stool, constipation, diarrhea, nausea and vomiting  Endocrine: Negative for cold intolerance and heat intolerance     Genitourinary: Negative for decreased urine volume, difficulty urinating, dysuria and hematuria  Musculoskeletal: Positive for gait problem  Negative for arthralgias, back pain and myalgias  Skin: Negative for rash  Neurological: Positive for dizziness and light-headedness  Negative for syncope, weakness and numbness  Psychiatric/Behavioral: Negative for agitation, behavioral problems and confusion  The patient is not nervous/anxious  OBJECTIVE  Vitals:    05/19/22 1652   BP: 170/80       Physical Exam  Constitutional: awake, alert and oriented, in no acute distress, no obvious deformities, he walks with a walker  Head: Normocephalic, without obvious abnormality, atraumatic  Eyes: conjunctivae clear and moist  Sclera anicteric  No xanthelasmas  Pupils equal bilaterally  Extraocular motions are full  Ear nose mouth and throat: ears are symmetrical bilaterally, hearing appears to be equal bilaterally, no nasal discharge or epistaxis, oropharynx is clear with moist mucous membranes  Neck:  Trachea is midline, neck is supple, no thyromegaly or significant lymphadenopathy, there is full range of motion  Lungs: clear to auscultation bilaterally, no wheezes, no rales, no rhonchi, no accessory muscle use, breathing is nonlabored  Heart: regular rate and rhythm, S1, S2 normal, no murmur, no click, no rub and no gallop, no lower extremity edema  Abdomen: soft, non-tender; bowel sounds normal; no masses,  no organomegaly  Psychiatric:  Patient is oriented to time, place, person, mood/affect is negative for depression, anxiety, agitation, appears to have appropriate insight  Skin: Skin is warm, dry, intact  No obvious rashes or lesions on exposed extremities  Nail beds are pink with no cyanosis or clubbing  Neuro    He has significant weakness in his legs to the point where he has to walk with a walker    EKG:  Results for orders placed or performed in visit on 05/19/22   POCT ECG    Impression    Normal sinus rhythm, normal ECG        IMPRESSION:  Orthostatic hypotension  Likely autonomic dysfunction  Diaphragm dysfunction  Parkinson's disease  Chronic kidney disease  Obstructive sleep apnea  Gastroesophageal reflux disease     Echo in 2020 with preserved ejection fraction with no significant valvular heart disease    DISCUSSION/RECOMMENDATIONS:  He continues to have issues with significant orthostatic hypotension  Would continue with midodrine for hypotensive episodes  Increase enalapril 5 mg for hypertensive episodes  When hypotensive I asked him to increase his fluids and or consider a small amount of sodium intake  He is very sensitive to changes however so I would not want to overdo this  The cause of his significant orthostatic hypotension is likely autonomic dysfunction related to his multi-system atrophy and or Parkinson's disease  He has no evidence of underlying myocardial disease    Triston Farmer DO, [facility] - [place]  --------------------------------------------------------------------------------  TREADMILL STRESS  No results found for this or any previous visit      ----------------------------------------------------------------------------------------------  NUCLEAR STRESS TEST: No results found for this or any previous visit      No results found for this or any previous visit       --------------------------------------------------------------------------------  CATH:  No results found for this or any previous visit     --------------------------------------------------------------------------------  ECHO:   Results for orders placed during the hospital encounter of 20    Echo complete with contrast if indicated    Dipika Padilla [address]  [facility], [address] [place] [address]  [phone]    Transthoracic Echocardiogram  2D, M-mode, Doppler, and Color Doppler    Study date:  2020    Patient: Priscila Mueller  MR number: [id number]  Account number: [de-identified]  : 1960  Age: 60 years  Gender: Male  Status: Outpatient  [facility]: [facility]  Height: 72 in  Weight: 234 5 lb  BP: 101/ 61 mmHg    Indications: Shortness of Breath    Diagnoses: R06 02 - Shortness of breath    Sonographer:  Cynthia Harley RDCS  Primary Physician:  Robbie Terrell DO  Referring Physician:  Robbie Terrell DO  Group:  [facility]  Interpreting Physician:  Jaison Parra MD    SUMMARY    LEFT VENTRICLE:  Systolic function was normal  Ejection fraction was estimated to be 60 %  There were no regional wall motion abnormalities  RIGHT VENTRICLE:  The size was normal   Systolic function was normal     HISTORY: PRIOR HISTORY: KALLI w/ BIPAP    PROCEDURE: [facility]  This was a routine study  The transthoracic approach was used  The study included complete 2D imaging, M-mode, complete spectral Doppler, and color Doppler  The  heart rate was 72 bpm, at the start of the study  Images were obtained from the parasternal, apical, subcostal, and suprasternal notch acoustic windows  Image quality was adequate  LEFT VENTRICLE: Size was normal  Systolic function was normal  Ejection fraction was estimated to be 60 %  There were no regional wall motion abnormalities  Wall thickness was normal  DOPPLER: The transmitral flow pattern was normal  Left  ventricular diastolic function parameters were normal     RIGHT VENTRICLE: The size was normal  Systolic function was normal  Wall thickness was normal     LEFT ATRIUM: Size was normal     RIGHT ATRIUM: Size was normal     MITRAL VALVE: Valve structure was normal  There was normal leaflet separation  DOPPLER: The transmitral velocity was within the normal range  There was no evidence for stenosis  There was trace regurgitation  AORTIC VALVE: The valve was trileaflet  Leaflets exhibited normal thickness and normal cuspal separation  DOPPLER: Transaortic velocity was within the normal range  There was no evidence for stenosis   There was no significant  regurgitation  TRICUSPID VALVE: The valve structure was normal  There was normal leaflet separation  DOPPLER: The transtricuspid velocity was within the normal range  There was no evidence for stenosis  There was trace regurgitation  The tricuspid jet  envelope definition was inadequate for estimation of RV systolic pressure  There are no indirect findings (abnormal RV volume or geometry, altered pulmonary flow velocity profile, or leftward septal displacement) which would suggest  moderate or severe pulmonary hypertension  PULMONIC VALVE: Leaflets exhibited normal thickness, no calcification, and normal cuspal separation  DOPPLER: The transpulmonic velocity was within the normal range  There was trace regurgitation  PERICARDIUM: There was no pericardial effusion  The pericardium was normal in appearance  AORTA: The root exhibited normal size  SYSTEMIC VEINS: IVC: The inferior vena cava was normal in size   Respirophasic changes were normal     SYSTEM MEASUREMENT TABLES    2D  %FS: 29 56 %  Ao Diam: 3 2 cm  EDV(Teich): 116 61 ml  EF(Cube): 65 04 %  EF(Teich): 56 34 %  ESV(Cube): 42 92 ml  ESV(Teich): 50 91 ml  IVSd: 1 02 cm  LA Area: 21 08 cm2  LA Diam: 3 25 cm  LVEDV MOD A4C: 147 51 ml  LVEF MOD A4C: 69 2 %  LVESV MOD A4C: 45 43 ml  LVIDd: 4 97 cm  LVIDs: 3 5 cm  LVLd A4C: 9 78 cm  LVLs A4C: 7 97 cm  LVPWd: 1 1 cm  RA Area: 19 63 cm2  RV Diam : 4 03 cm  SI(Cube): 35 03 ml/m2  SI(Teich): 28 82 ml/m2  SV MOD A4C: 102 08 ml  SV(Cube): 79 86 ml  SV(Teich): 65 7 ml    MM  TAPSE: 2 74 cm    PW  E': 0 15 m/s  E/E': 6 45  MV A Joesph: 0 72 m/s  MV Dec [place]: 6 03 m/s2  MV DecT: 162 26 ms  MV E Joesph: 0 98 m/s  MV E/A Ratio: 1 35    [employer] Accredited Echocardiography Laboratory    Prepared and electronically signed by    Lalo Staples MD  Signed 07-Apr-2020 13:58:33    No results found for this or any previous visit     --------------------------------------------------------------------------------  HOLTER  No results found for this or any previous visit  No results found for this or any previous visit     --------------------------------------------------------------------------------  CAROTIDS  No results found for this or any previous visit      --------------------------------------------------------------------------------  Diagnoses and all orders for this visit:    Essential hypertension  -     POCT ECG  -     enalapril (VASOTEC) 2 5 mg tablet; Take 2 tablets (5 mg total) by mouth as needed in the morning (for HTN)  Neurogenic orthostatic hypotension (HCC)  -     enalapril (VASOTEC) 2 5 mg tablet; Take 2 tablets (5 mg total) by mouth as needed in the morning (for HTN)      Renal artery stenosis of unknown etiology (HCC)     ======================================================    Past Medical History:   Diagnosis Date    Back pain     Bladder infection     BPH (benign prostatic hyperplasia)     Cancer ([place] [address] )     testicular 1988    Chronic kidney disease     Diverticulosis     GERD (gastroesophageal reflux disease)     occassional    History of testicular cancer 1988    Surgery and radiation; left side    Kidney stone     Kidney stones     Currently and in the past    Orthostatic hypotension     Orthostatic hypotension due to Parkinson's disease ([address] )     Parkinson's disease ([facility] [address] )     Sleep apnea     uses CPAP    Wears glasses      Past Surgical History:   Procedure Laterality Date    BLADDER NECK RECONSTRUCTION  07/27/2020    COLONOSCOPY  2017    COLONOSCOPY  09/2020    COLONOSCOPY  11/2020    CYSTOPLASTY / CYSTOURETHROPLASTY  07/27/2020    [address] inguinal hernia repair    IR SUPRAPUBIC CATHETER PLACEMENT  2/24/2020    IR TUBE UPSIZE  3/23/2020    KIDNEY STONE SURGERY      LITHOTRIPSY      Renal; Resolved: 2/27/07    ORCHIECTOMY Left     DE CYSTOURETHRO W/IMPLANT N/A 5/17/2018    Procedure: CYSTOSCOPY WITH INSERTION [place];  Surgeon: Ene Cavazos DO;  Location: [facility];  Service: Urology    PROSTATE SURGERY N/A 1/18/2018    Procedure: CYSTOSCOPY WITH INSERTION [place];  Surgeon: Ene Cavazos DO;  Location: [facility];  Service: Urology   [address]    For cancer    TONSILLECTOMY      URETERONEOCYSTOSTOMY      w/ cystoscopy Ureteral Stent Placement; Resolved: 6/14/2007    WISDOM TOOTH EXTRACTION           Medications  Current Outpatient Medications   Medication Sig Dispense Refill    albuterol (Ventolin HFA) 90 mcg/act inhaler Inhale 2 puffs every 6 (six) hours as needed for wheezing 18 g 0    carbidopa-levodopa (SINEMET)  mg per tablet Take 1 tablet by mouth 3 (three) times a day      cholecalciferol (VITAMIN D3) 1,000 units tablet Take 2,000 Units by mouth daily      cyanocobalamin (VITAMIN B-12) 1000 MCG tablet Take 1,000 mcg by mouth daily      Dalfampridine ER 10 MG TB12 3 (three) times a day        Diclofenac Sodium (VOLTAREN) 1 % Apply 2 g topically 4 (four) times a day 350 g 2    enalapril (VASOTEC) 2 5 mg tablet Take 2 tablets (5 mg total) by mouth as needed in the morning (for HTN)  90 tablet 3    famotidine (Pepcid) 20 mg tablet Take 1 tablet (20 mg total) by mouth daily (Patient taking differently: Take 20 mg by mouth as needed in the morning ) 90 tablet 1    midodrine (PROAMATINE) 5 mg tablet       multivitamin (THERAGRAN) TABS Take 1 tablet by mouth daily      pantoprazole (PROTONIX) 40 mg tablet TAKE 1 TABLET BY MOUTH 2 TIMES A DAY BEFORE MEALS   60 tablet 0    polyethylene glycol (GLYCOLAX) 17 GM/SCOOP powder Take 17 g by mouth daily      rOPINIRole (REQUIP) 1 mg tablet Take 1 tablet (1 mg total) by mouth 3 (three) times a day 30 tablet 3    selegiline (ELDEPRYL) 5 mg capsule       tadalafil (CIALIS) 5 MG tablet Take 5 mg by mouth daily      vitamin E 100 UNIT capsule Take 100 Units by mouth daily      Wheat Dextrin (Benefiber) POWD       calcium carbonate (OS-NEIL) 1250 (500 Ca) MG chewable tablet Chew (Patient not taking: Reported on 2022)      nystatin-triamcinolone (MYCOLOG-II) cream Apply topically 2 (two) times a day 60 g 0     No current facility-administered medications for this visit          No Known Allergies    Social History     Socioeconomic History    Marital status: /Civil Union     Spouse name: Not on file    Number of children: Not on file    Years of education: Not on file    Highest education level: Not on file   Occupational History    Occupation: [employer] retired   Tobacco Use    Smoking status: Former Smoker     Packs/day: 0 50     Years: 3 50     Pack years: 1 75     Types: Cigarettes     Start date:      Quit date:      Years since quittin 4    Smokeless tobacco: Never Used   Vaping Use    Vaping Use: Never used   Substance and Sexual Activity    Alcohol use: Not Currently    Drug use: No    Sexual activity: Not on file   Other Topics Concern    Not on file   Social History Narrative    Consumes 5 glasses of tea per week     Social Determinants of Health     Financial Resource Strain: Not on file   Food Insecurity: Not on file   Transportation Needs: Not on file   Physical Activity: Not on file   Stress: Not on file   Social Connections: Not on file   Intimate Partner Violence: Not on file   Housing Stability: Not on file        Family History   Problem Relation Age of Onset    Colon cancer Father     Heart failure Father     Parkinsonism Mother     Cancer Paternal Grandmother        Lab Results   Component Value Date    WBC 8 75 2020    HGB 14 4 2020    HCT 45 2 2020    MCV 92 2020     2020      Lab Results   Component Value Date    SODIUM 141 2021    K 4 5 2021     2021    CO2 30 2021    BUN 21 2021    CREATININE 1 24 2021    GLUC 100 05/11/2018    CALCIUM 10 0 01/23/2021      Lab Results   Component Value Date    HGBA1C 5 9 (H) 08/18/2020      No results found for: CHOL  Lab Results   Component Value Date    HDL 56 08/18/2020    HDL 49 07/06/2018    HDL 56 12/01/2016     Lab Results   Component Value Date    LDLCALC 107 (H) 08/18/2020    LDLCALC 120 (H) 07/06/2018    LDLCALC 127 (H) 12/01/2016     Lab Results   Component Value Date    TRIG 96 08/18/2020    TRIG 83 07/06/2018    TRIG 155 (H) 12/01/2016     No results found for: [place], [place]   Lab Results   Component Value Date    INR 1 05 02/24/2020    PROTIME 13 8 02/24/2020          Patient Active Problem List    Diagnosis Date Noted    Tinea versicolor 12/06/2021    Olecranon bursitis of right elbow 12/06/2021    Primary osteoarthritis of left knee 08/06/2021    Renal artery stenosis of unknown etiology ([facility] [address] ) 06/15/2021    Encounter for attention to other artificial openings of urinary tract ([facility] [address] ) 06/15/2021    Neuromuscular disease ([address] ) 06/15/2021    Essential hypertension 05/13/2021    Primary malignant neoplasm of testis ([facility] [address] ) 05/13/2021    Hyponatremia 01/06/2021    Olecranon bursitis 12/14/2020    Parasomnia 11/24/2020    Restless leg syndrome 11/24/2020    Prediabetes 09/23/2020    Mixed hyperlipidemia 09/23/2020    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/2020    Truncal ataxia 08/27/2020    Recurrent left knee instability 04/24/2020    Coracoid impingement of right shoulder 04/24/2020    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 04/24/2020    Numbness and tingling 04/24/2020    Acute pain of right shoulder 02/21/2020    Proteinuria 12/06/2019    Urinary retention 12/06/2019    Nephrolithiasis 12/06/2019    CKD (chronic kidney disease) stage 2, GFR 60-89 ml/min 12/06/2019    Elevated blood pressure reading 11/22/2019    Tinea corporis 08/27/2019    Periodic limb movement disorder (PLMD) 08/22/2019    Change in voice 06/03/2019    Gastroesophageal reflux disease with esophagitis 06/03/2019    KALLI (obstructive sleep apnea)     Chronic pain of right ankle 03/04/2019    Chronic pain of left knee 03/04/2019    Parkinson's disease ([address] ) 03/04/2019    Thyromegaly 03/04/2019    Neurogenic orthostatic hypotension (HCC) 02/27/2019    Shortness of breath 11/29/2018    Pain of right thumb 10/18/2018    Pain of left thumb 10/18/2018    Right elbow pain 10/18/2018    Right wrist pain 10/18/2018    Plantar fasciitis, bilateral 10/10/2018    Unspecified abnormalities of gait and mobility 10/10/2018    Dizziness 07/05/2018    Personal history of malignant neoplasm of testis 12/17/2014       Portions of the record may have been created with voice recognition software  Occasional wrong word or "sound a like" substitutions may have occurred due to the inherent limitations of voice recognition software  Read the chart carefully and recognize, using context, where substitutions have occurred      Dony Mackenzie DO, [facility] [place]  5/19/2022 5:36 PM

## 2022-06-06 ENCOUNTER — OFFICE VISIT (OUTPATIENT)
Dept: FAMILY MEDICINE CLINIC | Facility: CLINIC | Age: 62
End: 2022-06-06
Payer: MEDICARE

## 2022-06-06 VITALS
WEIGHT: 245 LBS | BODY MASS INDEX: 33.18 KG/M2 | HEIGHT: 72 IN | OXYGEN SATURATION: 100 % | HEART RATE: 80 BPM | TEMPERATURE: 99 F | SYSTOLIC BLOOD PRESSURE: 180 MMHG | DIASTOLIC BLOOD PRESSURE: 100 MMHG

## 2022-06-06 DIAGNOSIS — G20 PARKINSON'S DISEASE (HCC): ICD-10-CM

## 2022-06-06 DIAGNOSIS — G90.3 NEUROGENIC ORTHOSTATIC HYPOTENSION (HCC): Primary | ICD-10-CM

## 2022-06-06 DIAGNOSIS — Z00.00 MEDICARE ANNUAL WELLNESS VISIT, SUBSEQUENT: ICD-10-CM

## 2022-06-06 DIAGNOSIS — G70.9 NEUROMUSCULAR DISEASE (HCC): ICD-10-CM

## 2022-06-06 DIAGNOSIS — K21.00 GASTROESOPHAGEAL REFLUX DISEASE WITH ESOPHAGITIS WITHOUT HEMORRHAGE: ICD-10-CM

## 2022-06-06 DIAGNOSIS — R26.9 UNSPECIFIED ABNORMALITIES OF GAIT AND MOBILITY: ICD-10-CM

## 2022-06-06 PROCEDURE — G0439 PPPS, SUBSEQ VISIT: HCPCS | Performed by: FAMILY MEDICINE

## 2022-06-06 PROCEDURE — 99214 OFFICE O/P EST MOD 30 MIN: CPT | Performed by: FAMILY MEDICINE

## 2022-06-06 NOTE — PROGRESS NOTES
Assessment and Plan:     Problem List Items Addressed This Visit    None          Preventive health issues were discussed with patient, and age appropriate screening tests were ordered as noted in patient's After Visit Summary  Personalized health advice and appropriate referrals for health education or preventive services given if needed, as noted in patient's After Visit Summary       History of Present Illness:     Patient presents for Medicare Annual Wellness visit    Patient Care Team:  Layne Soliz DO as PCP - General  Lucas Freeman DO (Nephrology)     Problem List:     Patient Active Problem List   Diagnosis    Personal history of malignant neoplasm of testis    Dizziness    Plantar fasciitis, bilateral    Unspecified abnormalities of gait and mobility    Pain of right thumb    Pain of left thumb    Right elbow pain    Right wrist pain    Shortness of breath    Neurogenic orthostatic hypotension (HCC)    Chronic pain of right ankle    Chronic pain of left knee    Parkinson's disease ([address] )    Thyromegaly    KALLI (obstructive sleep apnea)    Change in voice    Gastroesophageal reflux disease with esophagitis    Periodic limb movement disorder (PLMD)    Tinea corporis    Elevated blood pressure reading    Proteinuria    Urinary retention    Nephrolithiasis    CKD (chronic kidney disease) stage 2, GFR 60-89 ml/min    Acute pain of right shoulder    Recurrent left knee instability    Coracoid impingement of right shoulder    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    Numbness and tingling    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HCC)    Truncal ataxia    Prediabetes    Mixed hyperlipidemia    Parasomnia    Restless leg syndrome    Olecranon bursitis    Hyponatremia    Essential hypertension    Primary malignant neoplasm of testis ([address] )    Renal artery stenosis of unknown etiology ([address] )    Encounter for attention to other artificial openings of urinary tract ([address] )  Neuromuscular disease ([facility] )    Primary osteoarthritis of left knee    Tinea versicolor    Olecranon bursitis of right elbow      Past Medical and Surgical History:     Past Medical History:   Diagnosis Date    Back pain     Bladder infection     BPH (benign prostatic hyperplasia)     Cancer ([facility] )     testicular 1988    Chronic kidney disease     Diverticulosis     GERD (gastroesophageal reflux disease)     occassional    History of testicular cancer 1988    Surgery and radiation; left side    Kidney stone     Kidney stones     Currently and in the past    Orthostatic hypotension     Orthostatic hypotension due to Parkinson's disease ([facility] )     Parkinson's disease ([place] [address] )     Sleep apnea     uses CPAP    Wears glasses      Past Surgical History:   Procedure Laterality Date    BLADDER NECK RECONSTRUCTION  07/27/2020    COLONOSCOPY  2017    COLONOSCOPY  09/2020    COLONOSCOPY  11/2020    Mckenna Wilner / CYSTOURETHROPLASTY  07/27/2020    [facility]  [address], 1986 inguinal hernia repair    IR SUPRAPUBIC CATHETER PLACEMENT  2/24/2020    IR TUBE UPSIZE  3/23/2020    KIDNEY STONE SURGERY      LITHOTRIPSY      Renal; Resolved: 2/27/07    ORCHIECTOMY Left     WI CYSTOURETHRO W/IMPLANT N/A 5/17/2018    Procedure: CYSTOSCOPY WITH INSERTION UROLIFT;  Surgeon: Tj Quispe DO;  [facility];  Service: Urology    PROSTATE SURGERY N/A 1/18/2018    Procedure: CYSTOSCOPY WITH INSERTION UROLIFT;  Surgeon: Tj Quispe DO;  [facility];  Service: Urology    [address] [place] [address]    For cancer    TONSILLECTOMY      URETERONEOCYSTOSTOMY      w/ cystoscopy Ureteral Stent Placement;  Resolved: 6/14/2007    WISDOM TOOTH EXTRACTION        Family History:     Family History   Problem Relation Age of Onset    Colon cancer Father     Heart failure Father     Parkinsonism Mother     Cancer Paternal Grandmother       Social History:     Social History Socioeconomic History    Marital status: /Civil Union     Spouse name: None    Number of children: None    Years of education: None    Highest education level: None   Occupational History    Occupation: [employer] retired   Tobacco Use    Smoking status: Former Smoker     Packs/day: 0 50     Years: 3 50     Pack years: 1 75     Types: Cigarettes     Start date:      Quit date:      Years since quittin 4    Smokeless tobacco: Never Used   Vaping Use    Vaping Use: Never used   Substance and Sexual Activity    Alcohol use: Not Currently    Drug use: No    Sexual activity: None   Other Topics Concern    None   Social History Narrative    Consumes 5 glasses of tea per week     Social Determinants of Health     Financial Resource Strain: Not on file   Food Insecurity: Not on file   Transportation Needs: Not on file   Physical Activity: Not on file   Stress: Not on file   Social Connections: Not on file   Intimate Partner Violence: Not on file   Housing Stability: Not on file      Medications and Allergies:     Current Outpatient Medications   Medication Sig Dispense Refill    albuterol (Ventolin HFA) 90 mcg/act inhaler Inhale 2 puffs every 6 (six) hours as needed for wheezing 18 g 0    carbidopa-levodopa (SINEMET)  mg per tablet Take 1 tablet by mouth 3 (three) times a day      cholecalciferol (VITAMIN D3) 1,000 units tablet Take 2,000 Units by mouth daily      cyanocobalamin (VITAMIN B-12) 1000 MCG tablet Take 1,000 mcg by mouth daily      Dalfampridine ER 10 MG TB12 3 (three) times a day        Diclofenac Sodium (VOLTAREN) 1 % Apply 2 g topically 4 (four) times a day 350 g 2    enalapril (VASOTEC) 2 5 mg tablet Take 2 tablets (5 mg total) by mouth as needed in the morning (for HTN)   90 tablet 3    famotidine (Pepcid) 20 mg tablet Take 1 tablet (20 mg total) by mouth daily (Patient taking differently: Take 20 mg by mouth as needed) 90 tablet 1    midodrine (PROAMATINE) 5 mg tablet       multivitamin (THERAGRAN) TABS Take 1 tablet by mouth daily      pantoprazole (PROTONIX) 40 mg tablet TAKE 1 TABLET BY MOUTH 2 TIMES A DAY BEFORE MEALS  60 tablet 0    polyethylene glycol (GLYCOLAX) 17 GM/SCOOP powder Take 17 g by mouth daily      rOPINIRole (REQUIP) 1 mg tablet Take 1 tablet (1 mg total) by mouth 3 (three) times a day 30 tablet 3    selegiline (ELDEPRYL) 5 mg capsule       vitamin E 100 UNIT capsule Take 100 Units by mouth daily      Wheat Dextrin (Benefiber) POWD       calcium carbonate (OS-NEIL) 1250 (500 Ca) MG chewable tablet Chew (Patient not taking: No sig reported)      nystatin-triamcinolone (MYCOLOG-II) cream Apply topically 2 (two) times a day 60 g 0    tadalafil (CIALIS) 5 MG tablet Take 5 mg by mouth daily       No current facility-administered medications for this visit  No Known Allergies   Immunizations:     Immunization History   Administered Date(s) Administered    COVID-19 PFIZER VACCINE 0 3 ML IM 03/02/2021, 03/22/2021    Influenza Quadrivalent, 6-35 Months IM 10/29/2015    Influenza, recombinant, quadrivalent,injectable, preservative free 09/23/2020, 12/06/2021    Influenza, seasonal, injectable, preservative free 10/29/2015    Td (adult), adsorbed 07/07/2006    Tdap 09/20/2011      Health Maintenance:         Topic Date Due    Hepatitis C Screening  Never done    HIV Screening  Never done    Colorectal Cancer Screening  09/17/2023         Topic Date Due    Pneumococcal Vaccine: Pediatrics (0 to 5 Years) and At-Risk Patients (6 to 59 Years) (1 - PCV) Never done    COVID-19 Vaccine (3 - Booster for Pfizer series) 08/22/2021    DTaP,Tdap,and Td Vaccines (2 - Td or Tdap) 09/20/2021      Medicare Health Risk Assessment:     There were no vitals taken for this visit  Zaida Escobar is here for his Subsequent Wellness visit  Health Risk Assessment:   Patient rates overall health as poor   Patient feels that their physical health rating is much worse  Patient is dissatisfied with their life  Eyesight was rated as slightly worse  Hearing was rated as same  Patient feels that their emotional and mental health rating is same  Patients states they are never, rarely angry  Patient states they are sometimes unusually tired/fatigued  Pain experienced in the last 7 days has been some  Patient's pain rating has been 2/10  Patient states that he has experienced weight loss or gain in last 6 months  Fall Risk Screening: In the past year, patient has experienced: history of falling in past year      Home Safety:  Patient does not have trouble with stairs inside or outside of their home  Patient has working smoke alarms and has working carbon monoxide detector  Home safety hazards include: none  Nutrition:   Current diet is Regular  Medications:   Patient is currently taking over-the-counter supplements  OTC medications include: vitamins, Voltaren  Patient is able to manage medications  Activities of Daily Living (ADLs)/Instrumental Activities of Daily Living (IADLs):   Walk and transfer into and out of bed and chair?: Yes  Dress and groom yourself?: Yes    Bathe or shower yourself?: Yes    Feed yourself?  Yes  Do your laundry/housekeeping?: No  Manage your money, pay your bills and track your expenses?: Yes  Make your own meals?: Yes    Do your own shopping?: No    Previous Hospitalizations:   Any hospitalizations or ED visits within the last 12 months?: No      Advance Care Planning:   Living will: No    Durable POA for healthcare: No    Advanced directive: No      Cognitive Screening:   Provider or family/friend/caregiver concerned regarding cognition?: No    PREVENTIVE SCREENINGS      Cardiovascular Screening:    General: Screening Not Indicated, History Lipid Disorder, Risks and Benefits Discussed and Screening Current      Diabetes Screening:     General: Risks and Benefits Discussed and Screening Current      Colorectal Cancer Screening:     General: Screening Current      Prostate Cancer Screening:    General: Risks and Benefits Discussed and Screening Current      Osteoporosis Screening:    General: Risks and Benefits Discussed and Screening Not Indicated      Abdominal Aortic Aneurysm (AAA) Screening:    Risk factors include: tobacco use        General: Risks and Benefits Discussed and Screening Not Indicated      Lung Cancer Screening:     General: Screening Not Indicated and Risks and Benefits Discussed      Hepatitis C Screening:    General: Risks and Benefits Discussed and Patient Declines    Screening, Brief Intervention, and Referral to Treatment (SBIRT)    Screening  Typical number of drinks in a day: 0  Typical number of drinks in a week: 0  Interpretation: Low risk drinking behavior  AUDIT-C Screenin) How often did you have a drink containing alcohol in the past year? never  2) How many drinks did you have on a typical day when you were drinking in the past year? 0  3) How often did you have 6 or more drinks on one occasion in the past year? never    AUDIT-C Score: 0  Interpretation: Score 0-3 (male): Negative screen for alcohol misuse    Single Item Drug Screening:  How often have you used an illegal drug (including marijuana) or a prescription medication for non-medical reasons in the past year? never    Single Item Drug Screen Score: 0  Interpretation: Negative screen for possible drug use disorder    Other Counseling Topics:   Regular weightbearing exercise         Nate Mccullough,

## 2022-06-08 NOTE — PROGRESS NOTES
Assessment/Plan:  Multiple laboratory studies including CBC, PT INR, PTT, BMP reviewed at this time  Patient will continue follow-up with neurologist at both [facility] and [facility]  Patient will continue with current regimen for neurogenic orthostatic hypotension  Patient will continue current regimen for Parkinson's disease and will continue with restorative exercises  GERD symptoms stable at this time  Refills will be given when needed for any medications needed  Patient will follow-up in 6 months or as needed       Diagnoses and all orders for this visit:    Neurogenic orthostatic hypotension ([facility] )    Parkinson's disease ([facility] )    Neuromuscular disease ([facility] )    Unspecified abnormalities of gait and mobility    Medicare annual wellness visit, subsequent            Subjective:        Patient ID: Aury Dailey is a 60 y o  male  Patient is here to follow-up on neurogenic orthostatic hypotension, Parkinson's, neuromuscular disease as well as gait abnormality  Patient also with history of GERD  No GERD related issues  Patient is seeing neurologist at [facility] as well as [place] for neurogenic orthostatic hypotension and multi system failure/atrophy  Patient with significant labile blood pressures  No acute chest pain shortness of breath or abdominal pain or problems urinating or defecating  No recent syncopal events  Patient with ongoing tremors dizziness weakness  The following portions of the patient's history were reviewed and updated as appropriate: allergies, current medications, past family history, past medical history, past social history, past surgical history and problem list       Review of Systems   Constitutional: Negative  HENT: Negative  Eyes: Negative  Respiratory: Negative  Cardiovascular: Negative  Gastrointestinal: Negative  Endocrine: Negative  Genitourinary: Negative  Musculoskeletal: Negative  Skin: Negative      Allergic/Immunologic: Negative  Neurological: Positive for dizziness, tremors, weakness, light-headedness and numbness  Hematological: Negative  Psychiatric/Behavioral: Negative  Objective:      BMI Counseling: Body mass index is 33 23 kg/m²  The BMI is above normal  Nutrition recommendations include consuming healthier snacks  Exercise recommendations include strength training exercises  Rationale for BMI follow-up plan is due to patient being overweight or obese  Depression Screening and Follow-up Plan: Clincally patient does not have depression  No treatment is required  BP (!) 180/100 (BP Location: Right arm, Patient Position: Sitting, Cuff Size: Large)   Pulse 80   Temp 99 °F (37 2 °C) (Temporal)   Ht 6' (1 829 m)   Wt 111 kg (245 lb)   SpO2 100%   BMI 33 23 kg/m²          Physical Exam  Vitals and nursing note reviewed  Constitutional:       General: He is not in acute distress  Appearance: Normal appearance  He is not ill-appearing, toxic-appearing or diaphoretic  HENT:      Head: Normocephalic and atraumatic  Right Ear: Tympanic membrane, ear canal and external ear normal  There is no impacted cerumen  Left Ear: Tympanic membrane, ear canal and external ear normal  There is no impacted cerumen  Eyes:      General: No scleral icterus  Right eye: No discharge  Left eye: No discharge  Extraocular Movements: Extraocular movements intact  Conjunctiva/sclera: Conjunctivae normal       Pupils: Pupils are equal, round, and reactive to light  Neck:      Vascular: No carotid bruit  Cardiovascular:      Rate and Rhythm: Normal rate and regular rhythm  Pulses: Normal pulses  Heart sounds: Normal heart sounds  No murmur heard  No friction rub  No gallop  Pulmonary:      Effort: Pulmonary effort is normal  No respiratory distress  Breath sounds: Normal breath sounds  No stridor  No wheezing, rhonchi or rales     Chest:      Chest wall: No tenderness  Musculoskeletal:         General: No swelling, tenderness, deformity or signs of injury  Cervical back: Normal range of motion and neck supple  No rigidity  No muscular tenderness  Right lower leg: No edema  Left lower leg: No edema  Lymphadenopathy:      Cervical: No cervical adenopathy  Skin:     General: Skin is warm and dry  Capillary Refill: Capillary refill takes less than 2 seconds  Coloration: Skin is not jaundiced  Findings: No bruising, erythema, lesion or rash  Neurological:      Mental Status: He is alert and oriented to person, place, and time  Mental status is at baseline  Cranial Nerves: No cranial nerve deficit  Motor: Weakness present  Coordination: Coordination abnormal       Gait: Gait abnormal    Psychiatric:         Mood and Affect: Mood normal          Behavior: Behavior normal          Thought Content: Thought content normal          Judgment: Judgment normal          Answers for HPI/ROS submitted by the patient on 6/6/2022  How would you rate your overall health?: poor  Compared to last year, how is your physical health?: much worse  In general, how satisfied are you with your life?: dissatisfied  Compared to last year, how is your eyesight?: slightly worse  Compared to last year, how is your hearing?: same  Compared to last year, how is your emotional/mental health?: same  How often is anger a problem for you?: never, rarely  How often do you feel unusually tired/fatigued?: sometimes  In the past 7 days, how much pain have you experienced?: some  If you answered "some" or "a lot", please rate the severity of your pain on a scale of 1 to 10 (1 being the least severe pain and 10 being the most intense pain)  : 2/10  In the past 6 months, have you lost or gained 10 pounds without trying?: Yes  One or more falls in the last year: Yes  Do you have trouble with the stairs inside or outside your home?: No  Does your home have working smoke alarms?: Yes  Does your home have a carbon monoxide monitor?: Yes  Which safety hazards (if any) have you experienced in your home? Please select all that apply : none  How would you describe your current diet?  Please select all that apply : Regular  In addition to prescription medications, are you taking any over-the-counter supplements?: Yes  If yes, what supplements are you taking?: vitamins, Voltaren  Can you manage your medications?: Yes  Are you currently taking any opioid medications?: No  Can you walk and transfer into and out of your bed and chair?: Yes  Can you dress and groom yourself?: Yes  Can you bathe or shower yourself?: Yes  Can you feed yourself?: Yes  Can you do your laundry/ housekeeping?: No  Can you manage your money, pay your bills, and track your expenses?: Yes  Can you make your own meals?: Yes  Can you do your own shopping?: No  Within the last 12 months, have you had any hospitalizations or Emergency Department visits?: No  Do you have a living will?: No  Do you have a Durable POA (Power of ) for healthcare decisions?: No  Do you have an Advanced Directive for end of life decisions?: No  How often have you used an illegal drug (including marijuana) or a prescription medication for non-medical reasons in the past year?: never  What is the typical number of drinks you consume in a day?: 0  What is the typical number of drinks you consume in a week?: 0  How often did you have a drink containing alcohol in the past year?: never  How many drinks did you have on a typical day  when you were drinking in the past year?: 0  How often did you have 6 or more drinks on one occasion in the past year?: never

## 2022-06-08 NOTE — PATIENT INSTRUCTIONS

## 2022-06-30 ENCOUNTER — APPOINTMENT (EMERGENCY)
Dept: RADIOLOGY | Facility: HOSPITAL | Age: 62
End: 2022-06-30
Payer: MEDICARE

## 2022-06-30 ENCOUNTER — APPOINTMENT (EMERGENCY)
Dept: ULTRASOUND IMAGING | Facility: HOSPITAL | Age: 62
End: 2022-06-30
Payer: MEDICARE

## 2022-06-30 ENCOUNTER — TELEPHONE (OUTPATIENT)
Dept: PULMONOLOGY | Facility: CLINIC | Age: 62
End: 2022-06-30

## 2022-06-30 ENCOUNTER — HOSPITAL ENCOUNTER (EMERGENCY)
Facility: HOSPITAL | Age: 62
Discharge: HOME | End: 2022-07-01
Attending: EMERGENCY MEDICINE
Payer: MEDICARE

## 2022-06-30 VITALS
WEIGHT: 245 LBS | BODY MASS INDEX: 33.23 KG/M2 | DIASTOLIC BLOOD PRESSURE: 91 MMHG | HEART RATE: 78 BPM | RESPIRATION RATE: 19 BRPM | OXYGEN SATURATION: 98 % | SYSTOLIC BLOOD PRESSURE: 165 MMHG | TEMPERATURE: 97.6 F

## 2022-06-30 DIAGNOSIS — R06.00 DYSPNEA: Primary | ICD-10-CM

## 2022-06-30 DIAGNOSIS — R06.03 RESPIRATORY DISTRESS: Primary | ICD-10-CM
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
## 2022-06-30 LAB
2HR DELTA HS TROPONIN: 2 NG/L
ALBUMIN SERPL BCP-MCNC: 3.8 G/DL (ref 3.5–5)
ALP SERPL-CCNC: 73 U/L (ref 46–116)
ALT SERPL W P-5'-P-CCNC: 14 U/L (ref 12–78)
ANION GAP SERPL CALCULATED.3IONS-SCNC: 5 MMOL/L (ref 4–13)
AST SERPL W P-5'-P-CCNC: 16 U/L (ref 5–45)
BACTERIA UR QL AUTO: ABNORMAL /HPF
BASOPHILS # BLD AUTO: 0.04 THOUSANDS/ΜL (ref 0–0.1)
BASOPHILS NFR BLD AUTO: 1 % (ref 0–1)
BILIRUB SERPL-MCNC: 0.83 MG/DL (ref 0.2–1)
BILIRUB UR QL STRIP: NEGATIVE
BUN SERPL-MCNC: 21 MG/DL (ref 5–25)
CALCIUM SERPL-MCNC: 8.9 MG/DL (ref 8.3–10.1)
CARDIAC TROPONIN I PNL SERPL HS: 4 NG/L
CARDIAC TROPONIN I PNL SERPL HS: 6 NG/L
CHLORIDE SERPL-SCNC: 101 MMOL/L (ref 100–108)
CLARITY UR: CLEAR
CO2 SERPL-SCNC: 30 MMOL/L (ref 21–32)
COLOR UR: YELLOW
CREAT SERPL-MCNC: 0.87 MG/DL (ref 0.6–1.3)
EOSINOPHIL # BLD AUTO: 0.15 THOUSAND/ΜL (ref 0–0.61)
EOSINOPHIL NFR BLD AUTO: 3 % (ref 0–6)
ERYTHROCYTE [DISTWIDTH] IN BLOOD BY AUTOMATED COUNT: 12.6 % (ref 11.6–15.1)
GFR SERPL CREATININE-BSD FRML MDRD: 91 ML/MIN/1.73SQ M
GLUCOSE SERPL-MCNC: 108 MG/DL (ref 65–140)
GLUCOSE UR STRIP-MCNC: NEGATIVE MG/DL
HCT VFR BLD AUTO: 44.2 % (ref 36.5–49.3)
HGB BLD-MCNC: 14.7 G/DL (ref 12–17)
HGB UR QL STRIP.AUTO: ABNORMAL
IMM GRANULOCYTES # BLD AUTO: 0.04 THOUSAND/UL (ref 0–0.2)
IMM GRANULOCYTES NFR BLD AUTO: 1 % (ref 0–2)
KETONES UR STRIP-MCNC: NEGATIVE MG/DL
LEUKOCYTE ESTERASE UR QL STRIP: NEGATIVE
LYMPHOCYTES # BLD AUTO: 1.28 THOUSANDS/ΜL (ref 0.6–4.47)
LYMPHOCYTES NFR BLD AUTO: 24 % (ref 14–44)
MCH RBC QN AUTO: 30.2 PG (ref 26.8–34.3)
MCHC RBC AUTO-ENTMCNC: 33.3 G/DL (ref 31.4–37.4)
MCV RBC AUTO: 91 FL (ref 82–98)
MONOCYTES # BLD AUTO: 0.63 THOUSAND/ΜL (ref 0.17–1.22)
MONOCYTES NFR BLD AUTO: 12 % (ref 4–12)
NEUTROPHILS # BLD AUTO: 3.3 THOUSANDS/ΜL (ref 1.85–7.62)
NEUTS SEG NFR BLD AUTO: 59 % (ref 43–75)
NITRITE UR QL STRIP: NEGATIVE
NON-SQ EPI CELLS URNS QL MICRO: ABNORMAL /HPF
NRBC BLD AUTO-RTO: 0 /100 WBCS
PH UR STRIP.AUTO: 5.5 [PH]
PLATELET # BLD AUTO: 196 THOUSANDS/UL (ref 149–390)
PMV BLD AUTO: 11.4 FL (ref 8.9–12.7)
POTASSIUM SERPL-SCNC: 4.1 MMOL/L (ref 3.5–5.3)
PROT SERPL-MCNC: 7.7 G/DL (ref 6.4–8.2)
PROT UR STRIP-MCNC: NEGATIVE MG/DL
RBC # BLD AUTO: 4.86 MILLION/UL (ref 3.88–5.62)
RBC #/AREA URNS AUTO: ABNORMAL /HPF
SODIUM SERPL-SCNC: 136 MMOL/L (ref 136–145)
SP GR UR STRIP.AUTO: 1.02 (ref 1–1.03)
UROBILINOGEN UR QL STRIP.AUTO: 0.2 E.U./DL
WBC # BLD AUTO: 5.44 THOUSAND/UL (ref 4.31–10.16)
WBC #/AREA URNS AUTO: ABNORMAL /HPF

## 2022-06-30 PROCEDURE — 80053 COMPREHEN METABOLIC PANEL: CPT

## 2022-06-30 PROCEDURE — 81001 URINALYSIS AUTO W/SCOPE: CPT

## 2022-06-30 PROCEDURE — 84484 ASSAY OF TROPONIN QUANT: CPT

## 2022-06-30 PROCEDURE — 96374 THER/PROPH/DIAG INJ IV PUSH: CPT

## 2022-06-30 PROCEDURE — 99285 EMERGENCY DEPT VISIT HI MDM: CPT

## 2022-06-30 PROCEDURE — 93005 ELECTROCARDIOGRAM TRACING: CPT

## 2022-06-30 PROCEDURE — 71046 X-RAY EXAM CHEST 2 VIEWS: CPT

## 2022-06-30 PROCEDURE — 85025 COMPLETE CBC W/AUTO DIFF WBC: CPT

## 2022-06-30 PROCEDURE — 76870 US EXAM SCROTUM: CPT

## 2022-06-30 PROCEDURE — 36415 COLL VENOUS BLD VENIPUNCTURE: CPT

## 2022-06-30 PROCEDURE — 99285 EMERGENCY DEPT VISIT HI MDM: CPT | Performed by: EMERGENCY MEDICINE

## 2022-06-30 RX ORDER — LABETALOL HYDROCHLORIDE 5 MG/ML
5 INJECTION, SOLUTION INTRAVENOUS ONCE
Status: COMPLETED | OUTPATIENT
Start: 2022-06-30 | End: 2022-06-30

## 2022-06-30 RX ADMIN — LABETALOL HYDROCHLORIDE 5 MG: 5 INJECTION, SOLUTION INTRAVENOUS at 21:57

## 2022-06-30 NOTE — PROGRESS NOTES
I received a call about Mr Mino Maloney  He was complaining of worsening respiratory distress that has occurred over the past 2-3 days  He states that he has been dealing with a UTI and has been on cipro for 2 weeks  He was seen by his urologist who send him for an [address] due to concern for a testicular abscess as he has significant swelling and tenderness  He has noted worsening respiratory distress despite being on his advanced iVAPS machine  He states that he has done relatively well on the iVAPS since the last adjustment made in march  It has only been the past few days he has noted this issue  He was hesitant to go tot he ER but I explained my concern that his work of breathing may be related to possible sepsis from pneumonia or urinary source/abscess  Given that he has little wiggle room from a respiratory standpoint and he was noting work of breathing, I encouraged him to go to the ER  He was in agreement  I called PACS and placed an ADT21 in place for more expedited evaluation  [facility]

## 2022-06-30 NOTE — TELEPHONE ENCOUNTER
Patient is calling, he is asking for a nurse to call him back, he is struggling to breath and sounded like he was having difficulty on the phone   Please advise

## 2022-06-30 NOTE — TELEPHONE ENCOUNTER
Damián Lewis would like a return call regarding     What is the reason for the call/chief complaint? SOB    What additional symptoms are present or absent? SOB, YES   Are they on O2? NO  chest pain/tightness, NO  wheezing, NO  Cough, NO  mucous production,NO  fevers/chills, NO  weight gain, NO  Swelling NO  Pain NO  does it hurt when breathing or all the time? YES  BARELY COULD TALK EXTREMELY SOB    When did they start/how long have they been going on? 2 DAYS    Constant or intermittent; if intermittent describe NO    What makes it better or worse? NOTHING AT THE MOMENT    Have you been exposed to anyone that is sick? NO    Have you been tested for COVID recently? NO    Check current pulmonary medications ALBUTEROL   frequency of use DAILY    Have they had any other treatment or testing for this problem elsewhere? NO    Recent steroids? NO    Recent Antibiotics? NO    Last office visit? 3/30/22    Patient pharmacy? [facility]   30 or 90 day supply 30      SPOKE WITH PATIENT AND HE COULDN'T BARELY TALK  WITH MILD EXERTION INCREASE SOB  DEEP SHALLOW BREATHING WAS ADVISED TO GO TO THE ED IF IT GOT WORSE  NEUROLOGIST QUESTION MAYBE HE CAN HAVE A DEVICE LIKE HIS IVAP THAT HE USES AT NIGHT TO HELP DURING THE DAY  PATIENT DID NOT SOUND WELL ON THE PHONE  PLEASE CALL PATIENT WITH NEXT STEP

## 2022-07-01 LAB
ATRIAL RATE: 76 BPM
ATRIAL RATE: 81 BPM
P AXIS: 51 DEGREES
P AXIS: 61 DEGREES
PR INTERVAL: 172 MS
PR INTERVAL: 174 MS
QRS AXIS: 5 DEGREES
QRS AXIS: 7 DEGREES
QRSD INTERVAL: 90 MS
QRSD INTERVAL: 96 MS
QT INTERVAL: 362 MS
QT INTERVAL: 376 MS
QTC INTERVAL: 420 MS
QTC INTERVAL: 423 MS
T WAVE AXIS: 10 DEGREES
T WAVE AXIS: 9 DEGREES
VENTRICULAR RATE: 76 BPM
VENTRICULAR RATE: 81 BPM

## 2022-07-01 PROCEDURE — 93010 ELECTROCARDIOGRAM REPORT: CPT

## 2022-07-01 NOTE — TELEPHONE ENCOUNTER
Patient evaluated in the ED and discharge-message sent to Joan gibbons to schedule follow up with Dr Adrian Jeffers

## 2022-07-01 NOTE — ED PROVIDER NOTES
History  Chief Complaint   Patient presents with    Shortness of Breath     Pt reports worsening respiratory distress that has occurred over the past 2-3 days  He states that he has been dealing with a UTI and has been on cipro for 2 weeks  He was seen by his urologist who send him for an [address] due to concern for a testicular abscess as he has significant swelling and tenderness  He has noted worsening respiratory distress despite being on his advanced iVAPS machine  Pt reports weakness and fell in parking lot outside Er  62M PMH Parkinson's, remote history of testicular cancer, presented to the emergency department with shortness of breath  He reports having baseline mild shortness of breath with history of diaphragm paralysis but has felt worsened shortness of breath over the past 3 days  He denies chest pain, palpitations, cough, URI symptoms, history of blood clots, leg swelling or pain, hemoptysis, recent travel or surgery, or smoking  He had a negative home COVID test today  He is currently being treated with ciprofloxacin for a UTI for the past 2 weeks, he scheduled to have a testicular ultrasound due to concerns for a right testicular abscess by his urologist   He denies abdominal pain, nausea, vomiting, diarrhea, or dysuria  He had a mechanical fall in the parking lot today without any prodromal symptoms, he landed on his right arm and leg  He denies head strike, loss of consciousness, blood thinners, headache, neck pain, back pain, confusion, weakness, or numbness  Prior to Admission Medications   Prescriptions Last Dose Informant Patient Reported? Taking?    Dalfampridine ER 10 MG TB12  Self Yes No   Sig: 3 (three) times a day     Diclofenac Sodium (VOLTAREN) 1 %  Self No No   Sig: Apply 2 g topically 4 (four) times a day   Wheat Dextrin (Benefiber) POWD  Self Yes No   albuterol (Ventolin HFA) 90 mcg/act inhaler  Self No No   Sig: Inhale 2 puffs every 6 (six) hours as needed for wheezing   calcium carbonate (OS-NEIL) 1250 (500 Ca) MG chewable tablet  Self Yes No   Sig: Chew   Patient not taking: No sig reported   carbidopa-levodopa (SINEMET)  mg per tablet  Self Yes No   Sig: Take 1 tablet by mouth 3 (three) times a day   cholecalciferol (VITAMIN D3) 1,000 units tablet  Self Yes No   Sig: Take 2,000 Units by mouth daily   cyanocobalamin (VITAMIN B-12) 1000 MCG tablet  Self Yes No   Sig: Take 1,000 mcg by mouth daily   enalapril (VASOTEC) 2 5 mg tablet  Self No No   Sig: Take 2 tablets (5 mg total) by mouth as needed in the morning (for HTN)     famotidine (Pepcid) 20 mg tablet  Self No No   Sig: Take 1 tablet (20 mg total) by mouth daily   Patient taking differently: Take 20 mg by mouth as needed   midodrine (PROAMATINE) 5 mg tablet  Self Yes No   multivitamin (THERAGRAN) TABS  Self Yes No   Sig: Take 1 tablet by mouth daily   nystatin-triamcinolone (MYCOLOG-II) cream  Self No No   Sig: Apply topically 2 (two) times a day   pantoprazole (PROTONIX) 40 mg tablet  Self No No   Sig: TAKE 1 TABLET BY MOUTH 2 TIMES A DAY BEFORE MEALS    polyethylene glycol (GLYCOLAX) 17 GM/SCOOP powder  Self Yes No   Sig: Take 17 g by mouth daily   rOPINIRole (REQUIP) 1 mg tablet  Self No No   Sig: Take 1 tablet (1 mg total) by mouth 3 (three) times a day   selegiline (ELDEPRYL) 5 mg capsule  Self Yes No   tadalafil (CIALIS) 5 MG tablet  Self Yes No   Sig: Take 5 mg by mouth daily   vitamin E 100 UNIT capsule  Self Yes No   Sig: Take 100 Units by mouth daily      Facility-Administered Medications: None       Past Medical History:   Diagnosis Date    Back pain     Benign prostatic hyperplasia 2017    TURP surgery 3/15/2019    Bladder infection     BPH (benign prostatic hyperplasia)     Cancer [facility])     testicular 1988    Chronic kidney disease     Diverticulosis     GERD (gastroesophageal reflux disease)     occassional    History of testicular cancer 1988    Surgery and radiation; left side    Hypertension Nov, 2018    occassionally    Kidney stone     Kidney stones     Currently and in the past    Orthostatic hypotension     Orthostatic hypotension due to [facility]'s disease ([facility] [address] [facility]     Parkinson's disease ([facility] [address] [facility]     Sleep apnea     uses CPAP    Sleep apnea, obstructive April, 2019    recently diagnosed    Wears glasses        Past Surgical History:   Procedure Laterality Date    BLADDER NECK RECONSTRUCTION  07/27/2020    COLONOSCOPY  2017    COLONOSCOPY  09/2020    COLONOSCOPY  11/2020    CYSTOPLASTY / CYSTOURETHROPLASTY  07/27/2020    [address] inguinal hernia repair    IR SUPRAPUBIC CATHETER PLACEMENT  2/24/2020    IR TUBE UPSIZE  3/23/2020    KIDNEY STONE SURGERY      LITHOTRIPSY      Renal; Resolved: 2/27/07    ORCHIECTOMY Left     VT CYSTOURETHRO W/IMPLANT N/A 5/17/2018    Procedure: CYSTOSCOPY WITH INSERTION UROLIFT;  Surgeon: Douglas Miller DO;  [facility]: [facility];  Service: Urology    PROSTATE SURGERY N/A 1/18/2018    Procedure: CYSTOSCOPY WITH INSERTION UROLIFT;  Surgeon: Douglas Miller DO;  [facility];  Service: Urology    [address] [place] [address] [address]    For cancer    TONSILLECTOMY      URETERONEOCYSTOSTOMY      w/ cystoscopy Ureteral Stent Placement; Resolved: 6/14/2007    WISDOM TOOTH EXTRACTION         Family History   Problem Relation Age of Onset    Colon cancer Father     Heart failure Father     Cancer Father         Colon CA    Parkinsonism Mother     Cancer Paternal Grandmother      I have reviewed and agree with the history as documented      E-Cigarette/Vaping    E-Cigarette Use Never User      E-Cigarette/Vaping Substances    Nicotine No     THC No     CBD No     Flavoring No     Other No     Unknown No      Social History     Tobacco Use    Smoking status: Former Smoker     Packs/day: 0 50     Years: 3 50     Pack years: 1 75     Types: Cigarettes     Start date: 64     Quit date: 1/1/1978     Years since quittin 5    Smokeless tobacco: Never Used   Vaping Use    Vaping Use: Never used   Substance Use Topics    Alcohol use: Not Currently    Drug use: No        Review of Systems   Constitutional: Negative for fever  HENT: Negative for congestion and rhinorrhea  Respiratory: Positive for shortness of breath  Negative for cough  Cardiovascular: Negative for chest pain, palpitations and leg swelling  Gastrointestinal: Negative for abdominal pain, diarrhea, nausea and vomiting  Genitourinary: Negative for dysuria  Musculoskeletal: Negative for back pain and neck pain  Neurological: Negative for syncope, weakness, numbness and headaches  Psychiatric/Behavioral: Negative for confusion  All other systems reviewed and are negative  Physical Exam  ED Triage Vitals [22]   Temperature Pulse Respirations Blood Pressure SpO2   97 6 °F (36 4 °C) 85 20 166/96 95 %      Temp Source Heart Rate Source Patient Position - Orthostatic VS BP Location FiO2 (%)   Oral Monitor Sitting Right arm --      Pain Score       No Pain             Orthostatic Vital Signs  Vitals:    22 2133 22 2156 22 2230   BP: 159/88 (!) 225/114 (!) 207/106 165/91   Pulse: 89 81 90 78   Patient Position - Orthostatic VS: Sitting Lying Lying Lying       Physical Exam  Vitals and nursing note reviewed  Constitutional:       General: He is not in acute distress  Appearance: He is not ill-appearing  HENT:      Head: Normocephalic and atraumatic  Comments: No tenderness to palpation throughout head     Mouth/Throat:      Mouth: Mucous membranes are moist    Eyes:      Extraocular Movements: Extraocular movements intact  Pupils: Pupils are equal, round, and reactive to light  Neck:      Comments: No midline cervical spinal tenderness  Cardiovascular:      Rate and Rhythm: Normal rate and regular rhythm  Heart sounds: Normal heart sounds  No murmur heard    Pulmonary: Effort: Pulmonary effort is normal  No respiratory distress  Breath sounds: Normal breath sounds  No wheezing, rhonchi or rales  Abdominal:      General: Abdomen is flat  There is no distension  Palpations: Abdomen is soft  Tenderness: There is no abdominal tenderness  There is no right CVA tenderness, left CVA tenderness, guarding or rebound  Genitourinary:     Comments: Right testicle with mild tenderness to palpation  Musculoskeletal:      Cervical back: Normal range of motion  No tenderness  Right lower leg: No edema  Left lower leg: No edema  Comments: No midline spinal tenderness  No tenderness to palpation throughout hips or bilateral upper and lower extremities   Skin:     General: Skin is warm and dry  Neurological:      General: No focal deficit present  Mental Status: He is alert  Cranial Nerves: No cranial nerve deficit  Sensory: No sensory deficit  Motor: No weakness  Comments: Slow movements, shuffled gait           ED Medications  Medications   labetalol (NORMODYNE) injection 5 mg (5 mg Intravenous Given 6/30/22 2157)       Diagnostic Studies  Results Reviewed     Procedure Component Value Units Date/Time    HS Troponin I 2hr [[id number]]  (Normal) Collected: 06/30/22 2214    Lab Status: Final result Specimen: Blood from Arm, Right Updated: 06/30/22 2251     hs TnI 2hr 6 ng/L      Delta 2hr hsTnI 2 ng/L     Urine Microscopic [[id number]]  (Abnormal) Collected: 06/30/22 2228    Lab Status: Final result Specimen: Urine, Clean Catch Updated: 06/30/22 2245     RBC, UA None Seen /hpf      WBC, UA 0-1 /hpf      Epithelial Cells Occasional /hpf      Bacteria, UA Occasional /hpf     UA w Reflex to Microscopic w Reflex to Culture [[id number]]  (Abnormal) Collected: 06/30/22 2228    Lab Status: Final result Specimen: Urine, Clean Catch Updated: 06/30/22 2235     Color, UA Yellow     Clarity, UA Clear     Specific Gravity, UA 1 025     pH, UA 5 5 Leukocytes, UA Negative     Nitrite, UA Negative     Protein, UA Negative mg/dl      Glucose, UA Negative mg/dl      Ketones, UA Negative mg/dl      Urobilinogen, UA 0 2 E U /dl      Bilirubin, UA Negative     Occult Blood, UA Trace-Intact    Comprehensive metabolic panel [[id number]] Collected: 06/30/22 2114    Lab Status: Final result Specimen: Blood from Arm, Right Updated: 06/30/22 2146     Sodium 136 mmol/L      Potassium 4 1 mmol/L      Chloride 101 mmol/L      CO2 30 mmol/L      ANION GAP 5 mmol/L      BUN 21 mg/dL      Creatinine 0 87 mg/dL      Glucose 108 mg/dL      Calcium 8 9 mg/dL      AST 16 U/L      ALT 14 U/L      Alkaline Phosphatase 73 U/L      Total Protein 7 7 g/dL      Albumin 3 8 g/dL      Total Bilirubin 0 83 mg/dL      eGFR 91 ml/min/1 73sq m     Narrative:      [facility] guidelines for Chronic Kidney Disease (CKD):     Stage 1 with normal or high GFR (GFR > 90 mL/min/1 73 square meters)    Stage 2 Mild CKD (GFR = 60-89 mL/min/1 73 square meters)    Stage 3A Moderate CKD (GFR = 45-59 mL/min/1 73 square meters)    Stage 3B Moderate CKD (GFR = 30-44 mL/min/1 73 square meters)    Stage 4 Severe CKD (GFR = 15-29 mL/min/1 73 square meters)    Stage 5 End Stage CKD (GFR <15 mL/min/1 73 square meters)  Note: GFR calculation is accurate only with a steady state creatinine    HS Troponin 0hr (reflex protocol) [[id number]]  (Normal) Collected: 06/30/22 2114    Lab Status: Final result Specimen: Blood from Arm, Right Updated: 06/30/22 2146     hs TnI 0hr 4 ng/L     CBC and differential [[id number]] Collected: 06/30/22 2114    Lab Status: Final result Specimen: Blood from Arm, Right Updated: 06/30/22 2122     WBC 5 44 Thousand/uL      RBC 4 86 Million/uL      Hemoglobin 14 7 g/dL      Hematocrit 44 2 %      MCV 91 fL      MCH 30 2 pg      MCHC 33 3 g/dL      RDW 12 6 %      MPV 11 4 fL      Platelets 530 Thousands/uL      nRBC 0 /100 WBCs      Neutrophils Relative 59 % Immat GRANS % 1 %      Lymphocytes Relative 24 %      Monocytes Relative 12 %      Eosinophils Relative 3 %      Basophils Relative 1 %      Neutrophils Absolute 3 30 Thousands/µL      Immature Grans Absolute 0 04 Thousand/uL      Lymphocytes Absolute 1 28 Thousands/µL      Monocytes Absolute 0 63 Thousand/µL      Eosinophils Absolute 0 15 Thousand/µL      Basophils Absolute 0 04 Thousands/µL                  XR chest 2 views   Final Result by Marcelle Henriquez MD (06/30 2141)   No acute cardiopulmonary findings  Workstation performed: [id number]         US scrotum and testicles   Final Result by Marcelle Henriquez MD (06/30 2141)       No testicular abscess  Normal appearance of the right testicle  Nonemergent findings above  Workstation performed: [id number]               Procedures  ECG 12 Lead Documentation Only    Date/Time: 6/30/2022 9:48 PM  Performed by: Marcos Graves MD  Authorized by: Marcos Graves MD     ECG reviewed by me, the ED Provider: yes    Patient location:  ED  Previous ECG:     Previous ECG:  Unavailable  Interpretation:     Interpretation: normal    Rate:     ECG rate:  81    ECG rate assessment: normal    Rhythm:     Rhythm: sinus rhythm    Ectopy:     Ectopy: none    QRS:     QRS axis:  Normal    QRS intervals:  Normal  Conduction:     Conduction: normal    ST segments:     ST segments:  Normal  T waves:     T waves: inverted      Inverted:  III          ED Course                             SBIRT 20yo+    Flowsheet Row Most Recent Value   SBIRT (25 yo +)    In order to provide better care to our patients, we are screening all of our patients for alcohol and drug use  Would it be okay to ask you these screening questions?  No Filed at: 06/30/2022 2018                [facility]  Number of Diagnoses or Management Options  Dyspnea  Fall, initial encounter  Diagnosis management comments: 60M PMH Parkinson's, remote history of testicular cancer, presented to the emergency department with shortness of breath  Workup including vital signs, physical exam, EKG, chest x-ray, labs, testicular ultrasound  EKG and chest x-ray without acute abnormalities, troponin negative, labs unremarkable, testicular ultrasound without abscess  No external signs of trauma, clinically cleared, imaging not currently indicated  Patient signed out at end of shift, per chart review patient discharged  Disposition  Final diagnoses:   Dyspnea   Fall, initial encounter     Time reflects when diagnosis was documented in both MDM as applicable and the Disposition within this note     Time User Action Codes Description Comment    6/30/2022 11:26 PM Hanh Swenson Add [R06 00] Dyspnea     6/30/2022 11:26 PM Hanh Swenson Add [E90  Glee Alistair, initial encounter       ED Disposition     ED Disposition   Discharge    Condition   Stable    Date/Time   u Jun 30, 2022 11:25 PM    Comment   Damián Lewis discharge to home/self care                 Follow-up Information     Follow up With Specialties Details Why Contact [facility], DO Family Medicine Schedule an appointment as soon as possible for a visit   [address]  [phone]            Discharge Medication List as of 6/30/2022 11:26 PM      CONTINUE these medications which have NOT CHANGED    Details   albuterol (Ventolin HFA) 90 mcg/act inhaler Inhale 2 puffs every 6 (six) hours as needed for wheezing, Starting Wed 3/30/2022, Normal      calcium carbonate (OS-NEIL) 1250 (500 Ca) MG chewable tablet Chew, Historical Med      carbidopa-levodopa (SINEMET)  mg per tablet Take 1 tablet by mouth 3 (three) times a day, Starting Mon 1/10/2022, Historical Med      cholecalciferol (VITAMIN D3) 1,000 units tablet Take 2,000 Units by mouth daily, Historical Med      cyanocobalamin (VITAMIN B-12) 1000 MCG tablet Take 1,000 mcg by mouth daily, Historical Med      Dalfampridine ER 10 MG TB12 3 (three) times a day  , Starting Mon 8/16/2021, Historical Med      Diclofenac Sodium (VOLTAREN) 1 % Apply 2 g topically 4 (four) times a day, Starting Fri 9/17/2021, Normal      enalapril (VASOTEC) 2 5 mg tablet Take 2 tablets (5 mg total) by mouth as needed in the morning (for HTN)  , Starting Thu 5/19/2022, Normal      famotidine (Pepcid) 20 mg tablet Take 1 tablet (20 mg total) by mouth daily, Starting Thu 3/4/2021, Normal      midodrine (PROAMATINE) 5 mg tablet Starting Sun 12/15/2019, Historical Med      multivitamin (THERAGRAN) TABS Take 1 tablet by mouth daily, Historical Med      nystatin-triamcinolone (MYCOLOG-II) cream Apply topically 2 (two) times a day, Starting Mon 9/27/2021, Until Sun 12/26/2021, Normal      pantoprazole (PROTONIX) 40 mg tablet TAKE 1 TABLET BY MOUTH 2 TIMES A DAY BEFORE MEALS , Normal      polyethylene glycol (GLYCOLAX) 17 GM/SCOOP powder Take 17 g by mouth daily, Historical Med      rOPINIRole (REQUIP) 1 mg tablet Take 1 tablet (1 mg total) by mouth 3 (three) times a day, Starting Fri 2/25/2022, Normal      selegiline (ELDEPRYL) 5 mg capsule Starting Mon 5/10/2021, Historical Med      tadalafil (CIALIS) 5 MG tablet Take 5 mg by mouth daily, Starting Mon 2/15/2021, Until u 5/19/2022, Historical Med      vitamin E 100 UNIT capsule Take 100 Units by mouth daily, Historical Med      Wheat Dextrin (Benefiber) POWD Historical Med           No discharge procedures on file  PDMP Review       Value Time User    PDMP Reviewed  Yes 3/19/2020  1:51 AM Ana Bolivar MD           ED Provider  Attending physically available and evaluated Kimberly April  I managed the patient along with the ED Attending      Electronically Signed by         Loralyn Dance, MD  07/07/22 [id number]

## 2022-07-06 ENCOUNTER — OFFICE VISIT (OUTPATIENT)
Dept: PULMONOLOGY | Facility: CLINIC | Age: 62
End: 2022-07-06
Payer: MEDICARE

## 2022-07-06 VITALS
BODY MASS INDEX: 32.82 KG/M2 | OXYGEN SATURATION: 95 % | HEART RATE: 85 BPM | DIASTOLIC BLOOD PRESSURE: 90 MMHG | WEIGHT: 242 LBS | SYSTOLIC BLOOD PRESSURE: 142 MMHG | TEMPERATURE: 98.3 F

## 2022-07-06 DIAGNOSIS — J45.909 UNCOMPLICATED ASTHMA, UNSPECIFIED ASTHMA SEVERITY, UNSPECIFIED WHETHER PERSISTENT: ICD-10-CM

## 2022-07-06 DIAGNOSIS — J45.901 ASTHMA WITH ACUTE EXACERBATION, UNSPECIFIED ASTHMA SEVERITY, UNSPECIFIED WHETHER PERSISTENT: Primary | ICD-10-CM

## 2022-07-06 DIAGNOSIS — G25.81 RLS (RESTLESS LEGS SYNDROME): ICD-10-CM

## 2022-07-06 PROCEDURE — 99215 OFFICE O/P EST HI 40 MIN: CPT | Performed by: INTERNAL MEDICINE

## 2022-07-06 RX ORDER — ALBUTEROL SULFATE 2.5 MG/3ML
2.5 SOLUTION RESPIRATORY (INHALATION) EVERY 6 HOURS PRN
Qty: 180 ML | Refills: 3 | Status: SHIPPED | OUTPATIENT
Start: 2022-07-06

## 2022-07-06 RX ORDER — CIPROFLOXACIN 500 MG/1
500 TABLET, FILM COATED ORAL 2 TIMES DAILY
COMMUNITY
Start: 2022-06-28

## 2022-07-06 RX ORDER — PREDNISONE 20 MG/1
40 TABLET ORAL DAILY
Qty: 10 TABLET | Refills: 0 | Status: SHIPPED | OUTPATIENT
Start: 2022-07-06

## 2022-07-06 NOTE — LETTER
July 8, 2022     Rich Danielle, [address] [address]    Patient: Peggy Vasquez   YOB: 1960   Date of Visit: 7/6/2022       Dear Dr Lazarus Slim:    Thank you for referring Kevin Holloway to me for evaluation  Below are my notes for this consultation  If you have questions, please do not hesitate to call me  I look forward to following your patient along with you  Sincerely,        Tez Mcdaniels DO        CC: No Recipients  Tez Mcdaniels DO  7/8/2022 12:10 AM  Sign when Signing Visit  Progress note - Pulmonary Medicine   Peggy Vasquez 60 y o  male MRN: [id number]       Impression & Plan:   62 y o  M with PMHx of Multiple System Atrophy, Parkinson's disease with diaphragmatic weakness, chronic knee and ankle pain, urinary retention s/p TURP, Moderate KALLI on CPAP who comes in for follow up  1  Acute exacerbation of COPD/asthma       -  New nebulier and supplies added as well as an Rx for albuterol      -  Prednisone 40mg Daily was added to start after he completes his ciprofloxacin       -  Samples of Breo was given to the patient to see fi this helps his breathing as well  2   Chronic respiratory failure from progressive neurologic weakness with Multiple System Atrophy   He has noted bulbar symptoms with upper airway closing, hoarseness, dysphagia and weak/ hoarse voice  This seems to have since resolved  SNIF test is negative but she is having worsening shortness of breath         -  He is doing well on iVAPS therapy with good compliance and has been tolerating it well  Continue current settings  Reisdual AHI - 3 3         -   We discussed my concern that his respiratory failure appears to be worse  He is aware of the minimal capacity to tolerate acute infection        3  Moderate KALLI (AHI - 15 7) - on iVAPS with good compliance as above    Residual AHI - 3 3      -  He is aware of the risk of leaving sleep apnea untreated including hypertension, heart failure, arrhythmia, MI and stroke      4    Periodic limb movement (PLM index - 109) -  This may be secondary to KALLI or Parkinsons   He denies symptoms of this          - Continue Requip to 1mg qHS          -  He did not find neurontin helpful      ______________________________________________________________________    HPI:    Fredda Peabody presents today for follow-up after recent ER evaluation  He had been noting worsening shortness of breath and concern for possible sepsis  Fortunately there was no acute infection and no clear source found for his work of breathing  He states that he has been having worsening shortness of breath over the past few weeks  He denies fever, chills or night sweats  He has been using his IVAPS and does find it to be helpful  He was wondering if it can be utilized during the day as well  Answers for HPI/ROS submitted by the patient on 7/5/2022  Chronicity: new  When did you first notice your symptoms?: 1 to 4 weeks ago  How often do your symptoms occur?: intermittently  Since you first noticed this problem, how has it changed?: waxing and waning  Do you have shortness of breath that occurs with effort or exertion?: Yes  Do you have ear congestion?: No  Do you have heartburn?: No  Do you have fatigue?: Yes  Do you have nasal congestion?: No  Do you have shortness of breath when lying flat?: No  Do you have shortness of breath when you wake up?: No  Do you have sweats?: No  Have you experienced weight loss?: No  Which of the following makes your symptoms worse?: exercise, minimal activity, strenuous activity  Which of the following makes your symptoms better?: rest        Review of Systems:  Review of Systems   Constitutional: Negative for appetite change and fever  HENT: Positive for rhinorrhea and trouble swallowing  Negative for ear pain, postnasal drip, sneezing and sore throat  Eyes: Negative      Respiratory: Positive for chest tightness, shortness of breath and wheezing  Cardiovascular: Negative for chest pain  Gastrointestinal: Negative  Endocrine: Negative  Genitourinary: Negative  Musculoskeletal: Negative for myalgias  Skin: Negative  Neurological: Negative  Negative for headaches  Hematological: Negative  Psychiatric/Behavioral: Negative            Social history updates:  Social History     Tobacco Use   Smoking Status Former Smoker    Packs/day: 0 50    Years: 3 50    Pack years: 1 75    Types: Cigarettes    Start date:     Quit date: 1978    Years since quittin 5   Smokeless Tobacco Never Used     Social History     Socioeconomic History    Marital status: /Civil Union     Spouse name: Not on file    Number of children: Not on file    Years of education: Not on file    Highest education level: Not on file   Occupational History    Occupation: [employer] retired   Tobacco Use    Smoking status: Former Smoker     Packs/day: 0 50     Years: 3 50     Pack years: 1 75     Types: Cigarettes     Start date:      Quit date: 1978     Years since quittin 5    Smokeless tobacco: Never Used   Vaping Use    Vaping Use: Never used   Substance and Sexual Activity    Alcohol use: Not Currently    Drug use: No    Sexual activity: Not Currently     Birth control/protection: Abstinence   Other Topics Concern    Not on file   Social History Narrative    Consumes 5 glasses of tea per week     Social Determinants of Health     Financial Resource Strain: Not on file   Food Insecurity: Not on file   Transportation Needs: Not on file   Physical Activity: Not on file   Stress: Not on file   Social Connections: Not on file   Intimate Partner Violence: Not on file   Housing Stability: Not on file       PhysicalExamination:  Vitals:   /90 (BP Location: Left arm, Patient Position: Sitting, Cuff Size: Large)   Pulse 85   Temp 98 3 °F (36 8 °C)   Wt 110 kg (242 lb) Comment: per patient/unable to get on scale/unbalanced  SpO2 95%   BMI 32 82 kg/m²   General: Pleasant, Awake alert and oriented x 3, conversant without conversational dyspnea, NAD, normal affect  HEENT:  PERRL, Sclera noninjected, nonicteric OU, Nares patent,  no craniofacial abnormalities, Mucous membranes, moist, no oral lesions, normal dentition  NECK: Trachea midline, no accessory muscle use, no stridor, no cervical or supraclavicular adenopathy, JVP not elevated  CARDIAC: Reg, single s1/S2, no m/r/g  PULM: expiratory wheezing noted in lung bases, no rhonchi or rales  ABD: Normoactive bowel sounds, soft nontender, nondistended, no rebound, no rigidity, no guarding  EXT: No cyanosis, no clubbing, no edema, normal capillary refill  NEURO: no focal neurologic deficits, AAOx3, moving all extremities appropriately      Diagnostic Data:  Labs: I personally reviewed the most recent laboratory data pertinent to today's visit  I have personally reviewed pertinent lab results    Lab Results   Component Value Date    WBC 5 44 06/30/2022    HGB 14 7 06/30/2022    HCT 44 2 06/30/2022    MCV 91 06/30/2022     06/30/2022     Lab Results   Component Value Date    CALCIUM 8 9 06/30/2022    K 4 1 06/30/2022    CO2 30 06/30/2022     06/30/2022    BUN 21 06/30/2022    CREATININE 0 87 06/30/2022     No results found for: IGE  Lab Results   Component Value Date    ALT 14 06/30/2022    AST 16 06/30/2022    ALKPHOS 73 06/30/2022     PFT 10/26/20  Results:  FEV1/FVC Ratio: 77 %  Forced Vital Capacity: 4 88 L    99 % predicted  FEV1: 3 75 L     99 % predicted  Lung volumes by body plethysmography:   Total Lung Capacity 99 % predicted   Residual volume 96 % predicted  DLCO corrected for patients hemoglobin level: 83 %  Interpretation:  · No obstructive airflow defect on spirometry  · Normal Spirometry  · Normal Lung volumes  · Normal diffusion capacity  · Flow volume loop is normal      3/4/19  Results:  FEV1/FVC Ratio: 80 %  Forced Vital Capacity: 4 80 L    93 % predicted  FEV1: 3 85 L     97 % predicted  Lung volumes by body plethysmography:   Total Lung Capacity 94 % predicted   Residual volume 88 % predicted  DLCO corrected for patients hemoglobin level: 79 %  Interpretation:  · No obstructive airflow defect   · Normal Spirometry  · Normal Lung volumes  · Normal diffusion capacity     Repeat 6 minute walk 20  Starting saturation - 98%   Starting HR - 89  Lowest saturation - 91%    Highest HR - 103  Ambulated - 252 m and he did not require oxygen     6 minute walk  Date of testin2019  Resting room air saturation: 93%  Randy scale of dyspnea at start of test: 0/10     Ambulation testing:  Lowest saturation 93%  No supplemental oxygen required     Randy scale of dyspnea at end of test: 3/10  Reason if test was stopped early: N/A      Total 6 minute walk distance: 1400 feet     Imaging:  I personally reviewed the images on the [facility] system pertinent to today's visit  CT chest - 19  IMPRESSION:  Within normal limits CT of the chest      Repeat SNF test 10/26/20  IMPRESSION:  There is no evidence of diaphragmatic paralysis or elevation         Other studies:  HST - moderate (15 7), Supine AHI - 23, hypoxia   CPAP titration - Nasal CPAP was titrated from 5-11 cm of water for  control of snoring and abnormal breathing  Patient appeared to do best at 11 cm of CPAP delivered using a [employer] 45 full face interface   Continued use of this equipment at these settings is recommended      New Compliance Data:  22-22                                   Type of device:  iVAPS alveolar ventilation 6L/min,  EPAP 15, PSV 4-15, target 12 bpm                                  Percent usage: 97%, 88%> 4hrs                                   Average time used: 5 hrs 57 mins                                   Residual AHI: 3 3    Compliance Data:  22-3/29/22                                   Type of device:  iVAPS alveolar ventilation 6L/min,  EPAP 12, PSV 4-15, target 12 bpm                                  Percent usage: 57%, 43 %> 4hrs                                   Average time used: 3 hrs 9 mins - 5 hrs 34 mins                                   Residual AHI: 6 8     Compliance Data:  11/9/21-12/8/21                                   Type of CPAP:  BiPAP 12/8                                  Percent usage: 97%, 93 %> 4hrs                                   Average time used: 6 hrs 36 mins                                  Time in large leak: 3 mins 43 secs                                   Residual AHI: 4 3     Compliance Data:  9/8/21-10/17/21                                   Type of CPAP:  BiPAP 12/8                                  Percent usage: 100%, 93 %> 4hrs                                   Average time used: 5hrs 58 mins - 8 hrs 36 mins                                  Time in large leak: 48 secs                                   Residual AHI: 5 6     Compliance Data:  3/12/21-5/12/21                                   Type of CPAP:  BiPAP 15/10                                  Percent usage: 100%, 89 %> 4hrs                                   Average time used: 5hrs 36 mins - 8 hrs 18 mins                                  Time in large leak: 10 min 15 secs                                   Residual AHI: 6 8       Compliance Data:  3/12/21-5/12/21                                   Type of CPAP:  BiPAP 15/10                                  Percent usage: 100%, 89 %> 4hrs                                   Average time used: 5hrs 36 mins - 8 hrs 18 mins                                  Time in large leak: 10 min 15 secs                                   Residual AHI: 6 8      Compliance Data:  1/2/21-2/10/21                                   Type of CPAP:  auto BiPAP min EPAP 11, PSV 4-6, Max IPAP 25,                                    Mean EPAP - 12- 14 8, IPAP 17 - Max IPAP 18 8                                   Percent usage: 95%, 85 %> 4hrs                                   Average time used: 5hrs 26 mins - 7 hrs 40 mins                                  Time in large leak: 6 min 22 secs                                   Residual AHI: 4 8 (CA - 3 0)     Compliance Data:  10/24/20-11/22/20                                   Type of CPAP:  auto BiPAP min EPAP 11, PSV 4-6, Max IPAP 25,                                    Mean EPAP - 11 7- 16 9, IPAP 16 2 - Max IPAP 22 9                                   Percent usage: 63%, 30%> 4hrs                                   Average time used: 2hrs 40 mins - 6 hrs 45 mins                                  Time in large leak: 16 min 19 secs                                   Residual AHI: 5 6 (CA - 3 1)     Compliance Data:  7/28/20-8/26/20                                   Type of CPAP:  auto BiPAP min EPAP 11, PSV 4, Max IPAP 25,                                    Mean EPAP - 12 2- 17 6, IPAP 16 2 - Max IPAP 22                                   Percent usage: 97%, 90%> 4hrs                                   Average time used: 5hrs 30 mins - 8 hrs 25 mins                                  Time in large leak: 4 min 39 secs                                   Residual AHI: 7 0 (CA - 5 1)     Compliance Data:  1/5/20-2/5/20                                   Type of CPAP:  auto BiPAP min EPAP 11, PSV 4, Max IPAP 25,                                    Mean EPAP recorded - 12 1, Peak - 14 9, Min IPAP 16, Max IPAP 19                                   Percent usage: 72%, 63%> 4hrs                                   Average time used: 3hrs 51 mins - 8 hrs 26 mins                                  Time in large leak: 50 secs                                   Residual AHI: 6 6  (CA - 3 9)     Compliance Data:  10/23/19-11/21/19                                   Type of CPAP:  autoPAP 11-15, Mean - 13 2, Peak - 15 0                                   Percent usage: 73%, 67%> 4hrs                                   Average time used: 3hrs 38 mins - 8 hrs 37 mins                                  Time in large leak: 9 mins 46 secs                                   Residual AHI: 11 0  (CA - 0 9)     Compliance Data:  8/25/19-9/23/19                                   Type of CPAP:  autoPAP 8-15, Mean - 11 9, Peak - 15 6                                   Percent usage: 63%                                   Average time used: 2hrs 52 mins - 4 hrs 32 mins                                  Time in large leak: 4 mins 44 secs                                   Residual AHI: 13 6  (CA - 0 9)    Sandhya Moreno DO

## 2022-07-08 RX ORDER — ROPINIROLE 1 MG/1
1 TABLET, FILM COATED ORAL
Qty: 30 TABLET | Refills: 3 | Status: SHIPPED | OUTPATIENT
Start: 2022-07-08

## 2022-07-08 NOTE — PROGRESS NOTES
Progress note - Pulmonary Medicine   Melvin Martinez 60 y o  male MRN: [id number]       Impression & Plan:   69 V J  C with PMHx of Multiple System Atrophy, Parkinson's disease with diaphragmatic weakness, chronic knee and ankle pain, urinary retention s/p TURP, Moderate KALLI on CPAP who comes in for follow up  1  Acute exacerbation of COPD/asthma       -  New nebulier and supplies added as well as an Rx for albuterol      -  Prednisone 40mg Daily was added to start after he completes his ciprofloxacin       -  Samples of Breo was given to the patient to see fi this helps his breathing as well  2   Chronic respiratory failure from progressive neurologic weakness with Multiple System Atrophy   He has noted bulbar symptoms with upper airway closing, hoarseness, dysphagia and weak/ hoarse voice  This seems to have since resolved  SNIF test is negative but she is having worsening shortness of breath         -  He is doing well on iVAPS therapy with good compliance and has been tolerating it well  Continue current settings  Reisdual AHI - 3 3         -   We discussed my concern that his respiratory failure appears to be worse  He is aware of the minimal capacity to tolerate acute infection        3  Moderate KALLI (AHI - 15 7) - on iVAPS with good compliance as above  Residual AHI - 3 3      -  He is aware of the risk of leaving sleep apnea untreated including hypertension, heart failure, arrhythmia, MI and stroke      4    Periodic limb movement (PLM index - 109) -  This may be secondary to KALLI or Parkinsons   He denies symptoms of this          - Continue Requip to 1mg qHS          -  He did not find neurontin helpful      ______________________________________________________________________    HPI:    Melvin Martinez presents today for follow-up after recent ER evaluation  He had been noting worsening shortness of breath and concern for possible sepsis    Fortunately there was no acute infection and no clear source found for his work of breathing  He states that he has been having worsening shortness of breath over the past few weeks  He denies fever, chills or night sweats  He has been using his IVAPS and does find it to be helpful  He was wondering if it can be utilized during the day as well  Answers for HPI/ROS submitted by the patient on 7/5/2022  Chronicity: new  When did you first notice your symptoms?: 1 to 4 weeks ago  How often do your symptoms occur?: intermittently  Since you first noticed this problem, how has it changed?: waxing and waning  Do you have shortness of breath that occurs with effort or exertion?: Yes  Do you have ear congestion?: No  Do you have heartburn?: No  Do you have fatigue?: Yes  Do you have nasal congestion?: No  Do you have shortness of breath when lying flat?: No  Do you have shortness of breath when you wake up?: No  Do you have sweats?: No  Have you experienced weight loss?: No  Which of the following makes your symptoms worse?: exercise, minimal activity, strenuous activity  Which of the following makes your symptoms better?: rest        Review of Systems:  Review of Systems   Constitutional: Negative for appetite change and fever  HENT: Positive for rhinorrhea and trouble swallowing  Negative for ear pain, postnasal drip, sneezing and sore throat  Eyes: Negative  Respiratory: Positive for chest tightness, shortness of breath and wheezing  Cardiovascular: Negative for chest pain  Gastrointestinal: Negative  Endocrine: Negative  Genitourinary: Negative  Musculoskeletal: Negative for myalgias  Skin: Negative  Neurological: Negative  Negative for headaches  Hematological: Negative  Psychiatric/Behavioral: Negative            Social history updates:  Social History     Tobacco Use   Smoking Status Former Smoker    Packs/day: 0 50    Years: 3 50    Pack years: 1 75    Types: Cigarettes    Start date: 64    Quit date: 1/1/1978    Years since quittin 5   Smokeless Tobacco Never Used     Social History     Socioeconomic History    Marital status: /Civil Union     Spouse name: Not on file    Number of children: Not on file    Years of education: Not on file    Highest education level: Not on file   Occupational History    Occupation: [employer] retired   Tobacco Use    Smoking status: Former Smoker     Packs/day: 0 50     Years: 3 50     Pack years: 1 75     Types: Cigarettes     Start date:      Quit date: 1978     Years since quittin 5    Smokeless tobacco: Never Used   Vaping Use    Vaping Use: Never used   Substance and Sexual Activity    Alcohol use: Not Currently    Drug use: No    Sexual activity: Not Currently     Birth control/protection: Abstinence   Other Topics Concern    Not on file   Social History Narrative    Consumes 5 glasses of tea per week     Social Determinants of Health     Financial Resource Strain: Not on file   Food Insecurity: Not on file   Transportation Needs: Not on file   Physical Activity: Not on file   Stress: Not on file   Social Connections: Not on file   Intimate Partner Violence: Not on file   Housing Stability: Not on file       PhysicalExamination:  Vitals:   /90 (BP Location: Left arm, Patient Position: Sitting, Cuff Size: Large)   Pulse 85   Temp 98 3 °F (36 8 °C)   Wt 110 kg (242 lb) Comment: per patient/unable to get on scale/unbalanced  SpO2 95%   BMI 32 82 kg/m²   General: Pleasant, Awake alert and oriented x 3, conversant without conversational dyspnea, NAD, normal affect  HEENT:  PERRL, Sclera noninjected, nonicteric OU, Nares patent,  no craniofacial abnormalities, Mucous membranes, moist, no oral lesions, normal dentition  NECK: Trachea midline, no accessory muscle use, no stridor, no cervical or supraclavicular adenopathy, JVP not elevated  CARDIAC: Reg, single s1/S2, no m/r/g  PULM: expiratory wheezing noted in lung bases, no rhonchi or rales  ABD: Normoactive bowel sounds, soft nontender, nondistended, no rebound, no rigidity, no guarding  EXT: No cyanosis, no clubbing, no edema, normal capillary refill  NEURO: no focal neurologic deficits, AAOx3, moving all extremities appropriately      Diagnostic Data:  Labs: I personally reviewed the most recent laboratory data pertinent to today's visit  I have personally reviewed pertinent lab results    Lab Results   Component Value Date    WBC 5 44 06/30/2022    HGB 14 7 06/30/2022    HCT 44 2 06/30/2022    MCV 91 06/30/2022     06/30/2022     Lab Results   Component Value Date    CALCIUM 8 9 06/30/2022    K 4 1 06/30/2022    CO2 30 06/30/2022     06/30/2022    BUN 21 06/30/2022    CREATININE 0 87 06/30/2022     No results found for: IGE  Lab Results   Component Value Date    ALT 14 06/30/2022    AST 16 06/30/2022    ALKPHOS 73 06/30/2022     PFT 10/26/20  Results:  FEV1/FVC Ratio: 77 %  Forced Vital Capacity: 4 88 L    99 % predicted  FEV1: 3 75 L     99 % predicted  Lung volumes by body plethysmography:   Total Lung Capacity 99 % predicted   Residual volume 96 % predicted  DLCO corrected for patients hemoglobin level: 83 %  Interpretation:  · No obstructive airflow defect on spirometry  · Normal Spirometry  · Normal Lung volumes  · Normal diffusion capacity  · Flow volume loop is normal      3/4/19  Results:  FEV1/FVC Ratio: 80 %  Forced Vital Capacity: 4 80 L    93 % predicted  FEV1: 3 85 L     97 % predicted  Lung volumes by body plethysmography:   Total Lung Capacity 94 % predicted   Residual volume 88 % predicted  DLCO corrected for patients hemoglobin level: 79 %  Interpretation:  · No obstructive airflow defect   · Normal Spirometry  · Normal Lung volumes  · Normal diffusion capacity     Repeat 6 minute walk 8/28/20  Starting saturation - 98%   Starting HR - 89  Lowest saturation - 91%    Highest HR - 103  Ambulated - 252 m and he did not require oxygen     6 minute walk  Date of testin2019  Resting room air saturation: 93%  Randy scale of dyspnea at start of test: 0/10     Ambulation testing:  Lowest saturation 93%  No supplemental oxygen required     Randy scale of dyspnea at end of test: 3/10  Reason if test was stopped early: N/A      Total 6 minute walk distance: 1400 feet     Imaging:  I personally reviewed the images on the [facility] system pertinent to today's visit  CT chest - 19  IMPRESSION:  Within normal limits CT of the chest      Repeat SNF test 10/26/20  IMPRESSION:  There is no evidence of diaphragmatic paralysis or elevation         Other studies:  HST - moderate (15 7), Supine AHI - 23, hypoxia   CPAP titration - Nasal CPAP was titrated from 5-11 cm of water for  control of snoring and abnormal breathing  Patient appeared to do best at 11 cm of CPAP delivered using a [employer] 45 full face interface   Continued use of this equipment at these settings is recommended      New Compliance Data:  22-22                                   Type of device:  iVAPS alveolar ventilation 6L/min,  EPAP 15, PSV 4-15, target 12 bpm                                  Percent usage: 97%, 88%> 4hrs                                   Average time used: 5 hrs 57 mins                                   Residual AHI: 3 3    Compliance Data:  22-3/29/22                                   Type of device:  iVAPS alveolar ventilation 6L/min,  EPAP 12, PSV 4-15, target 12 bpm                                  Percent usage: 57%, 43 %> 4hrs                                   Average time used: 3 hrs 9 mins - 5 hrs 34 mins                                   Residual AHI: 6 8     Compliance Data:  21-21                                   Type of CPAP:  BiPAP 12/8                                  Percent usage: 97%, 93 %> 4hrs                                   Average time used: 6 hrs 36 mins                                  Time in large leak: 3 mins 43 secs                                   Residual AHI: 4 3     Compliance Data:  9/8/21-10/17/21                                   Type of CPAP:  BiPAP 12/8                                  Percent usage: 100%, 93 %> 4hrs                                   Average time used: 5hrs 58 mins - 8 hrs 36 mins                                  Time in large leak: 48 secs                                   Residual AHI: 5 6     Compliance Data:  3/12/21-5/12/21                                   Type of CPAP:  BiPAP 15/10                                  Percent usage: 100%, 89 %> 4hrs                                   Average time used: 5hrs 36 mins - 8 hrs 18 mins                                  Time in large leak: 10 min 15 secs                                   Residual AHI: 6 8       Compliance Data:  3/12/21-5/12/21                                   Type of CPAP:  BiPAP 15/10                                  Percent usage: 100%, 89 %> 4hrs                                   Average time used: 5hrs 36 mins - 8 hrs 18 mins                                  Time in large leak: 10 min 15 secs                                   Residual AHI: 6 8      Compliance Data:  1/2/21-2/10/21                                   Type of CPAP:  auto BiPAP min EPAP 11, PSV 4-6, Max IPAP 25,                                    Mean EPAP - 12- 14 8, IPAP 17 - Max IPAP 18 8                                   Percent usage: 95%, 85 %> 4hrs                                   Average time used: 5hrs 26 mins - 7 hrs 40 mins                                  Time in large leak: 6 min 22 secs                                   Residual AHI: 4 8 (CA - 3 0)     Compliance Data:  10/24/20-11/22/20                                   Type of CPAP:  auto BiPAP min EPAP 11, PSV 4-6, Max IPAP 25,                                    Mean EPAP - 11 7- 16 9, IPAP 16 2 - Max IPAP 22 9                                   Percent usage: 63%, 30%> 4hrs                                   Average time used: 2hrs 40 mins - 6 hrs 45 mins                                  Time in large leak: 16 min 19 secs                                   Residual AHI: 5 6 (CA - 3 1)     Compliance Data:  7/28/20-8/26/20                                   Type of CPAP:  auto BiPAP min EPAP 11, PSV 4, Max IPAP 25,                                    Mean EPAP - 12 2- 17 6, IPAP 16 2 - Max IPAP 22                                   Percent usage: 97%, 90%> 4hrs                                   Average time used: 5hrs 30 mins - 8 hrs 25 mins                                  Time in large leak: 4 min 39 secs                                   Residual AHI: 7 0 (CA - 5 1)     Compliance Data:  1/5/20-2/5/20                                   Type of CPAP:  auto BiPAP min EPAP 11, PSV 4, Max IPAP 25,                                    Mean EPAP recorded - 12 1, Peak - 14 9, Min IPAP 16, Max IPAP 19                                   Percent usage: 72%, 63%> 4hrs                                   Average time used: 3hrs 51 mins - 8 hrs 26 mins                                  Time in large leak: 50 secs                                   Residual AHI: 6 6  (CA - 3 9)     Compliance Data:  10/23/19-11/21/19                                   Type of CPAP:  autoPAP 11-15, Mean - 13 2, Peak - 15 0                                   Percent usage: 73%, 67%> 4hrs                                   Average time used: 3hrs 38 mins - 8 hrs 37 mins                                  Time in large leak: 9 mins 46 secs                                   Residual AHI: 11 0  (CA - 0 9)     Compliance Data:  8/25/19-9/23/19                                   Type of CPAP:  autoPAP 8-15, Mean - 11 9, Peak - 15 6                                   Percent usage: 63%                                   Average time used: 2hrs 52 mins - 4 hrs 32 mins                                  Time in large leak: 4 mins 44 secs                                   Residual AHI: 13 6  (CA - 0 9)    Carl Salgado DO

## 2022-07-09 LAB

## 2022-07-11 DIAGNOSIS — G20 PARKINSON'S DISEASE: Primary | ICD-10-CM

## 2022-07-12 ENCOUNTER — TELEPHONE (OUTPATIENT)
Dept: NEPHROLOGY | Facility: CLINIC | Age: 62
End: 2022-07-12

## 2022-07-12 DIAGNOSIS — G20 PARKINSON'S DISEASE: Primary | ICD-10-CM

## 2022-07-12 DIAGNOSIS — G70.9 NEUROMUSCULAR DISEASE (HCC): ICD-10-CM

## 2022-07-12 NOTE — TELEPHONE ENCOUNTER
I spoke with the pt reminding him to obtain labs prior to his upcoming appt with Dr Will Sood  Pt verbalized understanding and will obtain at a [facility] lab

## 2022-07-13 ENCOUNTER — PATIENT OUTREACH (OUTPATIENT)
Dept: FAMILY MEDICINE CLINIC | Facility: CLINIC | Age: 62
End: 2022-07-13

## 2022-07-13 DIAGNOSIS — Z78.9 NEED FOR FOLLOW-UP BY SOCIAL WORKER: Primary | ICD-10-CM

## 2022-07-14 ENCOUNTER — PATIENT OUTREACH (OUTPATIENT)
Dept: FAMILY MEDICINE CLINIC | Facility: CLINIC | Age: 62
End: 2022-07-14

## 2022-07-14 NOTE — PROGRESS NOTES
BURAK PETERSON received a referral from RN CM regarding patient's need for social work follow up  BURAK PETERSON contacted patient's wife, Eileen Hickey, who states she is in need of additional assistance with patient  Eileen Hickey states patient's  in [place] from Neurology suggested that patient get referred to [facility]  Referral in the system shows referral for palliative care was placed on 7/11/22  Eileen Hickey denies having received a call from them yet  BURAK PETERSON agreed to follow up on the referral on patient's behalf  --    BURAK PETERSON contacted Palliative Care who states patient's referral was received and they will be outreaching as soon as possible  Confirmed they had the correct contact information for patient's wife  BURAK PETERSON will continue to follow

## 2022-07-20 ENCOUNTER — APPOINTMENT (OUTPATIENT)
Dept: LAB | Facility: CLINIC | Age: 62
End: 2022-07-20
Payer: MEDICARE

## 2022-07-20 DIAGNOSIS — N18.2 CKD (CHRONIC KIDNEY DISEASE) STAGE 2, GFR 60-89 ML/MIN: ICD-10-CM

## 2022-07-20 LAB
ANION GAP SERPL CALCULATED.3IONS-SCNC: 6 MMOL/L (ref 4–13)
BACTERIA UR QL AUTO: ABNORMAL /HPF
BILIRUB UR QL STRIP: NEGATIVE
BUN SERPL-MCNC: 20 MG/DL (ref 5–25)
CALCIUM SERPL-MCNC: 9.3 MG/DL (ref 8.3–10.1)
CHLORIDE SERPL-SCNC: 106 MMOL/L (ref 96–108)
CLARITY UR: CLEAR
CO2 SERPL-SCNC: 29 MMOL/L (ref 21–32)
COLOR UR: ABNORMAL
CREAT SERPL-MCNC: 1.15 MG/DL (ref 0.6–1.3)
CREAT UR-MCNC: 158 MG/DL
GFR SERPL CREATININE-BSD FRML MDRD: 67 ML/MIN/1.73SQ M
GLUCOSE P FAST SERPL-MCNC: 106 MG/DL (ref 65–99)
GLUCOSE UR STRIP-MCNC: NEGATIVE MG/DL
HGB UR QL STRIP.AUTO: NEGATIVE
KETONES UR STRIP-MCNC: NEGATIVE MG/DL
LEUKOCYTE ESTERASE UR QL STRIP: ABNORMAL
MUCOUS THREADS UR QL AUTO: ABNORMAL
NITRITE UR QL STRIP: NEGATIVE
NON-SQ EPI CELLS URNS QL MICRO: ABNORMAL /HPF
PH UR STRIP.AUTO: 6 [PH]
POTASSIUM SERPL-SCNC: 4.1 MMOL/L (ref 3.5–5.3)
PROT UR STRIP-MCNC: NEGATIVE MG/DL
PROT UR-MCNC: 14 MG/DL
PROT/CREAT UR: 0.09 MG/G{CREAT} (ref 0–0.1)
RBC #/AREA URNS AUTO: ABNORMAL /HPF
SODIUM SERPL-SCNC: 141 MMOL/L (ref 135–147)
SP GR UR STRIP.AUTO: 1.02 (ref 1–1.03)
UROBILINOGEN UR STRIP-ACNC: <2 MG/DL
WBC #/AREA URNS AUTO: ABNORMAL /HPF

## 2022-07-20 PROCEDURE — 80048 BASIC METABOLIC PNL TOTAL CA: CPT

## 2022-07-20 PROCEDURE — 81001 URINALYSIS AUTO W/SCOPE: CPT

## 2022-07-20 PROCEDURE — 84156 ASSAY OF PROTEIN URINE: CPT

## 2022-07-20 PROCEDURE — 36415 COLL VENOUS BLD VENIPUNCTURE: CPT

## 2022-07-20 PROCEDURE — 82570 ASSAY OF URINE CREATININE: CPT

## 2022-07-21 ENCOUNTER — TELEPHONE (OUTPATIENT)
Dept: NEPHROLOGY | Facility: CLINIC | Age: 62
End: 2022-07-21

## 2022-07-21 ENCOUNTER — TELEMEDICINE (OUTPATIENT)
Dept: NEPHROLOGY | Facility: CLINIC | Age: 62
End: 2022-07-21
Payer: MEDICARE

## 2022-07-21 VITALS
HEART RATE: 83 BPM | WEIGHT: 242 LBS | DIASTOLIC BLOOD PRESSURE: 108 MMHG | HEIGHT: 72 IN | BODY MASS INDEX: 32.78 KG/M2 | SYSTOLIC BLOOD PRESSURE: 178 MMHG

## 2022-07-21 DIAGNOSIS — N20.0 NEPHROLITHIASIS: ICD-10-CM

## 2022-07-21 DIAGNOSIS — R80.8 OTHER PROTEINURIA: ICD-10-CM

## 2022-07-21 DIAGNOSIS — I10 ESSENTIAL HYPERTENSION: Primary | ICD-10-CM

## 2022-07-21 DIAGNOSIS — R33.9 URINARY RETENTION: ICD-10-CM

## 2022-07-21 DIAGNOSIS — N18.2 CKD (CHRONIC KIDNEY DISEASE) STAGE 2, GFR 60-89 ML/MIN: ICD-10-CM

## 2022-07-21 DIAGNOSIS — E87.1 HYPONATREMIA: ICD-10-CM

## 2022-07-21 PROCEDURE — 99213 OFFICE O/P EST LOW 20 MIN: CPT | Performed by: INTERNAL MEDICINE

## 2022-07-21 NOTE — TELEPHONE ENCOUNTER
Labs and AVS mailed out       - [facility]

## 2022-07-21 NOTE — PATIENT INSTRUCTIONS
1  Previously elevated BP readings with orthostatic hypotension  -Parkinson's disease can lead to autonomic dysfunction as well as labile BP readings    -no longer on florinef  -am cortisol ok  -I do note mildly elevated free normetanephrine  Would expect 2-3x normal range to suspect pheochromocytoma  -craig 1 3, renin 0 208  Ratio is normal as of 11/23/19  -TSH ok  -repeat plasma metanephrines normal  -continue to wear compression stockings  Take your time getting up   -May take 10mg tablet midodrine up to three times daily  -would consider wearing abdominal binder as well  -on enalapril 2 5mg as needed     2  Elevated serum creatinine ? D/t chronic kidney disease stage 2(mild) - b/l sCr 1-1 2 since 2016, last sCr 1 15 which correlates with eGFR > 60 ml/min per CKD EPI equation  ? If this is due to lability in BP readings vs some other cause  -last UA with microscopy shows small leukocytes, 4-10 WBCs     3  Proteinuria  -UpCr < 0 09 and stable as of July 20, 2022  -of note, not anemic  Will defer myeloma workup     4  Nephrolithiasis - 10mm stone in left kidney  -would continue to follow low sodium diet (<2000mg/day)  Please read packages to ensure sodium content limited  -drink enough fluids to urinate 2-2 5L/day to prevent stone formation   -on multivitamin     5  History of urinary retention s/p suprapubic catheter - s/p urolift x 2, s/p TURP x 2, bladder neck constricture repair  Follows with urology  6  Hyponatremia - resolved on bloodwork since May 2021     RTC in 6 months  Obtain blood and urine testing prior to office visit  May take 10mg midodrine in the morning for SBP < 90

## 2022-07-21 NOTE — PROGRESS NOTES
Virtual Regular Visit    Verification of patient location:    Patient is located in the following state in which I hold an active license [place]      Assessment/Plan:    Problem List Items Addressed This Visit        Cardiovascular and Mediastinum    Essential hypertension - Primary       Genitourinary    Urinary retention    Nephrolithiasis    CKD (chronic kidney disease) stage 2, GFR 60-89 ml/min       Other    Proteinuria    Hyponatremia        1  Previously elevated BP readings with orthostatic hypotension  -Parkinson's disease can lead to autonomic dysfunction as well as labile BP readings    -no longer on florinef  -am cortisol ok  -I do note mildly elevated free normetanephrine  Would expect 2-3x normal range to suspect pheochromocytoma  -craig 1 3, renin 0 208  Ratio is normal as of 11/23/19  -TSH ok  -repeat plasma metanephrines normal  -continue to wear compression stockings  Take your time getting up   -May take 10mg tablet midodrine up to three times daily  -would consider wearing abdominal binder as well  -on enalapril 2 5mg as needed     2  Elevated serum creatinine ? D/t chronic kidney disease stage 2(mild) - b/l sCr 1-1 2 since 2016, last sCr 1 15 which correlates with eGFR > 60 ml/min per CKD EPI equation  ? If this is due to lability in BP readings vs some other cause  -last UA with microscopy shows small leukocytes, 4-10 WBCs     3  Proteinuria  -UpCr < 0 09 and stable as of July 20, 2022  -of note, not anemic  Will defer myeloma workup     4  Nephrolithiasis - 10mm stone in left kidney  -would continue to follow low sodium diet (<2000mg/day)  Please read packages to ensure sodium content limited  -drink enough fluids to urinate 2-2 5L/day to prevent stone formation   -on multivitamin     5  History of urinary retention s/p suprapubic catheter - s/p urolift x 2, s/p TURP x 2, bladder neck constricture repair  Follows with urology      6   Hyponatremia - resolved on bloodwork since May 2021     RT in 6 months  Obtain blood and urine testing prior to office visit  May take 10mg midodrine in the morning for SBP < 90          Reason for visit is   Chief Complaint   Patient presents with    Virtual Regular Visit    Chronic Kidney Disease    Follow-up        Encounter provider Ephraim Johns DO    Provider located at [address] [facility]  [address] [facility] [address]  [facility] [address]      Recent Visits  No visits were found meeting these conditions  Showing recent visits within past 7 days and meeting all other requirements  Today's Visits  Date Type Provider Dept   07/21/22 Telemedicine [facility], [address] [facility] today's visits and meeting all other requirements  Future Appointments  No visits were found meeting these conditions  Showing future appointments within next 150 days and meeting all other requirements       The patient was identified by name and date of birth  Sotero Dsouza was informed that this is a telemedicine visit and that the visit is being conducted through [facility] and patient was informed that this is a secure, HIPAA-compliant platform  He agrees to proceed     My office door was closed  No one else was in the room  He acknowledged consent and understanding of privacy and security of the video platform  The patient has agreed to participate and understands they can discontinue the visit at any time  Patient is aware this is a billable service  Subjective  Sotero Dsouza is a 60 y o  male presents in virtual follow up of HTN  Denies recent kidney stones  HTN - BP at home higher lately  Has not taken midodrine in 2-3 weeks  Takes ACEi as needed  Had a UTI x few weeks ago on cipro          Past Medical History:   Diagnosis Date    Arthritis early stages in thumbs and knee    Back pain     Benign prostatic hyperplasia 2017    TURP surgery 3/15/2019    Bladder infection     BPH (benign prostatic hyperplasia)     Cancer [facility])     testicular 1988    Chronic kidney disease     Diverticulosis     GERD (gastroesophageal reflux disease)     occassional    History of testicular cancer 1988    Surgery and radiation; left side    Hypertension Nov, 2018    occassionally    Kidney stone     Kidney stones     Currently and in the past    Orthostatic hypotension     Orthostatic hypotension due to Parkinson's disease ([facility] [address] )     Parkinson's disease ([facility] [address] )     Sleep apnea     uses CPAP    Sleep apnea, obstructive April, 2019    recently diagnosed    Urinary tract infection     Wears glasses        Past Surgical History:   Procedure Laterality Date    BLADDER NECK RECONSTRUCTION  07/27/2020    BLADDER SURGERY  nov 2019  repair bladder neck constriction    COLONOSCOPY  2017    COLONOSCOPY  09/2020    COLONOSCOPY  11/2020    CYSTOPLASTY / CYSTOURETHROPLASTY  07/27/2020    [address] inguinal hernia repair    IR SUPRAPUBIC CATHETER PLACEMENT  02/24/2020    IR TUBE UPSIZE  03/23/2020    KIDNEY STONE SURGERY      LITHOTRIPSY      Renal; Resolved: 2/27/07    ORCHIECTOMY Left     MA CYSTOURETHRO W/IMPLANT N/A 05/17/2018    Procedure: CYSTOSCOPY WITH INSERTION UROLIFT;  Surgeon: Tasha Zacarias DO;  Location: [facility];  Service: Urology    PROSTATE SURGERY N/A 01/18/2018    Procedure: CYSTOSCOPY WITH INSERTION Fabrice Rosaura;  Surgeon: Tasha Zacarias DO;  [facility];  Service: Urology    [address] [place] [address] [address] [facility]    For cancer    TONSILLECTOMY      TRANSURETHRAL RESECTION OF PROSTATE  Mar 2019/Jul 2019    URETERONEOCYSTOSTOMY      w/ cystoscopy Ureteral Stent Placement;  Resolved: 6/14/2007    WISDOM TOOTH EXTRACTION         Current Outpatient Medications   Medication Sig Dispense Refill    albuterol (2 5 mg/3 mL) 0 083 % nebulizer solution Take 3 mL (2 5 mg total) by nebulization every 6 (six) hours as needed for wheezing or shortness of breath 180 mL 3    albuterol (Ventolin HFA) 90 mcg/act inhaler Inhale 2 puffs every 6 (six) hours as needed for wheezing 18 g 0    carbidopa-levodopa (SINEMET)  mg per tablet Take 1 tablet by mouth 3 (three) times a day      cholecalciferol (VITAMIN D3) 1,000 units tablet Take 2,000 Units by mouth daily      ciprofloxacin (CIPRO) 500 mg tablet Take 500 mg by mouth 2 (two) times a day      cyanocobalamin (VITAMIN B-12) 1000 MCG tablet Take 1,000 mcg by mouth daily      Dalfampridine ER 10 MG TB12 3 (three) times a day        Diclofenac Sodium (VOLTAREN) 1 % Apply 2 g topically 4 (four) times a day 350 g 2    enalapril (VASOTEC) 2 5 mg tablet Take 2 tablets (5 mg total) by mouth as needed in the morning (for HTN)  90 tablet 3    famotidine (Pepcid) 20 mg tablet Take 1 tablet (20 mg total) by mouth daily (Patient taking differently: Take 20 mg by mouth as needed) 90 tablet 1    midodrine (PROAMATINE) 5 mg tablet       multivitamin (THERAGRAN) TABS Take 1 tablet by mouth daily      pantoprazole (PROTONIX) 40 mg tablet TAKE 1 TABLET BY MOUTH 2 TIMES A DAY BEFORE MEALS  60 tablet 5    polyethylene glycol (GLYCOLAX) 17 GM/SCOOP powder Take 17 g by mouth daily      predniSONE 20 mg tablet Take 2 tablets (40 mg total) by mouth daily 10 tablet 0    rOPINIRole (REQUIP) 1 mg tablet Take 1 tablet (1 mg total) by mouth daily at bedtime 30 tablet 3    selegiline (ELDEPRYL) 5 mg capsule       tadalafil (CIALIS) 5 MG tablet Take 5 mg by mouth daily      vitamin E 100 UNIT capsule Take 100 Units by mouth daily      Wheat Dextrin (Benefiber) POWD       calcium carbonate (OS-NEIL) 1250 (500 Ca) MG chewable tablet Chew (Patient not taking: No sig reported)      nystatin-triamcinolone (MYCOLOG-II) cream Apply topically 2 (two) times a day 60 g 0     No current facility-administered medications for this visit  No Known Allergies    Review of Systems   Constitutional: Positive for chills  Negative for fever     HENT: Negative for sore throat  Eyes: Negative for visual disturbance  Respiratory: Positive for shortness of breath  Negative for cough  Cardiovascular: Negative for chest pain and leg swelling  Gastrointestinal: Positive for constipation  Negative for abdominal pain, diarrhea, nausea and vomiting  Endocrine: Negative for polyuria  Genitourinary: Positive for difficulty urinating  Negative for decreased urine volume, dysuria and hematuria  Has to bear down to urinate   Musculoskeletal: Negative for back pain and myalgias  Skin: Negative for rash  Neurological: Positive for dizziness (frequently feels this), speech difficulty and weakness (difficulty walking, PD progressing)  Negative for light-headedness and numbness  Psychiatric/Behavioral: Negative for confusion  Video Exam    Vitals:    07/21/22 1432   BP: (!) 178/108   BP Location: Left arm   Patient Position: Sitting   Cuff Size: Standard   Pulse: 83   Weight: 110 kg (242 lb)   Height: 6' (1 829 m)       Physical Exam  Vitals reviewed  Constitutional:       Appearance: Normal appearance  He is not ill-appearing  HENT:      Head: Normocephalic and atraumatic  Nose: Nose normal       Mouth/Throat:      Mouth: Mucous membranes are moist       Pharynx: No oropharyngeal exudate or posterior oropharyngeal erythema  Eyes:      General: No scleral icterus  Right eye: No discharge  Left eye: No discharge  Extraocular Movements: Extraocular movements intact  Comments: eyeglasses   Pulmonary:      Effort: Pulmonary effort is normal       Breath sounds: No wheezing  Abdominal:      General: There is no distension  Palpations: Abdomen is soft  Tenderness: There is no abdominal tenderness  Musculoskeletal:         General: Swelling (b/l LE) present  Normal range of motion  Cervical back: Normal range of motion  Skin:     Coloration: Skin is not jaundiced     Neurological:      General: No focal deficit present  Mental Status: He is alert  Motor: Weakness present  Comments: Slow speech   Psychiatric:         Mood and Affect: Mood normal          Behavior: Behavior normal           I spent 14 minutes directly with the patient during this visit    [facility] verbally agrees to participate in [employer]  Pt is aware that [employer] could be limited without vital signs or the ability to perform a full hands-on physical exam  Orlando Perez understands he or the provider may request at any time to terminate the video visit and request the patient to seek care or treatment in person

## 2022-07-21 NOTE — TELEPHONE ENCOUNTER
----- Message from Kenzie Farmer DO sent at 7/21/2022  2:58 PM EDT -----  Patient should have follow up in 6 months  Blood and urine testing ordered

## 2022-07-25 ENCOUNTER — PATIENT OUTREACH (OUTPATIENT)
Dept: FAMILY MEDICINE CLINIC | Facility: CLINIC | Age: 62
End: 2022-07-25

## 2022-07-25 NOTE — PROGRESS NOTES
Chart review completed  Pt is scheduled with palliative care for 7/29/22  UPDATE  Pt established with palliative care as requested  SW CM will close case at this time  Will be available if needed, via new order

## 2022-07-28 NOTE — PATIENT INSTRUCTIONS
PRESCRIPTION REFILL REMINDER:  All medication refills should be requested prior to [facility] on Friday  Any refill requests after noon on Friday would be addressed the following Monday  Please protect yourself from [place]   = Wash your hands  Soap and water, or hand  with at least 60% alcohol, are both effective at killing the virus  = Wear a mask  This will help protect others from any virus particles you might spread  Your mouth and nose BOTH need to be covered  = Keep the distance  Keep 6 feet of distance from other people, even if they seem healthy  Keeping distance protects you from the other person's virus spread     = Get a vaccine, and boost it  Three vaccines are approved for use in the [place] for all adults  These vaccines do provide protection against all known variants   + Pfizer is FDA approved for all persons age 11 and older   + Versa Said may be given to all person age 25 and older   - We do NOT recommend [address] vaccine for our Palliative Care patients  = Call 4-919-KNQCWPY (Choose Option 7 for faster service!) to get scheduled for your shot   = [facility], [employer], [employer], [employer], and many [facility] locations ALSO have shots   + The CDC recommends booster shots on ALL vaccines for ALL adults  = https://Enthuse/  = If you are over 50 or have an immune compromise, the CDC recommends a fourth shot     = Test to treat  If you have symptoms, get tested, and get treatment!   New drugs like Paxlovid can prevent you from ever having to go to the hospital , and may be covered completely by your insurance or special government programs    - https://aspr hhs gov/TestToTreat/      Informacion en espanol sobre vacunas, de nos companeros de [facility] --  [employer]    Check out [employer] for [address] that are updated daily:    http://www ROSTR/     Global Epidemics  Org, from [facility] will give you [id number]-cq-[id number] information on virus cases and vaccination rates:    Https://globalepidemics  org/    Frequently Asked Questions about COVID, answered by [facility]    [employer]

## 2022-07-29 ENCOUNTER — TELEPHONE (OUTPATIENT)
Dept: PALLIATIVE MEDICINE | Facility: CLINIC | Age: 62
End: 2022-07-29

## 2022-07-29 ENCOUNTER — CONSULT (OUTPATIENT)
Dept: PALLIATIVE MEDICINE | Facility: CLINIC | Age: 62
End: 2022-07-29
Payer: MEDICARE

## 2022-07-29 VITALS
TEMPERATURE: 98 F | HEART RATE: 80 BPM | OXYGEN SATURATION: 95 % | RESPIRATION RATE: 20 BRPM | DIASTOLIC BLOOD PRESSURE: 70 MMHG | SYSTOLIC BLOOD PRESSURE: 120 MMHG

## 2022-07-29 DIAGNOSIS — G90.3 NEUROGENIC ORTHOSTATIC HYPOTENSION (HCC): ICD-10-CM

## 2022-07-29 DIAGNOSIS — G90.3 MULTIPLE SYSTEM ATROPHY (HCC): ICD-10-CM

## 2022-07-29 DIAGNOSIS — Z71.89 COUNSELING AND COORDINATION OF CARE: Primary | ICD-10-CM

## 2022-07-29 DIAGNOSIS — R49.9 CHANGE IN VOICE: ICD-10-CM

## 2022-07-29 DIAGNOSIS — G20 PARKINSON'S DISEASE (HCC): ICD-10-CM

## 2022-07-29 DIAGNOSIS — G70.9 NEUROMUSCULAR DISEASE (HCC): ICD-10-CM

## 2022-07-29 DIAGNOSIS — R26.2 AMBULATORY DYSFUNCTION: ICD-10-CM

## 2022-07-29 DIAGNOSIS — J96.10 CHRONIC RESPIRATORY FAILURE, UNSPECIFIED WHETHER WITH HYPOXIA OR HYPERCAPNIA (HCC): ICD-10-CM

## 2022-07-29 DIAGNOSIS — Z71.89 COUNSELING REGARDING ADVANCED CARE PLANNING AND GOALS OF CARE: Primary | ICD-10-CM

## 2022-07-29 PROBLEM — G23.8 MULTIPLE SYSTEM ATROPHY (HCC): Status: ACTIVE | Noted: 2022-07-29

## 2022-07-29 PROCEDURE — 99497 ADVNCD CARE PLAN 30 MIN: CPT | Performed by: INTERNAL MEDICINE

## 2022-07-29 PROCEDURE — NC001 PR NO CHARGE

## 2022-07-29 PROCEDURE — 99205 OFFICE O/P NEW HI 60 MIN: CPT | Performed by: INTERNAL MEDICINE

## 2022-07-29 NOTE — TELEPHONE ENCOUNTER
[facility] placed call to patient/spouse was informed that they could benefit from a w/c at this time  They do need a hospital bed but need to clear space for the placement of a hospital bed  They will notify the team when they are ready to have the bed ordered  They do have a meeting with hospice next week and if they opt to sign onto hospice, they are aware that hospice can order the bed for them

## 2022-07-29 NOTE — PROGRESS NOTES
Palliative and Supportive Care   Melvin Martinez 60 y o  male [id number]    Assessment/Plan:  1  Counseling regarding advanced care planning and goals of care    2  Parkinson's disease ([facility] )    3  Neurogenic orthostatic hypotension (HCC)    4  Neuromuscular disease ([facility] )    5  Multiple system atrophy ([facility] )    6  Ambulatory dysfunction    7  Chronic respiratory failure, unspecified whether with hypoxia or hypercapnia ([facility] )    8  Change in voice      · Extensive discussion about goals of care performed in the office today  · Hospice services and philosophy explained  Will refer for more information, as they have specific questions that may be best answered by hospice  · Patient clear that he does not want intubation or trach  · He is not clear about feeding tubes for MARIAA  · 5 Wishes provided  They are interested in filling this out  · POLST form also provided for their perusal  · Resources for [facility] given   · He requests DMEs because of progressive weakness etc    · RTO in 3 weeks    ==========  The patient requires a hospital bed, and meets ALL OF the following criteria:      - Patient can't reposition themselves in a regular to bed in order to alleviate pain: yes    - The head of the bed must be able to be elevated more than 30 degrees most of the time due to the following diagnosis: dysphagia and chronic hypoxic respiratory failure from [address]    - The patient has a medical condition (Multiple System Atrophy) which requires positioning of the body in ways not feasible with an ordinary bed      Vitals:    07/29/22 1015   BP: 120/70   BP Location: Right arm   Patient Position: Sitting   Cuff Size: Standard   Pulse: 80   Resp: 20   Temp: 98 °F (36 7 °C)   TempSrc: Temporal   SpO2: 95%     ==========    ==========  The patient needs a MANUAL wheelchair, and meets the ALL OF the following criteria:       - The patient has a mobility limitation that significantly impairs their ability to participate in 1 or more mobility related ADLs: Yes    - The mobility limitation cannot be sufficiently resolved by the use of a cane or walker: Yes, the limitation cannot be resolved with a less-supportive device  - The use of a manual wheelchair will improve the patient's ability to be mobile within the home: Yes    - The patient has expressed willingness to utilize a wheelchair:  Yes    - The patient has the ability to self propel the wheelchair, or there is a caregiver who is available, willing, and able to provide assistance: yes,    Vitals:    07/29/22 1015   BP: 120/70   BP Location: Right arm   Patient Position: Sitting   Cuff Size: Standard   Pulse: 80   Resp: 20   Temp: 98 °F (36 7 °C)   TempSrc: Temporal   SpO2: 95%      ==========      Requested Prescriptions      No prescriptions requested or ordered in this encounter     There are no discontinued medications  Representatives have queried the patient's controlled substance dispensing history in the Prescription Drug Monitoring Program in compliance with regulations before I have prescribed any controlled substances  The prescription history is consistent with prescribed therapy and our practice policies  60 minutes were spent face to face with Rodolfo Bowen with greater than 50% of the time spent in counseling or coordination of care including discussions of etiology of diagnosis, pathogenesis of diagnosis, prognosis of diagnosis, diagnostic results, impression, and recommendations, risks and benefits of treatment, instructions for disease self management, treatment instructions, follow up requirements, risk factors and risk reduction of disease, patient and family counseling/involvement in care, compliance with treatment regimen and advanced care planning  All of the patient's questions were answered during this discussion  No follow-ups on file      Subjective:   Chief Complaint  New consultation for:  symptom management, goal of care assessment and decisional support, disease process education and discussion of prognosis, advance care planning, emotional support in the setting of serious illness  HPI     Tray Beavers is a 60 y o  male with palliative diagnosis of multiple system atrophy with symptoms starting in 2016  He was given this diagnosis around 2019, per patient and family's recollection  He is referred to palliative care by his neurologist as family struggles with disease progression and taking care of him at home  He also sees pulmonology for chronic/progressive respiratory failure from neurologic disease with associated bulbar symptoms  Last note from 7/6 noted concerns for worsening respiratory failure  He is currently receiving supportive management from both specialists, as well as from cardiology (for neurogenic orthostatic hypotension)  Patient arrived today with his wife and his daughter  Introduced palliative care with emphasis on goals of care discussion  When asked how he is doing, he indicated that he is declining quicker than he'd like starting in 2019 when he was diagnosed with MSA  But especially so in the last 2 days when he now has to rely on the wheelchair to keep his balance and aid in his leg weakness  He had a fall a few days ago as well and patient's wife unable to help him up and needed to call the neighbor for assistance  He also noted dysphagia and having to clear his throat more in the last 2 days  They wonder what is the next step for them as they feel lost, with wife wondering if he is hospice appropriate  Wife still works full time and hopes to continue doing so  Wonders if they can hire HHA at home  They indicated that it appears his treatment options are now limited ("running out of option")    In order to find him the suitable support he needs at this time, we discussed several cam points that needs to be addressed sooner rather than later, as it appears that his breathing, swallowing and muscle weakness are worsening now      We discussed his wishes for intubation and mechanical ventilation for when his breathing worsens from his MSA  Discussed that once intubated, he will not get off the ventilator and will end up with a trach  He feels that this is not a path he wishes to go on and will only prolong the inevitable  We discussed feeding tube for artificial hydration and nutrition  I explained that a feeding tube may buy more time by providing the caloric requirements he requires daily but this will not protect against aspiration as dysphagia will continue to worsen even with the tube  He will also be prone to complications such as infection from the tube  He said he has not thought about this yet  Appears that these discussions were never brought up to them before  I feel that he may be close, if not already, to qualifying for hospice cares given progression as noted above  We explained the difference between palliative and hospice  I discussed hospice is much detail with emphasis on end of life care with limited re-hospitalizations and focusing more on symptom management of worsening MSA while we allow nature to takes its course  Patient and family understandably tearful but appears appreciative of information  Patient at this time wants to continue receiving current cares from his specialists  I encouraged them to think more about this especially if he continues to worsen down the line  He does not have a living will or dPOA  [place] Act 169 in terms of surrogate decision making explained  The 5 Wishes provided  A POLST was also provided for their perusal  Resources for [facility] provided  The following portions of the medical history were reviewed: past medical history, problem list, medication list, and social history      Current Outpatient Medications:     albuterol (2 5 mg/3 mL) 0 083 % nebulizer solution, Take 3 mL (2 5 mg total) by nebulization every 6 (six) hours as needed for wheezing or shortness of breath, Disp: 180 mL, Rfl: 3    albuterol (Ventolin HFA) 90 mcg/act inhaler, Inhale 2 puffs every 6 (six) hours as needed for wheezing, Disp: 18 g, Rfl: 0    carbidopa-levodopa (SINEMET)  mg per tablet, Take 1 tablet by mouth 3 (three) times a day, Disp: , Rfl:     cholecalciferol (VITAMIN D3) 1,000 units tablet, Take 2,000 Units by mouth daily, Disp: , Rfl:     cyanocobalamin (VITAMIN B-12) 1000 MCG tablet, Take 1,000 mcg by mouth daily, Disp: , Rfl:     Dalfampridine ER 10 MG TB12, 3 (three) times a day  , Disp: , Rfl:     Diclofenac Sodium (VOLTAREN) 1 %, Apply 2 g topically 4 (four) times a day, Disp: 350 g, Rfl: 2    enalapril (VASOTEC) 2 5 mg tablet, Take 2 tablets (5 mg total) by mouth as needed in the morning (for HTN)  , Disp: 90 tablet, Rfl: 3    famotidine (Pepcid) 20 mg tablet, Take 1 tablet (20 mg total) by mouth daily (Patient taking differently: Take 20 mg by mouth as needed), Disp: 90 tablet, Rfl: 1    midodrine (PROAMATINE) 5 mg tablet, , Disp: , Rfl:     multivitamin (THERAGRAN) TABS, Take 1 tablet by mouth daily, Disp: , Rfl:     pantoprazole (PROTONIX) 40 mg tablet, TAKE 1 TABLET BY MOUTH 2 TIMES A DAY BEFORE MEALS , Disp: 60 tablet, Rfl: 5    polyethylene glycol (GLYCOLAX) 17 GM/SCOOP powder, Take 17 g by mouth daily, Disp: , Rfl:     predniSONE 20 mg tablet, Take 2 tablets (40 mg total) by mouth daily, Disp: 10 tablet, Rfl: 0    rOPINIRole (REQUIP) 1 mg tablet, Take 1 tablet (1 mg total) by mouth daily at bedtime, Disp: 30 tablet, Rfl: 3    selegiline (ELDEPRYL) 5 mg capsule, , Disp: , Rfl:     Wheat Dextrin (Benefiber) POWD, , Disp: , Rfl:     calcium carbonate (OS-NEIL) 1250 (500 Ca) MG chewable tablet, Chew (Patient not taking: No sig reported), Disp: , Rfl:     ciprofloxacin (CIPRO) 500 mg tablet, Take 500 mg by mouth 2 (two) times a day (Patient not taking: Reported on 7/29/2022), Disp: , Rfl:     nystatin-triamcinolone (MYCOLOG-II) cream, Apply topically 2 (two) times a day, Disp: 60 g, Rfl: 0    tadalafil (CIALIS) 5 MG tablet, Take 5 mg by mouth daily, Disp: , Rfl:     vitamin E 100 UNIT capsule, Take 100 Units by mouth daily (Patient not taking: Reported on 7/29/2022), Disp: , Rfl:   Review of Systems   Constitutional: Positive for activity change  HENT: Positive for trouble swallowing and voice change  Respiratory: Negative for shortness of breath  Cardiovascular: Negative for chest pain  Gastrointestinal: Negative for abdominal pain  Musculoskeletal: Negative for back pain  Neurological: Positive for dizziness, speech difficulty, weakness and light-headedness  Psychiatric/Behavioral: Negative for sleep disturbance  The patient is not nervous/anxious  All other systems reviewed and are negative  All other systems negative    Objective:  Vital Signs  /70 (BP Location: Right arm, Patient Position: Sitting, Cuff Size: Standard)   Pulse 80   Temp 98 °F (36 7 °C) (Temporal)   Resp 20   SpO2 95%    Physical Exam    Constitutional: Appears well-developed and well-nourished  In no acute physical or emotional distress  Head: Normocephalic and atraumatic  Eyes: EOM are normal  No ocular discharge  No scleral icterus  Neck: No visible adenopathy or masses  Respiratory: Effort normal  No stridor  No respiratory distress  But short of breath with long sentences, needing to pause to catch breath  Gastrointestinal: No abdominal distension  Musculoskeletal: No edema  Neurological: Alert, oriented and appropriately conversant  Voice is hoarse and speech slurred, difficult to understand at times, but still intelligible  Now wheelchair bound it seems  Skin: No diaphoresis, no rashes seen on exposed areas of skin  Pale   Psychiatric: Displays a normal mood and affect   Behavior, judgement and thought content appear normal      Rajeev Piper MD  Palliative Medicine & Supportive Care  Internal Medicine  Available via [facility] Text  Office: [phone]  Fax: [phone]

## 2022-07-30 ENCOUNTER — HOME CARE VISIT (OUTPATIENT)
Dept: HOME HEALTH SERVICES | Facility: HOME HEALTHCARE | Age: 62
End: 2022-07-30

## 2022-08-02 ENCOUNTER — OFFICE VISIT (OUTPATIENT)
Dept: OBGYN CLINIC | Facility: MEDICAL CENTER | Age: 62
End: 2022-08-02
Payer: MEDICARE

## 2022-08-02 VITALS
DIASTOLIC BLOOD PRESSURE: 125 MMHG | HEIGHT: 72 IN | HEART RATE: 78 BPM | WEIGHT: 242 LBS | SYSTOLIC BLOOD PRESSURE: 197 MMHG | BODY MASS INDEX: 32.78 KG/M2

## 2022-08-02 DIAGNOSIS — M17.0 PRIMARY OSTEOARTHRITIS OF BOTH KNEES: Primary | ICD-10-CM

## 2022-08-02 PROCEDURE — 99213 OFFICE O/P EST LOW 20 MIN: CPT | Performed by: PHYSICIAN ASSISTANT

## 2022-08-02 PROCEDURE — 20610 DRAIN/INJ JOINT/BURSA W/O US: CPT | Performed by: PHYSICIAN ASSISTANT

## 2022-08-02 RX ORDER — BUPIVACAINE HYDROCHLORIDE 5 MG/ML
6 INJECTION, SOLUTION EPIDURAL; INTRACAUDAL
Status: COMPLETED | OUTPATIENT
Start: 2022-08-02 | End: 2022-08-02

## 2022-08-02 RX ORDER — METHYLPREDNISOLONE ACETATE 40 MG/ML
1 INJECTION, SUSPENSION INTRA-ARTICULAR; INTRALESIONAL; INTRAMUSCULAR; SOFT TISSUE
Status: COMPLETED | OUTPATIENT
Start: 2022-08-02 | End: 2022-08-02

## 2022-08-02 RX ADMIN — BUPIVACAINE HYDROCHLORIDE 6 ML: 5 INJECTION, SOLUTION EPIDURAL; INTRACAUDAL at 16:38

## 2022-08-02 RX ADMIN — METHYLPREDNISOLONE ACETATE 1 ML: 40 INJECTION, SUSPENSION INTRA-ARTICULAR; INTRALESIONAL; INTRAMUSCULAR; SOFT TISSUE at 16:38

## 2022-08-02 NOTE — PROGRESS NOTES
Orthopaedic Surgery - Office Note  Oswaldo Crowe (78 y o  male)   : 1960   MRN: [id number]  Encounter Date: 2022    Chief Complaint   Patient presents with    Left Knee - Follow-up       Assessment / Plan  Bilateral knee Osteoarthritis    · After discussion of risk and benefits the patient did agree to proceed with a steroid injection of left knee this was performed and prepared sterilely and tolerated well  · Continue to try strengthening as tolerated, this will be limited due to his progressive MSA  · WBAT  · Continue with Anti-inflammatories or Tylenol prn pain  He does use Voltaren cream as needed  · We did briefly discuss other treatment options for arthritis in the knee going forward he will contact us based on his progress  Return if symptoms worsen or fail to improve  History of Present Illness  Oswaldo Crowe is a 60 y o  male following up for left knee osteoarthritis  He does have a history for multiple system atrophy( MSA)  And he continues to become more disabled due to this atypical Parkinson's which is expected to leave him unable to walk unfortunately in the near future  He is currently walking minimally with a walker which is new since his last visit, and the most he usually does stand up and sit down frequently  On 10/12/2021 he did have a viscosupplementation injection which he feels only helped for a few days  The patient did have a steroid injection on 2021 which did help for short period of time but eventually wore off, then his injection last visit on 02/15/2022 seem to help well up to recently  He stating at this time that the thing that seems to bother him most is when he is trying to sit down as he has increased anterior knee discomfort at that time  He has no new injuries or associated numbness or tingling  He has not been also taking over-the-counter anti-inflammatories without much relief    Since last visit he feels he has been having increased discomfort in his right knee similar to the left but not as bad in the anterior aspect of the knee  He describes it as also being in the area of the patellar tendon insertion distally which he has tried some Voltaren cream for  Review of Systems  Pertinent items are noted in HPI  All other systems were reviewed and are negative  Physical Exam  BP (!) 197/125   Pulse 78   Ht 6' (1 829 m)   Wt 110 kg (242 lb)   BMI 32 82 kg/m²   Cons: Appears well  No apparent distress  Psych: Alert  Oriented x3  Mood and affect normal   Eyes: PERRLA, EOMI  Resp: Normal effort  No audible wheezing or stridor  CV: Palpable pulse  No discernable arrhythmia  No LE edema  Lymph:  No palpable cervical, axillary, or inguinal lymphadenopathy  Skin: Warm  No palpable masses  No visible lesions  Neuro: Normal muscle tone  Normal and symmetric DTR's  Left Knee Exam  Alignment:  Normal knee alignment  Inspection:  No swelling  No edema  No erythema  No ecchymosis  Palpation:  No tenderness  Severe patellofemoral crepitus  ROM:  Knee Extension 0  Knee Flexion 120  Strength:  5/5 quadriceps and hamstrings  Stability:  No objective knee instability  Stable Varus / Valgus stress, Lachman, and Posterior drawer  Tests:  No pertinent positive or negative tests  Patella:  Patella tracks centrally with crepitus  Neurovascular:  Sensation intact in DP/SP/Bateman/Sa/T nerve distributions  Toes warm and perfused  Gait:  Normal     Studies Reviewed  I have personally reviewed pertinent films in PACS  XR of left knee - From 08/06/2021 shows severe narrowing of the patellofemoral joint space with spurring and subchondral sclerosis  The remaining 2 compartments showed mild arthritic narrowing with mild spurring  No fracture dislocation seen  Large joint arthrocentesis: bilateral knee  Universal Protocol:  Consent: Verbal consent obtained    Consent given by: patient    Supporting Documentation  Indications: pain Procedure Details  Location: knee - bilateral knee  Needle size: 22 G  Approach: anterolateral    Medications (Right): 6 mL bupivacaine (PF) 0 5 %; 1 mL methylPREDNISolone acetate 40 mg/mLMedications (Left): 6 mL bupivacaine (PF) 0 5 %; 1 mL methylPREDNISolone acetate 40 mg/mL   Patient tolerance: patient tolerated the procedure well with no immediate complications  Dressing:  Sterile dressing applied            Medical, Surgical, Family, and Social History  The patient's medical history, family history, and social history, were reviewed and updated as appropriate  Past Medical History:   Diagnosis Date    Arthritis early stages in thumbs and knee    Back pain     Benign prostatic hyperplasia 2017    TURP surgery 3/15/2019    Bladder infection     BPH (benign prostatic hyperplasia)     Cancer [facility])     testicular 1988    Chronic kidney disease     Diverticulosis     GERD (gastroesophageal reflux disease)     occassional    History of testicular cancer 1988    Surgery and radiation; left side    Hypertension Nov, 2018    occassionally    Kidney stone     Kidney stones     Currently and in the past    Orthostatic hypotension     Orthostatic hypotension due to Parkinson's disease ([address] )     Parkinson's disease ([address] )     Sleep apnea     uses CPAP    Sleep apnea, obstructive April, 2019    recently diagnosed    Urinary tract infection     Wears glasses        Past Surgical History:   Procedure Laterality Date    BLADDER NECK RECONSTRUCTION  07/27/2020    BLADDER SURGERY  nov 2019   repair bladder neck constriction    COLONOSCOPY  2017    COLONOSCOPY  09/2020    COLONOSCOPY  11/2020    CYSTOPLASTY / CYSTOURETHROPLASTY  07/27/2020    [address] inguinal hernia repair    IR SUPRAPUBIC CATHETER PLACEMENT  02/24/2020    IR TUBE UPSIZE  03/23/2020    KIDNEY STONE SURGERY      LITHOTRIPSY      Renal; Resolved: 2/27/07    ORCHIECTOMY Left     GA CYSTOURETHRO W/IMPLANT N/A 2018    Procedure: CYSTOSCOPY WITH INSERTION UROLIFT;  Surgeon: Derrick Crawley DO;  [facility]: [facility];  Service: Urology    PROSTATE SURGERY N/A 2018    Procedure: Carlyle Bain;  Surgeon: Derrick Crawley DO;  Location: [facility];  Service: Urology    [address]    For cancer    TONSILLECTOMY      TRANSURETHRAL RESECTION OF PROSTATE  Mar 2019/2019    URETERONEOCYSTOSTOMY      w/ cystoscopy Ureteral Stent Placement;  Resolved: 2007    WISDOM TOOTH EXTRACTION         Family History   Problem Relation Age of Onset    Colon cancer Father     Heart failure Father     Cancer Father         Colon CA    Parkinsonism Mother     Cancer Paternal Grandmother        Social History     Occupational History    Occupation: [employer] retired   Tobacco Use    Smoking status: Former Smoker     Packs/day: 0 50     Years: 2 00     Pack years: 1 00     Types: Cigarettes     Start date:      Quit date: 1978     Years since quittin 6    Smokeless tobacco: Never Used   Vaping Use    Vaping Use: Never used   Substance and Sexual Activity    Alcohol use: Not Currently    Drug use: No    Sexual activity: Not Currently     Birth control/protection: Abstinence       No Known Allergies      Current Outpatient Medications:     albuterol (2 5 mg/3 mL) 0 083 % nebulizer solution, Take 3 mL (2 5 mg total) by nebulization every 6 (six) hours as needed for wheezing or shortness of breath, Disp: 180 mL, Rfl: 3    albuterol (Ventolin HFA) 90 mcg/act inhaler, Inhale 2 puffs every 6 (six) hours as needed for wheezing, Disp: 18 g, Rfl: 0    calcium carbonate (OS-NEIL) 1250 (500 Ca) MG chewable tablet, Chew, Disp: , Rfl:     carbidopa-levodopa (SINEMET)  mg per tablet, Take 1 tablet by mouth 3 (three) times a day, Disp: , Rfl:     cholecalciferol (VITAMIN D3) 1,000 units tablet, Take 2,000 Units by mouth daily, Disp: , Rfl:    cyanocobalamin (VITAMIN B-12) 1000 MCG tablet, Take 1,000 mcg by mouth daily, Disp: , Rfl:     Dalfampridine ER 10 MG TB12, 3 (three) times a day  , Disp: , Rfl:     Diclofenac Sodium (VOLTAREN) 1 %, Apply 2 g topically 4 (four) times a day, Disp: 350 g, Rfl: 2    enalapril (VASOTEC) 2 5 mg tablet, Take 2 tablets (5 mg total) by mouth as needed in the morning (for HTN)  , Disp: 90 tablet, Rfl: 3    midodrine (PROAMATINE) 5 mg tablet, , Disp: , Rfl:     multivitamin (THERAGRAN) TABS, Take 1 tablet by mouth daily, Disp: , Rfl:     pantoprazole (PROTONIX) 40 mg tablet, TAKE 1 TABLET BY MOUTH 2 TIMES A DAY BEFORE MEALS , Disp: 60 tablet, Rfl: 5    polyethylene glycol (GLYCOLAX) 17 GM/SCOOP powder, Take 17 g by mouth daily, Disp: , Rfl:     predniSONE 20 mg tablet, Take 2 tablets (40 mg total) by mouth daily, Disp: 10 tablet, Rfl: 0    rOPINIRole (REQUIP) 1 mg tablet, Take 1 tablet (1 mg total) by mouth daily at bedtime, Disp: 30 tablet, Rfl: 3    selegiline (ELDEPRYL) 5 mg capsule, , Disp: , Rfl:     vitamin E 100 UNIT capsule, Take 100 Units by mouth daily, Disp: , Rfl:     Wheat Dextrin (Benefiber) POWD, , Disp: , Rfl:     ciprofloxacin (CIPRO) 500 mg tablet, Take 500 mg by mouth 2 (two) times a day (Patient not taking: Reported on 7/29/2022), Disp: , Rfl:     famotidine (Pepcid) 20 mg tablet, Take 1 tablet (20 mg total) by mouth daily (Patient taking differently: Take 20 mg by mouth as needed), Disp: 90 tablet, Rfl: 1    nystatin-triamcinolone (MYCOLOG-II) cream, Apply topically 2 (two) times a day, Disp: 60 g, Rfl: 0    tadalafil (CIALIS) 5 MG tablet, Take 5 mg by mouth daily, Disp: , Rfl:       Neyda Aguila PA-C

## 2022-08-03 LAB
DME PARACHUTE DELIVERY DATE ACTUAL: NORMAL
DME PARACHUTE DELIVERY DATE EXPECTED: NORMAL
DME PARACHUTE DELIVERY DATE REQUESTED: NORMAL
DME PARACHUTE ITEM DESCRIPTION: NORMAL
DME PARACHUTE ORDER STATUS: NORMAL
DME PARACHUTE SUPPLIER NAME: NORMAL
DME PARACHUTE SUPPLIER PHONE: NORMAL

## 2022-08-11 ENCOUNTER — TELEPHONE (OUTPATIENT)
Dept: PULMONOLOGY | Facility: CLINIC | Age: 62
End: 2022-08-11

## 2022-08-11 DIAGNOSIS — J45.909 UNCOMPLICATED ASTHMA, UNSPECIFIED ASTHMA SEVERITY, UNSPECIFIED WHETHER PERSISTENT: Primary | ICD-10-CM

## 2022-08-18 ENCOUNTER — OFFICE VISIT (OUTPATIENT)
Dept: PALLIATIVE MEDICINE | Facility: CLINIC | Age: 62
End: 2022-08-18
Payer: MEDICARE

## 2022-08-18 VITALS
TEMPERATURE: 97.3 F | OXYGEN SATURATION: 95 % | HEART RATE: 73 BPM | WEIGHT: 247 LBS | DIASTOLIC BLOOD PRESSURE: 90 MMHG | BODY MASS INDEX: 33.5 KG/M2 | SYSTOLIC BLOOD PRESSURE: 170 MMHG

## 2022-08-18 DIAGNOSIS — G90.3 MULTIPLE SYSTEM ATROPHY (HCC): ICD-10-CM

## 2022-08-18 DIAGNOSIS — J96.10 CHRONIC RESPIRATORY FAILURE, UNSPECIFIED WHETHER WITH HYPOXIA OR HYPERCAPNIA (HCC): ICD-10-CM

## 2022-08-18 DIAGNOSIS — Z71.89 COUNSELING REGARDING ADVANCED CARE PLANNING AND GOALS OF CARE: Primary | ICD-10-CM

## 2022-08-18 DIAGNOSIS — R49.9 CHANGE IN VOICE: ICD-10-CM

## 2022-08-18 DIAGNOSIS — G70.9 NEUROMUSCULAR DISEASE (HCC): ICD-10-CM

## 2022-08-18 DIAGNOSIS — R13.19 OTHER DYSPHAGIA: ICD-10-CM

## 2022-08-18 DIAGNOSIS — R26.2 AMBULATORY DYSFUNCTION: ICD-10-CM

## 2022-08-18 PROCEDURE — 99497 ADVNCD CARE PLAN 30 MIN: CPT | Performed by: INTERNAL MEDICINE

## 2022-08-18 PROCEDURE — 99214 OFFICE O/P EST MOD 30 MIN: CPT | Performed by: INTERNAL MEDICINE

## 2022-08-18 NOTE — PROGRESS NOTES
Palliative and Supportive Care   Vu Bravo 60 y o  male [id number]    Assessment/Plan:  1  Counseling regarding advanced care planning and goals of care    2  Multiple system atrophy ([address] )    3  Neuromuscular disease ([address] )    4  Other dysphagia    5  Chronic respiratory failure, unspecified whether with hypoxia or hypercapnia (HCC)    6  Change in voice    7  Ambulatory dysfunction      · They have spoken with hospice and they are not ready for hospice services at this time  · He appears steadfast in his DNR/DNI status  · Appears interested in a feeding tube  We discussed that a feeding tube may be appropriate to supplement his nutrition if he continues to have issues with dysphagia and/or decreasing oral intake  The goal of the feeding tube is to provide sufficient nutrition to continue sustaining life  This will not protect him from aspiration nor will this slow down progression of his underlying neurologic condition  He hopes this will be temporary fix but it would mostly likely be a long term intervention  He may still eat/drink orally even with a tube if he can but at some point, he will rely solely on TF through the feeding tube  · They do not believe he is ready for this yet  It should be noted that he does think his symptoms have gotten slightly worse the last time we saw each other 2 weeks ago  He does experience cough with swallowing, but this is not daily  So they want to hold off for now  I am concerned though about his ability to safely go through the procedure the longer he waits  · I worry about the risk and benefit of the procedure because of worsening respiratory status and needing anesthesia  This will likely be  dependent and I'd rather they start talking to another specialist if this is the direction they are hoping to go  · He is already known to GI and this may be the first step to go   While his wife feels that he is not at the point of needing a feeding tube yet, patient agreed to a visit with them to discuss further and "not wait until they are in crisis mode before acting"  · I also asked them to get the opinion from his trusted PCP/Dr Cantu Members who he has been seeing a while as well as his neurologist about above  · I informed them that orders supplies for nutrition will likely come from PCP or GI or nutrition  · They are also interested in seeing his prior SLP therapist from his neurologist from [place]  They will reach out to his neurologist from [place] for another referral  · He completed the 5 Wishes:  · 1st agent: Wife/Alina Rodriguez  · 2nd agent: Daughter/Ameya Reji Adame  · 3rd agent: Son/Henrique Rodriguez  · He is DNR/DNI in all scenarios presented in the document, and wants them removed/stopped if initiated  Record of POLST discussion     I completed a POLST form with the patient and/or the patient's designated decision-maker  The pt is competent for medical decisions today  After discussing the problems addressed during this hospitalization, and prior to this encounter, the following limits and goals were set:    Part A)  do not Attempt Resuscitation     Part B)  Limited Medical interventions, agreeable to hospitalize if needed  Avoid ICU if possible    Part C)  Antibiotics for comfort    Part D)  Trial of MARIAA    Additional goals)  NA     Contacts)   - Wife/Alina will act as primary contact and decision-maker for this patient  Form was then signed by myself, patient, and wife/POA  Additional parties in discussion included: Daughter/Ameya  Copies of form were given to family  This was scanned in chart  Original of form was sent home with the patient  · He requests DMEs because of progressive weakness etc  But wants me to wait to order because they are in the process of renovating their home    · RTO in 3 months    ==========  The patient requires a hospital bed, and meets ALL OF the following criteria:      - Patient can't reposition themselves in a regular to bed in order to alleviate pain: yes    - The head of the bed must be able to be elevated more than 30 degrees most of the time due to the following diagnosis: dysphagia and chronic hypoxic respiratory failure from [address]    - The patient has a medical condition (Multiple System Atrophy) which requires positioning of the body in ways not feasible with an ordinary bed  Vitals:    08/18/22 0900   BP: 170/90   BP Location: Left arm   Cuff Size: Standard   Pulse: 73   Temp: (!) 97 3 °F (36 3 °C)   TempSrc: Temporal   SpO2: 95%   Weight: 112 kg (247 lb)     ==========    ==========  The patient needs a MANUAL wheelchair, and meets the ALL OF the following criteria:       - The patient has a mobility limitation that significantly impairs their ability to participate in 1 or more mobility related ADLs: Yes    - The mobility limitation cannot be sufficiently resolved by the use of a cane or walker: Yes, the limitation cannot be resolved with a less-supportive device  - The use of a manual wheelchair will improve the patient's ability to be mobile within the home: Yes    - The patient has expressed willingness to utilize a wheelchair:  Yes    - The patient has the ability to self propel the wheelchair, or there is a caregiver who is available, willing, and able to provide assistance: yes,    Vitals:    08/18/22 0900   BP: 170/90   BP Location: Left arm   Cuff Size: Standard   Pulse: 73   Temp: (!) 97 3 °F (36 3 °C)   TempSrc: Temporal   SpO2: 95%   Weight: 112 kg (247 lb)      ==========      Requested Prescriptions      No prescriptions requested or ordered in this encounter     There are no discontinued medications  Representatives have queried the patient's controlled substance dispensing history in the Prescription Drug Monitoring Program in compliance with regulations before I have prescribed any controlled substances    The prescription history is consistent with prescribed therapy and our practice policies  60 minutes were spent face to face with Christine Goodwin with greater than 50% of the time spent in counseling or coordination of care including discussions of etiology of diagnosis, pathogenesis of diagnosis, prognosis of diagnosis, diagnostic results, impression, and recommendations, risks and benefits of treatment, instructions for disease self management, treatment instructions, follow up requirements, risk factors and risk reduction of disease, patient and family counseling/involvement in care, compliance with treatment regimen and advanced care planning  All of the patient's questions were answered during this discussion  No follow-ups on file  Subjective:   Chief Complaint  Follow up visit for:  symptom management, goal of care assessment and decisional support, disease process education and discussion of prognosis, advance care planning, emotional support in the setting of serious illness  Shortness of Breath  Associated symptoms include dizziness  Pertinent negatives include no chest pain  Christine Goodwin is a 60 y o  male with palliative diagnosis of multiple system atrophy with symptoms starting in 2016  He was given this diagnosis around 2019, per patient and family's recollection  He is referred to palliative care by his neurologist as family struggles with disease progression and taking care of him at home  He also sees pulmonology for chronic/progressive respiratory failure from neurologic disease with associated bulbar symptoms  Last note from 7/6 noted concerns for worsening respiratory failure  He is currently receiving supportive management from both specialists, as well as from cardiology (for neurogenic orthostatic hypotension)  HPI/initial consult: Patient arrived today with his wife and his daughter  Introduced palliative care with emphasis on goals of care discussion   When asked how he is doing, he indicated that he is declining quicker than he'd like starting in 2019 when he was diagnosed with MSA  But especially so in the last 2 days when he now has to rely on the wheelchair to keep his balance and aid in his leg weakness  He had a fall a few days ago as well and patient's wife unable to help him up and needed to call the neighbor for assistance  He also noted dysphagia and having to clear his throat more in the last 2 days  They wonder what is the next step for them as they feel lost, with wife wondering if he is hospice appropriate  Wife still works full time and hopes to continue doing so  Wonders if they can hire [facility] at home  They indicated that it appears his treatment options are now limited ("running out of option")  In order to find him the suitable support he needs at this time, we discussed several cam points that needs to be addressed sooner rather than later, as it appears that his breathing, swallowing and muscle weakness are worsening now  We discussed his wishes for intubation and mechanical ventilation for when his breathing worsens from his MSA  Discussed that once intubated, he will not get off the ventilator and will end up with a trach  He feels that this is not a path he wishes to go on and will only prolong the inevitable  We discussed feeding tube for artificial hydration and nutrition  I explained that a feeding tube may buy more time by providing the caloric requirements he requires daily but this will not protect against aspiration as dysphagia will continue to worsen even with the tube  He will also be prone to complications such as infection from the tube  He said he has not thought about this yet  Appears that these discussions were never brought up to them before  I feel that he may be close, if not already, to qualifying for hospice cares given progression as noted above  We explained the difference between palliative and hospice   I discussed hospice is much detail with emphasis on end of life care with limited re-hospitalizations and focusing more on symptom management of worsening MSA while we allow nature to takes its course  Patient and family understandably tearful but appears appreciative of information  Patient at this time wants to continue receiving current cares from his specialists  I encouraged them to think more about this especially if he continues to worsen down the line  Interval history: He arrived today with his wife and his daughter  They have spoken with hospice to gather more information and at this time, they are not ready for this service yet  He reports slightly worsening symptoms of weakness, dysphagia  He coughs occasionally when eating/drinking, but not daily  He still has the same amount of PO intake  We re-visited our previous discussion re: feeding tube and our visit was centered mostly around that  Rest of conversation as above  We also completed the POLST and the 5 Wishes on today's visit  The following portions of the medical history were reviewed: past medical history, problem list, medication list, and social history      Current Outpatient Medications:     albuterol (2 5 mg/3 mL) 0 083 % nebulizer solution, Take 3 mL (2 5 mg total) by nebulization every 6 (six) hours as needed for wheezing or shortness of breath, Disp: 180 mL, Rfl: 3    albuterol (Ventolin HFA) 90 mcg/act inhaler, Inhale 2 puffs every 6 (six) hours as needed for wheezing, Disp: 18 g, Rfl: 0    calcium carbonate (OS-NEIL) 1250 (500 Ca) MG chewable tablet, Chew, Disp: , Rfl:     carbidopa-levodopa (SINEMET)  mg per tablet, Take 1 tablet by mouth 3 (three) times a day, Disp: , Rfl:     cholecalciferol (VITAMIN D3) 1,000 units tablet, Take 2,000 Units by mouth daily, Disp: , Rfl:     ciprofloxacin (CIPRO) 500 mg tablet, Take 500 mg by mouth 2 (two) times a day (Patient not taking: Reported on 7/29/2022), Disp: , Rfl:     cyanocobalamin (VITAMIN B-12) 1000 MCG tablet, Take 1,000 mcg by mouth daily, Disp: , Rfl:     Dalfampridine ER 10 MG TB12, 3 (three) times a day  , Disp: , Rfl:     Diclofenac Sodium (VOLTAREN) 1 %, Apply 2 g topically 4 (four) times a day, Disp: 350 g, Rfl: 2    enalapril (VASOTEC) 2 5 mg tablet, Take 2 tablets (5 mg total) by mouth as needed in the morning (for HTN)  , Disp: 90 tablet, Rfl: 3    famotidine (Pepcid) 20 mg tablet, Take 1 tablet (20 mg total) by mouth daily (Patient taking differently: Take 20 mg by mouth as needed), Disp: 90 tablet, Rfl: 1    fluticasone-vilanterol (Breo Ellipta) 100-25 mcg/inh inhaler, Inhale 1 puff daily Rinse mouth after use , Disp: 60 blister, Rfl: 5    midodrine (PROAMATINE) 5 mg tablet, , Disp: , Rfl:     multivitamin (THERAGRAN) TABS, Take 1 tablet by mouth daily, Disp: , Rfl:     nystatin-triamcinolone (MYCOLOG-II) cream, Apply topically 2 (two) times a day, Disp: 60 g, Rfl: 0    pantoprazole (PROTONIX) 40 mg tablet, TAKE 1 TABLET BY MOUTH 2 TIMES A DAY BEFORE MEALS , Disp: 60 tablet, Rfl: 5    polyethylene glycol (GLYCOLAX) 17 GM/SCOOP powder, Take 17 g by mouth daily, Disp: , Rfl:     predniSONE 20 mg tablet, Take 2 tablets (40 mg total) by mouth daily, Disp: 10 tablet, Rfl: 0    rOPINIRole (REQUIP) 1 mg tablet, Take 1 tablet (1 mg total) by mouth daily at bedtime, Disp: 30 tablet, Rfl: 3    selegiline (ELDEPRYL) 5 mg capsule, , Disp: , Rfl:     tadalafil (CIALIS) 5 MG tablet, Take 5 mg by mouth daily, Disp: , Rfl:     vitamin E 100 UNIT capsule, Take 100 Units by mouth daily, Disp: , Rfl:     Wheat Dextrin (Benefiber) POWD, , Disp: , Rfl:   Review of Systems   Constitutional: Positive for activity change  HENT: Positive for trouble swallowing and voice change  Respiratory: Positive for shortness of breath  Cardiovascular: Negative for chest pain  Gastrointestinal: Negative for abdominal pain  Musculoskeletal: Negative for back pain  Neurological: Positive for dizziness, speech difficulty, weakness and light-headedness  Psychiatric/Behavioral: Negative for sleep disturbance  The patient is not nervous/anxious  All other systems reviewed and are negative  All other systems negative    Objective:  Vital Signs  /90 (BP Location: Left arm, Cuff Size: Standard)   Pulse 73   Temp (!) 97 3 °F (36 3 °C) (Temporal)   Wt 112 kg (247 lb)   SpO2 95%   BMI 33 50 kg/m²    Physical Exam    Constitutional: Appears well-developed and well-nourished  In no acute physical or emotional distress  Head: Normocephalic and atraumatic  Eyes: EOM are normal  No ocular discharge  No scleral icterus  Neck: No visible adenopathy or masses  Respiratory: Effort normal  No stridor  No respiratory distress  But short of breath with long sentences, needing to pause to catch breath  Gastrointestinal: No abdominal distension  Musculoskeletal: No edema  Neurological: Alert, oriented and appropriately conversant  Voice is hoarse and speech slurred, difficult to understand at times, but still intelligible  Now wheelchair bound it seems  Skin: No diaphoresis, no rashes seen on exposed areas of skin  Pale   Psychiatric: Displays a normal mood and affect   Behavior, judgement and thought content appear normal      Claudia Lora MD  Palliative Medicine & Supportive Care  Internal Medicine  Available via [facility] Text  Office: [phone]  Fax: [phone]

## 2022-09-06 ENCOUNTER — TELEPHONE (OUTPATIENT)
Dept: PULMONOLOGY | Facility: CLINIC | Age: 62
End: 2022-09-06

## 2022-09-06 ENCOUNTER — TELEMEDICINE (OUTPATIENT)
Dept: PULMONOLOGY | Facility: CLINIC | Age: 62
End: 2022-09-06
Payer: MEDICARE

## 2022-09-06 VITALS
BODY MASS INDEX: 33.18 KG/M2 | WEIGHT: 245 LBS | HEIGHT: 72 IN | HEART RATE: 78 BPM | DIASTOLIC BLOOD PRESSURE: 95 MMHG | SYSTOLIC BLOOD PRESSURE: 153 MMHG

## 2022-09-06 DIAGNOSIS — G25.81 RESTLESS LEG SYNDROME: Primary | ICD-10-CM

## 2022-09-06 DIAGNOSIS — J96.10 CHRONIC RESPIRATORY FAILURE, UNSPECIFIED WHETHER WITH HYPOXIA OR HYPERCAPNIA (HCC): ICD-10-CM

## 2022-09-06 PROCEDURE — 99214 OFFICE O/P EST MOD 30 MIN: CPT | Performed by: INTERNAL MEDICINE

## 2022-09-06 RX ORDER — ROPINIROLE 0.25 MG/1
0.25 TABLET, FILM COATED ORAL 3 TIMES DAILY
Qty: 30 TABLET | Refills: 3 | Status: SHIPPED | OUTPATIENT
Start: 2022-09-06

## 2022-09-06 NOTE — PROGRESS NOTES
Virtual Regular Visit    Verification of patient location:    Patient is located in the following state in which I hold an active license [place]      Assessment/Plan:  62 y o  M with PMHx of Multiple System Atrophy, Parkinson's disease with diaphragmatic weakness, chronic knee and ankle pain, urinary retention s/p TURP, Moderate KALLI on CPAP who comes in for follow up  1   Chronic respiratory failure from progressive neurologic weakness with Multiple System Atrophy   He has noted bulbar symptoms with upper airway closing, hoarseness, dysphagia and weak/ hoarse voice  This seems to have since resolved  SNIF test is negative but she is having worsening shortness of breath         -  He is doing well on iVAPS therapy with good compliance but seems to not progressive dyspnea  I will attempt to adjust his minute ventilation to see if it works any ellie to 6 5L to see if he notes improvement that way         -   If this is not helpful, we can increase Ti to 1 1            -  We have discussed in the past of impending respiratory failure  He has been following with palliative care and has elected to go to a DNR level 3  We will continues to help optimize his breathing as best we can with the iVAPS machine        2  Moderate KALLI (AHI - 15 7) - on iVAPS with good compliance as above    Residual AHI - 3 3      -  Continue iVAPS as above         -  He is aware of the risk of leaving sleep apnea untreated including hypertension, heart failure, arrhythmia, MI and stroke      3    Periodic limb movement (PLM index - 109) -  This may be secondary to KALLI or Parkinsons   He denies symptoms of this          - We will increase Requip to 1 25mg qHS as he notes persistent symptoms       -  He did not find neurontin helpful         Reason for visit is   Chief Complaint   Patient presents with    Virtual Regular Visit        Encounter provider Claudeen Berry, DO    Provider located at [address] [place] [address] [facility] [address] [address] [phone]      Recent Visits  No visits were found meeting these conditions  Showing recent visits within past 7 days and meeting all other requirements  Today's Visits  Date Type Provider Dept   09/06/22 Telemedicine Chase Jonas DO [facility]   Showing today's visits and meeting all other requirements  Future Appointments  No visits were found meeting these conditions  Showing future appointments within next 150 days and meeting all other requirements       The patient was identified by name and date of birth  Kalyani Juarez was informed that this is a telemedicine visit and that the visit is being conducted through [address] [place] [address] Now and patient was informed that this is a secure, HIPAA-compliant platform  He agrees to proceed     My office door was closed  No one else was in the room  He acknowledged consent and understanding of privacy and security of the video platform  The patient has agreed to participate and understands they can discontinue the visit at any time  Patient is aware this is a billable service  Subjective  Kalyani Juarez is a 60 y o  male who calls in for follow up for his respiratory insufficiency  HPI   Mr Arcelia Paredes has been doing well with his iVAPS machine but continues to note progressive neurologic changes  He notes more significant weakness overall  He also notes that the tone of his voice seems to be weaker  He has trouble with shortness of breath with minimal exertion  He also notes generalized weakness which has progressed as well  Since last visit, he did speak to palliative care and has been made a DNR level 3  However, he would like to continue conservative treatment options as possible to see if he can stabilize clinically        Answers for HPI/ROS submitted by the patient on 9/6/2022  Do you experience frequent throat clearing?: Yes  Chronicity: chronic  When did you first notice your symptoms?: more than 1 month ago  How often do your symptoms occur?: daily  Since you first noticed this problem, how has it changed?: gradually worsening  Do you have shortness of breath that occurs with effort or exertion?: Yes  Do you have ear congestion?: No  Do you have heartburn?: No  Do you have fatigue?: Yes  Do you have nasal congestion?: No  Do you have shortness of breath when lying flat?: No  Do you have shortness of breath when you wake up?: No  Do you have sweats?: No  Have you experienced weight loss?: No  Which of the following makes your symptoms worse?: exercise, minimal activity, strenuous activity  Which of the following makes your symptoms better?: change in position      Past Medical History:   Diagnosis Date    Arthritis early stages in thumbs and knee    Back pain     Benign prostatic hyperplasia 2017    TURP surgery 3/15/2019    Bladder infection     BPH (benign prostatic hyperplasia)     Cancer [facility])     testicular 1988    Chronic kidney disease     Diverticulosis     GERD (gastroesophageal reflux disease)     occassional    History of testicular cancer 1988    Surgery and radiation; left side    Hypertension Nov, 2018    occassionally    Kidney stone     Kidney stones     Currently and in the past    Orthostatic hypotension     Orthostatic hypotension due to Parkinson's disease ([address] )     Parkinson's disease [facility] [address]     Sleep apnea     uses CPAP    Sleep apnea, obstructive April, 2019    recently diagnosed    Urinary tract infection     Wears glasses        Past Surgical History:   Procedure Laterality Date    BLADDER NECK RECONSTRUCTION  07/27/2020    BLADDER SURGERY  nov 2019   repair bladder neck constriction    COLONOSCOPY  2017    COLONOSCOPY  09/2020    COLONOSCOPY  11/2020    CYSTOPLASTY / CYSTOURETHROPLASTY  07/27/2020    [facility]   [address] 57, 1987 inguinal hernia repair    IR SUPRAPUBIC CATHETER PLACEMENT  02/24/2020    IR TUBE UPSIZE  03/23/2020    KIDNEY STONE SURGERY      LITHOTRIPSY      Renal; Resolved: 2/27/07    ORCHIECTOMY Left     DC CYSTOURETHRO W/IMPLANT N/A 05/17/2018    Procedure: CYSTOSCOPY WITH INSERTION UROLIFT;  Surgeon: Douglas Miller DO;  [facility]: [facility];  Service: Urology    PROSTATE SURGERY N/A 01/18/2018    Procedure: CYSTOSCOPY WITH INSERTION [address];  Surgeon: Douglas Miller DO;  [facility];  Service: Urology    [facility] [address]    For cancer    TONSILLECTOMY      TRANSURETHRAL RESECTION OF PROSTATE  Mar 2019/Jul 2019    URETERONEOCYSTOSTOMY      w/ cystoscopy Ureteral Stent Placement; Resolved: 6/14/2007    WISDOM TOOTH EXTRACTION         Current Outpatient Medications   Medication Sig Dispense Refill    albuterol (2 5 mg/3 mL) 0 083 % nebulizer solution Take 3 mL (2 5 mg total) by nebulization every 6 (six) hours as needed for wheezing or shortness of breath 180 mL 3    albuterol (Ventolin HFA) 90 mcg/act inhaler Inhale 2 puffs every 6 (six) hours as needed for wheezing 18 g 0    calcium carbonate (OS-NEIL) 1250 (500 Ca) MG chewable tablet Chew      carbidopa-levodopa (SINEMET)  mg per tablet Take 1 tablet by mouth 3 (three) times a day      cholecalciferol (VITAMIN D3) 1,000 units tablet Take 2,000 Units by mouth daily      cyanocobalamin (VITAMIN B-12) 1000 MCG tablet Take 1,000 mcg by mouth daily      Dalfampridine ER 10 MG TB12 3 (three) times a day        Diclofenac Sodium (VOLTAREN) 1 % Apply 2 g topically 4 (four) times a day 350 g 2    enalapril (VASOTEC) 2 5 mg tablet Take 2 tablets (5 mg total) by mouth as needed in the morning (for HTN)  90 tablet 3    fluticasone-vilanterol (Breo Ellipta) 100-25 mcg/inh inhaler Inhale 1 puff daily Rinse mouth after use  60 blister 5    multivitamin (THERAGRAN) TABS Take 1 tablet by mouth daily      pantoprazole (PROTONIX) 40 mg tablet TAKE 1 TABLET BY MOUTH 2 TIMES A DAY BEFORE MEALS  60 tablet 5    polyethylene glycol (GLYCOLAX) 17 GM/SCOOP powder Take 17 g by mouth daily      rOPINIRole (REQUIP) 0 25 mg tablet Take 1 tablet (0 25 mg total) by mouth 3 (three) times a day 30 tablet 3    rOPINIRole (REQUIP) 1 mg tablet Take 1 tablet (1 mg total) by mouth daily at bedtime 30 tablet 3    selegiline (ELDEPRYL) 5 mg capsule       tadalafil (CIALIS) 5 MG tablet Take 5 mg by mouth daily      vitamin E 100 UNIT capsule Take 100 Units by mouth daily      Wheat Dextrin (Benefiber) POWD       ciprofloxacin (CIPRO) 500 mg tablet Take 500 mg by mouth 2 (two) times a day (Patient not taking: No sig reported)      famotidine (Pepcid) 20 mg tablet Take 1 tablet (20 mg total) by mouth daily (Patient taking differently: Take 20 mg by mouth as needed) 90 tablet 1    midodrine (PROAMATINE) 5 mg tablet  (Patient not taking: Reported on 9/6/2022)      nystatin-triamcinolone (MYCOLOG-II) cream Apply topically 2 (two) times a day (Patient not taking: Reported on 9/6/2022) 60 g 0    predniSONE 20 mg tablet Take 2 tablets (40 mg total) by mouth daily (Patient not taking: Reported on 9/6/2022) 10 tablet 0     No current facility-administered medications for this visit  No Known Allergies    Review of Systems   Constitutional: Negative for appetite change and fever  HENT: Positive for trouble swallowing  Negative for ear pain, postnasal drip, rhinorrhea, sneezing and sore throat  Respiratory: Positive for shortness of breath  Cardiovascular: Negative for chest pain  Musculoskeletal: Negative for myalgias  Neurological: Negative for headaches         Video Exam  General: Pleasant, Awake alert and oriented x 3, conversant without conversational dyspnea, NAD, normalaffect  HEENT:  PERRL, Sclera noninjected, nonicteric OU, Nares patent,  no craniofacial abnormalities, Mucous membranes, moist, no oral lesions, normal dentition  NECK: Trachea midline, no accessory muscle use, no stridor  CARDIAC, PULM and ABD: Could not be performed on video visit      NEURO: no focal neurologic deficits, AAOx3, moving all extremities appropriately    Vitals:    22 1146   BP: 153/95   Pulse: 78   Weight: 111 kg (245 lb)   Height: 6' (1 829 m)       Physical Exam     PFT 10/26/20  Results:  FEV1/FVC Ratio: 77 %  Forced Vital Capacity: 4 88 L    99 % predicted  FEV1: 3 75 L     99 % predicted  Lung volumes by body plethysmography:   Total Lung Capacity 99 % predicted   Residual volume 96 % predicted  DLCO corrected for patients hemoglobin level: 83 %  Interpretation:  · No obstructive airflow defect on spirometry  · Normal Spirometry  · Normal Lung volumes  · Normal diffusion capacity  · Flow volume loop is normal      3/4/19  Results:  FEV1/FVC Ratio: 80 %  Forced Vital Capacity: 4 80 L    93 % predicted  FEV1: 3 85 L     97 % predicted  Lung volumes by body plethysmography:   Total Lung Capacity 94 % predicted   Residual volume 88 % predicted  DLCO corrected for patients hemoglobin level: 79 %  Interpretation:  · No obstructive airflow defect   · Normal Spirometry  · Normal Lung volumes  · Normal diffusion capacity     Repeat 6 minute walk 20  Starting saturation - 98%   Starting HR - 89  Lowest saturation - 91%    Highest HR - 103  Ambulated - 252 m and he did not require oxygen     6 minute walk  Date of testin2019  Resting room air saturation: 93%  Randy scale of dyspnea at start of test: 0/10     Ambulation testing:  Lowest saturation 93%  No supplemental oxygen required     Randy scale of dyspnea at end of test: 3/10  Reason if test was stopped early: N/A      Total 6 minute walk distance: 1400 feet     Imaging:  I personally reviewed the images on the [facility] system pertinent to today's visit  CT chest - 19  IMPRESSION:  Within normal limits CT of the chest      Repeat SNF test 10/26/20  IMPRESSION:  There is no evidence of diaphragmatic paralysis or elevation         Other studies:  HST - moderate (15 7), Supine AHI - 23, hypoxia   CPAP titration - Nasal CPAP was titrated from 5-11 cm of water for  control of snoring and abnormal breathing  Patient appeared to do best at 11 cm of CPAP delivered using a [employer] full face interface   Continued use of this equipment at these settings is recommended      New Compliance Data:  4/7/22-7/5/22                                   Type of device:  iVAPS alveolar ventilation 6L/min,  EPAP 15, PSV 4-15, target 12 bpm                                  Percent usage: 97%, 88%> 4hrs                                   Average time used: 5 hrs 57 mins                                   Residual AHI: 3 3     Compliance Data:  2/28/22-3/29/22                                   Type of device:  iVAPS alveolar ventilation 6L/min,  EPAP 12, PSV 4-15, target 12 bpm                                  Percent usage: 57%, 43 %> 4hrs                                   Average time used: 3 hrs 9 mins - 5 hrs 34 mins                                   Residual AHI: 6 8     Compliance Data:  11/9/21-12/8/21                                   Type of CPAP:  BiPAP 12/8                                  Percent usage: 97%, 93 %> 4hrs                                   Average time used: 6 hrs 36 mins                                  Time in large leak: 3 mins 43 secs                                   Residual AHI: 4 3     Compliance Data:  9/8/21-10/17/21                                   Type of CPAP:  BiPAP 12/8                                  Percent usage: 100%, 93 %> 4hrs                                   Average time used: 5hrs 58 mins - 8 hrs 36 mins                                  Time in large leak: 48 secs                                   Residual AHI: 5 6     Compliance Data:  3/12/21-5/12/21                                   Type of CPAP:  BiPAP 15/10                                  Percent usage: 100%, 89 %> 4hrs                                   Average time used: 5hrs 36 mins - 8 hrs 18 mins                                  Time in large leak: 10 min 15 secs                                   Residual AHI: 6 8       Compliance Data:  3/12/21-5/12/21                                   Type of CPAP:  BiPAP 15/10                                  Percent usage: 100%, 89 %> 4hrs                                   Average time used: 5hrs 36 mins - 8 hrs 18 mins                                  Time in large leak: 10 min 15 secs                                   Residual AHI: 6 8      Compliance Data:  1/2/21-2/10/21                                   Type of CPAP:  auto BiPAP min EPAP 11, PSV 4-6, Max IPAP 25,                                    Mean EPAP - 12- 14 8, IPAP 17 - Max IPAP 18 8                                   Percent usage: 95%, 85 %> 4hrs                                   Average time used: 5hrs 26 mins - 7 hrs 40 mins                                  Time in large leak: 6 min 22 secs                                   Residual AHI: 4 8 (CA - 3 0)     Compliance Data:  10/24/20-11/22/20                                   Type of CPAP:  auto BiPAP min EPAP 11, PSV 4-6, Max IPAP 25,                                    Mean EPAP - 11 7- 16 9, IPAP 16 2 - Max IPAP 22 9                                   Percent usage: 63%, 30%> 4hrs                                   Average time used: 2hrs 40 mins - 6 hrs 45 mins                                  Time in large leak: 16 min 19 secs                                   Residual AHI: 5 6 (CA - 3 1)     Compliance Data:  7/28/20-8/26/20                                   Type of CPAP:  auto BiPAP min EPAP 11, PSV 4, Max IPAP 25,                                    Mean EPAP - 12 2- 17 6, IPAP 16 2 - Max IPAP 22                                   Percent usage: 97%, 90%> 4hrs                                   Average time used: 5hrs 30 mins - 8 hrs 25 mins                                  Time in large leak: 4 min 39 secs                                   Residual AHI: 7 0 (CA - 5 1)     Compliance Data:  1/5/20-2/5/20                                   Type of CPAP:  auto BiPAP min EPAP 11, PSV 4, Max IPAP 25,                                    Mean EPAP recorded - 12 1, Peak - 14 9, Min IPAP 16, Max IPAP 19                                   Percent usage: 72%, 63%> 4hrs                                   Average time used: 3hrs 51 mins - 8 hrs 26 mins                                  Time in large leak: 50 secs                                   Residual AHI: 6 6  (CA - 3 9)     Compliance Data:  10/23/19-11/21/19                                   Type of CPAP:  autoPAP 11-15, Mean - 13 2, Peak - 15 0                                   Percent usage: 73%, 67%> 4hrs                                   Average time used: 3hrs 38 mins - 8 hrs 37 mins                                  Time in large leak: 9 mins 46 secs                                   Residual AHI: 11 0  (CA - 0 9)     Compliance Data:  8/25/19-9/23/19                                   Type of CPAP:  autoPAP 8-15, Mean - 11 9, Peak - 15 6                                   Percent usage: 63%                                   Average time used: 2hrs 52 mins - 4 hrs 32 mins                                  Time in large leak: 4 mins 44 secs                                   Residual AHI: 13 6  (CA - 0 9)     I spent 22 minutes directly with the patient during this visit

## 2022-09-06 NOTE — LETTER
September 6, 2022     Germaine Cool, [address] [place] [address]    Patient: Kevin Curry   YOB: 1960   Date of Visit: 9/6/2022       Dear Dr Rosemarie Woodard:    Thank you for referring Alexis Díaz to me for evaluation  Below are my notes for this consultation  If you have questions, please do not hesitate to call me  I look forward to following your patient along with you  Sincerely,        Markell Sender,         CC: No Recipients  Markell Sender,   9/6/2022  1:06 PM  Sign when Signing Visit    Virtual Regular Visit    Verification of patient location:    Patient is located in the following state in which I hold an active license [place]      Assessment/Plan:  62 y o  M with PMHx of Multiple System Atrophy, Parkinson's disease with diaphragmatic weakness, chronic knee and ankle pain, urinary retention s/p TURP, Moderate KALLI on CPAP who comes in for follow up  1   Chronic respiratory failure from progressive neurologic weakness with Multiple System Atrophy   He has noted bulbar symptoms with upper airway closing, hoarseness, dysphagia and weak/ hoarse voice  This seems to have since resolved  SNIF test is negative but she is having worsening shortness of breath         -  He is doing well on iVAPS therapy with good compliance but seems to not progressive dyspnea  I will attempt to adjust his minute ventilation to see if it works any ellie to 6 5L to see if he notes improvement that way         -   If this is not helpful, we can increase Ti to 1 1            -  We have discussed in the past of impending respiratory failure  He has been following with palliative care and has elected to go to a DNR level 3  We will continues to help optimize his breathing as best we can with the iVAPS machine        2  Moderate KALLI (AHI - 15 7) - on iVAPS with good compliance as above    Residual AHI - 3 3      -  Continue iVAPS as above         -  He is aware of the risk of leaving sleep apnea untreated including hypertension, heart failure, arrhythmia, MI and stroke      3    Periodic limb movement (PLM index - 109) -  This may be secondary to KALLI or Parkinsons   He denies symptoms of this          - We will increase Requip to 1 25mg qHS as he notes persistent symptoms       -  He did not find neurontin helpful         Reason for visit is   Chief Complaint   Patient presents with    Virtual Regular Visit        Encounter provider Yossi Cao DO    Provider located at [address] [place] [id number] [phone]      Recent Visits  No visits were found meeting these conditions  Showing recent visits within past 7 days and meeting all other requirements  Today's Visits  Date Type Provider Dept   09/06/22 Telemedicine Yossi Cao DO [facility]   Showing today's visits and meeting all other requirements  Future Appointments  No visits were found meeting these conditions  Showing future appointments within next 150 days and meeting all other requirements       The patient was identified by name and date of birth  Harsh Ward was informed that this is a telemedicine visit and that the visit is being conducted through [address] [place] [address] Now and patient was informed that this is a secure, HIPAA-compliant platform  He agrees to proceed     My office door was closed  No one else was in the room  He acknowledged consent and understanding of privacy and security of the video platform  The patient has agreed to participate and understands they can discontinue the visit at any time  Patient is aware this is a billable service  Subjective  Harsh Ward is a 60 y o  male who calls in for follow up for his respiratory insufficiency  HPI   Mr Rocky Schreiber has been doing well with his iVAPS machine but continues to note progressive neurologic changes    He notes more significant weakness overall  He also notes that the tone of his voice seems to be weaker  He has trouble with shortness of breath with minimal exertion  He also notes generalized weakness which has progressed as well  Since last visit, he did speak to palliative care and has been made a DNR level 3  However, he would like to continue conservative treatment options as possible to see if he can stabilize clinically        Answers for HPI/ROS submitted by the patient on 9/6/2022  Do you experience frequent throat clearing?: Yes  Chronicity: chronic  When did you first notice your symptoms?: more than 1 month ago  How often do your symptoms occur?: daily  Since you first noticed this problem, how has it changed?: gradually worsening  Do you have shortness of breath that occurs with effort or exertion?: Yes  Do you have ear congestion?: No  Do you have heartburn?: No  Do you have fatigue?: Yes  Do you have nasal congestion?: No  Do you have shortness of breath when lying flat?: No  Do you have shortness of breath when you wake up?: No  Do you have sweats?: No  Have you experienced weight loss?: No  Which of the following makes your symptoms worse?: exercise, minimal activity, strenuous activity  Which of the following makes your symptoms better?: change in position      Past Medical History:   Diagnosis Date    Arthritis early stages in thumbs and knee    Back pain     Benign prostatic hyperplasia 2017    TURP surgery 3/15/2019    Bladder infection     BPH (benign prostatic hyperplasia)     Cancer [facility])     testicular 1988    Chronic kidney disease     Diverticulosis     GERD (gastroesophageal reflux disease)     occassional    History of testicular cancer 1988    Surgery and radiation; left side    Hypertension Nov, 2018    occassionally    Kidney stone     Kidney stones     Currently and in the past    Orthostatic hypotension     Orthostatic hypotension due to Parkinson's disease ([facility] [address] [facility]     Parkinson's disease ([facility] [address] [facility]     Sleep apnea     uses CPAP    Sleep apnea, obstructive April, 2019    recently diagnosed    Urinary tract infection     Wears glasses        Past Surgical History:   Procedure Laterality Date    BLADDER NECK RECONSTRUCTION  07/27/2020    BLADDER SURGERY  nov 2019  repair bladder neck constriction    COLONOSCOPY  2017    COLONOSCOPY  09/2020    COLONOSCOPY  11/2020    CYSTOPLASTY / CYSTOURETHROPLASTY  07/27/2020    [address] inguinal hernia repair    IR SUPRAPUBIC CATHETER PLACEMENT  02/24/2020    IR TUBE UPSIZE  03/23/2020    KIDNEY STONE SURGERY      LITHOTRIPSY      Renal; Resolved: 2/27/07    ORCHIECTOMY Left     OR CYSTOURETHRO W/IMPLANT N/A 05/17/2018    Procedure: CYSTOSCOPY WITH INSERTION UROLIFT;  Surgeon: Clau Pena DO;  [facility];  Service: Urology    PROSTATE SURGERY N/A 01/18/2018    Procedure: CYSTOSCOPY WITH INSERTION Elta Bret;  Surgeon: Clau Pena DO;  [facility];  Service: Urology    [address] [place] [address] [address]    For cancer    TONSILLECTOMY      TRANSURETHRAL RESECTION OF PROSTATE  Mar 2019/Jul 2019    URETERONEOCYSTOSTOMY      w/ cystoscopy Ureteral Stent Placement;  Resolved: 6/14/2007    WISDOM TOOTH EXTRACTION         Current Outpatient Medications   Medication Sig Dispense Refill    albuterol (2 5 mg/3 mL) 0 083 % nebulizer solution Take 3 mL (2 5 mg total) by nebulization every 6 (six) hours as needed for wheezing or shortness of breath 180 mL 3    albuterol (Ventolin HFA) 90 mcg/act inhaler Inhale 2 puffs every 6 (six) hours as needed for wheezing 18 g 0    calcium carbonate (OS-NEIL) 1250 (500 Ca) MG chewable tablet Chew      carbidopa-levodopa (SINEMET)  mg per tablet Take 1 tablet by mouth 3 (three) times a day      cholecalciferol (VITAMIN D3) 1,000 units tablet Take 2,000 Units by mouth daily      cyanocobalamin (VITAMIN B-12) 1000 MCG tablet Take 1,000 mcg by mouth daily      Dalfampridine ER 10 MG TB12 3 (three) times a day        Diclofenac Sodium (VOLTAREN) 1 % Apply 2 g topically 4 (four) times a day 350 g 2    enalapril (VASOTEC) 2 5 mg tablet Take 2 tablets (5 mg total) by mouth as needed in the morning (for HTN)  90 tablet 3    fluticasone-vilanterol (Breo Ellipta) 100-25 mcg/inh inhaler Inhale 1 puff daily Rinse mouth after use  60 blister 5    multivitamin (THERAGRAN) TABS Take 1 tablet by mouth daily      pantoprazole (PROTONIX) 40 mg tablet TAKE 1 TABLET BY MOUTH 2 TIMES A DAY BEFORE MEALS  60 tablet 5    polyethylene glycol (GLYCOLAX) 17 GM/SCOOP powder Take 17 g by mouth daily      rOPINIRole (REQUIP) 0 25 mg tablet Take 1 tablet (0 25 mg total) by mouth 3 (three) times a day 30 tablet 3    rOPINIRole (REQUIP) 1 mg tablet Take 1 tablet (1 mg total) by mouth daily at bedtime 30 tablet 3    selegiline (ELDEPRYL) 5 mg capsule       tadalafil (CIALIS) 5 MG tablet Take 5 mg by mouth daily      vitamin E 100 UNIT capsule Take 100 Units by mouth daily      Wheat Dextrin (Benefiber) POWD       ciprofloxacin (CIPRO) 500 mg tablet Take 500 mg by mouth 2 (two) times a day (Patient not taking: No sig reported)      famotidine (Pepcid) 20 mg tablet Take 1 tablet (20 mg total) by mouth daily (Patient taking differently: Take 20 mg by mouth as needed) 90 tablet 1    midodrine (PROAMATINE) 5 mg tablet  (Patient not taking: Reported on 9/6/2022)      nystatin-triamcinolone (MYCOLOG-II) cream Apply topically 2 (two) times a day (Patient not taking: Reported on 9/6/2022) 60 g 0    predniSONE 20 mg tablet Take 2 tablets (40 mg total) by mouth daily (Patient not taking: Reported on 9/6/2022) 10 tablet 0     No current facility-administered medications for this visit  No Known Allergies    Review of Systems   Constitutional: Negative for appetite change and fever  HENT: Positive for trouble swallowing   Negative for ear pain, postnasal drip, rhinorrhea, sneezing and sore throat  Respiratory: Positive for shortness of breath  Cardiovascular: Negative for chest pain  Musculoskeletal: Negative for myalgias  Neurological: Negative for headaches  Video Exam  General: Pleasant, Awake alert and oriented x 3, conversant without conversational dyspnea, NAD, normalaffect  HEENT:  PERRL, Sclera noninjected, nonicteric OU, Nares patent,  no craniofacial abnormalities, Mucous membranes, moist, no oral lesions, normal dentition  NECK: Trachea midline, no accessory muscle use, no stridor  CARDIAC, PULM and ABD: Could not be performed on video visit      NEURO: no focal neurologic deficits, AAOx3, moving all extremities appropriately    Vitals:    22 1146   BP: 153/95   Pulse: 78   Weight: 111 kg (245 lb)   Height: 6' (1 829 m)       Physical Exam     PFT 10/26/20  Results:  FEV1/FVC Ratio: 77 %  Forced Vital Capacity: 4 88 L    99 % predicted  FEV1: 3 75 L     99 % predicted  Lung volumes by body plethysmography:   Total Lung Capacity 99 % predicted   Residual volume 96 % predicted  DLCO corrected for patients hemoglobin level: 83 %  Interpretation:  · No obstructive airflow defect on spirometry  · Normal Spirometry  · Normal Lung volumes  · Normal diffusion capacity  · Flow volume loop is normal      3/4/19  Results:  FEV1/FVC Ratio: 80 %  Forced Vital Capacity: 4 80 L    93 % predicted  FEV1: 3 85 L     97 % predicted  Lung volumes by body plethysmography:   Total Lung Capacity 94 % predicted   Residual volume 88 % predicted  DLCO corrected for patients hemoglobin level: 79 %  Interpretation:  · No obstructive airflow defect   · Normal Spirometry  · Normal Lung volumes  · Normal diffusion capacity     Repeat 6 minute walk 20  Starting saturation - 98%   Starting HR - 89  Lowest saturation - 91%    Highest HR - 103  Ambulated - 252 m and he did not require oxygen     6 minute walk  Date of testin2019  Resting room air saturation: 93%  Randy scale of dyspnea at start of test: 0/10     Ambulation testing:  Lowest saturation 93%  No supplemental oxygen required     Randy scale of dyspnea at end of test: 3/10  Reason if test was stopped early: N/A      Total 6 minute walk distance: 1400 feet     Imaging:  I personally reviewed the images on the [facility] system pertinent to today's visit  CT chest - 5/20/19  IMPRESSION:  Within normal limits CT of the chest      Repeat SNF test 10/26/20  IMPRESSION:  There is no evidence of diaphragmatic paralysis or elevation         Other studies:  HST - moderate (15 7), Supine AHI - 23, hypoxia   CPAP titration - Nasal CPAP was titrated from 5-11 cm of water for  control of snoring and abnormal breathing  Patient appeared to do best at 11 cm of CPAP delivered using a [employer] 45 full face interface   Continued use of this equipment at these settings is recommended      New Compliance Data:  4/7/22-7/5/22                                   Type of device:  iVAPS alveolar ventilation 6L/min,  EPAP 15, PSV 4-15, target 12 bpm                                  Percent usage: 97%, 88%> 4hrs                                   Average time used: 5 hrs 57 mins                                   Residual AHI: 3 3     Compliance Data:  2/28/22-3/29/22                                   Type of device:  iVAPS alveolar ventilation 6L/min,  EPAP 12, PSV 4-15, target 12 bpm                                  Percent usage: 57%, 43 %> 4hrs                                   Average time used: 3 hrs 9 mins - 5 hrs 34 mins                                   Residual AHI: 6 8     Compliance Data:  11/9/21-12/8/21                                   Type of CPAP:  BiPAP 12/8                                  Percent usage: 97%, 93 %> 4hrs                                   Average time used: 6 hrs 36 mins                                  Time in large leak: 3 mins 43 secs                                   Residual AHI: 4 3     Compliance Data:  9/8/21-10/17/21                                   Type of CPAP:  BiPAP 12/8                                  Percent usage: 100%, 93 %> 4hrs                                   Average time used: 5hrs 58 mins - 8 hrs 36 mins                                  Time in large leak: 48 secs                                   Residual AHI: 5 6     Compliance Data:  3/12/21-5/12/21                                   Type of CPAP:  BiPAP 15/10                                  Percent usage: 100%, 89 %> 4hrs                                   Average time used: 5hrs 36 mins - 8 hrs 18 mins                                  Time in large leak: 10 min 15 secs                                   Residual AHI: 6 8       Compliance Data:  3/12/21-5/12/21                                   Type of CPAP:  BiPAP 15/10                                  Percent usage: 100%, 89 %> 4hrs                                   Average time used: 5hrs 36 mins - 8 hrs 18 mins                                  Time in large leak: 10 min 15 secs                                   Residual AHI: 6 8      Compliance Data:  1/2/21-2/10/21                                   Type of CPAP:  auto BiPAP min EPAP 11, PSV 4-6, Max IPAP 25,                                    Mean EPAP - 12- 14 8, IPAP 17 - Max IPAP 18 8                                   Percent usage: 95%, 85 %> 4hrs                                   Average time used: 5hrs 26 mins - 7 hrs 40 mins                                  Time in large leak: 6 min 22 secs                                   Residual AHI: 4 8 (CA - 3 0)     Compliance Data:  10/24/20-11/22/20                                   Type of CPAP:  auto BiPAP min EPAP 11, PSV 4-6, Max IPAP 25,                                    Mean EPAP - 11 7- 16 9, IPAP 16 2 - Max IPAP 22 9                                   Percent usage: 63%, 30%> 4hrs                                   Average time used: 2hrs 40 mins - 6 hrs 45 mins                                  Time in large leak: 16 min 19 secs                                   Residual AHI: 5 6 (CA - 3 1)     Compliance Data:  7/28/20-8/26/20                                   Type of CPAP:  auto BiPAP min EPAP 11, PSV 4, Max IPAP 25,                                    Mean EPAP - 12 2- 17 6, IPAP 16 2 - Max IPAP 22                                   Percent usage: 97%, 90%> 4hrs                                   Average time used: 5hrs 30 mins - 8 hrs 25 mins                                  Time in large leak: 4 min 39 secs                                   Residual AHI: 7 0 (CA - 5 1)     Compliance Data:  1/5/20-2/5/20                                   Type of CPAP:  auto BiPAP min EPAP 11, PSV 4, Max IPAP 25,                                    Mean EPAP recorded - 12 1, Peak - 14 9, Min IPAP 16, Max IPAP 19                                   Percent usage: 72%, 63%> 4hrs                                   Average time used: 3hrs 51 mins - 8 hrs 26 mins                                  Time in large leak: 50 secs                                   Residual AHI: 6 6  (CA - 3 9)     Compliance Data:  10/23/19-11/21/19                                   Type of CPAP:  autoPAP 11-15, Mean - 13 2, Peak - 15 0                                   Percent usage: 73%, 67%> 4hrs                                   Average time used: 3hrs 38 mins - 8 hrs 37 mins                                  Time in large leak: 9 mins 46 secs                                   Residual AHI: 11 0  (CA - 0 9)     Compliance Data:  8/25/19-9/23/19                                   Type of CPAP:  autoPAP 8-15, Mean - 11 9, Peak - 15 6                                   Percent usage: 63%                                   Average time used: 2hrs 52 mins - 4 hrs 32 mins                                  Time in large leak: 4 mins 44 secs                                   Residual AHI: 13 6  (CA - 0 9)     I spent 22 minutes directly with the patient during this visit

## 2022-09-15 ENCOUNTER — TELEPHONE (OUTPATIENT)
Dept: PALLIATIVE MEDICINE | Facility: CLINIC | Age: 62
End: 2022-09-15

## 2022-09-15 LAB

## 2022-09-15 NOTE — TELEPHONE ENCOUNTER
[facility] placed call to spouse, was informed that OT was at the home and is recommending that the w/c is changed for a taller person, with adjustable arms as patient uses chair to push up to stand  [facility] to place call to [facility]  Call placed to [facility] to inquire about changing out patient's w/c with new recommendations  [facility] stating that new order to be completed with note stating to swap out previous w/c    Call placed to spouse to provide update on w/c

## 2022-09-19 ENCOUNTER — TELEPHONE (OUTPATIENT)
Dept: PALLIATIVE MEDICINE | Facility: CLINIC | Age: 62
End: 2022-09-19

## 2022-09-20 ENCOUNTER — TELEPHONE (OUTPATIENT)
Dept: PALLIATIVE MEDICINE | Facility: CLINIC | Age: 62
End: 2022-09-20

## 2022-09-20 NOTE — TELEPHONE ENCOUNTER
MSW received a call from the pt wife,  Nella Gaucher [phone] stating the pt has become increasingly weak and she was requesting a Bed Side Commode as well as a Hospital bed  The call was shared with Palliative Doctor, Derrick Hutchins and the DME was ordered through [employer]  MSW contacted [facility] and was informed the delivery will be on Thursday  MSW spoke with the pt wife, Nella Gaucher and she was very pleased and thankful  She has requested the MSW follow up with [facility] to ensure the commode is able to fit over an oblong toilet  MSW will follow up  MSW also informed Dr Derrick Hutchins of the DME delivery

## 2022-09-21 ENCOUNTER — TELEPHONE (OUTPATIENT)
Dept: PALLIATIVE MEDICINE | Facility: CLINIC | Age: 62
End: 2022-09-21

## 2022-09-21 NOTE — TELEPHONE ENCOUNTER
MSW followed up with [facility] regarding the pt delivery of DME  It was confirmed that the pt would have a hospital bed and a standard Bed side commode delivered tomorrow  MSW explained that the pt wife states that the standard [facility] will not fit over their toilet seat and is interested in a [facility] at [facility] stated that the pt insurance will only cover the standard BSC because the pt weight is not over 300lbs  MSW inquired about the out of pocket cost for the pt wife if she want to pay for a [employer] commode  MSW was informed the OOP cost would be $75  MSW was also informed the pt is able to call [facility] and pay for the upgrade in order for the delivery of the [facility] to occur tomorrow  MSW called the pt wife and updated her about only the standard BSC being covered by insurance as well as the cost associated with a [place] [place] commode  MSW also explained why the pt would not be eligible for the [place] [place] commode through his insurance  The pt wife  agreeable to paying for the upgrade  MSW provided her with the phone number to [facility]     The pt wife stated that she has not received the upgraded wheelchair for the pt  MSW asked the pt wife to inquire about the wheelchair when she calls to pay for the upgraded commode  MSW has asked the pt wife to call her if there are any further questions or concerns

## 2022-09-23 ENCOUNTER — TELEPHONE (OUTPATIENT)
Dept: FAMILY MEDICINE CLINIC | Facility: CLINIC | Age: 62
End: 2022-09-23

## 2022-09-23 NOTE — TELEPHONE ENCOUNTER
Tay Coello from [place] called in [address] that during PT today they used patients C-pap with the sitting and standing exercises and Oxygene was between 94-97% and his B/p and heart rate was good   Just JOSÉ MIGUEL

## 2022-09-26 NOTE — PROGRESS NOTES
Anesthesia Evaluation     no history of anesthetic complications:  NPO Solid Status: > 8 hours  NPO Liquid Status: > 8 hours           Airway   Mallampati: II  TM distance: >3 FB  Neck ROM: full  No difficulty expected  Dental    (+) poor dentition        Pulmonary - normal exam   (+) sleep apnea,   Cardiovascular - normal exam        Neuro/Psych  GI/Hepatic/Renal/Endo    (+)  GERD,  renal disease stones,     Musculoskeletal     Abdominal  - normal exam    Bowel sounds: normal.   Substance History      OB/GYN          Other                        Anesthesia Plan    ASA 2     general     intravenous induction     Anesthetic plan, risks, benefits, and alternatives have been provided, discussed and informed consent has been obtained with: patient.        CODE STATUS:        Assessment/Plan: labs reviewed the patient  Patient continue with MiraLax for constipation and will follow-up pulmonology per routine for sleep apnea and shortness of breath  Patient follow-up with neurology appropriately  Patient was seeing [facility] in January  Patient will have diabetic diet as she has A1c of 5 9  Labs reviewed the patient at this time  Patient also modify cholesterol intake  Guidance given overall  Patient will follow with all specialist per routine  Flu shot given at this time  Refills will be given when needed  Patient go when for colonoscopy and endoscopy last week  Diagnoses and all orders for this visit:    Need for immunization against influenza  -     FLUBLOK: influenza vaccine, quadrivalent, recombinant, PF, 0 5 mL    Neurogenic orthostatic hypotension (HCC)    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[address] )    Gastroesophageal reflux disease with esophagitis    Change in voice    Prediabetes    Mixed hyperlipidemia            Subjective:        Patient ID: Pako Aguilar is a 63 y o  male  Patient follow-up on neurogenic orthostatic hypotension atypical parkinsonian features along with GERD  Patient status post bladder neck repair in July  Patient had suprapubic catheter removed  Patient has some difficulty breathing post surgery  This is improved overall  Patient is following with pulmonology in November  patient is still using BiPAP machine for sleep apnea etcetera  Patient will be seeing [facility] in January  Patient did have some constipation which is improved with MiraLax every other day  The following portions of the patient's history were reviewed and updated as appropriate: allergies, current medications, past family history, past medical history, past social history, past surgical history and problem list       Review of Systems   Constitutional: Negative  HENT: Positive for voice change  Eyes: Negative      Respiratory: Positive for shortness of breath  Cardiovascular: Negative  Gastrointestinal: Negative  Endocrine: Negative  Genitourinary: Negative  Musculoskeletal: Negative  Skin: Negative  Allergic/Immunologic: Negative  Neurological: Positive for dizziness  Hematological: Negative  Psychiatric/Behavioral: Negative  Objective:      BMI Counseling: Body mass index is 29 43 kg/m²  The BMI is above normal  Nutrition recommendations include decreasing portion sizes  Exercise recommendations include moderate physical activity 150 minutes/week  Depression Screening and Follow-up Plan: Clincally patient does not have depression  No treatment is required  /78 (BP Location: Right arm, Patient Position: Sitting, Cuff Size: Adult)   Temp (!) 97 4 °F (36 3 °C) (Tympanic)   Ht 6' (1 829 m)   Wt 98 4 kg (217 lb)   BMI 29 43 kg/m²          Physical Exam  Vitals signs and nursing note reviewed  Constitutional:       General: He is not in acute distress  Appearance: Normal appearance  He is well-developed  He is not diaphoretic  HENT:      Head: Normocephalic  Right Ear: Tympanic membrane, ear canal and external ear normal       Left Ear: Tympanic membrane, ear canal and external ear normal       Nose: Nose normal  No congestion or rhinorrhea  Eyes:      General: No scleral icterus  Right eye: No discharge  Left eye: No discharge  Pupils: Pupils are equal, round, and reactive to light  Neck:      Musculoskeletal: Normal range of motion and neck supple  Thyroid: No thyromegaly  Cardiovascular:      Rate and Rhythm: Normal rate and regular rhythm  Heart sounds: Normal heart sounds  No murmur  No friction rub  No gallop  Pulmonary:      Effort: Pulmonary effort is normal  No respiratory distress  Breath sounds: Normal breath sounds  No wheezing or rales  Chest:      Chest wall: No tenderness     Abdominal:      General: Bowel sounds are normal  There is no distension  Palpations: Abdomen is soft  Tenderness: There is no abdominal tenderness  There is no guarding or rebound  Musculoskeletal: Normal range of motion  General: No tenderness or deformity  Lymphadenopathy:      Cervical: No cervical adenopathy  Skin:     General: Skin is warm and dry  Capillary Refill: Capillary refill takes less than 2 seconds  Coloration: Skin is not pale  Findings: No erythema, lesion or rash  Neurological:      Mental Status: He is alert and oriented to person, place, and time  Mental status is at baseline  Cranial Nerves: No cranial nerve deficit  Motor: No abnormal muscle tone  Coordination: Coordination normal    Psychiatric:         Mood and Affect: Mood normal          Behavior: Behavior normal          Thought Content:  Thought content normal          Judgment: Judgment normal

## 2022-10-04 ENCOUNTER — TELEPHONE (OUTPATIENT)
Dept: PULMONOLOGY | Facility: CLINIC | Age: 62
End: 2022-10-04

## 2022-10-04 NOTE — TELEPHONE ENCOUNTER
Patient is calling, he was struggling to breath well on the phone  He was asking if you could make an adjustment to his iVAPS machine  His breathing has became more labored and he is worried for at night  He pauses for a long time before he inhales and whatever will help him to recover from that is the adjustment he is looking for   He said if you have any questions to please call him

## 2022-10-05 ENCOUNTER — TELEPHONE (OUTPATIENT)
Dept: PULMONOLOGY | Facility: CLINIC | Age: 62
End: 2022-10-05

## 2022-10-05 DIAGNOSIS — J96.10 CHRONIC RESPIRATORY FAILURE, UNSPECIFIED WHETHER WITH HYPOXIA OR HYPERCAPNIA (HCC): Primary | ICD-10-CM

## 2022-10-05 NOTE — TELEPHONE ENCOUNTER
I adjusted the machine and let viktoria know  Could you please schedule him for a visit in 1-2 months    thanks

## 2022-10-05 NOTE — PROGRESS NOTES
Patient called stating that he is not sleeping as well and feels like he is not able to get a big enough breath  He also feels as if he is pausing his breathing  I will raise Itime to start and see if that is helpful first    I will also increase the back up RR to 14  Goal tidal volume could also be raised as well

## 2022-10-07 ENCOUNTER — TELEPHONE (OUTPATIENT)
Dept: PALLIATIVE MEDICINE | Facility: CLINIC | Age: 62
End: 2022-10-07

## 2022-10-07 NOTE — TELEPHONE ENCOUNTER
Needs script for a taller wheel chair, not sure what its suppose to say on script   He has a wheel chair for [employer]  but its too low for him and its getting more and more difficult for him to get around, Such as standing up  Renee Mahendra can you reach out  His OT suggested he get a taller wheelchair  She also ordered a potty chair and she sent it back because it was all messed up  She will also need an order for that before ordering please reach out to Vinay Fenton to discuss

## 2022-10-10 ENCOUNTER — TELEPHONE (OUTPATIENT)
Dept: PALLIATIVE MEDICINE | Facility: CLINIC | Age: 62
End: 2022-10-10

## 2022-10-10 NOTE — TELEPHONE ENCOUNTER
MSW received a call from the pt wife requesting DME, a higher wheel chair and also states that the commode that was delivered was all messed up  MSW called Nathaniel from [facility] and inquired about the DME  Nathaniel states that the wheelchair that pt currently has is standard for his height and the pt would need to be 6'7 to have a taller wheelchair than he has right now  Nathaniel also states that there are only the standard and [employer]  He states that there are no other sizes  MSW explained that the OT staff has made these recommendations about the wheelchair and Nathaniel states that they are able to call Abraham as well  MSW called the pt wife and updated her regarding the conversation with Abraham  She states that the Commode was too big but she was able to locate something on [employer] for over the toilet, but states that the pt will need something at bedside soon, because he is becoming increasingly weak  MSW and the pt wife discussed trying to find something on [email or web address]  The pt wife states she will look and get back to MSW if she can not find something  She states that it would be better for the pt if the arms in the Wheelchair were higher  MSW indicated that she believed the Wheel chair that the pt is using has adjustable arms  She states that she and OT will try to adjust the arms during her next visit  The pt wife thanked the MSW for the return call

## 2022-10-11 ENCOUNTER — TELEPHONE (OUTPATIENT)
Dept: GASTROENTEROLOGY | Facility: MEDICAL CENTER | Age: 62
End: 2022-10-11

## 2022-10-11 NOTE — TELEPHONE ENCOUNTER
Pt wife called in requesting to change pt appt for 10/13/2022 to a virtual appt because pt has a hard time moving around  Please give pt wife a call to follow up

## 2022-10-13 ENCOUNTER — TELEMEDICINE (OUTPATIENT)
Dept: GASTROENTEROLOGY | Facility: MEDICAL CENTER | Age: 62
End: 2022-10-13
Payer: MEDICARE

## 2022-10-13 DIAGNOSIS — G70.9 NEUROMUSCULAR DISEASE (HCC): ICD-10-CM

## 2022-10-13 DIAGNOSIS — R13.19 OTHER DYSPHAGIA: ICD-10-CM

## 2022-10-13 DIAGNOSIS — G90.3 MULTIPLE SYSTEM ATROPHY (HCC): ICD-10-CM

## 2022-10-13 PROCEDURE — 99214 OFFICE O/P EST MOD 30 MIN: CPT | Performed by: INTERNAL MEDICINE

## 2022-10-13 NOTE — PROGRESS NOTES
[facility] - Outpatient Follow-up Note  Radha [facility] 60 y o  male MRN: [id number]  Encounter: [id number]          Virtual Regular Visit    Verification of patient location:    Patient is located in the following state in which I hold an active license [place]      Assessment/Plan: 69-year-old male with history of multiple system atrophy, neuromuscular disease, chronic respiratory failure, chronic dysphagia, KALLI presents for follow-up evaluation  1  Other dysphagia  2  Multiple system atrophy ([facility] )  3  Neuromuscular disease ([facility] )  He has a history of multiple system atrophy, neuromuscular disease and was previously evaluated by our team for dysphagia  He underwent endoscopy and manometry testing which was normal, pH testing showed reflux in the recumbent position  He recently had a visit with palliative care and due to progressive dysphagia, poor oral intake the discussion of enteral feeding tube placement was brought up  I discussed the indication, risk and benefit of PEG tube placement with him and his wife extensively today  We discussed that PEG tube placement would not decrease aspiration risk but would provide a route for enteral nutrition  We discussed also that he could continue to eat by mouth per the speech therapy diet recommendations if he had a feeding tube  We discussed risks include bleeding, infection, bowel perforation  We discussed that usually PEG tube can be placed with monitored consistent care anesthesia (propofol) however it would be the discretion of the anesthesia doctor if he would need temporary early having an endotracheal tube due to his aspiration risk  I discussed of discussion today with patient and his wife regarding feeding tube placement  They are not quite ready to proceed and will processes the above information  I will contact my office at the additional questions or they are interested in proceeding with PEG tube placement    If PEG tube is ultimately placed he would require overnight stay in the hospital for evaluation in the tube the next day and to be evaluated by Nutrition therapy to ensure he is tolerating the feedings well      Problem List Items Addressed This Visit        Nervous and Auditory    Neuromuscular disease ([facility] )    Multiple system atrophy ([facility] )      Other Visit Diagnoses     Other dysphagia                   Reason for visit is   Chief Complaint   Patient presents with   • Virtual Regular Visit        Encounter provider Eleni Grey MD    Provider located at [address] [address]      Recent Visits  Date Type Provider Dept   10/11/22 Telephone [facility] recent visits within past 7 days and meeting all other requirements  Today's Visits  Date Type Provider Dept   10/13/22 Telemedicine Eleni Grey MD [address] today's visits and meeting all other requirements  Future Appointments  No visits were found meeting these conditions  Showing future appointments within next 150 days and meeting all other requirements       The patient was identified by name and date of birth  Stephany Dugan was informed that this is a telemedicine visit and that the visit is being conducted through [facility] and patient was informed this is a secure, HIPAA-complaint platform  He agrees to proceed     My office door was closed  No one else was in the room  He acknowledged consent and understanding of privacy and security of the video platform  The patient has agreed to participate and understands they can discontinue the visit at any time  Patient is aware this is a billable service       Subjective  Stephany Dugan is a 60 y o  male with history of multiple system atrophy, neuromuscular disease, chronic respiratory failure, chronic dysphagia, KALLI presents for follow-up evaluation  He was last seen in the GI office April 2021  At that time he had history of chronic symptoms of dysphagia which was to be related to his Parkinson's diagnosis  Previously underwent endoscopy in 2020 which was overall unremarkable  Colonoscopy showed multiple small colon polyps, adenoma she was recommended repeat in 3 years  Interval history:  He follow-up with palliative care doctor given his progressive multi-system atrophy/neuromuscular disease  He continues to have chronic dysphagia and difficulty with eating and swallowing  After speaking with the palliative care and due to issues with decreased oral intake they discussed feeding tube placement with him  December 2020 pH/manometry testing showed reflux in the recumbent position overall normal acid exposure time and normal esophageal motility  He underwent speech and swallow evaluation 2019 showing normal oral and pharyngeal states     HPI     Past Medical History:   Diagnosis Date   • Arthritis early stages in thumbs and knee   • Back pain    • Benign prostatic hyperplasia 2017    TURP surgery 3/15/2019   • Bladder infection    • BPH (benign prostatic hyperplasia)    • Cancer [facility])     testicular 1988   • Chronic kidney disease    • Diverticulosis    • GERD (gastroesophageal reflux disease)     occassional   • History of testicular cancer 1988    Surgery and radiation; left side   • Hypertension Nov, 2018    occassionally   • Kidney stone    • Kidney stones     Currently and in the past   • Orthostatic hypotension    • Orthostatic hypotension due to Parkinson's disease [facility])    • Parkinson's disease ([facility] )    • Sleep apnea     uses CPAP   • Sleep apnea, obstructive April, 2019    recently diagnosed   • Urinary tract infection    • Wears glasses        Past Surgical History:   Procedure Laterality Date   • BLADDER NECK RECONSTRUCTION  07/27/2020   • BLADDER SURGERY  nov 2019   repair bladder neck constriction   • COLONOSCOPY  2017   • COLONOSCOPY  09/2020   • COLONOSCOPY  11/2020   • CYSTOPLASTY / CYSTOURETHROPLASTY  07/27/2020    [facility]   • HERNIA REPAIR      1981, 1986 inguinal hernia repair   • IR SUPRAPUBIC CATHETER PLACEMENT  02/24/2020   • IR TUBE UPSIZE  03/23/2020   • KIDNEY STONE SURGERY     • LITHOTRIPSY      Renal; Resolved: 2/27/07   • ORCHIECTOMY Left    • WA CYSTOURETHRO W/IMPLANT N/A 05/17/2018    Procedure: CYSTOSCOPY WITH INSERTION UROLIFT;  Surgeon: Lorena Ramsey DO;  Location: [facility];  Service: Urology   • PROSTATE SURGERY N/A 01/18/2018    Procedure: CYSTOSCOPY WITH INSERTION UROLIFT;  Surgeon: Lorena Ramsey DO;  Location: [facility];  Service: Urology   • [address]    For cancer   • TONSILLECTOMY     • TRANSURETHRAL RESECTION OF PROSTATE  Mar 2019/Jul 2019   • URETERONEOCYSTOSTOMY      w/ cystoscopy Ureteral Stent Placement;  Resolved: 6/14/2007   • WISDOM TOOTH EXTRACTION         Current Outpatient Medications   Medication Sig Dispense Refill   • albuterol (2 5 mg/3 mL) 0 083 % nebulizer solution Take 3 mL (2 5 mg total) by nebulization every 6 (six) hours as needed for wheezing or shortness of breath 180 mL 3   • albuterol (Ventolin HFA) 90 mcg/act inhaler Inhale 2 puffs every 6 (six) hours as needed for wheezing 18 g 0   • calcium carbonate (OS-NEIL) 1250 (500 Ca) MG chewable tablet Chew     • carbidopa-levodopa (SINEMET)  mg per tablet Take 1 tablet by mouth 3 (three) times a day     • cholecalciferol (VITAMIN D3) 1,000 units tablet Take 2,000 Units by mouth daily     • ciprofloxacin (CIPRO) 500 mg tablet Take 500 mg by mouth 2 (two) times a day (Patient not taking: No sig reported)     • cyanocobalamin (VITAMIN B-12) 1000 MCG tablet Take 1,000 mcg by mouth daily     • Dalfampridine ER 10 MG TB12 3 (three) times a day       • Diclofenac Sodium (VOLTAREN) 1 % Apply 2 g topically 4 (four) times a day 350 g 2   • enalapril (VASOTEC) 2 5 mg tablet Take 2 tablets (5 mg total) by mouth as needed in the morning (for HTN)  90 tablet 3   • famotidine (Pepcid) 20 mg tablet Take 1 tablet (20 mg total) by mouth daily (Patient taking differently: Take 20 mg by mouth as needed) 90 tablet 1   • fluticasone-vilanterol (Breo Ellipta) 100-25 mcg/inh inhaler Inhale 1 puff daily Rinse mouth after use  60 blister 5   • midodrine (PROAMATINE) 5 mg tablet  (Patient not taking: Reported on 9/6/2022)     • multivitamin (THERAGRAN) TABS Take 1 tablet by mouth daily     • nystatin-triamcinolone (MYCOLOG-II) cream Apply topically 2 (two) times a day (Patient not taking: Reported on 9/6/2022) 60 g 0   • pantoprazole (PROTONIX) 40 mg tablet TAKE 1 TABLET BY MOUTH 2 TIMES A DAY BEFORE MEALS  60 tablet 5   • polyethylene glycol (GLYCOLAX) 17 GM/SCOOP powder Take 17 g by mouth daily     • predniSONE 20 mg tablet Take 2 tablets (40 mg total) by mouth daily (Patient not taking: Reported on 9/6/2022) 10 tablet 0   • rOPINIRole (REQUIP) 0 25 mg tablet Take 1 tablet (0 25 mg total) by mouth 3 (three) times a day 30 tablet 3   • rOPINIRole (REQUIP) 1 mg tablet Take 1 tablet (1 mg total) by mouth daily at bedtime 30 tablet 3   • selegiline (ELDEPRYL) 5 mg capsule      • tadalafil (CIALIS) 5 MG tablet Take 5 mg by mouth daily     • vitamin E 100 UNIT capsule Take 100 Units by mouth daily     • Wheat Dextrin (Benefiber) POWD        No current facility-administered medications for this visit  No Known Allergies    Review of Systems    Video Exam    There were no vitals filed for this visit  Physical Exam   REVIEW OF SYSTEMS:    CONSTITUTIONAL: Denies any fever, chills, rigors, and weight loss  HEENT: No earache or tinnitus  Denies hearing loss or visual disturbances  CARDIOVASCULAR: No chest pain or palpitations  RESPIRATORY: Denies any cough, hemoptysis, shortness of breath or dyspnea on exertion  GASTROINTESTINAL: As noted in the History of Present Illness     GENITOURINARY: No problems with urination  Denies any hematuria or dysuria  NEUROLOGIC: No dizziness or vertigo, denies headaches  MUSCULOSKELETAL: Denies any muscle or joint pain  SKIN: Denies skin rashes or itching  ENDOCRINE: Denies excessive thirst  Denies intolerance to heat or cold  PSYCHOSOCIAL: Denies depression or anxiety  Denies any recent memory loss  PHYSICAL EXAMINATION:  Appearance and vitals taken from home devices    General Appearance:   Alert, cooperative, no distress   HEENT:  Normocephalic, atraumatic, anicteric  Neck supple, symmetrical, trachea midline  Lungs:   Equal chest rise and unlabored breathing, normal effort, no coughing  Cardiovascular:   No visualized JVD  Abdomen:   No abdominal distension  Skin:   No jaundice, rashes, or lesions  Musculoskeletal:   Normal range of motion visualized  Psych:  Normal affect and normal insight  Neuro:  Alert and appropriate         I spent 20 minutes directly with the patient during this visit

## 2022-10-14 ENCOUNTER — TELEPHONE (OUTPATIENT)
Dept: PALLIATIVE MEDICINE | Facility: CLINIC | Age: 62
End: 2022-10-14

## 2022-10-17 NOTE — TELEPHONE ENCOUNTER
Spoke with patient, he has been scheduled for 12/19/2022 in [facility] [place]  CAPRINI SCORE    AGE RELATED RISK FACTORS                                                             [ ] Age 41-60 years                                            (1 Point)  [ ] Age: 61-74 years                                           (2 Points)                 [ ] Age= 75 years                                                (3 Points)             DISEASE RELATED RISK FACTORS                                                       [ ] Edema in the lower extremities                 (1 Point)                     [ ] Varicose veins                                               (1 Point)                                 [ ] BMI > 25 Kg/m2                                            (1 Point)                                  [ ] Serious infection (ie PNA)                            (1 Point)                     [ ] Lung disease ( COPD, Emphysema)            (1 Point)                                                                          [ ] Acute myocardial infarction                         (1 Point)                  [ ] Congestive heart failure (in the previous month)  (1 Point)         [ ] Inflammatory bowel disease                            (1 Point)                  [ ] Central venous access, PICC or Port               (2 points)       (within the last month)                                                                [ ] Stroke (in the previous month)                        (5 Points)    [ ] Previous or present malignancy                       (2 points)                                                                                                                                                         HEMATOLOGY RELATED FACTORS                                                         [ ] Prior episodes of VTE                                     (3 Points)                     [ ] Positive family history for VTE                      (3 Points)                  [ ] Prothrombin 41048 A                                     (3 Points)                     [ ] Factor V Leiden                                                (3 Points)                        [ ] Lupus anticoagulants                                      (3 Points)                                                           [ ] Anticardiolipin antibodies                              (3 Points)                                                       [ ] High homocysteine in the blood                   (3 Points)                                             [ ] Other congenital or acquired thrombophilia      (3 Points)                                                [ ] Heparin induced thrombocytopenia                  (3 Points)                                        MOBILITY RELATED FACTORS  [ ] Bed rest                                                         (1 Point)  [ ] Plaster cast                                                    (2 points)  [ ] Bed bound for more than 72 hours           (2 Points)    GENDER SPECIFIC FACTORS  [ ] Pregnancy or had a baby within the last month   (1 Point)  [ ] Post-partum < 6 weeks                                   (1 Point)  [ ] Hormonal therapy  or oral contraception   (1 Point)  [ ] History of pregnancy complications              (1 point)  [ ] Unexplained or recurrent              (1 Point)    OTHER RISK FACTORS                                           (1 Point)  [ ] BMI >40, smoking, diabetes requiring insulin, chemotherapy  blood transfusions and length of surgery over 2 hours    SURGERY RELATED RISK FACTORS  [ ]  Section within the last month     (1 Point)  [ ] Minor surgery                                                  (1 Point)  [ ] Arthroscopic surgery                                       (2 Points)  [ ] Planned major surgery lasting more            (2 Points)      than 45 minutes     [ ] Elective hip or knee joint replacement       (5 points)       surgery                                                TRAUMA RELATED RISK FACTORS  [ ] Fracture of the hip, pelvis, or leg                       (5 Points)  [ ] Spinal cord injury resulting in paralysis             (5 points)       (in the previous month)    [ ] Paralysis  (less than 1 month)                             (5 Points)  [ ] Multiple Trauma within 1 month                        (5 Points)    Total Score [        ]    Caprini Score 0-2: Low Risk, NO VTE prophylaxis required for most patients, encourage ambulation  Caprini Score 3-6: Moderate Risk , pharmacologic VTE prophylaxis is indicated for most patients (in the absence of contraindications)  Caprini Score Greater than or =7: High risk, pharmocologic VTE prophylaxis indicated for most patients (in the absence of contraindications)                OPIOID RISK TOOL    DINA EACH BOX THAT APPLIES AND ADD TOTALS AT THE END    FAMILY HISTORY OF SUBSTANCE ABUSE                 FEMALE         MALE                                                Alcohol                             [  ]1 pt          [  ]3pts                                               Illegal Durgs                     [  ]2 pts        [  ]3pts                                               Rx Drugs                           [  ]4 pts        [  ]4 pts    PERSONAL HISTORY OF SUBSTANCE ABUSE                                                                                          Alcohol                             [  ]3 pts       [  ]3 pts                                               Illegal Durgs                     [  ]4 pts        [  ]4 pts                                               Rx Drugs                           [  ]5 pts        [  ]5 pts    AGE BETWEEN 16-45 YEARS                                      [  ]1 pt         [  ]1 pt    HISTORY OF PREADOLESCENT   SEXUAL ABUSE                                                             [  ]3 pts        [  ]0pts    PSYCHOLOGICAL DISEASE                     ADD, OCD, Bipolar, Schizophrenia        [  ]2 pts         [  ]2 pts                      Depression                                               [  ]1 pt           [  ]1 pt           SCORING TOTAL   0                                  A score of 3 or lower indicated LOW risk for future opiod abuse  A score of 4 to 7 indicated moderate risk for future opiod abuse  A score of 8 or higher indicates a high risk for opiod abuse        46 year old female with pmhx of breast cancer with lumpectomy in 2018 with chemotherapy and radiation, elective salpingectomy, iron deficiency anemia follows with hematologist, plan for iron infusion prior to procedure.  Patient presents to [facility] for hyste[facility], as per patient due to her history of breast cancer her oncologist is recommending oophorectomy.  Patient reports her LMP was 3/2022, she denies nausea, vomiting, abdominal pain, constipation, bloating, diarrhea unintentional weight loss, dizziness, headaches, fever or chills. Patient is scheduled for laparoscopic supracervical hysterectomy with bilateral oophorectomy on 22 with Dr Merritt. Patient educated on surgical scrub, COVID testing, preadmission instructions, medical clearance and day of procedure medications, verbalizes understanding. Pt instructed to stop vitamins/supplements/herbal medications/ASA/NSAIDS for one week prior to surgery and discuss with PMD.   Hematology clearance pending.        CAPRINI SCORE    AGE RELATED RISK FACTORS                                                             [x ] Age 41-60 years                                            (1 Point)  [ ] Age: 61-74 years                                           (2 Points)                 [ ] Age= 75 years                                                (3 Points)             DISEASE RELATED RISK FACTORS                                                       [ ] Edema in the lower extremities                 (1 Point)                     [ ] Varicose veins                                               (1 Point)                                 [x ] BMI > 25 Kg/m2                                            (1 Point)                                  [ ] Serious infection (ie PNA)                            (1 Point)                     [ ] Lung disease ( COPD, Emphysema)            (1 Point)                                                                          [ ] Acute myocardial infarction                         (1 Point)                  [ ] Congestive heart failure (in the previous month)  (1 Point)         [ ] Inflammatory bowel disease                            (1 Point)                  [ ] Central venous access, PICC or Port               (2 points)       (within the last month)                                                                [ ] Stroke (in the previous month)                        (5 Points)    [x ] Previous or present malignancy                       (2 points)                                                                                                                                                         HEMATOLOGY RELATED FACTORS                                                         [ ] Prior episodes of VTE                                     (3 Points)                     [ ] Positive family history for VTE                      (3 Points)                  [ ] Prothrombin 93848 A                                     (3 Points)                     [ ] Factor V Leiden                                                (3 Points)                        [ ] Lupus anticoagulants                                      (3 Points)                                                           [ ] Anticardiolipin antibodies                              (3 Points)                                                       [ ] High homocysteine in the blood                   (3 Points)                                             [ ] Other congenital or acquired thrombophilia      (3 Points)                                                [ ] Heparin induced thrombocytopenia                  (3 Points)                                        MOBILITY RELATED FACTORS  [ ] Bed rest                                                         (1 Point)  [ ] Plaster cast                                                    (2 points)  [ ] Bed bound for more than 72 hours           (2 Points)    GENDER SPECIFIC FACTORS  [ ] Pregnancy or had a baby within the last month   (1 Point)  [ ] Post-partum < 6 weeks                                   (1 Point)  [ ] Hormonal therapy  or oral contraception   (1 Point)  [ ] History of pregnancy complications              (1 point)  [ ] Unexplained or recurrent              (1 Point)    OTHER RISK FACTORS                                           (1 Point)  [ x] BMI >40, smoking, diabetes requiring insulin, chemotherapy  blood transfusions and length of surgery over 2 hours    SURGERY RELATED RISK FACTORS  [ ]  Section within the last month     (1 Point)  [ ] Minor surgery                                                  (1 Point)  [ ] Arthroscopic surgery                                       (2 Points)  [x ] Planned major surgery lasting more            (2 Points)      than 45 minutes     [ ] Elective hip or knee joint replacement       (5 points)       surgery                                                TRAUMA RELATED RISK FACTORS  [ ] Fracture of the hip, pelvis, or leg                       (5 Points)  [ ] Spinal cord injury resulting in paralysis             (5 points)       (in the previous month)    [ ] Paralysis  (less than 1 month)                             (5 Points)  [ ] Multiple Trauma within 1 month                        (5 Points)    Total Score [    7    ]    Caprini Score 0-2: Low Risk, NO VTE prophylaxis required for most patients, encourage ambulation  Caprini Score 3-6: Moderate Risk , pharmacologic VTE prophylaxis is indicated for most patients (in the absence of contraindications)  Caprini Score Greater than or =7: High risk, pharmocologic VTE prophylaxis indicated for most patients (in the absence of contraindications)          OPIOID RISK TOOL    DINA EACH BOX THAT APPLIES AND ADD TOTALS AT THE END    FAMILY HISTORY OF SUBSTANCE ABUSE                 FEMALE         MALE                                                Alcohol                             [  ]1 pt          [  ]3pts                                               Illegal Durgs                     [  ]2 pts        [  ]3pts                                               Rx Drugs                           [  ]4 pts        [  ]4 pts    PERSONAL HISTORY OF SUBSTANCE ABUSE                                                                                          Alcohol                             [  ]3 pts       [  ]3 pts                                               Illegal Durgs                     [  ]4 pts        [  ]4 pts                                               Rx Drugs                           [  ]5 pts        [  ]5 pts    AGE BETWEEN 16-45 YEARS                                      [  ]1 pt         [  ]1 pt    HISTORY OF PREADOLESCENT   SEXUAL ABUSE                                                             [  ]3 pts        [  ]0pts    PSYCHOLOGICAL DISEASE                     ADD, OCD, Bipolar, Schizophrenia        [  ]2 pts         [  ]2 pts                      Depression                                               [  ]1 pt           [  ]1 pt           SCORING TOTAL   0                                  A score of 3 or lower indicated LOW risk for future opiod abuse  A score of 4 to 7 indicated moderate risk for future opiod abuse  A score of 8 or higher indicates a high risk for opiod abuse        46 year old female with pmhx of breast cancer with lumpectomy in 2018 with chemotherapy and radiation, elective salpingectomy, iron deficiency anemia follows with hematologist, plan for iron infusion prior to procedure.  Patient presents to [facility] for hyste[facility], as per patient due to her history of breast cancer her oncologist is recommending oophorectomy.  Patient reports her LMP was 3/2022, she denies nausea, vomiting, abdominal pain, constipation, bloating, diarrhea unintentional weight loss, dizziness, headaches, fever or chills. Patient is scheduled for laparoscopic supracervical hysterectomy with bilateral oophorectomy on 22 with Dr Merritt. Patient educated on surgical scrub, ERP protocol, COVID testing, preadmission instructions, hematology clearance and day of procedure medications, verbalizes understanding. Pt instructed to stop vitamins/supplements/herbal medications/ASA/NSAIDS for one week prior to surgery and discuss with PMD.

## 2022-11-14 DIAGNOSIS — G25.81 RLS (RESTLESS LEGS SYNDROME): ICD-10-CM

## 2022-11-16 RX ORDER — ROPINIROLE 1 MG/1
TABLET, FILM COATED ORAL
Qty: 30 TABLET | Refills: 3 | Status: SHIPPED | OUTPATIENT
Start: 2022-11-16

## 2022-11-21 ENCOUNTER — SOCIAL WORK (OUTPATIENT)
Dept: PALLIATIVE MEDICINE | Facility: CLINIC | Age: 62
End: 2022-11-21

## 2022-11-21 ENCOUNTER — OFFICE VISIT (OUTPATIENT)
Dept: PALLIATIVE MEDICINE | Facility: CLINIC | Age: 62
End: 2022-11-21

## 2022-11-21 VITALS — TEMPERATURE: 97.6 F | HEART RATE: 81 BPM | OXYGEN SATURATION: 97 %

## 2022-11-21 DIAGNOSIS — J96.10 CHRONIC RESPIRATORY FAILURE, UNSPECIFIED WHETHER WITH HYPOXIA OR HYPERCAPNIA (HCC): ICD-10-CM

## 2022-11-21 DIAGNOSIS — G90.3 MULTIPLE SYSTEM ATROPHY (HCC): Primary | ICD-10-CM

## 2022-11-21 DIAGNOSIS — R26.2 AMBULATORY DYSFUNCTION: ICD-10-CM

## 2022-11-21 DIAGNOSIS — Z71.89 COUNSELING AND COORDINATION OF CARE: Primary | ICD-10-CM

## 2022-11-21 DIAGNOSIS — R49.9 CHANGE IN VOICE: ICD-10-CM

## 2022-11-21 DIAGNOSIS — Z71.89 COUNSELING REGARDING ADVANCED CARE PLANNING AND GOALS OF CARE: ICD-10-CM

## 2022-11-21 NOTE — PROGRESS NOTES
Palliative and Supportive Care   Thania Lyon 60 y o  male [id number]    Assessment/Plan:  1  Multiple system atrophy ([facility] [address] )    2  Counseling regarding advanced care planning and goals of care    3  Ambulatory dysfunction    4  Change in voice    5  Chronic respiratory failure, unspecified whether with hypoxia or hypercapnia ([facility] [address] )       · He is undecided on a feeding tube  · He is taking this day by day  He understands that [address] is progressive and decline is inevitable  · He is still having lots of good days amidst bad days  · He is well supported by his family  · His hospital bed had been very helpful, especially with head, leg and height adjustments  · Of note, they have spoken with hospice and they are not ready for hospice services at this time  · RTO in 3 months, call sooner if needed    Requested Prescriptions      No prescriptions requested or ordered in this encounter     There are no discontinued medications  Representatives have queried the patient's controlled substance dispensing history in the Prescription Drug Monitoring Program in compliance with regulations before I have prescribed any controlled substances  The prescription history is consistent with prescribed therapy and our practice policies  25 minutes were spent face to face with Thania Lyon and his daughter with greater than 50% of the time spent in counseling or coordination of care including discussions of etiology of diagnosis, pathogenesis of diagnosis, prognosis of diagnosis, diagnostic results, impression, and recommendations, risks and benefits of treatment, instructions for disease self management, treatment instructions, follow up requirements, risk factors and risk reduction of disease, patient and family counseling/involvement in care, compliance with treatment regimen and advanced care planning  All of the patient's questions were answered during this discussion  No follow-ups on file      Subjective: Chief Complaint  Follow up visit for:  symptom management, goal of care assessment and decisional support, disease process education and discussion of prognosis, advance care planning, emotional support in the setting of serious illness  Shortness of Breath  Associated symptoms include dizziness  Pertinent negatives include no chest pain  Tyler Adair is a 60 y o  male with palliative diagnosis of multiple system atrophy with symptoms starting in 2016  He was given this diagnosis around 2019, per patient and family's recollection  He is referred to palliative care by his neurologist as family struggles with disease progression and taking care of him at home  He also sees pulmonology for chronic/progressive respiratory failure from neurologic disease with associated bulbar symptoms  Last note from 7/6 noted concerns for worsening respiratory failure  He is currently receiving supportive management from both specialists, as well as from cardiology (for neurogenic orthostatic hypotension)  HPI/initial consult: Patient arrived today with his wife and his daughter  Introduced palliative care with emphasis on goals of care discussion  When asked how he is doing, he indicated that he is declining quicker than he'd like starting in 2019 when he was diagnosed with MSA  But especially so in the last 2 days when he now has to rely on the wheelchair to keep his balance and aid in his leg weakness  He had a fall a few days ago as well and patient's wife unable to help him up and needed to call the neighbor for assistance  He also noted dysphagia and having to clear his throat more in the last 2 days  They wonder what is the next step for them as they feel lost, with wife wondering if he is hospice appropriate  Wife still works full time and hopes to continue doing so  Wonders if they can hire [facility]HA at home  They indicated that it appears his treatment options are now limited ("running out of option")    In order to find him the suitable support he needs at this time, we discussed several cam points that needs to be addressed sooner rather than later, as it appears that his breathing, swallowing and muscle weakness are worsening now  We discussed his wishes for intubation and mechanical ventilation for when his breathing worsens from his MSA  Discussed that once intubated, he will not get off the ventilator and will end up with a trach  He feels that this is not a path he wishes to go on and will only prolong the inevitable  We discussed feeding tube for artificial hydration and nutrition  I explained that a feeding tube may buy more time by providing the caloric requirements he requires daily but this will not protect against aspiration as dysphagia will continue to worsen even with the tube  He will also be prone to complications such as infection from the tube  He said he has not thought about this yet  Appears that these discussions were never brought up to them before  I feel that he may be close, if not already, to qualifying for hospice cares given progression as noted above  We explained the difference between palliative and hospice  I discussed hospice is much detail with emphasis on end of life care with limited re-hospitalizations and focusing more on symptom management of worsening MSA while we allow nature to takes its course  Patient and family understandably tearful but appears appreciative of information  Patient at this time wants to continue receiving current cares from his specialists  I encouraged them to think more about this especially if he continues to worsen down the line  Last visit 8/18/2022: We also completed the POLST and the 5 Wishes    Interval history: Since has last visit 8/18/2022, he has continued follow up with his primary neurology from [facility] and another neurologist from [facility]  [facility] neurology adjusted his levodopa in 10/2022   He also followed with GI who had an extensive discussion with them about the PEG tube  Today, he returns to office for routine follow up accompanied by his daughter  His wife is back to work  He reports further worsening of his already established symptoms - his BP is more unpredictable now, he is more dizzy, his swallowing and speech had gotten worse and he is more short of breath  He is weaker and his daughter notices that while he can still walk to the bathroom with a walker, he now requires the use of the wheelchair to come back from the bathroom  He wishes to continue the course as long as he can, but he also recognizes that decline is inevitable  He tells me of some studies that are available in [place] or [facility] neurology but that he does not qualify because of his advanced state  We discussed his GI appt and he is still considering whether or not to undergo PEG placement  We again touch base about hospice briefly but he is not interested in this yet  His appetite is still good and his weight had been stable  He may have gained some weight as well  Regardless of the decline, he still recognizes more good days than bad  Today is a particularly good day  The following portions of the medical history were reviewed: past medical history, problem list, medication list, and social history  Current Outpatient Medications:   •  carbidopa-levodopa (SINEMET)  mg per tablet, Take 1 tablet by mouth 3 (three) times a day, Disp: , Rfl:   •  cyanocobalamin (VITAMIN B-12) 1000 MCG tablet, Take 1,000 mcg by mouth daily, Disp: , Rfl:   •  Dalfampridine ER 10 MG TB12, 3 (three) times a day  , Disp: , Rfl:   •  Diclofenac Sodium (VOLTAREN) 1 %, Apply 2 g topically 4 (four) times a day, Disp: 350 g, Rfl: 2  •  enalapril (VASOTEC) 2 5 mg tablet, Take 2 tablets (5 mg total) by mouth as needed in the morning (for HTN)  , Disp: 90 tablet, Rfl: 3  •  fluticasone-vilanterol (Breo Ellipta) 100-25 mcg/inh inhaler, Inhale 1 puff daily Rinse mouth after use , Disp: 60 blister, Rfl: 5  •  midodrine (PROAMATINE) 5 mg tablet, , Disp: , Rfl:   •  pantoprazole (PROTONIX) 40 mg tablet, TAKE 1 TABLET BY MOUTH 2 TIMES A DAY BEFORE MEALS , Disp: 60 tablet, Rfl: 5  •  rOPINIRole (REQUIP) 0 25 mg tablet, Take 1 tablet (0 25 mg total) by mouth 3 (three) times a day, Disp: 30 tablet, Rfl: 3  •  rOPINIRole (REQUIP) 1 mg tablet, TAKE 1 TABLET BY MOUTH DAILY AT BEDTIME, Disp: 30 tablet, Rfl: 3  •  selegiline (ELDEPRYL) 5 mg capsule, , Disp: , Rfl:   •  albuterol (2 5 mg/3 mL) 0 083 % nebulizer solution, Take 3 mL (2 5 mg total) by nebulization every 6 (six) hours as needed for wheezing or shortness of breath (Patient not taking: Reported on 11/21/2022), Disp: 180 mL, Rfl: 3  •  albuterol (Ventolin HFA) 90 mcg/act inhaler, Inhale 2 puffs every 6 (six) hours as needed for wheezing, Disp: 18 g, Rfl: 0  •  calcium carbonate (OS-NEIL) 1250 (500 Ca) MG chewable tablet, Chew, Disp: , Rfl:   •  cholecalciferol (VITAMIN D3) 1,000 units tablet, Take 2,000 Units by mouth daily, Disp: , Rfl:   •  ciprofloxacin (CIPRO) 500 mg tablet, Take 500 mg by mouth 2 (two) times a day (Patient not taking: No sig reported), Disp: , Rfl:   •  famotidine (Pepcid) 20 mg tablet, Take 1 tablet (20 mg total) by mouth daily (Patient taking differently: Take 20 mg by mouth as needed), Disp: 90 tablet, Rfl: 1  •  multivitamin (THERAGRAN) TABS, Take 1 tablet by mouth daily, Disp: , Rfl:   •  nystatin-triamcinolone (MYCOLOG-II) cream, Apply topically 2 (two) times a day (Patient not taking: Reported on 9/6/2022), Disp: 60 g, Rfl: 0  •  polyethylene glycol (GLYCOLAX) 17 GM/SCOOP powder, Take 17 g by mouth daily, Disp: , Rfl:   •  predniSONE 20 mg tablet, Take 2 tablets (40 mg total) by mouth daily (Patient not taking: Reported on 9/6/2022), Disp: 10 tablet, Rfl: 0  •  tadalafil (CIALIS) 5 MG tablet, Take 5 mg by mouth daily, Disp: , Rfl:   •  vitamin E 100 UNIT capsule, Take 100 Units by mouth daily, Disp: , Rfl:   •  Wheat Dextrin (Charissa) QUINTON, , Disp: , Rfl:   Review of Systems   Constitutional: Positive for activity change  HENT: Positive for trouble swallowing and voice change  Respiratory: Positive for shortness of breath  Cardiovascular: Negative for chest pain  Gastrointestinal: Negative for abdominal pain  Musculoskeletal: Negative for back pain  Neurological: Positive for dizziness, speech difficulty, weakness and light-headedness  Psychiatric/Behavioral: Negative for sleep disturbance  The patient is not nervous/anxious  All other systems reviewed and are negative  All other systems negative    Objective:  Vital Signs  Pulse 81   Temp 97 6 °F (36 4 °C) (Temporal)   SpO2 97%    Physical Exam    Constitutional: Appears well-developed and well-nourished  In no acute physical or emotional distress  Head: Normocephalic and atraumatic  Eyes: EOM are normal  No ocular discharge  No scleral icterus  Neck: No visible adenopathy or masses  Respiratory: Effort normal  No stridor  No respiratory distress  But short of breath with long sentences, needing to pause to catch breath  Gastrointestinal: No abdominal distension  Musculoskeletal: No edema  Neurological: Alert, oriented and appropriately conversant  Voice is hoarse and speech slurred, difficult to understand at times, but still intelligible  Arrived in a wheelchair  Skin: No diaphoresis, no rashes seen on exposed areas of skin  Pale   Psychiatric: Displays a normal mood and affect   Behavior, judgement and thought content appear normal      Yola Baca MD  Palliative Medicine & Supportive Care  Internal Medicine  Available via [facility] Text  Office: [phone]  Fax: [phone]

## 2022-11-21 NOTE — PROGRESS NOTES
[facility] MSW met with patient and his daughter to introduce the roll of [facility] MSW in their care/treatment  Patient and his daughter were given the opportunity to have their questions/concerns addressed  [facility] SW encouraged patient/family to reach out to  as needed for support and/or resources as needed  The pt states that he has received the wheelchair and he hospital bed  He states that they are a big help  MSW and the pt discussed the wheelchair and he states that he sits low and getting up from the chair is a challenge  The pt is aware there is a height requirement for a larger chair  MSW explained that the larger chair would be sara and it would be larger for girth and not necessarily height  The pt daughter states that they place a cushion on the seat to make it easier for the pt to get up from the chair  The pt and his daughter were provided with the MSW contact information and encouraged to call if there were questions, concerns, or needs  MSW wished the family happy holidays and they did the same  I have spent 15 minutes with Patient and daughter today in which greater than 50% of this time was spent in counseling/coordination of care regarding ongoing emotional support      Palliative  will follow-up as requested by patient, family, and primary team   Please contact with any specific requests

## 2022-12-07 ENCOUNTER — OFFICE VISIT (OUTPATIENT)
Dept: CARDIOLOGY CLINIC | Facility: CLINIC | Age: 62
End: 2022-12-07

## 2022-12-07 VITALS
HEART RATE: 80 BPM | DIASTOLIC BLOOD PRESSURE: 94 MMHG | HEIGHT: 72 IN | BODY MASS INDEX: 33.32 KG/M2 | SYSTOLIC BLOOD PRESSURE: 140 MMHG | WEIGHT: 246 LBS

## 2022-12-07 DIAGNOSIS — J96.10 CHRONIC RESPIRATORY FAILURE, UNSPECIFIED WHETHER WITH HYPOXIA OR HYPERCAPNIA (HCC): ICD-10-CM

## 2022-12-07 DIAGNOSIS — G90.3 MULTIPLE SYSTEM ATROPHY (HCC): ICD-10-CM

## 2022-12-07 DIAGNOSIS — G20 PARKINSON'S DISEASE (HCC): ICD-10-CM

## 2022-12-07 DIAGNOSIS — R09.89 OTHER SPECIFIED SYMPTOMS AND SIGNS INVOLVING THE CIRCULATORY AND RESPIRATORY SYSTEMS: ICD-10-CM

## 2022-12-07 DIAGNOSIS — I10 ESSENTIAL HYPERTENSION: ICD-10-CM

## 2022-12-07 DIAGNOSIS — G70.9 NEUROMUSCULAR DISEASE (HCC): ICD-10-CM

## 2022-12-07 DIAGNOSIS — M54.2 NECK PAIN: Primary | ICD-10-CM

## 2022-12-07 DIAGNOSIS — E78.2 MIXED HYPERLIPIDEMIA: ICD-10-CM

## 2022-12-07 DIAGNOSIS — G20 ATYPICAL PARKINSONISM (HCC): ICD-10-CM

## 2022-12-07 DIAGNOSIS — I15.8 OTHER SECONDARY HYPERTENSION: ICD-10-CM

## 2022-12-07 RX ORDER — CAPTOPRIL 12.5 MG/1
6.25 TABLET ORAL 2 TIMES DAILY PRN
Qty: 60 TABLET | Refills: 2 | Status: SHIPPED | OUTPATIENT
Start: 2022-12-07

## 2022-12-07 RX ORDER — CAPTOPRIL 12.5 MG/1
6.25 TABLET ORAL 2 TIMES DAILY PRN
Qty: 60 TABLET | Refills: 2 | Status: SHIPPED | OUTPATIENT
Start: 2022-12-07 | End: 2022-12-07

## 2022-12-07 NOTE — PROGRESS NOTES
Cardiology   Shaune Frankel, MD Kristeen Ludwig, MD Ermelinda Shines, DO, SUSAN Cervantes MD Almetta Curio, DO, Javier Vicente DO, [facility]  -------------------------------------------------------------------  formerly [facility]  [address]  [place], [place] [address]  [phone]  [phone]  12/07/22  Geovanny Anglin  YOB: 1960   MRN: [id number]      Referring Physician: Sarahy Rosa DO  [address] [address]     HPI: Geovanny Anglin is a 60 y o  male with:   Orthostatic hypotension  autonomic dysfunction  Diaphragm dysfunction  Parkinson's disease  Chronic kidney disease  Obstructive sleep apnea  Gastroesophageal reflux disease    He presents today for follow-up  He states that from a blood pressure standpoint he is pretty much unchanged from prior  He has been having worsening issues with swallowing and was recently seen by palliative care  Not quite ready for hospice yet, however has begun to think about possibly needing a feeding tube in the future  Still having large blood pressure swings from hypotensive to hypertensive  He also complains of pain over his left carotid artery, worse with hypertension episodes  Review of Systems   Constitutional: Negative for chills and fever  HENT: Negative for facial swelling and sore throat  Eyes: Negative for visual disturbance  Respiratory: Negative for cough, chest tightness, shortness of breath and wheezing  Cardiovascular: Negative for chest pain, palpitations and leg swelling  Gastrointestinal: Negative for abdominal pain, blood in stool, constipation, diarrhea, nausea and vomiting  Endocrine: Negative for cold intolerance and heat intolerance  Genitourinary: Negative for decreased urine volume, difficulty urinating, dysuria and hematuria  Musculoskeletal: Negative for arthralgias, back pain and myalgias  Skin: Negative for rash  Neurological: Positive for syncope  Negative for dizziness, weakness and numbness  Psychiatric/Behavioral: Negative for agitation, behavioral problems and confusion  The patient is not nervous/anxious  OBJECTIVE  Vitals:    12/07/22 1333   BP: 140/94   Pulse: 80       Physical Exam  Constitutional: awake, alert and oriented, in no acute distress, no obvious deformities  Head: Normocephalic, without obvious abnormality, atraumatic  Eyes: conjunctivae clear and moist  Sclera anicteric  No xanthelasmas  Pupils equal bilaterally  Extraocular motions are full  Ear nose mouth and throat: ears are symmetrical bilaterally, hearing appears to be equal bilaterally, no nasal discharge or epistaxis, oropharynx is clear with moist mucous membranes  Neck: Trachea is midline, neck is supple, no thyromegaly or significant lymphadenopathy, there is full range of motion  Lungs: clear to auscultation bilaterally, no wheezes, no rales, no rhonchi, no accessory muscle use, breathing is nonlabored  Heart: regular rate and rhythm, S1, S2 normal, no murmur, no click, no rub and no gallop, no lower extremity edema  Abdomen: soft, non-tender; bowel sounds normal; no masses, no organomegaly  Psychiatric: Patient is oriented to time, place, person, mood/affect is negative for depression, anxiety, agitation, appears to have appropriate insight  Skin: Skin is warm, dry, intact  No obvious rashes or lesions on exposed extremities  Nail beds are pink with no cyanosis or clubbing  EKG:  No results found for this visit on 12/07/22  IMPRESSION:  Orthostatic hypotension  autonomic dysfunction  Diaphragm dysfunction  Parkinson's disease  Chronic kidney disease  Obstructive sleep apnea  Gastroesophageal reflux disease  Neck pain over carotid artery    DISCUSSION/RECOMMENDATIONS:  He still having issues with orthostatic hypotension which can be mitigated by positional changes, however occasionally has to take midodrine    He has not used the enalapril as his episodes of hypertension can usually be controlled again with position changes like lowering his legs and/or standing up, after which he gets hypotensive again rather quickly  He had echo in  which showed structurally normal-appearing heart  His issues with blood pressure swings are secondary to autonomic dysfunction due to his multisystem atrophy  Will DC enalapril, Rx captopril at one half of the lowest dose to be taken as needed for resistant hypertension  He is also having some pain over his left carotid artery, will check vascular ultrasound for further evaluation    Winston Randall DO, [facility], [facility]  --------------------------------------------------------------------------------  TREADMILL STRESS  No results found for this or any previous visit      ----------------------------------------------------------------------------------------------  NUCLEAR STRESS TEST: No results found for this or any previous visit      No results found for this or any previous visit       --------------------------------------------------------------------------------  CATH:  No results found for this or any previous visit     --------------------------------------------------------------------------------  ECHO:   Results for orders placed during the hospital encounter of 20    Echo complete with contrast if indicated    Narrative  [address]  [phone]    Transthoracic Echocardiogram  2D, M-mode, Doppler, and Color Doppler    Study date:  2020    Patient: Karenla Mode  MR number: [id number]  Account number: [de-identified]  : 1960  Age: 60 years  Gender: Male  Status: Outpatient  Location: [address] [place] [address] [facility] [place]  Height: 72 in  Weight: 234 5 lb  BP: 101/ 61 mmHg    Indications: Shortness of Breath    Diagnoses: R06 02 - Shortness of breath    Sonographer:  Orlando Yoon RDCS  Primary Physician:  Yair Wallace DO  Referring Physician:  Yair Wallace DO  Group:  [facility]  Interpreting Physician:  Tony Colmenares MD    SUMMARY    LEFT VENTRICLE:  Systolic function was normal  Ejection fraction was estimated to be 60 %  There were no regional wall motion abnormalities  RIGHT VENTRICLE:  The size was normal   Systolic function was normal     HISTORY: PRIOR HISTORY: KALLI w/ BIPAP    PROCEDURE: The study was performed in the [address]  This was a routine study  The transthoracic approach was used  The study included complete 2D imaging, M-mode, complete spectral Doppler, and color Doppler  The  heart rate was 72 bpm, at the start of the study  Images were obtained from the parasternal, apical, subcostal, and suprasternal notch acoustic windows  Image quality was adequate  LEFT VENTRICLE: Size was normal  Systolic function was normal  Ejection fraction was estimated to be 60 %  There were no regional wall motion abnormalities  Wall thickness was normal  DOPPLER: The transmitral flow pattern was normal  Left  ventricular diastolic function parameters were normal     RIGHT VENTRICLE: The size was normal  Systolic function was normal  Wall thickness was normal     LEFT ATRIUM: Size was normal     RIGHT ATRIUM: Size was normal     MITRAL VALVE: Valve structure was normal  There was normal leaflet separation  DOPPLER: The transmitral velocity was within the normal range  There was no evidence for stenosis  There was trace regurgitation  AORTIC VALVE: The valve was trileaflet  Leaflets exhibited normal thickness and normal cuspal separation  DOPPLER: Transaortic velocity was within the normal range  There was no evidence for stenosis  There was no significant  regurgitation  TRICUSPID VALVE: The valve structure was normal  There was normal leaflet separation  DOPPLER: The transtricuspid velocity was within the normal range   There was no evidence for stenosis  There was trace regurgitation  The tricuspid jet  envelope definition was inadequate for estimation of RV systolic pressure  There are no indirect findings (abnormal RV volume or geometry, altered pulmonary flow velocity profile, or leftward septal displacement) which would suggest  moderate or severe pulmonary hypertension  PULMONIC VALVE: Leaflets exhibited normal thickness, no calcification, and normal cuspal separation  DOPPLER: The transpulmonic velocity was within the normal range  There was trace regurgitation  PERICARDIUM: There was no pericardial effusion  The pericardium was normal in appearance  AORTA: The root exhibited normal size  SYSTEMIC VEINS: IVC: The inferior vena cava was normal in size  Respirophasic changes were normal     SYSTEM MEASUREMENT TABLES    2D  %FS: 29 56 %  Ao Diam: 3 2 cm  EDV(Teich): 116 61 ml  EF(Cube): 65 04 %  EF(Teich): 56 34 %  ESV(Cube): 42 92 ml  ESV(Teich): 50 91 ml  IVSd: 1 02 cm  LA Area: 21 08 cm2  LA Diam: 3 25 cm  LVEDV MOD A4C: 147 51 ml  LVEF MOD A4C: 69 2 %  LVESV MOD A4C: 45 43 ml  LVIDd: 4 97 cm  LVIDs: 3 5 cm  LVLd A4C: 9 78 cm  LVLs A4C: 7 97 cm  LVPWd: 1 1 cm  RA Area: 19 63 cm2  RV Diam : 4 03 cm  SI(Cube): 35 03 ml/m2  SI(Teich): 28 82 ml/m2  SV MOD A4C: 102 08 ml  SV(Cube): 79 86 ml  SV(Teich): 65 7 ml    MM  TAPSE: 2 74 cm    PW  E': 0 15 m/s  E/E': 6 45  MV A Joesph: 0 72 m/s  MV Dec Cook: 6 03 m/s2  MV DecT: 162 26 ms  MV E Joesph: 0 98 m/s  MV E/A Ratio: 1 35    [facility] Accredited Echocardiography Laboratory    Prepared and electronically signed by    Jessika Madrid MD  Signed 07-Apr-2020 13:58:33    No results found for this or any previous visit     --------------------------------------------------------------------------------  HOLTER  No results found for this or any previous visit      No results found for this or any previous visit     --------------------------------------------------------------------------------  CAROTIDS  No results found for this or any previous visit      --------------------------------------------------------------------------------  Diagnoses and all orders for this visit:    Neck pain  -     VAS carotid complete study; Future    Other secondary hypertension  -     VAS carotid complete study; Future  -     Discontinue: captopril (CAPOTEN) 12 5 mg tablet; Take 0 5 tablets (6 25 mg total) by mouth 2 (two) times a day as needed (HTN systolic BP >802 mmHg)  -     captopril (CAPOTEN) 12 5 mg tablet; Take 0 5 tablets (6 25 mg total) by mouth 2 (two) times a day as needed (HTN systolic BP >153 mmHg)    Other specified symptoms and signs involving the circulatory and respiratory systems  -     VAS carotid complete study;  Future    Essential hypertension    Chronic respiratory failure, unspecified whether with hypoxia or hypercapnia ([facility])    Parkinson's disease ([facility] )    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)     testicular 1988   • Chronic kidney disease    • Diverticulosis    • GERD (gastroesophageal reflux disease)     occassional   • History of testicular cancer 1988    Surgery and radiation; left side   • Hypertension Nov, 2018    occassionally   • Kidney stone    • Kidney stones     Currently and in the past   • Orthostatic hypotension    • Orthostatic hypotension due to Parkinson's disease [facility])    • Parkinson's disease ([facility] )    • Sleep apnea     uses CPAP   • Sleep apnea, obstructive April, 2019    recently diagnosed   • Urinary tract infection    • Wears glasses      Past Surgical History:   Procedure Laterality Date   • BLADDER NECK RECONSTRUCTION  07/27/2020   • BLADDER SURGERY  nov 2019  repair bladder neck constriction   • COLONOSCOPY  2017   • COLONOSCOPY  09/2020   • COLONOSCOPY  11/2020   • CYSTOPLASTY / CYSTOURETHROPLASTY  07/27/2020    [facility]   • HERNIA REPAIR      1981, 1986 inguinal hernia repair   • IR SUPRAPUBIC CATHETER PLACEMENT  02/24/2020   • IR TUBE UPSIZE  03/23/2020   • KIDNEY STONE SURGERY     • LITHOTRIPSY      Renal; Resolved: 2/27/07   • ORCHIECTOMY Left    • MS CYSTOURETHRO W/IMPLANT N/A 05/17/2018    Procedure: CYSTOSCOPY WITH INSERTION UROLIFT;  Surgeon: Lorena Ramsey DO;  Location: [facility];  Service: Urology   • PROSTATE SURGERY N/A 01/18/2018    Procedure: CYSTOSCOPY WITH INSERTION UROLIFT;  Surgeon: Lorena Ramsey DO;  Location: [facility];  Service: Urology   • [address]    For cancer   • TONSILLECTOMY     • TRANSURETHRAL RESECTION OF PROSTATE  Mar 2019/Jul 2019   • URETERONEOCYSTOSTOMY      w/ cystoscopy Ureteral Stent Placement; Resolved: 6/14/2007   • WISDOM TOOTH EXTRACTION           Medications  Current Outpatient Medications   Medication Sig Dispense Refill   • captopril (CAPOTEN) 12 5 mg tablet Take 0 5 tablets (6 25 mg total) by mouth 2 (two) times a day as needed (HTN systolic BP >844 mmHg) 60 tablet 2   • cyanocobalamin (VITAMIN B-12) 1000 MCG tablet Take 1,000 mcg by mouth daily     • Dalfampridine ER 10 MG TB12 3 (three) times a day       • Diclofenac Sodium (VOLTAREN) 1 % Apply 2 g topically 4 (four) times a day 350 g 2   • fluticasone-vilanterol (Breo Ellipta) 100-25 mcg/inh inhaler Inhale 1 puff daily Rinse mouth after use  60 blister 5   • midodrine (PROAMATINE) 5 mg tablet      • pantoprazole (PROTONIX) 40 mg tablet TAKE 1 TABLET BY MOUTH 2 TIMES A DAY BEFORE MEALS   60 tablet 5   • rOPINIRole (REQUIP) 0 25 mg tablet Take 1 tablet (0 25 mg total) by mouth 3 (three) times a day 30 tablet 3   • rOPINIRole (REQUIP) 1 mg tablet TAKE 1 TABLET BY MOUTH DAILY AT BEDTIME 30 tablet 3   • selegiline (ELDEPRYL) 5 mg capsule      • albuterol (2 5 mg/3 mL) 0 083 % nebulizer solution Take 3 mL (2 5 mg total) by nebulization every 6 (six) hours as needed for wheezing or shortness of breath (Patient not taking: Reported on 11/21/2022) 180 mL 3   • albuterol (Ventolin HFA) 90 mcg/act inhaler Inhale 2 puffs every 6 (six) hours as needed for wheezing 18 g 0   • calcium carbonate (OS-NEIL) 1250 (500 Ca) MG chewable tablet Chew     • carbidopa-levodopa (SINEMET)  mg per tablet Take 1 tablet by mouth 3 (three) times a day     • cholecalciferol (VITAMIN D3) 1,000 units tablet Take 2,000 Units by mouth daily     • ciprofloxacin (CIPRO) 500 mg tablet Take 500 mg by mouth 2 (two) times a day (Patient not taking: No sig reported)     • famotidine (Pepcid) 20 mg tablet Take 1 tablet (20 mg total) by mouth daily (Patient taking differently: Take 20 mg by mouth as needed) 90 tablet 1   • multivitamin (THERAGRAN) TABS Take 1 tablet by mouth daily     • nystatin-triamcinolone (MYCOLOG-II) cream Apply topically 2 (two) times a day (Patient not taking: Reported on 9/6/2022) 60 g 0   • polyethylene glycol (GLYCOLAX) 17 GM/SCOOP powder Take 17 g by mouth daily     • predniSONE 20 mg tablet Take 2 tablets (40 mg total) by mouth daily (Patient not taking: Reported on 9/6/2022) 10 tablet 0   • tadalafil (CIALIS) 5 MG tablet Take 5 mg by mouth daily     • vitamin E 100 UNIT capsule Take 100 Units by mouth daily     • Wheat Dextrin (Benefiber) POWD        No current facility-administered medications for this visit          No Known Allergies    Social History     Socioeconomic History   • Marital status: /Civil Union     Spouse name: Not on file   • Number of children: Not on file   • Years of education: Not on file   • Highest education level: Not on file   Occupational History   • Occupation: [employer] retired   Tobacco Use   • Smoking status: Former     Packs/day: 0 50     Years: 2 00     Pack years: 1 00     Types: Cigarettes     Start date: 64     Quit date: 1978     Years since quittin 9   • Smokeless tobacco: Never   Vaping Use   • Vaping Use: Never used   Substance and Sexual Activity   • Alcohol use: Not Currently   • Drug use: No   • Sexual activity: Not Currently     Birth control/protection: Abstinence   Other Topics Concern   • Not on file   Social History Narrative    Consumes 5 glasses of tea per week     Social Determinants of Health     Financial Resource Strain: Not on file   Food Insecurity: Not on file   Transportation Needs: Not on file   Physical Activity: Not on file   Stress: Not on file   Social Connections: Not on file   Intimate Partner Violence: Not on file   Housing Stability: Not on file        Family History   Problem Relation Age of Onset   • Colon cancer Father    • Heart failure Father    • Cancer Father         Colon CA   • Parkinsonism Mother    • Cancer Paternal Grandmother        Lab Results   Component Value Date    WBC 5 44 2022    HGB 14 7 2022    HCT 44 2 2022    MCV 91 2022     2022      Lab Results   Component Value Date    SODIUM 141 2022    K 4 1 2022     2022    CO2 29 2022    BUN 20 2022    CREATININE 1 15 2022    GLUC 108 2022    CALCIUM 9 3 2022      Lab Results   Component Value Date    HGBA1C 5 9 (H) 2020      No results found for: CHOL  Lab Results   Component Value Date    HDL 56 2020    HDL 49 2018    HDL 56 2016     Lab Results   Component Value Date    LDLCALC 107 (H) 2020    LDLCALC 120 (H) 2018    LDLCALC 127 (H) 2016     Lab Results   Component Value Date    TRIG 96 2020    TRIG 83 2018    TRIG 155 (H) 2016     No results found for: [place], [place]   Lab Results   Component Value Date    INR 1 05 02/24/2020    PROTIME 13 8 02/24/2020          Patient Active Problem List    Diagnosis Date Noted   • Primary osteoarthritis of both knees 08/02/2022   • Counseling regarding advanced care planning and goals of care 07/29/2022   • Multiple system atrophy ([facility] ) 07/29/2022   • Ambulatory dysfunction 07/29/2022   • Chronic respiratory failure ([facility] ) 07/29/2022   • Tinea versicolor 12/06/2021   • Olecranon bursitis of right elbow 12/06/2021   • Primary osteoarthritis of left knee 08/06/2021   • Renal artery stenosis of unknown etiology ([facility] ) 06/15/2021   • Encounter for attention to other artificial openings of urinary tract ([place] [address] ) 06/15/2021   • Neuromuscular disease ([place] [address] ) 06/15/2021   • Essential hypertension 05/13/2021   • Primary malignant neoplasm of testis ([facility] ) 05/13/2021   • Hyponatremia 01/06/2021   • Olecranon bursitis 12/14/2020   • Parasomnia 11/24/2020   • Restless leg syndrome 11/24/2020   • Prediabetes 09/23/2020   • Mixed hyperlipidemia 09/23/2020   • 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[facility] ) 08/27/2020   • Truncal ataxia 08/27/2020   • Recurrent left knee instability 04/24/2020   • Coracoid impingement of right shoulder 04/24/2020   • 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 04/24/2020   • Numbness and tingling 04/24/2020   • Acute pain of right shoulder 02/21/2020   • Proteinuria 12/06/2019   • Urinary retention 12/06/2019   • Nephrolithiasis 12/06/2019   • CKD (chronic kidney disease) stage 2, GFR 60-89 ml/min 12/06/2019   • Elevated blood pressure reading 11/22/2019   • Tinea corporis 08/27/2019   • Periodic limb movement disorder (PLMD) 08/22/2019   • Change in voice 06/03/2019   • Gastroesophageal reflux disease with esophagitis 06/03/2019   • KALLI (obstructive sleep apnea)    • Chronic pain of right ankle 03/04/2019   • Chronic pain of left knee 03/04/2019   • Parkinson's disease ([facility] ) 03/04/2019   • Thyromegaly 03/04/2019   • Neurogenic orthostatic hypotension ([facility] ) 02/27/2019   • Shortness of breath 11/29/2018   • Pain of right thumb 10/18/2018   • Pain of left thumb 10/18/2018   • Right elbow pain 10/18/2018   • Right wrist pain 10/18/2018   • Plantar fasciitis, bilateral 10/10/2018   • Unspecified abnormalities of gait and mobility 10/10/2018   • Dizziness 07/05/2018   • Personal history of malignant neoplasm of testis 12/17/2014       Portions of the record may have been created with voice recognition software  Occasional wrong word or "sound a like" substitutions may have occurred due to the inherent limitations of voice recognition software  Read the chart carefully and recognize, using context, where substitutions have occurred      Sawyer Pittman DO, [facility] [place]  12/7/2022 2:29 PM

## 2022-12-09 ENCOUNTER — HOSPITAL ENCOUNTER (OUTPATIENT)
Dept: NON INVASIVE DIAGNOSTICS | Facility: HOSPITAL | Age: 62
Discharge: HOME | End: 2022-12-09

## 2022-12-09 DIAGNOSIS — I15.8 OTHER SECONDARY HYPERTENSION: ICD-10-CM

## 2022-12-09 DIAGNOSIS — R09.89 OTHER SPECIFIED SYMPTOMS AND SIGNS INVOLVING THE CIRCULATORY AND RESPIRATORY SYSTEMS: ICD-10-CM

## 2022-12-09 DIAGNOSIS — M54.2 NECK PAIN: ICD-10-CM

## 2022-12-12 ENCOUNTER — TELEPHONE (OUTPATIENT)
Dept: CARDIOLOGY CLINIC | Facility: CLINIC | Age: 62
End: 2022-12-12

## 2022-12-12 NOTE — TELEPHONE ENCOUNTER
----- Message from Jose Armando Kuhn DO sent at 12/9/2022  3:58 PM EST -----  Please call the patient regarding their normal result

## 2022-12-12 NOTE — TELEPHONE ENCOUNTER
Contact was made with Mr Estrellaanurag Farley and he is now aware of his results that are per Dr Amy Fernando  Patient verbally states that he understands and no questions or concerns at this time

## 2023-01-09 ENCOUNTER — OFFICE VISIT (OUTPATIENT)
Dept: FAMILY MEDICINE CLINIC | Facility: CLINIC | Age: 63
End: 2023-01-09

## 2023-01-09 VITALS
SYSTOLIC BLOOD PRESSURE: 160 MMHG | DIASTOLIC BLOOD PRESSURE: 108 MMHG | HEART RATE: 82 BPM | OXYGEN SATURATION: 98 % | TEMPERATURE: 99 F

## 2023-01-09 DIAGNOSIS — Z43.6 ENCOUNTER FOR ATTENTION TO OTHER ARTIFICIAL OPENINGS OF URINARY TRACT (HCC): ICD-10-CM

## 2023-01-09 DIAGNOSIS — G90.3 MULTIPLE SYSTEM ATROPHY (HCC): Primary | ICD-10-CM

## 2023-01-09 DIAGNOSIS — G70.9 NEUROMUSCULAR DISEASE (HCC): ICD-10-CM

## 2023-01-09 DIAGNOSIS — E78.2 MIXED HYPERLIPIDEMIA: ICD-10-CM

## 2023-01-09 DIAGNOSIS — K21.00 GASTROESOPHAGEAL REFLUX DISEASE WITH ESOPHAGITIS WITHOUT HEMORRHAGE: ICD-10-CM

## 2023-01-09 DIAGNOSIS — J06.9 ACUTE URI: ICD-10-CM

## 2023-01-09 DIAGNOSIS — N18.2 CKD (CHRONIC KIDNEY DISEASE) STAGE 2, GFR 60-89 ML/MIN: ICD-10-CM

## 2023-01-09 DIAGNOSIS — I70.1: ICD-10-CM

## 2023-01-09 DIAGNOSIS — J96.10 CHRONIC RESPIRATORY FAILURE, UNSPECIFIED WHETHER WITH HYPOXIA OR HYPERCAPNIA (HCC): ICD-10-CM

## 2023-01-09 DIAGNOSIS — C62.90 PRIMARY MALIGNANT NEOPLASM OF TESTIS, UNSPECIFIED LATERALITY (HCC): ICD-10-CM

## 2023-01-09 DIAGNOSIS — G20 ATYPICAL PARKINSONISM (HCC): ICD-10-CM

## 2023-01-09 DIAGNOSIS — R73.03 PREDIABETES: ICD-10-CM

## 2023-01-09 DIAGNOSIS — G90.3 NEUROGENIC ORTHOSTATIC HYPOTENSION (HCC): ICD-10-CM

## 2023-01-09 DIAGNOSIS — Z23 ENCOUNTER FOR IMMUNIZATION: ICD-10-CM

## 2023-01-09 DIAGNOSIS — R26.2 AMBULATORY DYSFUNCTION: ICD-10-CM

## 2023-01-09 DIAGNOSIS — I10 ESSENTIAL HYPERTENSION: ICD-10-CM

## 2023-01-09 RX ORDER — AMOXICILLIN 500 MG/1
1000 CAPSULE ORAL EVERY 12 HOURS SCHEDULED
Qty: 28 CAPSULE | Refills: 0 | Status: SHIPPED | OUTPATIENT
Start: 2023-01-09 | End: 2023-01-16

## 2023-01-09 NOTE — PROGRESS NOTES
Assessment/Plan: Prevnar 20 given at this time  Labs and records reviewed  Patient will follow with palliative care  Patient will follow with neurology cardiology etc   Patient is amoxicillin for acute URI  Patient may use Mucinex as directed  We will follow-up as needed as palliative care will be continued       Diagnoses and all orders for this visit:    Multiple system atrophy ([facility] )    Gastroesophageal reflux disease with esophagitis without hemorrhage    Essential hypertension    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HCC)    CKD (chronic kidney disease) stage 2, GFR 60-89 ml/min    Mixed hyperlipidemia    Prediabetes    Ambulatory dysfunction    Neurogenic orthostatic hypotension (HCC)    Acute URI  -     amoxicillin (AMOXIL) 500 mg capsule; Take 2 capsules (1,000 mg total) by mouth every 12 (twelve) hours for 7 days    Encounter for attention to other artificial openings of urinary tract ([facility] )    Neuromuscular disease ([facility] )    Renal artery stenosis of unknown etiology ([facility] )    Chronic respiratory failure, unspecified whether with hypoxia or hypercapnia ([facility] )    Primary malignant neoplasm of testis, unspecified laterality ([facility] )    Encounter for immunization  -     Pneumococcal Conjugate Vaccine 20-valent (Pcv20)            Subjective:        Patient ID: Kevin Curry is a 60 y o  male  Patient is here to follow-up on multiple system atrophy, hypertension atypical Parkinsonism GERD CKD as well as labile hypertension with dose orthostasis  Patient has seen palliative care  Patient's symptoms are worsening overall  Patient has difficulty ambulation with walker  Patient in wheelchair  Patient has completed 5 wishes  Patient with some thick postnasal drip    No fever        The following portions of the patient's history were reviewed and updated as appropriate: allergies, current medications, past family history, past medical history, past social history, past surgical history and problem list       Review of Systems   Constitutional: Positive for fatigue  HENT: Positive for postnasal drip and rhinorrhea  Eyes: Negative  Respiratory: Negative  Cardiovascular: Negative  Gastrointestinal: Negative  Endocrine: Negative  Genitourinary: Negative  Musculoskeletal: Negative  Skin: Negative  Allergic/Immunologic: Negative  Neurological: Positive for dizziness, tremors, speech difficulty and weakness  Hematological: Negative  Psychiatric/Behavioral: Negative  Objective:      BMI Counseling: There is no height or weight on file to calculate BMI  The BMI is above normal  Nutrition recommendations include consuming healthier snacks  Exercise recommendations include strength training exercises  Rationale for BMI follow-up plan is due to patient being overweight or obese  Depression Screening and Follow-up Plan: Patient was screened for depression during today's encounter  They screened negative with a PHQ-2 score of 0             BP (!) 160/108 (BP Location: Left arm, Patient Position: Sitting, Cuff Size: Standard)   Pulse 82   Temp 99 °F (37 2 °C) (Temporal)   SpO2 98%          Physical Exam  Vitals and nursing note reviewed  Constitutional:       General: He is not in acute distress  Appearance: He is not ill-appearing, toxic-appearing or diaphoretic  Comments: In wheelchair   HENT:      Head: Normocephalic and atraumatic  Right Ear: Tympanic membrane, ear canal and external ear normal  There is no impacted cerumen  Left Ear: Tympanic membrane, ear canal and external ear normal  There is no impacted cerumen  Nose: Nose normal  No congestion or rhinorrhea  Mouth/Throat:      Pharynx: Oropharyngeal exudate present  Eyes:      General: No scleral icterus  Right eye: No discharge  Left eye: No discharge  Extraocular Movements: Extraocular movements intact        Conjunctiva/sclera: Conjunctivae normal       Pupils: Pupils are equal, round, and reactive to light  Neck:      Vascular: No carotid bruit  Cardiovascular:      Rate and Rhythm: Normal rate and regular rhythm  Pulses: Normal pulses  Heart sounds: Normal heart sounds  No murmur heard  No friction rub  No gallop  Pulmonary:      Effort: Pulmonary effort is normal  No respiratory distress  Breath sounds: Normal breath sounds  No stridor  No wheezing, rhonchi or rales  Chest:      Chest wall: No tenderness  Musculoskeletal:         General: No swelling, deformity or signs of injury  Cervical back: Normal range of motion and neck supple  No rigidity  No muscular tenderness  Right lower leg: No edema  Left lower leg: No edema  Lymphadenopathy:      Cervical: No cervical adenopathy  Skin:     General: Skin is warm and dry  Capillary Refill: Capillary refill takes less than 2 seconds  Coloration: Skin is not jaundiced  Findings: No bruising, erythema, lesion or rash  Neurological:      Mental Status: He is alert and oriented to person, place, and time  Mental status is at baseline  Motor: Weakness present  Coordination: Coordination abnormal       Gait: Gait abnormal       Comments: In wheelchair   Psychiatric:         Mood and Affect: Mood normal          Behavior: Behavior normal          Thought Content:  Thought content normal          Judgment: Judgment normal

## 2023-01-13 DIAGNOSIS — G25.81 RESTLESS LEG SYNDROME: ICD-10-CM

## 2023-01-14 RX ORDER — ROPINIROLE 0.25 MG/1
TABLET, FILM COATED ORAL
Qty: 30 TABLET | Refills: 3 | Status: SHIPPED | OUTPATIENT
Start: 2023-01-14

## 2023-01-18 ENCOUNTER — TELEPHONE (OUTPATIENT)
Dept: NEPHROLOGY | Facility: HOSPITAL | Age: 63
End: 2023-01-18

## 2023-01-18 ENCOUNTER — DOCUMENTATION (OUTPATIENT)
Dept: PALLIATIVE MEDICINE | Facility: CLINIC | Age: 63
End: 2023-01-18

## 2023-01-18 NOTE — PROGRESS NOTES
MSW received a call from the pt wife  She was reporting it was getting very difficult to get the pt out of his hospital bed  She further explained that the pt has been passing out due to becoming light headed  She also reports he has even passed out on the toilet  MSW discussed a potential ER visit, but the pt wife is hesitant and states this is disease progression for the pt  The pt PT/OT will resume this week as it was suspended for about three weeks while construction was occurring in the home  The pt wife also states the pt speech is becoming more difficult to comprehend  MSW mentioned a speech board and the pt wife would prefer something electronic  MSW stated that an MD script for the electronic board may be needed for insurance purposes  The pt wife still has concerns about the pt wheelchair stating they use cushions on the seat to have the pt sit up higher  She also inquired as to whether she will be able to get an electronic wheelchair when needed if they keep the current wheelchair  MSW informed her she would reach out to the [employer] to discuss that concern and get back to her  MSW reached out to Dr Hawk Oleary and she suggested the pt wife also reach out to the pt neurologist   MSW followed up with the pt wife and she agreed to speak with the neurologist         I have spent 20 minutes with Patient wife today in which greater than 50% of this time was spent in counseling/coordination of care regarding ongoing emotional support      Palliative  will follow-up as requested by patient, family, and primary team   Please contact with any specific requests

## 2023-01-18 NOTE — TELEPHONE ENCOUNTER
Called and spoke with [employer] to complete their bloodwork prior to their appointment on 1/24 with Dr Thiago Cee at the [facility]

## 2023-01-20 ENCOUNTER — TELEPHONE (OUTPATIENT)
Dept: LAB | Facility: HOSPITAL | Age: 63
End: 2023-01-20

## 2023-01-23 ENCOUNTER — TELEPHONE (OUTPATIENT)
Dept: LAB | Facility: HOSPITAL | Age: 63
End: 2023-01-23

## 2023-01-23 ENCOUNTER — TELEPHONE (OUTPATIENT)
Dept: NEPHROLOGY | Facility: HOSPITAL | Age: 63
End: 2023-01-23

## 2023-01-23 NOTE — TELEPHONE ENCOUNTER
Called and spoke to patient  Patient aware is  ok by Dr Maurice Martinez as long as he understands I would not be able to make certain recommendations without labs back  I would of course call him after his labwork if needed/any abnormalities noted

## 2023-01-23 NOTE — TELEPHONE ENCOUNTER
Called patient to confirm his appointment for 1/24 - patient requested to change visit to a virtual visit  Confirmed that he has only had 2 virtual visits and would be ok to change  After appt was changed, patient mentioned that he's housebound and has been unable to have his labs completed  Patient wishes to keep his appointment still to discuss his care with Dr Rinaldo Meckel and then have his labs completed by the home nurse on Friday  Patient wanted to make sure this would be ok

## 2023-01-24 ENCOUNTER — TELEMEDICINE (OUTPATIENT)
Dept: NEPHROLOGY | Facility: CLINIC | Age: 63
End: 2023-01-24

## 2023-01-24 VITALS
SYSTOLIC BLOOD PRESSURE: 122 MMHG | WEIGHT: 250 LBS | HEART RATE: 81 BPM | DIASTOLIC BLOOD PRESSURE: 77 MMHG | HEIGHT: 72 IN | BODY MASS INDEX: 33.86 KG/M2

## 2023-01-24 DIAGNOSIS — G90.3 NEUROGENIC ORTHOSTATIC HYPOTENSION (HCC): ICD-10-CM

## 2023-01-24 DIAGNOSIS — N18.2 CKD (CHRONIC KIDNEY DISEASE) STAGE 2, GFR 60-89 ML/MIN: Primary | ICD-10-CM

## 2023-01-24 DIAGNOSIS — R80.8 OTHER PROTEINURIA: ICD-10-CM

## 2023-01-24 DIAGNOSIS — E87.1 HYPONATREMIA: ICD-10-CM

## 2023-01-24 DIAGNOSIS — R03.0 ELEVATED BLOOD PRESSURE READING: ICD-10-CM

## 2023-01-24 DIAGNOSIS — I10 ESSENTIAL HYPERTENSION: ICD-10-CM

## 2023-01-24 DIAGNOSIS — I70.1: ICD-10-CM

## 2023-01-24 DIAGNOSIS — N20.0 NEPHROLITHIASIS: ICD-10-CM

## 2023-01-24 NOTE — PROGRESS NOTES
Unable to connect to video visit  Computer frozen  Will reschedule to next week virtual visit again after bloodwork obtained this Friday, 1/27/23

## 2023-01-25 ENCOUNTER — DOCUMENTATION (OUTPATIENT)
Dept: PALLIATIVE MEDICINE | Facility: CLINIC | Age: 63
End: 2023-01-25

## 2023-01-25 NOTE — PROGRESS NOTES
MSW called [facility] to inquire whether the pt having a standard wheelchair would impact his ability to get a power wheelchair if needed  MSW left a  for a return call  [phone]    MSW also called the pt wife Yaritza Valles [phone] to update her about her prior inquires  MSW asked about the pt speech and she was advised by his neurologist to contact [address] for assistance  But she states he does not need the DME at this time (an electronic speech board) at this time  MSW also inquired as to whether PT/OT had resumed and whether they were able to provide her with any suggestions about how to safely get the tp into and out bed  The pt wife states PT/OT had retuned and they were in the home at the time of the phone call  She stated she would speak with them today  MSW researched wheelchairs for people who have parkinsons   The [facility] was identified as the best [phone]    The [employer] commode    And Sonia Montgomery is an electronic is covered and paid by medicare    The pt wife stated that the pt is not in need of the speechvive at this time and she will reach out to [facility]  MSW has resources when needed  I have spent 60 minutes with Patient loacting resourcestoday in which greater than 50% of this time was spent in counseling/coordination of care regarding Ongoing emotional support      Palliative  will follow-up as requested by patient, family, and primary team   Please contact with any specific requests

## 2023-01-27 ENCOUNTER — APPOINTMENT (OUTPATIENT)
Dept: LAB | Facility: HOSPITAL | Age: 63
End: 2023-01-27
Attending: INTERNAL MEDICINE

## 2023-01-27 DIAGNOSIS — N18.2 CKD (CHRONIC KIDNEY DISEASE) STAGE 2, GFR 60-89 ML/MIN: ICD-10-CM

## 2023-01-27 DIAGNOSIS — R80.8 OTHER PROTEINURIA: ICD-10-CM

## 2023-01-27 DIAGNOSIS — E87.1 HYPONATREMIA: ICD-10-CM

## 2023-01-27 LAB
AMORPH URATE CRY URNS QL MICRO: ABNORMAL
ANION GAP SERPL CALCULATED.3IONS-SCNC: 5 MMOL/L (ref 4–13)
BACTERIA UR QL AUTO: ABNORMAL /HPF
BILIRUB UR QL STRIP: NEGATIVE
BUN SERPL-MCNC: 21 MG/DL (ref 5–25)
CALCIUM SERPL-MCNC: 9 MG/DL (ref 8.3–10.1)
CAOX CRY URNS QL MICRO: ABNORMAL /HPF
CHLORIDE SERPL-SCNC: 107 MMOL/L (ref 96–108)
CLARITY UR: ABNORMAL
CO2 SERPL-SCNC: 28 MMOL/L (ref 21–32)
COLOR UR: YELLOW
CREAT SERPL-MCNC: 1.03 MG/DL (ref 0.6–1.3)
CREAT UR-MCNC: 212 MG/DL
GFR SERPL CREATININE-BSD FRML MDRD: 76 ML/MIN/1.73SQ M
GLUCOSE SERPL-MCNC: 103 MG/DL (ref 65–140)
GLUCOSE UR STRIP-MCNC: NEGATIVE MG/DL
HGB UR QL STRIP.AUTO: ABNORMAL
KETONES UR STRIP-MCNC: ABNORMAL MG/DL
LEUKOCYTE ESTERASE UR QL STRIP: ABNORMAL
MUCOUS THREADS UR QL AUTO: ABNORMAL
NITRITE UR QL STRIP: NEGATIVE
NON-SQ EPI CELLS URNS QL MICRO: ABNORMAL /HPF
PH UR STRIP.AUTO: 5 [PH]
POTASSIUM SERPL-SCNC: 4.1 MMOL/L (ref 3.5–5.3)
PROT UR STRIP-MCNC: ABNORMAL MG/DL
PROT UR-MCNC: 20 MG/DL
PROT/CREAT UR: 0.09 MG/G{CREAT} (ref 0–0.1)
RBC #/AREA URNS AUTO: ABNORMAL /HPF
SODIUM SERPL-SCNC: 140 MMOL/L (ref 135–147)
SP GR UR STRIP.AUTO: 1.02 (ref 1–1.03)
UROBILINOGEN UR STRIP-ACNC: <2 MG/DL
WBC #/AREA URNS AUTO: ABNORMAL /HPF

## 2023-02-07 ENCOUNTER — OFFICE VISIT (OUTPATIENT)
Dept: PULMONOLOGY | Facility: CLINIC | Age: 63
End: 2023-02-07

## 2023-02-07 VITALS
DIASTOLIC BLOOD PRESSURE: 98 MMHG | HEART RATE: 64 BPM | TEMPERATURE: 98.1 F | SYSTOLIC BLOOD PRESSURE: 150 MMHG | RESPIRATION RATE: 18 BRPM | WEIGHT: 250 LBS | HEIGHT: 72 IN | BODY MASS INDEX: 33.86 KG/M2 | OXYGEN SATURATION: 98 %

## 2023-02-07 DIAGNOSIS — G25.81 RLS (RESTLESS LEGS SYNDROME): ICD-10-CM

## 2023-02-07 DIAGNOSIS — G47.33 OSA (OBSTRUCTIVE SLEEP APNEA): Primary | ICD-10-CM

## 2023-02-07 DIAGNOSIS — G90.3 MULTIPLE SYSTEM ATROPHY (HCC): ICD-10-CM

## 2023-02-07 DIAGNOSIS — G70.9 NEUROMUSCULAR DISEASE (HCC): ICD-10-CM

## 2023-02-07 RX ORDER — GABAPENTIN 100 MG/1
100 CAPSULE ORAL
Qty: 90 CAPSULE | Refills: 0 | Status: SHIPPED | OUTPATIENT
Start: 2023-02-07 | End: 2023-05-08

## 2023-02-07 NOTE — LETTER
February 7, 2023     Kristi Spickard, [address] [address]    Patient: Monica Meyer   YOB: 1960   Date of Visit: 2/7/2023       Dear Dr Shin:    Thank you for referring Guilherme Clark to me for evaluation  Below are my notes for this consultation  If you have questions, please do not hesitate to call me  I look forward to following your patient along with you  Sincerely,        Zabrina Perez,         CC: No Recipients  Kanika Fletcher MD  2/7/2023  1:50 PM  Attested  Progress Note - Sleep Medicine  Monica Meyer 60 y o  male MRN: [id number]       Impression & Plan:     1  KALLI- Patient is on IVAPS and is doing pretty well  He is tolerating the device well and has no significant side effects  Residual AHI is 6 1 with all events obstructive  No side effects with mask or the device    -Advised to use the device every night  -EPAP increased to 14 cm H2O from 12 cm H2O    -Advised to preform breathing exercises/spirometry during the daytime when he is awake  -Follow up in 3 months  2  RLS- Patient was on ropinirole 1 mg and was increased to 1 25 mg during his last visit  At around the same time, his dose of SINEMET was increased by his neurologist  He is currently exhibiting symptoms consistent with augmentation    -Dose of ropinirole decreased to 1mg  -Gabapentin added starting with 100 mg nightly but patient advised to increase the dose by 100 mg every 3-4 days if symptoms do not improve or continue to worsen    -Follow up in 3 months  3  Multiple system atrophy/neuromuscular disease- Is following with neurology  Advised to continue follow up  -Breathing exercises/incentive spirometry advised to prevent weakness of respiratory muscles and prevent complications        Diagnoses and all orders for this visit:    KALLI (obstructive sleep apnea)  -     PAP DME Pressure Change    RLS (restless legs syndrome)  -     gabapentin (Neurontin) 100 mg capsule; Take 1 capsule (100 mg total) by mouth daily at bedtime    Neuromuscular disease ([address] )    Multiple system atrophy ([facility] [address] )          ______________________________________________________________________    HPI:    Myranda Franklin is a 14-year-old male with past medical history of Multiple System Atrophy, Parkinson's disease with diaphragmatic weakness, chronic knee and ankle pain, urinary retention s/p TURP, Moderate KALLI on CPAP who presents today for follow-up of KALLI and RLS  With his history of multiple system atrophy his strength has been worsening and following up with his neurologist    With regards to his KALLI patient is using IVAPS with excellent compliance and feels that his quality of sleep improves on IVAPS  He has sometimes issues with mask leak but thinks it may be because he opens his mouth in his sleep  He otherwise does not have any significant issues with pressures, rash with mask, abdominal distention/bloating, or nasal/oral bleeding  With regards to his RLS, he was on ropinirole 1 mg and was increased to 1 25 mg during his last visit  Around the same time, the dose of SINEMET was increased by his neurologist  He says that his symptoms of RLS are now appearing around 5 PM in the evening which is concerning for augmentation  Sleep schedule: He goes to bed around 10 PM and falls asleep in 1 hour  He usually wakes up at 6 AM  During the night he wakes up 2-3 times or even more and sometimes will have hard time falling asleep  He sometimes will take off his mask at night  Review of Systems:  Review of Systems   Constitutional: Positive for fatigue  Negative for chills and fever  HENT: Negative for congestion, nosebleeds and rhinorrhea  Eyes: Negative for visual disturbance  Respiratory: Positive for shortness of breath  Negative for apnea and wheezing  Cardiovascular: Negative for chest pain and palpitations     Gastrointestinal: Negative for abdominal distention, nausea and vomiting  Endocrine: Negative for polyuria  Genitourinary: Negative for dysuria  Skin: Negative for rash  Neurological: Positive for speech difficulty and weakness  Psychiatric/Behavioral: Positive for sleep disturbance           Social history updates:  Social History     Tobacco Use   Smoking Status Former   • Packs/day: 0 50   • Years: 2 00   • Pack years: 1 00   • Types: Cigarettes   • Start date: 1974   • Quit date: 1978   • Years since quittin 1   • Passive exposure: Past   Smokeless Tobacco Never     Social History     Socioeconomic History   • Marital status: /Civil Union     Spouse name: Not on file   • Number of children: Not on file   • Years of education: Not on file   • Highest education level: Not on file   Occupational History   • Occupation: [employer] retired   Tobacco Use   • Smoking status: Former     Packs/day: 0 50     Years: 2 00     Pack years: 1 00     Types: Cigarettes     Start date: 1974     Quit date: 1978     Years since quittin 1     Passive exposure: Past   • Smokeless tobacco: Never   Vaping Use   • Vaping Use: Never used   Substance and Sexual Activity   • Alcohol use: Not Currently   • Drug use: No   • Sexual activity: Not Currently     Birth control/protection: Abstinence   Other Topics Concern   • Not on file   Social History Narrative    Consumes 5 glasses of tea per week     Social Determinants of Health     Financial Resource Strain: Not on file   Food Insecurity: Not on file   Transportation Needs: Not on file   Physical Activity: Not on file   Stress: Not on file   Social Connections: Not on file   Intimate Partner Violence: Not on file   Housing Stability: Not on file       PhysicalExamination:  Vitals:   /98 (BP Location: Left arm, Patient Position: Sitting, Cuff Size: Standard)   Pulse 64   Temp 98 1 °F (36 7 °C) (Tympanic)   Resp 18   Ht 6' (1 829 m)   Wt 113 kg (250 lb)   SpO2 98%   BMI 33 91 kg/m²     Physical Exam  Vitals and nursing note reviewed  Exam conducted with a chaperone present  Constitutional:       General: He is not in acute distress  Appearance: He is obese  He is ill-appearing  HENT:      Head: Normocephalic and atraumatic  Nose: No congestion or rhinorrhea  Mouth/Throat:      Mouth: Mucous membranes are moist       Pharynx: No oropharyngeal exudate or posterior oropharyngeal erythema  Eyes:      General: No scleral icterus  Extraocular Movements: Extraocular movements intact  Conjunctiva/sclera: Conjunctivae normal    Cardiovascular:      Rate and Rhythm: Normal rate and regular rhythm  Heart sounds: Normal heart sounds  No murmur heard  No gallop  Pulmonary:      Effort: Pulmonary effort is normal  No respiratory distress  Breath sounds: Rales (Bilateral bases) present  Musculoskeletal:         General: No swelling  Skin:     General: Skin is warm  Capillary Refill: Capillary refill takes less than 2 seconds  Coloration: Skin is not jaundiced  Neurological:      Mental Status: He is alert  Diagnostic Data:  Labs:   I personally reviewed the most recent laboratory data pertinent to today's visit  Appointment on 01/27/2023   Component Date Value   • Sodium 01/27/2023 140    • Potassium 01/27/2023 4 1    • Chloride 01/27/2023 107    • CO2 01/27/2023 28    • ANION GAP 01/27/2023 5    • BUN 01/27/2023 21    • Creatinine 01/27/2023 1 03    • Glucose 01/27/2023 103    • Calcium 01/27/2023 9 0    • eGFR 01/27/2023 76    • Color, UA 01/27/2023 Yellow    • Clarity, UA 01/27/2023 Turbid    • Specific Gravity, UA 01/27/2023 1 023    • pH, UA 01/27/2023 5 0    • Leukocytes, UA 01/27/2023 Moderate (A)    • Nitrite, UA 01/27/2023 Negative    • Protein, UA 01/27/2023 Trace (A)    • Glucose, UA 01/27/2023 Negative    • Ketones, UA 01/27/2023 Trace (A)    • Urobilinogen, UA 01/27/2023 <2 0    • Bilirubin, UA 01/27/2023 Negative    • Occult Blood, UA 01/27/2023 Small (A)    • RBC, UA 01/27/2023 2-4 (A)    • WBC, UA 01/27/2023 10-20 (A)    • Epithelial Cells 01/27/2023 Occasional    • Bacteria, UA 01/27/2023 Occasional    • MUCUS THREADS 01/27/2023 Moderate (A)    • Ca Oxalate Ana, UA 01/27/2023 Occasional (A)    • Amorphous Crystals, UA 01/27/2023 Occasional    • Creatinine, Ur 01/27/2023 212 0    • Protein Urine Random 01/27/2023 20    • Prot/Creat Ratio, Ur 01/27/2023 0 09        I have personally reviewed pertinent lab results  Lab Results   Component Value Date    WBC 5 44 06/30/2022    HGB 14 7 06/30/2022    HCT 44 2 06/30/2022    MCV 91 06/30/2022     06/30/2022     Lab Results   Component Value Date    CALCIUM 9 0 01/27/2023    K 4 1 01/27/2023    CO2 28 01/27/2023     01/27/2023    BUN 21 01/27/2023    CREATININE 1 03 01/27/2023     No results found for: IGE  Lab Results   Component Value Date    ALT 14 06/30/2022    AST 16 06/30/2022    ALKPHOS 73 06/30/2022     No results found for: IRON, TIBC, FERRITIN  Lab Results   Component Value Date    AIEKALNU53 596 01/17/2021     No results found for: FOLATE      Sleep studies:  Diagnostic: HST: 4/2/2019; JACQUELINE  15 7      Compliance Data:  Type of CPAP:  AirCurve 10 ST-A (IVAPS)                                   Percent usage: 93%                                   Average time used: 5 hrs 48 mins                                   Residual AHI: 6 1                                         MD Stephany Vasquez's Sleep Medicine Fellow  Attestation signed by Diane Lowery DO at 2/7/2023  9:20 PM (Updated): I have personally seen and examined  I discussed the patient with the  fellow including, but not limited to, verifying findings; reviewing labs and x-rays;developing the plan of care with patient  I have reviewed the note and assessment performed by the fellow and agree with the fellow's documented findings and plan of care with the following additions   Please see my following comments for details and adjustments  Assessment/Plan:  60 y o  M with PMHx of Multiple System Atrophy, Parkinson's disease with diaphragmatic weakness, chronic knee and ankle pain, urinary retention s/p TURP, Moderate KALLI on CPAP who comes in for follow up  1   Chronic respiratory failure from progressive neurologic weakness with Multiple System Atrophy  He has noted bulbar symptoms with upper airway closing, hoarseness, dysphagia and weak/ hoarse voice  SNIF test is negative but he continues to note progressive dyspnea  -  Continue  iVAPS therapy as he has good complianc  He has an elevated AHI so we will increase EPAP  -  We will follow in 2- 3 months       -  We will increase minute ventilation to 6 5 if he still struggles  If this is not helpful, we can increase Ti to 1 1            -  He is aware of impending respiratory failure  He has been following with palliative care and has elected to go to a DNR level 3  We will continues to help optimize his breathing as best we can with the iVAPS machine  2  Moderate KALLI (AHI - 15 7) - on iVAPS with good compliance as above  Residual AHI - 6 1      -  Continue iVAPS with adjustments as above as above  -  He is aware of the risk of leaving sleep apnea untreated including hypertension, heart failure, arrhythmia, MI and stroke  3    Periodic limb movement (PLM index - 109) with augmentation-  This may be secondary to KALLI or Parkinsons  He has noted this worsening symptoms  This also may have occurred in conjunction with Sinemet increase       - We will decrease Requip back to 1 mg qHS as he notes persistent symptoms       -  We will start gabapentin 100 mg qHS       -  He did not find neurontin helpful  4   Multiple system atrophy - will continue to follow with neurology  Mr Dhara Lloyd returns to the office today    He states that he feels like he is slowly worsening with his breathing and developing more weakness  He has noted more shortness of breath  His voice has also been progressively worse as well  He has also noted worsening RLS symptoms  He had an increase of Sinemet at approximately the same time his Requip was increased as well  He notes his RLS symptoms are happening earlier in the day  He is sleeping around 10 pm and wakes up around 6 am   He gets up 2-3 times        /98 (BP Location: Left arm, Patient Position: Sitting, Cuff Size: Standard)   Pulse 64   Temp 98 1 °F (36 7 °C) (Tympanic)   Resp 18   Ht 6' (1 829 m)   Wt 113 kg (250 lb)   SpO2 98%   BMI 33 91 kg/m²   RA  General:  Patient is awake, alert, non-toxic and in no acute respiratory distress  Eyes: PERRL, no scleral icterus  Neck: No JVD  CV:  Regular, +S1 and S2, No murmurs, gallops or rubs appreciated  Lungs: Clear to auscultation bilateral without wheeze, rales or rhonci  Abdomen: Soft, +BS, Non-tender, non-distended  Extremities: No clubbing, cyanosis or edema  Neuro: No focal motor/sensory deficits  Lab Results   Component Value Date    WBC 5 44 06/30/2022    HGB 14 7 06/30/2022    HCT 44 2 06/30/2022    MCV 91 06/30/2022     06/30/2022     Lab Results   Component Value Date    SODIUM 140 01/27/2023    K 4 1 01/27/2023     01/27/2023    CO2 28 01/27/2023    BUN 21 01/27/2023    CREATININE 1 03 01/27/2023    GLUC 103 01/27/2023    CALCIUM 9 0 01/27/2023       PFT 10/26/20  Results:  FEV1/FVC Ratio: 77 %  Forced Vital Capacity: 4 88 L    99 % predicted  FEV1: 3 75 L     99 % predicted  Lung volumes by body plethysmography:   Total Lung Capacity 99 % predicted   Residual volume 96 % predicted  DLCO corrected for patients hemoglobin level: 83 %  Interpretation:  • No obstructive airflow defect on spirometry  • Normal Spirometry  • Normal Lung volumes  • Normal diffusion capacity  • Flow volume loop is normal      3/4/19  Results:  FEV1/FVC Ratio: 80 %  Forced Vital Capacity: 4 80 L    93 % predicted  FEV1: 3 85 L     97 % predicted  Lung volumes by body plethysmography:   Total Lung Capacity 94 % predicted   Residual volume 88 % predicted  DLCO corrected for patients hemoglobin level: 79 %  Interpretation:  • No obstructive airflow defect   • Normal Spirometry  • Normal Lung volumes  • Normal diffusion capacity     Repeat 6 minute walk 20  Starting saturation - 98%   Starting HR - 89  Lowest saturation - 91%    Highest HR - 103  Ambulated - 252 m and he did not require oxygen     6 minute walk  Date of testin2019  Resting room air saturation: 93%  Randy scale of dyspnea at start of test: 0/10     Ambulation testing:  Lowest saturation 93%  No supplemental oxygen required     Randy scale of dyspnea at end of test: 3/10  Reason if test was stopped early: N/A      Total 6 minute walk distance: 1400 feet     Imaging:  I personally reviewed the images on the [facility] system pertinent to today's visit  CT chest - 19  IMPRESSION:  Within normal limits CT of the chest      Repeat SNF test 10/26/20  IMPRESSION:  There is no evidence of diaphragmatic paralysis or elevation  Other studies:  HST - moderate (15 7), Supine AHI - 23, hypoxia   CPAP titration - Nasal CPAP was titrated from 5-11 cm of water for  control of snoring and abnormal breathing  Patient appeared to do best at 11 cm of CPAP delivered using a [employer] full face interface  Continued use of this equipment at these settings is recommended       New Compliance Data:  Type of CPAP:  AirCurve 10 ST-A (IVAPS)                                   Percent usage: 93%                                   Average time used: 5 hrs 48 mins                                   Residual AHI: 6 1                                     Compliance Data:  22-22                                   Type of device:  iVAPS alveolar ventilation 6L/min,  EPAP 15, PSV 4-15, target 12 bpm                                  Percent usage: 97%, 88%> 4hrs Average time used: 5 hrs 57 mins                                   Residual AHI: 3 3     Compliance Data:  2/28/22-3/29/22                                   Type of device:  iVAPS alveolar ventilation 6L/min,  EPAP 12, PSV 4-15, target 12 bpm                                  Percent usage: 57%, 43 %> 4hrs                                   Average time used: 3 hrs 9 mins - 5 hrs 34 mins                                   Residual AHI: 6 8     Compliance Data:  11/9/21-12/8/21                                   Type of CPAP:  BiPAP 12/8                                  Percent usage: 97%, 93 %> 4hrs                                   Average time used: 6 hrs 36 mins                                  Time in large leak: 3 mins 43 secs                                   Residual AHI: 4 3     Compliance Data:  9/8/21-10/17/21                                   Type of CPAP:  BiPAP 12/8                                  Percent usage: 100%, 93 %> 4hrs                                   Average time used: 5hrs 58 mins - 8 hrs 36 mins                                  Time in large leak: 48 secs                                   Residual AHI: 5 6     Compliance Data:  3/12/21-5/12/21                                   Type of CPAP:  BiPAP 15/10                                  Percent usage: 100%, 89 %> 4hrs                                   Average time used: 5hrs 36 mins - 8 hrs 18 mins                                  Time in large leak: 10 min 15 secs                                   Residual AHI: 6 8       Compliance Data:  3/12/21-5/12/21                                   Type of CPAP:  BiPAP 15/10                                  Percent usage: 100%, 89 %> 4hrs                                   Average time used: 5hrs 36 mins - 8 hrs 18 mins                                  Time in large leak: 10 min 15 secs                                   Residual AHI: 6 8      Compliance Data:  1/2/21-2/10/21 Type of CPAP:  auto BiPAP min EPAP 11, PSV 4-6, Max IPAP 25,                                    Mean EPAP - 12- 14 8, IPAP 17 - Max IPAP 18 8                                   Percent usage: 95%, 85 %> 4hrs                                   Average time used: 5hrs 26 mins - 7 hrs 40 mins                                  Time in large leak: 6 min 22 secs                                   Residual AHI: 4 8 (CA - 3 0)     Compliance Data:  10/24/20-11/22/20                                   Type of CPAP:  auto BiPAP min EPAP 11, PSV 4-6, Max IPAP 25,                                    Mean EPAP - 11 7- 16 9, IPAP 16 2 - Max IPAP 22 9                                   Percent usage: 63%, 30%> 4hrs                                   Average time used: 2hrs 40 mins - 6 hrs 45 mins                                  Time in large leak: 16 min 19 secs                                   Residual AHI: 5 6 (CA - 3 1)     Compliance Data:  7/28/20-8/26/20                                   Type of CPAP:  auto BiPAP min EPAP 11, PSV 4, Max IPAP 25,                                    Mean EPAP - 12 2- 17 6, IPAP 16 2 - Max IPAP 22                                   Percent usage: 97%, 90%> 4hrs                                   Average time used: 5hrs 30 mins - 8 hrs 25 mins                                  Time in large leak: 4 min 39 secs                                   Residual AHI: 7 0 (CA - 5 1)     Compliance Data:  1/5/20-2/5/20                                   Type of CPAP:  auto BiPAP min EPAP 11, PSV 4, Max IPAP 25,                                    Mean EPAP recorded - 12 1, Peak - 14 9, Min IPAP 16, Max IPAP 19                                   Percent usage: 72%, 63%> 4hrs                                   Average time used: 3hrs 51 mins - 8 hrs 26 mins                                  Time in large leak: 50 secs                                   Residual AHI: 6 6  (CA - 3 9)     Compliance Data: 10/23/19-11/21/19                                   Type of CPAP:  autoPAP 11-15, Mean - 13 2, Peak - 15 0                                   Percent usage: 73%, 67%> 4hrs                                   Average time used: 3hrs 38 mins - 8 hrs 37 mins                                  Time in large leak: 9 mins 46 secs                                   Residual AHI: 11 0  (CA - 0 9)     Compliance Data:  8/25/19-9/23/19                                   Type of CPAP:  autoPAP 8-15, Mean - 11 9, Peak - 15 6                                   Percent usage: 63%                                   Average time used: 2hrs 52 mins - 4 hrs 32 mins                                  Time in large leak: 4 mins 44 secs                                   Residual AHI: 13 6  (CA - 0 9)

## 2023-02-07 NOTE — PROGRESS NOTES
Progress Note - Sleep Medicine  Neal Brooks 60 y o  male MRN: [id number]       Impression & Plan:     1  KALLI- Patient is on IVAPS and is doing pretty well  He is tolerating the device well and has no significant side effects  Residual AHI is 6 1 with all events obstructive  No side effects with mask or the device    -Advised to use the device every night  -EPAP increased to 14 cm H2O from 12 cm H2O    -Advised to preform breathing exercises/spirometry during the daytime when he is awake  -Follow up in 3 months  2  RLS- Patient was on ropinirole 1 mg and was increased to 1 25 mg during his last visit  At around the same time, his dose of SINEMET was increased by his neurologist  He is currently exhibiting symptoms consistent with augmentation    -Dose of ropinirole decreased to 1mg  -Gabapentin added starting with 100 mg nightly but patient advised to increase the dose by 100 mg every 3-4 days if symptoms do not improve or continue to worsen    -Follow up in 3 months  3  Multiple system atrophy/neuromuscular disease- Is following with neurology  Advised to continue follow up  -Breathing exercises/incentive spirometry advised to prevent weakness of respiratory muscles and prevent complications  Diagnoses and all orders for this visit:    KALLI (obstructive sleep apnea)  -     PAP DME Pressure Change    RLS (restless legs syndrome)  -     gabapentin (Neurontin) 100 mg capsule; Take 1 capsule (100 mg total) by mouth daily at bedtime    Neuromuscular disease ([facility] [address] )    Multiple system atrophy ([facility] [address] )          ______________________________________________________________________    HPI:    Neal Brooks is a 69-year-old male with past medical history of Multiple System Atrophy, Parkinson's disease with diaphragmatic weakness, chronic knee and ankle pain, urinary retention s/p TURP, Moderate KALLI on CPAP who presents today for follow-up of KALLI and RLS   With his history of multiple system atrophy his strength has been worsening and following up with his neurologist    With regards to his KALLI patient is using IVAPS with excellent compliance and feels that his quality of sleep improves on IVAPS  He has sometimes issues with mask leak but thinks it may be because he opens his mouth in his sleep  He otherwise does not have any significant issues with pressures, rash with mask, abdominal distention/bloating, or nasal/oral bleeding  With regards to his RLS, he was on ropinirole 1 mg and was increased to 1 25 mg during his last visit  Around the same time, the dose of SINEMET was increased by his neurologist  He says that his symptoms of RLS are now appearing around 5 PM in the evening which is concerning for augmentation  Sleep schedule: He goes to bed around 10 PM and falls asleep in 1 hour  He usually wakes up at 6 AM  During the night he wakes up 2-3 times or even more and sometimes will have hard time falling asleep  He sometimes will take off his mask at night  Review of Systems:  Review of Systems   Constitutional: Positive for fatigue  Negative for chills and fever  HENT: Negative for congestion, nosebleeds and rhinorrhea  Eyes: Negative for visual disturbance  Respiratory: Positive for shortness of breath  Negative for apnea and wheezing  Cardiovascular: Negative for chest pain and palpitations  Gastrointestinal: Negative for abdominal distention, nausea and vomiting  Endocrine: Negative for polyuria  Genitourinary: Negative for dysuria  Skin: Negative for rash  Neurological: Positive for speech difficulty and weakness  Psychiatric/Behavioral: Positive for sleep disturbance           Social history updates:  Social History     Tobacco Use   Smoking Status Former   • Packs/day: 0 50   • Years: 2 00   • Pack years: 1 00   • Types: Cigarettes   • Start date: 1974   • Quit date: 1978   • Years since quittin 1   • Passive exposure: Past   Smokeless Tobacco Never Social History     Socioeconomic History   • Marital status: /Civil Union     Spouse name: Not on file   • Number of children: Not on file   • Years of education: Not on file   • Highest education level: Not on file   Occupational History   • Occupation: [employer] retired   Tobacco Use   • Smoking status: Former     Packs/day: 0 50     Years: 2 00     Pack years: 1 00     Types: Cigarettes     Start date: 1974     Quit date: 1978     Years since quittin 1     Passive exposure: Past   • Smokeless tobacco: Never   Vaping Use   • Vaping Use: Never used   Substance and Sexual Activity   • Alcohol use: Not Currently   • Drug use: No   • Sexual activity: Not Currently     Birth control/protection: Abstinence   Other Topics Concern   • Not on file   Social History Narrative    Consumes 5 glasses of tea per week     Social Determinants of Health     Financial Resource Strain: Not on file   Food Insecurity: Not on file   Transportation Needs: Not on file   Physical Activity: Not on file   Stress: Not on file   Social Connections: Not on file   Intimate Partner Violence: Not on file   Housing Stability: Not on file       PhysicalExamination:  Vitals:   /98 (BP Location: Left arm, Patient Position: Sitting, Cuff Size: Standard)   Pulse 64   Temp 98 1 °F (36 7 °C) (Tympanic)   Resp 18   Ht 6' (1 829 m)   Wt 113 kg (250 lb)   SpO2 98%   BMI 33 91 kg/m²     Physical Exam  Vitals and nursing note reviewed  Exam conducted with a chaperone present  Constitutional:       General: He is not in acute distress  Appearance: He is obese  He is ill-appearing  HENT:      Head: Normocephalic and atraumatic  Nose: No congestion or rhinorrhea  Mouth/Throat:      Mouth: Mucous membranes are moist       Pharynx: No oropharyngeal exudate or posterior oropharyngeal erythema  Eyes:      General: No scleral icterus  Extraocular Movements: Extraocular movements intact  Conjunctiva/sclera: Conjunctivae normal    Cardiovascular:      Rate and Rhythm: Normal rate and regular rhythm  Heart sounds: Normal heart sounds  No murmur heard  No gallop  Pulmonary:      Effort: Pulmonary effort is normal  No respiratory distress  Breath sounds: Rales (Bilateral bases) present  Musculoskeletal:         General: No swelling  Skin:     General: Skin is warm  Capillary Refill: Capillary refill takes less than 2 seconds  Coloration: Skin is not jaundiced  Neurological:      Mental Status: He is alert  Diagnostic Data:  Labs: I personally reviewed the most recent laboratory data pertinent to today's visit  Appointment on 01/27/2023   Component Date Value   • Sodium 01/27/2023 140    • Potassium 01/27/2023 4 1    • Chloride 01/27/2023 107    • CO2 01/27/2023 28    • ANION GAP 01/27/2023 5    • BUN 01/27/2023 21    • Creatinine 01/27/2023 1 03    • Glucose 01/27/2023 103    • Calcium 01/27/2023 9 0    • eGFR 01/27/2023 76    • Color, UA 01/27/2023 Yellow    • Clarity, UA 01/27/2023 Turbid    • Specific Gravity, UA 01/27/2023 1 023    • pH, UA 01/27/2023 5 0    • Leukocytes, UA 01/27/2023 Moderate (A)    • Nitrite, UA 01/27/2023 Negative    • Protein, UA 01/27/2023 Trace (A)    • Glucose, UA 01/27/2023 Negative    • Ketones, UA 01/27/2023 Trace (A)    • Urobilinogen, UA 01/27/2023 <2 0    • Bilirubin, UA 01/27/2023 Negative    • Occult Blood, UA 01/27/2023 Small (A)    • RBC, UA 01/27/2023 2-4 (A)    • WBC, UA 01/27/2023 10-20 (A)    • Epithelial Cells 01/27/2023 Occasional    • Bacteria, UA 01/27/2023 Occasional    • MUCUS THREADS 01/27/2023 Moderate (A)    • Ca Oxalate Ana, UA 01/27/2023 Occasional (A)    • Amorphous Crystals, UA 01/27/2023 Occasional    • Creatinine, Ur 01/27/2023 212 0    • Protein Urine Random 01/27/2023 20    • Prot/Creat Ratio, Ur 01/27/2023 0 09        I have personally reviewed pertinent lab results    Lab Results   Component Value Date    WBC 5 44 06/30/2022    HGB 14 7 06/30/2022    HCT 44 2 06/30/2022    MCV 91 06/30/2022     06/30/2022     Lab Results   Component Value Date    CALCIUM 9 0 01/27/2023    K 4 1 01/27/2023    CO2 28 01/27/2023     01/27/2023    BUN 21 01/27/2023    CREATININE 1 03 01/27/2023     No results found for: IGE  Lab Results   Component Value Date    ALT 14 06/30/2022    AST 16 06/30/2022    ALKPHOS 73 06/30/2022     No results found for: IRON, TIBC, FERRITIN  Lab Results   Component Value Date    SONPXYEU51 215 01/17/2021     No results found for: FOLATE      Sleep studies:  Diagnostic: HST: 4/2/2019; JACQUELINE  15 7      Compliance Data:  Type of CPAP:  AirCurve 10 ST-A (IVAPS)                                   Percent usage: 93%                                   Average time used: 5 hrs 48 mins                                   Residual AHI: 6 1                                         MD Debra Sewell 73 Sleep Medicine Fellow

## 2023-02-08 ENCOUNTER — TELEPHONE (OUTPATIENT)
Dept: SLEEP CENTER | Facility: CLINIC | Age: 63
End: 2023-02-08

## 2023-02-22 ENCOUNTER — TELEPHONE (OUTPATIENT)
Dept: NEPHROLOGY | Facility: CLINIC | Age: 63
End: 2023-02-22

## 2023-02-22 NOTE — TELEPHONE ENCOUNTER
Spoke with Patient and schedule virtual appointment for 2/23/23 with [facility] in the [facility] location

## 2023-02-22 NOTE — PROGRESS NOTES
Virtual Regular Visit    Verification of patient location:    Patient is located in the following state in which I hold an active license [place]    Assessment/Plan:    Problem List Items Addressed This Visit        Cardiovascular and Mediastinum    Neurogenic orthostatic hypotension ([address] )    Essential hypertension       Genitourinary    Urinary retention    Nephrolithiasis    CKD (chronic kidney disease) stage 2, GFR 60-89 ml/min - Primary    Relevant Orders    Basic metabolic panel    Phosphorus    Magnesium    CBC    Protein / creatinine ratio, urine       Other    Proteinuria        Reason for visit is follow up  Chief Complaint   Patient presents with   • Virtual Regular Visit        Encounter provider [place], [place]    Provider located at [address] [place] [id number] [phone]      Recent Visits  Date Type Provider Dept   02/22/23 Telephone [place]  [facility] within past 7 days and meeting all other requirements  Today's Visits  Date Type Provider Dept   02/23/23 [facility] [address] today's visits and meeting all other requirements  Future Appointments  No visits were found meeting these conditions  Showing future appointments within next 150 days and meeting all other requirements       The patient was identified by name and date of birth  Juan Jhon was informed that this is a telemedicine visit and that the visit is being conducted through the [employer]  He agrees to proceed     My office door was closed  No one else was in the room  He acknowledged consent and understanding of privacy and security of the video platform  The patient has agreed to participate and understands they can discontinue the visit at any time  Patient is aware this is a billable service       Subjective  Alyson Orozco Rocky Schreiber is a 60 y o  male who is being evaluated virtually  He is with his wife  He is feeling well overall  He currently is being treated for a UTI by urology  He follows with Dr Chandu Lee for the past 20 years  He denies LE edema  He denies GI complaints  He continues to have a lot of dizziness and has been taking the midodrine almost daily at the 2 5mg dose  He uses midodrine in the afternoon about once a week  Assessment/Plan  Chronic Kidney Disease stage 2- Baseline creatinine is 1-1 2  Creatinine currently at baseline  Hypertension- He is not on any antihypertensives currently  Orthostasis: Complicated by Parkinsons disease / autonomic dysfunction  Wear compression stockings  Consider an abdominal binder  Midodrine as needed and he has been taking a half tablet almost daily in the morning  Prior secondary workup negative  Proteinuria- UPC ratio minimal at 0 09  Nephrolithiasis- None recently  History of Urinary Retention- s/p urolift x2, TURP x2  Continue to follow up with urology  UTI- on cipro for 2 weeks per Dr Chandu Lee, urology    Follow up with Dr Irina Duval in 6 months  Please call the office with any questions or concerns       HPI     Past Medical History:   Diagnosis Date   • Arthritis early stages in thumbs and knee   • Back pain    • Benign prostatic hyperplasia 2017    TURP surgery 3/15/2019   • Bladder infection    • BPH (benign prostatic hyperplasia)    • Cancer [facility])     testicular 1988   • Chronic kidney disease    • Diverticulosis    • GERD (gastroesophageal reflux disease)     occassional   • History of testicular cancer 1988    Surgery and radiation; left side   • Hypertension Nov, 2018    occassionally   • Kidney stone    • Kidney stones     Currently and in the past   • Orthostatic hypotension    • Orthostatic hypotension due to Parkinson's disease [facility])    • Parkinson's disease ([facility] [address] )    • Sleep apnea     uses CPAP   • Sleep apnea, obstructive April, 2019 recently diagnosed   • Urinary tract infection    • Wears glasses        Past Surgical History:   Procedure Laterality Date   • BLADDER NECK RECONSTRUCTION  07/27/2020   • BLADDER SURGERY  nov 2019  repair bladder neck constriction   • COLONOSCOPY  2017   • COLONOSCOPY  09/2020   • COLONOSCOPY  11/2020   • CYSTOPLASTY / CYSTOURETHROPLASTY  07/27/2020    [facility]   • HERNIA REPAIR      1981, 1986 inguinal hernia repair   • IR SUPRAPUBIC CATHETER PLACEMENT  02/24/2020   • IR TUBE UPSIZE  03/23/2020   • KIDNEY STONE SURGERY     • LITHOTRIPSY      Renal; Resolved: 2/27/07   • ORCHIECTOMY Left    • MN CYSTO INSERTION TRANSPROSTATIC IMPLANT SINGLE N/A 05/17/2018    Procedure: CYSTOSCOPY WITH INSERTION UROLIFT;  Surgeon: Malaika Macias DO;  Location: [facility];  Service: Urology   • PROSTATE SURGERY N/A 01/18/2018    Procedure: CYSTOSCOPY WITH INSERTION UROLIFT;  Surgeon: Malaika Macias DO;  Location: [facility];  Service: Urology   • [address]    For cancer   • TONSILLECTOMY     • TRANSURETHRAL RESECTION OF PROSTATE  Mar 2019/Jul 2019   • URETERONEOCYSTOSTOMY      w/ cystoscopy Ureteral Stent Placement; Resolved: 6/14/2007   • WISDOM TOOTH EXTRACTION         Current Outpatient Medications   Medication Sig Dispense Refill   • carbidopa-levodopa (SINEMET)  mg per tablet Take 1 tablet by mouth 3 (three) times a day     • ciprofloxacin (CIPRO) 500 mg tablet Take 500 mg by mouth every 12 (twelve) hours     • cyanocobalamin (VITAMIN B-12) 1000 MCG tablet Take 1,000 mcg by mouth daily     • Dalfampridine ER 10 MG TB12 3 (three) times a day       • Diclofenac Sodium (VOLTAREN) 1 % Apply 2 g topically 4 (four) times a day 350 g 2   • fluticasone-vilanterol (Breo Ellipta) 100-25 mcg/inh inhaler Inhale 1 puff daily Rinse mouth after use   60 blister 5   • gabapentin (Neurontin) 100 mg capsule Take 1 capsule (100 mg total) by mouth daily at bedtime 90 capsule 0   • midodrine (PROAMATINE) 5 mg tablet      • pantoprazole (PROTONIX) 40 mg tablet TAKE 1 TABLET BY MOUTH 2 TIMES A DAY BEFORE MEALS  60 tablet 5   • rOPINIRole (REQUIP) 1 mg tablet TAKE 1 TABLET BY MOUTH DAILY AT BEDTIME 30 tablet 3   • selegiline (ELDEPRYL) 5 mg capsule Take 5 mg by mouth 2 (two) times a day before meals     • tadalafil (CIALIS) 5 MG tablet Take 5 mg by mouth daily     • captopril (CAPOTEN) 12 5 mg tablet Take 0 5 tablets (6 25 mg total) by mouth 2 (two) times a day as needed (HTN systolic BP >331 mmHg) (Patient not taking: Reported on 2/23/2023) 60 tablet 2     No current facility-administered medications for this visit  No Known Allergies    Review of Systems   Constitutional: Positive for activity change (not very active)  Negative for appetite change  HENT: Negative for hearing loss  Respiratory: Positive for shortness of breath  Cardiovascular: Negative for leg swelling  Gastrointestinal: Negative for diarrhea, nausea and vomiting  Genitourinary: Negative for difficulty urinating  Musculoskeletal: Negative for gait problem  Neurological: Negative for dizziness and light-headedness  Psychiatric/Behavioral: Negative for agitation and confusion  Video Exam    Vitals:    02/23/23 0830   BP: 145/83   Weight: 113 kg (250 lb)   Height: 6' (1 829 m)       Physical Exam  Constitutional:       General: He is not in acute distress  Appearance: Normal appearance  He is not ill-appearing  HENT:      Head: Normocephalic  Nose: Nose normal       Mouth/Throat:      Mouth: Mucous membranes are moist    Eyes:      General: No scleral icterus  Cardiovascular:      Pulses: Normal pulses  Pulmonary:      Effort: Pulmonary effort is normal  No respiratory distress  Abdominal:      General: There is no distension  Musculoskeletal:         General: No swelling  Skin:     Coloration: Skin is not jaundiced  Neurological:      Mental Status: He is alert and oriented to person, place, and time   Mental status is at baseline  Psychiatric:         Mood and Affect: Mood normal          Thought Content:  Thought content normal         I spent 17 minutes directly with the patient during this visit

## 2023-02-23 ENCOUNTER — TELEMEDICINE (OUTPATIENT)
Dept: NEPHROLOGY | Facility: CLINIC | Age: 63
End: 2023-02-23

## 2023-02-23 ENCOUNTER — TELEPHONE (OUTPATIENT)
Dept: NEPHROLOGY | Facility: CLINIC | Age: 63
End: 2023-02-23

## 2023-02-23 VITALS
BODY MASS INDEX: 33.86 KG/M2 | DIASTOLIC BLOOD PRESSURE: 83 MMHG | WEIGHT: 250 LBS | SYSTOLIC BLOOD PRESSURE: 145 MMHG | HEIGHT: 72 IN

## 2023-02-23 DIAGNOSIS — I10 ESSENTIAL HYPERTENSION: ICD-10-CM

## 2023-02-23 DIAGNOSIS — N20.0 NEPHROLITHIASIS: ICD-10-CM

## 2023-02-23 DIAGNOSIS — N18.2 CKD (CHRONIC KIDNEY DISEASE) STAGE 2, GFR 60-89 ML/MIN: Primary | ICD-10-CM

## 2023-02-23 DIAGNOSIS — R33.9 URINARY RETENTION: ICD-10-CM

## 2023-02-23 DIAGNOSIS — G90.3 NEUROGENIC ORTHOSTATIC HYPOTENSION (HCC): ICD-10-CM

## 2023-02-23 DIAGNOSIS — R80.8 OTHER PROTEINURIA: ICD-10-CM

## 2023-02-23 RX ORDER — CARBIDOPA/LEVODOPA 25MG-250MG
1 TABLET ORAL 3 TIMES DAILY
COMMUNITY
Start: 2023-01-07

## 2023-02-23 RX ORDER — CIPROFLOXACIN 500 MG/1
500 TABLET, FILM COATED ORAL EVERY 12 HOURS SCHEDULED
COMMUNITY
Start: 2023-02-22 | End: 2023-03-09

## 2023-02-23 NOTE — TELEPHONE ENCOUNTER
Left message with patient to schedule 6 month f/u  AVS and labs were mailed from today's virtual appt

## 2023-02-23 NOTE — PATIENT INSTRUCTIONS
Chronic Kidney Disease stage 2- Baseline creatinine is 1-1 2  Creatinine currently at baseline  Hypertension- He is not on any antihypertensives currently  Orthostasis: Complicated by Parkinsons disease / autonomic dysfunction  Wear compression stockings  Consider an abdominal binder  Midodrine as needed and he has been taking a half tablet almost daily in the morning  Prior secondary workup negative  Proteinuria- UPC ratio minimal at 0 09  Nephrolithiasis- None recently  History of Urinary Retention- s/p urolift x2, TURP x2  Continue to follow up with urology  UTI- on cipro for 2 weeks per Dr James Quijano, urology    Follow up with Dr Adina Otto in 6 months  Please call the office with any questions or concerns

## 2023-02-28 ENCOUNTER — TELEPHONE (OUTPATIENT)
Dept: NEPHROLOGY | Facility: CLINIC | Age: 63
End: 2023-02-28

## 2023-02-28 NOTE — TELEPHONE ENCOUNTER
Spoke with Patient and schedule appointment for 8/28 with Dr Jurgen Upton in the [facility]  Virtual follow visit

## 2023-03-06 ENCOUNTER — TELEMEDICINE (OUTPATIENT)
Dept: PALLIATIVE MEDICINE | Facility: CLINIC | Age: 63
End: 2023-03-06

## 2023-03-06 DIAGNOSIS — R42 DIZZINESS: ICD-10-CM

## 2023-03-06 DIAGNOSIS — R26.2 AMBULATORY DYSFUNCTION: ICD-10-CM

## 2023-03-06 DIAGNOSIS — Z71.89 COUNSELING REGARDING ADVANCED CARE PLANNING AND GOALS OF CARE: Primary | ICD-10-CM

## 2023-03-06 DIAGNOSIS — J96.10 CHRONIC RESPIRATORY FAILURE, UNSPECIFIED WHETHER WITH HYPOXIA OR HYPERCAPNIA (HCC): ICD-10-CM

## 2023-03-06 DIAGNOSIS — G90.3 MULTIPLE SYSTEM ATROPHY (HCC): ICD-10-CM

## 2023-03-06 DIAGNOSIS — R49.9 CHANGE IN VOICE: ICD-10-CM

## 2023-03-06 NOTE — PATIENT INSTRUCTIONS
PRESCRIPTION REFILL REMINDER:  All medication refills should be requested prior to [facility] on Friday  Any refill requests after noon on Friday would be addressed the following Monday  Please protect yourself from [place]   = Wash your hands  Soap and water, or hand  with at least 60% alcohol, are both effective at killing the virus  = Wear a mask  This will help protect others from any virus particles you might spread  Your mouth and nose BOTH need to be covered  = Keep the distance  Keep 6 feet of distance from other people, even if they seem healthy  Keeping distance protects you from the other person's virus spread     = Get a vaccine, and boost it  Three vaccines are approved for use in the [place] for all adults  These vaccines do provide protection against all known variants, and the new 'bi-valent' booster is predicted to be more protective against Omicron variants   + Pfizer is FDA approved for all persons age 11 and older   + [place] Prom may be given to all person age 25 and older   - We do NOT recommend [address] vaccine for our Palliative Care patients  - [facility] is no longer offering regular COVID vaccine clinics  You may ask your primary doctor for help and advice  = you may also visit the CDC's complete list of approved sites for new vaccines: Vaccines  gov - Vaccine Location Search Results   (You may also visit Vaccines  gov and search for 'Bi-valent booster' in your zip code )  = [address] ([facility]), [employer], [facility], and many [facility] locations ALL have shots   + The CDC recommends booster shots on ALL vaccines for ALL adults  = Test to treat  If you have symptoms, get tested, and get treatment!   New drugs like Paxlovid can prevent you from ever having to go to the hospital , and may be covered completely by your insurance or special government programs    - https://aspr hhs gov/TestToTreat/ Informacion en espanol sobre vacunas, de nos companeros de [facility] --  [employer]    Check out [employer] for [place] data that are updated daily:    http://www Balihoo/     Global Epidemics  Org, from [facility] will give you [id number] information on virus cases and vaccination rates:    Https://globalepidemics  org/    Frequently Asked Questions about COVID, answered by [facility]    [employer]

## 2023-03-06 NOTE — PROGRESS NOTES
Telemedicine - Palliative and Supportive Care   Jenny Gaspar 60 y o  male [id number]    Assessment/Plan:  1  Counseling regarding advanced care planning and goals of care    2  Multiple system atrophy ([facility] [address] )    3  Ambulatory dysfunction    4  Chronic respiratory failure, unspecified whether with hypoxia or hypercapnia ([facility] [address] )    5  Change in voice    6  Dizziness       · Patient reports continued worsening of symptoms since last visit 3 months ago  · I discussed role of palliative care at this time and emphasized a multidisciplinary  approach to his cares  At this time, given their current goals, we will rely on the expertise of other specialists to manage his symptoms from [address] such as dizziness, hypotension  If more decline is experienced and role of other medications like opioids become clearer, palliative care can offer those if/when the need arise  At this time, this is not something appropriate nor sought after by anam and his family  · Also discussed the role of palliative care in transitioning cares based on goals  To this end, again discussed hospice  He asked about where hospice can be provided and we talked about this cares being provided at home if possible  He was asking if he needs to go to a facility for this  · I will ask our [facility] to reach out to them about other resources available for support at home - DMEs and HHA  Of note, he is already receiving home PT/OT/SLP  · RTO in 3 months, call sooner if needed    Requested Prescriptions      No prescriptions requested or ordered in this encounter     There are no discontinued medications  Representatives have queried the patient's controlled substance dispensing history in the Prescription Drug Monitoring Program in compliance with regulations before I have prescribed any controlled substances  The prescription history is consistent with prescribed therapy and our practice policies        25 minutes were spent on video call with Bell Melo Bob Velazquez and his daughter with greater than 50% of the time spent in counseling or coordination of care including discussions of etiology of diagnosis, pathogenesis of diagnosis, prognosis of diagnosis, diagnostic results, impression, and recommendations, risks and benefits of treatment, instructions for disease self management, treatment instructions, follow up requirements, risk factors and risk reduction of disease, patient and family counseling/involvement in care, compliance with treatment regimen and advanced care planning  All of the patient's questions were answered during this discussion  No follow-ups on file  Subjective:   Chief Complaint  Follow up visit for:  symptom management, goal of care assessment and decisional support, disease process education and discussion of prognosis, advance care planning, emotional support in the setting of serious illness  Shortness of Breath  Associated symptoms include dizziness and fatigue  Pertinent negatives include no chest pain  Zak Marin is a 60 y o  male with palliative diagnosis of multiple system atrophy with symptoms starting in 2016  He was given this diagnosis around 2019, per patient and family's recollection  He is referred to palliative care by his neurologist as family struggles with disease progression and taking care of him at home  He also sees pulmonology for chronic/progressive respiratory failure from neurologic disease with associated bulbar symptoms  Last note from 7/6 noted concerns for worsening respiratory failure  He is currently receiving supportive management from both specialists, as well as from cardiology (for neurogenic orthostatic hypotension)  HPI/initial consult: Patient arrived today with his wife and his daughter  Introduced palliative care with emphasis on goals of care discussion   When asked how he is doing, he indicated that he is declining quicker than he'd like starting in 2019 when he was diagnosed with MSA  But especially so in the last 2 days when he now has to rely on the wheelchair to keep his balance and aid in his leg weakness  He had a fall a few days ago as well and patient's wife unable to help him up and needed to call the neighbor for assistance  He also noted dysphagia and having to clear his throat more in the last 2 days  They wonder what is the next step for them as they feel lost, with wife wondering if he is hospice appropriate  Wife still works full time and hopes to continue doing so  Wonders if they can hire HHA at home  They indicated that it appears his treatment options are now limited ("running out of option")  In order to find him the suitable support he needs at this time, we discussed several cam points that needs to be addressed sooner rather than later, as it appears that his breathing, swallowing and muscle weakness are worsening now  We discussed his wishes for intubation and mechanical ventilation for when his breathing worsens from his MSA  Discussed that once intubated, he will not get off the ventilator and will end up with a trach  He feels that this is not a path he wishes to go on and will only prolong the inevitable  We discussed feeding tube for artificial hydration and nutrition  I explained that a feeding tube may buy more time by providing the caloric requirements he requires daily but this will not protect against aspiration as dysphagia will continue to worsen even with the tube  He will also be prone to complications such as infection from the tube  He said he has not thought about this yet  Appears that these discussions were never brought up to them before  I feel that he may be close, if not already, to qualifying for hospice cares given progression as noted above  We explained the difference between palliative and hospice   I discussed hospice is much detail with emphasis on end of life care with limited re-hospitalizations and focusing more on symptom management of worsening MSA while we allow nature to takes its course  Patient and family understandably tearful but appears appreciative of information  Patient at this time wants to continue receiving current cares from his specialists  I encouraged them to think more about this especially if he continues to worsen down the line  Last visit 11/21/2022: Discussed goals of care, was still having more good days than bad  Of note, we completed the POLST and the 5 Wishes in 8/18/2022  Wants to be DNR/DNI, agreeable to trial of MARIAA if needed and recommended  Interval history: Since has last visit, he has continued follow up with his primary neurology from [address] [place] [address] who adjusted his levodopa on 2/20/2022  He reported with them worsening symptoms  Also followed up with Pulm/Sleep who started him on gabapentin for RLS  Today, he followed up with palliative care via video visit  His daughter was present at bedside  He reports increasing and worsening symptoms since last visit 3 months ago  He is having more dizziness, hypotension which causes issues with ambulation and falls  Also more shortness of breath, difficulty swallowing and voice changes/weakness  Also sleeping more in his hospital bed, head of which has to be elevated for his breathing  He reports having more bad days than good  I again explained the role of palliative care and the multidisciplinary approach to his care  At this time, we will rely on the expertise of other specialists in managing his symptoms, including hypotension, dizziness, RLS, etc  We also emphasized our role in helping transition cares from disease-focused to comfort-focused on on hospice  We again discussed hospice to this end  He asked if he needs to be in a facility to receive hospice  I discussed home hospice as an option  His appetite is good  He continues to work with PT/OT/SLP who comes to his home once a week to provide services       Daughter believes he is starting to require more help at home now  He is receiving assistance from [facility], but they only assist in food preparation/assisting in feeding  They indicated he may need more physical help like HHAs  They also inquired about DMEs available that can help with standing from sitting or lying down position  I will have our SW reach out to them to assist with this  At this time, he is not ready for hospice yet  The following portions of the medical history were reviewed: past medical history, problem list, medication list, and social history      Current Outpatient Medications:   •  captopril (CAPOTEN) 12 5 mg tablet, Take 0 5 tablets (6 25 mg total) by mouth 2 (two) times a day as needed (HTN systolic BP >443 mmHg) (Patient not taking: Reported on 2/23/2023), Disp: 60 tablet, Rfl: 2  •  carbidopa-levodopa (SINEMET)  mg per tablet, Take 1 tablet by mouth 3 (three) times a day, Disp: , Rfl:   •  ciprofloxacin (CIPRO) 500 mg tablet, Take 500 mg by mouth every 12 (twelve) hours, Disp: , Rfl:   •  cyanocobalamin (VITAMIN B-12) 1000 MCG tablet, Take 1,000 mcg by mouth daily, Disp: , Rfl:   •  Dalfampridine ER 10 MG TB12, 3 (three) times a day  , Disp: , Rfl:   •  Diclofenac Sodium (VOLTAREN) 1 %, Apply 2 g topically 4 (four) times a day, Disp: 350 g, Rfl: 2  •  fluticasone-vilanterol (Breo Ellipta) 100-25 mcg/inh inhaler, Inhale 1 puff daily Rinse mouth after use , Disp: 60 blister, Rfl: 5  •  gabapentin (Neurontin) 100 mg capsule, Take 1 capsule (100 mg total) by mouth daily at bedtime, Disp: 90 capsule, Rfl: 0  •  midodrine (PROAMATINE) 5 mg tablet, , Disp: , Rfl:   •  pantoprazole (PROTONIX) 40 mg tablet, TAKE 1 TABLET BY MOUTH 2 TIMES A DAY BEFORE MEALS , Disp: 60 tablet, Rfl: 5  •  rOPINIRole (REQUIP) 1 mg tablet, TAKE 1 TABLET BY MOUTH DAILY AT BEDTIME, Disp: 30 tablet, Rfl: 3  •  selegiline (ELDEPRYL) 5 mg capsule, Take 5 mg by mouth 2 (two) times a day before meals, Disp: , Rfl:   • tadalafil (CIALIS) 5 MG tablet, Take 5 mg by mouth daily, Disp: , Rfl:   Review of Systems   Constitutional: Positive for activity change and fatigue  Negative for appetite change  HENT: Positive for trouble swallowing and voice change  Respiratory: Positive for shortness of breath  Cardiovascular: Negative for chest pain  Gastrointestinal: Negative for abdominal pain  Musculoskeletal: Negative for back pain  Neurological: Positive for dizziness, speech difficulty, weakness and light-headedness  Psychiatric/Behavioral: Negative for sleep disturbance  The patient is not nervous/anxious  All other systems reviewed and are negative  All other systems negative    Objective:  Vital Signs  There were no vitals taken for this visit  Physical Exam    Constitutional: Appears well-developed and well-nourished  Appears stable and healthy on video  Pleasant, well kempt  Overall comfortable  In no acute physical or emotional distress  Head: Normocephalic and atraumatic  Eyes: EOM are normal  No ocular discharge  No scleral icterus  Neck: No visible adenopathy or masses  Respiratory: Effort normal  No stridor  No respiratory distress  But short of breath with long sentences, needing to pause to catch breath  Musculoskeletal: No edema  Neurological: Alert, oriented and appropriately conversant  Voice is soft and sometimes difficult to understand but still intelligible  Skin: No diaphoresis, no rashes seen on exposed areas of skin  Pale   Psychiatric: Displays a normal mood and affect   Behavior, judgement and thought content appear normal      Terry Montague MD  Palliative Medicine & Supportive Care  Internal Medicine  Available via [facility] Text  Office: [phone]  Fax: [phone]

## 2023-03-10 PROBLEM — J06.9 ACUTE URI: Status: RESOLVED | Noted: 2023-01-09 | Resolved: 2023-03-10

## 2023-05-10 ENCOUNTER — TELEPHONE (OUTPATIENT)
Dept: PULMONOLOGY | Facility: CLINIC | Age: 63
End: 2023-05-10

## 2023-05-10 NOTE — TELEPHONE ENCOUNTER
Patients wife left  stating she called yesterday as well and looking to speak to someone  She states it is regarding Clementine Momin and a serious condition that he has  Please advise   [phone]

## 2023-05-11 ENCOUNTER — NURSE TRIAGE (OUTPATIENT)
Dept: OTHER | Facility: OTHER | Age: 63
End: 2023-05-11

## 2023-05-11 DIAGNOSIS — G25.81 RLS (RESTLESS LEGS SYNDROME): ICD-10-CM

## 2023-05-11 RX ORDER — GABAPENTIN 100 MG/1
100 CAPSULE ORAL
Qty: 90 CAPSULE | Refills: 0 | Status: SHIPPED | OUTPATIENT
Start: 2023-05-11 | End: 2023-08-09

## 2023-05-11 NOTE — TELEPHONE ENCOUNTER
Regarding: urgent med refill  ----- Message from Hernandez Villalobos sent at 5/11/2023  7:05 AM EDT -----  Medication Refill Request     Name gabapentin (Neurontin) 100 mg capsule   Dose/Frequency 1 daily @ bedtime  Quantity 90  Verified pharmacy   [ x] [id number], [address] [place] [address] [address], [phone] [id number]   Phone:  [phone]  Fax:  [phone]   Verified ordering Provider   [ x]  Does patient have enough for the next 3 days?  Yes [ ] No [ x]

## 2023-05-11 NOTE — TELEPHONE ENCOUNTER
BHASKAR moss and spoke with his wife  She stated that Aldo Andrade is in need of Gabapentin refill   Request has been sent to provider for approval

## 2023-05-11 NOTE — TELEPHONE ENCOUNTER
"Spoke to Duc montiel, advised office will be refilling medication and will be sent urgently  Verbalized understanding  Reason for Disposition  • [1] Prescription refill request for ESSENTIAL medicine (i e , likelihood of harm to patient if not taken) AND [2] triager unable to refill per department policy    Answer Assessment - Initial Assessment Questions  1  DRUG NAME: \"What medicine do you need to have refilled? \"      Gabapentin  2  REFILLS REMAINING: \"How many refills are remaining? \" (Note: The label on the medicine or pill bottle will show how many refills are remaining  If there are no refills remaining, then a renewal may be needed )     0  4  PRESCRIBING HCP: \"Who prescribed it? \" Reason: If prescribed by specialist, call should be referred to that group        *No Answer*    Protocols used: MEDICATION REFILL AND RENEWAL CALL-ADULT-    "

## 2023-06-15 ENCOUNTER — TELEMEDICINE (OUTPATIENT)
Dept: CARDIOLOGY CLINIC | Facility: CLINIC | Age: 63
End: 2023-06-15
Payer: MEDICARE

## 2023-06-15 VITALS
BODY MASS INDEX: 33.86 KG/M2 | WEIGHT: 250 LBS | HEART RATE: 68 BPM | SYSTOLIC BLOOD PRESSURE: 165 MMHG | DIASTOLIC BLOOD PRESSURE: 104 MMHG | HEIGHT: 72 IN

## 2023-06-15 DIAGNOSIS — G70.9 NEUROMUSCULAR DISEASE (HCC): ICD-10-CM

## 2023-06-15 DIAGNOSIS — G90.3 MULTIPLE SYSTEM ATROPHY (HCC): Primary | ICD-10-CM

## 2023-06-15 DIAGNOSIS — N18.2 CKD (CHRONIC KIDNEY DISEASE) STAGE 2, GFR 60-89 ML/MIN: ICD-10-CM

## 2023-06-15 DIAGNOSIS — G20 ATYPICAL PARKINSONISM (HCC): ICD-10-CM

## 2023-06-15 DIAGNOSIS — J96.10 CHRONIC RESPIRATORY FAILURE, UNSPECIFIED WHETHER WITH HYPOXIA OR HYPERCAPNIA (HCC): ICD-10-CM

## 2023-06-15 DIAGNOSIS — G20 PARKINSON'S DISEASE (HCC): ICD-10-CM

## 2023-06-15 DIAGNOSIS — G90.3 NEUROGENIC ORTHOSTATIC HYPOTENSION (HCC): ICD-10-CM

## 2023-06-15 PROCEDURE — 99214 OFFICE O/P EST MOD 30 MIN: CPT

## 2023-06-15 NOTE — PROGRESS NOTES
Cardiology   MD Dylan Escobedo MD, Dipak Simmons DO, [facility] [place] FACP Ather Mansoor, MD Aretta Earl, DO, Armond Scott DO, [facility]  -------------------------------------------------------------------  [facility]  [address] [place], [place] [address]  [phone]  [phone]  06/15/23  Blayne Jimenez  YOB: 1960   MRN: [id number]      Referring Physician: Jared Zayas DO  [address] [address]     HPI: Blayne Jimenez is a 58 y o  male with:   Orthostatic hypotension  Autonomic dysfunction  Diaphragm dysfunction  Parkinson's disease  Chronic kidney disease  Obstructive sleep apnea  Gastroesophageal reflux disease  Neck pain over carotid artery - carotid Doppler negative    No significant change from prior visit  Still with significant orthostatic symptoms which are improved with midodrine  Has increased his salt intake a bit  Has not had to take the captopril    Review of Systems   Constitutional: Negative for chills and fever  HENT: Negative for facial swelling and sore throat  Eyes: Negative for visual disturbance  Respiratory: Negative for cough, chest tightness, shortness of breath and wheezing  Cardiovascular: Negative for chest pain, palpitations and leg swelling  Gastrointestinal: Negative for abdominal pain, blood in stool, constipation, diarrhea, nausea and vomiting  Endocrine: Negative for cold intolerance and heat intolerance  Genitourinary: Negative for decreased urine volume, difficulty urinating, dysuria and hematuria  Musculoskeletal: Negative for arthralgias, back pain and myalgias  Skin: Negative for rash  Neurological: Positive for dizziness, light-headedness and headaches  Negative for syncope, weakness and numbness  Psychiatric/Behavioral: Negative for agitation, behavioral problems and confusion  The patient is not nervous/anxious  OBJECTIVE  Vitals:    06/15/23 0927   BP: (!) 165/104   Pulse: 68       Physical Exam  Physical exam is limited secondary to virtual visit  Constitutional: awake, alert and oriented, in no acute distress, he is currently in a wheelchair  Head: Normocephalic, without obvious abnormality, atraumatic  Eyes: conjunctivae clear and moist  Sclera anicteric  No xanthelasmas  Ear nose mouth and throat: ears are symmetrical bilaterally, hearing appears to be equal bilaterally, no nasal discharge or epistaxis  Psychiatric: Patient is oriented to time, place, person, mood/affect is negative for depression, anxiety, agitation, appears to have appropriate insight      EKG:  No results found for this visit on 06/15/23  IMPRESSION:  Orthostatic hypotension  Autonomic dysfunction  Diaphragm dysfunction  Parkinson's disease  Chronic kidney disease  Obstructive sleep apnea  Gastroesophageal reflux disease  Neck pain over carotid artery    DISCUSSION/RECOMMENDATIONS:  He presents today for follow-up  He was seen as a virtual visit today  Still with symptoms of orthostatic hypotension which are mitigated by increased salt intake and midodrine  He states he is only drinking about 4 glasses of water per day  I asked him to increase his water intake to about 64 ounces per day which is about 8 glasses  This should help improve his blood pressure and orthostatic symptoms  He has not had to take the captopril but he does have this on hand if he does develop sustained hypertension  For the most part his blood pressure is low but there are times when it is very high but he states this only last for about an hour or so  He does get a headache at these times for which he takes Tylenol  We will hold off on repeat cardiac imaging at this time    Ayden Lizama DO, [facility]  --------------------------------------------------------------------------------  TREADMILL STRESS  No results found for this or any previous visit  ----------------------------------------------------------------------------------------------  NUCLEAR STRESS TEST: No results found for this or any previous visit  No results found for this or any previous visit       --------------------------------------------------------------------------------  CATH:  No results found for this or any previous visit     --------------------------------------------------------------------------------  ECHO:   Results for orders placed during the hospital encounter of 20    Echo complete with contrast if indicated    Dipika Padilla [address] [place] [address]  [phone]    Transthoracic Echocardiogram  2D, M-mode, Doppler, and Color Doppler    Study date:  2020    Patient: Jocelin Aragon  MR number: [id number]  Account number: [de-identified]  : 1960  Age: 60 years  Gender: Male  Status: Outpatient  Location: [address] [place] [address] [facility] [place]  Height: 72 in  Weight: 234 5 lb  BP: 101/ 61 mmHg    Indications: Shortness of Breath    Diagnoses: R06 02 - Shortness of breath    Sonographer:  Armen Zavala RDCS  Primary Physician:  Leti Sanchez DO  Referring Physician:  Leti Sanchez DO  Group:  [facility]  Interpreting Physician:  Vasu Sharp MD    SUMMARY    LEFT VENTRICLE:  Systolic function was normal  Ejection fraction was estimated to be 60 %  There were no regional wall motion abnormalities  RIGHT VENTRICLE:  The size was normal   Systolic function was normal     HISTORY: PRIOR HISTORY: KALLI w/ BIPAP    PROCEDURE: The study was performed in the [address] [facility] [place]  This was a routine study  The transthoracic approach was used  The study included complete 2D imaging, M-mode, complete spectral Doppler, and color Doppler  The  heart rate was 72 bpm, at the start of the study   Images were obtained from the parasternal, apical, subcostal, and suprasternal notch acoustic windows  Image quality was adequate  LEFT VENTRICLE: Size was normal  Systolic function was normal  Ejection fraction was estimated to be 60 %  There were no regional wall motion abnormalities  Wall thickness was normal  DOPPLER: The transmitral flow pattern was normal  Left  ventricular diastolic function parameters were normal     RIGHT VENTRICLE: The size was normal  Systolic function was normal  Wall thickness was normal     LEFT ATRIUM: Size was normal     RIGHT ATRIUM: Size was normal     MITRAL VALVE: Valve structure was normal  There was normal leaflet separation  DOPPLER: The transmitral velocity was within the normal range  There was no evidence for stenosis  There was trace regurgitation  AORTIC VALVE: The valve was trileaflet  Leaflets exhibited normal thickness and normal cuspal separation  DOPPLER: Transaortic velocity was within the normal range  There was no evidence for stenosis  There was no significant  regurgitation  TRICUSPID VALVE: The valve structure was normal  There was normal leaflet separation  DOPPLER: The transtricuspid velocity was within the normal range  There was no evidence for stenosis  There was trace regurgitation  The tricuspid jet  envelope definition was inadequate for estimation of RV systolic pressure  There are no indirect findings (abnormal RV volume or geometry, altered pulmonary flow velocity profile, or leftward septal displacement) which would suggest  moderate or severe pulmonary hypertension  PULMONIC VALVE: Leaflets exhibited normal thickness, no calcification, and normal cuspal separation  DOPPLER: The transpulmonic velocity was within the normal range  There was trace regurgitation  PERICARDIUM: There was no pericardial effusion  The pericardium was normal in appearance  AORTA: The root exhibited normal size  SYSTEMIC VEINS: IVC: The inferior vena cava was normal in size   Respirophasic changes were normal     SYSTEM MEASUREMENT TABLES    2D  %FS: 29 56 %  Ao Diam: 3 2 cm  EDV(Teich): 116 61 ml  EF(Cube): 65 04 %  EF(Teich): 56 34 %  ESV(Cube): 42 92 ml  ESV(Teich): 50 91 ml  IVSd: 1 02 cm  LA Area: 21 08 cm2  LA Diam: 3 25 cm  LVEDV MOD A4C: 147 51 ml  LVEF MOD A4C: 69 2 %  LVESV MOD A4C: 45 43 ml  LVIDd: 4 97 cm  LVIDs: 3 5 cm  LVLd A4C: 9 78 cm  LVLs A4C: 7 97 cm  LVPWd: 1 1 cm  RA Area: 19 63 cm2  RV Diam : 4 03 cm  SI(Cube): 35 03 ml/m2  SI(Teich): 28 82 ml/m2  SV MOD A4C: 102 08 ml  SV(Cube): 79 86 ml  SV(Teich): 65 7 ml    MM  TAPSE: 2 74 cm    PW  E': 0 15 m/s  E/E': 6 45  MV A [place]: 0 72 m/s  MV Dec [place]: 6 03 m/s2  MV DecT: 162 26 ms  MV E [place]: 0 98 m/s  MV E/A Ratio: 1 35    [facility]    Prepared and electronically signed by    Natacha Chappell MD  Signed 07-Apr-2020 13:58:33    No results found for this or any previous visit     --------------------------------------------------------------------------------  HOLTER  No results found for this or any previous visit  No results found for this or any previous visit     --------------------------------------------------------------------------------  CAROTIDS  Results for orders placed during the hospital encounter of 12/09/22    VAS carotid complete study    Narrative  [facility] REPORT  CLINICAL:  Indications: The patient has orthostatic hypotension  Sometimes his BP is high  and sometimes it's low (usually low in the morning, higher in the evening)  He  states that when his blood pressure is high, he experiences pain in the left  side of his neck, over the area of the carotid artery  Requesting evaluation for  carotid artery disease  Operative History:  Denies Any Cardiovascular Surgeries  Risk Factors: HTN  Clinical  Right Pressure:  160/90 mm Hg, Left Pressure:  140/94 mm Hg      FINDINGS:    Right        Impression  PSV  EDV (cm/s)  Direction of Flow  Ratio  Dist  ICA                 57          25 0 51  Mid  ICA                  67          23                      0 60  Prox  ICA    Normal       47          15                      0 42  Dist CCA                  98          24  Mid CCA                  112          33                      1 13  Prox CCA                  99          20  Ext Carotid              120          27                      1 08  Prox Vert                 38          11  Antegrade  Subclavian               162           0    Left         Impression  PSV  EDV (cm/s)  Direction of Flow  Ratio  Dist  ICA                 58          19                      0 58  Mid  ICA                  56          13                      0 56  Prox  ICA    Normal       59          23                      0 59  Dist CCA                  79          22  Mid CCA                  100          24                      0 82  Prox CCA                 122          29  Ext Carotid              102          21                      1 01  Prox Vert                 36          10  Antegrade  Subclavian               195           0        CONCLUSION:    Impression  RIGHT:  There is no evidence of disease throughout the extracranial carotid arteries  Vertebral artery flow is antegrade  There is no significant subclavian artery disease  LEFT:  There is no evidence of disease throughout the extracranial carotid arteries  Vertebral artery flow is antegrade  There is no significant subclavian artery disease      SIGNATURE:  Electronically Signed by: Nahun Leavitt on 2022-12-09 02:46:29 PM     --------------------------------------------------------------------------------  Diagnoses and all orders for this visit:    Multiple system atrophy ([facility] )    Neuromuscular disease ([facility] )    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[facility] )    Parkinson's disease ([facility] )    Neurogenic orthostatic hypotension ([facility] )    Chronic respiratory failure, unspecified whether with hypoxia or hypercapnia ([facility] )    CKD (chronic kidney disease) stage 2, GFR 60-89 ml/min       ======================================================    Past Medical History:   Diagnosis Date   • Arthritis early stages in thumbs and knee   • Back pain    • Benign prostatic hyperplasia 2017    TURP surgery 3/15/2019   • Bladder infection    • BPH (benign prostatic hyperplasia)    • Cancer [facility])     testicular 1988   • Chronic kidney disease    • Diverticulosis    • GERD (gastroesophageal reflux disease)     occassional   • History of testicular cancer 1988    Surgery and radiation; left side   • Hypertension Nov, 2018    occassionally   • Kidney stone    • Kidney stones     Currently and in the past   • Orthostatic hypotension    • Orthostatic hypotension due to Parkinson's disease [facility])    • Parkinson's disease ([facility] )    • Sleep apnea     uses CPAP   • Sleep apnea, obstructive April, 2019    recently diagnosed   • Urinary tract infection    • Wears glasses      Past Surgical History:   Procedure Laterality Date   • BLADDER NECK RECONSTRUCTION  07/27/2020   • BLADDER SURGERY  nov 2019   repair bladder neck constriction   • COLONOSCOPY  2017   • COLONOSCOPY  09/2020   • COLONOSCOPY  11/2020   • CYSTOPLASTY / CYSTOURETHROPLASTY  07/27/2020    [facility]   • HERNIA REPAIR      1981, 1986 inguinal hernia repair   • IR SUPRAPUBIC CATHETER PLACEMENT  02/24/2020   • IR TUBE UPSIZE  03/23/2020   • KIDNEY STONE SURGERY     • LITHOTRIPSY      Renal; Resolved: 2/27/07   • ORCHIECTOMY Left    • AK CYSTO INSERTION TRANSPROSTATIC IMPLANT SINGLE N/A 05/17/2018    Procedure: CYSTOSCOPY WITH INSERTION UROLIFT;  Surgeon: Gregg Minor DO;  Location: [facility];  Service: Urology   • PROSTATE SURGERY N/A 01/18/2018    Procedure: CYSTOSCOPY WITH INSERTION UROLIFT;  Surgeon: Gregg Minor DO;  Location: [facility];  Service: Urology   • [address]    For cancer   • TONSILLECTOMY     • TRANSURETHRAL RESECTION OF PROSTATE  Mar 2019/Jul 2019   • URETERONEOCYSTOSTOMY      w/ cystoscopy Ureteral Stent Placement; Resolved: 2007   • WISDOM TOOTH EXTRACTION           Medications  Current Outpatient Medications   Medication Sig Dispense Refill   • carbidopa-levodopa (SINEMET)  mg per tablet Take 1 tablet by mouth 3 (three) times a day     • cyanocobalamin (VITAMIN B-12) 1000 MCG tablet Take 1,000 mcg by mouth daily     • Dalfampridine ER 10 MG TB12 3 (three) times a day       • Diclofenac Sodium (VOLTAREN) 1 % Apply 2 g topically 4 (four) times a day 350 g 2   • fluticasone-vilanterol (Breo Ellipta) 100-25 mcg/inh inhaler Inhale 1 puff daily Rinse mouth after use  60 blister 5   • gabapentin (Neurontin) 100 mg capsule Take 1 capsule (100 mg total) by mouth daily at bedtime 90 capsule 0   • midodrine (PROAMATINE) 5 mg tablet      • rOPINIRole (REQUIP) 1 mg tablet Take 1 tablet (1 mg total) by mouth daily at bedtime 30 tablet 3   • selegiline (ELDEPRYL) 5 mg capsule Take 5 mg by mouth 2 (two) times a day before meals     • captopril (CAPOTEN) 12 5 mg tablet Take 0 5 tablets (6 25 mg total) by mouth 2 (two) times a day as needed (HTN systolic BP >002 mmHg) (Patient not taking: Reported on 2023) 60 tablet 2   • pantoprazole (PROTONIX) 40 mg tablet TAKE 1 TABLET BY MOUTH 2 TIMES A DAY BEFORE MEALS  (Patient not taking: Reported on 6/15/2023) 60 tablet 5   • tadalafil (CIALIS) 5 MG tablet Take 5 mg by mouth daily       No current facility-administered medications for this visit          No Known Allergies    Social History     Socioeconomic History   • Marital status: /Civil Union     Spouse name: Not on file   • Number of children: Not on file   • Years of education: Not on file   • Highest education level: Not on file   Occupational History   • Occupation: [email or web address] retired   Tobacco Use   • Smoking status: Former     Packs/day: 0 50     Years: 2 00     Total pack years: 1 00     Types: Cigarettes     Start date: 1974     Quit date: 1978     Years since quittin 4 Passive exposure: Past   • Smokeless tobacco: Never   Vaping Use   • Vaping Use: Never used   Substance and Sexual Activity   • Alcohol use: Not Currently   • Drug use: No   • Sexual activity: Not Currently     Birth control/protection: Abstinence   Other Topics Concern   • Not on file   Social History Narrative    Consumes 5 glasses of tea per week     Social Determinants of Health     Financial Resource Strain: Low Risk  (5/13/2021)    Overall Financial Resource Strain (CARDIA)    • Difficulty of Paying Living Expenses: Not hard at all   Food Insecurity: No Food Insecurity (5/13/2021)    Hunger Vital Sign    • Worried About Running Out of Food in the Last Year: Never true    • Ran Out of Food in the Last Year: Never true   Transportation Needs: No Transportation Needs (5/13/2021)    PRAPARE - Transportation    • Lack of Transportation (Medical): No    • Lack of Transportation (Non-Medical): No   Physical Activity: Inactive (3/30/2021)    Exercise Vital Sign    • Days of Exercise per Week: 0 days    • Minutes of Exercise per Session: 0 min   Stress: No Stress Concern Present (3/30/2021)    [address]    • Feeling of Stress : Not at all   Social Connections:  Moderately Integrated (3/30/2021)    Social Connection and Isolation Panel [NHANES]    • Frequency of Communication with Friends and Family: More than three times a week    • Frequency of Social Gatherings with Friends and Family: More than three times a week    • Attends Alevism Services: 1 to 4 times per year    • Active Member of Clubs or Organizations: No    • Attends Club or Organization Meetings: Never    • Marital Status:    Intimate Partner Violence: Not At Risk (3/30/2021)    Humiliation, Afraid, Rape, and Kick questionnaire    • Fear of Current or Ex-Partner: No    • Emotionally Abused: No    • Physically Abused: No    • Sexually Abused: No   Housing Stability: Not on file "       Family History   Problem Relation Age of Onset   • Colon cancer Father    • Heart failure Father    • Cancer Father         Colon CA   • Parkinsonism Mother    • Cancer Paternal Grandmother        Lab Results   Component Value Date    HCT 44 2 06/30/2022    HGB 14 7 06/30/2022    MCV 91 06/30/2022     06/30/2022    WBC 5 44 06/30/2022      Lab Results   Component Value Date    BUN 21 01/27/2023    CALCIUM 9 0 01/27/2023     01/27/2023    CO2 28 01/27/2023    CREATININE 1 03 01/27/2023    GLUC 103 01/27/2023    K 4 1 01/27/2023    SODIUM 140 01/27/2023      Lab Results   Component Value Date    HGBA1C 5 9 (H) 08/18/2020      No results found for: \"CHOL\"  Lab Results   Component Value Date    HDL 56 08/18/2020    HDL 49 07/06/2018    HDL 56 12/01/2016     Lab Results   Component Value Date    LDLCALC 107 (H) 08/18/2020    LDLCALC 120 (H) 07/06/2018    LDLCALC 127 (H) 12/01/2016     Lab Results   Component Value Date    TRIG 96 08/18/2020    TRIG 83 07/06/2018    TRIG 155 (H) 12/01/2016     No results found for: \"CHOLHDL\"   Lab Results   Component Value Date    INR 1 05 02/24/2020    PROTIME 13 8 02/24/2020          Patient Active Problem List    Diagnosis Date Noted   • Primary osteoarthritis of both knees 08/02/2022   • Counseling regarding advanced care planning and goals of care 07/29/2022   • Multiple system atrophy ([facility] ) 07/29/2022   • Ambulatory dysfunction 07/29/2022   • Chronic respiratory failure ([facility] ) 07/29/2022   • Tinea versicolor 12/06/2021   • Olecranon bursitis of right elbow 12/06/2021   • Primary osteoarthritis of left knee 08/06/2021   • Renal artery stenosis of unknown etiology ([facility] ) 06/15/2021   • Encounter for attention to other artificial openings of urinary tract ([facility] ) 06/15/2021   • Neuromuscular disease ([facility] ) 06/15/2021   • Essential hypertension 05/13/2021   • Primary malignant neoplasm of testis ([facility] ) 05/13/2021   • Hyponatremia 01/06/2021   • Olecranon bursitis " "12/14/2020   • Parasomnia 11/24/2020   • Restless leg syndrome 11/24/2020   • Prediabetes 09/23/2020   • Mixed hyperlipidemia 09/23/2020   • 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[facility] ) 08/27/2020   • Truncal ataxia 08/27/2020   • Recurrent left knee instability 04/24/2020   • Coracoid impingement of right shoulder 04/24/2020   • 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 04/24/2020   • Numbness and tingling 04/24/2020   • Acute pain of right shoulder 02/21/2020   • Proteinuria 12/06/2019   • Urinary retention 12/06/2019   • Nephrolithiasis 12/06/2019   • CKD (chronic kidney disease) stage 2, GFR 60-89 ml/min 12/06/2019   • Elevated blood pressure reading 11/22/2019   • Tinea corporis 08/27/2019   • Periodic limb movement disorder (PLMD) 08/22/2019   • Change in voice 06/03/2019   • Gastroesophageal reflux disease with esophagitis 06/03/2019   • KALLI (obstructive sleep apnea)    • Chronic pain of right ankle 03/04/2019   • Chronic pain of left knee 03/04/2019   • Parkinson's disease ([facility] ) 03/04/2019   • Thyromegaly 03/04/2019   • Neurogenic orthostatic hypotension ([facility] ) 02/27/2019   • Shortness of breath 11/29/2018   • Pain of right thumb 10/18/2018   • Pain of left thumb 10/18/2018   • Right elbow pain 10/18/2018   • Right wrist pain 10/18/2018   • Plantar fasciitis, bilateral 10/10/2018   • Unspecified abnormalities of gait and mobility 10/10/2018   • Dizziness 07/05/2018   • Personal history of malignant neoplasm of testis 12/17/2014       Portions of the record may have been created with voice recognition software  Occasional wrong word or \"sound a like\" substitutions may have occurred due to the inherent limitations of voice recognition software  Read the chart carefully and recognize, using context, where substitutions have occurred      Janell Reyes DO, [facility]  6/15/2023 11:18 AM          "

## 2023-06-16 ENCOUNTER — PATIENT MESSAGE (OUTPATIENT)
Dept: PULMONOLOGY | Facility: CLINIC | Age: 63
End: 2023-06-16

## 2023-07-10 ENCOUNTER — TELEPHONE (OUTPATIENT)
Dept: PULMONOLOGY | Facility: CLINIC | Age: 63
End: 2023-07-10

## 2023-07-10 NOTE — TELEPHONE ENCOUNTER
I did reach out and speak with Mrs. Kevon Aguila [place]) and explained to her that because Mr. Kevon Aguila has Medicare as his primary insurance, he does have to have a face to face office visit and get tested in order for our provider to provide an oxygen order to the DME company. Mrs. Kevon Aguila was concerned because Mr. Kevon Aguila has difficulty getting around and that she would have to request transport for him via a gurney. I assured her that we had rooms to accommodate these situations and she was also concerned because of the testing -6 min walk. She feels he would not be able to perform this test due to his condition,  I explained to her that if he is de-sating to 87% or so on room air, that would qualify him for his oxygen and we would not require him to walk for the 6 min. Mrs. Kevon Aguila was comfortable with the explanation and she stated she is going to call me after July 26th, which is when Pallative care is going to do a Home visit and let me know the outcome of that visit to see if an appointment is still needed. Please transfer call to me when she does call back. Thank you.

## 2023-07-28 ENCOUNTER — TELEPHONE (OUTPATIENT)
Dept: PALLIATIVE MEDICINE | Facility: CLINIC | Age: 63
End: 2023-07-28

## 2023-08-09 DIAGNOSIS — G25.81 RLS (RESTLESS LEGS SYNDROME): ICD-10-CM

## 2023-08-09 RX ORDER — GABAPENTIN 100 MG/1
100 CAPSULE ORAL
Qty: 90 CAPSULE | Refills: 0 | Status: SHIPPED | OUTPATIENT
Start: 2023-08-09 | End: 2023-11-07

## 2023-08-24 ENCOUNTER — TELEPHONE (OUTPATIENT)
Dept: LAB | Facility: HOSPITAL | Age: 63
End: 2023-08-24

## 2023-08-25 ENCOUNTER — APPOINTMENT (OUTPATIENT)
Dept: LAB | Facility: HOSPITAL | Age: 63
End: 2023-08-25
Payer: MEDICARE

## 2023-08-25 DIAGNOSIS — N18.2 CKD (CHRONIC KIDNEY DISEASE) STAGE 2, GFR 60-89 ML/MIN: ICD-10-CM

## 2023-08-25 LAB
ANION GAP SERPL CALCULATED.3IONS-SCNC: 15 MMOL/L
BUN SERPL-MCNC: 17 MG/DL (ref 5–25)
CALCIUM SERPL-MCNC: 9.2 MG/DL (ref 8.4–10.2)
CHLORIDE SERPL-SCNC: 100 MMOL/L (ref 96–108)
CO2 SERPL-SCNC: 26 MMOL/L (ref 21–32)
CREAT SERPL-MCNC: 1.19 MG/DL (ref 0.6–1.3)
ERYTHROCYTE [DISTWIDTH] IN BLOOD BY AUTOMATED COUNT: 13.1 % (ref 11.6–15.1)
GFR SERPL CREATININE-BSD FRML MDRD: 64 ML/MIN/1.73SQ M
GLUCOSE SERPL-MCNC: 94 MG/DL (ref 65–140)
HCT VFR BLD AUTO: 46 % (ref 36.5–49.3)
HGB BLD-MCNC: 14.5 G/DL (ref 12–17)
MAGNESIUM SERPL-MCNC: 2.2 MG/DL (ref 1.9–2.7)
MCH RBC QN AUTO: 30 PG (ref 26.8–34.3)
MCHC RBC AUTO-ENTMCNC: 31.5 G/DL (ref 31.4–37.4)
MCV RBC AUTO: 95 FL (ref 82–98)
PHOSPHATE SERPL-MCNC: 3.2 MG/DL (ref 2.3–4.1)
PLATELET # BLD AUTO: 247 THOUSANDS/UL (ref 149–390)
PMV BLD AUTO: 11.9 FL (ref 8.9–12.7)
POTASSIUM SERPL-SCNC: 3.5 MMOL/L (ref 3.5–5.3)
RBC # BLD AUTO: 4.84 MILLION/UL (ref 3.88–5.62)
SODIUM SERPL-SCNC: 141 MMOL/L (ref 135–147)
WBC # BLD AUTO: 7.51 THOUSAND/UL (ref 4.31–10.16)

## 2023-08-25 PROCEDURE — 85027 COMPLETE CBC AUTOMATED: CPT

## 2023-08-25 PROCEDURE — 84100 ASSAY OF PHOSPHORUS: CPT

## 2023-08-25 PROCEDURE — 83735 ASSAY OF MAGNESIUM: CPT

## 2023-08-25 PROCEDURE — 80048 BASIC METABOLIC PNL TOTAL CA: CPT

## 2023-08-25 PROCEDURE — 36415 COLL VENOUS BLD VENIPUNCTURE: CPT

## 2023-08-28 ENCOUNTER — TELEMEDICINE (OUTPATIENT)
Dept: NEPHROLOGY | Facility: CLINIC | Age: 63
End: 2023-08-28
Payer: MEDICARE

## 2023-08-28 VITALS
DIASTOLIC BLOOD PRESSURE: 79 MMHG | HEIGHT: 72 IN | SYSTOLIC BLOOD PRESSURE: 125 MMHG | WEIGHT: 250 LBS | BODY MASS INDEX: 33.86 KG/M2

## 2023-08-28 DIAGNOSIS — I10 ESSENTIAL HYPERTENSION: ICD-10-CM

## 2023-08-28 DIAGNOSIS — E87.6 HYPOKALEMIA: ICD-10-CM

## 2023-08-28 DIAGNOSIS — I70.1: ICD-10-CM

## 2023-08-28 DIAGNOSIS — E87.1 HYPONATREMIA: ICD-10-CM

## 2023-08-28 DIAGNOSIS — G90.3 NEUROGENIC ORTHOSTATIC HYPOTENSION (HCC): ICD-10-CM

## 2023-08-28 DIAGNOSIS — N20.0 NEPHROLITHIASIS: ICD-10-CM

## 2023-08-28 DIAGNOSIS — N18.2 CKD (CHRONIC KIDNEY DISEASE) STAGE 2, GFR 60-89 ML/MIN: Primary | ICD-10-CM

## 2023-08-28 PROCEDURE — 99213 OFFICE O/P EST LOW 20 MIN: CPT | Performed by: INTERNAL MEDICINE

## 2023-08-28 RX ORDER — MIDODRINE HYDROCHLORIDE 5 MG/1
5 TABLET ORAL AS NEEDED
Qty: 90 TABLET | Refills: 1 | Status: SHIPPED | OUTPATIENT
Start: 2023-08-28 | End: 2023-08-28

## 2023-08-28 RX ORDER — MIDODRINE HYDROCHLORIDE 5 MG/1
5 TABLET ORAL AS NEEDED
Qty: 90 TABLET | Refills: 1 | Status: SHIPPED | OUTPATIENT
Start: 2023-08-28

## 2023-08-28 NOTE — PROGRESS NOTES
Virtual Regular Visit    Verification of patient location:    Patient is located at Home in the following state in which I hold an active license [place]      Assessment/Plan:    Problem List Items Addressed This Visit        Cardiovascular and Mediastinum    Neurogenic orthostatic hypotension (HCC)    Relevant Medications    midodrine (PROAMATINE) 5 mg tablet    Essential hypertension    Relevant Medications    midodrine (PROAMATINE) 5 mg tablet    Renal artery stenosis of unknown etiology ([address])       Genitourinary    Nephrolithiasis    CKD (chronic kidney disease) stage 2, GFR 60-89 ml/min - Primary       Other    Hyponatremia   Other Visit Diagnoses     Hypokalemia        Relevant Orders    Magnesium    Basic metabolic panel        1. Previously elevated BP readings with orthostatic hypotension  -Parkinson's disease can lead to autonomic dysfunction as well as labile BP readings.   -no longer on florinef  -am cortisol ok  -I do note mildly elevated free normetanephrine. Would expect 2-3x normal range to suspect pheochromocytoma  -craig 1.3, renin 0.208. Ratio is normal as of 11/23/19  -TSH ok  -repeat plasma metanephrines normal  -continue to wear compression stockings. Take your time getting up.  -May take 5mg tablet midodrine up to three times daily. -would consider wearing abdominal binder as well  -on enalapril 2.5mg as needed     2. Elevated serum creatinine ? D/t chronic kidney disease stage 2(mild) - b/l sCr 1-1.2 since 2016, last sCr 1.15 which correlates with eGFR > 60 ml/min per CKD EPI equation. ? If this is due to lability in BP readings vs some other cause  -last UA with microscopy shows small leukocytes, 4-10 WBCs     3. Proteinuria  -UpCr < 0.09 and stable as of Jan. 2023  -of note, not anemic. Will defer myeloma workup     4. Nephrolithiasis - 10mm stone in left kidney  -would continue to follow low sodium diet (<2000mg/day). Please read packages to ensure sodium content limited.   -drink enough Parkinsons is progressing. Palliative comes to the house. Past Medical History:   Diagnosis Date   • Arthritis early stages in thumbs and knee   • Back pain    • Benign prostatic hyperplasia 2017    TURP surgery 3/15/2019   • Bladder infection    • BPH (benign prostatic hyperplasia)    • Cancer [facility])     testicular 1988   • Chronic kidney disease    • Diverticulosis    • GERD (gastroesophageal reflux disease)     occassional   • History of testicular cancer 1988    Surgery and radiation; left side   • Hypertension Nov, 2018    occassionally   • Kidney stone    • Kidney stones     Currently and in the past   • Orthostatic hypotension    • Orthostatic hypotension due to Parkinson's disease [facility])    • Parkinson's disease ([address])    • Sleep apnea     uses CPAP   • Sleep apnea, obstructive April, 2019    recently diagnosed   • Urinary tract infection    • Wears glasses        Past Surgical History:   Procedure Laterality Date   • BLADDER NECK RECONSTRUCTION  07/27/2020   • BLADDER SURGERY  nov 2019.  repair bladder neck constriction   • COLONOSCOPY  2017   • COLONOSCOPY  09/2020   • COLONOSCOPY  11/2020   • CYSTOPLASTY / CYSTOURETHROPLASTY  07/27/2020    [facility]   • HERNIA REPAIR      1981, 1986 inguinal hernia repair   • IR SUPRAPUBIC CATHETER PLACEMENT  02/24/2020   • IR TUBE UPSIZE  03/23/2020   • KIDNEY STONE SURGERY     • LITHOTRIPSY      Renal; Resolved: 2/27/07   • ORCHIECTOMY Left    • [place] CYSTO INSERTION TRANSPROSTATIC IMPLANT SINGLE N/A 05/17/2018    Procedure: CYSTOSCOPY WITH INSERTION UROLIFT;  Surgeon: Darlyn Teague DO;  Location: [facility];  Service: Urology   • PROSTATE SURGERY N/A 01/18/2018    Procedure: CYSTOSCOPY WITH INSERTION UROLIFT;  Surgeon: Darlyn Teague DO;  Location: [facility];  Service: Urology   • [facility]    For cancer   • TONSILLECTOMY     • TRANSURETHRAL RESECTION OF PROSTATE  Mar.2019/Jul 2019   • URETERONEOCYSTOSTOMY      w/ cystoscopy Ureteral Stent Placement; Resolved: 6/14/2007   • WISDOM TOOTH EXTRACTION         Current Outpatient Medications   Medication Sig Dispense Refill   • carbidopa-levodopa (SINEMET)  mg per tablet Take 1 tablet by mouth 3 (three) times a day     • cyanocobalamin (VITAMIN B-12) 1000 MCG tablet Take 1,000 mcg by mouth daily     • Dalfampridine ER 10 MG TB12 3 (three) times a day       • Diclofenac Sodium (VOLTAREN) 1 % Apply 2 g topically 4 (four) times a day 350 g 2   • fluticasone-vilanterol (Breo Ellipta) 100-25 mcg/inh inhaler Inhale 1 puff daily Rinse mouth after use. 60 blister 5   • gabapentin (Neurontin) 100 mg capsule Take 1 capsule (100 mg total) by mouth daily at bedtime 90 capsule 0   • midodrine (PROAMATINE) 5 mg tablet Take 1 tablet (5 mg total) by mouth if needed (as needed for low blood pressure) 90 tablet 1   • rOPINIRole (REQUIP) 1 mg tablet Take 1 tablet (1 mg total) by mouth daily at bedtime 30 tablet 3   • selegiline (ELDEPRYL) 5 mg capsule Take 5 mg by mouth 2 (two) times a day before meals     • captopril (CAPOTEN) 12.5 mg tablet Take 0.5 tablets (6.25 mg total) by mouth 2 (two) times a day as needed (HTN systolic BP >808 mmHg) (Patient not taking: Reported on 2/23/2023) 60 tablet 2   • pantoprazole (PROTONIX) 40 mg tablet TAKE 1 TABLET BY MOUTH 2 TIMES A DAY BEFORE MEALS. (Patient not taking: Reported on 6/15/2023) 60 tablet 5   • tadalafil (CIALIS) 5 MG tablet Take 5 mg by mouth daily       No current facility-administered medications for this visit. No Known Allergies    Review of Systems   Constitutional: Positive for fatigue. HENT: Positive for trouble swallowing. Respiratory: Positive for shortness of breath. Cardiovascular: Negative for chest pain and leg swelling. Gastrointestinal: Positive for constipation. Genitourinary: Positive for difficulty urinating. Negative for hematuria. Musculoskeletal: Negative for back pain and myalgias.    Neurological: Positive for dizziness and light-headedness. Video Exam    Vitals:    08/28/23 0834   BP: 125/79   BP Location: Left arm   Patient Position: Sitting   Cuff Size: Standard   Weight: 113 kg (250 lb)   Height: 6' (1.829 m)       Physical Exam  Vitals reviewed. Constitutional:       Appearance: Normal appearance. He is not ill-appearing. HENT:      Head: Normocephalic and atraumatic. Nose: Nose normal.      Mouth/Throat:      Mouth: Mucous membranes are dry. Pharynx: No oropharyngeal exudate or posterior oropharyngeal erythema. Eyes:      General: No scleral icterus. Right eye: No discharge. Left eye: No discharge. Extraocular Movements: Extraocular movements intact. Comments: eyeglasses   Pulmonary:      Effort: Pulmonary effort is normal.      Breath sounds: No wheezing. Abdominal:      General: There is no distension. Palpations: Abdomen is soft. Tenderness: There is no abdominal tenderness. Musculoskeletal:         General: Swelling (trace b/l LE) present. Cervical back: Normal range of motion. Skin:     Coloration: Skin is not jaundiced. Neurological:      Mental Status: He is alert.       Comments: Difficulty speaking in full sentences   Psychiatric:         Mood and Affect: Mood normal.         Behavior: Behavior normal.          Visit Time  Total Visit Duration: 20 minutes

## 2023-08-28 NOTE — PATIENT INSTRUCTIONS
1. Previously elevated BP readings with orthostatic hypotension  -Parkinson's disease can lead to autonomic dysfunction as well as labile BP readings.   -no longer on florinef  -am cortisol ok  -I do note mildly elevated free normetanephrine. Would expect 2-3x normal range to suspect pheochromocytoma  -craig 1.3, renin 0.208. Ratio is normal as of 11/23/19  -TSH ok  -repeat plasma metanephrines normal  -continue to wear compression stockings. Take your time getting up.  -May take 5mg tablet midodrine up to three times daily. -would consider wearing abdominal binder as well  -on enalapril 2.5mg as needed     2. Elevated serum creatinine ? D/t chronic kidney disease stage 2(mild) - b/l sCr 1-1.2 since 2016, last sCr 1.15 which correlates with eGFR > 60 ml/min per CKD EPI equation. ? If this is due to lability in BP readings vs some other cause  -last UA with microscopy shows small leukocytes, 4-10 WBCs     3. Proteinuria  -UpCr < 0.09 and stable as of Jan. 2023  -of note, not anemic. Will defer myeloma workup     4. Nephrolithiasis - 10mm stone in left kidney  -would continue to follow low sodium diet (<2000mg/day). Please read packages to ensure sodium content limited. -drink enough fluids to urinate 2-2.5L/day to prevent stone formation   -on multivitamin     5. History of urinary retention s/p suprapubic catheter - s/p urolift x 2, s/p TURP x 2, bladder neck constricture repair. Follows with urology. 6. Hyponatremia - resolved on bloodwork since May 2021, last sNa 141 as of 8/25/23    7. Hypokalemia - repeat BMP and magnesium level next month     RTC in 6 months. Obtain blood and urine testing prior to office visit.

## 2023-09-04 DIAGNOSIS — G25.81 RLS (RESTLESS LEGS SYNDROME): ICD-10-CM

## 2023-09-05 DIAGNOSIS — J45.909 UNCOMPLICATED ASTHMA, UNSPECIFIED ASTHMA SEVERITY, UNSPECIFIED WHETHER PERSISTENT: ICD-10-CM

## 2023-09-05 RX ORDER — ROPINIROLE 1 MG/1
1 TABLET, FILM COATED ORAL
Qty: 30 TABLET | Refills: 3 | Status: SHIPPED | OUTPATIENT
Start: 2023-09-05

## 2023-09-05 RX ORDER — FLUTICASONE FUROATE AND VILANTEROL TRIFENATATE 100; 25 UG/1; UG/1
POWDER RESPIRATORY (INHALATION)
Qty: 60 EACH | Refills: 5 | Status: SHIPPED | OUTPATIENT
Start: 2023-09-05

## 2023-09-06 ENCOUNTER — TELEPHONE (OUTPATIENT)
Dept: NEPHROLOGY | Facility: CLINIC | Age: 63
End: 2023-09-06

## 2023-09-06 ENCOUNTER — TELEPHONE (OUTPATIENT)
Dept: PULMONOLOGY | Facility: CLINIC | Age: 63
End: 2023-09-06

## 2023-09-06 DIAGNOSIS — J45.909 UNCOMPLICATED ASTHMA, UNSPECIFIED ASTHMA SEVERITY, UNSPECIFIED WHETHER PERSISTENT: Primary | ICD-10-CM

## 2023-09-06 RX ORDER — PREDNISONE 10 MG/1
TABLET ORAL
Qty: 30 TABLET | Refills: 0 | Status: SHIPPED | OUTPATIENT
Start: 2023-09-06

## 2023-09-06 RX ORDER — AZITHROMYCIN 250 MG/1
TABLET, FILM COATED ORAL
Qty: 6 TABLET | Refills: 0 | Status: SHIPPED | OUTPATIENT
Start: 2023-09-06 | End: 2023-09-10

## 2023-09-06 NOTE — TELEPHONE ENCOUNTER
Please let patient know that I have ordered prednisone taper along with a Z-Telly-start over-the-counter Mucinex-I reordered his albuterol    I have ordered chest x-ray if he is able to obtain

## 2023-09-06 NOTE — TELEPHONE ENCOUNTER
Pt's wife calling in stating the pt is wheezing, coughing a lot, lots of mucus, has tried to use his albuterol but the current one he has is . She's unsure of what to do. Pt's wife did state the pt is currently not walking so coming into the office for a sick visit would be very hard for them.

## 2023-09-15 ENCOUNTER — TELEPHONE (OUTPATIENT)
Dept: NEPHROLOGY | Facility: CLINIC | Age: 63
End: 2023-09-15

## 2023-09-15 NOTE — TELEPHONE ENCOUNTER
----- Message from Viki Urena DO sent at 8/28/2023  9:00 AM EDT -----  Please schedule patient for virtual visit in 6 months. Thanks.

## 2023-09-22 RX ORDER — ALBUTEROL SULFATE 0.63 MG/3ML
0.63 SOLUTION RESPIRATORY (INHALATION) EVERY 6 HOURS PRN
COMMUNITY
Start: 2023-09-07

## 2023-09-22 RX ORDER — POLYETHYLENE GLYCOL 3350
17 POWDER (GRAM) MISCELLANEOUS DAILY PRN
COMMUNITY

## 2023-09-22 RX ORDER — CIPROFLOXACIN 500 MG/1
500 TABLET, FILM COATED ORAL 2 TIMES DAILY
COMMUNITY
Start: 2023-08-11

## 2023-09-22 RX ORDER — GUAIFENESIN 600 MG/1
1200 TABLET, EXTENDED RELEASE ORAL 2 TIMES DAILY
COMMUNITY

## 2023-09-27 ENCOUNTER — TELEMEDICINE (OUTPATIENT)
Dept: PULMONOLOGY | Facility: CLINIC | Age: 63
End: 2023-09-27
Payer: MEDICARE

## 2023-09-27 DIAGNOSIS — G70.9 NEUROMUSCULAR DISEASE (HCC): ICD-10-CM

## 2023-09-27 DIAGNOSIS — J96.10 CHRONIC RESPIRATORY FAILURE, UNSPECIFIED WHETHER WITH HYPOXIA OR HYPERCAPNIA (HCC): ICD-10-CM

## 2023-09-27 DIAGNOSIS — J96.10 CHRONIC RESPIRATORY FAILURE, UNSPECIFIED WHETHER WITH HYPOXIA OR HYPERCAPNIA (HCC): Primary | ICD-10-CM

## 2023-09-27 DIAGNOSIS — G25.81 RLS (RESTLESS LEGS SYNDROME): ICD-10-CM

## 2023-09-27 PROCEDURE — 99215 OFFICE O/P EST HI 40 MIN: CPT | Performed by: INTERNAL MEDICINE

## 2023-09-27 RX ORDER — IPRATROPIUM BROMIDE AND ALBUTEROL SULFATE 2.5; .5 MG/3ML; MG/3ML
3 SOLUTION RESPIRATORY (INHALATION) 4 TIMES DAILY
Qty: 360 ML | Refills: 3 | Status: SHIPPED | OUTPATIENT
Start: 2023-09-27 | End: 2023-09-27

## 2023-09-27 RX ORDER — ARFORMOTEROL TARTRATE 15 UG/2ML
15 SOLUTION RESPIRATORY (INHALATION) 2 TIMES DAILY
Qty: 120 ML | Refills: 2 | Status: SHIPPED | OUTPATIENT
Start: 2023-09-27

## 2023-09-27 RX ORDER — BUDESONIDE 0.5 MG/2ML
0.5 INHALANT ORAL 2 TIMES DAILY
Qty: 120 ML | Refills: 3 | Status: SHIPPED | OUTPATIENT
Start: 2023-09-27

## 2023-09-27 RX ORDER — GABAPENTIN 100 MG/1
300 CAPSULE ORAL
Qty: 90 CAPSULE | Refills: 3 | Status: SHIPPED | OUTPATIENT
Start: 2023-09-27 | End: 2024-01-25

## 2023-09-27 NOTE — LETTER
September 27, 2023     Shefali Neely, [address] [place] [address] [address] [place] [address]    Patient: Lida Bowles   YOB: 1960   Date of Visit: 9/27/2023       Dear Dr. Ayden Michelle:    Thank you for referring Nelia Betancourt to me for evaluation. Below are my notes for this consultation. If you have questions, please do not hesitate to call me. I look forward to following your patient along with you. Sincerely,        Luis Carter DO        CC: No Recipients    Luis Carter DO  9/27/2023  4:49 PM  Sign when Signing Visit    Virtual Regular Visit    Verification of patient location:    Patient is located at Home in the following state in which I hold an active license [place]      Assessment/Plan:  58 y.o. M with PMHx of chronic respiratory failure due to Multiple System Atrophy, Parkinson's disease with diaphragmatic weakness, neuromuscular disease,, chronic knee and ankle pain, urinary retention s/p TURP, Moderate KALLI on  iVAPS who comes in for follow up. 1.  Chronic respiratory failure from progressive neurologic weakness with Multiple System Atrophy. He has noted bulbar symptoms with upper airway closing, hoarseness, dysphagia and weak/ hoarse voice. SNIF test is negative but he continues to note progressive dyspnea. -  Unfortunately his disease continues to progress. His family is leaning towards moving to hospice but wanted to discuss possible additional therapy. -  Continue iVAPS therapy as he has good compliance. We discussed utilization through the day as well if he develops worsening shortness of breath. -  He now has had issues with pulmonary clearance of secretions. I will start nebulized duonebs 3-4 times a day. -  Switch breo to nebulized budesonide and brovana q12. -  We will need to follow up with him in 2-4 weeks to ensure this has helped his secretions.        2. Moderate KALLI (AHI - 15.7) - on iVAPS with good compliance as above. Residual AHI - 1.6      -  Continue iVAPS with adjustments as above as above. 3.   Periodic limb movement (PLM index - 109) with augmentation-  This may be secondary to KALLI or Parkinsons. He has noted this worsening symptoms. This also may have occurred in conjunction with Sinemet increase       - Continue Requip at current dose       -  We will continue gabapentin and increase dose to 200 - 300mg qHS    4. Aspiration pneumonia - he just completed antibiotics and steroids. He improved some with this. 5. Multiple system atrophy - will continue to follow with neurology. Problem List Items Addressed This Visit          Respiratory    Chronic respiratory failure ([address]) - Primary    Relevant Medications    ipratropium-albuterol (DUO-NEB) 0.5-2.5 mg/3 mL nebulizer solution    arformoterol (BROVANA) 15 mcg/2 mL nebulizer solution    budesonide (Pulmicort) 0.5 mg/2 mL nebulizer solution       Nervous and Auditory    Neuromuscular disease (HCC)    Relevant Medications    arformoterol (BROVANA) 15 mcg/2 mL nebulizer solution    budesonide (Pulmicort) 0.5 mg/2 mL nebulizer solution     Other Visit Diagnoses       RLS (restless legs syndrome)        Relevant Medications    gabapentin (Neurontin) 100 mg capsule                 Reason for visit is   Chief Complaint   Patient presents with   • Virtual Regular Visit        Encounter provider Zoila Mahoney DO    Provider located at [address] [place] [id number] [phone]      Recent Visits  No visits were found meeting these conditions. Showing recent visits within past 7 days and meeting all other requirements  Future Appointments  No visits were found meeting these conditions. Showing future appointments within next 150 days and meeting all other requirements       The patient was identified by name and date of birth.  Jaqui Jama was informed that this is a telemedicine visit and that the visit is being conducted through the [facility]. He agrees to proceed. .  My office door was closed. No one else was in the room. He acknowledged consent and understanding of privacy and security of the video platform. The patient has agreed to participate and understands they can discontinue the visit at any time. Patient is aware this is a billable service. Manuel Buckley is a 58 y.o. male for follow up of his chronic respiratory failure. HPI   Unfortunately since his last visit, Mr. Jennifer Tan has noted progressive respiratory symptoms and progression of his neurologic disease. He is no longer walking and his voice has worsened as well. A few weeks ago he called as he had developed worsening shortness of breath and an episodes of choking on his secretions. He was treated with steroids and antibiotics for aspiration pneumonia. He noted clinical improvement. However, he is still struggling with his breathing through the day. He also has episodes where his oxygen level drops as well. He is using oxygen during the day as well.       Past Medical History:   Diagnosis Date   • Arthritis early stages in thumbs and knee   • Back pain    • Benign prostatic hyperplasia 2017    TURP surgery 3/15/2019   • Bladder infection    • BPH (benign prostatic hyperplasia)    • Cancer [facility])     testicular 1988   • Chronic kidney disease    • Diverticulosis    • GERD (gastroesophageal reflux disease)     occassional   • History of testicular cancer 1988    Surgery and radiation; left side   • Hypertension Nov, 2018    occassionally   • Kidney stone    • Kidney stones     Currently and in the past   • Orthostatic hypotension    • Orthostatic hypotension due to Parkinson's disease [facility])    • Parkinson's disease ([address])    • Sleep apnea     uses CPAP   • Sleep apnea, obstructive April, 2019    recently diagnosed   • Urinary tract infection    • Wears glasses        Past Surgical History:   Procedure Laterality Date   • BLADDER NECK RECONSTRUCTION  07/27/2020   • BLADDER SURGERY  nov 2019. repair bladder neck constriction   • COLONOSCOPY  2017   • COLONOSCOPY  09/2020   • COLONOSCOPY  11/2020   • CYSTOPLASTY / CYSTOURETHROPLASTY  07/27/2020    [facility]   • HERNIA REPAIR      1981, 1986 inguinal hernia repair   • IR SUPRAPUBIC CATHETER PLACEMENT  02/24/2020   • IR TUBE UPSIZE  03/23/2020   • KIDNEY STONE SURGERY     • LITHOTRIPSY      Renal; Resolved: 2/27/07   • ORCHIECTOMY Left    • DE CYSTO INSERTION TRANSPROSTATIC IMPLANT SINGLE N/A 05/17/2018    Procedure: CYSTOSCOPY WITH INSERTION UROLIFT;  Surgeon: Brendon Johnson DO;  Location: [facility];  Service: Urology   • PROSTATE SURGERY N/A 01/18/2018    Procedure: CYSTOSCOPY WITH INSERTION UROLIFT;  Surgeon: Brendon Johnson DO;  Location: [facility];  Service: Urology   • [facility]    For cancer   • TONSILLECTOMY     • TRANSURETHRAL RESECTION OF PROSTATE  Mar.2019/Jul 2019   • URETERONEOCYSTOSTOMY      w/ cystoscopy Ureteral Stent Placement; Resolved: 6/14/2007   • WISDOM TOOTH EXTRACTION         Current Outpatient Medications   Medication Sig Dispense Refill   • albuterol (ACCUNEB) 0.63 MG/3ML nebulizer solution Inhale 0.63 mg every 6 (six) hours as needed     • arformoterol (BROVANA) 15 mcg/2 mL nebulizer solution Take 2 mL (15 mcg total) by nebulization 2 (two) times a day 120 mL 2   • budesonide (Pulmicort) 0.5 mg/2 mL nebulizer solution Take 2 mL (0.5 mg total) by nebulization 2 (two) times a day Rinse mouth after use. 120 mL 3   • gabapentin (Neurontin) 100 mg capsule Take 3 capsules (300 mg total) by mouth daily at bedtime 90 capsule 3   • ipratropium-albuterol (DUO-NEB) 0.5-2.5 mg/3 mL nebulizer solution Take 3 mL by nebulization 4 (four) times a day 360 mL 3   • Breo Ellipta 100-25 MCG/ACT inhaler INHALE 1 PUFF DAILY RINSE MOUTH AFTER USE.  60 each 5   • captopril (CAPOTEN) 12.5 mg tablet Take 0.5 tablets (6.25 mg total) by mouth 2 (two) times a day as needed (HTN systolic BP >443 mmHg) (Patient not taking: Reported on 2/23/2023) 60 tablet 2   • carbidopa-levodopa (SINEMET)  mg per tablet Take 1 tablet by mouth 3 (three) times a day     • ciprofloxacin (CIPRO) 500 mg tablet Take 500 mg by mouth 2 (two) times a day     • cyanocobalamin (VITAMIN B-12) 1000 MCG tablet Take 1,000 mcg by mouth daily     • Dalfampridine ER 10 MG TB12 3 (three) times a day       • Diclofenac Sodium (VOLTAREN) 1 % Apply 2 g topically 4 (four) times a day 350 g 2   • guaiFENesin (MUCINEX) 600 mg 12 hr tablet Take 1,200 mg by mouth 2 (two) times a day     • midodrine (PROAMATINE) 5 mg tablet TAKE 1 TABLET (5 MG TOTAL) BY MOUTH IF NEEDED (AS NEEDED FOR LOW BLOOD PRESSURE) 90 tablet 1   • pantoprazole (PROTONIX) 40 mg tablet TAKE 1 TABLET BY MOUTH 2 TIMES A DAY BEFORE MEALS. (Patient not taking: Reported on 6/15/2023) 60 tablet 5   • Polyethylene Glycol 3350 POWD Take 17 g by mouth daily as needed     • predniSONE 10 mg tablet 4 tabs x 3 days decrease by 1 tablet every third day 30 tablet 0   • rOPINIRole (REQUIP) 1 mg tablet TAKE 1 TABLET BY MOUTH DAILY AT BEDTIME 30 tablet 3   • selegiline (ELDEPRYL) 5 mg capsule Take 5 mg by mouth 2 (two) times a day before meals     • tadalafil (CIALIS) 5 MG tablet Take 5 mg by mouth daily       No current facility-administered medications for this visit. No Known Allergies    Review of Systems    Video Exam    There were no vitals filed for this visit. Physical Exam   General: Pleasant, Awake alert and oriented x 3, conversant without conversational dyspnea, NAD, normalaffect  HEENT:  PERRL, Sclera noninjected, nonicteric OU, Nares patent,  no craniofacial abnormalities, Mucous membranes, moist, no oral lesions, normal dentition  NECK: Trachea midline, no accessory muscle use, no stridor  CARDIAC, PULM and ABD: Could not be performed on video visit.     NEURO: no focal neurologic deficits, AAOx3, moving all extremities appropriately    PFT 10/26/20  Results:  FEV1/FVC Ratio: 77 %  Forced Vital Capacity: 4.88 L    99 % predicted  FEV1: 3.75 L     99 % predicted  Lung volumes by body plethysmography:   Total Lung Capacity 99 % predicted   Residual volume 96 % predicted  DLCO corrected for patients hemoglobin level: 83 %  Interpretation:  • No obstructive airflow defect on spirometry  • Normal Spirometry  • Normal Lung volumes  • Normal diffusion capacity  • Flow volume loop is normal.     3/4/19  Results:  FEV1/FVC Ratio: 80 %  Forced Vital Capacity: 4.80 L    93 % predicted  FEV1: 3.85 L     97 % predicted  Lung volumes by body plethysmography:   Total Lung Capacity 94 % predicted   Residual volume 88 % predicted  DLCO corrected for patients hemoglobin level: 79 %  Interpretation:  • No obstructive airflow defect   • Normal Spirometry  • Normal Lung volumes  • Normal diffusion capacity     Repeat 6 minute walk 20  Starting saturation - 98%   Starting HR - 89  Lowest saturation - 91%    Highest HR - 103  Ambulated - 252 m and he did not require oxygen     6 minute walk  Date of testin2019  Resting room air saturation: 93%  Randy scale of dyspnea at start of test: 0/10     Ambulation testing:  Lowest saturation 93%  No supplemental oxygen required     Randy scale of dyspnea at end of test: 3/10  Reason if test was stopped early: N/A      Total 6 minute walk distance: 1400 feet     Imaging:  I personally reviewed the images on the [facility] system pertinent to today's visit  CT chest - 19  IMPRESSION:  Within normal limits CT of the chest.     Repeat SNF test 10/26/20  IMPRESSION:  There is no evidence of diaphragmatic paralysis or elevation. Other studies:  HST - moderate (15.7), Supine AHI - 23, hypoxia   CPAP titration - Nasal CPAP was titrated from 5-11 cm of water for  control of snoring and abnormal breathing.  Patient appeared to do best at 11 cm of CPAP delivered using a [address] [place] [address] Ave full face interface. Continued use of this equipment at these settings is recommended.      New Compliance Data:  6/15/23 - 9/22/23                                   Type of CPAP:  AirCurve 10 ST-A (IVAPS) EPAP 14, min PSV - 4, max PSV - 15, alveolar ventilation - 6.5L                                   Percent usage: 97%                                   Average time used: 7 hrs 12 mins                                   Residual AHI: 1.6    Compliance Data:  Type of CPAP:  AirCurve 10 ST-A (IVAPS)                                   Percent usage: 93%                                   Average time used: 5 hrs 48 mins                                   Residual AHI: 6.1                                     Compliance Data:  4/7/22-7/5/22                                   Type of device:  iVAPS alveolar ventilation 6L/min,  EPAP 15, PSV 4-15, target 12 bpm                                  Percent usage: 97%, 88%> 4hrs                                   Average time used: 5 hrs 57 mins                                   Residual AHI: 3.3     Compliance Data:  2/28/22-3/29/22                                   Type of device:  iVAPS alveolar ventilation 6L/min,  EPAP 12, PSV 4-15, target 12 bpm                                  Percent usage: 57%, 43 %> 4hrs                                   Average time used: 3 hrs 9 mins - 5 hrs 34 mins                                   Residual AHI: 6.8     Compliance Data:  11/9/21-12/8/21                                   Type of CPAP:  BiPAP 12/8                                  Percent usage: 97%, 93 %> 4hrs                                   Average time used: 6 hrs 36 mins                                  Time in large leak: 3 mins 43 secs                                   Residual AHI: 4.3     Compliance Data:  9/8/21-10/17/21                                   Type of CPAP:  BiPAP 12/8                                  Percent usage: 100%, 93 %> 4hrs                                   Average time used: 5hrs 58 mins - 8 hrs 36 mins                                  Time in large leak: 48 secs                                   Residual AHI: 5.6     Compliance Data:  3/12/21-5/12/21                                   Type of CPAP:  BiPAP 15/10                                  Percent usage: 100%, 89 %> 4hrs                                   Average time used: 5hrs 36 mins - 8 hrs 18 mins                                  Time in large leak: 10 min 15 secs                                   Residual AHI: 6.8       Compliance Data:  3/12/21-5/12/21                                   Type of CPAP:  BiPAP 15/10                                  Percent usage: 100%, 89 %> 4hrs                                   Average time used: 5hrs 36 mins - 8 hrs 18 mins                                  Time in large leak: 10 min 15 secs                                   Residual AHI: 6.8      Compliance Data:  1/2/21-2/10/21                                   Type of CPAP:  auto BiPAP min EPAP 11, PSV 4-6, Max IPAP 25,                                    Mean EPAP - 12- 14.8, IPAP 17 - Max IPAP 18.8                                   Percent usage: 95%, 85 %> 4hrs                                   Average time used: 5hrs 26 mins - 7 hrs 40 mins                                  Time in large leak: 6 min 22 secs                                   Residual AHI: 4.8 (CA - 3.0)     Compliance Data:  10/24/20-11/22/20                                   Type of CPAP:  auto BiPAP min EPAP 11, PSV 4-6, Max IPAP 25,                                    Mean EPAP - 11.7- 16.9, IPAP 16.2 - Max IPAP 22.9                                   Percent usage: 63%, 30%> 4hrs                                   Average time used: 2hrs 40 mins - 6 hrs 45 mins                                  Time in large leak: 16 min 19 secs                                   Residual AHI: 5.6 (CA - 3.1)     Compliance Data: 7/28/20-8/26/20                                   Type of CPAP:  auto BiPAP min EPAP 11, PSV 4, Max IPAP 25,                                    Mean EPAP - 12.2- 17.6, IPAP 16.2 - Max IPAP 22                                   Percent usage: 97%, 90%> 4hrs                                   Average time used: 5hrs 30 mins - 8 hrs 25 mins                                  Time in large leak: 4 min 39 secs                                   Residual AHI: 7.0 (CA - 5.1)     Compliance Data:  1/5/20-2/5/20                                   Type of CPAP:  auto BiPAP min EPAP 11, PSV 4, Max IPAP 25,                                    Mean EPAP recorded - 12.1, Peak - 14.9, Min IPAP 16, Max IPAP 19                                   Percent usage: 72%, 63%> 4hrs                                   Average time used: 3hrs 51 mins - 8 hrs 26 mins                                  Time in large leak: 50 secs                                   Residual AHI: 6.6  (CA - 3.9)     Compliance Data:  10/23/19-11/21/19                                   Type of CPAP:  autoPAP 11-15, Mean - 13.2, Peak - 15.0                                   Percent usage: 73%, 67%> 4hrs                                   Average time used: 3hrs 38 mins - 8 hrs 37 mins                                  Time in large leak: 9 mins 46 secs                                   Residual AHI: 11.0  (CA - 0.9)     Compliance Data:  8/25/19-9/23/19                                   Type of CPAP:  autoPAP 8-15, Mean - 11.9, Peak - 15.6                                   Percent usage: 63%                                   Average time used: 2hrs 52 mins - 4 hrs 32 mins                                  Time in large leak: 4 mins 44 secs                                   Residual AHI: 13.6  (CA - 0.9)     Visit Time  Total Visit Duration: 20 minutes

## 2023-09-27 NOTE — PROGRESS NOTES
Virtual Regular Visit    Verification of patient location:    Patient is located at Home in the following state in which I hold an active license [place]      Assessment/Plan:  63 y.o. M with PMHx of chronic respiratory failure due to Multiple System Atrophy, Parkinson's disease with diaphragmatic weakness, neuromuscular disease,, chronic knee and ankle pain, urinary retention s/p TURP, Moderate KALLI on  iVAPS who comes in for follow up. 1.  Chronic respiratory failure from progressive neurologic weakness with Multiple System Atrophy.  He has noted bulbar symptoms with upper airway closing, hoarseness, dysphagia and weak/ hoarse voice.  SNIF test is negative but he continues to note progressive dyspnea.        -  Unfortunately his disease continues to progress. His family is leaning towards moving to hospice but wanted to discuss possible additional therapy. -  Continue iVAPS therapy as he has good compliance. We discussed utilization through the day as well if he develops worsening shortness of breath.            -  He now has had issues with pulmonary clearance of secretions. I will start nebulized duonebs 3-4 times a day. -  Switch breo to nebulized budesonide and brovana q12. -  We will need to follow up with him in 2-4 weeks to ensure this has helped his secretions.       2. Moderate KALLI (AHI - 15.7) - on iVAPS with good compliance as above.  Residual AHI - 1.6      -  Continue iVAPS with adjustments as above as above.       3.   Periodic limb movement (PLM index - 109) with augmentation-  This may be secondary to KALLI or Scott Heller has noted this worsening symptoms. This also may have occurred in conjunction with Sinemet increase       - Continue Requip at current dose       -  We will continue gabapentin and increase dose to 200 - 300mg qHS    4. Aspiration pneumonia - he just completed antibiotics and steroids. He improved some with this.       5. Multiple system atrophy - will continue to follow with neurology.          Problem List Items Addressed This Visit        Respiratory    Chronic respiratory failure ([address]) - Primary    Relevant Medications    ipratropium-albuterol (DUO-NEB) 0.5-2.5 mg/3 mL nebulizer solution    arformoterol (BROVANA) 15 mcg/2 mL nebulizer solution    budesonide (Pulmicort) 0.5 mg/2 mL nebulizer solution       Nervous and Auditory    Neuromuscular disease (HCC)    Relevant Medications    arformoterol (BROVANA) 15 mcg/2 mL nebulizer solution    budesonide (Pulmicort) 0.5 mg/2 mL nebulizer solution   Other Visit Diagnoses     RLS (restless legs syndrome)        Relevant Medications    gabapentin (Neurontin) 100 mg capsule               Reason for visit is   Chief Complaint   Patient presents with   • Virtual Regular Visit        Encounter provider Pool Funes DO    Provider located at [address] [place] [address]  [address] [place] [id number] [phone]      Recent Visits  No visits were found meeting these conditions. Showing recent visits within past 7 days and meeting all other requirements  Future Appointments  No visits were found meeting these conditions. Showing future appointments within next 150 days and meeting all other requirements       The patient was identified by name and date of birth. Emery Sena was informed that this is a telemedicine visit and that the visit is being conducted through the [employer]. He agrees to proceed. .  My office door was closed. No one else was in the room. He acknowledged consent and understanding of privacy and security of the video platform. The patient has agreed to participate and understands they can discontinue the visit at any time. Patient is aware this is a billable service. Subjective  Emery Sena is a 58 y.o. male for follow up of his chronic respiratory failure.         HPI   Unfortunately since his last visit, Mr. Samson Alonso has noted progressive respiratory symptoms and progression of his neurologic disease. He is no longer walking and his voice has worsened as well. A few weeks ago he called as he had developed worsening shortness of breath and an episodes of choking on his secretions. He was treated with steroids and antibiotics for aspiration pneumonia. He noted clinical improvement. However, he is still struggling with his breathing through the day. He also has episodes where his oxygen level drops as well. He is using oxygen during the day as well. Past Medical History:   Diagnosis Date   • Arthritis early stages in thumbs and knee   • Back pain    • Benign prostatic hyperplasia 2017    TURP surgery 3/15/2019   • Bladder infection    • BPH (benign prostatic hyperplasia)    • Cancer [facility])     testicular 1988   • Chronic kidney disease    • Diverticulosis    • GERD (gastroesophageal reflux disease)     occassional   • History of testicular cancer 1988    Surgery and radiation; left side   • Hypertension Nov, 2018    occassionally   • Kidney stone    • Kidney stones     Currently and in the past   • Orthostatic hypotension    • Orthostatic hypotension due to Parkinson's disease [facility])    • Parkinson's disease ([address])    • Sleep apnea     uses CPAP   • Sleep apnea, obstructive April, 2019    recently diagnosed   • Urinary tract infection    • Wears glasses        Past Surgical History:   Procedure Laterality Date   • BLADDER NECK RECONSTRUCTION  07/27/2020   • BLADDER SURGERY  nov 2019.  repair bladder neck constriction   • COLONOSCOPY  2017   • COLONOSCOPY  09/2020   • COLONOSCOPY  11/2020   • CYSTOPLASTY / CYSTOURETHROPLASTY  07/27/2020    [facility]   • HERNIA REPAIR      1981, 1986 inguinal hernia repair   • IR SUPRAPUBIC CATHETER PLACEMENT  02/24/2020   • IR TUBE UPSIZE  03/23/2020   • KIDNEY STONE SURGERY     • LITHOTRIPSY      Renal; Resolved: 2/27/07   • ORCHIECTOMY Left    • OR CYSTO INSERTION TRANSPROSTATIC IMPLANT SINGLE N/A 05/17/2018    Procedure: CYSTOSCOPY WITH INSERTION UROLIFT;  Surgeon: Elton Hudson DO;  Location: [facility];  Service: Urology   • PROSTATE SURGERY N/A 01/18/2018    Procedure: CYSTOSCOPY WITH INSERTION Evelyn Angeles;  Surgeon: Elton Hudson DO;  Location: [facility];  Service: Urology   • [facility]    For cancer   • TONSILLECTOMY     • TRANSURETHRAL RESECTION OF PROSTATE  Mar.2019/Jul 2019   • URETERONEOCYSTOSTOMY      w/ cystoscopy Ureteral Stent Placement; Resolved: 6/14/2007   • WISDOM TOOTH EXTRACTION         Current Outpatient Medications   Medication Sig Dispense Refill   • albuterol (ACCUNEB) 0.63 MG/3ML nebulizer solution Inhale 0.63 mg every 6 (six) hours as needed     • arformoterol (BROVANA) 15 mcg/2 mL nebulizer solution Take 2 mL (15 mcg total) by nebulization 2 (two) times a day 120 mL 2   • budesonide (Pulmicort) 0.5 mg/2 mL nebulizer solution Take 2 mL (0.5 mg total) by nebulization 2 (two) times a day Rinse mouth after use. 120 mL 3   • gabapentin (Neurontin) 100 mg capsule Take 3 capsules (300 mg total) by mouth daily at bedtime 90 capsule 3   • ipratropium-albuterol (DUO-NEB) 0.5-2.5 mg/3 mL nebulizer solution Take 3 mL by nebulization 4 (four) times a day 360 mL 3   • Breo Ellipta 100-25 MCG/ACT inhaler INHALE 1 PUFF DAILY RINSE MOUTH AFTER USE.  60 each 5   • captopril (CAPOTEN) 12.5 mg tablet Take 0.5 tablets (6.25 mg total) by mouth 2 (two) times a day as needed (HTN systolic BP >147 mmHg) (Patient not taking: Reported on 2/23/2023) 60 tablet 2   • carbidopa-levodopa (SINEMET)  mg per tablet Take 1 tablet by mouth 3 (three) times a day     • ciprofloxacin (CIPRO) 500 mg tablet Take 500 mg by mouth 2 (two) times a day     • cyanocobalamin (VITAMIN B-12) 1000 MCG tablet Take 1,000 mcg by mouth daily     • Dalfampridine ER 10 MG TB12 3 (three) times a day       • Diclofenac Sodium (VOLTAREN) 1 % Apply 2 g topically 4 (four) times a day 350 g 2 • guaiFENesin (MUCINEX) 600 mg 12 hr tablet Take 1,200 mg by mouth 2 (two) times a day     • midodrine (PROAMATINE) 5 mg tablet TAKE 1 TABLET (5 MG TOTAL) BY MOUTH IF NEEDED (AS NEEDED FOR LOW BLOOD PRESSURE) 90 tablet 1   • pantoprazole (PROTONIX) 40 mg tablet TAKE 1 TABLET BY MOUTH 2 TIMES A DAY BEFORE MEALS. (Patient not taking: Reported on 6/15/2023) 60 tablet 5   • Polyethylene Glycol 3350 POWD Take 17 g by mouth daily as needed     • predniSONE 10 mg tablet 4 tabs x 3 days decrease by 1 tablet every third day 30 tablet 0   • rOPINIRole (REQUIP) 1 mg tablet TAKE 1 TABLET BY MOUTH DAILY AT BEDTIME 30 tablet 3   • selegiline (ELDEPRYL) 5 mg capsule Take 5 mg by mouth 2 (two) times a day before meals     • tadalafil (CIALIS) 5 MG tablet Take 5 mg by mouth daily       No current facility-administered medications for this visit. No Known Allergies    Review of Systems    Video Exam    There were no vitals filed for this visit. Physical Exam   General: Pleasant, Awake alert and oriented x 3, conversant without conversational dyspnea, NAD, normalaffect  HEENT:  PERRL, Sclera noninjected, nonicteric OU, Nares patent,  no craniofacial abnormalities, Mucous membranes, moist, no oral lesions, normal dentition  NECK: Trachea midline, no accessory muscle use, no stridor  CARDIAC, PULM and ABD: Could not be performed on video visit.     NEURO: no focal neurologic deficits, AAOx3, moving all extremities appropriately    PFT 10/26/20  Results:  FEV1/FVC Ratio: 77 %  Forced Vital Capacity: 4.88 L    99 % predicted  FEV1: 3.75 L     99 % predicted  Lung volumes by body plethysmography:   Total Lung Capacity 99 % predicted   Residual volume 96 % predicted  DLCO corrected for patients hemoglobin level: 83 %  Interpretation:  • No obstructive airflow defect on spirometry  • Normal Spirometry  • Normal Lung volumes  • Normal diffusion capacity  • Flow volume loop is normal.     3/4/19  Results:  FEV1/FVC Ratio: 80 %  Forced Vital Capacity: 4.80 L    93 % predicted  FEV1: 3.85 L     97 % predicted  Lung volumes by body plethysmography:   Total Lung Capacity 94 % predicted   Residual volume 88 % predicted  DLCO corrected for patients hemoglobin level: 79 %  Interpretation:  • No obstructive airflow defect   • Normal Spirometry  • Normal Lung volumes  • Normal diffusion capacity     Repeat 6 minute walk 20  Starting saturation - 98%   Starting HR - 89  Lowest saturation - 91%    Highest HR - 103  Ambulated - 252 m and he did not require oxygen     6 minute walk  Date of testin2019  Resting room air saturation: 93%  Randy scale of dyspnea at start of test: 0/10     Ambulation testing:  Lowest saturation 93%  No supplemental oxygen required     Randy scale of dyspnea at end of test: 3/10  Reason if test was stopped early: N/A      Total 6 minute walk distance: 1400 feet     Imaging:  I personally reviewed the images on the [facility] system pertinent to today's visit  CT chest - 19  IMPRESSION:  Within normal limits CT of the chest.     Repeat SNF test 10/26/20  IMPRESSION:  There is no evidence of diaphragmatic paralysis or elevation.        Other studies:  HST - moderate (15.7), Supine AHI - 23, hypoxia   CPAP titration - Nasal CPAP was titrated from 5-11 cm of water for  control of snoring and abnormal breathing. Patient appeared to do best at 11 cm of CPAP delivered using a [address] full face interface.  Continued use of this equipment at these settings is recommended.     New Compliance Data:  6/15/23 - 23                                   Type of CPAP:  AirCurve 10 ST-A (IVAPS) EPAP 14, min PSV - 4, max PSV - 15, alveolar ventilation - 6.5L                                   Percent usage: 97%                                   Average time used: 7 hrs 12 mins                                   Residual AHI: 1.6    Compliance Data:  Type of CPAP:  AirCurve 10 ST-A (IVAPS)                                   Percent usage: 93%                                   Average time used: 5 hrs 48 mins                                   Residual AHI: 6.1                                     Compliance Data:  4/7/22-7/5/22                                   Type of device:  iVAPS alveolar ventilation 6L/min,  EPAP 15, PSV 4-15, target 12 bpm                                  Percent usage: 97%, 88%> 4hrs                                   Average time used: 5 hrs 57 mins                                   Residual AHI: 3.3     Compliance Data:  2/28/22-3/29/22                                   Type of device:  iVAPS alveolar ventilation 6L/min,  EPAP 12, PSV 4-15, target 12 bpm                                  Percent usage: 57%, 43 %> 4hrs                                   Average time used: 3 hrs 9 mins - 5 hrs 34 mins                                   Residual AHI: 6.8     Compliance Data:  11/9/21-12/8/21                                   Type of CPAP:  BiPAP 12/8                                  Percent usage: 97%, 93 %> 4hrs                                   Average time used: 6 hrs 36 mins                                  Time in large leak: 3 mins 43 secs                                   Residual AHI: 4.3     Compliance Data:  9/8/21-10/17/21                                   Type of CPAP:  BiPAP 12/8                                  Percent usage: 100%, 93 %> 4hrs                                   Average time used: 5hrs 58 mins - 8 hrs 36 mins                                  Time in large leak: 48 secs                                   Residual AHI: 5.6     Compliance Data:  3/12/21-5/12/21                                   Type of CPAP:  BiPAP 15/10                                  Percent usage: 100%, 89 %> 4hrs                                   Average time used: 5hrs 36 mins - 8 hrs 18 mins                                  Time in large leak: 10 min 15 secs                                   Residual AHI: 6.8       Compliance Data:  3/12/21-5/12/21                                   Type of CPAP:  BiPAP 15/10                                  Percent usage: 100%, 89 %> 4hrs                                   Average time used: 5hrs 36 mins - 8 hrs 18 mins                                  Time in large leak: 10 min 15 secs                                   Residual AHI: 6.8      Compliance Data:  1/2/21-2/10/21                                   Type of CPAP:  auto BiPAP min EPAP 11, PSV 4-6, Max IPAP 25,                                    Mean EPAP - 12- 14.8, IPAP 17 - Max IPAP 18.8                                   Percent usage: 95%, 85 %> 4hrs                                   Average time used: 5hrs 26 mins - 7 hrs 40 mins                                  Time in large leak: 6 min 22 secs                                   Residual AHI: 4.8 (CA - 3.0)     Compliance Data:  10/24/20-11/22/20                                   Type of CPAP:  auto BiPAP min EPAP 11, PSV 4-6, Max IPAP 25,                                    Mean EPAP - 11.7- 16.9, IPAP 16.2 - Max IPAP 22.9                                   Percent usage: 63%, 30%> 4hrs                                   Average time used: 2hrs 40 mins - 6 hrs 45 mins                                  Time in large leak: 16 min 19 secs                                   Residual AHI: 5.6 (CA - 3.1)     Compliance Data:  7/28/20-8/26/20                                   Type of CPAP:  auto BiPAP min EPAP 11, PSV 4, Max IPAP 25,                                    Mean EPAP - 12.2- 17.6, IPAP 16.2 - Max IPAP 22                                   Percent usage: 97%, 90%> 4hrs                                   Average time used: 5hrs 30 mins - 8 hrs 25 mins                                  Time in large leak: 4 min 39 secs                                   Residual AHI: 7.0 (CA - 5.1)     Compliance Data:  1/5/20-2/5/20                                   Type of CPAP:  auto BiPAP min EPAP 11, PSV 4, Max IPAP 25,                                    Mean EPAP recorded - 12.1, Peak - 14.9, Min IPAP 16, Max IPAP 19                                   Percent usage: 72%, 63%> 4hrs                                   Average time used: 3hrs 51 mins - 8 hrs 26 mins                                  Time in large leak: 50 secs                                   Residual AHI: 6.6  (CA - 3.9)     Compliance Data:  10/23/19-11/21/19                                   Type of CPAP:  autoPAP 11-15, Mean - 13.2, Peak - 15.0                                   Percent usage: 73%, 67%> 4hrs                                   Average time used: 3hrs 38 mins - 8 hrs 37 mins                                  Time in large leak: 9 mins 46 secs                                   Residual AHI: 11.0  (CA - 0.9)     Compliance Data:  8/25/19-9/23/19                                   Type of CPAP:  autoPAP 8-15, Mean - 11.9, Peak - 15.6                                   Percent usage: 63%                                   Average time used: 2hrs 52 mins - 4 hrs 32 mins                                  Time in large leak: 4 mins 44 secs                                   Residual AHI: 13.6  (CA - 0.9)     Visit Time  Total Visit Duration: 20 minutes

## 2023-09-28 ENCOUNTER — TELEPHONE (OUTPATIENT)
Dept: PALLIATIVE MEDICINE | Facility: CLINIC | Age: 63
End: 2023-09-28

## 2023-09-28 DIAGNOSIS — J96.10 CHRONIC RESPIRATORY FAILURE, UNSPECIFIED WHETHER WITH HYPOXIA OR HYPERCAPNIA (HCC): ICD-10-CM

## 2023-09-28 RX ORDER — IPRATROPIUM BROMIDE AND ALBUTEROL SULFATE 2.5; .5 MG/3ML; MG/3ML
3 SOLUTION RESPIRATORY (INHALATION) 4 TIMES DAILY
Qty: 360 ML | Refills: 3 | Status: SHIPPED | OUTPATIENT
Start: 2023-09-28 | End: 2023-09-28

## 2023-09-28 NOTE — TELEPHONE ENCOUNTER
Reviewed chart. Pt seen by Pulmonary 09/27/23. Faxed to Pulmonary office. Note pt being followed by [facility] with [facility]. Noted transitioned to hospice with [facility].      Done task

## 2023-09-30 ENCOUNTER — TELEPHONE (OUTPATIENT)
Dept: OTHER | Facility: OTHER | Age: 63
End: 2023-09-30

## 2023-09-30 NOTE — TELEPHONE ENCOUNTER
Patient was added to home hospice services and is requesting Dr. Connie Marquez to be the hospice Physician. Also the nurse has a medication question and would like a call back to discuss.

## 2023-10-02 ENCOUNTER — TELEPHONE (OUTPATIENT)
Dept: FAMILY MEDICINE CLINIC | Facility: CLINIC | Age: 63
End: 2023-10-02

## 2023-10-02 NOTE — TELEPHONE ENCOUNTER
Sanjay Arzola spoke to the hospice nurse. She was told Dr Basim Todd would follow patient in hospice services. Nurse said she will reach out to him when she needs anything.

## 2023-10-04 DIAGNOSIS — G25.81 RLS (RESTLESS LEGS SYNDROME): ICD-10-CM

## 2023-10-04 NOTE — TELEPHONE ENCOUNTER
Pt is on hospice through [facility]. I sent an Rx refill to their pharmacy for Requip.   If any problems I will call [facility] @ [phone]

## 2023-10-05 RX ORDER — ROPINIROLE 1 MG/1
1 TABLET, FILM COATED ORAL
Qty: 90 TABLET | Refills: 0 | Status: SHIPPED | OUTPATIENT
Start: 2023-10-05

## 2023-12-19 ENCOUNTER — TELEMEDICINE (OUTPATIENT)
Dept: PULMONOLOGY | Facility: CLINIC | Age: 63
End: 2023-12-19
Payer: MEDICARE

## 2023-12-19 DIAGNOSIS — G90.3 MULTIPLE SYSTEM ATROPHY (HCC): Primary | ICD-10-CM

## 2023-12-19 DIAGNOSIS — G70.9 NEUROMUSCULAR DISEASE (HCC): ICD-10-CM

## 2023-12-19 PROCEDURE — 99214 OFFICE O/P EST MOD 30 MIN: CPT | Performed by: INTERNAL MEDICINE

## 2023-12-19 NOTE — PROGRESS NOTES
Virtual Regular Visit    Verification of patient location:    Patient is located at Home in the following state in which I hold an active license [place]    Assessment/Plan:  63 y.o. M with PMHx of Multiple System Atrophy, Parkinson's disease with diaphragmatic weakness, chronic knee and ankle pain, urinary retention s/p TURP, Moderate KALLI who comes in for follow up.  1.  Chronic respiratory failure with hypoxia and hypercapnia from progressive neurologic weakness with Multiple System Atrophy.  He has noted progression with worsening bulbar symptoms with upper airway closing, hoarseness, dysphagia and weak/ hoarse voice.         -  He has since move on to hospice since our last visit.          -  Currently using oxygen through the day as well.          -  He has been using his iVAPS therapy with good compliance  However, it does seem the target for ventilation may be elevated.  I will attempt to see if a slightly lower minute ventilation may be beneficial to prevent distension.          - Can consider Ti of 1.1 if more ventilation is needed.             -  He is using opiates intermittently for work of breathing.  Continue PRN morphine and ativan as needed.          -  Continue budesonide and brovana insufficiency.       2. Moderate KALLI (AHI - 15.7) - on iVAPS with good compliance as above.  Residual AHI - 3.3      -  Continue iVAPS as above.        -  He is aware of the risk of leaving sleep apnea untreated including hypertension, heart failure, arrhythmia, MI and stroke.     3.   Periodic limb movement (PLM index - 109) -  This may be secondary to KALLI or Parkinsons.  He denies symptoms of this.         - Continue Requip to 1.25mg qHS as he notes persistent symptoms       -  He is intermittently using opiates for work of breathing and this may help his RLS symptoms.         Reason for visit is   Chief Complaint   Patient presents with    Virtual Regular Visit          Encounter provider Teofilo Chapin  DO    [facility]  [address]  [place] [id number] [phone]      Recent Visits  No visits were found meeting these conditions.  Showing recent visits within past 7 days and meeting all other requirements  Today's Visits  Date Type Provider Dept   12/19/23 Telemedicine Teofilo Chapin DO  [facility] [place]   Showing today's visits and meeting all other requirements  Future Appointments  No visits were found meeting these conditions.  Showing future appointments within next 150 days and meeting all other requirements       The patient was identified by name and date of birth. Orlando Rodriguez was informed that this is a telemedicine visit and that the visit is being conducted through the Epic Embedded platform. He agrees to proceed..  My office door was closed. No one else was in the room.  He acknowledged consent and understanding of privacy and security of the video platform. The patient has agreed to participate and understands they can discontinue the visit at any time.    Patient is aware this is a billable service.     Subjective  Orlando Rodriguez is a 63 y.o. male who calls in for follow up.        HPI   Mr. Rodriguez and his wife called in to discuss his respiratory status.  He has noted progression in his shortness of breath and is currently on oxygen.  He has noted worsening hoarseness and fatigue.  He notes increased secretions.  He has been using nebulizers which seems to help.  He denies wheezing or chest tightness.      Answers submitted by the patient for this visit:  Pulmonology Questionnaire (Submitted on 12/19/2023)  Chief Complaint: Primary symptoms  Do you have a hoarse voice?: Yes  Chronicity: chronic  When did you first notice your symptoms?: more than 1 month ago  How often do your symptoms occur?: daily  Since you first noticed this problem, how has it changed?: gradually worsening  Do  you have shortness of breath that occurs with effort or exertion?: Yes  Do you have ear congestion?: No  Do you have heartburn?: No  Do you have fatigue?: Yes  Do you have nasal congestion?: No  Do you have shortness of breath when lying flat?: No  Do you have shortness of breath when you wake up?: No  Do you have sweats?: Yes  Have you experienced weight loss?: Yes  Which of the following makes your symptoms worse?: any activity, exercise, minimal activity, strenuous activity  Which of the following makes your symptoms better?: cold air, rest    Past Medical History:   Diagnosis Date    Arthritis early stages in thumbs and knee    Back pain     Benign prostatic hyperplasia 2017    TURP surgery 3/15/2019    Bladder infection     BPH (benign prostatic hyperplasia)     Cancer (HCC)     testicular 1988    Chronic kidney disease     Diverticulosis     GERD (gastroesophageal reflux disease)     occassional    History of testicular cancer 1988    Surgery and radiation; left side    Hypertension Nov, 2018    occassionally    Kidney stone     Kidney stones     Currently and in the past    Orthostatic hypotension     Orthostatic hypotension due to Parkinson's disease (HCC)     Parkinson's disease (HCC)     Sleep apnea     uses CPAP    Sleep apnea, obstructive April, 2019    recently diagnosed    Urinary tract infection     Wears glasses        Past Surgical History:   Procedure Laterality Date    BLADDER NECK RECONSTRUCTION  07/27/2020    BLADDER SURGERY  nov 2019. repair bladder neck constriction    COLONOSCOPY  2017    COLONOSCOPY  09/2020    COLONOSCOPY  11/2020    CYSTOPLASTY / CYSTOURETHROPLASTY  07/27/2020    [facility]    HERNIA REPAIR      1981, 1986 inguinal hernia repair    IR SUPRAPUBIC CATHETER PLACEMENT  02/24/2020    IR TUBE UPSIZE  03/23/2020    KIDNEY STONE SURGERY      LITHOTRIPSY      Renal; Resolved: 2/27/07    ORCHIECTOMY Left     VT CYSTO INSERTION TRANSPROSTATIC IMPLANT SINGLE N/A 05/17/2018     Procedure: CYSTOSCOPY WITH INSERTION UROLIFT;  Surgeon: Derick Zepeda DO;  [facility]: [facility];  Service: Urology    PROSTATE SURGERY N/A 01/18/2018    Procedure: CYSTOSCOPY WITH INSERTION UROLIFT;  Surgeon: Derick Zepeda DO;  [facility];  Service: Urology    TESTICLE SURGERY  1988    For cancer    TONSILLECTOMY      TRANSURETHRAL RESECTION OF PROSTATE  Mar.2019/Jul 2019    URETERONEOCYSTOSTOMY      w/ cystoscopy Ureteral Stent Placement; Resolved: 6/14/2007    WISDOM TOOTH EXTRACTION         Current Outpatient Medications   Medication Sig Dispense Refill    albuterol (ACCUNEB) 0.63 MG/3ML nebulizer solution Inhale 0.63 mg every 6 (six) hours as needed      arformoterol (BROVANA) 15 mcg/2 mL nebulizer solution Take 2 mL (15 mcg total) by nebulization 2 (two) times a day 120 mL 2    budesonide (Pulmicort) 0.5 mg/2 mL nebulizer solution Take 2 mL (0.5 mg total) by nebulization 2 (two) times a day Rinse mouth after use. 120 mL 3    carbidopa-levodopa (SINEMET)  mg per tablet Take 1 tablet by mouth 3 (three) times a day      Dalfampridine ER 10 MG TB12 3 (three) times a day        gabapentin (Neurontin) 100 mg capsule Take 3 capsules (300 mg total) by mouth daily at bedtime 90 capsule 3    guaiFENesin (MUCINEX) 600 mg 12 hr tablet Take 1,200 mg by mouth 2 (two) times a day      ipratropium (ATROVENT) 0.02 % nebulizer solution Take 2.5 mL (0.5 mg total) by nebulization 4 (four) times a day 360 mL 0    midodrine (PROAMATINE) 5 mg tablet TAKE 1 TABLET (5 MG TOTAL) BY MOUTH IF NEEDED (AS NEEDED FOR LOW BLOOD PRESSURE) 90 tablet 1    Polyethylene Glycol 3350 POWD Take 17 g by mouth daily as needed      rOPINIRole (REQUIP) 1 mg tablet Take 1 tablet (1 mg total) by mouth daily at bedtime 90 tablet 0    selegiline (ELDEPRYL) 5 mg capsule Take 5 mg by mouth 2 (two) times a day before meals      cyanocobalamin (VITAMIN B-12) 1000 MCG tablet Take 1,000 mcg by mouth daily      tadalafil (CIALIS) 5 MG tablet  Take 5 mg by mouth daily       No current facility-administered medications for this visit.        No Known Allergies    Review of Systems   Constitutional:  Positive for fatigue.   HENT: Negative.     Eyes: Negative.    Respiratory:  Positive for chest tightness, shortness of breath and wheezing.    Cardiovascular: Negative.    Gastrointestinal: Negative.    Endocrine: Negative.    Genitourinary: Negative.    Musculoskeletal: Negative.    Skin: Negative.    Neurological: Negative.    Hematological: Negative.    Psychiatric/Behavioral: Negative.         Video Exam    There were no vitals filed for this visit.    Physical Exam   General: Pleasant, Awake alert and oriented x 3, conversant without conversational dyspnea, NAD, normalaffect  HEENT:  PERRL, Sclera noninjected, nonicteric OU, Nares patent,  no craniofacial abnormalities, Mucous membranes, moist, no oral lesions, normal dentition  NECK: Trachea midline, no accessory muscle use, no stridor  CARDIAC, PULM and ABD: Could not be performed on video visit.    NEURO: no focal neurologic deficits, AAOx3, moving all extremities appropriately      Visit Time  Total Visit Duration: 30

## 2023-12-19 NOTE — LETTER
December 19, 2023     Conrad Vaz DO  [address] [address]    Patient: Olrando Rodriguez   YOB: 1960   Date of Visit: 12/19/2023       Dear Dr. Vaz:    Thank you for referring Orlando Rodriguez to me for evaluation. Below are my notes for this consultation.    If you have questions, please do not hesitate to call me. I look forward to following your patient along with you.         Sincerely,        Teofilo Chapin DO        CC: No Recipients    Teofilo Chapin DO  12/19/2023  5:05 PM  Sign when Signing Visit  Virtual Regular Visit    Verification of patient location:    Patient is located at Home in the following state in which I hold an active license [place]    Assessment/Plan:  63 y.o. M with PMHx of Multiple System Atrophy, Parkinson's disease with diaphragmatic weakness, chronic knee and ankle pain, urinary retention s/p TURP, Moderate KALLI who comes in for follow up.  1.  Chronic respiratory failure with hypoxia and hypercapnia from progressive neurologic weakness with Multiple System Atrophy.  He has noted progression with worsening bulbar symptoms with upper airway closing, hoarseness, dysphagia and weak/ hoarse voice.         -  He has since move on to hospice since our last visit.          -  Currently using oxygen through the day as well.          -  He has been using his iVAPS therapy with good compliance  However, it does seem the target for ventilation may be elevated.  I will attempt to see if a slightly lower minute ventilation may be beneficial to prevent distension.          - Can consider Ti of 1.1 if more ventilation is needed.             -  He is using opiates intermittently for work of breathing.  Continue PRN morphine and ativan as needed.          -  Continue budesonide and brovana insufficiency.       2. Moderate KALLI (AHI - 15.7) - on iVAPS with good compliance as above.  Residual AHI - 3.3      -  Continue iVAPS as above.        -  He is aware  of the risk of leaving sleep apnea untreated including hypertension, heart failure, arrhythmia, MI and stroke.     3.   Periodic limb movement (PLM index - 109) -  This may be secondary to KALLI or Parkinsons.  He denies symptoms of this.         - Continue Requip to 1.25mg qHS as he notes persistent symptoms       -  He is intermittently using opiates for work of breathing and this may help his RLS symptoms.         Reason for visit is   Chief Complaint   Patient presents with   • Virtual Regular Visit          Encounter provider Teofilo Chapin DO    Provider located at [facility] [place]  [address] [id number] [phone]      Recent Visits  No visits were found meeting these conditions.  Showing recent visits within past 7 days and meeting all other requirements  Today's Visits  Date Type Provider Dept   12/19/23 Telemedicine Teofilo Chapin DO  [facility]   Showing today's visits and meeting all other requirements  Future Appointments  No visits were found meeting these conditions.  Showing future appointments within next 150 days and meeting all other requirements       The patient was identified by name and date of birth. Orlando Rodriguez was informed that this is a telemedicine visit and that the visit is being conducted through the Epic Embedded platform. He agrees to proceed..  My office door was closed. No one else was in the room.  He acknowledged consent and understanding of privacy and security of the video platform. The patient has agreed to participate and understands they can discontinue the visit at any time.    Patient is aware this is a billable service.     Subjective  Orlando Rodriguez is a 63 y.o. male who calls in for follow up.        HPI   Mr. Rodriguez and his wife called in to discuss his respiratory status.  He has noted progression in his shortness of breath and is currently on oxygen.   He has noted worsening hoarseness and fatigue.  He notes increased secretions.  He has been using nebulizers which seems to help.  He denies wheezing or chest tightness.      Answers submitted by the patient for this visit:  Pulmonology Questionnaire (Submitted on 12/19/2023)  Chief Complaint: Primary symptoms  Do you have a hoarse voice?: Yes  Chronicity: chronic  When did you first notice your symptoms?: more than 1 month ago  How often do your symptoms occur?: daily  Since you first noticed this problem, how has it changed?: gradually worsening  Do you have shortness of breath that occurs with effort or exertion?: Yes  Do you have ear congestion?: No  Do you have heartburn?: No  Do you have fatigue?: Yes  Do you have nasal congestion?: No  Do you have shortness of breath when lying flat?: No  Do you have shortness of breath when you wake up?: No  Do you have sweats?: Yes  Have you experienced weight loss?: Yes  Which of the following makes your symptoms worse?: any activity, exercise, minimal activity, strenuous activity  Which of the following makes your symptoms better?: cold air, rest    Past Medical History:   Diagnosis Date   • Arthritis early stages in thumbs and knee   • Back pain    • Benign prostatic hyperplasia 2017    TURP surgery 3/15/2019   • Bladder infection    • BPH (benign prostatic hyperplasia)    • Cancer (HCC)     testicular 1988   • Chronic kidney disease    • Diverticulosis    • GERD (gastroesophageal reflux disease)     occassional   • History of testicular cancer 1988    Surgery and radiation; left side   • Hypertension Nov, 2018    occassionally   • Kidney stone    • Kidney stones     Currently and in the past   • Orthostatic hypotension    • Orthostatic hypotension due to Parkinson's disease (HCC)    • Parkinson's disease (HCC)    • Sleep apnea     uses CPAP   • Sleep apnea, obstructive April, 2019    recently diagnosed   • Urinary tract infection    • Wears glasses        Past Surgical  History:   Procedure Laterality Date   • BLADDER NECK RECONSTRUCTION  07/27/2020   • BLADDER SURGERY  nov 2019. repair bladder neck constriction   • COLONOSCOPY  2017   • COLONOSCOPY  09/2020   • COLONOSCOPY  11/2020   • CYSTOPLASTY / CYSTOURETHROPLASTY  07/27/2020    [facility]   • HERNIA REPAIR      1981, 1986 inguinal hernia repair   • IR SUPRAPUBIC CATHETER PLACEMENT  02/24/2020   • IR TUBE UPSIZE  03/23/2020   • KIDNEY STONE SURGERY     • LITHOTRIPSY      Renal; Resolved: 2/27/07   • ORCHIECTOMY Left    • IN CYSTO INSERTION TRANSPROSTATIC IMPLANT SINGLE N/A 05/17/2018    Procedure: CYSTOSCOPY WITH INSERTION UROLIFT;  Surgeon: Derick Zepeda DO;  [facility];  Service: Urology   • PROSTATE SURGERY N/A 01/18/2018    Procedure: CYSTOSCOPY WITH INSERTION UROLIFT;  Surgeon: Derick Zepeda DO;  [facility];  Service: Urology   • TESTICLE SURGERY  1988    For cancer   • TONSILLECTOMY     • TRANSURETHRAL RESECTION OF PROSTATE  Mar.2019/Jul 2019   • URETERONEOCYSTOSTOMY      w/ cystoscopy Ureteral Stent Placement; Resolved: 6/14/2007   • WISDOM TOOTH EXTRACTION         Current Outpatient Medications   Medication Sig Dispense Refill   • albuterol (ACCUNEB) 0.63 MG/3ML nebulizer solution Inhale 0.63 mg every 6 (six) hours as needed     • arformoterol (BROVANA) 15 mcg/2 mL nebulizer solution Take 2 mL (15 mcg total) by nebulization 2 (two) times a day 120 mL 2   • budesonide (Pulmicort) 0.5 mg/2 mL nebulizer solution Take 2 mL (0.5 mg total) by nebulization 2 (two) times a day Rinse mouth after use. 120 mL 3   • carbidopa-levodopa (SINEMET)  mg per tablet Take 1 tablet by mouth 3 (three) times a day     • Dalfampridine ER 10 MG TB12 3 (three) times a day       • gabapentin (Neurontin) 100 mg capsule Take 3 capsules (300 mg total) by mouth daily at bedtime 90 capsule 3   • guaiFENesin (MUCINEX) 600 mg 12 hr tablet Take 1,200 mg by mouth 2 (two) times a day     • ipratropium (ATROVENT) 0.02 %  nebulizer solution Take 2.5 mL (0.5 mg total) by nebulization 4 (four) times a day 360 mL 0   • midodrine (PROAMATINE) 5 mg tablet TAKE 1 TABLET (5 MG TOTAL) BY MOUTH IF NEEDED (AS NEEDED FOR LOW BLOOD PRESSURE) 90 tablet 1   • Polyethylene Glycol 3350 POWD Take 17 g by mouth daily as needed     • rOPINIRole (REQUIP) 1 mg tablet Take 1 tablet (1 mg total) by mouth daily at bedtime 90 tablet 0   • selegiline (ELDEPRYL) 5 mg capsule Take 5 mg by mouth 2 (two) times a day before meals     • cyanocobalamin (VITAMIN B-12) 1000 MCG tablet Take 1,000 mcg by mouth daily     • tadalafil (CIALIS) 5 MG tablet Take 5 mg by mouth daily       No current facility-administered medications for this visit.        No Known Allergies    Review of Systems   Constitutional:  Positive for fatigue.   HENT: Negative.     Eyes: Negative.    Respiratory:  Positive for chest tightness, shortness of breath and wheezing.    Cardiovascular: Negative.    Gastrointestinal: Negative.    Endocrine: Negative.    Genitourinary: Negative.    Musculoskeletal: Negative.    Skin: Negative.    Neurological: Negative.    Hematological: Negative.    Psychiatric/Behavioral: Negative.         Video Exam    There were no vitals filed for this visit.    Physical Exam   General: Pleasant, Awake alert and oriented x 3, conversant without conversational dyspnea, NAD, normalaffect  HEENT:  PERRL, Sclera noninjected, nonicteric OU, Nares patent,  no craniofacial abnormalities, Mucous membranes, moist, no oral lesions, normal dentition  NECK: Trachea midline, no accessory muscle use, no stridor  CARDIAC, PULM and ABD: Could not be performed on video visit.    NEURO: no focal neurologic deficits, AAOx3, moving all extremities appropriately      Visit Time  Total Visit Duration: 30

## 2024-01-10 ENCOUNTER — TELEPHONE (OUTPATIENT)
Dept: FAMILY MEDICINE CLINIC | Facility: CLINIC | Age: 64
End: 2024-01-10

## 2024-05-23 ENCOUNTER — TELEPHONE (OUTPATIENT)
Dept: FAMILY MEDICINE CLINIC | Facility: CLINIC | Age: 64
End: 2024-05-23

## (undated) DEVICE — PACK TUR

## (undated) DEVICE — GLOVE INDICATOR PI UNDERGLOVE SZ 8 BLUE

## (undated) DEVICE — Device

## (undated) DEVICE — BAG URINE DRAINAGE 2000ML ANTI RFLX LF

## (undated) DEVICE — UROCATCH BAG

## (undated) DEVICE — SCD SEQUENTIAL COMPRESSION COMFORT SLEEVE MEDIUM KNEE LENGTH: Brand: KENDALL SCD

## (undated) DEVICE — GLOVE SRG BIOGEL 7

## (undated) DEVICE — TUBING SUCTION 5MM X 12 FT